# Patient Record
Sex: FEMALE | Race: WHITE | NOT HISPANIC OR LATINO | Employment: FULL TIME | URBAN - METROPOLITAN AREA
[De-identification: names, ages, dates, MRNs, and addresses within clinical notes are randomized per-mention and may not be internally consistent; named-entity substitution may affect disease eponyms.]

---

## 2017-01-31 ENCOUNTER — TRANSFERRED RECORDS (OUTPATIENT)
Dept: HEALTH INFORMATION MANAGEMENT | Facility: CLINIC | Age: 57
End: 2017-01-31

## 2017-02-17 ENCOUNTER — TELEPHONE (OUTPATIENT)
Dept: FAMILY MEDICINE | Facility: CLINIC | Age: 57
End: 2017-02-17
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
## 2017-03-20 ENCOUNTER — OFFICE VISIT (OUTPATIENT)
Dept: FAMILY MEDICINE | Facility: CLINIC | Age: 57
End: 2017-03-20
Payer: COMMERCIAL

## 2017-03-20 VITALS
DIASTOLIC BLOOD PRESSURE: 80 MMHG | SYSTOLIC BLOOD PRESSURE: 122 MMHG | BODY MASS INDEX: 39.16 KG/M2 | HEART RATE: 88 BPM | WEIGHT: 229.4 LBS | HEIGHT: 64 IN

## 2017-03-20 DIAGNOSIS — B07.0 PLANTAR WART: ICD-10-CM

## 2017-03-20 DIAGNOSIS — I10 ESSENTIAL HYPERTENSION WITH GOAL BLOOD PRESSURE LESS THAN 140/90: ICD-10-CM

## 2017-03-20 DIAGNOSIS — E66.01 MORBID OBESITY DUE TO EXCESS CALORIES (H): ICD-10-CM

## 2017-03-20 DIAGNOSIS — R80.9 MICROALBUMINURIA: ICD-10-CM

## 2017-03-20 DIAGNOSIS — E11.29 TYPE 2 DIABETES MELLITUS WITH OTHER DIABETIC KIDNEY COMPLICATION (H): Primary | ICD-10-CM

## 2017-03-20 DIAGNOSIS — E78.5 HYPERLIPIDEMIA LDL GOAL <100: ICD-10-CM

## 2017-03-20 LAB — HBA1C MFR BLD: 10.6 % (ref 4.3–6)

## 2017-03-20 PROCEDURE — 99214 OFFICE O/P EST MOD 30 MIN: CPT | Mod: 25 | Performed by: FAMILY MEDICINE

## 2017-03-20 PROCEDURE — 83036 HEMOGLOBIN GLYCOSYLATED A1C: CPT | Performed by: FAMILY MEDICINE

## 2017-03-20 PROCEDURE — 36415 COLL VENOUS BLD VENIPUNCTURE: CPT | Performed by: FAMILY MEDICINE

## 2017-03-20 PROCEDURE — 99207 C FOOT EXAM  NO CHARGE: CPT | Performed by: FAMILY MEDICINE

## 2017-03-20 PROCEDURE — 17110 DESTRUCTION B9 LES UP TO 14: CPT | Performed by: FAMILY MEDICINE

## 2017-03-20 NOTE — PATIENT INSTRUCTIONS
Diabetes Plan  1. Hemoglobin A1c goal is between 7% and 8%  Your Hemoglobin A1c is not at goal  Plan is:Will use novolog 18 units with each meal, an extra 6 units if glucometer over 200.  2. LDL (bad cholesterol) goal is less than 100  Your LDL is at goal  Plan is: Continue medications at current doses  3. Blood pressure goal is less than 140/90.  Your blood pressure is at goal.  Plan is: Continue medications at current doses  Come back in 3 months.                  Thank you for choosing [facility].  You may be receiving a survey in the mail from [employer] regarding your visit today.  Please take a few minutes to complete and return the survey to let us know how we are doing.      Our Clinic hours are:  Mondays    7:20 am - 7 pm  Tues -  Fri  7:20 am - 5 pm    Clinic Phone: [phone]    The clinic lab opens at 7:30 am Mon - Fri and appointments are required.    [facility]  Ph. [phone]  Monday-Thursday 8 am - 7pm  Tues/Wed/Fri 8 am - 5:30 pm

## 2017-03-20 NOTE — NURSING NOTE
"Chief Complaint   Patient presents with     Diabetes     has some questions got a cold on Wednesday       Initial /80 (Cuff Size: Adult Large)  Pulse 88  Ht 5' 3.5\" (1.613 m)  Wt 229 lb 6.4 oz (104.1 kg)  BMI 40 kg/m2 Estimated body mass index is 40 kg/(m^2) as calculated from the following:    Height as of this encounter: 5' 3.5\" (1.613 m).    Weight as of this encounter: 229 lb 6.4 oz (104.1 kg).  Medication Reconciliation: complete   Faiza Card CMA      "

## 2017-03-20 NOTE — PROGRESS NOTES
SUBJECTIVE:                                                    Bria Davis is a 56 year old female who presents to clinic today for the following health issues:      Diabetes Follow-up    Patient is checking blood sugars: three times daily.   Blood sugar testing frequency justification: Patient modifying lifestyle changes (diet, exercise) with blood sugars  Results are as follows:         am - 200         lunchtime - 200         suppertime - 220    Diabetic concerns: blood sugar frequently over 200     Symptoms of hypoglycemia (low blood sugar): none     Paresthesias (numbness or burning in feet) or sores: No     Date of last diabetic eye exam: 6 weeks ago        Amount of exercise or physical activity: 1 day/week for an average of 15-30 minutes    Problems taking medications regularly: No    Medication side effects: none    Diet: regular (no restrictions)    Last visit I recommended that she take 10 units of NovoLog with each meal if the glucometer at the time was over 200. She admits that she would not bring her insulin to work so never did cover her lunch and was often reluctant to take NovoLog at supper with fear that she might have hypoglycemia during the night. In the past if she had taken 30 units of NovoLog with meals she had some episodes of hypoglycemia      PROBLEMS TO ADD ON...      Hyperlipidemia Follow-Up      Rate your low fat/cholesterol diet?: good    Taking statin?  Yes, no muscle aches from statin    Other lipid medications/supplements?:  none     Hypertension Follow-up      Outpatient blood pressures are being checked at work.  Results are in the acceptable range.    Low Salt Diet: no added salt       Asthma Follow-Up    Was ACT completed today?    Yes    ACT Total Scores 3/20/2017   ACT TOTAL SCORE (Goal Greater than or Equal to 20) 21   In the past 12 months, how many times did you visit the emergency room for your asthma without being admitted to the hospital? 0   In the past 12 months,  "how many times were you hospitalized overnight because of your asthma? 0       Recent asthma triggers that patient is dealing with: upper respiratory infections       Plantar wart: She has a plantar wart on the right foot distal to the heel area. This was treated with cryotherapy in the past but did not resolve completely        Problem list and histories reviewed & adjusted, as indicated.  Additional history: none        Reviewed and updated as needed this visit by clinical staff  Tobacco  Allergies  Meds  Med Hx  Surg Hx  Fam Hx  Soc Hx      Reviewed and updated as needed this visit by Provider               ROS:  Constitutional, HEENT, cardiovascular, pulmonary, gi and gu systems are negative, except as otherwise noted.        OBJECTIVE:                                                    /80 (Cuff Size: Adult Large)  Pulse 88  Ht 5' 3.5\" (1.613 m)  Wt 229 lb 6.4 oz (104.1 kg)  BMI 40 kg/m2    GENERAL: healthy, alert and no distress  EYES: Eyes grossly normal to inspection, extraocular movements - intact, and PERRL  NECK: no tenderness, no adenopathy, no asymmetry, no masses, no stiffness; thyroid- normal to palpation  RESP: lungs clear to auscultation - no rales, no rhonchi, no wheezes  CV: regular rates and rhythm, normal S1 S2, no S3 or S4 and no murmur, no click or rub -  MS: extremities- no gross deformities noted, no edema  SKIN: 4 mm plantar wart on the plantar surface of right foot distal to the heel  Diabetic foot exam: no trophic changes or ulcerative lesions and normal monofilament exam    Diagnostic test results:  Results for orders placed or performed in visit on 03/20/17 (from the past 24 hour(s))   Hemoglobin A1c   Result Value Ref Range    Hemoglobin A1C 10.6 (H) 4.3 - 6.0 %        ASSESSMENT/PLAN:                                                    ASSESSMENT:  1. Type 2 diabetes mellitus with other diabetic kidney complication (H)    2. Essential hypertension with goal blood pressure " less than 140/90    3. Morbid obesity due to excess calories (H)    4. Hyperlipidemia LDL goal <100    5. Microalbuminuria    6. Plantar wart        PLAN:  Orders Placed This Encounter     DESTRUCT BENIGN LESION, UP TO 14     Hemoglobin A1c     insulin aspart (NOVOLOG VIAL) 100 UNITS/ML injection     The callus was trimmed off the wart with a razor blade and then the wart was treated with cryotherapy using liquid nitrogen with a freeze-thaw-freeze technique ×3    Patient Instructions   Diabetes Plan  1. Hemoglobin A1c goal is between 7% and 8%  Your Hemoglobin A1c is not at goal  Plan is:Will use novolog 18 units with each meal, an extra 6 units if glucometer over 200.  2. LDL (bad cholesterol) goal is less than 100  Your LDL is at goal  Plan is: Continue medications at current doses  3. Blood pressure goal is less than 140/90.  Your blood pressure is at goal.  Plan is: Continue medications at current doses  Come back in 3 months.                  Thank you for choosing [facility].  You may be receiving a survey in the mail from [employer] regarding your visit today.  Please take a few minutes to complete and return the survey to let us know how we are doing.      Our Clinic hours are:  Mondays    7:20 am - 7 pm  Tues -  Fri  7:20 am - 5 pm    Clinic Phone: [phone]    The clinic lab opens at 7:30 am Mon - Fri and appointments are required.    [facility]  Ph. [phone]  Monday-Thursday 8 am - 7pm  Tues/Wed/Fri 8 am - 5:30 pm             IRVIN Conroy  [facility]

## 2017-03-20 NOTE — Clinical Note
Please abstract the following data from this visit with this patient into the appropriate field in Epic:  Mammogram done on this date: 4/4/2017 (approximately), by this group: [facility], results were normal.

## 2017-03-20 NOTE — MR AVS SNAPSHOT
After Visit Summary   3/20/2017    Bria Davis    MRN: [id number]           Patient Information     Date Of Birth          1960        Visit Information        Provider Department      3/20/2017 4:40 PM Rafiq, IRVIN Malloy MD [facility]        Today's Diagnoses     Type 2 diabetes mellitus with other diabetic kidney complication (H)    -  1      Care Instructions    Diabetes Plan  1. Hemoglobin A1c goal is between 7% and 8%  Your Hemoglobin A1c is not at goal  Plan is:Will use novolog 18 units with each meal, an extra 6 units if glucometer over 200.  2. LDL (bad cholesterol) goal is less than 100  Your LDL is at goal  Plan is: Continue medications at current doses  3. Blood pressure goal is less than 140/90.  Your blood pressure is at goal.  Plan is: Continue medications at current doses  Come back in 3 months.                  Thank you for choosing [facility].  You may be receiving a survey in the mail from [employer] [facility] [employer] regarding your visit today.  Please take a few minutes to complete and return the survey to let us know how we are doing.      Our Clinic hours are:  Mondays    7:20 am - 7 pm  Tues -  Fri  7:20 am - 5 pm    Clinic Phone: [phone]    The clinic lab opens at 7:30 am Mon - Fri and appointments are required.    [facility]  Ph. [phone]  Monday-Thursday 8 am - 7pm  Tues/Wed/Fri 8 am - 5:30 pm               Follow-ups after your visit        Who to contact     If you have questions or need follow up information about today's clinic visit or your schedule please contact [facility] directly at [phone].  Normal or non-critical lab and imaging results will be communicated to you by MyChart, letter or phone within 4 business days after the clinic has received the results. If you do not hear from us within 7 days, please contact the clinic through MyChart or phone. If you have a critical or abnormal lab result, we  "will notify you by phone as soon as possible.  Submit refill requests through [employer] or call your pharmacy and they will forward the refill request to us. Please allow 3 business days for your refill to be completed.          Additional Information About Your Visit        [employer] Information     [employer] gives you secure access to your electronic health record. If you see a primary care provider, you can also send messages to your care team and make appointments. If you have questions, please call your primary care clinic.  If you do not have a primary care provider, please call [phone] and they will assist you.        Care EveryWhere ID     This is your Care EveryWhere ID. This could be used by other organizations to access your [facility] medical records  [id number]        Your Vitals Were     Pulse Height BMI (Body Mass Index)             88 5' 3.5\" (1.613 m) 40 kg/m2          Blood Pressure from Last 3 Encounters:   03/20/17 122/80   12/08/16 126/78   09/02/16 124/86    Weight from Last 3 Encounters:   03/20/17 229 lb 6.4 oz (104.1 kg)   12/08/16 229 lb 3.2 oz (104 kg)   09/02/16 244 lb (110.7 kg)              We Performed the Following     Hemoglobin A1c          Today's Medication Changes          These changes are accurate as of: 3/20/17  5:19 PM.  If you have any questions, ask your nurse or doctor.               These medicines have changed or have updated prescriptions.        Dose/Directions    insulin aspart 100 UNITS/ML injection   Commonly known as:  NovoLOG VIAL   This may have changed:  additional instructions   Used for:  Type 2 diabetes mellitus with other diabetic kidney complication (H)   Changed by:  IRVIN Conroy MD        Take 18 units with each meal. If glucometer is over 200 take an extra 6 units   Quantity:  3 vial   Refills:  1            Where to get your medicines      These medications were sent to [facility] [place], [place] - [address]  [address] [address] [facility] [address]     Phone:  [phone]     insulin aspart 100 UNITS/ML injection                Primary Care Provider Office Phone # Fax #    R Gama Conroy [phone]       [facility] [address] [facility] [address]        Thank you!     Thank you for choosing [facility]  for your care. Our goal is always to provide you with excellent care. Hearing back from our patients is one way we can continue to improve our services. Please take a few minutes to complete the written survey that you may receive in the mail after your visit with us. Thank you!             Your Updated Medication List - Protect others around you: Learn how to safely use, store and throw away your medicines at www.disposemymeds.org.          This list is accurate as of: 3/20/17  5:19 PM.  Always use your most recent med list.                   Brand Name Dispense Instructions for use    albuterol 108 (90 BASE) MCG/ACT Inhaler    PROAIR HFA/PROVENTIL HFA/VENTOLIN HFA    1 Inhaler    Inhale 2 puffs into the lungs every 6 hours       aspirin 81 MG tablet     100 tablet    Take 1 tablet by mouth daily.       blood glucose monitoring lancets     4 Box    1 each by In Vitro route 4 times daily Use to test blood sugar as directed.       blood glucose monitoring test strip    no brand specified    4 Box    Contour test stripsby In Vitro route 4 times daily       Calcium-Vitamin D 600-400 MG-UNIT Tabs      Take 1 tablet by mouth daily       dulaglutide 1.5 MG/0.5ML pen    TRULICITY    6 mL    Inject 1.5 mg Subcutaneous every 7 days       esomeprazole 40 MG CR capsule    nexIUM    90 capsule    Take 1 capsule (40 mg) by mouth every morning (before breakfast)       fluconazole 150 MG tablet    DIFLUCAN    4 tablet    Take 1 tablet (150 mg) by mouth every 3 days       fluticasone-salmeterol 100-50 MCG/DOSE diskus inhaler    ADVAIR DISKUS    3 Inhaler    Inhale 1 puff into  "the lungs 2 times daily       insulin aspart 100 UNITS/ML injection    NovoLOG VIAL    3 vial    Take 18 units with each meal. If glucometer is over 200 take an extra 6 units       insulin glargine 100 UNIT/ML injection    LANTUS VIAL    3 Month    Inject 64 Units Subcutaneous in the morning       losartan 100 MG tablet    COZAAR    100 tablet    Take 1 tablet (100 mg) by mouth daily       metFORMIN 500 MG 24 hr tablet    GLUCOPHAGE-XR    360 tablet    Take 2 tablets (1,000 mg) by mouth 2 times daily (with meals) (Needs lab work)       metoclopramide 10 MG tablet    REGLAN    180 tablet    Take 1 tablet (10 mg) by mouth 2 times daily       montelukast 10 MG tablet    SINGULAIR    90 tablet    Take 1 tablet (10 mg) by mouth At Bedtime       MULTIVITAMIN ADULTS 50+ Tabs      Take 1 tablet by mouth daily       Needle (Disp) 30G X 1/2\" Misc    BD DISP NEEDLES    300 each    To use with humalog insulin 3 times daily       pseudoePHEDrine 30 MG tablet    SUDAFED    360 tablet    Take 2 tablets (60 mg) by mouth every 12 hours       simvastatin 40 MG tablet    ZOCOR    100 tablet    Take 1 tablet (40 mg) by mouth At Bedtime at bedtime.         "

## 2017-03-21 ASSESSMENT — ASTHMA QUESTIONNAIRES: ACT_TOTALSCORE: 21

## 2017-04-04 ENCOUNTER — TRANSFERRED RECORDS (OUTPATIENT)
Dept: HEALTH INFORMATION MANAGEMENT | Facility: CLINIC | Age: 57
End: 2017-04-04

## 2017-04-24 ENCOUNTER — TELEPHONE (OUTPATIENT)
Dept: FAMILY MEDICINE | Facility: CLINIC | Age: 57
End: 2017-04-24

## 2017-04-24 DIAGNOSIS — E11.29 TYPE 2 DIABETES MELLITUS WITH OTHER DIABETIC KIDNEY COMPLICATION (H): ICD-10-CM

## 2017-04-24 NOTE — TELEPHONE ENCOUNTER
"Patient is requesting a new syringe needle prescription, she is looking for a shorter inch needle for her novolog, she doesn't like the 1/2\" needle. She also is requesting an Rx for her syringes for lantus.     Thank You-  Noy Ceballos, [facility] [place]     "

## 2017-04-26 NOTE — TELEPHONE ENCOUNTER
Talked to pharmacist, okay to dispense whatever they can find as a shorter alternative.     Kaia Elena M.D.

## 2017-05-01 ENCOUNTER — OFFICE VISIT (OUTPATIENT)
Dept: FAMILY MEDICINE | Facility: CLINIC | Age: 57
End: 2017-05-01
Payer: COMMERCIAL

## 2017-05-01 VITALS
WEIGHT: 240.2 LBS | DIASTOLIC BLOOD PRESSURE: 80 MMHG | BODY MASS INDEX: 41.01 KG/M2 | SYSTOLIC BLOOD PRESSURE: 130 MMHG | HEART RATE: 72 BPM | HEIGHT: 64 IN

## 2017-05-01 DIAGNOSIS — G44.209 MUSCLE CONTRACTION HEADACHE: Primary | ICD-10-CM

## 2017-05-01 DIAGNOSIS — E11.29 TYPE 2 DIABETES MELLITUS WITH OTHER DIABETIC KIDNEY COMPLICATION (H): ICD-10-CM

## 2017-05-01 PROCEDURE — 99214 OFFICE O/P EST MOD 30 MIN: CPT | Performed by: FAMILY MEDICINE

## 2017-05-01 RX ORDER — CYCLOBENZAPRINE HCL 10 MG
10 TABLET ORAL AT BEDTIME
Qty: 30 TABLET | Refills: 5 | Status: SHIPPED | OUTPATIENT
Start: 2017-05-01 | End: 2017-09-18

## 2017-05-01 NOTE — MR AVS SNAPSHOT
"              After Visit Summary   5/1/2017    Bria Davis    MRN: [id number]           Patient Information     Date Of Birth          1960        Visit Information        Provider Department      5/1/2017 4:40 PM IRVIN Conroy MD [facility]        Today's Diagnoses     Muscle contraction headache    -  1      Care Instructions    Alternate ice and heat to the back of the neck. Take the Flexeril at night if you have a persistent headache.          Follow-ups after your visit        Who to contact     If you have questions or need follow up information about today's clinic visit or your schedule please contact [facility] directly at [phone].  Normal or non-critical lab and imaging results will be communicated to you by MyChart, letter or phone within 4 business days after the clinic has received the results. If you do not hear from us within 7 days, please contact the clinic through [employer] or phone. If you have a critical or abnormal lab result, we will notify you by phone as soon as possible.  Submit refill requests through [employer] or call your pharmacy and they will forward the refill request to us. Please allow 3 business days for your refill to be completed.          Additional Information About Your Visit        MyChart Information     [employer] gives you secure access to your electronic health record. If you see a primary care provider, you can also send messages to your care team and make appointments. If you have questions, please call your primary care clinic.  If you do not have a primary care provider, please call [phone] and they will assist you.        Care EveryWhere ID     This is your Care EveryWhere ID. This could be used by other organizations to access your [facility] medical records  [id number]        Your Vitals Were     Pulse Height BMI (Body Mass Index)             72 5' 3.5\" (1.613 m) 41.88 kg/m2          Blood Pressure from Last 3 " Encounters:   05/01/17 130/80   03/20/17 122/80   12/08/16 126/78    Weight from Last 3 Encounters:   05/01/17 240 lb 3.2 oz (109 kg)   03/20/17 229 lb 6.4 oz (104.1 kg)   12/08/16 229 lb 3.2 oz (104 kg)              Today, you had the following     No orders found for display         Today's Medication Changes          These changes are accurate as of: 5/1/17  4:56 PM.  If you have any questions, ask your nurse or doctor.               Start taking these medicines.        Dose/Directions    cyclobenzaprine 10 MG tablet   Commonly known as:  FLEXERIL   Used for:  Muscle contraction headache   Started by:  IRVIN Conroy MD        Dose:  10 mg   Take 1 tablet (10 mg) by mouth At Bedtime   Quantity:  30 tablet   Refills:  5            Where to get your medicines      These medications were sent to [facility] [place] [id number]  [id number] [place] [address]     Phone:  [phone]     cyclobenzaprine 10 MG tablet                Primary Care Provider Office Phone # Fax #    IRVIN Conroy [phone]       [address] [address]        Thank you!     Thank you for choosing [facility]  for your care. Our goal is always to provide you with excellent care. Hearing back from our patients is one way we can continue to improve our services. Please take a few minutes to complete the written survey that you may receive in the mail after your visit with us. Thank you!             Your Updated Medication List - Protect others around you: Learn how to safely use, store and throw away your medicines at www.disposemymeds.org.          This list is accurate as of: 5/1/17  4:56 PM.  Always use your most recent med list.                   Brand Name Dispense Instructions for use    albuterol 108 (90 BASE) MCG/ACT Inhaler    PROAIR HFA/PROVENTIL HFA/VENTOLIN HFA    1 Inhaler    Inhale 2 puffs  "into the lungs every 6 hours       aspirin 81 MG tablet     100 tablet    Take 1 tablet by mouth daily.       blood glucose monitoring lancets     4 Box    1 each by In Vitro route 4 times daily Use to test blood sugar as directed.       blood glucose monitoring test strip    no brand specified    4 Box    Contour test stripsby In Vitro route 4 times daily       Calcium-Vitamin D 600-400 MG-UNIT Tabs      Take 1 tablet by mouth daily       cyclobenzaprine 10 MG tablet    FLEXERIL    30 tablet    Take 1 tablet (10 mg) by mouth At Bedtime       dulaglutide 1.5 MG/0.5ML pen    TRULICITY    6 mL    Inject 1.5 mg Subcutaneous every 7 days       esomeprazole 40 MG CR capsule    nexIUM    90 capsule    Take 1 capsule (40 mg) by mouth every morning (before breakfast)       fluconazole 150 MG tablet    DIFLUCAN    4 tablet    Take 1 tablet (150 mg) by mouth every 3 days       fluticasone-salmeterol 100-50 MCG/DOSE diskus inhaler    ADVAIR DISKUS    3 Inhaler    Inhale 1 puff into the lungs 2 times daily       insulin aspart 100 UNITS/ML injection    NovoLOG VIAL    3 vial    Take 18 units with each meal. If glucometer is over 200 take an extra 6 units       insulin glargine 100 UNIT/ML injection    LANTUS VIAL    3 Month    Inject 64 Units Subcutaneous in the morning       losartan 100 MG tablet    COZAAR    100 tablet    Take 1 tablet (100 mg) by mouth daily       metFORMIN 500 MG 24 hr tablet    GLUCOPHAGE-XR    360 tablet    Take 2 tablets (1,000 mg) by mouth 2 times daily (with meals) (Needs lab work)       metoclopramide 10 MG tablet    REGLAN    180 tablet    Take 1 tablet (10 mg) by mouth 2 times daily       montelukast 10 MG tablet    SINGULAIR    90 tablet    Take 1 tablet (10 mg) by mouth At Bedtime       MULTIVITAMIN ADULTS 50+ Tabs      Take 1 tablet by mouth daily       Needle (Disp) 30G X 1/2\" Misc    BD DISP NEEDLES    300 each    To use with humalog insulin 3 times daily       pseudoePHEDrine 30 MG tablet    " SUDAFED    360 tablet    Take 2 tablets (60 mg) by mouth every 12 hours       simvastatin 40 MG tablet    ZOCOR    100 tablet    Take 1 tablet (40 mg) by mouth At Bedtime at bedtime.

## 2017-05-01 NOTE — PATIENT INSTRUCTIONS
Alternate ice and heat to the back of the neck. Take the Flexeril at night if you have a persistent headache.

## 2017-05-02 NOTE — PROGRESS NOTES
Subjective:  Bria Davis is a 56 year old female   Chief Complaint   Patient presents with     Headache     X worse in the last week.      Patient Request     concerns with ? reaction to Trulicity. sx are choking when swallowing. food, water or whatever pt. is having. X months.      Last week she had a headache that was more severe than usual in the right posterior neck and occipital region. This particular headache lasted 2 days which is quite unusual for her. She will get hit headaches periodically but they generally only last a few hours. It has gotten better since then but she's had a few episodes of the headache off and on since that. She has had a few episodes of choking with swallowing food and had read about truly see being associated with thyroid cancer. She admits that she was worried that the symptoms could be because of a tumor in the thyroid.        Encounter Diagnoses   Name Primary?     Muscle contraction headache Yes     Type 2 diabetes mellitus with other diabetic kidney complication (H)        ROS:other than noted above, general, HEENT, respiratory, cardiac, gastrointestinal systems are negative    Medical, surgical, social, and family histories, medications and allergies reviewed and updated.    Objective:  Exam:    GENERAL APPEARANCE ADULT: Alert, no acute distress  EYES: PERRL, EOM normal, conjunctiva and lids normal  HENT: Ears and TMs normal, oral mucosa and posterior oropharynx normal  NECK: No adenopathy,masses or thyromegaly  RESP: lungs clear to auscultation   CV: normal rate, regular rhythm, no murmur or gallop  NEURO: Alert, oriented, speech and mentation normal, Cranial nerves 2-12 are normal., Strength normal and symmetrical in upper and lower extremities., Reflexes 2+ and symmetrical at biceps, triceps, brachioradialis, knees and ankles, Finger to nose and heel to shin testing is normal, Sensation is normal., Gait normal    She brought in her glucometer readings and they have  been quite improved over the last month    ASSESSMENT:  1. Muscle contraction headache; Discussed pathophysiology of this condition and implications.  Questions answered.     2. Type 2 diabetes mellitus with other diabetic kidney complication (H)      Reassured her that thyroid cancer would present as a palpable lump in the thyroid gland and she does not have this. Would really like to continue with the [facility] [place] since it is helping her blood sugars    PLAN:  Orders Placed This Encounter     cyclobenzaprine (FLEXERIL) 10 MG tablet   Discussed how to take the medication(s), expected outcomes, potential side effects.     Patient Instructions   Alternate ice and heat to the back of the neck. Take the Flexeril at night if you have a persistent headache.

## 2017-05-22 ENCOUNTER — OFFICE VISIT (OUTPATIENT)
Dept: FAMILY MEDICINE | Facility: CLINIC | Age: 57
End: 2017-05-22
Payer: COMMERCIAL

## 2017-05-22 VITALS
HEART RATE: 101 BPM | DIASTOLIC BLOOD PRESSURE: 86 MMHG | HEIGHT: 64 IN | BODY MASS INDEX: 41.48 KG/M2 | RESPIRATION RATE: 16 BRPM | SYSTOLIC BLOOD PRESSURE: 137 MMHG | TEMPERATURE: 98.2 F | WEIGHT: 243 LBS

## 2017-05-22 DIAGNOSIS — E11.29 TYPE 2 DIABETES MELLITUS WITH OTHER DIABETIC KIDNEY COMPLICATION (H): ICD-10-CM

## 2017-05-22 DIAGNOSIS — H10.31 ACUTE BACTERIAL CONJUNCTIVITIS OF RIGHT EYE: Primary | ICD-10-CM

## 2017-05-22 PROCEDURE — 99213 OFFICE O/P EST LOW 20 MIN: CPT | Performed by: NURSE PRACTITIONER

## 2017-05-22 RX ORDER — CIPROFLOXACIN HYDROCHLORIDE 3.5 MG/ML
SOLUTION/ DROPS TOPICAL
Qty: 1 BOTTLE | Refills: 0 | Status: SHIPPED | OUTPATIENT
Start: 2017-05-22 | End: 2017-12-29

## 2017-05-22 NOTE — NURSING NOTE
"Chief Complaint   Patient presents with     Eye Problem     Medication Reconciliation     not on the Diflucan       Initial /86 (BP Location: Right arm, Patient Position: Chair, Cuff Size: Adult Large)  Pulse 101  Temp 98.2  F (36.8  C) (Oral)  Resp 16  Ht 5' 3.5\" (1.613 m)  Wt 243 lb (110.2 kg)  LMP   BMI 42.37 kg/m2 Estimated body mass index is 42.37 kg/(m^2) as calculated from the following:    Height as of this encounter: 5' 3.5\" (1.613 m).    Weight as of this encounter: 243 lb (110.2 kg).  Medication Reconciliation: complete  "

## 2017-05-22 NOTE — MR AVS SNAPSHOT
After Visit Summary   5/22/2017    Bria Davis    MRN: [id number]           Patient Information     Date Of Birth          1960        Visit Information        Provider Department      5/22/2017 9:40 AM Varsha Posey APRN [facility]        Today's Diagnoses     Acute bacterial conjunctivitis of right eye    -  1      Care Instructions    If no improvement or any worsening, follow up with eye doctor.                  Conjunctivitis  What is eye inflammation?   The clear membrane that lines the inside of the eyelids and covers the white of the eye (conjunctiva) can get red and swollen. This is called conjunctivitis.   How does it occur?   Conjunctivitis can be caused by many things, including infection by viruses or bacteria. Many kinds of bacteria can cause conjunctivitis. These include bacteria that cause strep, staph, and STD infections.   Conjunctivitis caused by a virus is sometimes called pink eye. It can be spread easily to other people. The same viruses that cause the common cold can cause viral conjunctivitis. Viruses can be spread by coughing or sneezing and can get in your eyes through contact with infected:  hands   washcloths or towels   cosmetics   false eyelashes   soft contact lenses  Avoid unnecessary contact with others so that you do not spread the disease.   What are the symptoms?   Symptoms may include:  itchy or scratchy eyes   redness   painful sensitivity to light   swelling of eyelids   matting of eyelashes   watery or pus discharge  How is it diagnosed?   Your healthcare provider will ask about your medical history and if you have been near someone who has conjunctivitis. Your provider will examine your eyes. He or she will also check for enlarged lymph nodes near your ear and jaw. Your provider may get lab tests of a sample of the pus to see what type of germs are present.  How is it treated?   Like a cold, viral conjunctivitis will  usually go away on its own without treatment. However, your healthcare provider may prescribe eyedrops to help control your symptoms. Antihistamine pills may also relieve the itching and redness.  If you have bacterial conjunctivitis, your healthcare provider will prescribe antibiotic eyedrops. You can also help your eyes get better by washing them gently to remove any pus or crusts. Then dry them gently with a clean towel.  For very severe forms of conjunctivitis, antibiotics may need to be given by mouth or with a shot or an IV.  If you wear contact lenses, you will need to stop wearing them until your eyes are healed. The combination of contacts and conjunctivitis may damage your cornea (the clear outer layer on the front of your eye) and cause severe vision problems. Your provider may ask you to throw away your current contact lenses and lens case.  How long will the effects last?   Viral conjunctivitis usually gets worse 5 to 7 days after the first symptoms. It can get better in 10 days to 1 month. If only one eye is affected at first, the other eye may become infected up to 2 weeks later. Usually, if both eyes are affected, the first eye has worse conjunctivitis than the second.  Bacterial conjunctivitis should improve within 2 days after you begin using antibiotics. If your eyes are not better after 3 days of antibiotics, call your healthcare provider.  How can I prevent conjunctivitis?   To keep from getting conjunctivitis from someone who has it, or to keep from spreading it to others, follow these guidelines:  Wash your hands often. Do not touch or rub your eyes.   Never share eye makeup or cosmetics with anyone. When you have conjunctivitis, throw out eye makeup you have been using.   Never use eye medicine that has been prescribed for someone else.   Do not share towels, washcloths, pillows, or sheets with anyone. If one of your eyes is affected but not the other, use a separate towel for each eye.    Avoid swimming in swimming pools if you have conjunctivitis.   Avoid close contact with people until your symptoms improve. Depending on your job, you may be asked to take some time off from work.  When should I call my healthcare provider?   Call your provider if:  You have any severe eye pain.   Your symptoms do not improve after you have used your medicine for 3 days (if you have bacterial conjunctivitis).   Your symptoms do not improve after 2 weeks (if you have viral conjunctivitis).   Your eyes get very sensitive to light, even after the redness is gone.  Reviewed for medical accuracy by faculty at the [facility]. Web site: [email or web address]        Thank you for choosing [facility].  You may be receiving a survey in the mail from [employer] regarding your visit today.  Please take a few minutes to complete and return the survey to let us know how we are doing.      Our Clinic hours are:  Mondays    7:20 am - 7 pm  Tues -  Fri  7:20 am - 5 pm    Clinic Phone: [phone]    The clinic lab opens at 7:30 am Mon - Fri and appointments are required.    [facility]  Ph. [phone]  Monday-Thursday 8 am - 7pm  Tues/Wed/Fri 8 am - 5:30 pm               Follow-ups after your visit        Who to contact     If you have questions or need follow up information about today's clinic visit or your schedule please contact [facility] directly at [phone].  Normal or non-critical lab and imaging results will be communicated to you by MyChart, letter or phone within 4 business days after the clinic has received the results. If you do not hear from us within 7 days, please contact the clinic through MyChart or phone. If you have a critical or abnormal lab result, we will notify you by phone as soon as possible.  Submit refill requests through [employer] or call your pharmacy and they will forward the refill request to us. Please  "allow 3 business days for your refill to be completed.          Additional Information About Your Visit        MyChart Information     [employer] gives you secure access to your electronic health record. If you see a primary care provider, you can also send messages to your care team and make appointments. If you have questions, please call your primary care clinic.  If you do not have a primary care provider, please call [phone] and they will assist you.        Care EveryWhere ID     This is your Care EveryWhere ID. This could be used by other organizations to access your [facility] medical records  [id number]        Your Vitals Were     Pulse Temperature Respirations Height BMI (Body Mass Index)       101 98.2  F (36.8  C) (Oral) 16 5' 3.5\" (1.613 m) 42.37 kg/m2        Blood Pressure from Last 3 Encounters:   05/22/17 137/86   05/01/17 130/80   03/20/17 122/80    Weight from Last 3 Encounters:   05/22/17 243 lb (110.2 kg)   05/01/17 240 lb 3.2 oz (109 kg)   03/20/17 229 lb 6.4 oz (104.1 kg)              We Performed the Following     Asthma Action Plan (AAP)          Today's Medication Changes          These changes are accurate as of: 5/22/17 10:27 AM.  If you have any questions, ask your nurse or doctor.               Start taking these medicines.        Dose/Directions    ciprofloxacin 0.3 % ophthalmic solution   Commonly known as:  CILOXAN   Used for:  Acute bacterial conjunctivitis of right eye   Started by:  Varsha Posey APRN CNP        Instill 1-2 drops in the affected eye(s) every 2 hours while awake for 2 days then 1-2 drops every 4 hours while awake for the next 5 days.   Quantity:  1 Bottle   Refills:  0            Where to get your medicines      These medications were sent to [facility] [place], [place] - [address]  [address], [facility] [address]     Phone:  [phone]     ciprofloxacin 0.3 % ophthalmic solution                Primary " Care Provider Office Phone # Fax #    IRVIN Gama Conroy [phone]       [address] [address]        Thank you!     Thank you for choosing [facility]  for your care. Our goal is always to provide you with excellent care. Hearing back from our patients is one way we can continue to improve our services. Please take a few minutes to complete the written survey that you may receive in the mail after your visit with us. Thank you!             Your Updated Medication List - Protect others around you: Learn how to safely use, store and throw away your medicines at www.disposemymeds.org.          This list is accurate as of: 5/22/17 10:27 AM.  Always use your most recent med list.                   Brand Name Dispense Instructions for use    albuterol 108 (90 BASE) MCG/ACT Inhaler    PROAIR HFA/PROVENTIL HFA/VENTOLIN HFA    1 Inhaler    Inhale 2 puffs into the lungs every 6 hours       aspirin 81 MG tablet     100 tablet    Take 1 tablet by mouth daily.       blood glucose monitoring lancets     4 Box    1 each by In Vitro route 4 times daily Use to test blood sugar as directed.       blood glucose monitoring test strip    no brand specified    4 Box    Contour test stripsby In Vitro route 4 times daily       Calcium-Vitamin D 600-400 MG-UNIT Tabs      Take 1 tablet by mouth daily       ciprofloxacin 0.3 % ophthalmic solution    CILOXAN    1 Bottle    Instill 1-2 drops in the affected eye(s) every 2 hours while awake for 2 days then 1-2 drops every 4 hours while awake for the next 5 days.       cyclobenzaprine 10 MG tablet    FLEXERIL    30 tablet    Take 1 tablet (10 mg) by mouth At Bedtime       dulaglutide 1.5 MG/0.5ML pen    TRULICITY    6 mL    Inject 1.5 mg Subcutaneous every 7 days       esomeprazole 40 MG CR capsule    nexIUM    90 capsule    Take 1 capsule (40 mg) by mouth every morning (before breakfast)       fluconazole 150 MG tablet  "   DIFLUCAN    4 tablet    Take 1 tablet (150 mg) by mouth every 3 days       fluticasone-salmeterol 100-50 MCG/DOSE diskus inhaler    ADVAIR DISKUS    3 Inhaler    Inhale 1 puff into the lungs 2 times daily       insulin aspart 100 UNITS/ML injection    NovoLOG VIAL    3 vial    Take 18 units with each meal. If glucometer is over 200 take an extra 6 units       insulin glargine 100 UNIT/ML injection    LANTUS VIAL    3 Month    Inject 64 Units Subcutaneous in the morning       losartan 100 MG tablet    COZAAR    100 tablet    Take 1 tablet (100 mg) by mouth daily       metFORMIN 500 MG 24 hr tablet    GLUCOPHAGE-XR    360 tablet    Take 2 tablets (1,000 mg) by mouth 2 times daily (with meals) (Needs lab work)       metoclopramide 10 MG tablet    REGLAN    180 tablet    Take 1 tablet (10 mg) by mouth 2 times daily       montelukast 10 MG tablet    SINGULAIR    90 tablet    Take 1 tablet (10 mg) by mouth At Bedtime       MULTIVITAMIN ADULTS 50+ Tabs      Take 1 tablet by mouth daily       Needle (Disp) 30G X 1/2\" Misc    BD DISP NEEDLES    300 each    To use with humalog insulin 3 times daily       pseudoePHEDrine 30 MG tablet    SUDAFED    360 tablet    Take 2 tablets (60 mg) by mouth every 12 hours       simvastatin 40 MG tablet    ZOCOR    100 tablet    Take 1 tablet (40 mg) by mouth At Bedtime at bedtime.         "

## 2017-05-22 NOTE — TELEPHONE ENCOUNTER
Trulicity          Last Written Prescription Date: 12/8/16  Last Fill Quantity: 6ml, # refills: 1  Last Office Visit with G, P or [facility] prescribing provider:  5/1/17        BP Readings from Last 3 Encounters:   05/22/17 137/86   05/01/17 130/80   03/20/17 122/80     Lab Results   Component Value Date    MICROL 271 05/19/2016     Lab Results   Component Value Date    UMALCR 231.62 05/19/2016     Creatinine   Date Value Ref Range Status   09/01/2016 0.57 0.52 - 1.04 mg/dL Final   ]  GFR Estimate   Date Value Ref Range Status   09/01/2016 >90  Non  GFR Calc   >60 mL/min/1.7m2 Final   09/28/2015 >90  Non  GFR Calc   >60 mL/min/1.7m2 Final   03/13/2014 >90 >60 mL/min/1.7m2 Final     GFR Estimate If Black   Date Value Ref Range Status   09/01/2016 >90   GFR Calc   >60 mL/min/1.7m2 Final   09/28/2015 >90   GFR Calc   >60 mL/min/1.7m2 Final   03/13/2014 >90 >60 mL/min/1.7m2 Final     Lab Results   Component Value Date    CHOL 170 11/25/2016     Lab Results   Component Value Date    HDL 45 11/25/2016     Lab Results   Component Value Date    LDL 80 11/25/2016     Lab Results   Component Value Date    TRIG 224 11/25/2016     Lab Results   Component Value Date    CHOLHDLRATIO 4.0 03/13/2014     Lab Results   Component Value Date    AST 32 04/26/2010     Lab Results   Component Value Date    ALT 17 03/30/2012     Lab Results   Component Value Date    A1C 10.6 03/20/2017    A1C 10.8 11/25/2016    A1C 10.1 09/01/2016    A1C 8.6 05/19/2016    A1C 8.4 01/21/2016     Potassium   Date Value Ref Range Status   09/01/2016 4.2 3.4 - 5.3 mmol/L Final     Thank You-  Noy Ceballos, [facility]

## 2017-05-22 NOTE — PATIENT INSTRUCTIONS
If no improvement or any worsening, follow up with eye doctor.                  Conjunctivitis  What is eye inflammation?   The clear membrane that lines the inside of the eyelids and covers the white of the eye (conjunctiva) can get red and swollen. This is called conjunctivitis.   How does it occur?   Conjunctivitis can be caused by many things, including infection by viruses or bacteria. Many kinds of bacteria can cause conjunctivitis. These include bacteria that cause strep, staph, and STD infections.   Conjunctivitis caused by a virus is sometimes called pink eye. It can be spread easily to other people. The same viruses that cause the common cold can cause viral conjunctivitis. Viruses can be spread by coughing or sneezing and can get in your eyes through contact with infected:  hands   washcloths or towels   cosmetics   false eyelashes   soft contact lenses  Avoid unnecessary contact with others so that you do not spread the disease.   What are the symptoms?   Symptoms may include:  itchy or scratchy eyes   redness   painful sensitivity to light   swelling of eyelids   matting of eyelashes   watery or pus discharge  How is it diagnosed?   Your healthcare provider will ask about your medical history and if you have been near someone who has conjunctivitis. Your provider will examine your eyes. He or she will also check for enlarged lymph nodes near your ear and jaw. Your provider may get lab tests of a sample of the pus to see what type of germs are present.  How is it treated?   Like a cold, viral conjunctivitis will usually go away on its own without treatment. However, your healthcare provider may prescribe eyedrops to help control your symptoms. Antihistamine pills may also relieve the itching and redness.  If you have bacterial conjunctivitis, your healthcare provider will prescribe antibiotic eyedrops. You can also help your eyes get better by washing them gently to remove any pus or crusts. Then dry  them gently with a clean towel.  For very severe forms of conjunctivitis, antibiotics may need to be given by mouth or with a shot or an IV.  If you wear contact lenses, you will need to stop wearing them until your eyes are healed. The combination of contacts and conjunctivitis may damage your cornea (the clear outer layer on the front of your eye) and cause severe vision problems. Your provider may ask you to throw away your current contact lenses and lens case.  How long will the effects last?   Viral conjunctivitis usually gets worse 5 to 7 days after the first symptoms. It can get better in 10 days to 1 month. If only one eye is affected at first, the other eye may become infected up to 2 weeks later. Usually, if both eyes are affected, the first eye has worse conjunctivitis than the second.  Bacterial conjunctivitis should improve within 2 days after you begin using antibiotics. If your eyes are not better after 3 days of antibiotics, call your healthcare provider.  How can I prevent conjunctivitis?   To keep from getting conjunctivitis from someone who has it, or to keep from spreading it to others, follow these guidelines:  Wash your hands often. Do not touch or rub your eyes.   Never share eye makeup or cosmetics with anyone. When you have conjunctivitis, throw out eye makeup you have been using.   Never use eye medicine that has been prescribed for someone else.   Do not share towels, washcloths, pillows, or sheets with anyone. If one of your eyes is affected but not the other, use a separate towel for each eye.   Avoid swimming in swimming pools if you have conjunctivitis.   Avoid close contact with people until your symptoms improve. Depending on your job, you may be asked to take some time off from work.  When should I call my healthcare provider?   Call your provider if:  You have any severe eye pain.   Your symptoms do not improve after you have used your medicine for 3 days (if you have bacterial  conjunctivitis).   Your symptoms do not improve after 2 weeks (if you have viral conjunctivitis).   Your eyes get very sensitive to light, even after the redness is gone.  Reviewed for medical accuracy by faculty at the [facility]. Web site: [email or web address]        Thank you for choosing [facility]  You may be receiving a survey in the mail from [employer] regarding your visit today.  Please take a few minutes to complete and return the survey to let us know how we are doing.      Our Clinic hours are:  Mondays    7:20 am - 7 pm  Tues -  Fri  7:20 am - 5 pm    Clinic Phone: [phone]    The clinic lab opens at 7:30 am Mon - Fri and appointments are required.    [facility]  Ph. [phone]  Monday-Thursday 8 am - 7pm  Tues/Wed/Fri 8 am - 5:30 pm

## 2017-05-22 NOTE — PROGRESS NOTES
SUBJECTIVE:                                                    Bria Davis is a 56 year old female who presents to clinic today for the following health issues:  not on the Diflucan    Eye(s) Problem      Duration: watery yesterday and worse today    Description:  Location: right  Pain: YES  Redness: YES  Discharge: YES    Accompanying signs and symptoms: none    History (Trauma, foreign body exposure,): None    Precipitating or alleviating factors (contact use): None    Therapies tried and outcome: None           Problem list and histories reviewed & adjusted, as indicated.  Additional history: she denies any worsening of allergy symptoms. She reports a lot of pink eye going around her work; she works at [employer].  She states her right started watering yesterday and is worsening redness. Denies any crusty discharge present this morning.  She thought at first it may of felt like a foreign body but that's improved.  She denies pain.   No other URI complaint today.      Patient Active Problem List   Diagnosis     Mild intermittent asthma     Esophageal reflux     Allergic rhinitis     Obesity     Restless legs syndrome (RLS)     Enthesopathy     Lumbar radiculopathy     Microalbuminuria     Hyperlipidemia LDL goal <100     Type 2 diabetes mellitus with other diabetic kidney complication (H)     Morbid obesity (H)     Essential hypertension with goal blood pressure less than 140/90     Past Surgical History:   Procedure Laterality Date     COLONOSCOPY  1/31/2002     COLONOSCOPY  10/26/2012    Procedure: COLONOSCOPY;  Colonoscopy;  Surgeon: Margret Sales MD;  Location: [facility]     INJECT EPIDURAL LUMBAR  12/7/2010    INJECT EPIDURAL LUMBAR performed by GENERIC ANESTHESIA PROVIDER at [facility]     INJECT EPIDURAL LUMBAR  1/17/2011    INJECT EPIDURAL LUMBAR performed by GENERIC ANESTHESIA PROVIDER at [facility]     RELEASE CARPAL TUNNEL  6/19/2012    Procedure: RELEASE CARPAL TUNNEL;  Right  Carpal Tunnel Release;  Surgeon: Mike Sparrow MD;  Location: [facility]     RELEASE CARPAL TUNNEL  11/9/2012    Procedure: RELEASE CARPAL TUNNEL;  Left Carpal Tunnel Release;  Surgeon: Mike Sparrow MD;  Location: [facility]     SURGICAL HISTORY OF -   4/10/2000    bilateral total ethmoidectomies, bilateral maxillary antrostomies, bilateral SMR of inferior turbinates, reduction of lt turbinate porter bullosa       Social History   Substance Use Topics     Smoking status: Never Smoker     Smokeless tobacco: Never Used     Alcohol use No     Family History   Problem Relation Age of Onset     Hypertension Mother      DIABETES Mother      Respiratory Mother      asthma-COPD     Hypertension Father      HEART DISEASE Father      CEREBROVASCULAR DISEASE Father      Cardiovascular Father      Breast Cancer Maternal Grandmother      CANCER Brother      DIABETES Brother      Respiratory Brother      copd     Chronic Obstructive Pulmonary Disease Brother      Depression Sister      Respiratory Sister      copd     Chronic Obstructive Pulmonary Disease Sister      Depression Sister      Chronic Obstructive Pulmonary Disease Sister      Depression Sister          Current Outpatient Prescriptions   Medication Sig Dispense Refill     ciprofloxacin (CILOXAN) 0.3 % ophthalmic solution Instill 1-2 drops in the affected eye(s) every 2 hours while awake for 2 days then 1-2 drops every 4 hours while awake for the next 5 days. 1 Bottle 0     cyclobenzaprine (FLEXERIL) 10 MG tablet Take 1 tablet (10 mg) by mouth At Bedtime 30 tablet 5     insulin aspart (NOVOLOG VIAL) 100 UNITS/ML injection Take 18 units with each meal. If glucometer is over 200 take an extra 6 units 3 vial 1     metFORMIN (GLUCOPHAGE-XR) 500 MG 24 hr tablet Take 2 tablets (1,000 mg) by mouth 2 times daily (with meals) (Needs lab work) 360 tablet 1     dulaglutide (TRULICITY) 1.5 MG/0.5ML pen Inject 1.5 mg Subcutaneous every 7 days 6 mL 1     losartan (COZAAR) 100 MG  "tablet Take 1 tablet (100 mg) by mouth daily 100 tablet 1     simvastatin (ZOCOR) 40 MG tablet Take 1 tablet (40 mg) by mouth At Bedtime at bedtime. 100 tablet 1     insulin glargine (LANTUS VIAL) 100 UNIT/ML injection Inject 64 Units Subcutaneous in the morning 3 Month 1     fluticasone-salmeterol (ADVAIR DISKUS) 100-50 MCG/DOSE diskus inhaler Inhale 1 puff into the lungs 2 times daily 3 Inhaler 3     montelukast (SINGULAIR) 10 MG tablet Take 1 tablet (10 mg) by mouth At Bedtime 90 tablet 3     esomeprazole (NEXIUM) 40 MG CR capsule Take 1 capsule (40 mg) by mouth every morning (before breakfast) 90 capsule 3     albuterol (PROAIR HFA/PROVENTIL HFA/VENTOLIN HFA) 108 (90 BASE) MCG/ACT Inhaler Inhale 2 puffs into the lungs every 6 hours 1 Inhaler 11     metoclopramide (REGLAN) 10 MG tablet Take 1 tablet (10 mg) by mouth 2 times daily 180 tablet 3     pseudoePHEDrine (SUDAFED) 30 MG tablet Take 2 tablets (60 mg) by mouth every 12 hours 360 tablet 3     blood glucose monitoring (ACCU-CHEK MULTICLIX) lancets 1 each by In Vitro route 4 times daily Use to test blood sugar as directed. 4 Box 3     blood glucose monitoring (NO BRAND SPECIFIED) test strip Contour test stripsby In Vitro route 4 times daily 4 Box 3     Needle, Disp, (BD DISP NEEDLES) 30G X 1/2\" MISC To use with humalog insulin 3 times daily 300 each 1     Multiple Vitamins-Minerals (MULTIVITAMIN ADULTS 50+) TABS Take 1 tablet by mouth daily       Calcium Carb-Cholecalciferol (CALCIUM-VITAMIN D) 600-400 MG-UNIT TABS Take 1 tablet by mouth daily       aspirin 81 MG tablet Take 1 tablet by mouth daily. 100 tablet 3     fluconazole (DIFLUCAN) 150 MG tablet Take 1 tablet (150 mg) by mouth every 3 days (Patient not taking: Reported on 3/20/2017) 4 tablet 5     Allergies   Allergen Reactions     Lisinopril Cough     Tape [Adhesive Tape] Rash       Reviewed and updated as needed this visit by clinical staff  Tobacco  Allergies  Med Hx  Surg Hx  Fam Hx  Soc Hx    " "  Reviewed and updated as needed this visit by Provider          ROS: 10 point ROS neg other than the symptoms noted above in the HPI.    OBJECTIVE:                                                    /86 (BP Location: Right arm, Patient Position: Chair, Cuff Size: Adult Large)  Pulse 101  Temp 98.2  F (36.8  C) (Oral)  Resp 16  Ht 5' 3.5\" (1.613 m)  Wt 243 lb (110.2 kg)  LMP   BMI 42.37 kg/m2  Body mass index is 42.37 kg/(m^2).  GENERAL: healthy, alert and no distress  HENT: ear canals and TM's normal, pharynx without erythema, nasal passages boggy and congested, right is watering, sclera injected, left normal, perrl, no surrounding eye swelling or erythema or pain, PERRL, gross acuity normal  NECK: no adenopathy, no asymmetry  RESP: lungs clear to auscultation - no rales, rhonchi or wheezes  CV: regular rate and rhythm  MS: no gross musculoskeletal defects noted      Diagnostic Test Results:  none      ASSESSMENT/PLAN:                                                            1. Acute bacterial conjunctivitis of right eye    - ciprofloxacin (CILOXAN) 0.3 % ophthalmic solution; Instill 1-2 drops in the affected eye(s) every 2 hours while awake for 2 days then 1-2 drops every 4 hours while awake for the next 5 days.  Dispense: 1 Bottle; Refill: 0  Discussed how to take the medication(s), expected outcomes, potential side effects.  Will treat based upon exposure in school setting, although it is very mild and appears more consistent with allergic conjunctivitis and she is currently on medications to help control that.    See Patient Instructions  Patient Instructions   If no improvement or any worsening, follow up with eye doctor.                  Conjunctivitis  What is eye inflammation?   The clear membrane that lines the inside of the eyelids and covers the white of the eye (conjunctiva) can get red and swollen. This is called conjunctivitis.   How does it occur?   Conjunctivitis can be caused by many " things, including infection by viruses or bacteria. Many kinds of bacteria can cause conjunctivitis. These include bacteria that cause strep, staph, and STD infections.   Conjunctivitis caused by a virus is sometimes called pink eye. It can be spread easily to other people. The same viruses that cause the common cold can cause viral conjunctivitis. Viruses can be spread by coughing or sneezing and can get in your eyes through contact with infected:  hands   washcloths or towels   cosmetics   false eyelashes   soft contact lenses  Avoid unnecessary contact with others so that you do not spread the disease.   What are the symptoms?   Symptoms may include:  itchy or scratchy eyes   redness   painful sensitivity to light   swelling of eyelids   matting of eyelashes   watery or pus discharge  How is it diagnosed?   Your healthcare provider will ask about your medical history and if you have been near someone who has conjunctivitis. Your provider will examine your eyes. He or she will also check for enlarged lymph nodes near your ear and jaw. Your provider may get lab tests of a sample of the pus to see what type of germs are present.  How is it treated?   Like a cold, viral conjunctivitis will usually go away on its own without treatment. However, your healthcare provider may prescribe eyedrops to help control your symptoms. Antihistamine pills may also relieve the itching and redness.  If you have bacterial conjunctivitis, your healthcare provider will prescribe antibiotic eyedrops. You can also help your eyes get better by washing them gently to remove any pus or crusts. Then dry them gently with a clean towel.  For very severe forms of conjunctivitis, antibiotics may need to be given by mouth or with a shot or an IV.  If you wear contact lenses, you will need to stop wearing them until your eyes are healed. The combination of contacts and conjunctivitis may damage your cornea (the clear outer layer on the front of  your eye) and cause severe vision problems. Your provider may ask you to throw away your current contact lenses and lens case.  How long will the effects last?   Viral conjunctivitis usually gets worse 5 to 7 days after the first symptoms. It can get better in 10 days to 1 month. If only one eye is affected at first, the other eye may become infected up to 2 weeks later. Usually, if both eyes are affected, the first eye has worse conjunctivitis than the second.  Bacterial conjunctivitis should improve within 2 days after you begin using antibiotics. If your eyes are not better after 3 days of antibiotics, call your healthcare provider.  How can I prevent conjunctivitis?   To keep from getting conjunctivitis from someone who has it, or to keep from spreading it to others, follow these guidelines:  Wash your hands often. Do not touch or rub your eyes.   Never share eye makeup or cosmetics with anyone. When you have conjunctivitis, throw out eye makeup you have been using.   Never use eye medicine that has been prescribed for someone else.   Do not share towels, washcloths, pillows, or sheets with anyone. If one of your eyes is affected but not the other, use a separate towel for each eye.   Avoid swimming in swimming pools if you have conjunctivitis.   Avoid close contact with people until your symptoms improve. Depending on your job, you may be asked to take some time off from work.  When should I call my healthcare provider?   Call your provider if:  You have any severe eye pain.   Your symptoms do not improve after you have used your medicine for 3 days (if you have bacterial conjunctivitis).   Your symptoms do not improve after 2 weeks (if you have viral conjunctivitis).   Your eyes get very sensitive to light, even after the redness is gone.  Reviewed for medical accuracy by faculty at the [facility]. Web site: [email or web address]        Thank you for choosing  [facility].  You may be receiving a survey in the mail from [facility] regarding your visit today.  Please take a few minutes to complete and return the survey to let us know how we are doing.      Our Clinic hours are:  Mondays    7:20 am - 7 pm  Tues -  Fri  7:20 am - 5 pm    Clinic Phone: [phone]    The clinic lab opens at 7:30 am Mon - Fri and appointments are required.    [facility]  Ph. [phone]  Monday-Thursday 8 am - 7pm  Tues/Wed/Fri 8 am - 5:30 pm             ALYSON Pierce CNP  [facility]

## 2017-05-22 NOTE — LETTER
My Asthma Action Plan  Name: Bria Davis   YOB: 1960  Date: 5/22/2017   My doctor: ALYSON Pierce CNP   My clinic: [facility]        My Control Medicine: Fluticasone + salmeterol (Advair) -  Diskus 100/50 mcg 2 puffs twice daily  Montelukast (Singulair) -  10 mg 1 daily  My Rescue Medicine: Albuterol (Proair/Ventolin/Proventil) inhaler 2 puffs every 4 hours as needed   My Asthma Severity: intermittent  Avoid your asthma triggers: weather  upper respiratory infections            GREEN ZONE     Good Control    I feel good    No cough or wheeze    Can work, sleep and play without asthma symptoms       Take your asthma control medicine every day.     1. If exercise triggers your asthma, take your rescue medication    15 minutes before exercise or sports, and    During exercise if you have asthma symptoms  2. Spacer to use with inhaler: If you have a spacer, make sure to use it with your inhaler             YELLOW ZONE     Getting Worse  I have ANY of these:    I do not feel good    Cough or wheeze    Chest feels tight    Wake up at night   1. Keep taking your Green Zone medications  2. Start taking your rescue medicine:    every 20 minutes for up to 1 hour. Then every 4 hours for 24-48 hours.  3. If you stay in the Yellow Zone for more than 12-24 hours, contact your doctor.  4. If you do not return to the Green Zone in 12-24 hours or you get worse, start taking your oral steroid medicine if prescribed by your provider.           RED ZONE     Medical Alert - Get Help  I have ANY of these:    I feel awful    Medicine is not helping    Breathing getting harder    Trouble walking or talking    Nose opens wide to breathe       1. Take your rescue medicine NOW  2. If your provider has prescribed an oral steroid medicine, start taking it NOW  3. Call your doctor NOW  4. If you are still in the Red Zone after 20 minutes and you have not reached your doctor:    Take your rescue  medicine again and    Call 911 or go to the emergency room right away    See your regular doctor within 2 weeks of an Emergency Room or Urgent Care visit for follow-up treatment.        Electronically signed by: Faiza Ramirez, May 22, 2017    Annual Reminders:  Meet with Asthma Educator,  Flu Shot in the Fall, consider Pneumonia Vaccination for patients with asthma (aged 19 and older).    Pharmacy:    [id number] - [place], [place] - [address] AT [address] [facility] - [place], [place] - [address]  [id number] - [place], [place] - [address]                     Asthma Triggers  How To Control Things That Make Your Asthma Worse    Triggers are things that make your asthma worse.  Look at the list below to help you find your triggers and what you can do about them.  You can help prevent asthma flare-ups by staying away from your triggers.      Trigger                                                          What you can do   Cigarette Smoke  Tobacco smoke can make asthma worse. Do not allow smoking in your home, car or around you.  Be sure no one smokes at a child s day care or school.  If you smoke, ask your health care provider for ways to help you quit.  Ask family members to quit too.  Ask your health care provider for a referral to Quit Plan to help you quit smoking, or call 1-611-444-PLAN.     Colds, Flu, Bronchitis  These are common triggers of asthma. Wash your hands often.  Don t touch your eyes, nose or mouth.  Get a flu shot every year.     Dust Mites  These are tiny bugs that live in cloth or carpet. They are too small to see. Wash sheets and blankets in hot water every week.   Encase pillows and mattress in dust mite proof covers.  Avoid having carpet if you can. If you have carpet, vacuum weekly.   Use a dust mask and HEPA vacuum.   Pollen and Outdoor Mold  Some people are allergic to trees, grass, or weed pollen, or molds. Try  to keep your windows closed.  Limit time out doors when pollen count is high.   Ask you health care provider about taking medicine during allergy season.     Animal Dander  Some people are allergic to skin flakes, urine or saliva from pets with fur or feathers. Keep pets with fur or feathers out of your home.    If you can t keep the pet outdoors, then keep the pet out of your bedroom.  Keep the bedroom door closed.  Keep pets off cloth furniture and away from stuffed toys.     Mice, Rats, and Cockroaches  Some people are allergic to the waste from these pests.   Cover food and garbage.  Clean up spills and food crumbs.  Store grease in the refrigerator.   Keep food out of the bedroom.   Indoor Mold  This can be a trigger if your home has high moisture. Fix leaking faucets, pipes, or other sources of water.   Clean moldy surfaces.  Dehumidify basement if it is damp and smelly.   Smoke, Strong Odors, and Sprays  These can reduce air quality. Stay away from strong odors and sprays, such as perfume, powder, hair spray, paints, smoke incense, paint, cleaning products, candles and new carpet.   Exercise or Sports  Some people with asthma have this trigger. Be active!  Ask your doctor about taking medicine before sports or exercise to prevent symptoms.    Warm up for 5-10 minutes before and after sports or exercise.     Other Triggers of Asthma  Cold air:  Cover your nose and mouth with a scarf.  Sometimes laughing or crying can be a trigger.  Some medicines and food can trigger asthma.

## 2017-05-26 NOTE — TELEPHONE ENCOUNTER
Medication is being filled for 1 time refill only due to:  Patient needs labs [id number]. Patient needs to be seen because requested by PCP. Kimberly MELENDEZ RN

## 2017-06-13 DIAGNOSIS — E78.5 HYPERLIPIDEMIA LDL GOAL <100: ICD-10-CM

## 2017-06-13 DIAGNOSIS — E11.29 TYPE 2 DIABETES MELLITUS WITH OTHER DIABETIC KIDNEY COMPLICATION (H): ICD-10-CM

## 2017-06-13 NOTE — TELEPHONE ENCOUNTER
Simvastatin 40mg     Last Written Prescription Date: 12/8/16  Last Fill Quantity: 100, # refills: 1  Last Office Visit with [facility], [facility] or [facility] prescribing provider: 5/22/17       Lab Results   Component Value Date    CHOL 170 11/25/2016     Lab Results   Component Value Date    HDL 45 11/25/2016     Lab Results   Component Value Date    LDL 80 11/25/2016     Lab Results   Component Value Date    TRIG 224 11/25/2016     Lab Results   Component Value Date    CHOLHDLRATIO 4.0 03/13/2014       Metformin ER 500mg         Last Written Prescription Date: 12/8/16  Last Fill Quantity: 360, # refills: 1  Last Office Visit with [facility], [facility] or [facility] prescribing provider:  5/22/17        BP Readings from Last 3 Encounters:   05/22/17 137/86   05/01/17 130/80   03/20/17 122/80     Lab Results   Component Value Date    MICROL 271 05/19/2016     Lab Results   Component Value Date    UMALCR 231.62 05/19/2016     Creatinine   Date Value Ref Range Status   09/01/2016 0.57 0.52 - 1.04 mg/dL Final   ]  GFR Estimate   Date Value Ref Range Status   09/01/2016 >90  Non  GFR Calc   >60 mL/min/1.7m2 Final   09/28/2015 >90  Non  GFR Calc   >60 mL/min/1.7m2 Final   03/13/2014 >90 >60 mL/min/1.7m2 Final     GFR Estimate If Black   Date Value Ref Range Status   09/01/2016 >90   GFR Calc   >60 mL/min/1.7m2 Final   09/28/2015 >90   GFR Calc   >60 mL/min/1.7m2 Final   03/13/2014 >90 >60 mL/min/1.7m2 Final     Lab Results   Component Value Date    CHOL 170 11/25/2016     Lab Results   Component Value Date    HDL 45 11/25/2016     Lab Results   Component Value Date    LDL 80 11/25/2016     Lab Results   Component Value Date    TRIG 224 11/25/2016     Lab Results   Component Value Date    CHOLHDLRATIO 4.0 03/13/2014     Lab Results   Component Value Date    AST 32 04/26/2010     Lab Results   Component Value Date    ALT 17 03/30/2012     Lab Results   Component Value Date    A1C 10.6  03/20/2017    A1C 10.8 11/25/2016    A1C 10.1 09/01/2016    A1C 8.6 05/19/2016    A1C 8.4 01/21/2016     Potassium   Date Value Ref Range Status   09/01/2016 4.2 3.4 - 5.3 mmol/L Final       Lantus 100u/ml Vial         Last Written Prescription Date: 12//816  Last Fill Quantity: 3 months, # refills: 1  Last Office Visit with [facility], [facility] or [facility] prescribing provider:  5/22/17        BP Readings from Last 3 Encounters:   05/22/17 137/86   05/01/17 130/80   03/20/17 122/80     Lab Results   Component Value Date    MICROL 271 05/19/2016     Lab Results   Component Value Date    UMALCR 231.62 05/19/2016     Creatinine   Date Value Ref Range Status   09/01/2016 0.57 0.52 - 1.04 mg/dL Final   ]  GFR Estimate   Date Value Ref Range Status   09/01/2016 >90  Non  GFR Calc   >60 mL/min/1.7m2 Final   09/28/2015 >90  Non  GFR Calc   >60 mL/min/1.7m2 Final   03/13/2014 >90 >60 mL/min/1.7m2 Final     GFR Estimate If Black   Date Value Ref Range Status   09/01/2016 >90   GFR Calc   >60 mL/min/1.7m2 Final   09/28/2015 >90   GFR Calc   >60 mL/min/1.7m2 Final   03/13/2014 >90 >60 mL/min/1.7m2 Final     Lab Results   Component Value Date    CHOL 170 11/25/2016     Lab Results   Component Value Date    HDL 45 11/25/2016     Lab Results   Component Value Date    LDL 80 11/25/2016     Lab Results   Component Value Date    TRIG 224 11/25/2016     Lab Results   Component Value Date    CHOLHDLRATIO 4.0 03/13/2014     Lab Results   Component Value Date    AST 32 04/26/2010     Lab Results   Component Value Date    ALT 17 03/30/2012     Lab Results   Component Value Date    A1C 10.6 03/20/2017    A1C 10.8 11/25/2016    A1C 10.1 09/01/2016    A1C 8.6 05/19/2016    A1C 8.4 01/21/2016     Potassium   Date Value Ref Range Status   09/01/2016 4.2 3.4 - 5.3 mmol/L Final       Beverly Aylsworth, CPhT  [id number] [address], [facility] [place]  [address]     Phone: [phone]    Fax: [phone]

## 2017-06-15 RX ORDER — INSULIN GLARGINE 100 [IU]/ML
INJECTION, SOLUTION SUBCUTANEOUS
Qty: 2 ML | Refills: 0 | Status: SHIPPED | OUTPATIENT
Start: 2017-06-15 | End: 2017-07-26

## 2017-06-15 RX ORDER — METFORMIN HCL 500 MG
1000 TABLET, EXTENDED RELEASE 24 HR ORAL 2 TIMES DAILY WITH MEALS
Qty: 120 TABLET | Refills: 0 | Status: SHIPPED | OUTPATIENT
Start: 2017-06-15 | End: 2017-06-19

## 2017-06-15 RX ORDER — SIMVASTATIN 40 MG
40 TABLET ORAL AT BEDTIME
Qty: 30 TABLET | Refills: 0 | Status: SHIPPED | OUTPATIENT
Start: 2017-06-15 | End: 2017-06-19

## 2017-06-19 ENCOUNTER — OFFICE VISIT (OUTPATIENT)
Dept: FAMILY MEDICINE | Facility: CLINIC | Age: 57
End: 2017-06-19
Payer: COMMERCIAL

## 2017-06-19 VITALS
WEIGHT: 248.6 LBS | SYSTOLIC BLOOD PRESSURE: 120 MMHG | BODY MASS INDEX: 42.44 KG/M2 | DIASTOLIC BLOOD PRESSURE: 62 MMHG | HEART RATE: 96 BPM | HEIGHT: 64 IN

## 2017-06-19 DIAGNOSIS — E66.01 MORBID OBESITY DUE TO EXCESS CALORIES (H): ICD-10-CM

## 2017-06-19 DIAGNOSIS — E11.29 TYPE 2 DIABETES MELLITUS WITH OTHER DIABETIC KIDNEY COMPLICATION (H): Primary | ICD-10-CM

## 2017-06-19 DIAGNOSIS — E78.5 HYPERLIPIDEMIA LDL GOAL <100: ICD-10-CM

## 2017-06-19 DIAGNOSIS — I10 ESSENTIAL HYPERTENSION WITH GOAL BLOOD PRESSURE LESS THAN 140/90: ICD-10-CM

## 2017-06-19 DIAGNOSIS — R80.9 MICROALBUMINURIA: ICD-10-CM

## 2017-06-19 LAB
CREAT UR-MCNC: 92 MG/DL
HBA1C MFR BLD: 8.6 % (ref 4.3–6)
MICROALBUMIN UR-MCNC: 120 MG/L
MICROALBUMIN/CREAT UR: 129.87 MG/G CR (ref 0–25)

## 2017-06-19 PROCEDURE — 99214 OFFICE O/P EST MOD 30 MIN: CPT | Performed by: FAMILY MEDICINE

## 2017-06-19 PROCEDURE — 82043 UR ALBUMIN QUANTITATIVE: CPT | Performed by: FAMILY MEDICINE

## 2017-06-19 PROCEDURE — 36415 COLL VENOUS BLD VENIPUNCTURE: CPT | Performed by: FAMILY MEDICINE

## 2017-06-19 PROCEDURE — 83036 HEMOGLOBIN GLYCOSYLATED A1C: CPT | Performed by: FAMILY MEDICINE

## 2017-06-19 PROCEDURE — 99207 C FOOT EXAM  NO CHARGE: CPT | Performed by: FAMILY MEDICINE

## 2017-06-19 RX ORDER — SIMVASTATIN 40 MG
40 TABLET ORAL AT BEDTIME
Qty: 90 TABLET | Refills: 1 | Status: SHIPPED | OUTPATIENT
Start: 2017-06-19 | End: 2017-12-29

## 2017-06-19 RX ORDER — METFORMIN HCL 500 MG
1000 TABLET, EXTENDED RELEASE 24 HR ORAL 2 TIMES DAILY WITH MEALS
Qty: 120 TABLET | Refills: 0 | Status: SHIPPED | OUTPATIENT
Start: 2017-06-19 | End: 2017-08-24

## 2017-06-19 RX ORDER — LOSARTAN POTASSIUM 100 MG/1
100 TABLET ORAL DAILY
Qty: 100 TABLET | Refills: 1 | Status: SHIPPED | OUTPATIENT
Start: 2017-06-19 | End: 2017-09-18

## 2017-06-19 NOTE — MR AVS SNAPSHOT
After Visit Summary   6/19/2017    Bria Davis    MRN: [id number]           Patient Information     Date Of Birth          1960        Visit Information        Provider Department      6/19/2017 4:20 PM Rafiq, IRVIN Malloy MD [facility]        Today's Diagnoses     Type 2 diabetes mellitus with other diabetic kidney complication (H)    -  1    Microalbuminuria        Hyperlipidemia LDL goal <100        Essential hypertension with goal blood pressure less than 140/90        Morbid obesity due to excess calories (H)          Care Instructions    Diabetes Plan  1. Hemoglobin A1c goal is between 7% and 8%  Your Hemoglobin A1c is not at goal, but getting close  Plan is:Increase novolog to 22 u breakfast and lunch, decrease dinner to 16 units  2. LDL (bad cholesterol) goal is less than 100  Your LDL is at goal  Plan is: Continue medications at current doses  3. Blood pressure goal is less than 140/90.  Your blood pressure is at goal.  Plan is: Continue medications at current doses  4. Microalbumin checks for protein in your urine which is an indicator of kidney damage.  Your microalbumin is pending  Plan is: Recheck in one year  Recheck in 3 months                        Follow-ups after your visit        Who to contact     If you have questions or need follow up information about today's clinic visit or your schedule please contact [facility] directly at [phone].  Normal or non-critical lab and imaging results will be communicated to you by MyChart, letter or phone within 4 business days after the clinic has received the results. If you do not hear from us within 7 days, please contact the clinic through [employer] inchart or phone. If you have a critical or abnormal lab result, we will notify you by phone as soon as possible.  Submit refill requests through [employer] or call your pharmacy and they will forward the refill request to us. Please allow 3 business days for your  "refill to be completed.          Additional Information About Your Visit        [employer] Information     [employer] gives you secure access to your electronic health record. If you see a primary care provider, you can also send messages to your care team and make appointments. If you have questions, please call your primary care clinic.  If you do not have a primary care provider, please call [phone] and they will assist you.        Care EveryWhere ID     This is your Care EveryWhere ID. This could be used by other organizations to access your [place] medical records  [id number]        Your Vitals Were     Pulse Height BMI (Body Mass Index)             96 5' 3.5\" (1.613 m) 43.35 kg/m2          Blood Pressure from Last 3 Encounters:   06/19/17 120/62   05/22/17 137/86   05/01/17 130/80    Weight from Last 3 Encounters:   06/19/17 248 lb 9.6 oz (112.8 kg)   05/22/17 243 lb (110.2 kg)   05/01/17 240 lb 3.2 oz (109 kg)              We Performed the Following     **A1C FUTURE anytime     **Albumin Random Urine Quant FUTURE anytime          Today's Medication Changes          These changes are accurate as of: 6/19/17  5:04 PM.  If you have any questions, ask your nurse or doctor.               These medicines have changed or have updated prescriptions.        Dose/Directions    insulin aspart 100 UNITS/ML injection   Commonly known as:  NovoLOG VIAL   This may have changed:  additional instructions   Used for:  Type 2 diabetes mellitus with other diabetic kidney complication (H)   Changed by:  IRVIN Conroy MD        Take 22 units with breakfast and lunch, 16 units with dinner.  If glucometer is over 200 take an extra 6 units   Quantity:  3 vial   Refills:  1            Where to get your medicines      These medications were sent to [facility] [place], [place] - [address]  [address] [facility] [address]     Phone:  [phone]     dulaglutide 1.5 MG/0.5ML pen    " insulin aspart 100 UNITS/ML injection    losartan 100 MG tablet    metFORMIN 500 MG 24 hr tablet    simvastatin 40 MG tablet                Primary Care Provider Office Phone # Fax #    IRVIN Gama Conroy [phone]       [facility] [address] [facility] [address]        Thank you!     Thank you for choosing [facility]  for your care. Our goal is always to provide you with excellent care. Hearing back from our patients is one way we can continue to improve our services. Please take a few minutes to complete the written survey that you may receive in the mail after your visit with us. Thank you!             Your Updated Medication List - Protect others around you: Learn how to safely use, store and throw away your medicines at www.disposemymeds.org.          This list is accurate as of: 6/19/17  5:04 PM.  Always use your most recent med list.                   Brand Name Dispense Instructions for use    albuterol 108 (90 BASE) MCG/ACT Inhaler    PROAIR HFA/PROVENTIL HFA/VENTOLIN HFA    1 Inhaler    Inhale 2 puffs into the lungs every 6 hours       aspirin 81 MG tablet     100 tablet    Take 1 tablet by mouth daily.       blood glucose monitoring lancets     4 Box    1 each by In Vitro route 4 times daily Use to test blood sugar as directed.       blood glucose monitoring test strip    no brand specified    4 Box    Contour test stripsby In Vitro route 4 times daily       Calcium-Vitamin D 600-400 MG-UNIT Tabs      Take 1 tablet by mouth daily       ciprofloxacin 0.3 % ophthalmic solution    CILOXAN    1 Bottle    Instill 1-2 drops in the affected eye(s) every 2 hours while awake for 2 days then 1-2 drops every 4 hours while awake for the next 5 days.       cyclobenzaprine 10 MG tablet    FLEXERIL    30 tablet    Take 1 tablet (10 mg) by mouth At Bedtime       dulaglutide 1.5 MG/0.5ML pen    TRULICITY    6 mL    Inject 1.5 mg Subcutaneous every 7 days        "esomeprazole 40 MG CR capsule    nexIUM    90 capsule    Take 1 capsule (40 mg) by mouth every morning (before breakfast)       fluconazole 150 MG tablet    DIFLUCAN    4 tablet    Take 1 tablet (150 mg) by mouth every 3 days       fluticasone-salmeterol 100-50 MCG/DOSE diskus inhaler    ADVAIR DISKUS    3 Inhaler    Inhale 1 puff into the lungs 2 times daily       insulin aspart 100 UNITS/ML injection    NovoLOG VIAL    3 vial    Take 22 units with breakfast and lunch, 16 units with dinner.  If glucometer is over 200 take an extra 6 units       insulin glargine 100 UNIT/ML injection    LANTUS VIAL    2 mL    Inject 64 Units Subcutaneous in the morning       losartan 100 MG tablet    COZAAR    100 tablet    Take 1 tablet (100 mg) by mouth daily       metFORMIN 500 MG 24 hr tablet    GLUCOPHAGE-XR    120 tablet    Take 2 tablets (1,000 mg) by mouth 2 times daily (with meals)       metoclopramide 10 MG tablet    REGLAN    180 tablet    Take 1 tablet (10 mg) by mouth 2 times daily       montelukast 10 MG tablet    SINGULAIR    90 tablet    Take 1 tablet (10 mg) by mouth At Bedtime       MULTIVITAMIN ADULTS 50+ Tabs      Take 1 tablet by mouth daily       Needle (Disp) 30G X 1/2\" Misc    BD DISP NEEDLES    300 each    To use with humalog insulin 3 times daily       pseudoePHEDrine 30 MG tablet    SUDAFED    360 tablet    Take 2 tablets (60 mg) by mouth every 12 hours       simvastatin 40 MG tablet    ZOCOR    90 tablet    Take 1 tablet (40 mg) by mouth At Bedtime at bedtime.         "

## 2017-06-19 NOTE — NURSING NOTE
"Chief Complaint   Patient presents with     Diabetes     Lipids     Hypertension       Initial /62 (BP Location: Right arm, Patient Position: Chair, Cuff Size: Adult Large)  Pulse 96  Ht 5' 3.5\" (1.613 m)  Wt 248 lb 9.6 oz (112.8 kg)  BMI 43.35 kg/m2 Estimated body mass index is 43.35 kg/(m^2) as calculated from the following:    Height as of this encounter: 5' 3.5\" (1.613 m).    Weight as of this encounter: 248 lb 9.6 oz (112.8 kg).  Medication Reconciliation: complete     Jewell Pantoja, CMA      "

## 2017-06-19 NOTE — PATIENT INSTRUCTIONS
Diabetes Plan  1. Hemoglobin A1c goal is between 7% and 8%  Your Hemoglobin A1c is not at goal, but getting close  Plan is:Increase novolog to 22 u breakfast and lunch, decrease dinner to 16 units  2. LDL (bad cholesterol) goal is less than 100  Your LDL is at goal  Plan is: Continue medications at current doses  3. Blood pressure goal is less than 140/90.  Your blood pressure is at goal.  Plan is: Continue medications at current doses  4. Microalbumin checks for protein in your urine which is an indicator of kidney damage.  Your microalbumin is pending  Plan is: Recheck in one year  Recheck in 3 months

## 2017-06-19 NOTE — PROGRESS NOTES
"  SUBJECTIVE:                                                    Bria Davis is a 56 year old female who presents to clinic today for the following health issues:        Diabetes Follow-up    Patient is checking blood sugars: three times daily.   Blood sugar testing frequency justification: Frequent hypoglycemia  Results are as follows:         am - 194         lunchtime - 191         suppertime - 184    Diabetic concerns: None     Symptoms of hypoglycemia (low blood sugar): shaky, dizzy, weak sometimes     Paresthesias (numbness or burning in feet) or sores: No     Date of last diabetic eye exam: 1/31/2017    Overall her blood sugars are better but she has been having more episodes of waking up in the middle the night with hypoglycemia, and then she has to have a snack. She feels that's probably why she has regained some of the weight she had lost      Hyperlipidemia Follow-Up      Rate your low fat/cholesterol diet?: poor    Taking statin?  Yes, no muscle aches from statin    Other lipid medications/supplements?:  none     Hypertension Follow-up      Outpatient blood pressures are not being checked.    Low Salt Diet: low salt         Amount of exercise or physical activity: None    Problems taking medications regularly: No    Medication side effects: none    Diet: regular (no restrictions)          Problem list and histories reviewed & adjusted, as indicated.  Additional history: none        Reviewed and updated as needed this visit by clinical staff  Tobacco  Allergies  Med Hx  Surg Hx  Fam Hx  Soc Hx      Reviewed and updated as needed this visit by Provider               ROS:  Constitutional, HEENT, cardiovascular, pulmonary, gi and gu systems are negative, except as otherwise noted.        OBJECTIVE:                                                    /62 (BP Location: Right arm, Patient Position: Chair, Cuff Size: Adult Large)  Pulse 96  Ht 5' 3.5\" (1.613 m)  Wt 248 lb 9.6 oz (112.8 kg)  " BMI 43.35 kg/m2    GENERAL: healthy, alert and no distress  EYES: Eyes grossly normal to inspection, extraocular movements - intact, and PERRL  NECK: no tenderness, no adenopathy, no asymmetry, no masses, no stiffness; thyroid- normal to palpation  RESP: lungs clear to auscultation - no rales, no rhonchi, no wheezes  CV: regular rates and rhythm, normal S1 S2, no S3 or S4 and no murmur, no click or rub -  MS: extremities- no gross deformities noted, no edema  Diabetic foot exam: no trophic changes or ulcerative lesions and normal monofilament exam    Diagnostic test results:  Results for orders placed or performed in visit on 06/19/17 (from the past 24 hour(s))   **A1C FUTURE anytime   Result Value Ref Range    Hemoglobin A1C 8.6 (H) 4.3 - 6.0 %        ASSESSMENT:  1. Type 2 diabetes mellitus with other diabetic kidney complication (H)    2. Microalbuminuria    3. Hyperlipidemia LDL goal <100    4. Essential hypertension with goal blood pressure less than 140/90    5. Morbid obesity due to excess calories (H)        PLAN:  Orders Placed This Encounter     FOOT EXAM     insulin aspart (NOVOLOG VIAL) 100 UNITS/ML injection     simvastatin (ZOCOR) 40 MG tablet     metFORMIN (GLUCOPHAGE-XR) 500 MG 24 hr tablet     dulaglutide (TRULICITY) 1.5 MG/0.5ML pen     losartan (COZAAR) 100 MG tablet       Patient Instructions   Diabetes Plan  1. Hemoglobin A1c goal is between 7% and 8%  Your Hemoglobin A1c is not at goal, but getting close  Plan is:Increase novolog to 22 u breakfast and lunch, decrease dinner to 16 units  2. LDL (bad cholesterol) goal is less than 100  Your LDL is at goal  Plan is: Continue medications at current doses  3. Blood pressure goal is less than 140/90.  Your blood pressure is at goal.  Plan is: Continue medications at current doses  4. Microalbumin checks for protein in your urine which is an indicator of kidney damage.  Your microalbumin is pending  Plan is: Recheck in one year  Recheck in 3  meredith Malloy Post  [facility]

## 2017-06-20 NOTE — PROGRESS NOTES
Bria,  We discussed her glycosylated hemoglobin were in the office. The albumin in your urine is improved from last time, probably because your blood sugar control has improved. I'll see you in 3 months      Gama Conroy MD

## 2017-07-25 ENCOUNTER — TELEPHONE (OUTPATIENT)
Dept: FAMILY MEDICINE | Facility: CLINIC | Age: 57
End: 2017-07-25

## 2017-07-25 DIAGNOSIS — B37.31 CANDIDIASIS OF VULVA AND VAGINA: Primary | ICD-10-CM

## 2017-07-25 RX ORDER — FLUCONAZOLE 150 MG/1
150 TABLET ORAL ONCE
Qty: 2 TABLET | Refills: 0 | Status: SHIPPED | OUTPATIENT
Start: 2017-07-25 | End: 2017-07-25

## 2017-07-25 NOTE — TELEPHONE ENCOUNTER
Dr. Conroy,      S-(situation): yeast infection    B-(background): ongoing for 2 days.  White like cottage cheese discharge with terrible itching.  No odor to discharge.  No color to discharge.  Some gas pains yesterday.  No vaginal bleeding. No fever.  +sexaully active.    A-(assessment): yeast infection    R-(recommendations): would like Rx for diflucan sent to our pharmacy.  Please advise. Kimberly MELENDEZ RN

## 2017-07-25 NOTE — TELEPHONE ENCOUNTER
Reason for call:  Patient reporting a symptom    Symptom or request: Pt has had yeast infection symptoms X 2 days and would like an Rx for Diflucan sent to [facility]    Duration (how long have symptoms been present): 2 days    Have you been treated for this before? Yes    Additional comments:     Phone Number patient can be reached at:  Home number on file [phone] (home)    Best Time:  any    Can we leave a detailed message on this number:  YES    Call taken on 7/25/2017 at 9:39 AM by Ciara Mir

## 2017-07-26 DIAGNOSIS — E11.29 TYPE 2 DIABETES MELLITUS WITH OTHER DIABETIC KIDNEY COMPLICATION (H): ICD-10-CM

## 2017-07-26 NOTE — TELEPHONE ENCOUNTER
Rambo         Last Written Prescription Date: 06/15/17  Last Fill Quantity: 2ml, # refills: 0  Last Office Visit with G, [facility] or [facility] prescribing provider:  06/19/17        BP Readings from Last 3 Encounters:   06/19/17 120/62   05/22/17 137/86   05/01/17 130/80     Lab Results   Component Value Date    MICROL 120 06/19/2017     Lab Results   Component Value Date    UMALCR 129.87 06/19/2017     Creatinine   Date Value Ref Range Status   09/01/2016 0.57 0.52 - 1.04 mg/dL Final   ]  GFR Estimate   Date Value Ref Range Status   09/01/2016 >90  Non  GFR Calc   >60 mL/min/1.7m2 Final   09/28/2015 >90  Non  GFR Calc   >60 mL/min/1.7m2 Final   03/13/2014 >90 >60 mL/min/1.7m2 Final     GFR Estimate If Black   Date Value Ref Range Status   09/01/2016 >90   GFR Calc   >60 mL/min/1.7m2 Final   09/28/2015 >90   GFR Calc   >60 mL/min/1.7m2 Final   03/13/2014 >90 >60 mL/min/1.7m2 Final     Lab Results   Component Value Date    CHOL 170 11/25/2016     Lab Results   Component Value Date    HDL 45 11/25/2016     Lab Results   Component Value Date    LDL 80 11/25/2016     Lab Results   Component Value Date    TRIG 224 11/25/2016     Lab Results   Component Value Date    CHOLHDLRATIO 4.0 03/13/2014     Lab Results   Component Value Date    AST 32 04/26/2010     Lab Results   Component Value Date    ALT 17 03/30/2012     Lab Results   Component Value Date    A1C 8.6 06/19/2017    A1C 10.6 03/20/2017    A1C 10.8 11/25/2016    A1C 10.1 09/01/2016    A1C 8.6 05/19/2016     Potassium   Date Value Ref Range Status   09/01/2016 4.2 3.4 - 5.3 mmol/L Final

## 2017-07-28 RX ORDER — INSULIN GLARGINE 100 [IU]/ML
INJECTION, SOLUTION SUBCUTANEOUS
Qty: 60 ML | Refills: 1 | Status: SHIPPED | OUTPATIENT
Start: 2017-07-28 | End: 2017-09-18

## 2017-08-24 DIAGNOSIS — E11.29 TYPE 2 DIABETES MELLITUS WITH OTHER DIABETIC KIDNEY COMPLICATION (H): ICD-10-CM

## 2017-08-25 NOTE — TELEPHONE ENCOUNTER
Metformin         Last Written Prescription Date: 06/19/17  Last Fill Quantity: 120, # refills: 0  Last Office Visit with [facility], [facility] or [facility] prescribing provider:  06/19/17        BP Readings from Last 3 Encounters:   06/19/17 120/62   05/22/17 137/86   05/01/17 130/80     Lab Results   Component Value Date    MICROL 120 06/19/2017     Lab Results   Component Value Date    UMALCR 129.87 06/19/2017     Creatinine   Date Value Ref Range Status   09/01/2016 0.57 0.52 - 1.04 mg/dL Final   ]  GFR Estimate   Date Value Ref Range Status   09/01/2016 >90  Non  GFR Calc   >60 mL/min/1.7m2 Final   09/28/2015 >90  Non  GFR Calc   >60 mL/min/1.7m2 Final   03/13/2014 >90 >60 mL/min/1.7m2 Final     GFR Estimate If Black   Date Value Ref Range Status   09/01/2016 >90   GFR Calc   >60 mL/min/1.7m2 Final   09/28/2015 >90   GFR Calc   >60 mL/min/1.7m2 Final   03/13/2014 >90 >60 mL/min/1.7m2 Final     Lab Results   Component Value Date    CHOL 170 11/25/2016     Lab Results   Component Value Date    HDL 45 11/25/2016     Lab Results   Component Value Date    LDL 80 11/25/2016     Lab Results   Component Value Date    TRIG 224 11/25/2016     Lab Results   Component Value Date    CHOLHDLRATIO 4.0 03/13/2014     Lab Results   Component Value Date    AST 32 04/26/2010     Lab Results   Component Value Date    ALT 17 03/30/2012     Lab Results   Component Value Date    A1C 8.6 06/19/2017    A1C 10.6 03/20/2017    A1C 10.8 11/25/2016    A1C 10.1 09/01/2016    A1C 8.6 05/19/2016     Potassium   Date Value Ref Range Status   09/01/2016 4.2 3.4 - 5.3 mmol/L Final

## 2017-08-29 NOTE — TELEPHONE ENCOUNTER
Patient requesting 90 day supply please    Dilma Carrion CPhT  [facility]  Pharmacy  [phone]

## 2017-08-31 RX ORDER — METFORMIN HCL 500 MG
TABLET, EXTENDED RELEASE 24 HR ORAL
Qty: 120 TABLET | Refills: 1 | Status: SHIPPED | OUTPATIENT
Start: 2017-08-31 | End: 2017-09-18

## 2017-09-18 ENCOUNTER — OFFICE VISIT (OUTPATIENT)
Dept: FAMILY MEDICINE | Facility: CLINIC | Age: 57
End: 2017-09-18
Payer: COMMERCIAL

## 2017-09-18 VITALS
DIASTOLIC BLOOD PRESSURE: 80 MMHG | HEART RATE: 96 BPM | WEIGHT: 255.6 LBS | BODY MASS INDEX: 43.64 KG/M2 | SYSTOLIC BLOOD PRESSURE: 130 MMHG | HEIGHT: 64 IN

## 2017-09-18 DIAGNOSIS — I10 ESSENTIAL HYPERTENSION WITH GOAL BLOOD PRESSURE LESS THAN 140/90: ICD-10-CM

## 2017-09-18 DIAGNOSIS — G44.209 MUSCLE CONTRACTION HEADACHE: ICD-10-CM

## 2017-09-18 DIAGNOSIS — E11.29 TYPE 2 DIABETES MELLITUS WITH OTHER DIABETIC KIDNEY COMPLICATION (H): Primary | ICD-10-CM

## 2017-09-18 DIAGNOSIS — E78.5 HYPERLIPIDEMIA LDL GOAL <100: ICD-10-CM

## 2017-09-18 LAB — HBA1C MFR BLD: 9.3 % (ref 4.3–6)

## 2017-09-18 PROCEDURE — 83036 HEMOGLOBIN GLYCOSYLATED A1C: CPT | Performed by: FAMILY MEDICINE

## 2017-09-18 PROCEDURE — 99214 OFFICE O/P EST MOD 30 MIN: CPT | Performed by: FAMILY MEDICINE

## 2017-09-18 PROCEDURE — 36415 COLL VENOUS BLD VENIPUNCTURE: CPT | Performed by: FAMILY MEDICINE

## 2017-09-18 RX ORDER — METFORMIN HCL 500 MG
TABLET, EXTENDED RELEASE 24 HR ORAL
Qty: 360 TABLET | Refills: 0 | Status: SHIPPED | OUTPATIENT
Start: 2017-09-18 | End: 2017-12-29

## 2017-09-18 RX ORDER — CYCLOBENZAPRINE HCL 10 MG
10 TABLET ORAL
Qty: 30 TABLET | Refills: 5 | Status: SHIPPED | OUTPATIENT
Start: 2017-09-18 | End: 2019-05-09

## 2017-09-18 RX ORDER — INSULIN GLARGINE 100 [IU]/ML
INJECTION, SOLUTION SUBCUTANEOUS
Qty: 70 ML | Refills: 2 | Status: SHIPPED | OUTPATIENT
Start: 2017-09-18 | End: 2017-12-29

## 2017-09-18 RX ORDER — LOSARTAN POTASSIUM 100 MG/1
100 TABLET ORAL DAILY
Qty: 100 TABLET | Refills: 1 | Status: SHIPPED | OUTPATIENT
Start: 2017-09-18 | End: 2018-07-30

## 2017-09-18 NOTE — MR AVS SNAPSHOT
After Visit Summary   9/18/2017    Bria Davis    MRN: [id number]           Patient Information     Date Of Birth          1960        Visit Information        Provider Department      9/18/2017 3:20 PM Rafiq, IRVIN Malloy MD [facility]        Today's Diagnoses     Type 2 diabetes mellitus with other diabetic kidney complication (H)        Essential hypertension with goal blood pressure less than 140/90        Muscle contraction headache          Care Instructions    Diabetes Plan  1. Hemoglobin A1c goal is between 7% and 8%  Your Hemoglobin A1c is not at goal  Plan is:Increase novolog as indicated on prescription and change Lantus to 36 units twice daily  2. LDL (bad cholesterol) goal is less than 100  Your LDL is at goal  Plan is: Continue medications at current doses  3. Blood pressure goal is less than 140/90.  Your blood pressure is at goal.  Plan is: Continue medications at current doses  4. Microalbumin checks for protein in your urine which is an indicator of kidney damage.  Your microalbumin is acceptable.  Plan is: Recheck in one year  Recheck in 3 months                        Follow-ups after your visit        Who to contact     If you have questions or need follow up information about today's clinic visit or your schedule please contact [facility] directly at [phone].  Normal or non-critical lab and imaging results will be communicated to you by MyChart, letter or phone within 4 business days after the clinic has received the results. If you do not hear from us within 7 days, please contact the clinic through "[employer]hart or phone. If you have a critical or abnormal lab result, we will notify you by phone as soon as possible.  Submit refill requests through [employer] or call your pharmacy and they will forward the refill request to us. Please allow 3 business days for your refill to be completed.          Additional Information About Your Visit       "  [employer] Information     [employer] gives you secure access to your electronic health record. If you see a primary care provider, you can also send messages to your care team and make appointments. If you have questions, please call your primary care clinic.  If you do not have a primary care provider, please call [phone] and they will assist you.        Care EveryWhere ID     This is your Care EveryWhere ID. This could be used by other organizations to access your [facility] medical records  [id number]        Your Vitals Were     Pulse Height BMI (Body Mass Index)             96 5' 3.5\" (1.613 m) 44.57 kg/m2          Blood Pressure from Last 3 Encounters:   09/18/17 130/80   06/19/17 120/62   05/22/17 137/86    Weight from Last 3 Encounters:   09/18/17 255 lb 9.6 oz (115.9 kg)   06/19/17 248 lb 9.6 oz (112.8 kg)   05/22/17 243 lb (110.2 kg)              We Performed the Following     Hemoglobin A1c          Today's Medication Changes          These changes are accurate as of: 9/18/17  3:47 PM.  If you have any questions, ask your nurse or doctor.               These medicines have changed or have updated prescriptions.        Dose/Directions    cyclobenzaprine 10 MG tablet   Commonly known as:  FLEXERIL   This may have changed:    - when to take this  - reasons to take this   Used for:  Muscle contraction headache   Changed by:  IRVIN Conroy MD        Dose:  10 mg   Take 1 tablet (10 mg) by mouth nightly as needed for muscle spasms   Quantity:  30 tablet   Refills:  5       insulin aspart 100 UNITS/ML injection   Commonly known as:  NovoLOG VIAL   This may have changed:  additional instructions   Used for:  Type 2 diabetes mellitus with other diabetic kidney complication (H)   Changed by:  IRVIN Conroy MD        Take 24 units with breakfast and lunch, 18 units with dinner.  If glucometer is over 200 take an extra 6 units   Quantity:  3 vial   Refills:  2       insulin glargine 100 UNIT/ML injection   Commonly " known as:  LANTUS VIAL   This may have changed:  See the new instructions.   Used for:  Type 2 diabetes mellitus with other diabetic kidney complication (H)   Changed by:  IRVIN Conroy MD        INJECT 36 UNITS UNDER THE SKIN twice daily   Quantity:  70 mL   Refills:  2       metFORMIN 500 MG 24 hr tablet   Commonly known as:  GLUCOPHAGE-XR   This may have changed:  See the new instructions.   Used for:  Type 2 diabetes mellitus with other diabetic kidney complication (H)   Changed by:  IRVIN Conroy MD        TAKE TWO TABLETS BY MOUTH TWICE A DAY (WITH MEALS)   Quantity:  360 tablet   Refills:  0            Where to get your medicines      [place] [facility] [place] [address]  [address] [facility] [address]     Phone:  [phone]     cyclobenzaprine 10 MG tablet    dulaglutide 1.5 MG/0.5ML pen    insulin aspart 100 UNITS/ML injection    insulin glargine 100 UNIT/ML injection    losartan 100 MG tablet    metFORMIN 500 MG 24 hr tablet                Primary Care Provider Office Phone # Fax #    IRVIN Conroy [phone] [address] [facility] [address]        Equal Access to Services     JOANNE STORM AH: Hadii kam carney hadasho Soomaali, waaxda luqadaha, qaybta kaalmada adeegyada, waxay jeronimoin haylion sneha lehman. So [facility] [phone].    ATENCIÓN: Si habla español, tiene a headley disposición servicios gratuitos de asistencia lingüística. Piliame al [phone].    We comply with applicable federal civil rights laws and [place] laws. We do not discriminate on the basis of race, color, national origin, age, disability sex, sexual orientation or gender identity.            Thank you!     Thank you for choosing [facility]  for your care. Our goal is always to provide you with excellent care. Hearing back from our patients is one way we can continue to improve our services.  Please take a few minutes to complete the written survey that you may receive in the mail after your visit with us. Thank you!             Your Updated Medication List - Protect others around you: Learn how to safely use, store and throw away your medicines at www.disposemymeds.org.          This list is accurate as of: 9/18/17  3:47 PM.  Always use your most recent med list.                   Brand Name Dispense Instructions for use Diagnosis    albuterol 108 (90 BASE) MCG/ACT Inhaler    PROAIR HFA/PROVENTIL HFA/VENTOLIN HFA    1 Inhaler    Inhale 2 puffs into the lungs every 6 hours    Mild intermittent asthma without complication       aspirin 81 MG tablet     100 tablet    Take 1 tablet by mouth daily.    Diabetes mellitus, type 2 (H)       blood glucose monitoring lancets     4 Box    1 each by In Vitro route 4 times daily Use to test blood sugar as directed.    Type 2 diabetes mellitus with other diabetic kidney complication (H)       blood glucose monitoring test strip    no brand specified    4 Box    Contour test stripsby In Vitro route 4 times daily    Type 2 diabetes mellitus with other diabetic kidney complication (H)       Calcium-Vitamin D 600-400 MG-UNIT Tabs      Take 1 tablet by mouth daily        ciprofloxacin 0.3 % ophthalmic solution    CILOXAN    1 Bottle    Instill 1-2 drops in the affected eye(s) every 2 hours while awake for 2 days then 1-2 drops every 4 hours while awake for the next 5 days.    Acute bacterial conjunctivitis of right eye       cyclobenzaprine 10 MG tablet    FLEXERIL    30 tablet    Take 1 tablet (10 mg) by mouth nightly as needed for muscle spasms    Muscle contraction headache       dulaglutide 1.5 MG/0.5ML pen    TRULICITY    6 mL    Inject 1.5 mg Subcutaneous every 7 days    Type 2 diabetes mellitus with other diabetic kidney complication (H)       esomeprazole 40 MG CR capsule    nexIUM    90 capsule    Take 1 capsule (40 mg) by mouth every morning (before breakfast)     "Gastroesophageal reflux disease without esophagitis       fluconazole 150 MG tablet    DIFLUCAN    4 tablet    Take 1 tablet (150 mg) by mouth every 3 days    Candidiasis of vulva and vagina       fluticasone-salmeterol 100-50 MCG/DOSE diskus inhaler    ADVAIR DISKUS    3 Inhaler    Inhale 1 puff into the lungs 2 times daily    Mild intermittent asthma, uncomplicated       insulin aspart 100 UNITS/ML injection    NovoLOG VIAL    3 vial    Take 24 units with breakfast and lunch, 18 units with dinner.  If glucometer is over 200 take an extra 6 units    Type 2 diabetes mellitus with other diabetic kidney complication (H)       insulin glargine 100 UNIT/ML injection    LANTUS VIAL    70 mL    INJECT 36 UNITS UNDER THE SKIN twice daily    Type 2 diabetes mellitus with other diabetic kidney complication (H)       losartan 100 MG tablet    COZAAR    100 tablet    Take 1 tablet (100 mg) by mouth daily    Essential hypertension with goal blood pressure less than 140/90       metFORMIN 500 MG 24 hr tablet    GLUCOPHAGE-XR    360 tablet    TAKE TWO TABLETS BY MOUTH TWICE A DAY (WITH MEALS)    Type 2 diabetes mellitus with other diabetic kidney complication (H)       metoclopramide 10 MG tablet    REGLAN    180 tablet    Take 1 tablet (10 mg) by mouth 2 times daily    Gastroparesis due to DM (H)       montelukast 10 MG tablet    SINGULAIR    90 tablet    Take 1 tablet (10 mg) by mouth At Bedtime    Mild intermittent asthma, uncomplicated       MULTIVITAMIN ADULTS 50+ Tabs      Take 1 tablet by mouth daily        Needle (Disp) 30G X 1/2\" Misc    BD DISP NEEDLES    300 each    To use with humalog insulin 3 times daily    Type 2 diabetes mellitus with other diabetic kidney complication (H)       pseudoePHEDrine 30 MG tablet    SUDAFED    360 tablet    Take 2 tablets (60 mg) by mouth every 12 hours    Chronic rhinitis       simvastatin 40 MG tablet    ZOCOR    90 tablet    Take 1 tablet (40 mg) by mouth At Bedtime at bedtime.    " Hyperlipidemia LDL goal <100

## 2017-09-18 NOTE — PATIENT INSTRUCTIONS
Diabetes Plan  1. Hemoglobin A1c goal is between 7% and 8%  Your Hemoglobin A1c is not at goal  Plan is:Increase novolog as indicated on prescription and change Lantus to 36 units twice daily  2. LDL (bad cholesterol) goal is less than 100  Your LDL is at goal  Plan is: Continue medications at current doses  3. Blood pressure goal is less than 140/90.  Your blood pressure is at goal.  Plan is: Continue medications at current doses  4. Microalbumin checks for protein in your urine which is an indicator of kidney damage.  Your microalbumin is acceptable.  Plan is: Recheck in one year  Recheck in 3 months

## 2017-09-18 NOTE — NURSING NOTE
"Chief Complaint   Patient presents with     Diabetes       Initial /80 (BP Location: Right arm, Patient Position: Chair, Cuff Size: Adult Regular)  Pulse 96  Ht 5' 3.5\" (1.613 m)  Wt 255 lb 9.6 oz (115.9 kg)  BMI 44.57 kg/m2 Estimated body mass index is 44.57 kg/(m^2) as calculated from the following:    Height as of this encounter: 5' 3.5\" (1.613 m).    Weight as of this encounter: 255 lb 9.6 oz (115.9 kg).  Medication Reconciliation: complete     Jewell Pantoja, CMA      "

## 2017-09-18 NOTE — PROGRESS NOTES
Bria,  As we suspected, the glycosylated hemoglobin is higher than last time. Please make the changes in insulin dose as discussed in the office in follow-up in 3 months      Gama Conroy MD

## 2017-09-19 ASSESSMENT — ASTHMA QUESTIONNAIRES: ACT_TOTALSCORE: 24

## 2017-10-01 DIAGNOSIS — J45.20 MILD INTERMITTENT ASTHMA, UNCOMPLICATED: ICD-10-CM

## 2017-10-02 RX ORDER — MONTELUKAST SODIUM 10 MG/1
TABLET ORAL
Qty: 90 TABLET | Refills: 1 | Status: SHIPPED | OUTPATIENT
Start: 2017-10-02 | End: 2017-12-29

## 2017-10-02 NOTE — TELEPHONE ENCOUNTER
Prescription approved per [facility] Refill Protocol.    Julia NEGRO RN

## 2017-10-02 NOTE — TELEPHONE ENCOUNTER
Montelukast        Last Written Prescription Date: 09/25/17  Last Fill Quantity: 90, # refills:3   Last Office Visit with [facility], [facility] or [facility] prescribing provider:  09/18/17   Future Office Visit:       Date of Last Asthma Action Plan Letter:   Asthma Action Plan Q1 Year    Topic Date Due     Asthma Action Plan - yearly  05/22/2018      Asthma Control Test:   ACT Total Scores 9/18/2017   ACT TOTAL SCORE -   ASTHMA ER VISITS -   ASTHMA HOSPITALIZATIONS -   ACT TOTAL SCORE (Goal Greater than or Equal to 20) 24   In the past 12 months, how many times did you visit the emergency room for your asthma without being admitted to the hospital? 0   In the past 12 months, how many times were you hospitalized overnight because of your asthma? 0       Date of Last Spirometry Test:   No results found for this or any previous visit.

## 2017-11-01 DIAGNOSIS — E11.29 TYPE 2 DIABETES MELLITUS WITH OTHER DIABETIC KIDNEY COMPLICATION (CODE): Primary | ICD-10-CM

## 2017-11-02 PROBLEM — E11.29: Status: ACTIVE | Noted: 2017-11-02

## 2017-11-02 RX ORDER — DULAGLUTIDE 1.5 MG/.5ML
INJECTION, SOLUTION SUBCUTANEOUS
Qty: 6 ML | Refills: 0 | OUTPATIENT
Start: 2017-11-02

## 2017-11-02 RX ORDER — LANCETS
EACH MISCELLANEOUS
Qty: 408 EACH | Refills: 3 | Status: SHIPPED | OUTPATIENT
Start: 2017-11-02 | End: 2018-07-30

## 2017-11-02 NOTE — TELEPHONE ENCOUNTER
**This refill requires provider completion and is not appropriate for RN review per RN refill guidelines.**  Please associate appropriate diagnosis to this medication.  Varsha Stephen RN

## 2017-11-28 ENCOUNTER — TELEPHONE (OUTPATIENT)
Dept: FAMILY MEDICINE | Facility: CLINIC | Age: 57
End: 2017-11-28

## 2017-11-28 NOTE — TELEPHONE ENCOUNTER
"11/28/2017      Patient Communication Preferences indicate  Do not contact  and/or communication by \"Phone\" is not preferred. Call not required per Outreach team.          Outreach ,  Noah Peterson    "

## 2017-12-03 ENCOUNTER — HOSPITAL ENCOUNTER (EMERGENCY)
Facility: CLINIC | Age: 57
Discharge: HOME | End: 2017-12-03
Attending: PHYSICIAN ASSISTANT | Admitting: PHYSICIAN ASSISTANT
Payer: COMMERCIAL

## 2017-12-03 VITALS
DIASTOLIC BLOOD PRESSURE: 107 MMHG | HEART RATE: 102 BPM | OXYGEN SATURATION: 98 % | RESPIRATION RATE: 18 BRPM | SYSTOLIC BLOOD PRESSURE: 178 MMHG | TEMPERATURE: 97.4 F

## 2017-12-03 DIAGNOSIS — J06.9 UPPER RESPIRATORY TRACT INFECTION, UNSPECIFIED TYPE: ICD-10-CM

## 2017-12-03 LAB
INTERNAL QC OK POCT: YES
S PYO AG THROAT QL IA.RAPID: NEGATIVE

## 2017-12-03 PROCEDURE — 87077 CULTURE AEROBIC IDENTIFY: CPT | Performed by: PHYSICIAN ASSISTANT

## 2017-12-03 PROCEDURE — 99213 OFFICE O/P EST LOW 20 MIN: CPT | Performed by: PHYSICIAN ASSISTANT

## 2017-12-03 PROCEDURE — 87880 STREP A ASSAY W/OPTIC: CPT | Performed by: PHYSICIAN ASSISTANT

## 2017-12-03 PROCEDURE — 87081 CULTURE SCREEN ONLY: CPT | Performed by: PHYSICIAN ASSISTANT

## 2017-12-03 PROCEDURE — 99213 OFFICE O/P EST LOW 20 MIN: CPT

## 2017-12-03 NOTE — ED AVS SNAPSHOT
[facility] Emergency Department    [address] [facility] [address]    Phone:  [phone]    Fax:  [phone]                                       Bria Davis   MRN: [id number]    Department:  [facility] Emergency Department   Date of Visit:  12/3/2017           Patient Information     Date Of Birth          1960        Your diagnoses for this visit were:     Upper respiratory tract infection, unspecified type        You were seen by Dalton Jacob PA-C.        Discharge Instructions         Viral Upper Respiratory Illness (Adult)  You have a viral upper respiratory illness (URI), which is another term for the common cold. This illness is contagious during the first few days. It is spread through the air by coughing and sneezing. It may also be spread by direct contact (touching the sick person and then touching your own eyes, nose, or mouth). Frequent handwashing will decrease risk of spread. Most viral illnesses go away within 7 to 10 days with rest and simple home remedies. Sometimes the illness may last for several weeks. Antibiotics will not kill a virus, and they are generally not prescribed for this condition.    Home care    If symptoms are severe, rest at home for the first 2 to 3 days. When you resume activity, don't let yourself get too tired.    Avoid being exposed to cigarette smoke (yours or others ).    You may use acetaminophen or ibuprofen to control pain and fever, unless another medicine was prescribed. (Note: If you have chronic liver or kidney disease, have ever had a stomach ulcer or gastrointestinal bleeding, or are taking blood-thinning medicines, talk with your healthcare provider before using these medicines.) Aspirin should never be given to anyone under 18 years of age who is ill with a viral infection or fever. It may cause severe liver or brain damage.    Your appetite may be poor, so a light diet is fine. Avoid dehydration by drinking 6 to 8  glasses of fluids per day (water, soft drinks, juices, tea, or soup). Extra fluids will help loosen secretions in the nose and lungs.    Over-the-counter cold medicines will not shorten the length of time you re sick, but they may be helpful for the following symptoms: cough, sore throat, and nasal and sinus congestion. (Note: Do not use decongestants if you have high blood pressure.)  Follow-up care  Follow up with your healthcare provider, or as advised.  When to seek medical advice  Call your healthcare provider right away if any of these occur:    Cough with lots of colored sputum (mucus)    Severe headache; face, neck, or ear pain    Difficulty swallowing due to throat pain    Fever of 100.4 F (38 C)  Call 911, or get immediate medical care  Call emergency services right away if any of these occur:    Chest pain, shortness of breath, wheezing, or difficulty breathing    Coughing up blood    Inability to swallow due to throat pain  Date Last Reviewed: 9/13/2015 2000-2017 [employer]. [address] [place] [address]. All rights reserved. This information is not intended as a substitute for professional medical care. Always follow your healthcare professional's instructions.          24 Hour Appointment Hotline       To make an appointment at any [facility], call [phone] ([phone]). If you don't have a family doctor or clinic, we will help you find one. [facility] clinics are conveniently located to serve the needs of you and your family.             Review of your medicines      Our records show that you are taking the medicines listed below. If these are incorrect, please call your family doctor or clinic.        Dose / Directions Last dose taken    albuterol 108 (90 BASE) MCG/ACT Inhaler   Commonly known as:  PROAIR HFA/PROVENTIL HFA/VENTOLIN HFA   Dose:  2 puff   Quantity:  1 Inhaler        Inhale 2 puffs into the lungs every 6 hours   Refills:  11        aspirin 81  MG tablet   Dose:  81 mg   Quantity:  100 tablet        Take 1 tablet by mouth daily.   Refills:  3        JOAQUIN CONTOUR test strip   Quantity:  400 each   Generic drug:  blood glucose monitoring        USE TO TEST BLOOD SUGAR FOUR TIMES A DAY AS DIRECTED   Refills:  3        blood glucose monitoring lancets   Quantity:  408 each        USE TO TEST BLOOD SUGAR FOUR TIMES A DAY AS DIRECTED   Refills:  3        Calcium-Vitamin D 600-400 MG-UNIT Tabs   Dose:  1 tablet        Take 1 tablet by mouth daily   Refills:  0        ciprofloxacin 0.3 % ophthalmic solution   Commonly known as:  CILOXAN   Quantity:  1 Bottle        Instill 1-2 drops in the affected eye(s) every 2 hours while awake for 2 days then 1-2 drops every 4 hours while awake for the next 5 days.   Refills:  0        cyclobenzaprine 10 MG tablet   Commonly known as:  FLEXERIL   Dose:  10 mg   Quantity:  30 tablet        Take 1 tablet (10 mg) by mouth nightly as needed for muscle spasms   Refills:  5        dulaglutide 1.5 MG/0.5ML pen   Commonly known as:  TRULICITY   Dose:  1.5 mg   Quantity:  6 mL        Inject 1.5 mg Subcutaneous every 7 days   Refills:  2        esomeprazole 40 MG CR capsule   Commonly known as:  nexIUM   Dose:  40 mg   Quantity:  90 capsule        Take 1 capsule (40 mg) by mouth every morning (before breakfast)   Refills:  3        fluconazole 150 MG tablet   Commonly known as:  DIFLUCAN   Dose:  150 mg   Quantity:  4 tablet        Take 1 tablet (150 mg) by mouth every 3 days   Refills:  5        fluticasone-salmeterol 100-50 MCG/DOSE diskus inhaler   Commonly known as:  ADVAIR DISKUS   Dose:  1 puff   Quantity:  3 Inhaler        Inhale 1 puff into the lungs 2 times daily   Refills:  3        insulin aspart 100 UNITS/ML injection   Commonly known as:  NovoLOG VIAL   Quantity:  3 vial        Take 24 units with breakfast and lunch, 18 units with dinner.  If glucometer is over 200 take an extra 6 units   Refills:  2        insulin  "glargine 100 UNIT/ML injection   Commonly known as:  LANTUS VIAL   Quantity:  70 mL        INJECT 36 UNITS UNDER THE SKIN twice daily   Refills:  2        losartan 100 MG tablet   Commonly known as:  COZAAR   Dose:  100 mg   Quantity:  100 tablet        Take 1 tablet (100 mg) by mouth daily   Refills:  1        metFORMIN 500 MG 24 hr tablet   Commonly known as:  GLUCOPHAGE-XR   Quantity:  360 tablet        TAKE TWO TABLETS BY MOUTH TWICE A DAY (WITH MEALS)   Refills:  0        metoclopramide 10 MG tablet   Commonly known as:  REGLAN   Dose:  10 mg   Quantity:  180 tablet        Take 1 tablet (10 mg) by mouth 2 times daily   Refills:  3        montelukast 10 MG tablet   Commonly known as:  SINGULAIR   Quantity:  90 tablet        TAKE ONE TABLET BY MOUTH AT BEDTIME   Refills:  1        MULTIVITAMIN ADULTS 50+ Tabs   Dose:  1 tablet        Take 1 tablet by mouth daily   Refills:  0        Needle (Disp) 30G X 1/2\" Misc   Commonly known as:  BD DISP NEEDLES   Quantity:  300 each        To use with humalog insulin 3 times daily   Refills:  1        pseudoePHEDrine 30 MG tablet   Commonly known as:  SUDAFED   Dose:  60 mg   Quantity:  360 tablet        Take 2 tablets (60 mg) by mouth every 12 hours   Refills:  3        simvastatin 40 MG tablet   Commonly known as:  ZOCOR   Dose:  40 mg   Quantity:  90 tablet        Take 1 tablet (40 mg) by mouth At Bedtime at bedtime.   Refills:  1                Procedures and tests performed during your visit     Beta strep group A r/o culture    Rapid strep group A screen POCT      Orders Needing Specimen Collection     None      Pending Results     Date and Time Order Name Status Description    12/3/2017 1625 Beta strep group A r/o culture In process             Pending Culture Results     Date and Time Order Name Status Description    12/3/2017 1625 Beta strep group A r/o culture In process             Pending Results Instructions     If you had any lab results that were not finalized " at the time of your Discharge, you can call the ED Lab Result RN at [phone]. You will be contacted by this team for any positive Lab results or changes in treatment. The nurses are available 7 days a week from 10A to 6:30P.  You can leave a message 24 hours per day and they will return your call.        Test Results From Your Hospital Stay        12/3/2017  4:25 PM      Component Results     Component Value Ref Range & Units Status    Rapid Strep A Screen NEGATIVE neg Final    Internal QC OK Yes  Final         12/3/2017  4:26 PM                Thank you for choosing [facility]       Thank you for choosing [facility] for your care. Our goal is always to provide you with excellent care. Hearing back from our patients is one way we can continue to improve our services. Please take a few minutes to complete the written survey that you may receive in the mail after you visit with us. Thank you!        [employer] Information     [employer] gives you secure access to your electronic health record. If you see a primary care provider, you can also send messages to your care team and make appointments. If you have questions, please call your primary care clinic.  If you do not have a primary care provider, please call [phone] and they will assist you.        Care EveryWhere ID     This is your Care EveryWhere ID. This could be used by other organizations to access your [facility] medical records  [id number]        Equal Access to Services     JOANNE STORM : Ferny Bonilla, wasandrada kwaku, qaybta kaalmahema medley, ann-marie lehman. So [facility] [phone].    ATENCIÓN: Si habla español, tiene a headley disposición servicios gratuitos de asistencia lingüística. Llame al [phone].    We comply with applicable federal civil rights laws and [place] laws. We do not discriminate on the basis of race, color, national origin, age, disability, sex, sexual orientation, or gender identity.             After Visit Summary       This is your record. Keep this with you and show to your community pharmacist(s) and doctor(s) at your next visit.

## 2017-12-03 NOTE — ED AVS SNAPSHOT
[facility] Emergency Department    [address] [address]    Phone:  [phone]    Fax:  [phone]                                       Bria Davis   MRN: [id number]    Department:  [facility] Emergency Department   Date of Visit:  12/3/2017           After Visit Summary Signature Page     I have received my discharge instructions, and my questions have been answered. I have discussed any challenges I see with this plan with the nurse or doctor.    ..........................................................................................................................................  Patient/Patient Representative Signature      ..........................................................................................................................................  Patient Representative Print Name and Relationship to Patient    ..................................................               ................................................  Date                                            Time    ..........................................................................................................................................  Reviewed by Signature/Title    ...................................................              ..............................................  Date                                                            Time

## 2017-12-03 NOTE — ED PROVIDER NOTES
Chief Complaint    Chief Complaint   Patient presents with     Pharyngitis       HPI  Bria Davis is an 57 year old female. presents for evaluation and treatment of sore throat. Symptoms include congestion and cough. Onset 5 days, unchanged since that time. Known Strep exposure: work exposure.  Patient states that her boss was Dx with strep that is resistant to antibiotics.    She denies any diarrhea or vomiting.  No chest pain or shortness of breath.   She is eating well with no problems swallowing.  Is urinating regularly.     ROS:  All other systems were reviewed and are negative.     Respiratory History  occasional episodes of bronchitis     Problem history  Patient Active Problem List   Diagnosis     Mild intermittent asthma     Esophageal reflux     Allergic rhinitis     Obesity     Restless legs syndrome (RLS)     Enthesopathy     Lumbar radiculopathy     Microalbuminuria     Hyperlipidemia LDL goal <100     Type 2 diabetes mellitus with other diabetic kidney complication     Morbid obesity (H)     Essential hypertension with goal blood pressure less than 140/90     Type 2 diabetes mellitus with other diabetic kidney complication (CODE) (H)        Allergies  Allergies   Allergen Reactions     Lisinopril Cough     Tape [Adhesive Tape] Rash        Smoking History  History   Smoking Status     Never Smoker   Smokeless Tobacco     Never Used        Current Meds  Medications reviewed in EMR    OBJECTIVE     Vital signs noted and reviewed by Dalton Jacob  BP (!) 178/107  Pulse 102  Temp 97.4  F (36.3  C) (Temporal)  Resp 18  SpO2 98%     PEFR:  General appearance: healthy, alert and no distress  Ears: R TM - normal: no effusions, no erythema, and normal landmarks, L TM - normal: no effusions, no erythema, and normal landmarks  Eyes: R normal, L normal  Nose: clear discharge  Oropharynx: mild erythema and post nasal drainage  Neck: supple and no adenopathy  Lungs: normal and clear to auscultation  Heart:  S1, S2 normal, no murmur, click, rub or gallop, regular rate and rhythm, chest is clear without rales or wheezing, no pedal edema, no JVD, no hepatosplenomegaly          Labs:     Results for orders placed or performed during the hospital encounter of 12/03/17   Rapid strep group A screen POCT   Result Value Ref Range    Rapid Strep A Screen NEGATIVE neg    Internal QC OK Yes         ASSESSMENT:     (J06.9) Upper respiratory tract infection, unspecified type       PLAN:    Strep screen was negative and communicated to patient    Symptomatic treatment with fluids, vaporizer, acetaminophen,salt water gargles, and ibuprofen.  Follow up with PCP as needed for persistence, worsening, appearance of new symptoms.  Contagion reviewed.  Out of work/school until feeling better, or no fever without antipyretics for 24 hours.        Dalton Jacob  12/3/2017, 4:46 PM       Dalton Jacob PA-C  12/03/17 [id number]

## 2017-12-03 NOTE — DISCHARGE INSTRUCTIONS

## 2017-12-03 NOTE — ED NOTES
Patient here for sore throat, chest congestion, ear pain, symptoms started about a month ago.  Patient presents ambulatory to the urgent care.  Rapid strep ordered per protocol.

## 2017-12-05 ENCOUNTER — TELEPHONE (OUTPATIENT)
Dept: FAMILY MEDICINE | Facility: CLINIC | Age: 57
End: 2017-12-05

## 2017-12-05 DIAGNOSIS — B37.31 YEAST INFECTION OF THE VAGINA: ICD-10-CM

## 2017-12-05 DIAGNOSIS — B96.89 BACTERIAL PHARYNGITIS: Primary | ICD-10-CM

## 2017-12-05 DIAGNOSIS — J02.8 BACTERIAL PHARYNGITIS: Primary | ICD-10-CM

## 2017-12-05 LAB
BACTERIA SPEC CULT: ABNORMAL
Lab: ABNORMAL
SPECIMEN SOURCE: ABNORMAL

## 2017-12-05 RX ORDER — FLUCONAZOLE 150 MG/1
150 TABLET ORAL
Qty: 4 TABLET | Refills: 0 | Status: SHIPPED | OUTPATIENT
Start: 2017-12-05 | End: 2017-12-29

## 2017-12-05 RX ORDER — FLUCONAZOLE 150 MG/1
150 TABLET ORAL ONCE
Qty: 1 TABLET | Refills: 0 | Status: CANCELLED | OUTPATIENT
Start: 2017-12-05 | End: 2017-12-05

## 2017-12-05 RX ORDER — PENICILLIN V POTASSIUM 500 MG/1
500 TABLET, FILM COATED ORAL 2 TIMES DAILY
Qty: 20 TABLET | Refills: 0 | Status: SHIPPED | OUTPATIENT
Start: 2017-12-05 | End: 2017-12-29

## 2017-12-05 NOTE — TELEPHONE ENCOUNTER
The culture with Streptococcus, not group A means this is not the usual strep bacteria.  However, it is a bacterial infection and since she has been sick for so long it would be reasonable to treat with a course of antibiotics.  I will send a prescription to the [facility] pharmacy

## 2017-12-05 NOTE — TELEPHONE ENCOUNTER
"Reason for call:  Patient reporting a symptom    Symptom or request: Patient has had URI on and off since October. Was seen in ED on 12/3. ED doctor said that this has been a series of URI, not the same one for the whole 6 weeks. Patient received results this morning from Strep culture which noted  \"Light growth Beta hemolytic Streptococcus, not group A.\" Patient would like to know what this means. It is not positive, yet there is growth. Patient would like to know what she can do, she needs to get over the sickness as its gone on too long. Patient also notes that she has had higher Blood Sugars the last 2 months during this time she has been ill.    Duration (how long have symptoms been present): two months, see ED note from 12/3    Have you been treated for this before? Yes    Additional comments: none    Phone Number patient can be reached at:  Work number on file:  [phone] (work)    Best Time:  any    Can we leave a detailed message on this number:  YES    Call taken on 12/5/2017 at 11:33 AM by Imelda aGrvin    "

## 2017-12-05 NOTE — TELEPHONE ENCOUNTER
Please see message below.  Patient has been sick on and off since Oct.  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  Patient denies any fevers just cold symptoms.  Patient would like antibiotics due to growth in culture.  Patient was advised the growth does not indicate treatment with antibiotics but due to the longevity of the symptoms will send to provider for review.  If ok to treat patient would like diflucan also.      Thank you  Julia NEGRO RN

## 2017-12-05 NOTE — TELEPHONE ENCOUNTER
Patient called back and was given information below. Patient is following up with Dr. Conroy in 2 week for diabetes, she will let him know how she is feeling at that time.    Imelda Garvin   Clinic Station

## 2017-12-10 DIAGNOSIS — K31.84 GASTROPARESIS DUE TO DM (H): ICD-10-CM

## 2017-12-10 DIAGNOSIS — E11.43 GASTROPARESIS DUE TO DM (H): ICD-10-CM

## 2017-12-12 RX ORDER — METOCLOPRAMIDE 10 MG/1
TABLET ORAL
Qty: 180 TABLET | Refills: 1 | Status: SHIPPED | OUTPATIENT
Start: 2017-12-12 | End: 2018-06-10

## 2017-12-22 ENCOUNTER — TELEPHONE (OUTPATIENT)
Dept: FAMILY MEDICINE | Facility: CLINIC | Age: 57
End: 2017-12-22

## 2017-12-22 DIAGNOSIS — E78.5 HYPERLIPIDEMIA LDL GOAL <100: ICD-10-CM

## 2017-12-22 DIAGNOSIS — E11.29 TYPE 2 DIABETES MELLITUS WITH OTHER DIABETIC KIDNEY COMPLICATION (CODE): ICD-10-CM

## 2017-12-22 DIAGNOSIS — R80.9 MICROALBUMINURIA: Primary | ICD-10-CM

## 2017-12-22 RX ORDER — METFORMIN HCL 500 MG
TABLET, EXTENDED RELEASE 24 HR ORAL
Qty: 360 TABLET | Refills: 0 | OUTPATIENT
Start: 2017-12-22

## 2017-12-22 NOTE — TELEPHONE ENCOUNTER
POst:  Pt scheduled on 12/29/17.  Added FLP, CMP and microalbumin to labs for refills that will be needed in the next 12 months.  Please review and approve.      Pt states she has enough medication to get to her appointment on 12/29/17 and will address refills at this time.

## 2017-12-29 ENCOUNTER — OFFICE VISIT (OUTPATIENT)
Dept: FAMILY MEDICINE | Facility: CLINIC | Age: 57
End: 2017-12-29
Payer: COMMERCIAL

## 2017-12-29 VITALS
BODY MASS INDEX: 43.87 KG/M2 | WEIGHT: 257 LBS | HEIGHT: 64 IN | TEMPERATURE: 98 F | DIASTOLIC BLOOD PRESSURE: 86 MMHG | HEART RATE: 96 BPM | SYSTOLIC BLOOD PRESSURE: 138 MMHG | RESPIRATION RATE: 18 BRPM

## 2017-12-29 DIAGNOSIS — J31.0 CHRONIC RHINITIS, UNSPECIFIED TYPE: ICD-10-CM

## 2017-12-29 DIAGNOSIS — I10 ESSENTIAL HYPERTENSION WITH GOAL BLOOD PRESSURE LESS THAN 140/90: ICD-10-CM

## 2017-12-29 DIAGNOSIS — E78.5 HYPERLIPIDEMIA LDL GOAL <100: ICD-10-CM

## 2017-12-29 DIAGNOSIS — J45.20 MILD INTERMITTENT ASTHMA WITHOUT COMPLICATION: ICD-10-CM

## 2017-12-29 DIAGNOSIS — B37.31 CANDIDIASIS OF VULVA AND VAGINA: ICD-10-CM

## 2017-12-29 DIAGNOSIS — J45.20 MILD INTERMITTENT ASTHMA, UNCOMPLICATED: ICD-10-CM

## 2017-12-29 DIAGNOSIS — E11.29 TYPE 2 DIABETES MELLITUS WITH OTHER DIABETIC KIDNEY COMPLICATION (CODE): ICD-10-CM

## 2017-12-29 DIAGNOSIS — R80.9 MICROALBUMINURIA: ICD-10-CM

## 2017-12-29 DIAGNOSIS — K21.9 GASTROESOPHAGEAL REFLUX DISEASE WITHOUT ESOPHAGITIS: ICD-10-CM

## 2017-12-29 DIAGNOSIS — E66.01 MORBID OBESITY (H): ICD-10-CM

## 2017-12-29 DIAGNOSIS — E11.29 TYPE 2 DIABETES MELLITUS WITH OTHER KIDNEY COMPLICATION, UNSPECIFIED LONG TERM INSULIN USE STATUS: Primary | ICD-10-CM

## 2017-12-29 LAB
ANION GAP SERPL CALCULATED.3IONS-SCNC: 5 MMOL/L (ref 3–14)
BUN SERPL-MCNC: 13 MG/DL (ref 7–30)
CALCIUM SERPL-MCNC: 9.3 MG/DL (ref 8.5–10.1)
CHLORIDE SERPL-SCNC: 101 MMOL/L (ref 94–109)
CHOLEST SERPL-MCNC: 175 MG/DL
CO2 SERPL-SCNC: 31 MMOL/L (ref 20–32)
CREAT SERPL-MCNC: 0.57 MG/DL (ref 0.52–1.04)
CREAT UR-MCNC: 52 MG/DL
GFR SERPL CREATININE-BSD FRML MDRD: >90 ML/MIN/1.7M2
GLUCOSE SERPL-MCNC: 166 MG/DL (ref 70–99)
HBA1C MFR BLD: 9.6 % (ref 4.3–6)
HDLC SERPL-MCNC: 52 MG/DL
LDLC SERPL CALC-MCNC: 71 MG/DL
MICROALBUMIN UR-MCNC: 107 MG/L
MICROALBUMIN/CREAT UR: 205.37 MG/G CR (ref 0–25)
NONHDLC SERPL-MCNC: 123 MG/DL
POTASSIUM SERPL-SCNC: 4.3 MMOL/L (ref 3.4–5.3)
SODIUM SERPL-SCNC: 137 MMOL/L (ref 133–144)
TRIGL SERPL-MCNC: 259 MG/DL
TSH SERPL DL<=0.005 MIU/L-ACNC: 2.85 MU/L (ref 0.4–4)

## 2017-12-29 PROCEDURE — 80048 BASIC METABOLIC PNL TOTAL CA: CPT | Performed by: FAMILY MEDICINE

## 2017-12-29 PROCEDURE — 36415 COLL VENOUS BLD VENIPUNCTURE: CPT | Performed by: FAMILY MEDICINE

## 2017-12-29 PROCEDURE — 80061 LIPID PANEL: CPT | Performed by: FAMILY MEDICINE

## 2017-12-29 PROCEDURE — 84443 ASSAY THYROID STIM HORMONE: CPT | Performed by: FAMILY MEDICINE

## 2017-12-29 PROCEDURE — 99214 OFFICE O/P EST MOD 30 MIN: CPT | Performed by: FAMILY MEDICINE

## 2017-12-29 PROCEDURE — 82043 UR ALBUMIN QUANTITATIVE: CPT | Performed by: FAMILY MEDICINE

## 2017-12-29 PROCEDURE — 83036 HEMOGLOBIN GLYCOSYLATED A1C: CPT | Performed by: FAMILY MEDICINE

## 2017-12-29 RX ORDER — PSEUDOEPHEDRINE HCL 30 MG
60 TABLET ORAL EVERY 12 HOURS
Qty: 360 TABLET | Refills: 3 | Status: SHIPPED | OUTPATIENT
Start: 2017-12-29 | End: 2019-01-07

## 2017-12-29 RX ORDER — MONTELUKAST SODIUM 10 MG/1
TABLET ORAL
Qty: 90 TABLET | Refills: 1 | Status: SHIPPED | OUTPATIENT
Start: 2017-12-29 | End: 2018-06-17

## 2017-12-29 RX ORDER — INSULIN GLARGINE 100 [IU]/ML
INJECTION, SOLUTION SUBCUTANEOUS
Qty: 70 ML | Refills: 2 | Status: SHIPPED | OUTPATIENT
Start: 2017-12-29 | End: 2018-04-13

## 2017-12-29 RX ORDER — SIMVASTATIN 40 MG
40 TABLET ORAL AT BEDTIME
Qty: 90 TABLET | Refills: 1 | Status: SHIPPED | OUTPATIENT
Start: 2017-12-29 | End: 2018-07-09

## 2017-12-29 RX ORDER — ALBUTEROL SULFATE 90 UG/1
2 AEROSOL, METERED RESPIRATORY (INHALATION) EVERY 6 HOURS
Qty: 1 INHALER | Refills: 11 | Status: SHIPPED | OUTPATIENT
Start: 2017-12-29 | End: 2018-07-30

## 2017-12-29 RX ORDER — ESOMEPRAZOLE MAGNESIUM 40 MG/1
40 CAPSULE, DELAYED RELEASE ORAL
Qty: 90 CAPSULE | Refills: 3 | Status: SHIPPED | OUTPATIENT
Start: 2017-12-29 | End: 2018-07-30

## 2017-12-29 RX ORDER — FLUCONAZOLE 150 MG/1
150 TABLET ORAL
Qty: 4 TABLET | Refills: 5 | Status: SHIPPED | OUTPATIENT
Start: 2017-12-29 | End: 2018-06-05

## 2017-12-29 RX ORDER — METFORMIN HCL 500 MG
TABLET, EXTENDED RELEASE 24 HR ORAL
Qty: 360 TABLET | Refills: 1 | Status: SHIPPED | OUTPATIENT
Start: 2017-12-29 | End: 2018-06-17

## 2017-12-29 NOTE — NURSING NOTE
"Chief Complaint   Patient presents with     Diabetes     Health Maintenance     will complete pap at [facility]       Initial BP (!) 142/94  Pulse 96  Temp 98  F (36.7  C) (Tympanic)  Resp 18  Ht 5' 3.5\" (1.613 m)  Wt 257 lb (116.6 kg)  Breastfeeding? Yes  BMI 44.81 kg/m2 Estimated body mass index is 44.81 kg/(m^2) as calculated from the following:    Height as of this encounter: 5' 3.5\" (1.613 m).    Weight as of this encounter: 257 lb (116.6 kg).  Medication Reconciliation: complete    "

## 2017-12-29 NOTE — PROGRESS NOTES
SUBJECTIVE:   Bria Davis is a 57 year old female who presents to clinic today for the following health issues:      Diabetes Follow-up    Patient is checking blood sugars: three times daily.   Blood sugar testing frequency justification: Frequent hypoglycemia  Results are as follows:       am - 180       lunchtime - 200       suppertime - 180        Diabetic concerns: blood sugar frequently over 200     Symptoms of hypoglycemia (low blood sugar): shaky, weak     Paresthesias (numbness or burning in feet) or sores: No     Date of last diabetic eye exam: up to date    She has had problems with hypoglycemia and admits that she frequently will eat to avoid having hypoglycemia and this ends up giving her more total calories.  She also had a prolonged respiratory infection with cough and admits that she has not been doing any regular exercise.  When she was ill did not feel like cooking properly and had a lot of fast food    Hyperlipidemia Follow-Up      Rate your low fat/cholesterol diet?: poor    Taking statin?  Yes, no muscle aches from statin    Other lipid medications/supplements?:  none    Hypertension Follow-up      Outpatient blood pressures are not being checked.    Low Salt Diet: low salt  BP Readings from Last 2 Encounters:   12/03/17 (!) 178/107   09/18/17 130/80     Hemoglobin A1C (%)   Date Value   12/29/2017 9.6 (H)   09/18/2017 9.3 (H)     LDL Cholesterol Calculated (mg/dL)   Date Value   11/25/2016 80   01/21/2016 65         Amount of exercise or physical activity: None    Problems taking medications regularly: No    Medication side effects: none    Diet: regular (no restrictions)    Morbid obesity: As discussed above has not been consistent in exercising and has not been eating a healthy diet so has not been able to lose any weight        Problem list and histories reviewed & adjusted, as indicated.  Additional history: as documented        Reviewed and updated as needed this visit by clinical  "staffTobacco       Reviewed and updated as needed this visit by Provider               ROS:  Constitutional, HEENT, cardiovascular, pulmonary, gi and gu systems are negative, except as otherwise noted.          OBJECTIVE:                                                    /86  Pulse 96  Temp 98  F (36.7  C) (Tympanic)  Resp 18  Ht 5' 3.5\" (1.613 m)  Wt 257 lb (116.6 kg)  Breastfeeding? Yes  BMI 44.81 kg/m2    GENERAL: healthy, alert and no distress  EYES: Eyes grossly normal to inspection, extraocular movements - intact, and PERRL  NECK: no tenderness, no adenopathy, no asymmetry, no masses, no stiffness; thyroid- normal to palpation  RESP: lungs clear to auscultation - no rales, no rhonchi, no wheezes  CV: regular rates and rhythm, normal S1 S2, no S3 or S4 and no murmur, no click or rub -  MS: extremities- no gross deformities noted, no edema  Diabetic foot exam: no trophic changes or ulcerative lesions and normal monofilament exam; some dry skin and callus formation    Diagnostic test results:  Results for orders placed or performed in visit on 12/29/17 (from the past 24 hour(s))   Hemoglobin A1c   Result Value Ref Range    Hemoglobin A1C 9.6 (H) 4.3 - 6.0 %          ASSESSMENT/PLAN:                                                    ASSESSMENT:  1. Type 2 diabetes mellitus with other kidney complication, unspecified long term insulin use status (H)    2. Mild intermittent asthma, uncomplicated    3. Hyperlipidemia LDL goal <100    4. Mild intermittent asthma without complication    5. Essential hypertension with goal blood pressure less than 140/90    6. Morbid obesity (H)    7. Gastroesophageal reflux disease without esophagitis    8. Candidiasis of vulva and vagina    9. Chronic rhinitis, unspecified type        PLAN:  Orders Placed This Encounter     montelukast (SINGULAIR) 10 MG tablet     insulin glargine (LANTUS VIAL) 100 UNIT/ML injection     insulin aspart (NOVOLOG VIAL) 100 UNITS/ML " injection     metFORMIN (GLUCOPHAGE-XR) 500 MG 24 hr tablet     simvastatin (ZOCOR) 40 MG tablet     fluticasone-salmeterol (ADVAIR DISKUS) 100-50 MCG/DOSE diskus inhaler     albuterol (PROAIR HFA/PROVENTIL HFA/VENTOLIN HFA) 108 (90 BASE) MCG/ACT Inhaler     esomeprazole (NEXIUM) 40 MG CR capsule     pseudoePHEDrine (SUDAFED) 30 MG tablet     fluconazole (DIFLUCAN) 150 MG tablet       Patient Instructions   Diabetes Plan  1. Hemoglobin A1c goal is between 7% and 8%  Your Hemoglobin A1c is not at goal  Plan is:Increase Lantus to 40 units twice daily; really work on diet and exercise  2. LDL (bad cholesterol) goal is less than 100  Your LDL is at goal  Plan is: Continue medications at current doses  3. Blood pressure goal is less than 140/90.  Your blood pressure is at goal.  Plan is: Continue medications at current doses  Recheck in 3 months                  Thank you for choosing [facility]  You may be receiving a survey in the mail from [employer] regarding your visit today.  Please take a few minutes to complete and return the survey to let us know how we are doing.      Our Clinic hours are:  Mondays    7:20 am - 7 pm  Tues -  Fri  7:20 am - 5 pm    Clinic Phone: [phone]    The clinic lab opens at 7:30 am Mon - Fri and appointments are required.    [facility]  Ph. [phone]  Monday-Thursday 8 am - 7pm  Tues/Wed/Fri 8 am - 5:30 pm             IRVIN Malloy Post  [facility]

## 2017-12-29 NOTE — PATIENT INSTRUCTIONS
Diabetes Plan  1. Hemoglobin A1c goal is between 7% and 8%  Your Hemoglobin A1c is not at goal  Plan is:Increase Lantus to 40 units twice daily; really work on diet and exercise  2. LDL (bad cholesterol) goal is less than 100  Your LDL is at goal  Plan is: Continue medications at current doses  3. Blood pressure goal is less than 140/90.  Your blood pressure is at goal.  Plan is: Continue medications at current doses  Recheck in 3 months                  Thank you for choosing [facility]  You may be receiving a survey in the mail from [employer] regarding your visit today.  Please take a few minutes to complete and return the survey to let us know how we are doing.      Our Clinic hours are:  Mondays    7:20 am - 7 pm  Tues -  Fri  7:20 am - 5 pm    Clinic Phone: [phone]    The clinic lab opens at 7:30 am Mon - Fri and appointments are required.    [facility]  Ph. [phone]  Monday-Thursday 8 am - 7pm  Tues/Wed/Fri 8 am - 5:30 pm

## 2017-12-29 NOTE — MR AVS SNAPSHOT
After Visit Summary   12/29/2017    Bria Davis    MRN: [id number]           Patient Information     Date Of Birth          1960        Visit Information        Provider Department      12/29/2017 9:40 AM IRVIN Conroy MD [facility]        Today's Diagnoses     Type 2 diabetes mellitus with other kidney complication, unspecified long term insulin use status (H)    -  1    Mild intermittent asthma, uncomplicated        Hyperlipidemia LDL goal <100        Mild intermittent asthma without complication        Gastroesophageal reflux disease without esophagitis        Candidiasis of vulva and vagina        Chronic rhinitis, unspecified type          Care Instructions    Diabetes Plan  1. Hemoglobin A1c goal is between 7% and 8%  Your Hemoglobin A1c is not at goal  Plan is:Increase Lantus to 40 units twice daily; really work on diet and exercise  2. LDL (bad cholesterol) goal is less than 100  Your LDL is at goal  Plan is: Continue medications at current doses  3. Blood pressure goal is less than 140/90.  Your blood pressure is at goal.  Plan is: Continue medications at current doses  Recheck in 3 months                  Thank you for choosing [facility].  You may be receiving a survey in the mail from [employer] regarding your visit today.  Please take a few minutes to complete and return the survey to let us know how we are doing.      Our Clinic hours are:  Mondays    7:20 am - 7 pm  Tues -  Fri  7:20 am - 5 pm    Clinic Phone: [phone]    The clinic lab opens at 7:30 am Mon - Fri and appointments are required.    [facility]  Ph. [phone]  Monday-Thursday 8 am - 7pm  Tues/Wed/Fri 8 am - 5:30 pm                 Follow-ups after your visit        Who to contact     If you have questions or need follow up information about today's clinic visit or your schedule please contact [facility] directly at [phone].  Normal or  "non-critical lab and imaging results will be communicated to you by MyChart, letter or phone within 4 business days after the clinic has received the results. If you do not hear from us within 7 days, please contact the clinic through [employer] or phone. If you have a critical or abnormal lab result, we will notify you by phone as soon as possible.  Submit refill requests through [employer] or call your pharmacy and they will forward the refill request to us. Please allow 3 business days for your refill to be completed.          Additional Information About Your Visit        [employer] Information     [employer] gives you secure access to your electronic health record. If you see a primary care provider, you can also send messages to your care team and make appointments. If you have questions, please call your primary care clinic.  If you do not have a primary care provider, please call [phone] and they will assist you.        Care EveryWhere ID     This is your Care EveryWhere ID. This could be used by other organizations to access your [place] medical records  [id number]        Your Vitals Were     Pulse Temperature Respirations Height Breastfeeding? BMI (Body Mass Index)    96 98  F (36.7  C) (Tympanic) 18 5' 3.5\" (1.613 m) Yes 44.81 kg/m2       Blood Pressure from Last 3 Encounters:   12/29/17 138/86   12/03/17 (!) 178/107   09/18/17 130/80    Weight from Last 3 Encounters:   12/29/17 257 lb (116.6 kg)   09/18/17 255 lb 9.6 oz (115.9 kg)   06/19/17 248 lb 9.6 oz (112.8 kg)              Today, you had the following     No orders found for display         Today's Medication Changes          These changes are accurate as of: 12/29/17 10:03 AM.  If you have any questions, ask your nurse or doctor.               These medicines have changed or have updated prescriptions.        Dose/Directions    insulin glargine 100 UNIT/ML injection   Commonly known as:  LANTUS VIAL   This may have changed:  additional instructions "   Used for:  Type 2 diabetes mellitus with other kidney complication, unspecified long term insulin use status (H)   Changed by:  IRVIN Conroy MD        INJECT 40 UNITS UNDER THE SKIN twice daily   Quantity:  70 mL   Refills:  2       montelukast 10 MG tablet   Commonly known as:  SINGULAIR   This may have changed:  See the new instructions.   Used for:  Mild intermittent asthma, uncomplicated, Chronic rhinitis, unspecified type   Changed by:  IRVIN Conroy MD        TAKE ONE TABLET BY MOUTH AT BEDTIME   Quantity:  90 tablet   Refills:  1            Where to get your medicines      These medications were [place] [facility] [address]  [id number] [place] [id number]     Phone:  [phone]     albuterol 108 (90 BASE) MCG/ACT Inhaler    esomeprazole 40 MG CR capsule    fluconazole 150 MG tablet    fluticasone-salmeterol 100-50 MCG/DOSE diskus inhaler    insulin aspart 100 UNITS/ML injection    insulin glargine 100 UNIT/ML injection    metFORMIN 500 MG 24 hr tablet    montelukast 10 MG tablet    simvastatin 40 MG tablet         Some of these will need a paper prescription and others can be bought over the counter.  Ask your nurse if you have questions.     Bring a paper prescription for each of these medications     pseudoePHEDrine 30 MG tablet                Primary Care Provider Office Phone # Fax #    R Gama Conroy [phone] [address] [facility] [address]        Equal Access to Services     Sharp [facility] VistaIRVIN AH: Hadii aad ku hadasho Soomaali, waaxda luqadaha, qaybta kaalmada adeegyada, waxay idiin haylion sneha white lajohn ah. So [facility] [phone].    ATENCIÓN: Si habla español, tiene a headley disposición servicios gratuitos de asistencia lingüística. Llame al [phone].    We comply with applicable federal civil rights laws and [place] laws. We do not discriminate on the basis of race, color, national origin,  age, disability, sex, sexual orientation, or gender identity.            Thank you!     Thank you for choosing [facility]  for your care. Our goal is always to provide you with excellent care. Hearing back from our patients is one way we can continue to improve our services. Please take a few minutes to complete the written survey that you may receive in the mail after your visit with us. Thank you!             Your Updated Medication List - Protect others around you: Learn how to safely use, store and throw away your medicines at www.disposemymeds.org.          This list is accurate as of: 12/29/17 10:03 AM.  Always use your most recent med list.                   Brand Name Dispense Instructions for use Diagnosis    albuterol 108 (90 BASE) MCG/ACT Inhaler    PROAIR HFA/PROVENTIL HFA/VENTOLIN HFA    1 Inhaler    Inhale 2 puffs into the lungs every 6 hours    Mild intermittent asthma without complication       aspirin 81 MG tablet     100 tablet    Take 1 tablet by mouth daily.    Diabetes mellitus, type 2 (H)       JOAQUIN CONTOUR test strip   Generic drug:  blood glucose monitoring     400 each    USE TO TEST BLOOD SUGAR FOUR TIMES A DAY AS DIRECTED    Type 2 diabetes mellitus with other diabetic kidney complication (CODE) (H)       blood glucose monitoring lancets     408 each    USE TO TEST BLOOD SUGAR FOUR TIMES A DAY AS DIRECTED    Type 2 diabetes mellitus with other diabetic kidney complication (CODE) (H)       Calcium-Vitamin D 600-400 MG-UNIT Tabs      Take 1 tablet by mouth daily        cyclobenzaprine 10 MG tablet    FLEXERIL    30 tablet    Take 1 tablet (10 mg) by mouth nightly as needed for muscle spasms    Muscle contraction headache       dulaglutide 1.5 MG/0.5ML pen    TRULICITY    6 mL    Inject 1.5 mg Subcutaneous every 7 days    Type 2 diabetes mellitus with other diabetic kidney complication       esomeprazole 40 MG CR capsule    nexIUM    90 capsule    Take 1 capsule (40 mg) by  "mouth every morning (before breakfast)    Gastroesophageal reflux disease without esophagitis       fluconazole 150 MG tablet    DIFLUCAN    4 tablet    Take 1 tablet (150 mg) by mouth every 3 days    Candidiasis of vulva and vagina       fluticasone-salmeterol 100-50 MCG/DOSE diskus inhaler    ADVAIR DISKUS    3 Inhaler    Inhale 1 puff into the lungs 2 times daily    Mild intermittent asthma, uncomplicated       insulin aspart 100 UNITS/ML injection    NovoLOG VIAL    3 vial    Take 24 units with breakfast and lunch, 18 units with dinner.  If glucometer is over 200 take an extra 6 units    Type 2 diabetes mellitus with other kidney complication, unspecified long term insulin use status (H)       insulin glargine 100 UNIT/ML injection    LANTUS VIAL    70 mL    INJECT 40 UNITS UNDER THE SKIN twice daily    Type 2 diabetes mellitus with other kidney complication, unspecified long term insulin use status (H)       losartan 100 MG tablet    COZAAR    100 tablet    Take 1 tablet (100 mg) by mouth daily    Essential hypertension with goal blood pressure less than 140/90       metFORMIN 500 MG 24 hr tablet    GLUCOPHAGE-XR    360 tablet    TAKE TWO TABLETS BY MOUTH TWICE A DAY (WITH MEALS)    Type 2 diabetes mellitus with other kidney complication, unspecified long term insulin use status (H)       metoclopramide 10 MG tablet    REGLAN    180 tablet    TAKE ONE TABLET BY MOUTH TWICE A DAY    Gastroparesis due to DM (H)       montelukast 10 MG tablet    SINGULAIR    90 tablet    TAKE ONE TABLET BY MOUTH AT BEDTIME    Mild intermittent asthma, uncomplicated, Chronic rhinitis, unspecified type       MULTIVITAMIN ADULTS 50+ Tabs      Take 1 tablet by mouth daily        Needle (Disp) 30G X 1/2\" Misc    BD DISP NEEDLES    300 each    To use with humalog insulin 3 times daily    Type 2 diabetes mellitus with other diabetic kidney complication       pseudoePHEDrine 30 MG tablet    SUDAFED    360 tablet    Take 2 tablets (60 mg) " by mouth every 12 hours    Chronic rhinitis, unspecified type       simvastatin 40 MG tablet    ZOCOR    90 tablet    Take 1 tablet (40 mg) by mouth At Bedtime at bedtime.    Hyperlipidemia LDL goal <100

## 2018-01-02 NOTE — PROGRESS NOTES
Bria,  We already discussed the glycosylated hemoglobin and the importance of improving your diet and exercise level. The other abnormality in your labs is the protein in your urine is higher than last time. This indicates damage to the kidneys which will progress if you don't improve your blood sugar control.      Gama Conroy MD

## 2018-01-06 ENCOUNTER — HEALTH MAINTENANCE LETTER (OUTPATIENT)
Age: 58
End: 2018-01-06

## 2018-02-05 ENCOUNTER — TRANSFERRED RECORDS (OUTPATIENT)
Dept: HEALTH INFORMATION MANAGEMENT | Facility: CLINIC | Age: 58
End: 2018-02-05

## 2018-03-19 DIAGNOSIS — I10 ESSENTIAL HYPERTENSION WITH GOAL BLOOD PRESSURE LESS THAN 140/90: ICD-10-CM

## 2018-03-20 NOTE — TELEPHONE ENCOUNTER
"Requested Prescriptions   Pending Prescriptions Disp Refills     losartan (COZAAR) 100 MG tablet [Pharmacy Med Name: LOSARTAN POTASSIUM 100MG TABS]  Last Written Prescription Date:  09/18/2017  Last Fill Quantity: 100,  # refills: 1   Last office visit: 12/29/2017 with prescribing provider:  Post   Future Office Visit:     100 tablet 1     Sig: TAKE ONE TABLET BY MOUTH EVERY DAY    Angiotensin-II Receptors Passed    3/19/2018  5:43 PM       Passed - Blood pressure under 140/90 in past 12 months    BP Readings from Last 3 Encounters:   12/29/17 138/86   12/03/17 (!) 178/107   09/18/17 130/80                Passed - Recent (12 mo) or future (30 days) visit within the authorizing provider's specialty    Patient had office visit in the last 12 months or has a visit in the next 30 days with authorizing provider or within the authorizing provider's specialty.  See \"Patient Info\" tab in inbasket, or \"Choose Columns\" in Meds & Orders section of the refill encounter.           Passed - Patient is age 18 or older       Passed - No active pregnancy on record       Passed - Normal serum creatinine on file in past 12 months    Recent Labs   Lab Test  12/29/17   0907   CR  0.57            Passed - Normal serum potassium on file in past 12 months    Recent Labs   Lab Test  12/29/17   0907   POTASSIUM  4.3                   Passed - No positive pregnancy test in past 12 months        Jose L Borja RT (R)    "

## 2018-03-21 RX ORDER — LOSARTAN POTASSIUM 100 MG/1
TABLET ORAL
Qty: 100 TABLET | Refills: 1 | Status: SHIPPED | OUTPATIENT
Start: 2018-03-21 | End: 2018-07-30

## 2018-04-13 ENCOUNTER — OFFICE VISIT (OUTPATIENT)
Dept: FAMILY MEDICINE | Facility: CLINIC | Age: 58
End: 2018-04-13
Payer: COMMERCIAL

## 2018-04-13 VITALS
BODY MASS INDEX: 44.9 KG/M2 | HEIGHT: 64 IN | SYSTOLIC BLOOD PRESSURE: 136 MMHG | DIASTOLIC BLOOD PRESSURE: 80 MMHG | WEIGHT: 263 LBS | HEART RATE: 88 BPM

## 2018-04-13 DIAGNOSIS — R80.9 MICROALBUMINURIA: ICD-10-CM

## 2018-04-13 DIAGNOSIS — J45.20 MILD INTERMITTENT ASTHMA WITHOUT COMPLICATION: ICD-10-CM

## 2018-04-13 DIAGNOSIS — E78.5 HYPERLIPIDEMIA LDL GOAL <100: ICD-10-CM

## 2018-04-13 DIAGNOSIS — I10 ESSENTIAL HYPERTENSION WITH GOAL BLOOD PRESSURE LESS THAN 140/90: ICD-10-CM

## 2018-04-13 DIAGNOSIS — E11.29 TYPE 2 DIABETES MELLITUS WITH OTHER KIDNEY COMPLICATION, UNSPECIFIED LONG TERM INSULIN USE STATUS: Primary | ICD-10-CM

## 2018-04-13 DIAGNOSIS — E66.01 MORBID OBESITY (H): ICD-10-CM

## 2018-04-13 LAB — HBA1C MFR BLD: 9.3 % (ref 0–5.6)

## 2018-04-13 PROCEDURE — 36415 COLL VENOUS BLD VENIPUNCTURE: CPT | Performed by: FAMILY MEDICINE

## 2018-04-13 PROCEDURE — 83036 HEMOGLOBIN GLYCOSYLATED A1C: CPT | Performed by: FAMILY MEDICINE

## 2018-04-13 PROCEDURE — 99214 OFFICE O/P EST MOD 30 MIN: CPT | Performed by: FAMILY MEDICINE

## 2018-04-13 RX ORDER — INSULIN GLARGINE 100 [IU]/ML
INJECTION, SOLUTION SUBCUTANEOUS
Qty: 70 ML | Refills: 2 | Status: SHIPPED | OUTPATIENT
Start: 2018-04-13 | End: 2018-07-30

## 2018-04-13 NOTE — PROGRESS NOTES
SUBJECTIVE:   Bria Davis is a 57 year old female who presents to clinic today for the following health issues:      Diabetes Follow-up    Patient is checking blood sugars: three times daily.   Blood sugar testing frequency justification: Uncontrolled diabetes  Results are as follows:         am - 185         lunchtime - 180         suppertime - 175    Diabetic concerns: None     Symptoms of hypoglycemia (low blood sugar): shaky, dizzy, weak     Paresthesias (numbness or burning in feet) or sores: No     Date of last diabetic eye exam: 2/5/2018    BP Readings from Last 2 Encounters:   12/29/17 138/86   12/03/17 (!) 178/107     Hemoglobin A1C (%)   Date Value   12/29/2017 9.6 (H)   09/18/2017 9.3 (H)     LDL Cholesterol Calculated (mg/dL)   Date Value   12/29/2017 71   11/25/2016 80       Amount of exercise or physical activity: None    Problems taking medications regularly: No    Medication side effects: none    Diet: regular (no restrictions)        PROBLEMS TO ADD ON...  Hyperlipidemia Follow-Up      Rate your low fat/cholesterol diet?: good    Taking statin?  Yes, no muscle aches from statin    Other lipid medications/supplements?:  none    Hypertension Follow-up      Outpatient blood pressures are being checked at home.  Results are in the acceptable range.    Low Salt Diet: no added salt    Asthma Follow-Up    Was ACT completed today?  25  Yes    ACT Total Scores 9/18/2017   ACT TOTAL SCORE -   ASTHMA ER VISITS -   ASTHMA HOSPITALIZATIONS -   ACT TOTAL SCORE (Goal Greater than or Equal to 20) 24   In the past 12 months, how many times did you visit the emergency room for your asthma without being admitted to the hospital? 0   In the past 12 months, how many times were you hospitalized overnight because of your asthma? 0       Recent asthma triggers that patient is dealing with: None      Morbid obesity: She was pretty good about exercising in January and then her  had some health issues and she  "admits she has been lax recently    Problem list and histories reviewed & adjusted, as indicated.  Additional history: none        Reviewed and updated as needed this visit by clinical staff  Tobacco  Allergies  Med Hx  Surg Hx  Fam Hx  Soc Hx      Reviewed and updated as needed this visit by Provider               ROS:  Constitutional, HEENT, cardiovascular, pulmonary, gi and gu systems are negative, except as otherwise noted.        OBJECTIVE:                                                    /80  Pulse 88  Ht 5' 3.5\" (1.613 m)  Wt 263 lb (119.3 kg)  BMI 45.86 kg/m2    GENERAL: healthy, alert and no distress  EYES: Eyes grossly normal to inspection, extraocular movements - intact, and PERRL  NECK: no tenderness, no adenopathy, no asymmetry, no masses, no stiffness; thyroid- normal to palpation  RESP: lungs clear to auscultation - no rales, no rhonchi, no wheezes  CV: regular rates and rhythm, normal S1 S2, no S3 or S4 and no murmur, no click or rub -  MS: extremities- no gross deformities noted, no edema  Diabetic foot exam: no trophic changes or ulcerative lesions and normal monofilament exam    Diagnostic test results:  Results for orders placed or performed in visit on 04/13/18 (from the past 24 hour(s))   Hemoglobin A1c   Result Value Ref Range    Hemoglobin A1C 9.3 (H) 0 - 5.6 %        ASSESSMENT/PLAN:                                                    ASSESSMENT:  1. Type 2 diabetes mellitus with other kidney complication, unspecified long term insulin use status (H)    2. Hyperlipidemia LDL goal <100    3. Essential hypertension with goal blood pressure less than 140/90    4. Morbid obesity (H)    5. Microalbuminuria    6. Mild intermittent asthma without complication        PLAN:  Orders Placed This Encounter     Hemoglobin A1c       Diabetes Plan  1. Hemoglobin A1c goal is between 7% and 8%  Your Hemoglobin A1c is not at goal  Plan is: Increase her Lantus to 42 units twice daily and " increase the NovoLog to 26 units with breakfast and lunch and 20 units with dinner; will come in over 200 take an extra 6 units  2. LDL (bad cholesterol) goal is less than 100  Your LDL is at goal  Plan is: Continue medications at current doses  3. Blood pressure goal is less than 140/90.  Your blood pressure is at goal.  Plan is: Continue medications at current doses        IRVIN Conroy  [facility]

## 2018-04-13 NOTE — MR AVS SNAPSHOT
"              After Visit Summary   4/13/2018    Bria Davis    MRN: [id number]           Patient Information     Date Of Birth          1960        Visit Information        Provider Department      4/13/2018 7:40 AM IRVIN Conroy MD [facility]        Today's Diagnoses     Type 2 diabetes mellitus with other kidney complication, unspecified long term insulin use status (H)    -  1       Follow-ups after your visit        Who to contact     If you have questions or need follow up information about today's clinic visit or your schedule please contact [facility] directly at [phone].  Normal or non-critical lab and imaging results will be communicated to you by [employer], letter or phone within 4 business days after the clinic has received the results. If you do not hear from us within 7 days, please contact the clinic through [employer] or phone. If you have a critical or abnormal lab result, we will notify you by phone as soon as possible.  Submit refill requests through [employer] or call your pharmacy and they will forward the refill request to us. Please allow 3 business days for your refill to be completed.          Additional Information About Your Visit        MyChart Information     [employer] gives you secure access to your electronic health record. If you see a primary care provider, you can also send messages to your care team and make appointments. If you have questions, please call your primary care clinic.  If you do not have a primary care provider, please call [phone] and they will assist you.        Care EveryWhere ID     This is your Care EveryWhere ID. This could be used by other organizations to access your [place] medical records  [id number]        Your Vitals Were     Pulse Height BMI (Body Mass Index)             88 5' 3.5\" (1.613 m) 45.86 kg/m2          Blood Pressure from Last 3 Encounters:   04/13/18 136/80   12/29/17 138/86   12/03/17 (!) " 178/107    Weight from Last 3 Encounters:   04/13/18 263 lb (119.3 kg)   12/29/17 257 lb (116.6 kg)   09/18/17 255 lb 9.6 oz (115.9 kg)              We Performed the Following     Hemoglobin A1c        Primary Care Provider Office Phone # Fax #    R Gama Conroy [phone] [address] [facility] [address]        Equal Access to Services     JOANNE STORM : Hadii aad ku hadasho Soomaali, waaxda luqadaha, qaybta kaalmada adeegyada, waxay idiin hayaan adeeg kharash lajohn . So [facility] [phone].    ATENCIÓN: Si jerilyn gautam, tiene a headley disposición servicios gratuitos de asistencia lingüística. Llame al [phone].    We comply with applicable federal civil rights laws and [place] laws. We do not discriminate on the basis of race, color, national origin, age, disability, sex, sexual orientation, or gender identity.            Thank you!     Thank you for choosing [facility]  for your care. Our goal is always to provide you with excellent care. Hearing back from our patients is one way we can continue to improve our services. Please take a few minutes to complete the written survey that you may receive in the mail after your visit with us. Thank you!             Your Updated Medication List - Protect others around you: Learn how to safely use, store and throw away your medicines at www.disposemymeds.org.          This list is accurate as of 4/13/18  8:18 AM.  Always use your most recent med list.                   Brand Name Dispense Instructions for use Diagnosis    albuterol 108 (90 Base) MCG/ACT Inhaler    PROAIR HFA/PROVENTIL HFA/VENTOLIN HFA    1 Inhaler    Inhale 2 puffs into the lungs every 6 hours    Mild intermittent asthma without complication       aspirin 81 MG tablet     100 tablet    Take 1 tablet by mouth daily.    Diabetes mellitus, type 2 (H)       JOAQUIN CONTOUR test strip   Generic drug:  blood glucose monitoring     400 each    USE TO TEST BLOOD  SUGAR FOUR TIMES A DAY AS DIRECTED    Type 2 diabetes mellitus with other diabetic kidney complication (CODE) (H)       blood glucose monitoring lancets     408 each    USE TO TEST BLOOD SUGAR FOUR TIMES A DAY AS DIRECTED    Type 2 diabetes mellitus with other diabetic kidney complication (CODE) (H)       Calcium-Vitamin D 600-400 MG-UNIT Tabs      Take 1 tablet by mouth daily        cyclobenzaprine 10 MG tablet    FLEXERIL    30 tablet    Take 1 tablet (10 mg) by mouth nightly as needed for muscle spasms    Muscle contraction headache       dulaglutide 1.5 MG/0.5ML pen    TRULICITY    6 mL    Inject 1.5 mg Subcutaneous every 7 days    Type 2 diabetes mellitus with other diabetic kidney complication       esomeprazole 40 MG CR capsule    nexIUM    90 capsule    Take 1 capsule (40 mg) by mouth every morning (before breakfast)    Gastroesophageal reflux disease without esophagitis       fluconazole 150 MG tablet    DIFLUCAN    4 tablet    Take 1 tablet (150 mg) by mouth every 3 days    Candidiasis of vulva and vagina       fluticasone-salmeterol 100-50 MCG/DOSE diskus inhaler    ADVAIR DISKUS    3 Inhaler    Inhale 1 puff into the lungs 2 times daily    Mild intermittent asthma, uncomplicated       insulin aspart 100 UNITS/ML injection    NovoLOG VIAL    3 vial    Take 24 units with breakfast and lunch, 18 units with dinner.  If glucometer is over 200 take an extra 6 units    Type 2 diabetes mellitus with other kidney complication, unspecified long term insulin use status (H)       insulin glargine 100 UNIT/ML injection    LANTUS VIAL    70 mL    INJECT 40 UNITS UNDER THE SKIN twice daily    Type 2 diabetes mellitus with other kidney complication, unspecified long term insulin use status (H)       * losartan 100 MG tablet    COZAAR    100 tablet    Take 1 tablet (100 mg) by mouth daily    Essential hypertension with goal blood pressure less than 140/90       * losartan 100 MG tablet    COZAAR    100 tablet    TAKE ONE  "TABLET BY MOUTH EVERY DAY    Essential hypertension with goal blood pressure less than 140/90       metFORMIN 500 MG 24 hr tablet    GLUCOPHAGE-XR    360 tablet    TAKE TWO TABLETS BY MOUTH TWICE A DAY (WITH MEALS)    Type 2 diabetes mellitus with other kidney complication, unspecified long term insulin use status (H)       metoclopramide 10 MG tablet    REGLAN    180 tablet    TAKE ONE TABLET BY MOUTH TWICE A DAY    Gastroparesis due to DM (H)       montelukast 10 MG tablet    SINGULAIR    90 tablet    TAKE ONE TABLET BY MOUTH AT BEDTIME    Mild intermittent asthma, uncomplicated, Chronic rhinitis, unspecified type       MULTIVITAMIN ADULTS 50+ Tabs      Take 1 tablet by mouth daily        Needle (Disp) 30G X 1/2\" Misc    BD DISP NEEDLES    300 each    To use with humalog insulin 3 times daily    Type 2 diabetes mellitus with other diabetic kidney complication       pseudoePHEDrine 30 MG tablet    SUDAFED    360 tablet    Take 2 tablets (60 mg) by mouth every 12 hours    Chronic rhinitis, unspecified type       simvastatin 40 MG tablet    ZOCOR    90 tablet    Take 1 tablet (40 mg) by mouth At Bedtime at bedtime.    Hyperlipidemia LDL goal <100       * Notice:  This list has 2 medication(s) that are the same as other medications prescribed for you. Read the directions carefully, and ask your doctor or other care provider to review them with you.      "

## 2018-04-13 NOTE — PROGRESS NOTES
Bria,  A1c was a little improved but not much.  I think you are going to need to do more than increase her exercise.  I would recommend you increase the Lantus to 42 units twice daily and her NovoLog to 26 units at breakfast and lunch and 20 units at dinner      Gama Conroy MD

## 2018-04-13 NOTE — NURSING NOTE
"Chief Complaint   Patient presents with     Diabetes       Initial /80  Pulse 88  Ht 5' 3.5\" (1.613 m)  Wt 263 lb (119.3 kg)  BMI 45.86 kg/m2 Estimated body mass index is 45.86 kg/(m^2) as calculated from the following:    Height as of this encounter: 5' 3.5\" (1.613 m).    Weight as of this encounter: 263 lb (119.3 kg).  Medication Reconciliation: complete     Jewell Pantoja, CMA      "

## 2018-04-14 ASSESSMENT — ASTHMA QUESTIONNAIRES: ACT_TOTALSCORE: 25

## 2018-04-23 ENCOUNTER — TELEPHONE (OUTPATIENT)
Dept: SURGERY | Facility: CLINIC | Age: 58
End: 2018-04-23

## 2018-04-23 DIAGNOSIS — J31.0 CHRONIC RHINITIS, UNSPECIFIED TYPE: ICD-10-CM

## 2018-04-23 DIAGNOSIS — E11.29 TYPE 2 DIABETES MELLITUS WITH OTHER KIDNEY COMPLICATION, UNSPECIFIED LONG TERM INSULIN USE STATUS: Primary | ICD-10-CM

## 2018-04-23 NOTE — TELEPHONE ENCOUNTER
Patient called requesting refill on 30g needles and 50 ml syringes. For insulin.     [facility] pharmacy    Esthela L.  [facility]

## 2018-04-26 ENCOUNTER — TELEPHONE (OUTPATIENT)
Dept: FAMILY MEDICINE | Facility: CLINIC | Age: 58
End: 2018-04-26

## 2018-04-26 DIAGNOSIS — E11.29 TYPE 2 DIABETES MELLITUS WITH OTHER KIDNEY COMPLICATION, UNSPECIFIED LONG TERM INSULIN USE STATUS: Primary | ICD-10-CM

## 2018-04-26 RX ORDER — SYRINGE-NEEDLE,INSULIN,0.5 ML 27GX1/2"
SYRINGE, EMPTY DISPOSABLE MISCELLANEOUS
Qty: 100 EACH | Refills: 11 | Status: SHIPPED | OUTPATIENT
Start: 2018-04-26 | End: 2018-07-30

## 2018-04-26 RX ORDER — SYRINGE-NEEDLE,INSULIN,0.5 ML 27GX1/2"
SYRINGE, EMPTY DISPOSABLE MISCELLANEOUS
Qty: 100 EACH | Refills: 5 | Status: SHIPPED | OUTPATIENT
Start: 2018-04-26 | End: 2018-07-30

## 2018-06-05 ENCOUNTER — OFFICE VISIT (OUTPATIENT)
Dept: FAMILY MEDICINE | Facility: CLINIC | Age: 58
End: 2018-06-05
Payer: COMMERCIAL

## 2018-06-05 VITALS
HEIGHT: 64 IN | DIASTOLIC BLOOD PRESSURE: 87 MMHG | WEIGHT: 263.6 LBS | OXYGEN SATURATION: 97 % | HEART RATE: 87 BPM | BODY MASS INDEX: 45 KG/M2 | SYSTOLIC BLOOD PRESSURE: 134 MMHG | TEMPERATURE: 98.2 F

## 2018-06-05 DIAGNOSIS — Z11.59 NEED FOR HEPATITIS C SCREENING TEST: ICD-10-CM

## 2018-06-05 DIAGNOSIS — B37.31 CANDIDIASIS OF VULVA AND VAGINA: ICD-10-CM

## 2018-06-05 DIAGNOSIS — D50.9 MICROCYTIC HYPOCHROMIC ANEMIA: ICD-10-CM

## 2018-06-05 DIAGNOSIS — R10.32 LLQ ABDOMINAL PAIN: Primary | ICD-10-CM

## 2018-06-05 DIAGNOSIS — Z11.4 SCREENING FOR HIV (HUMAN IMMUNODEFICIENCY VIRUS): ICD-10-CM

## 2018-06-05 LAB
ALBUMIN UR-MCNC: >=300 MG/DL
APPEARANCE UR: CLEAR
BACTERIA #/AREA URNS HPF: ABNORMAL /HPF
BASOPHILS # BLD AUTO: 0.1 10E9/L (ref 0–0.2)
BASOPHILS NFR BLD AUTO: 0.9 %
BILIRUB UR QL STRIP: NEGATIVE
COLOR UR AUTO: YELLOW
DIFFERENTIAL METHOD BLD: ABNORMAL
EOSINOPHIL # BLD AUTO: 0.5 10E9/L (ref 0–0.7)
EOSINOPHIL NFR BLD AUTO: 4.8 %
ERYTHROCYTE [DISTWIDTH] IN BLOOD BY AUTOMATED COUNT: 18.3 % (ref 10–15)
GLUCOSE UR STRIP-MCNC: >=1000 MG/DL
HCT VFR BLD AUTO: 33.7 % (ref 35–47)
HGB BLD-MCNC: 10.2 G/DL (ref 11.7–15.7)
HGB UR QL STRIP: NEGATIVE
KETONES UR STRIP-MCNC: NEGATIVE MG/DL
LEUKOCYTE ESTERASE UR QL STRIP: NEGATIVE
LYMPHOCYTES # BLD AUTO: 2.2 10E9/L (ref 0.8–5.3)
LYMPHOCYTES NFR BLD AUTO: 22.2 %
MCH RBC QN AUTO: 20.9 PG (ref 26.5–33)
MCHC RBC AUTO-ENTMCNC: 30.3 G/DL (ref 31.5–36.5)
MCV RBC AUTO: 69 FL (ref 78–100)
MONOCYTES # BLD AUTO: 0.7 10E9/L (ref 0–1.3)
MONOCYTES NFR BLD AUTO: 7.1 %
NEUTROPHILS # BLD AUTO: 6.4 10E9/L (ref 1.6–8.3)
NEUTROPHILS NFR BLD AUTO: 65 %
NITRATE UR QL: NEGATIVE
NON-SQ EPI CELLS #/AREA URNS LPF: ABNORMAL /LPF
PH UR STRIP: 5.5 PH (ref 5–7)
PLATELET # BLD AUTO: 383 10E9/L (ref 150–450)
RBC # BLD AUTO: 4.88 10E12/L (ref 3.8–5.2)
RBC #/AREA URNS AUTO: ABNORMAL /HPF
SOURCE: ABNORMAL
SP GR UR STRIP: 1.02 (ref 1–1.03)
UROBILINOGEN UR STRIP-ACNC: 0.2 EU/DL (ref 0.2–1)
WBC # BLD AUTO: 9.8 10E9/L (ref 4–11)
WBC #/AREA URNS AUTO: ABNORMAL /HPF

## 2018-06-05 PROCEDURE — 85025 COMPLETE CBC W/AUTO DIFF WBC: CPT | Performed by: FAMILY MEDICINE

## 2018-06-05 PROCEDURE — 36415 COLL VENOUS BLD VENIPUNCTURE: CPT | Performed by: FAMILY MEDICINE

## 2018-06-05 PROCEDURE — 99214 OFFICE O/P EST MOD 30 MIN: CPT | Performed by: FAMILY MEDICINE

## 2018-06-05 PROCEDURE — 86803 HEPATITIS C AB TEST: CPT | Performed by: FAMILY MEDICINE

## 2018-06-05 PROCEDURE — 87389 HIV-1 AG W/HIV-1&-2 AB AG IA: CPT | Performed by: FAMILY MEDICINE

## 2018-06-05 PROCEDURE — 81001 URINALYSIS AUTO W/SCOPE: CPT | Performed by: FAMILY MEDICINE

## 2018-06-05 RX ORDER — METRONIDAZOLE 500 MG/1
500 TABLET ORAL 3 TIMES DAILY
Qty: 21 TABLET | Refills: 0 | Status: SHIPPED | OUTPATIENT
Start: 2018-06-05 | End: 2019-02-21

## 2018-06-05 RX ORDER — FLUCONAZOLE 150 MG/1
150 TABLET ORAL ONCE
Qty: 1 TABLET | Refills: 3 | Status: SHIPPED | OUTPATIENT
Start: 2018-06-05 | End: 2019-02-21

## 2018-06-05 RX ORDER — CIPROFLOXACIN 500 MG/1
500 TABLET, FILM COATED ORAL 2 TIMES DAILY
Qty: 20 TABLET | Refills: 0 | Status: SHIPPED | OUTPATIENT
Start: 2018-06-05 | End: 2018-07-25

## 2018-06-05 ASSESSMENT — PAIN SCALES - GENERAL: PAINLEVEL: MILD PAIN (3)

## 2018-06-05 NOTE — PROGRESS NOTES
"  SUBJECTIVE:   Bria Davis is a 57 year old female who presents to clinic today for the following health issues:      FLANK/groin PAIN     Onset: about 2 days    Description:   Character: Dull ache and constant  Location: left flank  Radiation: Back    Intensity: moderate    Progression of Symptoms:  worsening    Accompanying Signs & Symptoms:  Fever/Chills?: no   Gas/Bloating: YES- feels a little gasy  Nausea: no   Vomitting: no   Diarrhea?: no   Constipation:no   Dysuria or Hematuria: no    History:   Trauma: no   Previous similar pain: no    Previous tests done: none    Precipitating factors:   Does the pain change with:     Food: no      BM: no     Urination: no     Alleviating factors:  none    Therapies Tried and outcome: has not tried anything. She did take some sinus medicine last night that had a little tylenol in it.    LMP:  not applicable       She has never had a history of kidney stones and has not seen any blood in her urine.  The pain is a dull steady pain and not colicky in nature.  She had a colonoscopy about 6 years ago and this did not show any diverticulosis.  She has never had an episode of diverticulitis either.  She has not noticed any blood in her stools or urine and does not have other bleeding problems such as nosebleeds or postmenopausal bleeding  **She says he scalp has also been slightly itchy.        Problem list and histories reviewed & adjusted, as indicated.  Additional history: none          Reviewed and updated as needed this visit by clinical staff       Reviewed and updated as needed this visit by Provider               ROS:  Constitutional, HEENT, cardiovascular, pulmonary, gi and gu systems are negative, except as otherwise noted.        OBJECTIVE:                                                    /87 (BP Location: Right arm, Patient Position: Sitting, Cuff Size: Adult Large)  Pulse 87  Temp 98.2  F (36.8  C) (Tympanic)  Ht 5' 3.5\" (1.613 m)  Wt 263 lb 9.6 oz " (119.6 kg)  SpO2 97%  BMI 45.96 kg/m2    GENERAL: healthy, alert and no distress  EYES: Eyes grossly normal to inspection, extraocular movements - intact, and PERRL  NECK: no tenderness, no adenopathy, no asymmetry, no masses, no stiffness; thyroid- normal to palpation  RESP: lungs clear to auscultation - no rales, no rhonchi, no wheezes  CV: regular rates and rhythm, normal S1 S2, no S3 or S4 and no murmur, no click or rub -  ABDOMEN: bowel sounds normal, liver span normal to percussion, no palpable masses and tender low down in the left lower quadrant  MS: extremities- no gross deformities noted, no edema  SKIN: no suspicious lesions, no rashes    Diagnostic test results:  Results for orders placed or performed in visit on 06/05/18 (from the past 24 hour(s))   CBC with platelets differential   Result Value Ref Range    WBC 9.8 4.0 - 11.0 10e9/L    RBC Count 4.88 3.8 - 5.2 10e12/L    Hemoglobin 10.2 (L) 11.7 - 15.7 g/dL    Hematocrit 33.7 (L) 35.0 - 47.0 %    MCV 69 (L) 78 - 100 fl    MCH 20.9 (L) 26.5 - 33.0 pg    MCHC 30.3 (L) 31.5 - 36.5 g/dL    RDW 18.3 (H) 10.0 - 15.0 %    Platelet Count 383 150 - 450 10e9/L    Diff Method Automated Method     % Neutrophils 65.0 %    % Lymphocytes 22.2 %    % Monocytes 7.1 %    % Eosinophils 4.8 %    % Basophils 0.9 %    Absolute Neutrophil 6.4 1.6 - 8.3 10e9/L    Absolute Lymphocytes 2.2 0.8 - 5.3 10e9/L    Absolute Monocytes 0.7 0.0 - 1.3 10e9/L    Absolute Eosinophils 0.5 0.0 - 0.7 10e9/L    Absolute Basophils 0.1 0.0 - 0.2 10e9/L   *UA reflex to Microscopic   Result Value Ref Range    Color Urine Yellow     Appearance Urine Clear     Glucose Urine >=1000 (A) NEG^Negative mg/dL    Bilirubin Urine Negative NEG^Negative    Ketones Urine Negative NEG^Negative mg/dL    Specific Gravity Urine 1.025 1.003 - 1.035    Blood Urine Negative NEG^Negative    pH Urine 5.5 5.0 - 7.0 pH    Protein Albumin Urine >=300 (A) NEG^Negative mg/dL    Urobilinogen Urine 0.2 0.2 - 1.0 EU/dL     Nitrite Urine Negative NEG^Negative    Leukocyte Esterase Urine Negative NEG^Negative    Source Midstream Urine    Urine Microscopic   Result Value Ref Range    WBC Urine 0 - 5 OTO5^0 - 5 /HPF    RBC Urine O - 2 OTO2^O - 2 /HPF    Squamous Epithelial /LPF Urine Few FEW^Few /LPF    Bacteria Urine Few (A) NEG^Negative /HPF        ASSESSMENT/PLAN:                                                    ASSESSMENT:  1. LLQ abdominal pain    2. Microcytic hypochromic anemia    3. Candidiasis of vulva and vagina    4. Need for hepatitis C screening test    5. Screening for HIV (human immunodeficiency virus)        PLAN:  We discussed that it would be unusual to develop a microcytic hypochromic anemia without some type of chronic blood loss, and yet by her history there is nothing obvious.  We will start her on presumptive treatment for diverticulitis with Cipro and metronidazole, and set her up for a CT scan.  I explained that sometimes people will lead from diverticulitis and it could cause a blood loss anemia.  If this all comes back normal then would recommend going ahead with a colonoscopy even though it has only been 6 years since her last      Orders Placed This Encounter     CT Abdomen Pelvis w Contrast     Hepatitis C Screen Reflex to HCV RNA Quant and Genotype     HIV Screening     CBC with platelets differential     *UA reflex to Microscopic     Urine Microscopic     ciprofloxacin (CIPRO) 500 MG tablet     metroNIDAZOLE (FLAGYL) 500 MG tablet     fluconazole (DIFLUCAN) 150 MG tablet       Patient Instructions   Take the antibiotics until gone. Schedule CT abd soon.      IRVIN Malloy Post  [facility]

## 2018-06-05 NOTE — MR AVS SNAPSHOT
After Visit Summary   6/5/2018    Bria Davis    MRN: [id number]           Patient Information     Date Of Birth          1960        Visit Information        Provider Department      6/5/2018 3:20 PM Rafiq, IRVIN Malloy MD [facility]        Today's Diagnoses     LLQ abdominal pain    -  1    Need for hepatitis C screening test        Screening for HIV (human immunodeficiency virus)          Care Instructions    Take the antibiotics until gone. Schedule CT abd soon.          Follow-ups after your visit        Future tests that were ordered for you today     Open Future Orders        Priority Expected Expires Ordered    CT Abdomen Pelvis w Contrast Routine  6/5/2019 6/5/2018            Who to contact     If you have questions or need follow up information about today's clinic visit or your schedule please contact [facility] directly at [phone].  Normal or non-critical lab and imaging results will be communicated to you by [employer], letter or phone within 4 business days after the clinic has received the results. If you do not hear from us within 7 days, please contact the clinic through [employer] or phone. If you have a critical or abnormal lab result, we will notify you by phone as soon as possible.  Submit refill requests through [employer] or call your pharmacy and they will forward the refill request to us. Please allow 3 business days for your refill to be completed.          Additional Information About Your Visit        [employer] Information     [employer] gives you secure access to your electronic health record. If you see a primary care provider, you can also send messages to your care team and make appointments. If you have questions, please call your primary care clinic.  If you do not have a primary care provider, please call [phone] and they will assist you.        Care EveryWhere ID     This is your Care EveryWhere ID. This could be used by other  "organizations to access your [facility] medical records  [id number]        Your Vitals Were     Pulse Temperature Height Pulse Oximetry BMI (Body Mass Index)       87 98.2  F (36.8  C) (Tympanic) 5' 3.5\" (1.613 m) 97% 45.96 kg/m2        Blood Pressure from Last 3 Encounters:   06/05/18 134/87   04/13/18 136/80   12/29/17 138/86    Weight from Last 3 Encounters:   06/05/18 263 lb 9.6 oz (119.6 kg)   04/13/18 263 lb (119.3 kg)   12/29/17 257 lb (116.6 kg)              We Performed the Following     *UA reflex to Microscopic     CBC with platelets differential     Hepatitis C Screen Reflex to HCV RNA Quant and Genotype     HIV Screening     Urine Microscopic          Today's Medication Changes          These changes are accurate as of 6/5/18  4:01 PM.  If you have any questions, ask your nurse or doctor.               Start taking these medicines.        Dose/Directions    ciprofloxacin 500 MG tablet   Commonly known as:  CIPRO   Used for:  LLQ abdominal pain   Started by:  IRVIN Conroy MD        Dose:  500 mg   Take 1 tablet (500 mg) by mouth 2 times daily   Quantity:  20 tablet   Refills:  0       metroNIDAZOLE 500 MG tablet   Commonly known as:  FLAGYL   Used for:  LLQ abdominal pain   Started by:  IRVIN Conroy MD        Dose:  500 mg   Take 1 tablet (500 mg) by mouth 3 times daily for 7 days   Quantity:  21 tablet   Refills:  0            Where to get your medicines      These medications were sent to [facility] [address]  [address] [facility] [address]     Phone:  [phone]     ciprofloxacin 500 MG tablet    metroNIDAZOLE 500 MG tablet                Primary Care Provider Office Phone # Fax #    IRVIN Conroy [phone] [address] [facility] [address]        Equal Access to Services     JOANNE STORM AH: Ferny rainey Soomaali, waaxda luqadaha, qaybta kaalmada adesona, ann-marie hoover " christopher monteaadain ah. So [facility] [phone].    ATENCIÓN: Si jerilyn gautam, tiene a headley disposición servicios gratuitos de asistencia lingüística. Lo valencia [phone].    We comply with applicable federal civil rights laws and [place] laws. We do not discriminate on the basis of race, color, national origin, age, disability, sex, sexual orientation, or gender identity.            Thank you!     Thank you for choosing [facility]  for your care. Our goal is always to provide you with excellent care. Hearing back from our patients is one way we can continue to improve our services. Please take a few minutes to complete the written survey that you may receive in the mail after your visit with us. Thank you!             Your Updated Medication List - Protect others around you: Learn how to safely use, store and throw away your medicines at www.disposemymeds.org.          This list is accurate as of 6/5/18  4:01 PM.  Always use your most recent med list.                   Brand Name Dispense Instructions for use Diagnosis    albuterol 108 (90 Base) MCG/ACT Inhaler    PROAIR HFA/PROVENTIL HFA/VENTOLIN HFA    1 Inhaler    Inhale 2 puffs into the lungs every 6 hours    Mild intermittent asthma without complication       aspirin 81 MG tablet     100 tablet    Take 1 tablet by mouth daily.    Diabetes mellitus, type 2 (H)       JOAQUIN CONTOUR test strip   Generic drug:  blood glucose monitoring     400 each    USE TO TEST BLOOD SUGAR FOUR TIMES A DAY AS DIRECTED    Type 2 diabetes mellitus with other diabetic kidney complication (CODE) (H)       blood glucose monitoring lancets     408 each    USE TO TEST BLOOD SUGAR FOUR TIMES A DAY AS DIRECTED    Type 2 diabetes mellitus with other diabetic kidney complication (CODE) (H)       Calcium-Vitamin D 600-400 MG-UNIT Tabs      Take 1 tablet by mouth daily        ciprofloxacin 500 MG tablet    CIPRO    20 tablet    Take 1 tablet (500 mg) by mouth 2 times daily    LLQ  "abdominal pain       cyclobenzaprine 10 MG tablet    FLEXERIL    30 tablet    Take 1 tablet (10 mg) by mouth nightly as needed for muscle spasms    Muscle contraction headache       dulaglutide 1.5 MG/0.5ML pen    TRULICITY    6 mL    Inject 1.5 mg Subcutaneous every 7 days    Type 2 diabetes mellitus with other diabetic kidney complication       esomeprazole 40 MG CR capsule    nexIUM    90 capsule    Take 1 capsule (40 mg) by mouth every morning (before breakfast)    Gastroesophageal reflux disease without esophagitis       fluticasone-salmeterol 100-50 MCG/DOSE diskus inhaler    ADVAIR DISKUS    3 Inhaler    Inhale 1 puff into the lungs 2 times daily    Mild intermittent asthma, uncomplicated       insulin aspart 100 UNITS/ML injection    NovoLOG VIAL    9 vial    Take 26 units with breakfast and lunch, 20 units with dinner.  If glucometer is over 200 take an extra 6 units    Type 2 diabetes mellitus with other kidney complication, unspecified long term insulin use status (H)       insulin glargine 100 UNIT/ML injection    LANTUS VIAL    70 mL    INJECT 42 UNITS UNDER THE SKIN twice daily    Type 2 diabetes mellitus with other kidney complication, unspecified long term insulin use status (H)       * insulin syringe-needle U-100 30G X 1/2\" 0.3 ML     400 each    Use four syringes daily or as directed.    Type 2 diabetes mellitus with other kidney complication, unspecified long term insulin use status (H)       * insulin syringe-needle U-100 31G X 5/16\" 0.5 ML    BD insulin syringe ultrafine    100 each    Use one syringe 4 daily or as directed.    Type 2 diabetes mellitus with other kidney complication, unspecified long term insulin use status (H)       * insulin syringe-needle U-100 31G X 5/16\" 1 ML     100 each    Use 3 syringes daily or as directed.    Type 2 diabetes mellitus with other kidney complication, unspecified long term insulin use status (H)       * losartan 100 MG tablet    COZAAR    100 tablet    " "Take 1 tablet (100 mg) by mouth daily    Essential hypertension with goal blood pressure less than 140/90       * losartan 100 MG tablet    COZAAR    100 tablet    TAKE ONE TABLET BY MOUTH EVERY DAY    Essential hypertension with goal blood pressure less than 140/90       metFORMIN 500 MG 24 hr tablet    GLUCOPHAGE-XR    360 tablet    TAKE TWO TABLETS BY MOUTH TWICE A DAY (WITH MEALS)    Type 2 diabetes mellitus with other kidney complication, unspecified long term insulin use status (H)       metoclopramide 10 MG tablet    REGLAN    180 tablet    TAKE ONE TABLET BY MOUTH TWICE A DAY    Gastroparesis due to DM (H)       metroNIDAZOLE 500 MG tablet    FLAGYL    21 tablet    Take 1 tablet (500 mg) by mouth 3 times daily for 7 days    LLQ abdominal pain       montelukast 10 MG tablet    SINGULAIR    90 tablet    TAKE ONE TABLET BY MOUTH AT BEDTIME    Mild intermittent asthma, uncomplicated, Chronic rhinitis, unspecified type       MULTIVITAMIN ADULTS 50+ Tabs      Take 1 tablet by mouth daily        Needle (Disp) 30G X 1/2\" Misc    BD DISP NEEDLES    300 each    To use with humalog insulin 3 times daily    Type 2 diabetes mellitus with other diabetic kidney complication       pseudoePHEDrine 30 MG tablet    SUDAFED    360 tablet    Take 2 tablets (60 mg) by mouth every 12 hours    Chronic rhinitis, unspecified type       simvastatin 40 MG tablet    ZOCOR    90 tablet    Take 1 tablet (40 mg) by mouth At Bedtime at bedtime.    Hyperlipidemia LDL goal <100       * Notice:  This list has 5 medication(s) that are the same as other medications prescribed for you. Read the directions carefully, and ask your doctor or other care provider to review them with you.      "

## 2018-06-05 NOTE — LETTER
My Asthma Action Plan  Name: Bria Davis   YOB: 1960  Date: 6/5/2018   My doctor: IRVIN Conroy MD   My clinic: [facility]        My Control Medicine: Fluticasone + salmeterol (Advair) -  Diskus 100/50 mcg one puff twice daily  My Rescue Medicine: Albuterol (Proair/Ventolin/Proventil) inhaler 108 mcg   My Asthma Severity: intermittent  Avoid your asthma triggers: smoke, upper respiratory infections, dust mites, animal dander, humidity and strong odors and fumes              GREEN ZONE   Good Control    I feel good    No cough or wheeze    Can work, sleep and play without asthma symptoms       Take your asthma control medicine every day.     1. If exercise triggers your asthma, take your rescue medication    15 minutes before exercise or sports, and    During exercise if you have asthma symptoms  2. Spacer to use with inhaler: If you have a spacer, make sure to use it with your inhaler             YELLOW ZONE Getting Worse  I have ANY of these:    I do not feel good    Cough or wheeze    Chest feels tight    Wake up at night   1. Keep taking your Green Zone medications  2. Start taking your rescue medicine:    every 20 minutes for up to 1 hour. Then every 4 hours for 24-48 hours.  3. If you stay in the Yellow Zone for more than 12-24 hours, contact your doctor.  4. If you do not return to the Green Zone in 12-24 hours or you get worse, start taking your oral steroid medicine if prescribed by your provider.           RED ZONE Medical Alert - Get Help  I have ANY of these:    I feel awful    Medicine is not helping    Breathing getting harder    Trouble walking or talking    Nose opens wide to breathe       1. Take your rescue medicine NOW  2. If your provider has prescribed an oral steroid medicine, start taking it NOW  3. Call your doctor NOW  4. If you are still in the Red Zone after 20 minutes and you have not reached your doctor:    Take your rescue medicine again and    Call  911 or go to the emergency room right away    See your regular doctor within 2 weeks of an Emergency Room or Urgent Care visit for follow-up treatment.          Annual Reminders:  Meet with Asthma Educator,  Flu Shot in the Fall, consider Pneumonia Vaccination for patients with asthma (aged 19 and older).    Pharmacy:    [id number] - [place], [place] - [address] AT [facility] OF [address] [place] [address] [facility] [place] - [place], [place] - [address] [facility] - [place], [place] - [address] [place] [address] PHARMACY [place], [place] - [address] [facility] [address]                      Asthma Triggers  How To Control Things That Make Your Asthma Worse    Triggers are things that make your asthma worse.  Look at the list below to help you find your triggers and what you can do about them.  You can help prevent asthma flare-ups by staying away from your triggers.      Trigger                                                          What you can do   Cigarette Smoke  Tobacco smoke can make asthma worse. Do not allow smoking in your home, car or around you.  Be sure no one smokes at a child s day care or school.  If you smoke, ask your health care provider for ways to help you quit.  Ask family members to quit too.  Ask your health care provider for a referral to Quit Plan to help you quit smoking, or call 7-793-210-PLAN.     Colds, Flu, Bronchitis  These are common triggers of asthma. Wash your hands often.  Don t touch your eyes, nose or mouth.  Get a flu shot every year.     Dust Mites  These are tiny bugs that live in cloth or carpet. They are too small to see. Wash sheets and blankets in hot water every week.   Encase pillows and mattress in dust mite proof covers.  Avoid having carpet if you can. If you have carpet, vacuum weekly.   Use a dust mask and HEPA vacuum.   Pollen and Outdoor Mold  Some people are allergic to trees, grass, or weed pollen, or molds. Try to keep  your windows closed.  Limit time out doors when pollen count is high.   Ask you health care provider about taking medicine during allergy season.     Animal Dander  Some people are allergic to skin flakes, urine or saliva from pets with fur or feathers. Keep pets with fur or feathers out of your home.    If you can t keep the pet outdoors, then keep the pet out of your bedroom.  Keep the bedroom door closed.  Keep pets off cloth furniture and away from stuffed toys.     Mice, Rats, and Cockroaches  Some people are allergic to the waste from these pests.   Cover food and garbage.  Clean up spills and food crumbs.  Store grease in the refrigerator.   Keep food out of the bedroom.   Indoor Mold  This can be a trigger if your home has high moisture. Fix leaking faucets, pipes, or other sources of water.   Clean moldy surfaces.  Dehumidify basement if it is damp and smelly.   Smoke, Strong Odors, and Sprays  These can reduce air quality. Stay away from strong odors and sprays, such as perfume, powder, hair spray, paints, smoke incense, paint, cleaning products, candles and new carpet.   Exercise or Sports  Some people with asthma have this trigger. Be active!  Ask your doctor about taking medicine before sports or exercise to prevent symptoms.    Warm up for 5-10 minutes before and after sports or exercise.     Other Triggers of Asthma  Cold air:  Cover your nose and mouth with a scarf.  Sometimes laughing or crying can be a trigger.  Some medicines and food can trigger asthma.

## 2018-06-06 LAB
HCV AB SERPL QL IA: NONREACTIVE
HIV 1+2 AB+HIV1 P24 AG SERPL QL IA: NONREACTIVE

## 2018-06-06 RX ORDER — IOPAMIDOL 755 MG/ML
100 INJECTION, SOLUTION INTRAVASCULAR ONCE
Status: COMPLETED | OUTPATIENT
Start: 2018-06-07 | End: 2018-06-07

## 2018-06-07 ENCOUNTER — HOSPITAL ENCOUNTER (OUTPATIENT)
Dept: CT IMAGING | Facility: CLINIC | Age: 58
Discharge: HOME | End: 2018-06-07
Attending: FAMILY MEDICINE | Admitting: FAMILY MEDICINE
Payer: COMMERCIAL

## 2018-06-07 DIAGNOSIS — R10.32 LLQ ABDOMINAL PAIN: ICD-10-CM

## 2018-06-07 LAB
CREAT BLD-MCNC: 0.6 MG/DL (ref 0.52–1.04)
GFR SERPL CREATININE-BSD FRML MDRD: >90 ML/MIN/1.7M2

## 2018-06-07 PROCEDURE — 82565 ASSAY OF CREATININE: CPT

## 2018-06-07 PROCEDURE — 74177 CT ABD & PELVIS W/CONTRAST: CPT

## 2018-06-07 PROCEDURE — 25000125 ZZHC RX 250: Performed by: RADIOLOGY

## 2018-06-07 PROCEDURE — 25000128 H RX IP 250 OP 636: Performed by: RADIOLOGY

## 2018-06-07 RX ADMIN — SODIUM CHLORIDE 72 ML: 9 INJECTION, SOLUTION INTRAVENOUS at 07:55

## 2018-06-07 RX ADMIN — IOPAMIDOL 100 ML: 755 INJECTION, SOLUTION INTRAVENOUS at 07:54

## 2018-06-07 NOTE — PROGRESS NOTES
Bria,    Your CT scan shows that you have epiploic appendagitis.  In late terms, this means that 1 of the fatty attachments along the outside of your colon has twisted on itself and this will cause pain similar to what you are experiencing.  The best majority of time these episodes will resolve spontaneously within 3-14 days.  It may help to take adequate doses of ibuprofen to help deal with the pain.  If this is not adequate I could give you a short-term prescription of a narcotic pain medication.  Let me know if it is not slowly getting better      Gama Conroy MD

## 2018-06-08 ENCOUNTER — TELEPHONE (OUTPATIENT)
Dept: FAMILY MEDICINE | Facility: CLINIC | Age: 58
End: 2018-06-08

## 2018-06-08 NOTE — TELEPHONE ENCOUNTER
Dr. Conroy,    Patient calls us and she is doing very well.  Not needing anything for pain.  Patient questioning if she should continue with the cipro and flagyl? Kimberly MELENDEZ RN

## 2018-06-08 NOTE — TELEPHONE ENCOUNTER
Reason for Call:  Results    Detailed comments: patient is calling stating she see's her results on My Chart, but was hoping Dr. Conroy or his RN will call, as she has questions about the results.    Phone Number Patient can be reached at: Home number on file [phone] (home)    Best Time: any    Can we leave a detailed message on this number? YES   Ginny Rust  [facility] [place] [facility]      Call taken on 6/8/2018 at 10:15 AM by Ginny Rust

## 2018-06-10 DIAGNOSIS — E11.43 GASTROPARESIS DUE TO DM (H): ICD-10-CM

## 2018-06-10 DIAGNOSIS — K31.84 GASTROPARESIS DUE TO DM (H): ICD-10-CM

## 2018-06-11 ENCOUNTER — MYC MEDICAL ADVICE (OUTPATIENT)
Dept: FAMILY MEDICINE | Facility: CLINIC | Age: 58
End: 2018-06-11

## 2018-06-11 RX ORDER — METOCLOPRAMIDE 10 MG/1
TABLET ORAL
Qty: 180 TABLET | Refills: 1 | Status: SHIPPED | OUTPATIENT
Start: 2018-06-11 | End: 2018-07-30

## 2018-06-11 NOTE — TELEPHONE ENCOUNTER
"Requested Prescriptions   Pending Prescriptions Disp Refills     metoclopramide (REGLAN) 10 MG tablet [Pharmacy Med Name: METOCLOPRAMIDE HCL 10MG TABS]  Last Written Prescription Date:  12/12/17  Last Fill Quantity: 180,  # refills: 1   Last office visit: 6/5/2018 with prescribing provider:  06/05/18   Future Office Visit:     180 tablet 1     Sig: TAKE ONE TABLET BY MOUTH TWICE A DAY     Antivertigo/Antiemetic Agents Passed    6/10/2018 10:48 AM       Passed - Recent (12 mo) or future (30 days) visit within the authorizing provider's specialty    Patient had office visit in the last 12 months or has a visit in the next 30 days with authorizing provider or within the authorizing provider's specialty.  See \"Patient Info\" tab in inbasket, or \"Choose Columns\" in Meds & Orders section of the refill encounter.           Passed - Patient is 18 years of age or older          "

## 2018-06-12 ENCOUNTER — TRANSFERRED RECORDS (OUTPATIENT)
Dept: HEALTH INFORMATION MANAGEMENT | Facility: CLINIC | Age: 58
End: 2018-06-12

## 2018-06-12 ENCOUNTER — TRANSFERRED RECORDS (OUTPATIENT)
Dept: MULTI SPECIALTY CLINIC | Facility: CLINIC | Age: 58
End: 2018-06-12

## 2018-06-12 LAB — PAP SMEAR - HIM PATIENT REPORTED: NORMAL

## 2018-06-17 DIAGNOSIS — J45.20 MILD INTERMITTENT ASTHMA, UNCOMPLICATED: ICD-10-CM

## 2018-06-17 DIAGNOSIS — E11.29 TYPE 2 DIABETES MELLITUS WITH OTHER KIDNEY COMPLICATION, UNSPECIFIED LONG TERM INSULIN USE STATUS: ICD-10-CM

## 2018-06-17 DIAGNOSIS — J31.0 CHRONIC RHINITIS, UNSPECIFIED TYPE: ICD-10-CM

## 2018-06-18 NOTE — TELEPHONE ENCOUNTER
"Requested Prescriptions   Pending Prescriptions Disp Refills     montelukast (SINGULAIR) 10 MG tablet [Pharmacy Med Name: MONTELUKAST SODIUM 10MG TABS]  Last Written Prescription Date:  12/29/17  Last Fill Quantity: 90,  # refills: 1   Last office visit: 6/5/2018 with prescribing provider:  06/05/18   Future Office Visit:     90 tablet 1     Sig: TAKE ONE TABLET BY MOUTH AT BEDTIME    Leukotriene Inhibitors Protocol Passed    6/17/2018 10:16 AM       Passed - Patient is age 12 or older    If patient is under 16, ok to refill using age based dosing.          Passed - Asthma control assessment score within normal limits in last 6 months    Please review ACT score.   ACT Total Scores 3/20/2017 9/18/2017 4/13/2018   ACT TOTAL SCORE - - -   ASTHMA ER VISITS - - -   ASTHMA HOSPITALIZATIONS - - -   ACT TOTAL SCORE (Goal Greater than or Equal to 20) 21 24 25   In the past 12 months, how many times did you visit the emergency room for your asthma without being admitted to the hospital? 0 0 0   In the past 12 months, how many times were you hospitalized overnight because of your asthma? 0 0 0          Passed - Recent (6 mo) or future (30 days) visit within the authorizing provider's specialty    Patient had office visit in the last 6 months or has a visit in the next 30 days with authorizing provider or within the authorizing provider's specialty.  See \"Patient Info\" tab in inbasket, or \"Choose Columns\" in Meds & Orders section of the refill encounter.            metFORMIN (GLUCOPHAGE-XR) 500 MG 24 hr tablet [Pharmacy Med Name: METFORMIN 500MG ER TABLET]  Last Written Prescription Date:  12/29/17  Last Fill Quantity: 360,  # refills: 1   Last office visit: 6/5/2018 with prescribing provider:  06/05/18   Future Office Visit:     360 tablet 1     Sig: TAKE TWO TABLETS BY MOUTH TWICE A DAY WITH MEALS    Biguanide Agents Passed    6/17/2018 10:16 AM       Passed - Blood pressure less than 140/90 in past 6 months    BP Readings " "from Last 3 Encounters:   06/05/18 134/87   04/13/18 136/80   12/29/17 138/86          Passed - Patient has documented LDL within the past 12 mos.    Recent Labs   Lab Test  12/29/17   0907   LDL  71          Passed - Patient has had a Microalbumin in the past 12 mos.    Recent Labs   Lab Test  12/29/17   0916   MICROL  107   UMALCR  205.37*          Passed - Patient is age 10 or older       Passed - Patient has documented A1c within the specified period of time.    If HgbA1C is 8 or greater, it needs to be on file within the past 3 months.  If less than 8, must be on file within the past 6 months.     Recent Labs   Lab Test  04/13/18   0821   A1C  9.3*          Passed - Patient's CR is NOT>1.4 OR Patient's EGFR is NOT<45 within past 12 mos.    Recent Labs   Lab Test  06/07/18   0755   GFRESTIMATED  >90   GFRESTBLACK  >90     Recent Labs   Lab Test  12/29/17   0907   CR  0.57          Passed - Patient does NOT have a diagnosis of CHF.       Passed - Patient is not pregnant       Passed - Patient has not had a positive pregnancy test within the past 12 mos.        Passed - Recent (6 mo) or future (30 days) visit within the authorizing provider's specialty    Patient had office visit in the last 6 months or has a visit in the next 30 days with authorizing provider or within the authorizing provider's specialty.  See \"Patient Info\" tab in inbasket, or \"Choose Columns\" in Meds & Orders section of the refill encounter.              "

## 2018-06-19 RX ORDER — METFORMIN HCL 500 MG
TABLET, EXTENDED RELEASE 24 HR ORAL
Qty: 360 TABLET | Refills: 1 | Status: SHIPPED | OUTPATIENT
Start: 2018-06-19 | End: 2018-07-30

## 2018-06-19 RX ORDER — MONTELUKAST SODIUM 10 MG/1
TABLET ORAL
Qty: 90 TABLET | Refills: 1 | Status: SHIPPED | OUTPATIENT
Start: 2018-06-19 | End: 2018-11-01

## 2018-07-09 DIAGNOSIS — E78.5 HYPERLIPIDEMIA LDL GOAL <100: ICD-10-CM

## 2018-07-09 NOTE — TELEPHONE ENCOUNTER
"Requested Prescriptions   Pending Prescriptions Disp Refills     simvastatin (ZOCOR) 40 MG tablet  Last Written Prescription Date:  12/29/17  Last Fill Quantity: 90,  # refills: 1   Last office visit: 6/5/2018 with prescribing provider:  06/05/18   Future Office Visit:     90 tablet 1     Sig: Take 1 tablet (40 mg) by mouth At Bedtime at bedtime.    Statins Protocol Passed    7/9/2018 12:16 PM       Passed - LDL on file in past 12 months    Recent Labs   Lab Test  12/29/17   0907   LDL  71          Passed - No abnormal creatine kinase in past 12 months    No lab results found.        Passed - Recent (12 mo) or future (30 days) visit within the authorizing provider's specialty    Patient had office visit in the last 12 months or has a visit in the next 30 days with authorizing provider or within the authorizing provider's specialty.  See \"Patient Info\" tab in inbasket, or \"Choose Columns\" in Meds & Orders section of the refill encounter.           Passed - Patient is age 18 or older       Passed - No active pregnancy on record       Passed - No positive pregnancy test in past 12 months          "

## 2018-07-10 RX ORDER — SIMVASTATIN 40 MG
40 TABLET ORAL AT BEDTIME
Qty: 90 TABLET | Refills: 1 | Status: SHIPPED | OUTPATIENT
Start: 2018-07-10 | End: 2018-11-01

## 2018-07-25 ENCOUNTER — OFFICE VISIT (OUTPATIENT)
Dept: FAMILY MEDICINE | Facility: CLINIC | Age: 58
End: 2018-07-25
Payer: COMMERCIAL

## 2018-07-25 ENCOUNTER — RADIANT APPOINTMENT (OUTPATIENT)
Dept: GENERAL RADIOLOGY | Facility: CLINIC | Age: 58
End: 2018-07-25
Attending: NURSE PRACTITIONER
Payer: COMMERCIAL

## 2018-07-25 VITALS
HEART RATE: 94 BPM | RESPIRATION RATE: 12 BRPM | HEIGHT: 64 IN | TEMPERATURE: 97.6 F | DIASTOLIC BLOOD PRESSURE: 82 MMHG | SYSTOLIC BLOOD PRESSURE: 142 MMHG | OXYGEN SATURATION: 96 % | BODY MASS INDEX: 45.07 KG/M2 | WEIGHT: 264 LBS

## 2018-07-25 DIAGNOSIS — M79.672 PAIN OF LEFT HEEL: ICD-10-CM

## 2018-07-25 DIAGNOSIS — M79.672 PAIN OF LEFT HEEL: Primary | ICD-10-CM

## 2018-07-25 PROCEDURE — 99213 OFFICE O/P EST LOW 20 MIN: CPT | Performed by: NURSE PRACTITIONER

## 2018-07-25 PROCEDURE — 73650 X-RAY EXAM OF HEEL: CPT | Mod: LT

## 2018-07-25 ASSESSMENT — PAIN SCALES - GENERAL: PAINLEVEL: MILD PAIN (2)

## 2018-07-25 NOTE — MR AVS SNAPSHOT
After Visit Summary   7/25/2018    Bria Davis    MRN: [id number]           Patient Information     Date Of Birth          1960        Visit Information        Provider Department      7/25/2018 9:20 AM Maylin Casper APRN CNP [facility]        Today's Diagnoses     Pain of left heel    -  1      Care Instructions    We will call you with the results of the x ray.           Follow-ups after your visit        Your next 10 appointments already scheduled     Jul 30, 2018  9:40 AM CDT   SHORT with IRVIN Conroy MD   [facility] ([facility])    [address]  [facility] [address]   [phone]              Who to contact     If you have questions or need follow up information about today's clinic visit or your schedule please contact [facility] directly at [phone].  Normal or non-critical lab and imaging results will be communicated to you by MyChart, letter or phone within 4 business days after the clinic has received the results. If you do not hear from us within 7 days, please contact the clinic through MyChart or phone. If you have a critical or abnormal lab result, we will notify you by phone as soon as possible.  Submit refill requests through [employer] or call your pharmacy and they will forward the refill request to us. Please allow 3 business days for your refill to be completed.          Additional Information About Your Visit        MyChart Information     [employer] gives you secure access to your electronic health record. If you see a primary care provider, you can also send messages to your care team and make appointments. If you have questions, please call your primary care clinic.  If you do not have a primary care provider, please call [phone] and they will assist you.        Care EveryWhere ID     This is your Care EveryWhere ID. This could be used by other organizations to access your  "[facility] medical records  [id number]        Your Vitals Were     Pulse Temperature Respirations Height Last Period Pulse Oximetry    94 97.6  F (36.4  C) (Tympanic) 12 5' 3.5\" (1.613 m) 01/14/2016 (Approximate) 96%    Breastfeeding? BMI (Body Mass Index)                No 46.03 kg/m2           Blood Pressure from Last 3 Encounters:   07/25/18 142/82   06/05/18 134/87   04/13/18 136/80    Weight from Last 3 Encounters:   07/25/18 264 lb (119.7 kg)   06/05/18 263 lb 9.6 oz (119.6 kg)   04/13/18 263 lb (119.3 kg)                 Today's Medication Changes          These changes are accurate as of 7/25/18 10:37 AM.  If you have any questions, ask your nurse or doctor.               Start taking these medicines.        Dose/Directions    order for DME   Used for:  Pain of left heel   Started by:  Maylin Casper APRN CNP        Equipment being ordered: CAM walker boot   Quantity:  1 Units   Refills:  0            Where to get your medicines      Some of these will need a paper prescription and others can be bought over the counter.  Ask your nurse if you have questions.     Bring a paper prescription for each of these medications     order for DME                Primary Care Provider Office Phone # Fax #    R Gama Conroy [phone] [address] [facility] [address]        Equal Access to Services     [facility] Center[facility] AH: Hadii kam carney hadasho Soomaali, waaxda luqadaha, qaybta kaalmada adetgyada, waxemily allan bruno . So [facility] [phone].    ATENCIÓN: Si habla español, tiene a headley disposición servicios gratuitos de asistencia lingüística. Llame al [phone].    We comply with applicable federal civil rights laws and [place] laws. We do not discriminate on the basis of race, color, national origin, age, disability, sex, sexual orientation, or gender identity.            Thank you!     Thank you for choosing [facility]  for your care. Our goal is " always to provide you with excellent care. Hearing back from our patients is one way we can continue to improve our services. Please take a few minutes to complete the written survey that you may receive in the mail after your visit with us. Thank you!             Your Updated Medication List - Protect others around you: Learn how to safely use, store and throw away your medicines at www.disposemymeds.org.          This list is accurate as of 7/25/18 10:37 AM.  Always use your most recent med list.                   Brand Name Dispense Instructions for use Diagnosis    albuterol 108 (90 Base) MCG/ACT Inhaler    PROAIR HFA/PROVENTIL HFA/VENTOLIN HFA    1 Inhaler    Inhale 2 puffs into the lungs every 6 hours    Mild intermittent asthma without complication       aspirin 81 MG tablet     100 tablet    Take 1 tablet by mouth daily.    Diabetes mellitus, type 2 (H)       JOAQUIN CONTOUR test strip   Generic drug:  blood glucose monitoring     400 each    USE TO TEST BLOOD SUGAR FOUR TIMES A DAY AS DIRECTED    Type 2 diabetes mellitus with other diabetic kidney complication (CODE) (H)       blood glucose monitoring lancets     408 each    USE TO TEST BLOOD SUGAR FOUR TIMES A DAY AS DIRECTED    Type 2 diabetes mellitus with other diabetic kidney complication (CODE) (H)       Calcium-Vitamin D 600-400 MG-UNIT Tabs      Take 1 tablet by mouth daily        cyclobenzaprine 10 MG tablet    FLEXERIL    30 tablet    Take 1 tablet (10 mg) by mouth nightly as needed for muscle spasms    Muscle contraction headache       dulaglutide 1.5 MG/0.5ML pen    TRULICITY    6 mL    Inject 1.5 mg Subcutaneous every 7 days    Type 2 diabetes mellitus with other diabetic kidney complication       esomeprazole 40 MG CR capsule    nexIUM    90 capsule    Take 1 capsule (40 mg) by mouth every morning (before breakfast)    Gastroesophageal reflux disease without esophagitis       fluticasone-salmeterol 100-50 MCG/DOSE diskus inhaler    ADVAIR DISKUS  "   3 Inhaler    Inhale 1 puff into the lungs 2 times daily    Mild intermittent asthma, uncomplicated       insulin aspart 100 UNITS/ML injection    NovoLOG VIAL    9 vial    Take 26 units with breakfast and lunch, 20 units with dinner.  If glucometer is over 200 take an extra 6 units    Type 2 diabetes mellitus with other kidney complication, unspecified long term insulin use status (H)       insulin glargine 100 UNIT/ML injection    LANTUS VIAL    70 mL    INJECT 42 UNITS UNDER THE SKIN twice daily    Type 2 diabetes mellitus with other kidney complication, unspecified long term insulin use status (H)       * insulin syringe-needle U-100 30G X 1/2\" 0.3 ML     400 each    Use four syringes daily or as directed.    Type 2 diabetes mellitus with other kidney complication, unspecified long term insulin use status (H)       * insulin syringe-needle U-100 31G X 5/16\" 0.5 ML    BD insulin syringe ultrafine    100 each    Use one syringe 4 daily or as directed.    Type 2 diabetes mellitus with other kidney complication, unspecified long term insulin use status (H)       * insulin syringe-needle U-100 31G X 5/16\" 1 ML     100 each    Use 3 syringes daily or as directed.    Type 2 diabetes mellitus with other kidney complication, unspecified long term insulin use status (H)       * losartan 100 MG tablet    COZAAR    100 tablet    Take 1 tablet (100 mg) by mouth daily    Essential hypertension with goal blood pressure less than 140/90       * losartan 100 MG tablet    COZAAR    100 tablet    TAKE ONE TABLET BY MOUTH EVERY DAY    Essential hypertension with goal blood pressure less than 140/90       metFORMIN 500 MG 24 hr tablet    GLUCOPHAGE-XR    360 tablet    TAKE TWO TABLETS BY MOUTH TWICE A DAY WITH MEALS    Type 2 diabetes mellitus with other kidney complication, unspecified long term insulin use status (H)       metoclopramide 10 MG tablet    REGLAN    180 tablet    TAKE ONE TABLET BY MOUTH TWICE A DAY    " "Gastroparesis due to DM (H)       montelukast 10 MG tablet    SINGULAIR    90 tablet    TAKE ONE TABLET BY MOUTH AT BEDTIME    Mild intermittent asthma, uncomplicated, Chronic rhinitis, unspecified type       MULTIVITAMIN ADULTS 50+ Tabs      Take 1 tablet by mouth daily        Needle (Disp) 30G X 1/2\" Misc    BD DISP NEEDLES    300 each    To use with humalog insulin 3 times daily    Type 2 diabetes mellitus with other diabetic kidney complication       order for DME     1 Units    Equipment being ordered: CAM walker boot    Pain of left heel       pseudoePHEDrine 30 MG tablet    SUDAFED    360 tablet    Take 2 tablets (60 mg) by mouth every 12 hours    Chronic rhinitis, unspecified type       simvastatin 40 MG tablet    ZOCOR    90 tablet    Take 1 tablet (40 mg) by mouth At Bedtime at bedtime.    Hyperlipidemia LDL goal <100       * Notice:  This list has 5 medication(s) that are the same as other medications prescribed for you. Read the directions carefully, and ask your doctor or other care provider to review them with you.      "

## 2018-07-25 NOTE — PROGRESS NOTES
SUBJECTIVE:   Bria Davis is a 58 year old female who presents to clinic today for the following health issues:      Joint Pain    Onset: 7/14/18    Description:   Location: left heel  Character: Sharp and dull ache    Intensity: mild, moderate    Progression of Symptoms: better    Accompanying Signs & Symptoms:  Other symptoms: none    History:   Previous similar pain: no       Precipitating factors:   Trauma or overuse: YES- pt was playing [employer] ball and landed on it    Alleviating factors:  Improved by: rest/inactivity    Therapies Tried and outcome: rest helps, stretching made it hurt    Resting at home and elevating. 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  She will not need to be up and active on her feet until school is in session.        Problem list and histories reviewed & adjusted, as indicated.  Additional history: as documented    Patient Active Problem List   Diagnosis     Mild intermittent asthma     Esophageal reflux     Allergic rhinitis     Restless legs syndrome (RLS)     Enthesopathy     Lumbar radiculopathy     Microalbuminuria     Hyperlipidemia LDL goal <100     Type 2 diabetes mellitus with other kidney complication, unspecified long term insulin use status (H)     Morbid obesity (H)     Essential hypertension with goal blood pressure less than 140/90     Past Surgical History:   Procedure Laterality Date     COLONOSCOPY  1/31/2002     COLONOSCOPY  10/26/2012    Procedure: COLONOSCOPY;  Colonoscopy;  Surgeon: Margret Sales MD;  Location: [facility]     INJECT EPIDURAL LUMBAR  12/7/2010    INJECT EPIDURAL LUMBAR performed by GENERIC ANESTHESIA PROVIDER at [facility]     INJECT EPIDURAL LUMBAR  1/17/2011    INJECT EPIDURAL LUMBAR performed by GENERIC ANESTHESIA PROVIDER at [facility]     RELEASE  "CARPAL TUNNEL  6/19/2012    Procedure: RELEASE CARPAL TUNNEL;  Right Carpal Tunnel Release;  Surgeon: Mike Sparrow MD;  Location: [facility]     RELEASE CARPAL TUNNEL  11/9/2012    Procedure: RELEASE CARPAL TUNNEL;  Left Carpal Tunnel Release;  Surgeon: Mkie Sparrow MD;  Location: [facility]     SURGICAL HISTORY OF -   4/10/2000    bilateral total ethmoidectomies, bilateral maxillary antrostomies, bilateral SMR of inferior turbinates, reduction of lt turbinate porter bullosa       Social History   Substance Use Topics     Smoking status: Never Smoker     Smokeless tobacco: Never Used     Alcohol use No     Family History   Problem Relation Age of Onset     Hypertension Mother      Diabetes Mother      Respiratory Mother      asthma-COPD     Hypertension Father      HEART DISEASE Father      Cerebrovascular Disease Father      Cardiovascular Father      Breast Cancer Maternal Grandmother      Cancer Brother      Diabetes Brother      Respiratory Brother      copd     Chronic Obstructive Pulmonary Disease Brother      Depression Sister      Respiratory Sister      copd     Chronic Obstructive Pulmonary Disease Sister      Depression Sister      Chronic Obstructive Pulmonary Disease Sister      Depression Sister            Reviewed and updated as needed this visit by clinical staff  Tobacco  Allergies  Meds  Problems  Med Hx  Surg Hx  Fam Hx  Soc Hx        Reviewed and updated as needed this visit by Provider  Allergies  Meds  Problems         ROS:  Constitutional, HEENT, cardiovascular, pulmonary, gi and gu systems are negative, except as otherwise noted.    OBJECTIVE:     /82 (BP Location: Right arm, Patient Position: Chair, Cuff Size: Adult Large)  Pulse 94  Temp 97.6  F (36.4  C) (Tympanic)  Resp 12  Ht 5' 3.5\" (1.613 m)  Wt 264 lb (119.7 kg)  LMP 01/14/2016 (Approximate)  SpO2 96%  Breastfeeding? No  BMI 46.03 kg/m2  Body mass index is 46.03 kg/(m^2).  GENERAL: healthy, alert and no " distress  RESP: Normal work of breathing  MS: Pain with palpation on left lateral heel.  Able to dorsi and plantarflex left ankle without discomfort.    Diagnostic Test Results:  Xray - LEFT CALCANEUS TWO OR MORE VIEWS   7/25/2018 10:17 AM      HISTORY: Pain of left heel.     COMPARISON: None.         IMPRESSION: Moderate to large plantar and posterior calcaneal spurs.  No evidence of acute fracture.     RADHA LANGLEY MD    ASSESSMENT/PLAN:     ASSESSMENT:  1. Pain of left heel.  Patient landed hard on her heel while playing volleyball.  Doubt fracture, will get x-ray.       PLAN:  Orders Placed This Encounter     XR Calcaneus Left G/E 2 Views     order for DME       Patient Instructions   We will call you with the results of the x ray.   Patient agrees with plan of care as outlined. Call or return to the clinic with any worsening of symptoms or no resolution. Medication side effects reviewed.  Chart documentation with Dragon Voice recognition Software. Although reviewed after completion, some words and grammatical errors may remain.        Maylin Casper, PAUL, APRN CNP  [facility]

## 2018-07-30 ENCOUNTER — OFFICE VISIT (OUTPATIENT)
Dept: FAMILY MEDICINE | Facility: CLINIC | Age: 58
End: 2018-07-30
Payer: COMMERCIAL

## 2018-07-30 VITALS
RESPIRATION RATE: 18 BRPM | DIASTOLIC BLOOD PRESSURE: 60 MMHG | BODY MASS INDEX: 45.58 KG/M2 | HEART RATE: 88 BPM | SYSTOLIC BLOOD PRESSURE: 132 MMHG | OXYGEN SATURATION: 97 % | HEIGHT: 64 IN | WEIGHT: 267 LBS | TEMPERATURE: 97.7 F

## 2018-07-30 DIAGNOSIS — J45.20 MILD INTERMITTENT ASTHMA WITHOUT COMPLICATION: ICD-10-CM

## 2018-07-30 DIAGNOSIS — I10 ESSENTIAL HYPERTENSION WITH GOAL BLOOD PRESSURE LESS THAN 140/90: ICD-10-CM

## 2018-07-30 DIAGNOSIS — J31.0 CHRONIC RHINITIS, UNSPECIFIED TYPE: ICD-10-CM

## 2018-07-30 DIAGNOSIS — E11.29 TYPE 2 DIABETES MELLITUS WITH OTHER KIDNEY COMPLICATION, UNSPECIFIED LONG TERM INSULIN USE STATUS: Primary | ICD-10-CM

## 2018-07-30 DIAGNOSIS — J45.20 MILD INTERMITTENT ASTHMA, UNCOMPLICATED: ICD-10-CM

## 2018-07-30 DIAGNOSIS — E66.01 MORBID OBESITY (H): ICD-10-CM

## 2018-07-30 DIAGNOSIS — K21.9 GASTROESOPHAGEAL REFLUX DISEASE WITHOUT ESOPHAGITIS: ICD-10-CM

## 2018-07-30 DIAGNOSIS — E11.43 GASTROPARESIS DUE TO DM (H): ICD-10-CM

## 2018-07-30 DIAGNOSIS — K31.84 GASTROPARESIS DUE TO DM (H): ICD-10-CM

## 2018-07-30 LAB — HBA1C MFR BLD: 9.1 % (ref 0–5.6)

## 2018-07-30 PROCEDURE — 99214 OFFICE O/P EST MOD 30 MIN: CPT | Performed by: FAMILY MEDICINE

## 2018-07-30 PROCEDURE — 83036 HEMOGLOBIN GLYCOSYLATED A1C: CPT | Performed by: FAMILY MEDICINE

## 2018-07-30 PROCEDURE — 36415 COLL VENOUS BLD VENIPUNCTURE: CPT | Performed by: FAMILY MEDICINE

## 2018-07-30 RX ORDER — PSEUDOEPHEDRINE HCL 30 MG
60 TABLET ORAL EVERY 12 HOURS
Qty: 360 TABLET | Refills: 3 | Status: CANCELLED | OUTPATIENT
Start: 2018-07-30

## 2018-07-30 RX ORDER — LOSARTAN POTASSIUM 100 MG/1
100 TABLET ORAL DAILY
Qty: 100 TABLET | Refills: 1 | Status: SHIPPED | OUTPATIENT
Start: 2018-07-30 | End: 2018-11-01

## 2018-07-30 RX ORDER — ALBUTEROL SULFATE 90 UG/1
2 AEROSOL, METERED RESPIRATORY (INHALATION) EVERY 6 HOURS
Qty: 1 INHALER | Refills: 11 | Status: SHIPPED | OUTPATIENT
Start: 2018-07-30 | End: 2019-08-05

## 2018-07-30 RX ORDER — METFORMIN HCL 500 MG
TABLET, EXTENDED RELEASE 24 HR ORAL
Qty: 360 TABLET | Refills: 1 | Status: SHIPPED | OUTPATIENT
Start: 2018-07-30 | End: 2018-11-01

## 2018-07-30 RX ORDER — LANCETS
1 EACH MISCELLANEOUS 3 TIMES DAILY
Qty: 408 EACH | Refills: 3 | Status: SHIPPED | OUTPATIENT
Start: 2018-07-30 | End: 2019-08-05

## 2018-07-30 RX ORDER — ESOMEPRAZOLE MAGNESIUM 40 MG/1
40 CAPSULE, DELAYED RELEASE ORAL
Qty: 90 CAPSULE | Refills: 3 | Status: SHIPPED | OUTPATIENT
Start: 2018-07-30 | End: 2019-08-05

## 2018-07-30 RX ORDER — SYRINGE-NEEDLE,INSULIN,0.5 ML 27GX1/2"
SYRINGE, EMPTY DISPOSABLE MISCELLANEOUS
Qty: 270 EACH | Refills: 3 | Status: SHIPPED | OUTPATIENT
Start: 2018-07-30 | End: 2019-08-06

## 2018-07-30 RX ORDER — METOCLOPRAMIDE 10 MG/1
10 TABLET ORAL 2 TIMES DAILY
Qty: 180 TABLET | Refills: 1 | Status: SHIPPED | OUTPATIENT
Start: 2018-07-30 | End: 2018-11-01

## 2018-07-30 RX ORDER — SYRINGE-NEEDLE,INSULIN,0.5 ML 27GX1/2"
SYRINGE, EMPTY DISPOSABLE MISCELLANEOUS
Qty: 180 EACH | Refills: 3 | Status: SHIPPED | OUTPATIENT
Start: 2018-07-30 | End: 2019-08-07

## 2018-07-30 RX ORDER — INSULIN GLARGINE 100 [IU]/ML
INJECTION, SOLUTION SUBCUTANEOUS
Qty: 70 ML | Refills: 2 | Status: SHIPPED | OUTPATIENT
Start: 2018-07-30 | End: 2018-11-01

## 2018-07-30 NOTE — PATIENT INSTRUCTIONS
Diabetes Plan  1. Hemoglobin A1c goal is between 7% and 8%  Your Hemoglobin A1c is not at goal  Plan is:Continue medications at current doses and Diet and Exercise (she states that she is going back to work next week and then will be on a much more consistent routine of diet and exercise, should be able to bring her blood sugars down without adjusting her medications)  2. LDL (bad cholesterol) goal is less than 100  Your LDL is at goal  Plan is: Continue medications at current doses  3. Blood pressure goal is less than 140/90.  Your blood pressure is at goal.  Plan is: Continue medications at current doses    Come back for a glycosylated hemoglobin in 3 months and then will decide the next steps                    Thank you for choosing [facility].  You may be receiving a survey in the mail from [employer] regarding your visit today.  Please take a few minutes to complete and return the survey to let us know how we are doing.      Our Clinic hours are:  Mondays    7:20 am - 7 pm  Tues -  Fri  7:20 am - 5 pm    Clinic Phone: [phone]    The clinic lab opens at 7:30 am Mon - Fri and appointments are required.    [facility]. [phone]  Monday  8 am - 7pm  Tues - Fri 8 am - 5:30 pm

## 2018-07-30 NOTE — PROGRESS NOTES
SUBJECTIVE:   Bria Davis is a 58 year old female who presents to clinic today for the following health issues:      Diabetes Follow-up    Patient is checking blood sugars: 3 times daily.    Blood sugar testing frequency justification: Patient modifying lifestyle changes (diet, exercise) with blood sugars  Results are as follows:         120-380    Diabetic concerns blood sugar frequently over 200     Symptoms of hypoglycemia (low blood sugar): none     Paresthesias (numbness or burning in feet) or sores: No     Date of last diabetic eye exam: 6 mo     Diabetes Management Resources    Hyperlipidemia Follow-Up      Rate your low fat/cholesterol diet?: good    Taking statin?  Yes, muscle aches after starting statin    Other lipid medications/supplements?:  none    Hypertension Follow-up      Outpatient blood pressures are not being checked.    Low Salt Diet: no added salt    BP Readings from Last 2 Encounters:   07/30/18 132/60   07/25/18 142/82     Hemoglobin A1C (%)   Date Value   04/13/2018 9.3 (H)   12/29/2017 9.6 (H)     LDL Cholesterol Calculated (mg/dL)   Date Value   12/29/2017 71   11/25/2016 80       Amount of exercise or physical activity: None    Problems taking medications regularly: No    Medication side effects: muscle aches    Diet: low salt, low fat/cholesterol, diabetic and carbohydrate counting    Morbid obesity: She admits she has not been consistent in eating properly and exercising.  Recently had a big family reunion, and now that that is over states she will be able to get on a more consistent routine        Problem list and histories reviewed & adjusted, as indicated.  Additional history: none        Reviewed and updated as needed this visit by clinical staff  Tobacco  Allergies  Meds       Reviewed and updated as needed this visit by Provider               ROS:  Constitutional, HEENT, cardiovascular, pulmonary, gi and gu systems are negative, except as otherwise  "noted.        OBJECTIVE:                                                    /60  Pulse 88  Temp 97.7  F (36.5  C)  Resp 18  Ht 5' 3.75\" (1.619 m)  Wt 267 lb (121.1 kg)  LMP 01/14/2016 (Approximate)  SpO2 97%  BMI 46.19 kg/m2    GENERAL: healthy, alert and no distress  EYES: Eyes grossly normal to inspection, extraocular movements - intact, and PERRL  NECK: no tenderness, no adenopathy, no asymmetry, no masses, no stiffness; thyroid- normal to palpation  RESP: lungs clear to auscultation - no rales, no rhonchi, no wheezes  CV: regular rates and rhythm, normal S1 S2, no S3 or S4 and no murmur, no click or rub -  MS: extremities- no gross deformities noted, no edema    Diagnostic test results:  Results for orders placed or performed in visit on 07/30/18 (from the past 24 hour(s))   Hemoglobin A1c   Result Value Ref Range    Hemoglobin A1C 9.1 (H) 0 - 5.6 %        ASSESSMENT/PLAN:                                                    ASSESSMENT:  1. Type 2 diabetes mellitus with other kidney complication, unspecified long term insulin use status (H)    2. Mild intermittent asthma without complication    3. Gastroesophageal reflux disease without esophagitis    4. Mild intermittent asthma, uncomplicated    5. Essential hypertension with goal blood pressure less than 140/90    6. Gastroparesis due to DM (H)    7. Chronic rhinitis, unspecified type    8. Morbid obesity (H)        PLAN:  Orders Placed This Encounter     Hemoglobin A1c     albuterol (PROAIR HFA/PROVENTIL HFA/VENTOLIN HFA) 108 (90 Base) MCG/ACT Inhaler     blood glucose monitoring (ACCU-CHEK FASTCLIX) lancets     dulaglutide (TRULICITY) 1.5 MG/0.5ML pen     esomeprazole (NEXIUM) 40 MG CR capsule     fluticasone-salmeterol (ADVAIR DISKUS) 100-50 MCG/DOSE diskus inhaler     insulin aspart (NOVOLOG VIAL) 100 UNITS/ML injection     insulin glargine (LANTUS VIAL) 100 UNIT/ML injection     insulin syringe-needle U-100 (BD INSULIN SYRINGE ULTRAFINE) 31G X " "5/16\" 0.5 ML     insulin syringe-needle U-100 31G X 5/16\" 1 ML     losartan (COZAAR) 100 MG tablet     metFORMIN (GLUCOPHAGE-XR) 500 MG 24 hr tablet     metoclopramide (REGLAN) 10 MG tablet     Needle, Disp, (BD DISP NEEDLES) 30G X 1/2\" MISC       Patient Instructions   Diabetes Plan  1. Hemoglobin A1c goal is between 7% and 8%  Your Hemoglobin A1c is not at goal  Plan is:Continue medications at current doses and Diet and Exercise (she states that she is going back to work next week and then will be on a much more consistent routine of diet and exercise, should be able to bring her blood sugars down without adjusting her medications)  2. LDL (bad cholesterol) goal is less than 100  Your LDL is at goal  Plan is: Continue medications at current doses  3. Blood pressure goal is less than 140/90.  Your blood pressure is at goal.  Plan is: Continue medications at current doses    Come back for a glycosylated hemoglobin in 3 months and then will decide the next steps                    Thank you for choosing [facility].  You may be receiving a survey in the mail from [employer] regarding your visit today.  Please take a few minutes to complete and return the survey to let us know how we are doing.      Our Clinic hours are:  Mondays    7:20 am - 7 pm  Tues -  Fri  7:20 am - 5 pm    Clinic Phone: [phone]    The clinic lab opens at 7:30 am Mon - Fri and appointments are required.    [facility] [place]  Ph. [phone]  Monday  8 am - 7pm  Tues - Fri 8 am - 5:30 pm             IRVIN Malloy Post  [facility]     "

## 2018-07-30 NOTE — MR AVS SNAPSHOT
After Visit Summary   7/30/2018    Bria Davis    MRN: [id number]           Patient Information     Date Of Birth          1960        Visit Information        Provider Department      7/30/2018 9:40 AM Rafiq, IRVIN Malloy MD [facility]        Today's Diagnoses     Mild intermittent asthma without complication        Type 2 diabetes mellitus with other diabetic kidney complication (CODE) (H)        Gastroesophageal reflux disease without esophagitis        Mild intermittent asthma, uncomplicated        Type 2 diabetes mellitus with other kidney complication, unspecified long term insulin use status (H)        Essential hypertension with goal blood pressure less than 140/90        Gastroparesis due to DM (H)        Chronic rhinitis, unspecified type          Care Instructions    Diabetes Plan  1. Hemoglobin A1c goal is between 7% and 8%  Your Hemoglobin A1c is not at goal  Plan is:Continue medications at current doses and Diet and Exercise (she states that she is going back to work next week and then will be on a much more consistent routine of diet and exercise, should be able to bring her blood sugars down without adjusting her medications)  2. LDL (bad cholesterol) goal is less than 100  Your LDL is at goal  Plan is: Continue medications at current doses  3. Blood pressure goal is less than 140/90.  Your blood pressure is at goal.  Plan is: Continue medications at current doses    Come back for a glycosylated hemoglobin in 3 months and then will decide the next steps                    Thank you for choosing [facility].  You may be receiving a survey in the mail from Guillermo Rockwell regarding your visit today.  Please take a few minutes to complete and return the survey to let us know how we are doing.      Our Clinic hours are:  Mondays    7:20 am - 7 pm  Tues -  Fri  7:20 am - 5 pm    Clinic Phone: [phone]    The clinic lab opens at 7:30 am Mon - Fri and appointments  "are required.    [facility] [place]  Ph. [phone]  Monday  8 am - 7pm  Tues - Fri 8 am - 5:30 pm                 Follow-ups after your visit        Who to contact     If you have questions or need follow up information about today's clinic visit or your schedule please contact [facility] directly at [phone].  Normal or non-critical lab and imaging results will be communicated to you by [employer], letter or phone within 4 business days after the clinic has received the results. If you do not hear from us within 7 days, please contact the clinic through [employer] or phone. If you have a critical or abnormal lab result, we will notify you by phone as soon as possible.  Submit refill requests through [employer] or call your pharmacy and they will forward the refill request to us. Please allow 3 business days for your refill to be completed.          Additional Information About Your Visit        [employer] Information     [employer] gives you secure access to your electronic health record. If you see a primary care provider, you can also send messages to your care team and make appointments. If you have questions, please call your primary care clinic.  If you do not have a primary care provider, please call [phone] and they will assist you.        Care EveryWhere ID     This is your Care EveryWhere ID. This could be used by other organizations to access your [facility] medical records  [id number]        Your Vitals Were     Pulse Temperature Respirations Height Last Period Pulse Oximetry    88 97.7  F (36.5  C) 18 5' 3.75\" (1.619 m) 01/14/2016 (Approximate) 97%    BMI (Body Mass Index)                   46.19 kg/m2            Blood Pressure from Last 3 Encounters:   07/30/18 132/60   07/25/18 142/82   06/05/18 134/87    Weight from Last 3 Encounters:   07/30/18 267 lb (121.1 kg)   07/25/18 264 lb (119.7 kg)   06/05/18 263 lb 9.6 oz (119.6 kg)              We Performed the Following     " "Hemoglobin A1c          Today's Medication Changes          These changes are accurate as of 7/30/18 12:12 PM.  If you have any questions, ask your nurse or doctor.               These medicines have changed or have updated prescriptions.        Dose/Directions    blood glucose monitoring lancets   This may have changed:  See the new instructions.   Used for:  Type 2 diabetes mellitus with other diabetic kidney complication (CODE) (H)   Changed by:  IRVIN Conroy MD        Dose:  1 each   1 each 3 times daily Use to test blood sugar 3 times daily or as directed.   Quantity:  408 each   Refills:  3       * insulin syringe-needle U-100 30G X 1/2\" 0.3 ML   This may have changed:  Another medication with the same name was changed. Make sure you understand how and when to take each.   Used for:  Type 2 diabetes mellitus with other kidney complication, unspecified long term insulin use status (H)   Changed by:  IRVIN Conroy MD        Use four syringes daily or as directed.   Quantity:  400 each   Refills:  3       * insulin syringe-needle U-100 31G X 5/16\" 0.5 ML   Commonly known as:  BD insulin syringe ultrafine   This may have changed:  additional instructions   Used for:  Type 2 diabetes mellitus with other kidney complication, unspecified long term insulin use status (H)   Changed by:  IRVIN Conroy MD        Use one syringe 3 timesdaily or as directed.   Quantity:  270 each   Refills:  3       * insulin syringe-needle U-100 31G X 5/16\" 1 ML   This may have changed:  additional instructions   Used for:  Type 2 diabetes mellitus with other kidney complication, unspecified long term insulin use status (H)   Changed by:  IRVIN Conroy MD        Use 2 syringes daily or as directed.   Quantity:  180 each   Refills:  3       losartan 100 MG tablet   Commonly known as:  COZAAR   This may have changed:  Another medication with the same name was removed. Continue taking this medication, and follow the directions you see here. " "  Used for:  Essential hypertension with goal blood pressure less than 140/90   Changed by:  IRVIN Conroy MD        Dose:  100 mg   Take 1 tablet (100 mg) by mouth daily   Quantity:  100 tablet   Refills:  1       metoclopramide 10 MG tablet   Commonly known as:  REGLAN   This may have changed:  See the new instructions.   Used for:  Gastroparesis due to DM (H)   Changed by:  IRVIN Conroy MD        Dose:  10 mg   Take 1 tablet (10 mg) by mouth 2 times daily   Quantity:  180 tablet   Refills:  1       * Notice:  This list has 3 medication(s) that are the same as other medications prescribed for you. Read the directions carefully, and ask your doctor or other care provider to review them with you.         Where to get your medicines      These medications were sent to [facility] [address]  [address] [facility] [address]     Phone:  [phone]     albuterol 108 (90 Base) MCG/ACT Inhaler    blood glucose monitoring lancets    dulaglutide 1.5 MG/0.5ML pen    esomeprazole 40 MG CR capsule    fluticasone-salmeterol 100-50 MCG/DOSE diskus inhaler    insulin aspart 100 UNITS/ML injection    insulin glargine 100 UNIT/ML injection    insulin syringe-needle U-100 31G X 5/16\" 0.5 ML    insulin syringe-needle U-100 31G X 5/16\" 1 ML    losartan 100 MG tablet    metFORMIN 500 MG 24 hr tablet    metoclopramide 10 MG tablet    Needle (Disp) 30G X 1/2\" Misc                Primary Care Provider Office Phone # Fax #    IRVIN Conroy [phone] [address] [facility] [address]        Equal Access to Services     [facility] DOTTY AH: Hadii kam Bonilla, waaxda luqadaha, qaybta kaalmada adeegyada, ann-marie lehman. So [facility] [phone].    ATENCIÓN: Si habla español, tiene a headley disposición servicios gratuitos de asistencia lingüística. Llame al [phone].    We comply with applicable federal civil " rights laws and [place] laws. We do not discriminate on the basis of race, color, national origin, age, disability, sex, sexual orientation, or gender identity.            Thank you!     Thank you for choosing [facility]  for your care. Our goal is always to provide you with excellent care. Hearing back from our patients is one way we can continue to improve our services. Please take a few minutes to complete the written survey that you may receive in the mail after your visit with us. Thank you!             Your Updated Medication List - Protect others around you: Learn how to safely use, store and throw away your medicines at www.disposemymeds.org.          This list is accurate as of 7/30/18 12:12 PM.  Always use your most recent med list.                   Brand Name Dispense Instructions for use Diagnosis    albuterol 108 (90 Base) MCG/ACT Inhaler    PROAIR HFA/PROVENTIL HFA/VENTOLIN HFA    1 Inhaler    Inhale 2 puffs into the lungs every 6 hours    Mild intermittent asthma without complication       aspirin 81 MG tablet     100 tablet    Take 1 tablet by mouth daily.    Diabetes mellitus, type 2 (H)       JOAQUIN CONTOUR test strip   Generic drug:  blood glucose monitoring     400 each    USE TO TEST BLOOD SUGAR FOUR TIMES A DAY AS DIRECTED    Type 2 diabetes mellitus with other diabetic kidney complication (CODE) (H)       blood glucose monitoring lancets     408 each    1 each 3 times daily Use to test blood sugar 3 times daily or as directed.    Type 2 diabetes mellitus with other diabetic kidney complication (CODE) (H)       Calcium-Vitamin D 600-400 MG-UNIT Tabs      Take 1 tablet by mouth daily        cyclobenzaprine 10 MG tablet    FLEXERIL    30 tablet    Take 1 tablet (10 mg) by mouth nightly as needed for muscle spasms    Muscle contraction headache       dulaglutide 1.5 MG/0.5ML pen    TRULICITY    6 mL    Inject 1.5 mg Subcutaneous every 7 days    Type 2 diabetes mellitus with  "other diabetic kidney complication (CODE) (H)       esomeprazole 40 MG CR capsule    nexIUM    90 capsule    Take 1 capsule (40 mg) by mouth every morning (before breakfast)    Gastroesophageal reflux disease without esophagitis       fluticasone-salmeterol 100-50 MCG/DOSE diskus inhaler    ADVAIR DISKUS    3 Inhaler    Inhale 1 puff into the lungs 2 times daily    Mild intermittent asthma, uncomplicated       insulin aspart 100 UNITS/ML injection    NovoLOG VIAL    9 vial    Take 26 units with breakfast and lunch, 20 units with dinner.  If glucometer is over 200 take an extra 6 units    Type 2 diabetes mellitus with other kidney complication, unspecified long term insulin use status (H)       insulin glargine 100 UNIT/ML injection    LANTUS VIAL    70 mL    INJECT 42 UNITS UNDER THE SKIN twice daily    Type 2 diabetes mellitus with other kidney complication, unspecified long term insulin use status (H)       * insulin syringe-needle U-100 30G X 1/2\" 0.3 ML     400 each    Use four syringes daily or as directed.    Type 2 diabetes mellitus with other kidney complication, unspecified long term insulin use status (H)       * insulin syringe-needle U-100 31G X 5/16\" 0.5 ML    BD insulin syringe ultrafine    270 each    Use one syringe 3 timesdaily or as directed.    Type 2 diabetes mellitus with other kidney complication, unspecified long term insulin use status (H)       * insulin syringe-needle U-100 31G X 5/16\" 1 ML     180 each    Use 2 syringes daily or as directed.    Type 2 diabetes mellitus with other kidney complication, unspecified long term insulin use status (H)       losartan 100 MG tablet    COZAAR    100 tablet    Take 1 tablet (100 mg) by mouth daily    Essential hypertension with goal blood pressure less than 140/90       metFORMIN 500 MG 24 hr tablet    GLUCOPHAGE-XR    360 tablet    TAKE TWO TABLETS BY MOUTH TWICE A DAY WITH MEALS    Type 2 diabetes mellitus with other kidney complication, " "unspecified long term insulin use status (H)       metoclopramide 10 MG tablet    REGLAN    180 tablet    Take 1 tablet (10 mg) by mouth 2 times daily    Gastroparesis due to DM (H)       montelukast 10 MG tablet    SINGULAIR    90 tablet    TAKE ONE TABLET BY MOUTH AT BEDTIME    Mild intermittent asthma, uncomplicated, Chronic rhinitis, unspecified type       MULTIVITAMIN ADULTS 50+ Tabs      Take 1 tablet by mouth daily        Needle (Disp) 30G X 1/2\" Misc    BD DISP NEEDLES    300 each    To use with humalog insulin 3 times daily    Gastroesophageal reflux disease without esophagitis       order for DME     1 Units    Equipment being ordered: CAM walker boot    Pain of left heel       pseudoePHEDrine 30 MG tablet    SUDAFED    360 tablet    Take 2 tablets (60 mg) by mouth every 12 hours    Chronic rhinitis, unspecified type       simvastatin 40 MG tablet    ZOCOR    90 tablet    Take 1 tablet (40 mg) by mouth At Bedtime at bedtime.    Hyperlipidemia LDL goal <100       * Notice:  This list has 3 medication(s) that are the same as other medications prescribed for you. Read the directions carefully, and ask your doctor or other care provider to review them with you.      "

## 2018-08-08 ENCOUNTER — TELEPHONE (OUTPATIENT)
Dept: FAMILY MEDICINE | Facility: CLINIC | Age: 58
End: 2018-08-08

## 2018-08-08 ENCOUNTER — HOSPITAL ENCOUNTER (EMERGENCY)
Facility: CLINIC | Age: 58
Discharge: HOME | End: 2018-08-08
Attending: NURSE PRACTITIONER | Admitting: NURSE PRACTITIONER
Payer: COMMERCIAL

## 2018-08-08 VITALS
HEIGHT: 63 IN | SYSTOLIC BLOOD PRESSURE: 151 MMHG | RESPIRATION RATE: 14 BRPM | TEMPERATURE: 98.2 F | DIASTOLIC BLOOD PRESSURE: 83 MMHG | OXYGEN SATURATION: 97 % | HEART RATE: 88 BPM

## 2018-08-08 DIAGNOSIS — H10.13 ALLERGIC CONJUNCTIVITIS, BILATERAL: ICD-10-CM

## 2018-08-08 PROCEDURE — 99213 OFFICE O/P EST LOW 20 MIN: CPT | Performed by: NURSE PRACTITIONER

## 2018-08-08 PROCEDURE — G0463 HOSPITAL OUTPT CLINIC VISIT: HCPCS

## 2018-08-08 RX ORDER — AZELASTINE HYDROCHLORIDE 0.5 MG/ML
1 SOLUTION/ DROPS OPHTHALMIC 2 TIMES DAILY
Qty: 1 BOTTLE | Refills: 0 | Status: SHIPPED | OUTPATIENT
Start: 2018-08-08 | End: 2019-05-09

## 2018-08-08 NOTE — TELEPHONE ENCOUNTER
Reason for Call:  eyes    Detailed comments: patient is calling wondering if she has pink eye. She states her eyes are very painful and have fluid coming from them. Do not look pink. She has been exposed to Pink Eye at work. Please advise.    Phone Number Patient can be reached at: Work number on file:  [phone] (work)    Best Time: any    Can we leave a detailed message on this number? YES   Ginny Rust  Clinic Station  [facility]      Call taken on 8/8/2018 at 11:22 AM by Ginny Rust

## 2018-08-08 NOTE — ED AVS SNAPSHOT
[facility] Emergency Department    [address] [facility] [address]    Phone:  [phone]    Fax:  [phone]                                       Bria Davis   MRN: [id number]    Department:  [facility] Emergency Department   Date of Visit:  8/8/2018           After Visit Summary Signature Page     I have received my discharge instructions, and my questions have been answered. I have discussed any challenges I see with this plan with the nurse or doctor.    ..........................................................................................................................................  Patient/Patient Representative Signature      ..........................................................................................................................................  Patient Representative Print Name and Relationship to Patient    ..................................................               ................................................  Date                                            Time    ..........................................................................................................................................  Reviewed by Signature/Title    ...................................................              ..............................................  Date                                                            Time

## 2018-08-08 NOTE — ED AVS SNAPSHOT
[facility] Emergency Department    [address]    [facility] [place] [address]    Phone:  [phone]    Fax:  [phone]                                       Bria Davis   MRN: [id number]    Department:  [facility] Emergency Department   Date of Visit:  8/8/2018           Patient Information     Date Of Birth          1960        Your diagnoses for this visit were:     Allergic conjunctivitis, bilateral        You were seen by Christy Ruiz APRN CNP.      Follow-up Information     Follow up with IRVIN Conroy MD.    Specialty:  Family Practice    Why:  As needed    Contact information:    [address] [facility] [id number] [phone]          Discharge Instructions       Zyrtec or Claritin daily.  Azelastine drops 1 drop each eye daily.        Conjunctivitis, Allergic    Conjunctivitis is an irritation of a thin membrane in the eye. This membrane is called the conjunctiva. It covers the white of the eye and the inside of the eyelid. The condition is often called pink eye or red eye because the eye looks pink or red. The eye can also be swollen. A thick fluid may leak from the eyelid. The eye may itch and burn, and feel gritty or scratchy.  Allergic conjunctivitis is caused by an allergen. Allergens are substances that cause the body to react with certain symptoms. Allergens that cause eye irritation include things such as house dust or pollen in the air. This can occur seasonally, most often in the spring.  Home care    Eye drops may be prescribed to reduce itching and redness. Use these as directed. Otherwise, over-the-counter decongestant eye drops may be used.    Apply a cool compress (towel soaked in cool water) to the affected eye 3 to 4 times a day to reduce swelling and itching.    It is common to have mucus drainage during the night, causing the eyelids to become crusted by morning. Use a warm, wet cloth to wipe this away. You may also use saline irrigating  solution or artificial tears to rinse away mucus in the eye. Don't patch the eye.    You may use acetaminophen or ibuprofen to control pain, unless another medicine was prescribed. (Note: If you have chronic liver or kidney disease, or if you have ever had a stomach ulcer or gastrointestinal bleeding, talk with your healthcare provider before using these medicines.)    Don't wear contact lenses until your eyes have healed and all symptoms are gone.  Follow-up care  Follow up with your healthcare provider, or as advised.  When to seek medical advice  Call your healthcare provider right away if any of these occur:    Increased eyelid swelling    New or worsening drainage from the eye    Increasing redness around the eye    Facial swelling  Date Last Reviewed: 7/1/2017 2000-2017 [employer]. [address] [place] [address], [place] [id number]. All rights reserved. This information is not intended as a substitute for professional medical care. Always follow your healthcare professional's instructions.          24 Hour Appointment Hotline       To make an appointment at any [facility], call [phone] ([phone]). If you don't have a family doctor or clinic, we will help you find one. [place] clinics are conveniently located to serve the needs of you and your family.             Review of your medicines      START taking        Dose / Directions Last dose taken    azelastine 0.05 % Soln ophthalmic solution   Commonly known as:  OPTIVAR   Dose:  1 drop   Quantity:  1 Bottle        Place 1 drop into both eyes 2 times daily   Refills:  0          Our records show that you are taking the medicines listed below. If these are incorrect, please call your family doctor or clinic.        Dose / Directions Last dose taken    albuterol 108 (90 Base) MCG/ACT Inhaler   Commonly known as:  PROAIR HFA/PROVENTIL HFA/VENTOLIN HFA   Dose:  2 puff   Quantity:  1 Inhaler        Inhale 2 puffs into the lungs every 6  "hours   Refills:  11        aspirin 81 MG tablet   Dose:  81 mg   Quantity:  100 tablet        Take 1 tablet by mouth daily.   Refills:  3        JOAQUIN CONTOUR test strip   Quantity:  400 each   Generic drug:  blood glucose monitoring        USE TO TEST BLOOD SUGAR FOUR TIMES A DAY AS DIRECTED   Refills:  3        blood glucose monitoring lancets   Dose:  1 each   Quantity:  408 each        1 each 3 times daily Use to test blood sugar 3 times daily or as directed.   Refills:  3        Calcium-Vitamin D 600-400 MG-UNIT Tabs   Dose:  1 tablet        Take 1 tablet by mouth daily   Refills:  0        cyclobenzaprine 10 MG tablet   Commonly known as:  FLEXERIL   Dose:  10 mg   Quantity:  30 tablet        Take 1 tablet (10 mg) by mouth nightly as needed for muscle spasms   Refills:  5        dulaglutide 1.5 MG/0.5ML pen   Commonly known as:  TRULICITY   Dose:  1.5 mg   Quantity:  6 mL        Inject 1.5 mg Subcutaneous every 7 days   Refills:  2        esomeprazole 40 MG CR capsule   Commonly known as:  nexIUM   Dose:  40 mg   Quantity:  90 capsule        Take 1 capsule (40 mg) by mouth every morning (before breakfast)   Refills:  3        fluticasone-salmeterol 100-50 MCG/DOSE diskus inhaler   Commonly known as:  ADVAIR DISKUS   Dose:  1 puff   Quantity:  3 Inhaler        Inhale 1 puff into the lungs 2 times daily   Refills:  3        insulin aspart 100 UNITS/ML injection   Commonly known as:  NovoLOG VIAL   Quantity:  9 vial        Take 26 units with breakfast and lunch, 20 units with dinner.  If glucometer is over 200 take an extra 6 units   Refills:  1        insulin glargine 100 UNIT/ML injection   Commonly known as:  LANTUS VIAL   Quantity:  70 mL        INJECT 42 UNITS UNDER THE SKIN twice daily   Refills:  2        * insulin syringe-needle U-100 30G X 1/2\" 0.3 ML   Quantity:  400 each        Use four syringes daily or as directed.   Refills:  3        * insulin syringe-needle U-100 31G X 5/16\" 0.5 ML   Commonly " "known as:  BD insulin syringe ultrafine   Quantity:  270 each        Use one syringe 3 timesdaily or as directed.   Refills:  3        * insulin syringe-needle U-100 31G X 5/16\" 1 ML   Quantity:  180 each        Use 2 syringes daily or as directed.   Refills:  3        losartan 100 MG tablet   Commonly known as:  COZAAR   Dose:  100 mg   Quantity:  100 tablet        Take 1 tablet (100 mg) by mouth daily   Refills:  1        metFORMIN 500 MG 24 hr tablet   Commonly known as:  GLUCOPHAGE-XR   Quantity:  360 tablet        TAKE TWO TABLETS BY MOUTH TWICE A DAY WITH MEALS   Refills:  1        metoclopramide 10 MG tablet   Commonly known as:  REGLAN   Dose:  10 mg   Quantity:  180 tablet        Take 1 tablet (10 mg) by mouth 2 times daily   Refills:  1        montelukast 10 MG tablet   Commonly known as:  SINGULAIR   Quantity:  90 tablet        TAKE ONE TABLET BY MOUTH AT BEDTIME   Refills:  1        MULTIVITAMIN ADULTS 50+ Tabs   Dose:  1 tablet        Take 1 tablet by mouth daily   Refills:  0        Needle (Disp) 30G X 1/2\" Misc   Commonly known as:  BD DISP NEEDLES   Quantity:  300 each        To use with humalog insulin 3 times daily   Refills:  1        order for DME   Quantity:  1 Units        Equipment being ordered: CAM walker boot   Refills:  0        pseudoePHEDrine 30 MG tablet   Commonly known as:  SUDAFED   Dose:  60 mg   Quantity:  360 tablet        Take 2 tablets (60 mg) by mouth every 12 hours   Refills:  3        simvastatin 40 MG tablet   Commonly known as:  ZOCOR   Dose:  40 mg   Quantity:  90 tablet        Take 1 tablet (40 mg) by mouth At Bedtime at bedtime.   Refills:  1        * Notice:  This list has 3 medication(s) that are the same as other medications prescribed for you. Read the directions carefully, and ask your doctor or other care provider to review them with you.            Prescriptions were sent or printed at these locations (1 Prescription)                   [facility] - " [place], [place] - [address]   [address] [place] [address]    Telephone:  [phone]   Fax:  [phone]   Hours:                  E-Prescribed (1 of 1)         azelastine (OPTIVAR) 0.05 % SOLN ophthalmic solution                Orders Needing Specimen Collection     None      Pending Results     No orders found from 8/6/2018 to 8/9/2018.            Pending Culture Results     No orders found from 8/6/2018 to 8/9/2018.            Pending Results Instructions     If you had any lab results that were not finalized at the time of your Discharge, you can call the ED Lab Result RN at [phone]. You will be contacted by this team for any positive Lab results or changes in treatment. The nurses are available 7 days a week from 10A to 6:30P.  You can leave a message 24 hours per day and they will return your call.        Test Results From Your Hospital Stay               Thank you for choosing [facility]       Thank you for choosing [facility] for your care. Our goal is always to provide you with excellent care. Hearing back from our patients is one way we can continue to improve our services. Please take a few minutes to complete the written survey that you may receive in the mail after you visit with us. Thank you!        [employer] Information     [employer] gives you secure access to your electronic health record. If you see a primary care provider, you can also send messages to your care team and make appointments. If you have questions, please call your primary care clinic.  If you do not have a primary care provider, please call [phone] and they will assist you.        Care EveryWhere ID     This is your Care EveryWhere ID. This could be used by other organizations to access your [facility] medical records  [id number]        Equal Access to Services     JOANNE STORM : patience Porter qaybta kaalmada adeegyada, waxay idiin hayaan adeeg kharash la'aan ah. So [facility]  [phone].    ATENCIÓN: Si habla español, tiene a headley disposición servicios gratuitos de asistencia lingüística. Llame al [phone].    We comply with applicable federal civil rights laws and [place] laws. We do not discriminate on the basis of race, color, national origin, age, disability, sex, sexual orientation, or gender identity.            After Visit Summary       This is your record. Keep this with you and show to your community pharmacist(s) and doctor(s) at your next visit.

## 2018-08-08 NOTE — DISCHARGE INSTRUCTIONS
Zyrtec or Claritin daily.  Azelastine drops 1 drop each eye daily.        Conjunctivitis, Allergic    Conjunctivitis is an irritation of a thin membrane in the eye. This membrane is called the conjunctiva. It covers the white of the eye and the inside of the eyelid. The condition is often called pink eye or red eye because the eye looks pink or red. The eye can also be swollen. A thick fluid may leak from the eyelid. The eye may itch and burn, and feel gritty or scratchy.  Allergic conjunctivitis is caused by an allergen. Allergens are substances that cause the body to react with certain symptoms. Allergens that cause eye irritation include things such as house dust or pollen in the air. This can occur seasonally, most often in the spring.  Home care    Eye drops may be prescribed to reduce itching and redness. Use these as directed. Otherwise, over-the-counter decongestant eye drops may be used.    Apply a cool compress (towel soaked in cool water) to the affected eye 3 to 4 times a day to reduce swelling and itching.    It is common to have mucus drainage during the night, causing the eyelids to become crusted by morning. Use a warm, wet cloth to wipe this away. You may also use saline irrigating solution or artificial tears to rinse away mucus in the eye. Don't patch the eye.    You may use acetaminophen or ibuprofen to control pain, unless another medicine was prescribed. (Note: If you have chronic liver or kidney disease, or if you have ever had a stomach ulcer or gastrointestinal bleeding, talk with your healthcare provider before using these medicines.)    Don't wear contact lenses until your eyes have healed and all symptoms are gone.  Follow-up care  Follow up with your healthcare provider, or as advised.  When to seek medical advice  Call your healthcare provider right away if any of these occur:    Increased eyelid swelling    New or worsening drainage from the eye    Increasing redness around the  eye    Facial swelling  Date Last Reviewed: 7/1/2017 2000-2017 [employer]. [address] [place] [address], [place] [address]. All rights reserved. This information is not intended as a substitute for professional medical care. Always follow your healthcare professional's instructions.

## 2018-08-08 NOTE — ED NOTES
Pt presents to ED with complaints of eyes watering for several days and pain that started yesterday. Pt also reports feeling the sensation of something stuck in her throat for a couple weeks. Pt is able to swallow with slight discomfort. No trouble breathing.     Vision is not impaired. No fevers.

## 2018-08-09 NOTE — ED PROVIDER NOTES
History     Chief Complaint   Patient presents with     Eye Problem     discharge and itching     HPI  Bria Davis is a 58 year old female who who presents to urgent care for evaluation of watery itchy eyes.  Symptoms started 1 week ago.  Sinus congestion for the last 2 days. Eye drainage is sticky and clear. Denies fever. Denies cough. Denies foreign body in eye. Wears glasses, does not wear contacts.  Problem List:    Patient Active Problem List    Diagnosis Date Noted     Essential hypertension with goal blood pressure less than 140/90 08/25/2016     Priority: Medium     Type 2 diabetes mellitus with other kidney complication, unspecified long term insulin use status (H) 10/21/2015     Priority: Medium     Morbid obesity (H) 10/21/2015     Priority: Medium     Hyperlipidemia LDL goal <100 03/26/2011     Priority: Medium     Microalbuminuria 01/06/2011     Priority: Medium     Lumbar radiculopathy 11/30/2010     Priority: Medium     Enthesopathy 11/27/2007     Priority: Medium     Problem list name updated by automated process. Provider to review       Restless legs syndrome (RLS) 04/12/2007     Priority: Medium     Esophageal reflux 02/07/2006     Priority: Medium     Allergic rhinitis 02/07/2006     Priority: Medium     Problem list name updated by automated process. Provider to review       Mild intermittent asthma 11/10/2005     Priority: Medium        Past Medical History:    Past Medical History:   Diagnosis Date     Asthma      Diabetes mellitus (H)      Esophageal reflux      Other chronic sinusitis        Past Surgical History:    Past Surgical History:   Procedure Laterality Date     COLONOSCOPY  1/31/2002     COLONOSCOPY  10/26/2012    Procedure: COLONOSCOPY;  Colonoscopy;  Surgeon: Margret Sales MD;  Location: [facility]     INJECT EPIDURAL LUMBAR  12/7/2010    INJECT EPIDURAL LUMBAR performed by GENERIC ANESTHESIA PROVIDER at [facility]     INJECT EPIDURAL LUMBAR  1/17/2011    INJECT  EPIDURAL LUMBAR performed by GENERIC ANESTHESIA PROVIDER at [facility]     RELEASE CARPAL TUNNEL  6/19/2012    Procedure: RELEASE CARPAL TUNNEL;  Right Carpal Tunnel Release;  Surgeon: Mike Sparrow MD;  Location: [facility]     RELEASE CARPAL TUNNEL  11/9/2012    Procedure: RELEASE CARPAL TUNNEL;  Left Carpal Tunnel Release;  Surgeon: Mike Sparrow MD;  Location: [facility]     SURGICAL HISTORY OF -   4/10/2000    bilateral total ethmoidectomies, bilateral maxillary antrostomies, bilateral SMR of inferior turbinates, reduction of lt turbinate porter bullosa       Family History:    Family History   Problem Relation Age of Onset     Hypertension Mother      Diabetes Mother      Respiratory Mother      asthma-COPD     Hypertension Father      HEART DISEASE Father      Cerebrovascular Disease Father      Cardiovascular Father      Breast Cancer Maternal Grandmother      Cancer Brother      Diabetes Brother      Respiratory Brother      copd     Chronic Obstructive Pulmonary Disease Brother      Depression Sister      Respiratory Sister      copd     Chronic Obstructive Pulmonary Disease Sister      Depression Sister      Chronic Obstructive Pulmonary Disease Sister      Depression Sister        Social History:  Marital Status:   [2]  Social History   Substance Use Topics     Smoking status: Never Smoker     Smokeless tobacco: Never Used     Alcohol use No        No current facility-administered medications on file prior to encounter.   Current Outpatient Prescriptions on File Prior to Encounter:  albuterol (PROAIR HFA/PROVENTIL HFA/VENTOLIN HFA) 108 (90 Base) MCG/ACT Inhaler Inhale 2 puffs into the lungs every 6 hours   aspirin 81 MG tablet Take 1 tablet by mouth daily.   JOAQUIN CONTOUR test strip USE TO TEST BLOOD SUGAR FOUR TIMES A DAY AS DIRECTED   blood glucose monitoring (ACCU-CHEK FASTCLIX) lancets 1 each 3 times daily Use to test blood sugar 3 times daily or as directed.   Calcium Carb-Cholecalciferol  "(CALCIUM-VITAMIN D) 600-400 MG-UNIT TABS Take 1 tablet by mouth daily   cyclobenzaprine (FLEXERIL) 10 MG tablet Take 1 tablet (10 mg) by mouth nightly as needed for muscle spasms (Patient not taking: Reported on 4/13/2018)   dulaglutide (TRULICITY) 1.5 MG/0.5ML pen Inject 1.5 mg Subcutaneous every 7 days   esomeprazole (NEXIUM) 40 MG CR capsule Take 1 capsule (40 mg) by mouth every morning (before breakfast)   fluticasone-salmeterol (ADVAIR DISKUS) 100-50 MCG/DOSE diskus inhaler Inhale 1 puff into the lungs 2 times daily   insulin aspart (NOVOLOG VIAL) 100 UNITS/ML injection Take 26 units with breakfast and lunch, 20 units with dinner.  If glucometer is over 200 take an extra 6 units   insulin glargine (LANTUS VIAL) 100 UNIT/ML injection INJECT 42 UNITS UNDER THE SKIN twice daily   insulin syringe-needle U-100 (BD INSULIN SYRINGE ULTRAFINE) 31G X 5/16\" 0.5 ML Use one syringe 3 timesdaily or as directed.   insulin syringe-needle U-100 30G X 1/2\" 0.3 ML Use four syringes daily or as directed.   insulin syringe-needle U-100 31G X 5/16\" 1 ML Use 2 syringes daily or as directed.   losartan (COZAAR) 100 MG tablet Take 1 tablet (100 mg) by mouth daily   metFORMIN (GLUCOPHAGE-XR) 500 MG 24 hr tablet TAKE TWO TABLETS BY MOUTH TWICE A DAY WITH MEALS   metoclopramide (REGLAN) 10 MG tablet Take 1 tablet (10 mg) by mouth 2 times daily   montelukast (SINGULAIR) 10 MG tablet TAKE ONE TABLET BY MOUTH AT BEDTIME   Multiple Vitamins-Minerals (MULTIVITAMIN ADULTS 50+) TABS Take 1 tablet by mouth daily   Needle, Disp, (BD DISP NEEDLES) 30G X 1/2\" MISC To use with humalog insulin 3 times daily   order for DME Equipment being ordered: CAM walker boot   pseudoePHEDrine (SUDAFED) 30 MG tablet Take 2 tablets (60 mg) by mouth every 12 hours   simvastatin (ZOCOR) 40 MG tablet Take 1 tablet (40 mg) by mouth At Bedtime at bedtime.         Review of Systems  As mentioned above in the history present illness. All other systems were reviewed and " "are negative.    Physical Exam   BP: 151/83  Pulse: 88  Temp: 98.2  F (36.8  C)  Resp: 14  Height: 160 cm (5' 3\")  SpO2: 97 %      Physical Exam    GENERAL APPEARANCE: healthy, alert and no distress  EYES: EOMI, bilateral conjunctiva injection.  No periorbital edema or erythema.  HENT: bilateral ear canals clear, intact, and without inflammation. Right TM normal. Left TM normal. Nose normal.  Oropharynx without ulcers, erythema or lesions  NECK: supple, nontender, no lymphadenopathy  RESP: lungs clear to auscultation - no rales, rhonchi or wheezes  CV: regular rates and rhythm, normal S1 S2, no murmur noted    ED Course     ED Course     Procedures             No results found for this or any previous visit (from the past 24 hour(s)).    Medications - No data to display    Assessments & Plan (with Medical Decision Making)   History and exam are consistent with allergic conjunctivitis.  Prescription for antihistamine eyedrop, Optivar, was given to the patient.  Instructed to recheck for worsening or persistent symptoms.  I have reviewed the nursing notes.    I have reviewed the findings, diagnosis, plan and need for follow up with the patient.      Discharge Medication List as of 8/8/2018  5:52 PM      START taking these medications    Details   azelastine (OPTIVAR) 0.05 % SOLN ophthalmic solution Place 1 drop into both eyes 2 times daily, Disp-1 Bottle, R-0, E-Prescribe             Final diagnoses:   Allergic conjunctivitis, bilateral       8/8/2018   [facility] EMERGENCY DEPARTMENT     SaraChristy soto, ALYSON MONAHAN  08/09/18 1657    "

## 2018-10-01 ENCOUNTER — OFFICE VISIT (OUTPATIENT)
Dept: FAMILY MEDICINE | Facility: CLINIC | Age: 58
End: 2018-10-01
Payer: COMMERCIAL

## 2018-10-01 VITALS
TEMPERATURE: 97.8 F | RESPIRATION RATE: 12 BRPM | HEIGHT: 63 IN | OXYGEN SATURATION: 100 % | WEIGHT: 265 LBS | DIASTOLIC BLOOD PRESSURE: 78 MMHG | HEART RATE: 84 BPM | BODY MASS INDEX: 46.95 KG/M2 | SYSTOLIC BLOOD PRESSURE: 122 MMHG

## 2018-10-01 DIAGNOSIS — Z01.818 PREOP GENERAL PHYSICAL EXAM: Primary | ICD-10-CM

## 2018-10-01 DIAGNOSIS — N84.0 UTERINE POLYP: ICD-10-CM

## 2018-10-01 LAB
ANION GAP SERPL CALCULATED.3IONS-SCNC: 6 MMOL/L (ref 3–14)
BUN SERPL-MCNC: 12 MG/DL (ref 7–30)
CALCIUM SERPL-MCNC: 8.7 MG/DL (ref 8.5–10.1)
CHLORIDE SERPL-SCNC: 105 MMOL/L (ref 94–109)
CO2 SERPL-SCNC: 29 MMOL/L (ref 20–32)
CREAT SERPL-MCNC: 0.62 MG/DL (ref 0.52–1.04)
ERYTHROCYTE [DISTWIDTH] IN BLOOD BY AUTOMATED COUNT: 17.6 % (ref 10–15)
GFR SERPL CREATININE-BSD FRML MDRD: >90 ML/MIN/1.7M2
GLUCOSE SERPL-MCNC: 107 MG/DL (ref 70–99)
HCT VFR BLD AUTO: 35 % (ref 35–47)
HGB BLD-MCNC: 10.8 G/DL (ref 11.7–15.7)
MCH RBC QN AUTO: 22.1 PG (ref 26.5–33)
MCHC RBC AUTO-ENTMCNC: 30.9 G/DL (ref 31.5–36.5)
MCV RBC AUTO: 72 FL (ref 78–100)
PLATELET # BLD AUTO: 360 10E9/L (ref 150–450)
POTASSIUM SERPL-SCNC: 4.3 MMOL/L (ref 3.4–5.3)
RBC # BLD AUTO: 4.88 10E12/L (ref 3.8–5.2)
SODIUM SERPL-SCNC: 140 MMOL/L (ref 133–144)
WBC # BLD AUTO: 9.2 10E9/L (ref 4–11)

## 2018-10-01 PROCEDURE — 80048 BASIC METABOLIC PNL TOTAL CA: CPT | Performed by: NURSE PRACTITIONER

## 2018-10-01 PROCEDURE — 85027 COMPLETE CBC AUTOMATED: CPT | Performed by: NURSE PRACTITIONER

## 2018-10-01 PROCEDURE — 36415 COLL VENOUS BLD VENIPUNCTURE: CPT | Performed by: NURSE PRACTITIONER

## 2018-10-01 PROCEDURE — 99214 OFFICE O/P EST MOD 30 MIN: CPT | Performed by: NURSE PRACTITIONER

## 2018-10-01 NOTE — PATIENT INSTRUCTIONS
Hold aspirin or NSAID's.  Half dose of lantus and hold novolog insulin morning of surgery.    Before Your Surgery      Call your surgeon if there is any change in your health. This includes signs of a cold or flu (such as a sore throat, runny nose, cough, rash or fever).    Do not smoke, drink alcohol or take over the counter medicine (unless your surgeon or primary care doctor tells you to) for the 24 hours before and after surgery.    If you take prescribed drugs: Follow your doctor s orders about which medicines to take and which to stop until after surgery.    Eating and drinking prior to surgery: follow the instructions from your surgeon    Take a shower or bath the night before surgery. Use the soap your surgeon gave you to gently clean your skin. If you do not have soap from your surgeon, use your regular soap. Do not shave or scrub the surgery site.  Wear clean pajamas and have clean sheets on your bed.

## 2018-10-01 NOTE — MR AVS SNAPSHOT
After Visit Summary   10/1/2018    Bria Davis    MRN: [id number]           Patient Information     Date Of Birth          1960        Visit Information        Provider Department      10/1/2018 1:00 PM Varsha Posey APRN CNP [facility]        Today's Diagnoses     Preop general physical exam    -  1    Uterine polyp          Care Instructions    Hold aspirin or NSAID's.  Half dose of lantus and hold novolog insulin morning of surgery.    Before Your Surgery      Call your surgeon if there is any change in your health. This includes signs of a cold or flu (such as a sore throat, runny nose, cough, rash or fever).    Do not smoke, drink alcohol or take over the counter medicine (unless your surgeon or primary care doctor tells you to) for the 24 hours before and after surgery.    If you take prescribed drugs: Follow your doctor s orders about which medicines to take and which to stop until after surgery.    Eating and drinking prior to surgery: follow the instructions from your surgeon    Take a shower or bath the night before surgery. Use the soap your surgeon gave you to gently clean your skin. If you do not have soap from your surgeon, use your regular soap. Do not shave or scrub the surgery site.  Wear clean pajamas and have clean sheets on your bed.           Follow-ups after your visit        Follow-up notes from your care team     Return if symptoms worsen or fail to improve.      Your next 10 appointments already scheduled     Nov 01, 2018  4:20 PM CDT   SHORT with IRVIN Conroy MD   [facility] ([facility])    [address] [facility] [id number] [phone]              Who to contact     If you have questions or need follow up information about today's clinic visit or your schedule please contact [facility] directly at [phone].  Normal or non-critical lab and imaging results will be  "communicated to you by [employer], letter or phone within 4 business days after the clinic has received the results. If you do not hear from us within 7 days, please contact the clinic through [employer] or phone. If you have a critical or abnormal lab result, we will notify you by phone as soon as possible.  Submit refill requests through [employer] or call your pharmacy and they will forward the refill request to us. Please allow 3 business days for your refill to be completed.          Additional Information About Your Visit        [employer] Information     [employer] gives you secure access to your electronic health record. If you see a primary care provider, you can also send messages to your care team and make appointments. If you have questions, please call your primary care clinic.  If you do not have a primary care provider, please call [phone] and they will assist you.        Care EveryWhere ID     This is your Care EveryWhere ID. This could be used by other organizations to access your [facility] medical records  [id number]        Your Vitals Were     Pulse Temperature Respirations Height Last Period Pulse Oximetry    84 97.8  F (36.6  C) (Tympanic) 12 5' 3\" (1.6 m) 01/14/2016 (Approximate) 100%    BMI (Body Mass Index)                   46.94 kg/m2            Blood Pressure from Last 3 Encounters:   10/01/18 122/78   08/08/18 151/83   07/30/18 132/60    Weight from Last 3 Encounters:   10/01/18 265 lb (120.2 kg)   07/30/18 267 lb (121.1 kg)   07/25/18 264 lb (119.7 kg)              We Performed the Following     Basic metabolic panel  (Ca, Cl, CO2, Creat, Gluc, K, Na, BUN)     CBC with platelets        Primary Care Provider Office Phone # Fax #    R Gama Conroy [phone] [address] [facility] [address]        Equal Access to Services     JOANNE STORM AH: Ferny woodardo Somauali, waaxda luqadaha, qaybta kaalmada adeegyahema, ann-marie lehman. So wa " [phone].    ATENCIÓN: Si jerilyn gautam, tiene a headley disposición servicios gratuitos de asistencia lingüística. Lo valencia [phone].    We comply with applicable federal civil rights laws and [place] laws. We do not discriminate on the basis of race, color, national origin, age, disability, sex, sexual orientation, or gender identity.            Thank you!     Thank you for choosing [facility]  for your care. Our goal is always to provide you with excellent care. Hearing back from our patients is one way we can continue to improve our services. Please take a few minutes to complete the written survey that you may receive in the mail after your visit with us. Thank you!             Your Updated Medication List - Protect others around you: Learn how to safely use, store and throw away your medicines at www.disposemymeds.org.          This list is accurate as of 10/1/18  1:19 PM.  Always use your most recent med list.                   Brand Name Dispense Instructions for use Diagnosis    albuterol 108 (90 Base) MCG/ACT inhaler    PROAIR HFA/PROVENTIL HFA/VENTOLIN HFA    1 Inhaler    Inhale 2 puffs into the lungs every 6 hours    Mild intermittent asthma without complication       aspirin 81 MG tablet     100 tablet    Take 1 tablet by mouth daily.    Diabetes mellitus, type 2 (H)       azelastine 0.05 % ophthalmic solution    OPTIVAR    1 Bottle    Place 1 drop into both eyes 2 times daily        JOAQUIN CONTOUR test strip   Generic drug:  blood glucose monitoring     400 each    USE TO TEST BLOOD SUGAR FOUR TIMES A DAY AS DIRECTED    Type 2 diabetes mellitus with other diabetic kidney complication (CODE) (H)       blood glucose monitoring lancets     408 each    1 each 3 times daily Use to test blood sugar 3 times daily or as directed.    Type 2 diabetes mellitus with other kidney complication, unspecified long term insulin use status (H)       Calcium-Vitamin D 600-400 MG-UNIT Tabs      Take  "1 tablet by mouth daily        cyclobenzaprine 10 MG tablet    FLEXERIL    30 tablet    Take 1 tablet (10 mg) by mouth nightly as needed for muscle spasms    Muscle contraction headache       dulaglutide 1.5 MG/0.5ML pen    TRULICITY    6 mL    Inject 1.5 mg Subcutaneous every 7 days    Type 2 diabetes mellitus with other kidney complication, unspecified long term insulin use status (H)       esomeprazole 40 MG CR capsule    nexIUM    90 capsule    Take 1 capsule (40 mg) by mouth every morning (before breakfast)    Gastroesophageal reflux disease without esophagitis       fluticasone-salmeterol 100-50 MCG/DOSE diskus inhaler    ADVAIR DISKUS    3 Inhaler    Inhale 1 puff into the lungs 2 times daily    Mild intermittent asthma, uncomplicated       insulin aspart 100 UNITS/ML injection    NovoLOG VIAL    9 vial    Take 26 units with breakfast and lunch, 20 units with dinner.  If glucometer is over 200 take an extra 6 units    Type 2 diabetes mellitus with other kidney complication, unspecified long term insulin use status (H)       insulin glargine 100 UNIT/ML injection    LANTUS VIAL    70 mL    INJECT 42 UNITS UNDER THE SKIN twice daily    Type 2 diabetes mellitus with other kidney complication, unspecified long term insulin use status (H)       * insulin syringe-needle U-100 30G X 1/2\" 0.3 ML     400 each    Use four syringes daily or as directed.    Type 2 diabetes mellitus with other kidney complication, unspecified long term insulin use status (H)       * insulin syringe-needle U-100 31G X 5/16\" 0.5 ML    BD insulin syringe ultrafine    270 each    Use one syringe 3 timesdaily or as directed.    Type 2 diabetes mellitus with other kidney complication, unspecified long term insulin use status (H)       * insulin syringe-needle U-100 31G X 5/16\" 1 ML     180 each    Use 2 syringes daily or as directed.    Type 2 diabetes mellitus with other kidney complication, unspecified long term insulin use status (H)       " "losartan 100 MG tablet    COZAAR    100 tablet    Take 1 tablet (100 mg) by mouth daily    Essential hypertension with goal blood pressure less than 140/90       metFORMIN 500 MG 24 hr tablet    GLUCOPHAGE-XR    360 tablet    TAKE TWO TABLETS BY MOUTH TWICE A DAY WITH MEALS    Type 2 diabetes mellitus with other kidney complication, unspecified long term insulin use status (H)       metoclopramide 10 MG tablet    REGLAN    180 tablet    Take 1 tablet (10 mg) by mouth 2 times daily    Gastroparesis due to DM (H)       montelukast 10 MG tablet    SINGULAIR    90 tablet    TAKE ONE TABLET BY MOUTH AT BEDTIME    Mild intermittent asthma, uncomplicated, Chronic rhinitis, unspecified type       MULTIVITAMIN ADULTS 50+ Tabs      Take 1 tablet by mouth daily        Needle (Disp) 30G X 1/2\" Misc    BD DISP NEEDLES    300 each    To use with humalog insulin 3 times daily    Type 2 diabetes mellitus with other kidney complication, unspecified long term insulin use status (H)       order for DME     1 Units    Equipment being ordered: CAM walker boot    Pain of left heel       pseudoePHEDrine 30 MG tablet    SUDAFED    360 tablet    Take 2 tablets (60 mg) by mouth every 12 hours    Chronic rhinitis, unspecified type       simvastatin 40 MG tablet    ZOCOR    90 tablet    Take 1 tablet (40 mg) by mouth At Bedtime at bedtime.    Hyperlipidemia LDL goal <100       * Notice:  This list has 3 medication(s) that are the same as other medications prescribed for you. Read the directions carefully, and ask your doctor or other care provider to review them with you.      "

## 2018-10-01 NOTE — PROGRESS NOTES
[facility]  [address] [facility] [address]  [phone]  Dept: [phone]    PRE-OP EVALUATION:  Today's date: 10/1/2018    Bria Davis (: 1960) presents for pre-operative evaluation assessment as requested by Dr. Anthony.  She requires evaluation and anesthesia risk assessment prior to undergoing surgery/procedure for treatment of polyps removed from uterus .    Proposed Surgery/ Procedure: polyps removed from uterus  Date of Surgery/ Procedure: 10/5/18   Time of Surgery/ Procedure: to be determined  Hospital/Surgical Facility: [facility]  Fax number for surgical facility: [phone]  Primary Physician: IRVIN Conroy  Type of Anesthesia Anticipated: General    Patient has a Health Care Directive or Living Will:  NO    1. NO - Do you have a history of heart attack, stroke, stent, bypass or surgery on an artery in the head, neck, heart or legs?  2. NO - Do you ever have any pain or discomfort in your chest?  3. NO - Do you have a history of  Heart Failure?  4. NO - Are you troubled by shortness of breath when: walking on the level, up a slight hill or at night?  5. NO - Do you currently have a cold, bronchitis or other respiratory infection?  6. NO - Do you have a cough, shortness of breath or wheezing?  7. NO - Do you sometimes get pains in the calves of your legs when you walk?  8. NO - Do you or anyone in your family have previous history of blood clots?  9. NO - Do you or does anyone in your family have a serious bleeding problem such as prolonged bleeding following surgeries or cuts?  10. yes - Have you ever had problems with anemia or been told to take iron pills? Anemia last , no longer on iron pills  11. NO - Have you had any abnormal blood loss such as black, tarry or bloody stools, or abnormal vaginal bleeding?  12. NO - Have you ever had a blood transfusion?  13. NO - Have you or any of your relatives ever had problems with  anesthesia?  14. NO - Do you have sleep apnea, excessive snoring or daytime drowsiness?  15. NO - Do you have any prosthetic heart valves?  16. NO - Do you have prosthetic joints?  17. NO - Is there any chance that you may be pregnant?      HPI:     HPI related to upcoming procedure: she has had painful intercourse and was found to have polyps in her uterus.    She has had flu shot this season.    See problem list for active medical problems.  Problems all longstanding and stable, except as noted/documented.  See ROS for pertinent symptoms related to these conditions.                                                                                                                                                          .    MEDICAL HISTORY:     Patient Active Problem List    Diagnosis Date Noted     Essential hypertension with goal blood pressure less than 140/90 08/25/2016     Priority: Medium     Type 2 diabetes mellitus with other kidney complication, unspecified long term insulin use status (H) 10/21/2015     Priority: Medium     Morbid obesity (H) 10/21/2015     Priority: Medium     Hyperlipidemia LDL goal <100 03/26/2011     Priority: Medium     Microalbuminuria 01/06/2011     Priority: Medium     Lumbar radiculopathy 11/30/2010     Priority: Medium     Enthesopathy 11/27/2007     Priority: Medium     Problem list name updated by automated process. Provider to review       Restless legs syndrome (RLS) 04/12/2007     Priority: Medium     Esophageal reflux 02/07/2006     Priority: Medium     Allergic rhinitis 02/07/2006     Priority: Medium     Problem list name updated by automated process. Provider to review       Mild intermittent asthma 11/10/2005     Priority: Medium      Past Medical History:   Diagnosis Date     Asthma      Diabetes mellitus (H)      Esophageal reflux      Other chronic sinusitis      Past Surgical History:   Procedure Laterality Date     COLONOSCOPY  1/31/2002     COLONOSCOPY  10/26/2012     Procedure: COLONOSCOPY;  Colonoscopy;  Surgeon: Margret Sales MD;  Location: [facility]     INJECT EPIDURAL LUMBAR  12/7/2010    INJECT EPIDURAL LUMBAR performed by GENERIC ANESTHESIA PROVIDER at [facility]     INJECT EPIDURAL LUMBAR  1/17/2011    INJECT EPIDURAL LUMBAR performed by GENERIC ANESTHESIA PROVIDER at [facility]     RELEASE CARPAL TUNNEL  6/19/2012    Procedure: RELEASE CARPAL TUNNEL;  Right Carpal Tunnel Release;  Surgeon: Mike Sparrow MD;  Location: [facility]     RELEASE CARPAL TUNNEL  11/9/2012    Procedure: RELEASE CARPAL TUNNEL;  Left Carpal Tunnel Release;  Surgeon: Mike Sparrow MD;  Location: [facility]     SURGICAL HISTORY OF -   4/10/2000    bilateral total ethmoidectomies, bilateral maxillary antrostomies, bilateral SMR of inferior turbinates, reduction of lt turbinate porter bullosa     Current Outpatient Prescriptions   Medication Sig Dispense Refill     aspirin 81 MG tablet Take 1 tablet by mouth daily. 100 tablet 3     JOAQUIN CONTOUR test strip USE TO TEST BLOOD SUGAR FOUR TIMES A DAY AS DIRECTED 400 each 3     blood glucose monitoring (ACCU-CHEK FASTCLIX) lancets 1 each 3 times daily Use to test blood sugar 3 times daily or as directed. 408 each 3     Calcium Carb-Cholecalciferol (CALCIUM-VITAMIN D) 600-400 MG-UNIT TABS Take 1 tablet by mouth daily       dulaglutide (TRULICITY) 1.5 MG/0.5ML pen Inject 1.5 mg Subcutaneous every 7 days 6 mL 2     esomeprazole (NEXIUM) 40 MG CR capsule Take 1 capsule (40 mg) by mouth every morning (before breakfast) 90 capsule 3     fluticasone-salmeterol (ADVAIR DISKUS) 100-50 MCG/DOSE diskus inhaler Inhale 1 puff into the lungs 2 times daily 3 Inhaler 3     insulin aspart (NOVOLOG VIAL) 100 UNITS/ML injection Take 26 units with breakfast and lunch, 20 units with dinner.  If glucometer is over 200 take an extra 6 units 9 vial 1     insulin glargine (LANTUS VIAL) 100 UNIT/ML injection INJECT 42 UNITS UNDER THE SKIN twice daily 70 mL 2     insulin  "syringe-needle U-100 (BD INSULIN SYRINGE ULTRAFINE) 31G X 5/16\" 0.5 ML Use one syringe 3 timesdaily or as directed. 270 each 3     insulin syringe-needle U-100 30G X 1/2\" 0.3 ML Use four syringes daily or as directed. 400 each 3     insulin syringe-needle U-100 31G X 5/16\" 1 ML Use 2 syringes daily or as directed. 180 each 3     losartan (COZAAR) 100 MG tablet Take 1 tablet (100 mg) by mouth daily 100 tablet 1     metFORMIN (GLUCOPHAGE-XR) 500 MG 24 hr tablet TAKE TWO TABLETS BY MOUTH TWICE A DAY WITH MEALS 360 tablet 1     metoclopramide (REGLAN) 10 MG tablet Take 1 tablet (10 mg) by mouth 2 times daily 180 tablet 1     montelukast (SINGULAIR) 10 MG tablet TAKE ONE TABLET BY MOUTH AT BEDTIME 90 tablet 1     Multiple Vitamins-Minerals (MULTIVITAMIN ADULTS 50+) TABS Take 1 tablet by mouth daily       Needle, Disp, (BD DISP NEEDLES) 30G X 1/2\" MISC To use with humalog insulin 3 times daily 300 each 1     pseudoePHEDrine (SUDAFED) 30 MG tablet Take 2 tablets (60 mg) by mouth every 12 hours 360 tablet 3     simvastatin (ZOCOR) 40 MG tablet Take 1 tablet (40 mg) by mouth At Bedtime at bedtime. 90 tablet 1     albuterol (PROAIR HFA/PROVENTIL HFA/VENTOLIN HFA) 108 (90 Base) MCG/ACT Inhaler Inhale 2 puffs into the lungs every 6 hours 1 Inhaler 11     azelastine (OPTIVAR) 0.05 % SOLN ophthalmic solution Place 1 drop into both eyes 2 times daily (Patient not taking: Reported on 10/1/2018) 1 Bottle 0     cyclobenzaprine (FLEXERIL) 10 MG tablet Take 1 tablet (10 mg) by mouth nightly as needed for muscle spasms (Patient not taking: Reported on 4/13/2018) 30 tablet 5     order for DME Equipment being ordered: CAM walker boot (Patient not taking: Reported on 10/1/2018) 1 Units 0     OTC products: None, except as noted above    Allergies   Allergen Reactions     Lisinopril Cough     Tape [Adhesive Tape] Rash      Latex Allergy: NO    Social History   Substance Use Topics     Smoking status: Never Smoker     Smokeless tobacco: Never " "Used     Alcohol use No     History   Drug Use No       REVIEW OF SYSTEMS:   CONSTITUTIONAL: NEGATIVE for fever, chills, change in weight  INTEGUMENTARY/SKIN: NEGATIVE for worrisome rashes, moles or lesions  EYES: NEGATIVE for vision changes or irritation  ENT/MOUTH: NEGATIVE for ear, mouth and throat problems  RESP: NEGATIVE for significant cough or SOB  BREAST: NEGATIVE for masses, tenderness or discharge  CV: NEGATIVE for chest pain, palpitations or peripheral edema  GI: NEGATIVE for nausea, abdominal pain, heartburn, or change in bowel habits  : NEGATIVE for frequency, dysuria, or hematuria  MUSCULOSKELETAL: NEGATIVE for significant arthralgias or myalgia  NEURO: NEGATIVE for weakness, dizziness or paresthesias  ENDOCRINE: NEGATIVE for temperature intolerance, skin/hair changes  HEME: NEGATIVE for bleeding problems  PSYCHIATRIC: NEGATIVE for changes in mood or affect    EXAM:   /78 (BP Location: Right arm, Patient Position: Chair, Cuff Size: Adult Regular)  Pulse 84  Temp 97.8  F (36.6  C) (Tympanic)  Resp 12  Ht 5' 3\" (1.6 m)  Wt 265 lb (120.2 kg)  LMP 01/14/2016 (Approximate)  SpO2 100%  BMI 46.94 kg/m2    GENERAL APPEARANCE: healthy, alert and no distress     EYES: EOMI, PERRL     HENT: ear canals and TM's normal and nose and mouth without ulcers or lesions     NECK: no adenopathy, no asymmetry, masses, or scars and thyroid normal to palpation     RESP: lungs clear to auscultation - no rales, rhonchi or wheezes     CV: regular rates and rhythm, normal S1 S2, no S3 or S4 and no murmur, click or rub     ABDOMEN:  soft, obese, nontender, no HSM or masses and bowel sounds normal     MS: extremities normal- no gross deformities noted, no evidence of inflammation in joints, FROM in all extremities.     SKIN: no suspicious lesions or rashes     NEURO: Normal strength and tone, sensory exam grossly normal, mentation intact and speech normal     PSYCH: mentation appears normal. and affect " normal/bright     LYMPHATICS: No cervical adenopathy    DIAGNOSTICS:     No labs or EKG required for low risk surgery (cataract, skin procedure, breast biopsy, etc)  Labs Drawn and in Process:   Unresulted Labs Ordered in the Past 30 Days of this Admission     No orders found from 8/2/2018 to 10/2/2018.          Recent Labs   Lab Test  07/30/18   1012  06/05/18   1537  04/13/18   0821  12/29/17   0907   09/01/16   1654   HGB   --   10.2*   --    --    --    --    PLT   --   383   --    --    --    --    NA   --    --    --   137   --   137   POTASSIUM   --    --    --   4.3   --   4.2   CR   --    --    --   0.57   --   0.57   A1C  9.1*   --   9.3*  9.6*   < >  10.1*    < > = values in this interval not displayed.        IMPRESSION:   Reason for surgery/procedure: uterine polyps  Diagnosis/reason for consult: evaluation for anesthesia    The proposed surgical procedure is considered LOW risk.    REVISED CARDIAC RISK INDEX  The patient has the following serious cardiovascular risks for perioperative complications such as (MI, PE, VFib and 3  AV Block):  No serious cardiac risks  INTERPRETATION: 0 risks: Class I (very low risk - 0.4% complication rate)    The patient has the following additional risks for perioperative complications:  No identified additional risks      ICD-10-CM    1. Preop general physical exam Z01.818 Basic metabolic panel  (Ca, Cl, CO2, Creat, Gluc, K, Na, BUN)     CBC with platelets   2. Uterine polyp N84.0 Basic metabolic panel  (Ca, Cl, CO2, Creat, Gluc, K, Na, BUN)     CBC with platelets       RECOMMENDATIONS:     --Consult hospital rounder / IM to assist post-op medical management    --Patient is to take all scheduled medications on the day of surgery EXCEPT for modifications listed below.  Take one half of long acting insulin dose and hold short acting insulin in the morning of surgery.    APPROVAL GIVEN to proceed with proposed procedure, without further diagnostic evaluation       Signed  Electronically by: ALYSON Pierce CNP    Copy of this evaluation report is provided to requesting physician.    [facility] Preop Guidelines    Revised Cardiac Risk Index

## 2018-11-01 ENCOUNTER — OFFICE VISIT (OUTPATIENT)
Dept: FAMILY MEDICINE | Facility: CLINIC | Age: 58
End: 2018-11-01
Payer: COMMERCIAL

## 2018-11-01 VITALS
HEIGHT: 63 IN | OXYGEN SATURATION: 97 % | RESPIRATION RATE: 16 BRPM | DIASTOLIC BLOOD PRESSURE: 70 MMHG | WEIGHT: 264.4 LBS | TEMPERATURE: 97.1 F | SYSTOLIC BLOOD PRESSURE: 126 MMHG | HEART RATE: 84 BPM | BODY MASS INDEX: 46.85 KG/M2

## 2018-11-01 DIAGNOSIS — E11.43 GASTROPARESIS DUE TO DM (H): ICD-10-CM

## 2018-11-01 DIAGNOSIS — E11.22 TYPE 2 DIABETES MELLITUS WITH CHRONIC KIDNEY DISEASE, WITH LONG-TERM CURRENT USE OF INSULIN, UNSPECIFIED CKD STAGE (H): Primary | ICD-10-CM

## 2018-11-01 DIAGNOSIS — J45.20 MILD INTERMITTENT ASTHMA, UNCOMPLICATED: ICD-10-CM

## 2018-11-01 DIAGNOSIS — E78.5 HYPERLIPIDEMIA LDL GOAL <100: ICD-10-CM

## 2018-11-01 DIAGNOSIS — Z79.4 TYPE 2 DIABETES MELLITUS WITH CHRONIC KIDNEY DISEASE, WITH LONG-TERM CURRENT USE OF INSULIN, UNSPECIFIED CKD STAGE (H): Primary | ICD-10-CM

## 2018-11-01 DIAGNOSIS — K31.84 GASTROPARESIS DUE TO DM (H): ICD-10-CM

## 2018-11-01 DIAGNOSIS — I10 ESSENTIAL HYPERTENSION WITH GOAL BLOOD PRESSURE LESS THAN 140/90: ICD-10-CM

## 2018-11-01 LAB — HBA1C MFR BLD: 9.8 % (ref 0–5.6)

## 2018-11-01 PROCEDURE — 99214 OFFICE O/P EST MOD 30 MIN: CPT | Performed by: FAMILY MEDICINE

## 2018-11-01 PROCEDURE — 36416 COLLJ CAPILLARY BLOOD SPEC: CPT | Performed by: FAMILY MEDICINE

## 2018-11-01 PROCEDURE — 83036 HEMOGLOBIN GLYCOSYLATED A1C: CPT | Performed by: FAMILY MEDICINE

## 2018-11-01 RX ORDER — LOSARTAN POTASSIUM 100 MG/1
100 TABLET ORAL DAILY
Qty: 100 TABLET | Refills: 1 | Status: SHIPPED | OUTPATIENT
Start: 2018-11-01 | End: 2019-02-11

## 2018-11-01 RX ORDER — SIMVASTATIN 40 MG
40 TABLET ORAL AT BEDTIME
Qty: 90 TABLET | Refills: 1 | Status: SHIPPED | OUTPATIENT
Start: 2018-11-01 | End: 2019-02-11

## 2018-11-01 RX ORDER — MONTELUKAST SODIUM 10 MG/1
TABLET ORAL
Qty: 90 TABLET | Refills: 1 | Status: SHIPPED | OUTPATIENT
Start: 2018-11-01 | End: 2019-07-05

## 2018-11-01 RX ORDER — METFORMIN HCL 500 MG
TABLET, EXTENDED RELEASE 24 HR ORAL
Qty: 360 TABLET | Refills: 1 | Status: SHIPPED | OUTPATIENT
Start: 2018-11-01 | End: 2019-02-11

## 2018-11-01 RX ORDER — METOCLOPRAMIDE 10 MG/1
10 TABLET ORAL 2 TIMES DAILY
Qty: 180 TABLET | Refills: 1 | Status: SHIPPED | OUTPATIENT
Start: 2018-11-01 | End: 2019-06-05

## 2018-11-01 RX ORDER — INSULIN GLARGINE 100 [IU]/ML
INJECTION, SOLUTION SUBCUTANEOUS
Qty: 70 ML | Refills: 2 | Status: SHIPPED | OUTPATIENT
Start: 2018-11-01 | End: 2019-02-11

## 2018-11-01 NOTE — MR AVS SNAPSHOT
After Visit Summary   11/1/2018    Bria Davis    MRN: [id number]           Patient Information     Date Of Birth          1960        Visit Information        Provider Department      11/1/2018 4:20 PM Rafiq, IRVIN Malloy MD [facility]        Today's Diagnoses     Type 2 diabetes mellitus with chronic kidney disease, with long-term current use of insulin, unspecified CKD stage (H)    -  1    Essential hypertension with goal blood pressure less than 140/90        Gastroparesis due to DM (H)        Mild intermittent asthma, uncomplicated        Hyperlipidemia LDL goal <100          Care Instructions    Diabetes Plan  1. Hemoglobin A1c goal is between 7% and 8%  Your Hemoglobin A1c is not at goal  Plan is:Continue medications at current doses but really work on Diet and Exercise  2. LDL (bad cholesterol) goal is less than 100  Your LDL is at goal  Plan is: Continue medications at current doses  3. Blood pressure goal is less than 140/90.  Your blood pressure is at goal.  Plan is: Continue medications at current doses  Recheck glycosylated hemoglobin in 3 months          Follow-ups after your visit        Future tests that were ordered for you today     Open Future Orders        Priority Expected Expires Ordered    Hemoglobin A1c Routine 2/1/2019 5/4/2019 11/1/2018            Who to contact     If you have questions or need follow up information about today's clinic visit or your schedule please contact [facility] directly at [phone].  Normal or non-critical lab and imaging results will be communicated to you by MyChart, letter or phone within 4 business days after the clinic has received the results. If you do not hear from us within 7 days, please contact the clinic through MyChart or phone. If you have a critical or abnormal lab result, we will notify you by phone as soon as possible.  Submit refill requests through [employer] or call your pharmacy and they  "will forward the refill request to us. Please allow 3 business days for your refill to be completed.          Additional Information About Your Visit        [employer] Information     [employer] gives you secure access to your electronic health record. If you see a primary care provider, you can also send messages to your care team and make appointments. If you have questions, please call your primary care clinic.  If you do not have a primary care provider, please call [phone] and they will assist you.        Care EveryWhere ID     This is your Care EveryWhere ID. This could be used by other organizations to access your [facility] medical records  [id number]        Your Vitals Were     Pulse Temperature Respirations Height Last Period Pulse Oximetry    84 97.1  F (36.2  C) (Tympanic) 16 5' 3\" (1.6 m) 01/14/2016 (Approximate) 97%    BMI (Body Mass Index)                   46.84 kg/m2            Blood Pressure from Last 3 Encounters:   11/01/18 126/70   10/01/18 122/78   08/08/18 151/83    Weight from Last 3 Encounters:   11/01/18 264 lb 6.4 oz (119.9 kg)   10/01/18 265 lb (120.2 kg)   07/30/18 267 lb (121.1 kg)              We Performed the Following     Hemoglobin A1c          Where to get your medicines      These medications were sent to [facility] [place], [place] - [address]  [address] [facility] [address]     Phone:  [phone]     dulaglutide 1.5 MG/0.5ML pen    insulin aspart 100 UNITS/ML injection    insulin glargine 100 UNIT/ML injection    losartan 100 MG tablet    metFORMIN 500 MG 24 hr tablet    metoclopramide 10 MG tablet    montelukast 10 MG tablet    simvastatin 40 MG tablet          Primary Care Provider Office Phone # Fax #    IRVIN Conroy [phone] [address] [facility] [address]        Equal Access to Services     JOANNE STORM AH: patience Porter, dov medley, " ann-marie bo sneha rodas'aan ah. So [facility] [phone].    ATENCIÓN: Si jerilyn gautam, tiene a headley disposición servicios gratuitos de asistencia lingüística. Lo valencia [phone].    We comply with applicable federal civil rights laws and [place] laws. We do not discriminate on the basis of race, color, national origin, age, disability, sex, sexual orientation, or gender identity.            Thank you!     Thank you for choosing [facility]  for your care. Our goal is always to provide you with excellent care. Hearing back from our patients is one way we can continue to improve our services. Please take a few minutes to complete the written survey that you may receive in the mail after your visit with us. Thank you!             Your Updated Medication List - Protect others around you: Learn how to safely use, store and throw away your medicines at www.disposemymeds.org.          This list is accurate as of 11/1/18  5:01 PM.  Always use your most recent med list.                   Brand Name Dispense Instructions for use Diagnosis    albuterol 108 (90 Base) MCG/ACT inhaler    PROAIR HFA/PROVENTIL HFA/VENTOLIN HFA    1 Inhaler    Inhale 2 puffs into the lungs every 6 hours    Mild intermittent asthma without complication       aspirin 81 MG tablet     100 tablet    Take 1 tablet by mouth daily.    Diabetes mellitus, type 2 (H)       azelastine 0.05 % ophthalmic solution    OPTIVAR    1 Bottle    Place 1 drop into both eyes 2 times daily        JOAQUIN CONTOUR test strip   Generic drug:  blood glucose monitoring     400 each    USE TO TEST BLOOD SUGAR FOUR TIMES A DAY AS DIRECTED    Type 2 diabetes mellitus with other diabetic kidney complication (CODE)       blood glucose monitoring lancets     408 each    1 each 3 times daily Use to test blood sugar 3 times daily or as directed.    Type 2 diabetes mellitus with other kidney complication, unspecified long term insulin use status        "Calcium-Vitamin D 600-400 MG-UNIT Tabs      Take 1 tablet by mouth daily        cyclobenzaprine 10 MG tablet    FLEXERIL    30 tablet    Take 1 tablet (10 mg) by mouth nightly as needed for muscle spasms    Muscle contraction headache       dulaglutide 1.5 MG/0.5ML pen    TRULICITY    6 mL    Inject 1.5 mg Subcutaneous every 7 days    Type 2 diabetes mellitus with chronic kidney disease, with long-term current use of insulin, unspecified CKD stage (H)       esomeprazole 40 MG CR capsule    nexIUM    90 capsule    Take 1 capsule (40 mg) by mouth every morning (before breakfast)    Gastroesophageal reflux disease without esophagitis       fluticasone-salmeterol 100-50 MCG/DOSE diskus inhaler    ADVAIR DISKUS    3 Inhaler    Inhale 1 puff into the lungs 2 times daily    Mild intermittent asthma, uncomplicated       insulin aspart 100 UNITS/ML injection    NovoLOG VIAL    9 vial    Take 26 units with breakfast and lunch, 20 units with dinner.  If glucometer is over 200 take an extra 6 units    Type 2 diabetes mellitus with chronic kidney disease, with long-term current use of insulin, unspecified CKD stage (H)       insulin glargine 100 UNIT/ML injection    LANTUS VIAL    70 mL    INJECT 42 UNITS UNDER THE SKIN twice daily    Type 2 diabetes mellitus with chronic kidney disease, with long-term current use of insulin, unspecified CKD stage (H)       * insulin syringe-needle U-100 30G X 1/2\" 0.3 ML     400 each    Use four syringes daily or as directed.    Type 2 diabetes mellitus with other kidney complication, unspecified long term insulin use status       * insulin syringe-needle U-100 31G X 5/16\" 0.5 ML    BD insulin syringe ultrafine    270 each    Use one syringe 3 timesdaily or as directed.    Type 2 diabetes mellitus with other kidney complication, unspecified long term insulin use status       * insulin syringe-needle U-100 31G X 5/16\" 1 ML     180 each    Use 2 syringes daily or as directed.    Type 2 diabetes " "mellitus with other kidney complication, unspecified long term insulin use status       losartan 100 MG tablet    COZAAR    100 tablet    Take 1 tablet (100 mg) by mouth daily    Essential hypertension with goal blood pressure less than 140/90       metFORMIN 500 MG 24 hr tablet    GLUCOPHAGE-XR    360 tablet    TAKE TWO TABLETS BY MOUTH TWICE A DAY WITH MEALS    Type 2 diabetes mellitus with chronic kidney disease, with long-term current use of insulin, unspecified CKD stage (H)       metoclopramide 10 MG tablet    REGLAN    180 tablet    Take 1 tablet (10 mg) by mouth 2 times daily    Gastroparesis due to DM (H)       montelukast 10 MG tablet    SINGULAIR    90 tablet    TAKE ONE TABLET BY MOUTH AT BEDTIME    Mild intermittent asthma, uncomplicated       MULTIVITAMIN ADULTS 50+ Tabs      Take 1 tablet by mouth daily        Needle (Disp) 30G X 1/2\" Misc    BD DISP NEEDLES    300 each    To use with humalog insulin 3 times daily    Type 2 diabetes mellitus with other kidney complication, unspecified long term insulin use status       order for DME     1 Units    Equipment being ordered: CAM walker boot    Pain of left heel       pseudoePHEDrine 30 MG tablet    SUDAFED    360 tablet    Take 2 tablets (60 mg) by mouth every 12 hours    Chronic rhinitis, unspecified type       simvastatin 40 MG tablet    ZOCOR    90 tablet    Take 1 tablet (40 mg) by mouth At Bedtime at bedtime.    Hyperlipidemia LDL goal <100       * Notice:  This list has 3 medication(s) that are the same as other medications prescribed for you. Read the directions carefully, and ask your doctor or other care provider to review them with you.      "

## 2018-11-01 NOTE — PATIENT INSTRUCTIONS
Diabetes Plan  1. Hemoglobin A1c goal is between 7% and 8%  Your Hemoglobin A1c is not at goal  Plan is:Continue medications at current doses but really work on Diet and Exercise  2. LDL (bad cholesterol) goal is less than 100  Your LDL is at goal  Plan is: Continue medications at current doses  3. Blood pressure goal is less than 140/90.  Your blood pressure is at goal.  Plan is: Continue medications at current doses  Recheck glycosylated hemoglobin in 3 months

## 2018-11-01 NOTE — PROGRESS NOTES
SUBJECTIVE:   Bria Davis is a 58 year old female who presents to clinic today for the following health issues:      Diabetes Follow-up    Patient is checking blood sugars: three times daily.   Blood sugar testing frequency justification: Frequent hypoglycemia  Results are as follows:         am - 190         lunchtime - 170         suppertime - 160    Diabetic concerns: None     Symptoms of hypoglycemia (low blood sugar): none     Paresthesias (numbness or burning in feet) or sores: No     Date of last diabetic eye exam: 2/5/2018    Diabetes Management Resources    Hyperlipidemia Follow-Up      Rate your low fat/cholesterol diet?: poor    Taking statin?  Yes, no muscle aches from statin    Other lipid medications/supplements?:  none    Hypertension Follow-up      Outpatient blood pressures are not being checked.    Low Salt Diet: low salt    BP Readings from Last 2 Encounters:   10/01/18 122/78   08/08/18 151/83     Hemoglobin A1C (%)   Date Value   07/30/2018 9.1 (H)   04/13/2018 9.3 (H)     LDL Cholesterol Calculated (mg/dL)   Date Value   12/29/2017 71   11/25/2016 80       Amount of exercise or physical activity: None    Problems taking medications regularly: No    Medication side effects: none    Diet: regular (no restrictions)        PROBLEMS TO ADD ON...  He was dealing with back pain and then had surgery for removal of endometrial polyps.  This took care of her back pain    Problem list and histories reviewed & adjusted, as indicated.  Additional history: none        Reviewed and updated as needed this visit by clinical staff  Tobacco  Allergies  Meds  Med Hx  Surg Hx  Fam Hx  Soc Hx      Reviewed and updated as needed this visit by Provider               ROS:  Constitutional, HEENT, cardiovascular, pulmonary, gi and gu systems are negative, except as otherwise noted.        OBJECTIVE:                                                    /70  Pulse 84  Temp 97.1  F (36.2  C) (Tympanic)   "Resp 16  Ht 5' 3\" (1.6 m)  Wt 264 lb 6.4 oz (119.9 kg)  LMP 01/14/2016 (Approximate)  SpO2 97%  BMI 46.84 kg/m2    GENERAL: healthy, alert and no distress  EYES: Eyes grossly normal to inspection, extraocular movements - intact, and PERRL  HENT: ear canals- normal; TMs- normal; Nose- normal; Mouth- no ulcers, no lesions  NECK: no tenderness, no adenopathy, no asymmetry, no masses, no stiffness; thyroid- normal to palpation  RESP: lungs clear to auscultation - no rales, no rhonchi, no wheezes  CV: regular rates and rhythm, normal S1 S2, no S3 or S4 and no murmur, no click or rub -  MS: extremities- no gross deformities noted, no edema  Diabetic foot exam: no trophic changes or ulcerative lesions and reduced sensation at bottom of both feet with filament testing but it is mild    Diagnostic test results:  Results for orders placed or performed in visit on 11/01/18 (from the past 24 hour(s))   Hemoglobin A1c   Result Value Ref Range    Hemoglobin A1C 9.8 (H) 0 - 5.6 %        ASSESSMENT/PLAN:                                                    ASSESSMENT:  1. Type 2 diabetes mellitus with chronic kidney disease, with long-term current use of insulin, unspecified CKD stage (H)    2. Essential hypertension with goal blood pressure less than 140/90    3. Gastroparesis due to DM (H)    4. Mild intermittent asthma, uncomplicated    5. Hyperlipidemia LDL goal <100        This is inadequate glycemic control.  She is reluctant to increase her insulin doses because it seems when she does so she ends up eating more because she starts to feel hypoglycemic. She knows that what she needs to do is increase her exercise level, and now that the back pain is taking care of she indicates she will be working hard on this    PLAN:  Orders Placed This Encounter     Hemoglobin A1c     Hemoglobin A1c     insulin aspart (NOVOLOG VIAL) 100 UNITS/ML injection     insulin glargine (LANTUS VIAL) 100 UNIT/ML injection     dulaglutide " (TRULICITY) 1.5 MG/0.5ML pen     losartan (COZAAR) 100 MG tablet     metFORMIN (GLUCOPHAGE-XR) 500 MG 24 hr tablet     metoclopramide (REGLAN) 10 MG tablet     montelukast (SINGULAIR) 10 MG tablet     simvastatin (ZOCOR) 40 MG tablet       Patient Instructions   Diabetes Plan  1. Hemoglobin A1c goal is between 7% and 8%  Your Hemoglobin A1c is not at goal  Plan is:Continue medications at current doses but really work on Diet and Exercise  2. LDL (bad cholesterol) goal is less than 100  Your LDL is at goal  Plan is: Continue medications at current doses  3. Blood pressure goal is less than 140/90.  Your blood pressure is at goal.  Plan is: Continue medications at current doses  Recheck glycosylated hemoglobin in 3 months      IRVIN Conroy  [facility]

## 2018-11-02 ASSESSMENT — ASTHMA QUESTIONNAIRES: ACT_TOTALSCORE: 25

## 2018-12-14 ENCOUNTER — ANCILLARY PROCEDURE (OUTPATIENT)
Dept: GENERAL RADIOLOGY | Facility: CLINIC | Age: 58
End: 2018-12-14
Attending: FAMILY MEDICINE
Payer: OTHER MISCELLANEOUS

## 2018-12-14 ENCOUNTER — OFFICE VISIT (OUTPATIENT)
Dept: FAMILY MEDICINE | Facility: CLINIC | Age: 58
End: 2018-12-14
Payer: OTHER MISCELLANEOUS

## 2018-12-14 VITALS
TEMPERATURE: 96.6 F | BODY MASS INDEX: 46.94 KG/M2 | RESPIRATION RATE: 20 BRPM | SYSTOLIC BLOOD PRESSURE: 144 MMHG | DIASTOLIC BLOOD PRESSURE: 72 MMHG | OXYGEN SATURATION: 96 % | WEIGHT: 265 LBS | HEART RATE: 91 BPM

## 2018-12-14 DIAGNOSIS — S49.91XA ARM INJURY, RIGHT, INITIAL ENCOUNTER: ICD-10-CM

## 2018-12-14 DIAGNOSIS — S50.11XA CONTUSION OF RIGHT FOREARM, INITIAL ENCOUNTER: Primary | ICD-10-CM

## 2018-12-14 DIAGNOSIS — S54.01XA: ICD-10-CM

## 2018-12-14 PROCEDURE — 99213 OFFICE O/P EST LOW 20 MIN: CPT | Performed by: FAMILY MEDICINE

## 2018-12-14 PROCEDURE — 73090 X-RAY EXAM OF FOREARM: CPT | Mod: RT

## 2018-12-14 ASSESSMENT — PAIN SCALES - GENERAL: PAINLEVEL: MILD PAIN (2)
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
## 2018-12-14 NOTE — PATIENT INSTRUCTIONS
Thank you for choosing [facility].  You may be receiving a survey in the mail from [facility] regarding your visit today.  Please take a few minutes to complete and return the survey to let us know how we are doing.      Our Clinic hours are:  Mondays    7:20 am - 7 pm  Tues - Fri  7:20 am - 5 pm    Clinic Phone: [phone]    The clinic lab opens at 7:30 am Mon - Fri and appointments are required.    [facility]. [phone]  Monday  8 am - 7pm  Tues - Fri 8 am - 5:30 pm

## 2018-12-14 NOTE — LETTER
[facility]  [address]  [facility] [address]  Phone: [phone]      REPORT OF WORK ABILITY    NOTE TO EMPLOYEE: You must promptly provide a copy of this report to your  employer or worker's compensation insurer, and Qualified Rehabilitation Consultant.    Date: 12/14/2018                     Employee Name: Bria Davis         YOB: 1960  Medical Record Number: [id number]   Soc.Sec.No: [id number]  Employer: None                Date of Injury: 12/13/18  Managed Care Organization / Insurance Company Name: UNKNOWN    Diagnosis: contusion of forearm with sensory nerve injury  Work Related: yes     MMI: NO   Permanent Partial Disability(PPD) likely: UNKNOWN    EMPLOYEE IS ABLE TO WORK: unrestricted as of 12/14/18 - Full shift     RESTRICTIONS IF ANY:         OTHER RESTRICTIONS: None    TREATMENT PLAN/NOTES: ice and elevate over the weekend.      LY Conroy MD/

## 2019-01-07 DIAGNOSIS — J31.0 CHRONIC RHINITIS: ICD-10-CM

## 2019-01-08 NOTE — TELEPHONE ENCOUNTER
pseudoePHEDrine (SUDAFED) 30 MG tablet      Last Written Prescription Date:  12/29/17  Last Fill Quantity: 360,   # refills: 3  Last Office Visit: 12/14/18  Future Office visit:       Routing refill request to provider for review/approval because:  Drug not on the [facility], P or [facility] refill protocol or controlled substance

## 2019-01-09 RX ORDER — PSEUDOEPHEDRINE HCL 30 MG
TABLET ORAL
Qty: 360 TABLET | Refills: 3 | Status: SHIPPED | OUTPATIENT
Start: 2019-01-09 | End: 2019-12-24

## 2019-01-09 NOTE — TELEPHONE ENCOUNTER
Dr Conroy pcp. She has seen Varsha GRAY also.     This med is not on refill protocol.   RN unable to approve or deny.   Lori Cruz RNC

## 2019-02-06 ENCOUNTER — TELEPHONE (OUTPATIENT)
Dept: FAMILY MEDICINE | Facility: CLINIC | Age: 59
End: 2019-02-06

## 2019-02-06 NOTE — TELEPHONE ENCOUNTER
Panel Management Review      Patient has the following on her problem list:     Asthma review     ACT Total Scores 11/1/2018   ACT TOTAL SCORE -   ASTHMA ER VISITS -   ASTHMA HOSPITALIZATIONS -   ACT TOTAL SCORE (Goal Greater than or Equal to 20) 25   In the past 12 months, how many times did you visit the emergency room for your asthma without being admitted to the hospital? 0   In the past 12 months, how many times were you hospitalized overnight because of your asthma? 0      1. Is Asthma diagnosis on the Problem List? Yes    2. Is Asthma listed on Health Maintenance? Yes    3. Patient is due for:  none    Diabetes    ASA: Passed    Last A1C  Lab Results   Component Value Date    A1C 9.8 11/01/2018    A1C 9.1 07/30/2018    A1C 9.3 04/13/2018    A1C 9.6 12/29/2017    A1C 9.3 09/18/2017     A1C tested: FAILED    Last LDL:    Lab Results   Component Value Date    CHOL 175 12/29/2017     Lab Results   Component Value Date    HDL 52 12/29/2017     Lab Results   Component Value Date    LDL 71 12/29/2017     Lab Results   Component Value Date    TRIG 259 12/29/2017     Lab Results   Component Value Date    CHOLHDLRATIO 4.0 03/13/2014     Lab Results   Component Value Date    NHDL 123 12/29/2017       Is the patient on a Statin? YES             Is the patient on Aspirin? YES    Medications     HMG CoA Reductase Inhibitors    simvastatin (ZOCOR) 40 MG tablet    Salicylates    aspirin 81 MG tablet          Last three blood pressure readings:  BP Readings from Last 3 Encounters:   12/14/18 144/72   11/01/18 126/70   10/01/18 122/78       Date of last diabetes office visit: 11/01/18     Tobacco History:     History   Smoking Status     Never Smoker   Smokeless Tobacco     Never Used           Composite cancer screening  Chart review shows that this patient is due/due soon for the following Pap Smear  Summary:    Patient is due/failing the following:   PAP    Action needed:   Patient needs office visit for pap.    Type of  outreach:    had normal pap 6/12/18 through [facility]    Questions for provider review:    None                                                                                                                                    Dilma Dolan MA       Chart routed to abstract .

## 2019-02-07 ENCOUNTER — OFFICE VISIT (OUTPATIENT)
Dept: FAMILY MEDICINE | Facility: CLINIC | Age: 59
End: 2019-02-07
Payer: COMMERCIAL

## 2019-02-07 ENCOUNTER — ANCILLARY PROCEDURE (OUTPATIENT)
Dept: GENERAL RADIOLOGY | Facility: CLINIC | Age: 59
End: 2019-02-07
Attending: NURSE PRACTITIONER
Payer: COMMERCIAL

## 2019-02-07 VITALS
TEMPERATURE: 97.6 F | SYSTOLIC BLOOD PRESSURE: 132 MMHG | WEIGHT: 264.8 LBS | BODY MASS INDEX: 46.92 KG/M2 | OXYGEN SATURATION: 98 % | DIASTOLIC BLOOD PRESSURE: 68 MMHG | HEART RATE: 98 BPM | HEIGHT: 63 IN

## 2019-02-07 DIAGNOSIS — M25.471 EDEMA OF RIGHT ANKLE: Primary | ICD-10-CM

## 2019-02-07 DIAGNOSIS — M25.471 EDEMA OF RIGHT ANKLE: ICD-10-CM

## 2019-02-07 LAB — URATE SERPL-MCNC: 5.1 MG/DL (ref 2.6–6)

## 2019-02-07 PROCEDURE — 99214 OFFICE O/P EST MOD 30 MIN: CPT | Performed by: NURSE PRACTITIONER

## 2019-02-07 PROCEDURE — 36415 COLL VENOUS BLD VENIPUNCTURE: CPT | Performed by: NURSE PRACTITIONER

## 2019-02-07 PROCEDURE — 84550 ASSAY OF BLOOD/URIC ACID: CPT | Performed by: NURSE PRACTITIONER

## 2019-02-07 PROCEDURE — 73610 X-RAY EXAM OF ANKLE: CPT | Mod: RT

## 2019-02-07 RX ORDER — TRAMADOL HYDROCHLORIDE 50 MG/1
50 TABLET ORAL 2 TIMES DAILY PRN
Qty: 10 TABLET | Refills: 0 | Status: SHIPPED | OUTPATIENT
Start: 2019-02-07 | End: 2019-02-21

## 2019-02-07 RX ORDER — DICLOFENAC SODIUM 75 MG/1
75 TABLET, DELAYED RELEASE ORAL 2 TIMES DAILY PRN
Qty: 60 TABLET | Refills: 1 | Status: SHIPPED | OUTPATIENT
Start: 2019-02-07 | End: 2019-05-09

## 2019-02-07 ASSESSMENT — MIFFLIN-ST. JEOR: SCORE: 1755.25

## 2019-02-07 NOTE — PATIENT INSTRUCTIONS
Xray to rule out pathological, or stress fracture: normal    Uric acid level for evaluation of gout    Start Diclofenac 75 mg twice daily as needed for pain, take with food    Tramadol 1 tablet twice daily as needed  for more severe pain    ACE wraps    Elevate foot    If no improvement in 2-3 days let me know will make referral to podiatrist

## 2019-02-07 NOTE — PROGRESS NOTES
"  SUBJECTIVE:   Bria Davis is a 58 year old female who presents to clinic today for the following health issues:    Joint Pain    Onset: Since Jan 23rd, gradually getting worse, no known injury     Description:   Location: right foot, started on the top of her foot, now having ankle pain on the outside     Character: always a constant ache, sharp pains when she trying to lay down.     Intensity: 6/10    Progression of Symptoms: worse    Accompanying Signs & Symptoms:  Other symptoms: swelling yesterday     History:   Previous similar pain: no       Precipitating factors:   Trauma or overuse: NO, but constantly on her feet at work      Alleviating factors:  Improved by: nothing    Therapies Tried and outcome: elevated it, epsam salt     Problem list and histories reviewed & adjusted, as indicated.  Additional history: as documented    Labs reviewed in EPIC    Reviewed and updated as needed this visit by clinical staff       Reviewed and updated as needed this visit by Provider         ROS:  Constitutional, HEENT, cardiovascular, pulmonary, gi and gu systems are negative, except as otherwise noted.    OBJECTIVE:     /68 (BP Location: Right arm, Patient Position: Sitting, Cuff Size: Adult Large)   Pulse 98   Temp 97.6  F (36.4  C) (Tympanic)   Ht 1.6 m (5' 3\")   Wt 120.1 kg (264 lb 12.8 oz)   LMP 01/14/2016 (Approximate)   SpO2 98%   BMI 46.91 kg/m    Body mass index is 46.91 kg/m .  GENERAL: healthy, alert and no distress  CV: regular rates and rhythm and no peripheral edema  MS: moderate edema of the lateral side of the R ankle, tender to palpation, decreased ROM due to pain, Achillis intact.  SKIN: no suspicious lesions or rashes  PSYCH: mentation appears normal, affect normal/bright    Diagnostic Test Results:  R Ankle Xray - normal, no pathological fractures seen  Uric acid level pending for evaluation of possible gout     ASSESSMENT/PLAN:     1. Edema of right ankle  -possibly due to " osteoarthritis changes, overuse. Ordered Xray to rule out possible pathological fractures and it was normal.   -recommended treatment with Voltaren and Tramadol for more severe pain  -rest, ice, elevation  -ACE wraps  -if pain persist, or no improvement over the next 3-4 days, recommended podiatry consult   - XR Ankle Right G/E 3 Views; Future  - Uric acid  - diclofenac (VOLTAREN) 75 MG EC tablet; Take 1 tablet (75 mg) by mouth 2 times daily as needed for moderate pain  Dispense: 60 tablet; Refill: 1  - traMADol (ULTRAM) 50 MG tablet; Take 1 tablet (50 mg) by mouth 2 times daily as needed for severe pain  Dispense: 10 tablet; Refill: 0    See Patient Instructions    ALYSON Flores [facility]

## 2019-02-11 ENCOUNTER — OFFICE VISIT (OUTPATIENT)
Dept: FAMILY MEDICINE | Facility: CLINIC | Age: 59
End: 2019-02-11
Payer: COMMERCIAL

## 2019-02-11 VITALS
HEIGHT: 63 IN | TEMPERATURE: 97.8 F | WEIGHT: 266.8 LBS | SYSTOLIC BLOOD PRESSURE: 136 MMHG | DIASTOLIC BLOOD PRESSURE: 80 MMHG | RESPIRATION RATE: 16 BRPM | HEART RATE: 89 BPM | BODY MASS INDEX: 47.27 KG/M2 | OXYGEN SATURATION: 96 %

## 2019-02-11 DIAGNOSIS — I10 ESSENTIAL HYPERTENSION WITH GOAL BLOOD PRESSURE LESS THAN 140/90: ICD-10-CM

## 2019-02-11 DIAGNOSIS — Z79.4 TYPE 2 DIABETES MELLITUS WITH CHRONIC KIDNEY DISEASE, WITH LONG-TERM CURRENT USE OF INSULIN, UNSPECIFIED CKD STAGE (H): Primary | ICD-10-CM

## 2019-02-11 DIAGNOSIS — E78.5 HYPERLIPIDEMIA LDL GOAL <100: ICD-10-CM

## 2019-02-11 DIAGNOSIS — E11.22 TYPE 2 DIABETES MELLITUS WITH CHRONIC KIDNEY DISEASE, WITH LONG-TERM CURRENT USE OF INSULIN, UNSPECIFIED CKD STAGE (H): Primary | ICD-10-CM

## 2019-02-11 DIAGNOSIS — M67.471 GANGLION OF RIGHT ANKLE: ICD-10-CM

## 2019-02-11 LAB
ALBUMIN SERPL-MCNC: 3.4 G/DL (ref 3.4–5)
ALP SERPL-CCNC: 73 U/L (ref 40–150)
ALT SERPL W P-5'-P-CCNC: 30 U/L (ref 0–50)
ANION GAP SERPL CALCULATED.3IONS-SCNC: 6 MMOL/L (ref 3–14)
AST SERPL W P-5'-P-CCNC: 24 U/L (ref 0–45)
BILIRUB SERPL-MCNC: 0.3 MG/DL (ref 0.2–1.3)
BUN SERPL-MCNC: 11 MG/DL (ref 7–30)
CALCIUM SERPL-MCNC: 8.5 MG/DL (ref 8.5–10.1)
CHLORIDE SERPL-SCNC: 104 MMOL/L (ref 94–109)
CHOLEST SERPL-MCNC: 156 MG/DL
CO2 SERPL-SCNC: 27 MMOL/L (ref 20–32)
CREAT SERPL-MCNC: 0.6 MG/DL (ref 0.52–1.04)
CREAT UR-MCNC: 36 MG/DL
GFR SERPL CREATININE-BSD FRML MDRD: >90 ML/MIN/{1.73_M2}
GLUCOSE SERPL-MCNC: 132 MG/DL (ref 70–99)
HBA1C MFR BLD: 9.5 % (ref 0–5.6)
HDLC SERPL-MCNC: 40 MG/DL
LDLC SERPL CALC-MCNC: 58 MG/DL
MICROALBUMIN UR-MCNC: 105 MG/L
MICROALBUMIN/CREAT UR: 290.86 MG/G CR (ref 0–25)
NONHDLC SERPL-MCNC: 116 MG/DL
POTASSIUM SERPL-SCNC: 4 MMOL/L (ref 3.4–5.3)
PROT SERPL-MCNC: 6.6 G/DL (ref 6.8–8.8)
SODIUM SERPL-SCNC: 137 MMOL/L (ref 133–144)
TRIGL SERPL-MCNC: 292 MG/DL

## 2019-02-11 PROCEDURE — 82043 UR ALBUMIN QUANTITATIVE: CPT | Performed by: FAMILY MEDICINE

## 2019-02-11 PROCEDURE — 99214 OFFICE O/P EST MOD 30 MIN: CPT | Performed by: FAMILY MEDICINE

## 2019-02-11 PROCEDURE — 83036 HEMOGLOBIN GLYCOSYLATED A1C: CPT | Performed by: FAMILY MEDICINE

## 2019-02-11 PROCEDURE — 80061 LIPID PANEL: CPT | Performed by: FAMILY MEDICINE

## 2019-02-11 PROCEDURE — 80053 COMPREHEN METABOLIC PANEL: CPT | Performed by: FAMILY MEDICINE

## 2019-02-11 PROCEDURE — 36415 COLL VENOUS BLD VENIPUNCTURE: CPT | Performed by: FAMILY MEDICINE

## 2019-02-11 RX ORDER — LOSARTAN POTASSIUM 100 MG/1
100 TABLET ORAL DAILY
Qty: 100 TABLET | Refills: 1 | Status: SHIPPED | OUTPATIENT
Start: 2019-02-11 | End: 2019-08-05

## 2019-02-11 RX ORDER — INSULIN GLARGINE 100 [IU]/ML
INJECTION, SOLUTION SUBCUTANEOUS
Qty: 70 ML | Refills: 2 | Status: SHIPPED | OUTPATIENT
Start: 2019-02-11 | End: 2019-08-05

## 2019-02-11 RX ORDER — SIMVASTATIN 40 MG
40 TABLET ORAL AT BEDTIME
Qty: 90 TABLET | Refills: 1 | Status: SHIPPED | OUTPATIENT
Start: 2019-02-11 | End: 2019-08-05

## 2019-02-11 RX ORDER — METFORMIN HCL 500 MG
TABLET, EXTENDED RELEASE 24 HR ORAL
Qty: 360 TABLET | Refills: 1 | Status: SHIPPED | OUTPATIENT
Start: 2019-02-11 | End: 2019-08-05

## 2019-02-11 ASSESSMENT — MIFFLIN-ST. JEOR: SCORE: 1764.33

## 2019-02-11 NOTE — PATIENT INSTRUCTIONS
Increase Lantus to 46 units twice daily    See the diabetic educator either at [place] or [facility]    Kaia Elena M.D.

## 2019-02-11 NOTE — PROGRESS NOTES
"  SUBJECTIVE:   Bria Davis is a 58 year old female who presents to clinic today for the following health issues:      Diabetes Follow-up    Patient is checking blood sugars: three times daily.   Blood sugar testing frequency justification: Risk of hypoglycemia with medication(s)  Results are as follows:         am - 217          lunchtime - 235         suppertime - 221    Diabetic concerns: blood sugar frequently over 200     Symptoms of hypoglycemia (low blood sugar): none     Paresthesias (numbness or burning in feet) or sores: Yes right foot     Date of last diabetic eye exam: 2/2018    Keeps a log of blood sugars.  Most are in the mid to high 200s, occasionally low 100s but this isn't frequent.  She ad a 66 once in the past few months and that scared her.  By history it sounds like he's over reacting to the feeling of a \"low\" - she also gets nauseated if it's in the low 100s.  We talked about the fact that this likely happens when it's come from a high to a more \"normal\" level and doesn't represent a true low and therefore doesn't need to be treated.      Diabetes Management Resources    Hyperlipidemia Follow-Up      Rate your low fat/cholesterol diet?: poor    Taking statin?  Yes, no muscle aches from statin    Other lipid medications/supplements?:  none    Hypertension Follow-up      Outpatient blood pressures are not being checked.    Low Salt Diet: low salt    BP Readings from Last 2 Encounters:   02/11/19 136/80   02/07/19 132/68     Hemoglobin A1C (%)   Date Value   11/01/2018 9.8 (H)   07/30/2018 9.1 (H)     LDL Cholesterol Calculated (mg/dL)   Date Value   12/29/2017 71   11/25/2016 80       Amount of exercise or physical activity: None    Problems taking medications regularly: No    Medication side effects: none    Diet: regular (no restrictions)         /80   Pulse 89   Temp 97.8  F (36.6  C) (Tympanic)   Resp 16   Ht 1.6 m (5' 3\")   Wt 121 kg (266 lb 12.8 oz)   LMP 01/14/2016 " (Approximate)   SpO2 96%   BMI 47.26 kg/m    EXAM: GENERAL APPEARANCE: Alert, no acute distress  RESP: lungs clear to auscultation   CV: normal rate, regular rhythm, no murmur or gallop  ABDOMEN: soft, no organomegaly, masses or tenderness    Diabetic Foot Screen:  Any complaints of increased pain or numbness ?  YES right foot/ankle pain  Is there a foot ulcer now or a history of foot ulcer? No  Does the foot have an abnormal shape?  YES there is a ganglion cyst of the right lateral ankle - 4 cm or so in diameter  Are the nails thick, too long or ingrown? No  Are there any redness or open areas? No         Sensation Testing done at all points on the diagram with monofilament     Right Foot: Sensation Normal at all points  Left Foot: Sensation Normal at all points     Risk Category: 0- No loss of protective sensation  Performed by Kaia Elena MD        Results for orders placed or performed in visit on 02/11/19   Hemoglobin A1c   Result Value Ref Range    Hemoglobin A1C 9.5 (H) 0 - 5.6 %     ASSESSMENT/PLAN:      ICD-10-CM    1. Type 2 diabetes mellitus with chronic kidney disease, with long-term current use of insulin, unspecified CKD stage (H) E11.22 Hemoglobin A1c    Z79.4 Lipid panel reflex to direct LDL Non-fasting     Comprehensive metabolic panel     Albumin Random Urine Quantitative with Creat Ratio     dulaglutide (TRULICITY) 1.5 MG/0.5ML pen     insulin aspart (NOVOLOG VIAL) 100 UNITS/ML vial     insulin glargine (LANTUS VIAL) 100 UNIT/ML vial     metFORMIN (GLUCOPHAGE-XR) 500 MG 24 hr tablet     DIABETES EDUCATOR REFERRAL   2. Essential hypertension with goal blood pressure less than 140/90 I10 losartan (COZAAR) 100 MG tablet   3. Hyperlipidemia LDL goal <100 E78.5 simvastatin (ZOCOR) 40 MG tablet   4. Ganglion of right ankle M67.471 ORTHO  REFERRAL     Uncontrolled type 2 diabetes with microalbuminuria. May benefit from 24 hour urine collection to see the true amount of proteinuria.     Strongly encouraged her to go back to diabetic ed and will increase her lantus to 46 units twice daily (from 42)    Check renal function, just started on diclofenac.  If renal function is changing we may need to discontinue this.      See podiatry for the ganglion cyst.     Kaia Elena M.D.      Patient Instructions   Increase Lantus to 46 units twice daily    See the diabetic educator either at [place] or [facility]    Kaia Elena M.D.

## 2019-02-21 ENCOUNTER — OFFICE VISIT (OUTPATIENT)
Dept: PODIATRY | Facility: CLINIC | Age: 59
End: 2019-02-21
Payer: COMMERCIAL

## 2019-02-21 VITALS
WEIGHT: 266 LBS | BODY MASS INDEX: 47.13 KG/M2 | DIASTOLIC BLOOD PRESSURE: 87 MMHG | HEART RATE: 87 BPM | SYSTOLIC BLOOD PRESSURE: 145 MMHG | HEIGHT: 63 IN

## 2019-02-21 DIAGNOSIS — M76.71 PERONEAL TENDONITIS, RIGHT: Primary | ICD-10-CM

## 2019-02-21 DIAGNOSIS — D17.20 LIPOMA OF EXTREMITY: ICD-10-CM

## 2019-02-21 PROCEDURE — 99243 OFF/OP CNSLTJ NEW/EST LOW 30: CPT | Performed by: PODIATRIST

## 2019-02-21 ASSESSMENT — MIFFLIN-ST. JEOR: SCORE: 1760.7

## 2019-02-21 NOTE — PROGRESS NOTES
PATIENT HISTORY:  Bria Davis is a 58 year old female who presents to clinic for a painful right foot .  The patient describes the pain as sharp aching.  The patient relates the pain level is moderate.  The patient relates pain is located along the outside of the right ankle.  The patient relates the pain has been present for the past several weeks.  The patient relates pain with ambulation.  The patient has tried different shoes with little relief.  The patient was sent by Dr. Elena for consultation on the right foot.       REVIEW OF SYSTEMS:  Constitutional, HEENT, cardiovascular, pulmonary, GI, , musculoskeletal, neuro, skin, endocrine and psych systems are negative, except as otherwise noted.     PAST MEDICAL HISTORY:   Past Medical History:   Diagnosis Date     Asthma      Diabetes mellitus (H)      Esophageal reflux      Other chronic sinusitis         PAST SURGICAL HISTORY:   Past Surgical History:   Procedure Laterality Date     COLONOSCOPY  1/31/2002     COLONOSCOPY  10/26/2012    Procedure: COLONOSCOPY;  Colonoscopy;  Surgeon: Margret Sales MD;  Location: [facility]     INJECT EPIDURAL LUMBAR  12/7/2010    INJECT EPIDURAL LUMBAR performed by GENERIC ANESTHESIA PROVIDER at [facility]     INJECT EPIDURAL LUMBAR  1/17/2011    INJECT EPIDURAL LUMBAR performed by GENERIC ANESTHESIA PROVIDER at [facility]     RELEASE CARPAL TUNNEL  6/19/2012    Procedure: RELEASE CARPAL TUNNEL;  Right Carpal Tunnel Release;  Surgeon: Mike Sparrow MD;  Location: [facility]     RELEASE CARPAL TUNNEL  11/9/2012    Procedure: RELEASE CARPAL TUNNEL;  Left Carpal Tunnel Release;  Surgeon: Mike Sparrow MD;  Location: [facility]     SURGICAL HISTORY OF -   4/10/2000    bilateral total ethmoidectomies, bilateral maxillary antrostomies, bilateral SMR of inferior turbinates, reduction of lt turbinate porter bullosa        MEDICATIONS:   Current Outpatient Medications:      aspirin 81 MG tablet, Take 1 tablet by mouth daily.,  "Disp: 100 tablet, Rfl: 3     JOAQUIN CONTOUR test strip, USE TO TEST BLOOD SUGAR FOUR TIMES A DAY AS DIRECTED, Disp: 400 each, Rfl: 3     blood glucose monitoring (ACCU-CHEK FASTCLIX) lancets, 1 each 3 times daily Use to test blood sugar 3 times daily or as directed., Disp: 408 each, Rfl: 3     Calcium Carb-Cholecalciferol (CALCIUM-VITAMIN D) 600-400 MG-UNIT TABS, Take 1 tablet by mouth daily, Disp: , Rfl:      diclofenac (VOLTAREN) 75 MG EC tablet, Take 1 tablet (75 mg) by mouth 2 times daily as needed for moderate pain, Disp: 60 tablet, Rfl: 1     dulaglutide (TRULICITY) 1.5 MG/0.5ML pen, Inject 1.5 mg Subcutaneous every 7 days, Disp: 6 mL, Rfl: 2     esomeprazole (NEXIUM) 40 MG CR capsule, Take 1 capsule (40 mg) by mouth every morning (before breakfast), Disp: 90 capsule, Rfl: 3     fluticasone-salmeterol (ADVAIR DISKUS) 100-50 MCG/DOSE diskus inhaler, Inhale 1 puff into the lungs 2 times daily, Disp: 3 Inhaler, Rfl: 3     insulin aspart (NOVOLOG VIAL) 100 UNITS/ML vial, Take 26 units with breakfast and lunch, 20 units with dinner.  If glucometer is over 200 take an extra 6 units, Disp: 9 vial, Rfl: 1     insulin glargine (LANTUS VIAL) 100 UNIT/ML vial, INJECT 46 UNITS UNDER THE SKIN twice daily, Disp: 70 mL, Rfl: 2     insulin syringe-needle U-100 (BD INSULIN SYRINGE ULTRAFINE) 31G X 5/16\" 0.5 ML, Use one syringe 3 timesdaily or as directed., Disp: 270 each, Rfl: 3     insulin syringe-needle U-100 30G X 1/2\" 0.3 ML, Use four syringes daily or as directed., Disp: 400 each, Rfl: 3     insulin syringe-needle U-100 31G X 5/16\" 1 ML, Use 2 syringes daily or as directed., Disp: 180 each, Rfl: 3     losartan (COZAAR) 100 MG tablet, Take 1 tablet (100 mg) by mouth daily, Disp: 100 tablet, Rfl: 1     metFORMIN (GLUCOPHAGE-XR) 500 MG 24 hr tablet, TAKE TWO TABLETS BY MOUTH TWICE A DAY WITH MEALS, Disp: 360 tablet, Rfl: 1     metoclopramide (REGLAN) 10 MG tablet, Take 1 tablet (10 mg) by mouth 2 times daily, Disp: 180 " "tablet, Rfl: 1     montelukast (SINGULAIR) 10 MG tablet, TAKE ONE TABLET BY MOUTH AT BEDTIME, Disp: 90 tablet, Rfl: 1     Multiple Vitamins-Minerals (MULTIVITAMIN ADULTS 50+) TABS, Take 1 tablet by mouth daily, Disp: , Rfl:      Needle, Disp, (BD DISP NEEDLES) 30G X 1/2\" MISC, To use with humalog insulin 3 times daily, Disp: 300 each, Rfl: 1     pseudoePHEDrine (SUDAFED) 30 MG tablet, TAKE TWO TABLETS BY MOUTH EVERY 12 HOURS, Disp: 360 tablet, Rfl: 3     simvastatin (ZOCOR) 40 MG tablet, Take 1 tablet (40 mg) by mouth At Bedtime at bedtime., Disp: 90 tablet, Rfl: 1     albuterol (PROAIR HFA/PROVENTIL HFA/VENTOLIN HFA) 108 (90 Base) MCG/ACT Inhaler, Inhale 2 puffs into the lungs every 6 hours (Patient not taking: Reported on 2/7/2019), Disp: 1 Inhaler, Rfl: 11     azelastine (OPTIVAR) 0.05 % SOLN ophthalmic solution, Place 1 drop into both eyes 2 times daily (Patient not taking: Reported on 10/1/2018), Disp: 1 Bottle, Rfl: 0     cyclobenzaprine (FLEXERIL) 10 MG tablet, Take 1 tablet (10 mg) by mouth nightly as needed for muscle spasms (Patient not taking: Reported on 12/14/2018), Disp: 30 tablet, Rfl: 5     order for DME, Equipment being ordered: CAM walker boot (Patient not taking: Reported on 10/1/2018), Disp: 1 Units, Rfl: 0     ALLERGIES:    Allergies   Allergen Reactions     Lisinopril Cough     Tape [Adhesive Tape] Rash        SOCIAL HISTORY:   Social History     Socioeconomic History     Marital status:      Spouse name: Not on file     Number of children: Not on file     Years of education: Not on file     Highest education level: Not on file   Occupational History     Not on file   Social Needs     Financial resource strain: Not on file     Food insecurity:     Worry: Not on file     Inability: Not on file     Transportation needs:     Medical: Not on file     Non-medical: Not on file   Tobacco Use     Smoking status: Never Smoker     Smokeless tobacco: Never Used   Substance and Sexual Activity     " "Alcohol use: No     Drug use: No     Sexual activity: Yes     Partners: Male     Comment:  vasectomy   Lifestyle     Physical activity:     Days per week: Not on file     Minutes per session: Not on file     Stress: Not on file   Relationships     Social connections:     Talks on phone: Not on file     Gets together: Not on file     Attends Islam service: Not on file     Active member of club or organization: Not on file     Attends meetings of clubs or organizations: Not on file     Relationship status: Not on file     Intimate partner violence:     Fear of current or ex partner: Not on file     Emotionally abused: Not on file     Physically abused: Not on file     Forced sexual activity: Not on file   Other Topics Concern     Parent/sibling w/ CABG, MI or angioplasty before 65F 55M? No   Social History Narrative     Not on file        FAMILY HISTORY:   Family History   Problem Relation Age of Onset     Hypertension Mother      Diabetes Mother      Respiratory Mother         asthma-COPD     Hypertension Father      Heart Disease Father      Cerebrovascular Disease Father      Cardiovascular Father      Breast Cancer Maternal Grandmother      Cancer Brother      Diabetes Brother      Respiratory Brother         copd     Chronic Obstructive Pulmonary Disease Brother      Depression Sister      Respiratory Sister         copd     Chronic Obstructive Pulmonary Disease Sister      Depression Sister      Chronic Obstructive Pulmonary Disease Sister      Depression Sister         EXAM:Vitals: /87 (BP Location: Other (Comment), Patient Position: Sitting, Cuff Size: Adult Regular)   Pulse 87   Ht 1.6 m (5' 3\")   Wt 120.7 kg (266 lb)   LMP 01/14/2016 (Approximate)   BMI 47.12 kg/m    BMI= Body mass index is 47.12 kg/m .    Weight management plan: Patient was referred to their PCP to discuss a diet and exercise plan.    General appearance: Patient is alert and fully cooperative with history & exam.  No " sign of distress is noted during the visit.     Psychiatric: Affect is pleasant & appropriate.  Patient appears motivated to improve health.     Respiratory: Breathing is regular & unlabored while sitting.     HEENT: Hearing is intact to spoken word.  Speech is clear.  No gross evidence of visual impairment that would impact ambulation.     Dermatologic: Skin is intact to both lower extremities without significant lesions, rash or abrasion.  No paronychia or evidence of soft tissue infection is noted.     Vascular: DP & PT pulses are intact & regular bilaterally.  No significant edema or varicosities noted.  CFT and skin temperature is normal to both lower extremities.     Neurologic: Lower extremity sensation is intact to light touch.  No evidence of weakness or contracture in the lower extremities.  No evidence of neuropathy.     Musculoskeletal: Patient is ambulatory without assistive device or brace.  No gross ankle deformity noted.  No foot or ankle joint effusion is noted.  Noted firm soft tissue mass located over the sinus tarsi bilaterally.  No surrounding erythema noted.  Noted pain on palpation of the peroneal tendons on the right foot and ankle.  Noted pain with plantarflexion and eversion against resistance on the right.  No surrounding erythema noted.    Radiographs from February 7, 2019 were evaluated including AP, lateral and medial oblique views of the right foot reveals  no cortical erosions or periosteal elevation.  All joint margins appear stable.  There is no apparent fracture or tumor formation noted.  There is no evidence of foreign body.    ASSESSMENT / PLAN:     ICD-10-CM    1. Peroneal tendonitis, right M76.71        I have explained to Bria  about the conditions.  We discussed the nature of the condition as well as the treatment plan and expected length of recovery.  At this time, the patient was instructed on icing, stretching, tissue massage and support.  The patient was fitted with a  Tri-Lock ankle brace that will aid in offloading the tension forces to the soft tissues and prevent further inflammation.    The patient will return in four weeks for reevaluation if the symptoms do not resolve.        Disclaimer: This note consists of symbols derived from keyboarding, dictation and/or voice recognition software. As a result, there may be errors in the script that have gone undetected. Please consider this when interpreting information found in this chart.       ELIO Mccann D.P.M., [email or web address].

## 2019-02-21 NOTE — NURSING NOTE
"Chief Complaint   Patient presents with     Consult     XR taken 2/7/19,Pain started 1/23/19, swelling occuried 2 weeks later, large bump in rt ankle       Initial /87 (BP Location: Other (Comment), Patient Position: Sitting, Cuff Size: Adult Regular)   Pulse 87   Ht 1.6 m (5' 3\")   Wt 120.7 kg (266 lb)   LMP 01/14/2016 (Approximate)   BMI 47.12 kg/m   Estimated body mass index is 47.12 kg/m  as calculated from the following:    Height as of this encounter: 1.6 m (5' 3\").    Weight as of this encounter: 120.7 kg (266 lb).  Medications and allergies reviewed.      Carmen CUNNINGHAM MA    "

## 2019-02-21 NOTE — LETTER
2/21/2019         RE: Bria Davis  [address] [facility] [address]        Dear Colleague,    Thank you for referring your patient, Bria Davis, to the [facility]. Please see a copy of my visit note below.    PATIENT HISTORY:  Bria Davis is a 58 year old female who presents to clinic for a painful right foot .  The patient describes the pain as sharp aching.  The patient relates the pain level is moderate.  The patient relates pain is located along the outside of the right ankle.  The patient relates the pain has been present for the past several weeks.  The patient relates pain with ambulation.  The patient has tried different shoes with little relief.  The patient was sent by Dr. Elena for consultation on the right foot.       REVIEW OF SYSTEMS:  Constitutional, HEENT, cardiovascular, pulmonary, GI, , musculoskeletal, neuro, skin, endocrine and psych systems are negative, except as otherwise noted.     PAST MEDICAL HISTORY:   Past Medical History:   Diagnosis Date     Asthma      Diabetes mellitus (H)      Esophageal reflux      Other chronic sinusitis         PAST SURGICAL HISTORY:   Past Surgical History:   Procedure Laterality Date     COLONOSCOPY  1/31/2002     COLONOSCOPY  10/26/2012    Procedure: COLONOSCOPY;  Colonoscopy;  Surgeon: Margret Sales MD;  Location: [facility]     INJECT EPIDURAL LUMBAR  12/7/2010    INJECT EPIDURAL LUMBAR performed by GENERIC ANESTHESIA PROVIDER at [facility]     INJECT EPIDURAL LUMBAR  1/17/2011    INJECT EPIDURAL LUMBAR performed by GENERIC ANESTHESIA PROVIDER at [facility]     RELEASE CARPAL TUNNEL  6/19/2012    Procedure: RELEASE CARPAL TUNNEL;  Right Carpal Tunnel Release;  Surgeon: Mike Sparrow MD;  Location: [facility]     RELEASE CARPAL TUNNEL  11/9/2012    Procedure: RELEASE CARPAL TUNNEL;  Left Carpal Tunnel Release;  Surgeon: Mike Sparrow MD;  Location: [facility]     SURGICAL HISTORY OF -    "4/10/2000    bilateral total ethmoidectomies, bilateral maxillary antrostomies, bilateral SMR of inferior turbinates, reduction of lt turbinate porter bullosa        MEDICATIONS:   Current Outpatient Medications:      aspirin 81 MG tablet, Take 1 tablet by mouth daily., Disp: 100 tablet, Rfl: 3     JOAQUIN CONTOUR test strip, USE TO TEST BLOOD SUGAR FOUR TIMES A DAY AS DIRECTED, Disp: 400 each, Rfl: 3     blood glucose monitoring (ACCU-CHEK FASTCLIX) lancets, 1 each 3 times daily Use to test blood sugar 3 times daily or as directed., Disp: 408 each, Rfl: 3     Calcium Carb-Cholecalciferol (CALCIUM-VITAMIN D) 600-400 MG-UNIT TABS, Take 1 tablet by mouth daily, Disp: , Rfl:      diclofenac (VOLTAREN) 75 MG EC tablet, Take 1 tablet (75 mg) by mouth 2 times daily as needed for moderate pain, Disp: 60 tablet, Rfl: 1     dulaglutide (TRULICITY) 1.5 MG/0.5ML pen, Inject 1.5 mg Subcutaneous every 7 days, Disp: 6 mL, Rfl: 2     esomeprazole (NEXIUM) 40 MG CR capsule, Take 1 capsule (40 mg) by mouth every morning (before breakfast), Disp: 90 capsule, Rfl: 3     fluticasone-salmeterol (ADVAIR DISKUS) 100-50 MCG/DOSE diskus inhaler, Inhale 1 puff into the lungs 2 times daily, Disp: 3 Inhaler, Rfl: 3     insulin aspart (NOVOLOG VIAL) 100 UNITS/ML vial, Take 26 units with breakfast and lunch, 20 units with dinner.  If glucometer is over 200 take an extra 6 units, Disp: 9 vial, Rfl: 1     insulin glargine (LANTUS VIAL) 100 UNIT/ML vial, INJECT 46 UNITS UNDER THE SKIN twice daily, Disp: 70 mL, Rfl: 2     insulin syringe-needle U-100 (BD INSULIN SYRINGE ULTRAFINE) 31G X 5/16\" 0.5 ML, Use one syringe 3 timesdaily or as directed., Disp: 270 each, Rfl: 3     insulin syringe-needle U-100 30G X 1/2\" 0.3 ML, Use four syringes daily or as directed., Disp: 400 each, Rfl: 3     insulin syringe-needle U-100 31G X 5/16\" 1 ML, Use 2 syringes daily or as directed., Disp: 180 each, Rfl: 3     losartan (COZAAR) 100 MG tablet, Take 1 tablet (100 mg) " "by mouth daily, Disp: 100 tablet, Rfl: 1     metFORMIN (GLUCOPHAGE-XR) 500 MG 24 hr tablet, TAKE TWO TABLETS BY MOUTH TWICE A DAY WITH MEALS, Disp: 360 tablet, Rfl: 1     metoclopramide (REGLAN) 10 MG tablet, Take 1 tablet (10 mg) by mouth 2 times daily, Disp: 180 tablet, Rfl: 1     montelukast (SINGULAIR) 10 MG tablet, TAKE ONE TABLET BY MOUTH AT BEDTIME, Disp: 90 tablet, Rfl: 1     Multiple Vitamins-Minerals (MULTIVITAMIN ADULTS 50+) TABS, Take 1 tablet by mouth daily, Disp: , Rfl:      Needle, Disp, (BD DISP NEEDLES) 30G X 1/2\" MISC, To use with humalog insulin 3 times daily, Disp: 300 each, Rfl: 1     pseudoePHEDrine (SUDAFED) 30 MG tablet, TAKE TWO TABLETS BY MOUTH EVERY 12 HOURS, Disp: 360 tablet, Rfl: 3     simvastatin (ZOCOR) 40 MG tablet, Take 1 tablet (40 mg) by mouth At Bedtime at bedtime., Disp: 90 tablet, Rfl: 1     albuterol (PROAIR HFA/PROVENTIL HFA/VENTOLIN HFA) 108 (90 Base) MCG/ACT Inhaler, Inhale 2 puffs into the lungs every 6 hours (Patient not taking: Reported on 2/7/2019), Disp: 1 Inhaler, Rfl: 11     azelastine (OPTIVAR) 0.05 % SOLN ophthalmic solution, Place 1 drop into both eyes 2 times daily (Patient not taking: Reported on 10/1/2018), Disp: 1 Bottle, Rfl: 0     cyclobenzaprine (FLEXERIL) 10 MG tablet, Take 1 tablet (10 mg) by mouth nightly as needed for muscle spasms (Patient not taking: Reported on 12/14/2018), Disp: 30 tablet, Rfl: 5     order for DME, Equipment being ordered: CAM walker boot (Patient not taking: Reported on 10/1/2018), Disp: 1 Units, Rfl: 0     ALLERGIES:    Allergies   Allergen Reactions     Lisinopril Cough     Tape [Adhesive Tape] Rash        SOCIAL HISTORY:   Social History     Socioeconomic History     Marital status:      Spouse name: Not on file     Number of children: Not on file     Years of education: Not on file     Highest education level: Not on file   Occupational History     Not on file   Social Needs     Financial resource strain: Not on file     " "Food insecurity:     Worry: Not on file     Inability: Not on file     Transportation needs:     Medical: Not on file     Non-medical: Not on file   Tobacco Use     Smoking status: Never Smoker     Smokeless tobacco: Never Used   Substance and Sexual Activity     Alcohol use: No     Drug use: No     Sexual activity: Yes     Partners: Male     Comment:  vasectomy   Lifestyle     Physical activity:     Days per week: Not on file     Minutes per session: Not on file     Stress: Not on file   Relationships     Social connections:     Talks on phone: Not on file     Gets together: Not on file     Attends Buddhist service: Not on file     Active member of club or organization: Not on file     Attends meetings of clubs or organizations: Not on file     Relationship status: Not on file     Intimate partner violence:     Fear of current or ex partner: Not on file     Emotionally abused: Not on file     Physically abused: Not on file     Forced sexual activity: Not on file   Other Topics Concern     Parent/sibling w/ CABG, MI or angioplasty before 65F 55M? No   Social History Narrative     Not on file        FAMILY HISTORY:   Family History   Problem Relation Age of Onset     Hypertension Mother      Diabetes Mother      Respiratory Mother         asthma-COPD     Hypertension Father      Heart Disease Father      Cerebrovascular Disease Father      Cardiovascular Father      Breast Cancer Maternal Grandmother      Cancer Brother      Diabetes Brother      Respiratory Brother         copd     Chronic Obstructive Pulmonary Disease Brother      Depression Sister      Respiratory Sister         copd     Chronic Obstructive Pulmonary Disease Sister      Depression Sister      Chronic Obstructive Pulmonary Disease Sister      Depression Sister         EXAM:Vitals: /87 (BP Location: Other (Comment), Patient Position: Sitting, Cuff Size: Adult Regular)   Pulse 87   Ht 1.6 m (5' 3\")   Wt 120.7 kg (266 lb)   LMP " 01/14/2016 (Approximate)   BMI 47.12 kg/m     BMI= Body mass index is 47.12 kg/m .    Weight management plan: Patient was referred to their PCP to discuss a diet and exercise plan.    General appearance: Patient is alert and fully cooperative with history & exam.  No sign of distress is noted during the visit.     Psychiatric: Affect is pleasant & appropriate.  Patient appears motivated to improve health.     Respiratory: Breathing is regular & unlabored while sitting.     HEENT: Hearing is intact to spoken word.  Speech is clear.  No gross evidence of visual impairment that would impact ambulation.     Dermatologic: Skin is intact to both lower extremities without significant lesions, rash or abrasion.  No paronychia or evidence of soft tissue infection is noted.     Vascular: DP & PT pulses are intact & regular bilaterally.  No significant edema or varicosities noted.  CFT and skin temperature is normal to both lower extremities.     Neurologic: Lower extremity sensation is intact to light touch.  No evidence of weakness or contracture in the lower extremities.  No evidence of neuropathy.     Musculoskeletal: Patient is ambulatory without assistive device or brace.  No gross ankle deformity noted.  No foot or ankle joint effusion is noted.  Noted firm soft tissue mass located over the sinus tarsi bilaterally.  No surrounding erythema noted.  Noted pain on palpation of the peroneal tendons on the right foot and ankle.  Noted pain with plantarflexion and eversion against resistance on the right.  No surrounding erythema noted.    Radiographs from February 7, 2019 were evaluated including AP, lateral and medial oblique views of the right foot reveals  no cortical erosions or periosteal elevation.  All joint margins appear stable.  There is no apparent fracture or tumor formation noted.  There is no evidence of foreign body.    ASSESSMENT / PLAN:     ICD-10-CM    1. Peroneal tendonitis, right M76.71        I have  explained to Bria  about the conditions.  We discussed the nature of the condition as well as the treatment plan and expected length of recovery.  At this time, the patient was instructed on icing, stretching, tissue massage and support.  The patient was fitted with a Tri-Lock ankle brace that will aid in offloading the tension forces to the soft tissues and prevent further inflammation.    The patient will return in four weeks for reevaluation if the symptoms do not resolve.        Disclaimer: This note consists of symbols derived from keyboarding, dictation and/or voice recognition software. As a result, there may be errors in the script that have gone undetected. Please consider this when interpreting information found in this chart.       MARISA [email or web address].[email or web address]., F.A.C.F.A.S.        Again, thank you for allowing me to participate in the care of your patient.        Sincerely,        Lai Mccann DPM

## 2019-02-24 DIAGNOSIS — Z79.4 TYPE 2 DIABETES MELLITUS WITH CHRONIC KIDNEY DISEASE, WITH LONG-TERM CURRENT USE OF INSULIN, UNSPECIFIED CKD STAGE (H): Primary | ICD-10-CM

## 2019-02-24 DIAGNOSIS — E11.22 TYPE 2 DIABETES MELLITUS WITH CHRONIC KIDNEY DISEASE, WITH LONG-TERM CURRENT USE OF INSULIN, UNSPECIFIED CKD STAGE (H): Primary | ICD-10-CM

## 2019-02-25 NOTE — TELEPHONE ENCOUNTER
"Requested Prescriptions   Pending Prescriptions Disp Refills     blood glucose monitoring (ACCU-CHEK FASTCLIX) lancets [Pharmacy Med Name: ACCU-CHEK FASTCLIX LANCETS  MISC]  Last Written Prescription Date:  7/30/208  Last Fill Quantity: 408,  # refills: 3   Last office visit: 2/11/2019 with prescribing provider:  Ulices    Future Office Visit:     408 each 3     Sig: USE TO TEST BLOOD SUGAR FOUR TIMES A DAY OR AS DIRECTED    Diabetic Supplies Protocol Passed - 2/24/2019  3:02 PM       Passed - Medication is active on med list       Passed - Patient is 18 years of age or older       Passed - Recent (6 mo) or future (30 days) visit within the authorizing provider's specialty    Patient had office visit in the last 6 months or has a visit in the next 30 days with authorizing provider.  See \"Patient Info\" tab in inbasket, or \"Choose Columns\" in Meds & Orders section of the refill encounter.            JOAQUIN CONTOUR test strip [Pharmacy Med Name: JOAQUIN CONTOUR TEST  STRP]  Last Written Prescription Date:  11/2/2017  Last Fill Quantity: 400,  # refills: 3   Last office visit: 2/11/2019 with prescribing provider:  Ulices    Future Office Visit:     400 each 3     Sig: USE TO TEST BLOOD SUGAR FOUR TIMES A DAY OR AS DIRECTED    Diabetic Supplies Protocol Passed - 2/24/2019  3:02 PM       Passed - Medication is active on med list       Passed - Patient is 18 years of age or older       Passed - Recent (6 mo) or future (30 days) visit within the authorizing provider's specialty    Patient had office visit in the last 6 months or has a visit in the next 30 days with authorizing provider.  See \"Patient Info\" tab in inbasket, or \"Choose Columns\" in Meds & Orders section of the refill encounter.              "

## 2019-02-26 RX ORDER — LANCETS
EACH MISCELLANEOUS
Qty: 408 EACH | Refills: 3 | OUTPATIENT
Start: 2019-02-26

## 2019-02-26 NOTE — TELEPHONE ENCOUNTER
Prescription approved per [facility] Refill Protocol.      Julia NEGRO RN

## 2019-03-26 ENCOUNTER — TRANSFERRED RECORDS (OUTPATIENT)
Dept: HEALTH INFORMATION MANAGEMENT | Facility: CLINIC | Age: 59
End: 2019-03-26

## 2019-05-09 ENCOUNTER — OFFICE VISIT (OUTPATIENT)
Dept: FAMILY MEDICINE | Facility: CLINIC | Age: 59
End: 2019-05-09
Payer: OTHER MISCELLANEOUS

## 2019-05-09 VITALS
OXYGEN SATURATION: 98 % | HEIGHT: 63 IN | HEART RATE: 85 BPM | TEMPERATURE: 97.6 F | RESPIRATION RATE: 18 BRPM | DIASTOLIC BLOOD PRESSURE: 88 MMHG | BODY MASS INDEX: 46.95 KG/M2 | SYSTOLIC BLOOD PRESSURE: 128 MMHG | WEIGHT: 265 LBS

## 2019-05-09 DIAGNOSIS — T14.8XXA HEMATOMA OF SKIN: Primary | ICD-10-CM

## 2019-05-09 DIAGNOSIS — S89.92XA INJURY OF LEFT LOWER EXTREMITY, INITIAL ENCOUNTER: ICD-10-CM

## 2019-05-09 PROCEDURE — 99213 OFFICE O/P EST LOW 20 MIN: CPT | Performed by: FAMILY MEDICINE

## 2019-05-09 ASSESSMENT — MIFFLIN-ST. JEOR: SCORE: 1756.16

## 2019-05-09 ASSESSMENT — PAIN SCALES - GENERAL: PAINLEVEL: MILD PAIN (2)

## 2019-05-09 NOTE — LETTER
[facility] [place]  [address]  [facility] [address]  Phone: [phone]      REPORT OF WORK ABILITY    NOTE TO EMPLOYEE: You must promptly provide a copy of this report to your  employer or worker's compensation insurer, and Qualified Rehabilitation Consultant.    Date: 5/9/2019                     Employee Name: Bria Davis         YOB: 1960  Medical Record Number: [id number]   Soc.Sec.No: [id number]  Employer: None                Date of Injury: 5/8/2019  Managed Care Organization / Insurance Company Name: UNKNOWN    Diagnosis: hematoma and bruising, left leg  Work Related: yes     MMI: NO   Permanent Partial Disability(PPD) likely: NO    EMPLOYEE IS ABLE TO WORK: OFF work for today and unrestricted as of 5/10/2019 - Full shift     RESTRICTIONS IF ANY: None         OTHER RESTRICTIONS: None    TREATMENT PLAN/NOTES: warm packs to left leg as needed. .          Kaia Elena MD

## 2019-05-09 NOTE — PATIENT INSTRUCTIONS
Warm compresses to bruising    Follow up if there is significant pain/redness.     Kaia Elena M.D.      Our Clinic hours are:  Mondays    7:20 am - 7 pm  Tues -  Fri  7:20 am - 5 pm    Clinic Phone: [phone]    The clinic lab opens at 7:30 am Mon - Fri and appointments are required.    [facility]. [phone]  Monday  8 am - 7pm  Tues - Fri 8 am - 5:30 pm
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
## 2019-05-10 ASSESSMENT — ASTHMA QUESTIONNAIRES: ACT_TOTALSCORE: 25

## 2019-05-19 DIAGNOSIS — Z79.4 TYPE 2 DIABETES MELLITUS WITH CHRONIC KIDNEY DISEASE, WITH LONG-TERM CURRENT USE OF INSULIN, UNSPECIFIED CKD STAGE (H): Primary | ICD-10-CM

## 2019-05-19 DIAGNOSIS — E11.22 TYPE 2 DIABETES MELLITUS WITH CHRONIC KIDNEY DISEASE, WITH LONG-TERM CURRENT USE OF INSULIN, UNSPECIFIED CKD STAGE (H): Primary | ICD-10-CM

## 2019-05-20 RX ORDER — LANCETS
EACH MISCELLANEOUS
Qty: 408 EACH | Refills: 3 | Status: SHIPPED | OUTPATIENT
Start: 2019-05-20 | End: 2019-11-20

## 2019-05-20 NOTE — TELEPHONE ENCOUNTER
"Requested Prescriptions   Pending Prescriptions Disp Refills     blood glucose monitoring (ACCU-CHEK FASTCLIX) lancets [Pharmacy Med Name: ACCU-CHEK FASTCLIX LANCETS  MISC] 408 each 3     Sig: USE TO TEST BLOOD SUGAR FOUR TIMES A DAY OR AS DIRECTED  Last Written Prescription Date:  2/26/2019  Last Fill Quantity: 100,  # refills: 2   Last office visit: 5/9/2019 with prescribing provider:  Ulices   Future Office Visit:           Diabetic Supplies Protocol Passed - 5/19/2019  5:35 PM        Passed - Medication is active on med list        Passed - Patient is 18 years of age or older        Passed - Recent (6 mo) or future (30 days) visit within the authorizing provider's specialty     Patient had office visit in the last 6 months or has a visit in the next 30 days with authorizing provider.  See \"Patient Info\" tab in inbasket, or \"Choose Columns\" in Meds & Orders section of the refill encounter.              "

## 2019-06-03 DIAGNOSIS — K31.84 GASTROPARESIS DUE TO DM (H): ICD-10-CM

## 2019-06-03 DIAGNOSIS — E11.43 GASTROPARESIS DUE TO DM (H): ICD-10-CM

## 2019-06-03 NOTE — TELEPHONE ENCOUNTER
"Requested Prescriptions   Pending Prescriptions Disp Refills     metoclopramide (REGLAN) 10 MG tablet 180 tablet 1     Sig: Take 1 tablet (10 mg) by mouth 2 times daily  Last Written Prescription Date:  11/1/2018  Last Fill Quantity: 180,  # refills: 1   Last office visit: 5/9/2019 with prescribing provider:  Ulices   Future Office Visit:            Antivertigo/Antiemetic Agents Passed - 6/3/2019  3:57 PM        Passed - Recent (12 mo) or future (30 days) visit within the authorizing provider's specialty     Patient had office visit in the last 12 months or has a visit in the next 30 days with authorizing provider or within the authorizing provider's specialty.  See \"Patient Info\" tab in inbasket, or \"Choose Columns\" in Meds & Orders section of the refill encounter.              Passed - Medication is active on med list        Passed - Patient is 18 years of age or older          "

## 2019-06-05 RX ORDER — METOCLOPRAMIDE 10 MG/1
10 TABLET ORAL 2 TIMES DAILY
Qty: 180 TABLET | Refills: 1 | Status: SHIPPED | OUTPATIENT
Start: 2019-06-05 | End: 2019-08-05

## 2019-07-02 DIAGNOSIS — J45.20 MILD INTERMITTENT ASTHMA, UNCOMPLICATED: ICD-10-CM

## 2019-07-02 NOTE — TELEPHONE ENCOUNTER
"Requested Prescriptions   Pending Prescriptions Disp Refills     montelukast (SINGULAIR) 10 MG tablet 90 tablet 1     Sig: TAKE ONE TABLET BY MOUTH AT BEDTIME  Last Written Prescription Date:  11/1/2018  Last Fill Quantity: 90,  # refills: 1   Last office visit: 5/9/2019 with prescribing provider:  Ulices    Future Office Visit:           Leukotriene Inhibitors Protocol Passed - 7/2/2019  3:24 PM        Passed - Patient is age 12 or older     If patient is under 16, ok to refill using age based dosing.           Passed - Asthma control assessment score within normal limits in last 6 months     Please review ACT score.           Passed - Medication is active on med list        Passed - Recent (6 mo) or future (30 days) visit within the authorizing provider's specialty     Patient had office visit in the last 6 months or has a visit in the next 30 days with authorizing provider or within the authorizing provider's specialty.  See \"Patient Info\" tab in inbasket, or \"Choose Columns\" in Meds & Orders section of the refill encounter.              " No

## 2019-07-05 RX ORDER — MONTELUKAST SODIUM 10 MG/1
TABLET ORAL
Qty: 90 TABLET | Refills: 1 | Status: SHIPPED | OUTPATIENT
Start: 2019-07-05 | End: 2019-08-05

## 2019-07-05 NOTE — TELEPHONE ENCOUNTER
Routing refill request to provider for review/approval because:  Last ordered by Dr. Conroy    ACT Total Scores 4/13/2018 11/1/2018 5/9/2019   ACT TOTAL SCORE - - -   ASTHMA ER VISITS - - -   ASTHMA HOSPITALIZATIONS - - -   ACT TOTAL SCORE (Goal Greater than or Equal to 20) 25 25 25   In the past 12 months, how many times did you visit the emergency room for your asthma without being admitted to the hospital? 0 0 0   In the past 12 months, how many times were you hospitalized overnight because of your asthma? 0 0 0     Ginna SPENCER RN

## 2019-07-11 DIAGNOSIS — E11.22 TYPE 2 DIABETES MELLITUS WITH CHRONIC KIDNEY DISEASE, WITH LONG-TERM CURRENT USE OF INSULIN, UNSPECIFIED CKD STAGE (H): Primary | ICD-10-CM

## 2019-07-11 DIAGNOSIS — Z79.4 TYPE 2 DIABETES MELLITUS WITH CHRONIC KIDNEY DISEASE, WITH LONG-TERM CURRENT USE OF INSULIN, UNSPECIFIED CKD STAGE (H): Primary | ICD-10-CM

## 2019-07-17 DIAGNOSIS — J45.20 MILD INTERMITTENT ASTHMA, UNCOMPLICATED: ICD-10-CM

## 2019-07-17 NOTE — TELEPHONE ENCOUNTER
Advair refill given.  Pt was seen on 5/9/19.  LM to discuss further refills of this med @ her next appt. Kpaashley

## 2019-07-17 NOTE — TELEPHONE ENCOUNTER
"Requested Prescriptions   Pending Prescriptions Disp Refills     fluticasone-salmeterol (ADVAIR DISKUS) 100-50 MCG/DOSE inhaler 3 Inhaler 3     Sig: Inhale 1 puff into the lungs 2 times daily   Last Written Prescription Date:  7/30/18  Last Fill Quantity: 3 inhaler,  # refills: 3   Last office visit: 5/9/2019 with prescribing provider:  Kaia Elena     Future Office Visit:   Next 5 appointments (look out 90 days)    Aug 05, 2019  3:20 PM CDT  SHORT with Kaia Elena MD  [facility] [address] [facility] [address]  [phone]             Inhaled Steroids Protocol Passed - 7/17/2019 10:14 AM        Passed - Patient is age 12 or older        Passed - Asthma control assessment score within normal limits in last 6 months     Please review ACT score.           Passed - Medication is active on med list        Passed - Recent (6 mo) or future (30 days) visit within the authorizing provider's specialty     Patient had office visit in the last 6 months or has a visit in the next 30 days with authorizing provider or within the authorizing provider's specialty.  See \"Patient Info\" tab in inbasket, or \"Choose Columns\" in Meds & Orders section of the refill encounter.              "

## 2019-08-05 ENCOUNTER — OFFICE VISIT (OUTPATIENT)
Dept: FAMILY MEDICINE | Facility: CLINIC | Age: 59
End: 2019-08-05
Payer: COMMERCIAL

## 2019-08-05 VITALS
WEIGHT: 267 LBS | OXYGEN SATURATION: 98 % | HEIGHT: 63 IN | BODY MASS INDEX: 47.31 KG/M2 | SYSTOLIC BLOOD PRESSURE: 128 MMHG | DIASTOLIC BLOOD PRESSURE: 68 MMHG | RESPIRATION RATE: 18 BRPM | HEART RATE: 90 BPM | TEMPERATURE: 98.6 F

## 2019-08-05 DIAGNOSIS — I10 ESSENTIAL HYPERTENSION WITH GOAL BLOOD PRESSURE LESS THAN 140/90: ICD-10-CM

## 2019-08-05 DIAGNOSIS — K31.84 GASTROPARESIS DUE TO DM (H): ICD-10-CM

## 2019-08-05 DIAGNOSIS — E11.43 GASTROPARESIS DUE TO DM (H): ICD-10-CM

## 2019-08-05 DIAGNOSIS — E11.22 TYPE 2 DIABETES MELLITUS WITH CHRONIC KIDNEY DISEASE, WITH LONG-TERM CURRENT USE OF INSULIN, UNSPECIFIED CKD STAGE (H): ICD-10-CM

## 2019-08-05 DIAGNOSIS — Z79.4 TYPE 2 DIABETES MELLITUS WITH CHRONIC KIDNEY DISEASE, WITH LONG-TERM CURRENT USE OF INSULIN, UNSPECIFIED CKD STAGE (H): Primary | ICD-10-CM

## 2019-08-05 DIAGNOSIS — E78.5 HYPERLIPIDEMIA LDL GOAL <100: ICD-10-CM

## 2019-08-05 DIAGNOSIS — J45.20 MILD INTERMITTENT ASTHMA WITHOUT COMPLICATION: ICD-10-CM

## 2019-08-05 DIAGNOSIS — E11.22 TYPE 2 DIABETES MELLITUS WITH CHRONIC KIDNEY DISEASE, WITH LONG-TERM CURRENT USE OF INSULIN, UNSPECIFIED CKD STAGE (H): Primary | ICD-10-CM

## 2019-08-05 DIAGNOSIS — J45.20 MILD INTERMITTENT ASTHMA, UNCOMPLICATED: ICD-10-CM

## 2019-08-05 DIAGNOSIS — K21.9 GASTROESOPHAGEAL REFLUX DISEASE WITHOUT ESOPHAGITIS: ICD-10-CM

## 2019-08-05 DIAGNOSIS — R07.9 EXERTIONAL CHEST PAIN: ICD-10-CM

## 2019-08-05 DIAGNOSIS — Z79.4 TYPE 2 DIABETES MELLITUS WITH CHRONIC KIDNEY DISEASE, WITH LONG-TERM CURRENT USE OF INSULIN, UNSPECIFIED CKD STAGE (H): ICD-10-CM

## 2019-08-05 LAB
ALBUMIN SERPL-MCNC: 3.6 G/DL (ref 3.4–5)
ALP SERPL-CCNC: 79 U/L (ref 40–150)
ALT SERPL W P-5'-P-CCNC: 29 U/L (ref 0–50)
ANION GAP SERPL CALCULATED.3IONS-SCNC: 10 MMOL/L (ref 3–14)
AST SERPL W P-5'-P-CCNC: 19 U/L (ref 0–45)
BILIRUB SERPL-MCNC: 0.1 MG/DL (ref 0.2–1.3)
BUN SERPL-MCNC: 14 MG/DL (ref 7–30)
CALCIUM SERPL-MCNC: 9.4 MG/DL (ref 8.5–10.1)
CHLORIDE SERPL-SCNC: 103 MMOL/L (ref 94–109)
CO2 SERPL-SCNC: 24 MMOL/L (ref 20–32)
CREAT SERPL-MCNC: 0.6 MG/DL (ref 0.52–1.04)
CREAT UR-MCNC: 157 MG/DL
GFR SERPL CREATININE-BSD FRML MDRD: >90 ML/MIN/{1.73_M2}
GLUCOSE SERPL-MCNC: 177 MG/DL (ref 70–99)
HBA1C MFR BLD: 9.4 % (ref 0–5.6)
MICROALBUMIN UR-MCNC: 496 MG/L
MICROALBUMIN/CREAT UR: 315.92 MG/G CR (ref 0–25)
POTASSIUM SERPL-SCNC: 3.9 MMOL/L (ref 3.4–5.3)
PROT SERPL-MCNC: 7.2 G/DL (ref 6.8–8.8)
SODIUM SERPL-SCNC: 137 MMOL/L (ref 133–144)
TSH SERPL DL<=0.005 MIU/L-ACNC: 1.41 MU/L (ref 0.4–4)

## 2019-08-05 PROCEDURE — 80053 COMPREHEN METABOLIC PANEL: CPT | Performed by: FAMILY MEDICINE

## 2019-08-05 PROCEDURE — 82043 UR ALBUMIN QUANTITATIVE: CPT | Performed by: FAMILY MEDICINE

## 2019-08-05 PROCEDURE — 36415 COLL VENOUS BLD VENIPUNCTURE: CPT | Performed by: FAMILY MEDICINE

## 2019-08-05 PROCEDURE — 93000 ELECTROCARDIOGRAM COMPLETE: CPT | Performed by: FAMILY MEDICINE

## 2019-08-05 PROCEDURE — 84443 ASSAY THYROID STIM HORMONE: CPT | Performed by: FAMILY MEDICINE

## 2019-08-05 PROCEDURE — 83036 HEMOGLOBIN GLYCOSYLATED A1C: CPT | Performed by: FAMILY MEDICINE

## 2019-08-05 PROCEDURE — 99214 OFFICE O/P EST MOD 30 MIN: CPT | Performed by: FAMILY MEDICINE

## 2019-08-05 RX ORDER — METFORMIN HCL 500 MG
TABLET, EXTENDED RELEASE 24 HR ORAL
Qty: 360 TABLET | Refills: 1 | Status: SHIPPED | OUTPATIENT
Start: 2019-08-05 | End: 2019-10-31

## 2019-08-05 RX ORDER — METOCLOPRAMIDE 10 MG/1
10 TABLET ORAL 2 TIMES DAILY
Qty: 180 TABLET | Refills: 1 | Status: SHIPPED | OUTPATIENT
Start: 2019-08-05 | End: 2019-10-31

## 2019-08-05 RX ORDER — ALBUTEROL SULFATE 90 UG/1
2 AEROSOL, METERED RESPIRATORY (INHALATION) EVERY 6 HOURS
Qty: 1 INHALER | Refills: 11 | Status: SHIPPED | OUTPATIENT
Start: 2019-08-05 | End: 2021-01-14

## 2019-08-05 RX ORDER — MONTELUKAST SODIUM 10 MG/1
TABLET ORAL
Qty: 90 TABLET | Refills: 1 | Status: SHIPPED | OUTPATIENT
Start: 2019-08-05 | End: 2019-10-31

## 2019-08-05 RX ORDER — INSULIN GLARGINE 100 [IU]/ML
INJECTION, SOLUTION SUBCUTANEOUS
Qty: 70 ML | Refills: 2 | Status: SHIPPED | OUTPATIENT
Start: 2019-08-05 | End: 2019-10-31

## 2019-08-05 RX ORDER — ESOMEPRAZOLE MAGNESIUM 40 MG/1
40 CAPSULE, DELAYED RELEASE ORAL
Qty: 90 CAPSULE | Refills: 3 | Status: SHIPPED | OUTPATIENT
Start: 2019-08-05 | End: 2020-06-15

## 2019-08-05 RX ORDER — SIMVASTATIN 40 MG
40 TABLET ORAL AT BEDTIME
Qty: 90 TABLET | Refills: 1 | Status: SHIPPED | OUTPATIENT
Start: 2019-08-05 | End: 2019-10-31

## 2019-08-05 RX ORDER — LOSARTAN POTASSIUM 100 MG/1
100 TABLET ORAL DAILY
Qty: 90 TABLET | Refills: 1 | Status: SHIPPED | OUTPATIENT
Start: 2019-08-05 | End: 2020-06-08

## 2019-08-05 ASSESSMENT — MIFFLIN-ST. JEOR: SCORE: 1760.23

## 2019-08-05 ASSESSMENT — PAIN SCALES - GENERAL: PAINLEVEL: NO PAIN (0)

## 2019-08-05 NOTE — PROGRESS NOTES
Subjective     Bria Davis is a 59 year old female who presents to clinic today for the following health issues:    HPI   Diabetes Follow-up      How often are you checking your blood sugar? Three times daily    What time of day are you checking your blood sugars (select all that apply)?  Before meals    Have you had any blood sugars above 200?  Yes     Have you had any blood sugars below 70?  seldom    What symptoms do you notice when your blood sugar is low?  Shaky    What concerns do you have today about your diabetes? Blood sugar is often over 200     Do you have any of these symptoms? (Select all that apply)  No numbness or tingling in feet.  No redness, sores or blisters on feet.  No complaints of excessive thirst.  No reports of blurry vision.  No significant changes to weight.     Have you had a diabetic eye exam in the last 12 months? Yes- Date of last eye exam: UTD    Diabetes Management Resources    Hyperlipidemia Follow-Up      Are you having any of the following symptoms? (Select all that apply)  No complaints of shortness of breath, chest pain or pressure.  No increased sweating or nausea with activity.  No left-sided neck or arm pain.  No complaints of pain in calves when walking 1-2 blocks.    Are you regularly taking any medication or supplement to lower your cholesterol?   Yes- simvastain    Are you having muscle aches or other side effects that you think could be caused by your cholesterol lowering medication?  No    Hypertension Follow-up      Do you check your blood pressure regularly outside of the clinic? No     Are you following a low salt diet? Yes    Are your blood pressures ever more than 140 on the top number (systolic) OR more   than 90 on the bottom number (diastolic), for example 140/90? unknown    BP Readings from Last 2 Encounters:   08/05/19 128/68   05/09/19 128/88     Hemoglobin A1C (%)   Date Value   08/05/2019 9.4 (H)   02/11/2019 9.5 (H)     LDL Cholesterol Calculated  "(mg/dL)   Date Value   02/11/2019 58   12/29/2017 71       Amount of exercise or physical activity: None    Problems taking medications regularly: No    Medication side effects: none    Diet: regular (no restrictions) and low salt  Reviewed and updated as needed this visit by Provider         Review of Systems   ROS COMP: Constitutional, HEENT, cardiovascular, pulmonary, gi and gu systems are negative, except as otherwise noted.        Patient reports chest pain - central chest with activity at times \"it's fleeting\" but it resolves with rest and a few deep breaths.  She will feel short of breath with this. No diaphoresis or nausea.     Objective    /68   Pulse 90   Temp 98.6  F (37  C) (Tympanic)   Resp 18   Ht 1.6 m (5' 3\")   Wt 121.1 kg (267 lb)   LMP 01/14/2016 (Approximate)   SpO2 98%   Breastfeeding? No   BMI 47.30 kg/m    Body mass index is 47.3 kg/m .  Physical Exam   GENERAL: healthy, alert and no distress  EYES: Eyes grossly normal to inspection, PERRL and conjunctivae and sclerae normal  HENT: ear canals and TM's normal, nose and mouth without ulcers or lesions  NECK: no adenopathy, no asymmetry, masses, or scars and thyroid normal to palpation  RESP: lungs clear to auscultation - no rales, rhonchi or wheezes  CV: regular rate and rhythm, normal S1 S2, no S3 or S4, no murmur, click or rub, no peripheral edema and peripheral pulses strong  ABDOMEN: soft, nontender, no hepatosplenomegaly, no masses and bowel sounds normal  MS: no gross musculoskeletal defects noted, no edema  SKIN: no suspicious lesions or rashes  NEURO: Normal strength and tone, mentation intact and speech normal  PSYCH: mentation appears normal, affect normal/bright    Diagnostic Test Results:  Results for orders placed or performed in visit on 08/05/19 (from the past 24 hour(s))   Hemoglobin A1c   Result Value Ref Range    Hemoglobin A1C 9.4 (H) 0 - 5.6 %     EKG - negative        Assessment & Plan       ICD-10-CM    1. " Type 2 diabetes mellitus with kidney complication, with long-term current use of insulin (H) E11.29 Comprehensive metabolic panel    Z79.4 TSH with free T4 reflex     Albumin Random Urine Quantitative with Creat Ratio   2. Mild intermittent asthma without complication J45.20 albuterol (PROAIR HFA/PROVENTIL HFA/VENTOLIN HFA) 108 (90 Base) MCG/ACT inhaler   3. Type 2 diabetes mellitus with chronic kidney disease, with long-term current use of insulin, unspecified CKD stage (H) E11.22 blood glucose (NO BRAND SPECIFIED) lancets standard    Z79.4 blood glucose (NO BRAND SPECIFIED) test strip     dulaglutide (TRULICITY) 1.5 MG/0.5ML pen     insulin aspart (NOVOLOG VIAL) 100 UNITS/ML vial     insulin glargine (LANTUS VIAL) 100 UNIT/ML vial     metFORMIN (GLUCOPHAGE-XR) 500 MG 24 hr tablet     NM Exercise stress test   4. Gastroesophageal reflux disease without esophagitis K21.9 esomeprazole (NEXIUM) 40 MG DR capsule   5. Essential hypertension with goal blood pressure less than 140/90 I10 losartan (COZAAR) 100 MG tablet   6. Mild intermittent asthma, uncomplicated J45.20 montelukast (SINGULAIR) 10 MG tablet   7. Gastroparesis due to DM (H) E11.43 metoclopramide (REGLAN) 10 MG tablet    K31.84    8. Hyperlipidemia LDL goal <100 E78.5 simvastatin (ZOCOR) 40 MG tablet   9. Exertional chest pain R07.9 EKG 12-lead complete w/read - Clinics     NM Exercise stress test     Patient resistant to adding additional medications (invokana, glyburide, etc) at this time or increasing her insulin.  She would like to try some dietary modifications.   Recheck in 3 months.     With respect to the chest pain, this could be an anginal equivalent.  Needs nuclear stress, due to her obesity I don't think a stress echo would be appropriate.      Patient Instructions   Recheck HgbA1c in 3 months    Book:  The Diabetes Code by Dr. Hero Rowell    Website:  [email or web address]    Schedule stress testing [phone] - cardiology clinic in [place]    Our  "Clinic hours are:  Mondays    7:20 am - 7 pm  Tues -  Fri  7:20 am - 5 pm    Clinic Phone: [phone]    The clinic lab opens at 7:30 am Mon - Fri and appointments are required.    [facility]  Ph. [phone]  Monday  8 am - 7pm  Tues - Fri 8 am - 5:30 pm               BMI:   Estimated body mass index is 47.3 kg/m  as calculated from the following:    Height as of this encounter: 1.6 m (5' 3\").    Weight as of this encounter: 121.1 kg (267 lb).               No follow-ups on file.    Kaia Elena MD  [facility]      "

## 2019-08-05 NOTE — PATIENT INSTRUCTIONS
Recheck HgbA1c in 3 months    Book:  The Diabetes Code by Dr. Hero Rowell    Website:  [email or web address]    Schedule stress testing [phone] - cardiology clinic in [place]    Our Clinic hours are:  Mondays    7:20 am - 7 pm  Tues -  Fri  7:20 am - 5 pm    Clinic Phone: [phone]    The clinic lab opens at 7:30 am Mon - Fri and appointments are required.    [facility]. [phone]  Monday  8 am - 7pm  Tues - Fri 8 am - 5:30 pm

## 2019-08-06 DIAGNOSIS — Z79.4 TYPE 2 DIABETES MELLITUS WITH COMPLICATION, WITH LONG-TERM CURRENT USE OF INSULIN (H): ICD-10-CM

## 2019-08-06 DIAGNOSIS — E11.8 TYPE 2 DIABETES MELLITUS WITH COMPLICATION, WITH LONG-TERM CURRENT USE OF INSULIN (H): ICD-10-CM

## 2019-08-06 DIAGNOSIS — E11.9 DIABETES MELLITUS WITHOUT COMPLICATION (H): ICD-10-CM

## 2019-08-06 RX ORDER — SYRINGE-NEEDLE,INSULIN,0.5 ML 27GX1/2"
SYRINGE, EMPTY DISPOSABLE MISCELLANEOUS
Qty: 270 EACH | Refills: 3 | Status: SHIPPED | OUTPATIENT
Start: 2019-08-06 | End: 2019-08-07

## 2019-08-06 NOTE — TELEPHONE ENCOUNTER
"Requested Prescriptions   Pending Prescriptions Disp Refills     insulin syringe-needle U-100 (BD INSULIN SYRINGE ULTRAFINE) 31G X 5/16\" 0.5 ML miscellaneous 270 each 3     Sig: Use one syringe 3 timesdaily or as directed.  Last Written Prescription Date:  7/30/2018  Last Fill Quantity: 180,  # refills: 3   Last office visit: 8/5/2019 with prescribing provider:  Rozina    Future Office Visit:           Diabetic Supplies Protocol Passed - 8/6/2019  3:04 PM        Passed - Medication is active on med list        Passed - Patient is 18 years of age or older        Passed - Recent (6 mo) or future (30 days) visit within the authorizing provider's specialty     Patient had office visit in the last 6 months or has a visit in the next 30 days with authorizing provider.  See \"Patient Info\" tab in inbasket, or \"Choose Columns\" in Meds & Orders section of the refill encounter.              "

## 2019-08-06 NOTE — TELEPHONE ENCOUNTER
Routing refill request to provider for review/approval because:  Last ordered by Dr. Rafiq SPENCER RN

## 2019-08-07 ENCOUNTER — ALLIED HEALTH/NURSE VISIT (OUTPATIENT)
Dept: FAMILY MEDICINE | Facility: CLINIC | Age: 59
End: 2019-08-07
Payer: COMMERCIAL

## 2019-08-07 DIAGNOSIS — Z79.4 TYPE 2 DIABETES MELLITUS WITH COMPLICATION, WITH LONG-TERM CURRENT USE OF INSULIN (H): Primary | ICD-10-CM

## 2019-08-07 DIAGNOSIS — E11.8 TYPE 2 DIABETES MELLITUS WITH COMPLICATION, WITH LONG-TERM CURRENT USE OF INSULIN (H): Primary | ICD-10-CM

## 2019-08-07 PROCEDURE — 99207 ZZC NO CHARGE NURSE ONLY: CPT

## 2019-08-07 RX ORDER — SYRINGE-NEEDLE,INSULIN,0.5 ML 27GX1/2"
SYRINGE, EMPTY DISPOSABLE MISCELLANEOUS
Qty: 270 EACH | Refills: 1 | Status: SHIPPED | OUTPATIENT
Start: 2019-08-07 | End: 2019-11-06 | Stop reason: ALTCHOICE

## 2019-08-07 RX ORDER — SYRINGE-NEEDLE,INSULIN,0.5 ML 27GX1/2"
SYRINGE, EMPTY DISPOSABLE MISCELLANEOUS
Qty: 180 EACH | Refills: 1 | Status: SHIPPED | OUTPATIENT
Start: 2019-08-07 | End: 2019-11-06 | Stop reason: ALTCHOICE

## 2019-08-07 NOTE — TELEPHONE ENCOUNTER
Pt was seen this week and Insulin syringes were not ordered.  Orders for Insulin syringes sent.  KpawindyRN

## 2019-08-07 NOTE — PROGRESS NOTES
Order for Insulin syringes sent to Pharmacy.  Pt was just seen on 8/6/19.  Will f/u in 3 months.  KpaNichole

## 2019-08-14 ENCOUNTER — HOSPITAL ENCOUNTER (OUTPATIENT)
Dept: NUCLEAR MEDICINE | Facility: CLINIC | Age: 59
Setting detail: NUCLEAR MEDICINE
Discharge: HOME | End: 2019-08-14
Attending: FAMILY MEDICINE | Admitting: FAMILY MEDICINE
Payer: COMMERCIAL

## 2019-08-14 DIAGNOSIS — R07.9 EXERTIONAL CHEST PAIN: ICD-10-CM

## 2019-08-14 DIAGNOSIS — Z79.4 TYPE 2 DIABETES MELLITUS WITH CHRONIC KIDNEY DISEASE, WITH LONG-TERM CURRENT USE OF INSULIN, UNSPECIFIED CKD STAGE (H): ICD-10-CM

## 2019-08-14 DIAGNOSIS — E11.22 TYPE 2 DIABETES MELLITUS WITH CHRONIC KIDNEY DISEASE, WITH LONG-TERM CURRENT USE OF INSULIN, UNSPECIFIED CKD STAGE (H): ICD-10-CM

## 2019-08-14 PROCEDURE — 93018 CV STRESS TEST I&R ONLY: CPT | Performed by: INTERNAL MEDICINE

## 2019-08-14 PROCEDURE — 93017 CV STRESS TEST TRACING ONLY: CPT

## 2019-08-14 PROCEDURE — 34300033 ZZH RX 343: Performed by: INTERNAL MEDICINE

## 2019-08-14 PROCEDURE — 78452 HT MUSCLE IMAGE SPECT MULT: CPT | Mod: 26 | Performed by: INTERNAL MEDICINE

## 2019-08-14 PROCEDURE — 93016 CV STRESS TEST SUPVJ ONLY: CPT | Performed by: INTERNAL MEDICINE

## 2019-08-14 PROCEDURE — A9502 TC99M TETROFOSMIN: HCPCS | Performed by: INTERNAL MEDICINE

## 2019-08-14 PROCEDURE — 78452 HT MUSCLE IMAGE SPECT MULT: CPT

## 2019-08-14 RX ADMIN — TETROFOSMIN 12.7 MCI.: 1.38 INJECTION, POWDER, LYOPHILIZED, FOR SOLUTION INTRAVENOUS at 07:53

## 2019-08-14 RX ADMIN — TETROFOSMIN 37.5 MCI.: 1.38 INJECTION, POWDER, LYOPHILIZED, FOR SOLUTION INTRAVENOUS at 09:15

## 2019-08-14 NOTE — PROGRESS NOTES
Today patient had a nuclear stress test. She had 3/10 chest tightness at peak exercise which resolved at 4 minutes recovery. She did develop a LBBB at 3 minutes exercise with HR about 120/min which resolved in recovery. Patient describes hx of chest tightness with stress or some exertion occurring for a couple of months. Discussed with supervising MD, OK for patient to go home with activity restrictions. Patient verbalized understanding of restrictions. OMERO Torres RN

## 2019-08-15 DIAGNOSIS — R94.31 ABNORMAL ECG DURING EXERCISE STRESS TEST: Primary | ICD-10-CM

## 2019-08-15 NOTE — RESULT ENCOUNTER NOTE
Stress testing was without evidence of ischemia.  However, there was an abnormal finding on the EKG that developed while exercising (left bundle branch block).  I would like her to see cardiology for a consultation regarding this.  Can call [phone] to make that appointment.    Kaia Elena M.D.

## 2019-09-06 ENCOUNTER — OFFICE VISIT (OUTPATIENT)
Dept: CARDIOLOGY | Facility: CLINIC | Age: 59
End: 2019-09-06
Attending: FAMILY MEDICINE
Payer: COMMERCIAL

## 2019-09-06 VITALS
WEIGHT: 267 LBS | DIASTOLIC BLOOD PRESSURE: 83 MMHG | SYSTOLIC BLOOD PRESSURE: 133 MMHG | HEART RATE: 85 BPM | OXYGEN SATURATION: 98 % | BODY MASS INDEX: 47.3 KG/M2

## 2019-09-06 DIAGNOSIS — Z79.4 TYPE 2 DIABETES MELLITUS WITH CHRONIC KIDNEY DISEASE, WITH LONG-TERM CURRENT USE OF INSULIN, UNSPECIFIED CKD STAGE (H): ICD-10-CM

## 2019-09-06 DIAGNOSIS — I20.89 OTHER FORMS OF ANGINA PECTORIS (H): Primary | ICD-10-CM

## 2019-09-06 DIAGNOSIS — E11.22 TYPE 2 DIABETES MELLITUS WITH CHRONIC KIDNEY DISEASE, WITH LONG-TERM CURRENT USE OF INSULIN, UNSPECIFIED CKD STAGE (H): ICD-10-CM

## 2019-09-06 PROCEDURE — 99205 OFFICE O/P NEW HI 60 MIN: CPT | Performed by: INTERNAL MEDICINE

## 2019-09-06 RX ORDER — LIDOCAINE 40 MG/G
CREAM TOPICAL
Status: CANCELLED | OUTPATIENT
Start: 2019-09-06

## 2019-09-06 RX ORDER — METOPROLOL SUCCINATE 25 MG/1
25 TABLET, EXTENDED RELEASE ORAL DAILY
Qty: 93 TABLET | Refills: 3 | Status: SHIPPED | OUTPATIENT
Start: 2019-09-06 | End: 2020-06-15

## 2019-09-06 RX ORDER — ASPIRIN 81 MG/1
81 TABLET ORAL DAILY
Status: CANCELLED | OUTPATIENT
Start: 2019-09-06 | End: 2019-09-08

## 2019-09-06 RX ORDER — SODIUM CHLORIDE 9 MG/ML
INJECTION, SOLUTION INTRAVENOUS CONTINUOUS
Status: CANCELLED | OUTPATIENT
Start: 2019-09-06

## 2019-09-06 NOTE — LETTER
9/6/2019      Kaia Elena MD  [id number]  [facility] [id number]      RE: Bria Davis       Dear Colleague,    I had the pleasure of seeing Bria Davis in the [facility].    Service Date: 09/06/2019      PATIENT HISTORY:  Ms. Bria Davis is 59 years old and was referred by Dr. Kaia Elena to [facility] in [place], [place].  She is seen for chest discomfort and a recent stress study which demonstrated a rate-dependent left bundle branch block.  She also had chest discomfort during the stress study.      Ms. Davis notes that for the past 3-6 months, she has developed exertional chest pressure.  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  She notes that concrete has been torn up and there has been concrete dust in the air.  She attributed her shortness of breath and the chest pressure to breathing problems associated with that renovation work.      She has no prior history of heart disease.  She began to develop upper mid-chest pressure if she walked too fast or exerted herself too significantly.  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  She was concerned about her ability to continue her job, having developed the discomfort.  On further questioning, she notes that sometimes she will also have mid-scapular discomfort.  The discomfort typically resolves shortly after she stops exerting herself.  She has been careful not to exert herself significantly, but still has been having daily symptoms.  She thought that she had no discomfort for the last 2 days, but then remembers that she was carrying 2 annmarie of paper and had mild discomfort.      She has never smoked, but has a family history of heart disease  through her father, has dyslipidemia with hypertriglyceridemia, currently treated with statin therapy and, with regard to her  diabetes, the hemoglobin A1c's have been approximately 9-10 for the last year.  Her blood pressures have been reasonably controlled, but she does have a history of hypertension.      The stress study performed was a nuclear exercise stress study and she was able to complete 5 minutes and 18 seconds.  She had chest tightness during the study which resulted in the study being ended.  She reached 91% of her age-predicted heart rate and there was no evidence of ischemia or infarct on the stress study.  She did develop a left bundle branch block at a rate of about 126 beats per minute which persisted 6 minutes into recovery.      PHYSICAL EXAMINATION:     GENERAL:  This is a woman in no apparent distress.  She was alert and oriented to person, place and time.   VITAL SIGNS:  Blood pressure was initially mildly elevated when she first came into the clinic and later on recheck on the left arm, sitting, it was 133/83 mmHg, heart rate 85 beats per minute and regular, and respiratory rate 14-18 per minute.   CHEST:  Clear to auscultation.   CARDIAC:  On cardiac auscultation, there was an S1 and S2 without extra sounds or a murmur.  The rhythm was regular.      ASSESSMENT/RECOMMENDATIONS:  With regard to her symptoms, she has classic exertional symptoms.  She had reproduction of her symptom on her exercise nuclear stress study and so despite the normal perfusion scan, I have recommended further evaluation.  I also recommended the addition of beta blockade to her medical regimen.  She will initiate metoprolol XL 25 mg daily.      I discussed the physiology of coronary ischemia.  I discussed the fact that stress imaging studies have a sensitivity and specificity based on the severity of disease.  I also explained that stress testing is not absolutely normal or absolutely abnormal as a definition of the existence of coronary disease.  I am concerned by her symptoms and her need to exert herself.  Her  asked about coronary  angiography and whether it is always indicated.  I explained that it is indicated if there is a high suspicion of her limiting symptoms being caused by cardiac disease and I explained why I thought that was the case.  I also explained that revascularization can be done with coronary angiography and the reason to do revascularization is when symptoms are not controlled by medication.  I did add metoprolol, but she is already on an ARB agent.  Her lipids are being treated and she is on low-dose aspirin.  Unfortunately, her blood sugars remain high.  I do also believe that she is at higher risk for possible multivessel coronary disease not detected by the stress study, and, as noted, the stress study was subjectively abnormal.  I explained the procedure in detail including the risks, benefits and alternatives of medical therapy alone.  I explained the risks of medical therapy alone.  She also understands the need for dual antiplatelet therapy following coronary intervention.      She and her  agreed to proceed with coronary angiography.  She asked about exercise and I recommended holding off on an exercise program until the procedure is completed.  She will contact us in the interim if she has any additional questions, but the procedure has now been scheduled.  She would like to proceed as quickly as possible.  Followup will be arranged after the coronary angiogram.         NIKKI VILCHIS MD, [facility]             D: 2019   T: 2019   MT: al      Name:     ALYCIA ENCINAS   MRN:      [id number]        Account:      [id number]   :      1960           Service Date: 2019      Document: [id number]         Outpatient Encounter Medications as of 2019   Medication Sig Dispense Refill     albuterol (PROAIR HFA/PROVENTIL HFA/VENTOLIN HFA) 108 (90 Base) MCG/ACT inhaler Inhale 2 puffs into the lungs every 6 hours 1 Inhaler 11     aspirin 81 MG tablet Take 1 tablet by mouth daily. 100 tablet 3  "    blood glucose (NO BRAND SPECIFIED) lancets standard Use to test blood sugar four times daily or as directed. 100 each 2     blood glucose (NO BRAND SPECIFIED) test strip Use to test blood sugar four times daily or as directed. 400 each 1     blood glucose monitoring (ACCU-CHEK FASTCLIX) lancets USE TO TEST BLOOD SUGAR FOUR TIMES A DAY OR AS DIRECTED 408 each 3     Calcium Carb-Cholecalciferol (CALCIUM-VITAMIN D) 600-400 MG-UNIT TABS Take 1 tablet by mouth daily       dulaglutide (TRULICITY) 1.5 MG/0.5ML pen Inject 1.5 mg Subcutaneous every 7 days 6 mL 1     esomeprazole (NEXIUM) 40 MG DR capsule Take 1 capsule (40 mg) by mouth every morning (before breakfast) 90 capsule 3     fluticasone-salmeterol (ADVAIR DISKUS) 100-50 MCG/DOSE inhaler Inhale 1 puff into the lungs 2 times daily Discuss refills @ Next appt. 3 Inhaler 1     insulin aspart (NOVOLOG VIAL) 100 UNITS/ML vial Take 26 units with breakfast and lunch, 20 units with dinner.  If glucometer is over 200 take an extra 6 units 9 vial 1     insulin glargine (LANTUS VIAL) 100 UNIT/ML vial INJECT 46 UNITS UNDER THE SKIN twice daily 70 mL 2     insulin syringe-needle U-100 (31G X 5/16\" 1 ML) 31G X 5/16\" 1 ML miscellaneous Use 2 syringes daily or as directed. 180 each 1     insulin syringe-needle U-100 (BD INSULIN SYRINGE ULTRAFINE) 31G X 5/16\" 0.5 ML miscellaneous Use one syringe 3 times daily or as directed. 270 each 1     insulin syringe-needle U-100 30G X 1/2\" 0.3 ML Use four syringes daily or as directed. 400 each 3     losartan (COZAAR) 100 MG tablet Take 1 tablet (100 mg) by mouth daily 90 tablet 1     metFORMIN (GLUCOPHAGE-XR) 500 MG 24 hr tablet TAKE TWO TABLETS BY MOUTH TWICE A DAY WITH MEALS 360 tablet 1     metoclopramide (REGLAN) 10 MG tablet Take 1 tablet (10 mg) by mouth 2 times daily 180 tablet 1     metoprolol succinate ER (TOPROL-XL) 25 MG 24 hr tablet Take 1 tablet (25 mg) by mouth daily 93 tablet 3     montelukast (SINGULAIR) 10 MG tablet TAKE " "ONE TABLET BY MOUTH AT BEDTIME 90 tablet 1     Multiple Vitamins-Minerals (MULTIVITAMIN ADULTS 50+) TABS Take 1 tablet by mouth daily       Needle, Disp, (BD DISP NEEDLES) 30G X 1/2\" MISC To use with humalog insulin 3 times daily 300 each 1     pseudoePHEDrine (SUDAFED) 30 MG tablet TAKE TWO TABLETS BY MOUTH EVERY 12 HOURS 360 tablet 3     simvastatin (ZOCOR) 40 MG tablet Take 1 tablet (40 mg) by mouth At Bedtime at bedtime. 90 tablet 1     No facility-administered encounter medications on file as of 9/6/2019.        Again, thank you for allowing me to participate in the care of your patient.      Sincerely,    Leni Kolb MD     [facility]    "

## 2019-09-06 NOTE — PATIENT INSTRUCTIONS
"[facility]  [facility]~[address]~[place], [place]~[address]  Thank you for your  Heart Care visit today. If you have questions regarding your visit, please contact your cardiology RN's, Kimberly Quinonez or Melyssa Davison, at [phone]. Your provider has recommended the following:  Medication Changes:  1. START metoprolol XL 25 mg daily  Recommendations:  1. Coronary angiogram to be done at [facility] on Thursday September 12, 2019. Please arrive at 6:30am. If you need to contact [facility] for any reason, please call [phone] option #2.  Follow-up:  See Carmen Stevens NP for cardiology follow up at [facility] 1-2 weeks after angiogram    To schedule a future appointment, we kindly ask that you call cardiology scheduling at [phone] three months prior to requested revisit date.  [facility] cardiology clinic is staffed with \"Advance Practice Providers\". These are our cardiology Physician Assistants and Nurse Practitioners.   Please call cardiology scheduling if you feel you need clinical evaluation with them at any time for any cardiac reason.   Reminder:  For your safety, we ask that you bring in your current medication(s) or an updated list of your medications with you to EACH office visit. Include the medication name, dose of pill on bottle and how you are taking it. Include over-the-counter medications or supplements. Your provider will review this at each visit and plan your care based on your current information.   ~~~~~~~~~~~~~~~~~~~~~~~~~~~~~~~~~~~~~~~  \"[facility]\" campus telephone numbers for reference:  Cardiology Scheduling~[phone]  Diagnostic Imaging Scheduling~[phone]  Lab Scheduling~[phone]  Anticoagulation Clinic~[phone]  Cardiac Rehabilitation~[phone]  CORE Clinic RN's~[phone] (at [facility])  Cardiology Clinic RN's~[phone] (Melyssa Davison, TERA & Kimberly Quinonez, " "RN)  ~~~~~~~~~~~~~~~~~~~~~~~~~~~~~~~~~~~~~~~~    ANGIOGRAM  [facility]   [place], [place]   Contact [facility] if needed at [phone].      1. In preparation for your procedure, we require that you do the following:  a. Nothing to eat or drink after midnight if your procedure is before 12 noon.  b. Take your usual morning medications with a small sip of water immediately upon arising on the morning of your procedure unless outlined below:    Aspirin:  o If you currently take Aspirin, continue taking your same dose.    Diabetic:  o If you take Metformin (glucophage) do not take the morning of the procedure or for 2 days after the procedure.   o If you are on other oral diabetic medications do not take the morning of the procedure.  o If you take Insulin contact your Primary Care MD for the recommended dosage to take the evening prior/morning of the procedure.  2. You will be unable to drive after your procedure; please arrange to have someone drive you home. Make sure that there is a responsible adult with you for 24 hours after your procedure. Your procedure will be cancelled if you do not have transportation home or someone staying with you for 24 hrs.   3.  Your procedure will be done at [facility]. Please park in the  Skyway Ramp  on the west side of [facility] on [address]. Take the skyway over [facility] to the hospital. Please check in on the first floor, \"Skyway Welcome Desk\" which is one floor down from the skyway level.   4. If you have any questions about your upcoming procedure, please contact nursing at [facility] at [phone] or at [facility] at [phone].    "

## 2019-09-06 NOTE — LETTER
9/6/2019    Kaia Elena MD  [address]  [facility] [address]    RE: Bria Davis       Dear Colleague,    I had the pleasure of seeing Bria Davis in the [facility].    HPI and Plan:   See dictation [id number]    Orders Placed This Encounter   Procedures     Basic metabolic panel       Orders Placed This Encounter   Medications     metoprolol succinate ER (TOPROL-XL) 25 MG 24 hr tablet     Sig: Take 1 tablet (25 mg) by mouth daily     Dispense:  93 tablet     Refill:  3       There are no discontinued medications.      Encounter Diagnoses   Name Primary?     Other forms of angina pectoris (H) Yes     Type 2 diabetes mellitus with chronic kidney disease, with long-term current use of insulin, unspecified CKD stage (H)        CURRENT MEDICATIONS:  Current Outpatient Medications   Medication Sig Dispense Refill     albuterol (PROAIR HFA/PROVENTIL HFA/VENTOLIN HFA) 108 (90 Base) MCG/ACT inhaler Inhale 2 puffs into the lungs every 6 hours 1 Inhaler 11     aspirin 81 MG tablet Take 1 tablet by mouth daily. 100 tablet 3     blood glucose (NO BRAND SPECIFIED) lancets standard Use to test blood sugar four times daily or as directed. 100 each 2     blood glucose (NO BRAND SPECIFIED) test strip Use to test blood sugar four times daily or as directed. 400 each 1     blood glucose monitoring (ACCU-CHEK FASTCLIX) lancets USE TO TEST BLOOD SUGAR FOUR TIMES A DAY OR AS DIRECTED 408 each 3     Calcium Carb-Cholecalciferol (CALCIUM-VITAMIN D) 600-400 MG-UNIT TABS Take 1 tablet by mouth daily       dulaglutide (TRULICITY) 1.5 MG/0.5ML pen Inject 1.5 mg Subcutaneous every 7 days 6 mL 1     esomeprazole (NEXIUM) 40 MG DR capsule Take 1 capsule (40 mg) by mouth every morning (before breakfast) 90 capsule 3     fluticasone-salmeterol (ADVAIR DISKUS) 100-50 MCG/DOSE inhaler Inhale 1 puff into the lungs 2 times daily Discuss refills @ Next appt. 3 Inhaler 1     insulin aspart (NOVOLOG VIAL)  "100 UNITS/ML vial Take 26 units with breakfast and lunch, 20 units with dinner.  If glucometer is over 200 take an extra 6 units 9 vial 1     insulin glargine (LANTUS VIAL) 100 UNIT/ML vial INJECT 46 UNITS UNDER THE SKIN twice daily 70 mL 2     insulin syringe-needle U-100 (31G X 5/16\" 1 ML) 31G X 5/16\" 1 ML miscellaneous Use 2 syringes daily or as directed. 180 each 1     insulin syringe-needle U-100 (BD INSULIN SYRINGE ULTRAFINE) 31G X 5/16\" 0.5 ML miscellaneous Use one syringe 3 times daily or as directed. 270 each 1     insulin syringe-needle U-100 30G X 1/2\" 0.3 ML Use four syringes daily or as directed. 400 each 3     losartan (COZAAR) 100 MG tablet Take 1 tablet (100 mg) by mouth daily 90 tablet 1     metFORMIN (GLUCOPHAGE-XR) 500 MG 24 hr tablet TAKE TWO TABLETS BY MOUTH TWICE A DAY WITH MEALS 360 tablet 1     metoclopramide (REGLAN) 10 MG tablet Take 1 tablet (10 mg) by mouth 2 times daily 180 tablet 1     metoprolol succinate ER (TOPROL-XL) 25 MG 24 hr tablet Take 1 tablet (25 mg) by mouth daily 93 tablet 3     montelukast (SINGULAIR) 10 MG tablet TAKE ONE TABLET BY MOUTH AT BEDTIME 90 tablet 1     Multiple Vitamins-Minerals (MULTIVITAMIN ADULTS 50+) TABS Take 1 tablet by mouth daily       Needle, Disp, (BD DISP NEEDLES) 30G X 1/2\" MISC To use with humalog insulin 3 times daily 300 each 1     pseudoePHEDrine (SUDAFED) 30 MG tablet TAKE TWO TABLETS BY MOUTH EVERY 12 HOURS 360 tablet 3     simvastatin (ZOCOR) 40 MG tablet Take 1 tablet (40 mg) by mouth At Bedtime at bedtime. 90 tablet 1       ALLERGIES     Allergies   Allergen Reactions     Lisinopril Cough     Tape [Adhesive Tape] Rash       PAST MEDICAL HISTORY:  Past Medical History:   Diagnosis Date     Asthma      Diabetes mellitus (H)      Esophageal reflux      Other chronic sinusitis        PAST SURGICAL HISTORY:  Past Surgical History:   Procedure Laterality Date     COLONOSCOPY  1/31/2002     COLONOSCOPY  10/26/2012    Procedure: COLONOSCOPY;  " Colonoscopy;  Surgeon: Margret Sales MD;  Location: [facility]     INJECT EPIDURAL LUMBAR  12/7/2010    INJECT EPIDURAL LUMBAR performed by GENERIC ANESTHESIA PROVIDER at [facility]     INJECT EPIDURAL LUMBAR  1/17/2011    INJECT EPIDURAL LUMBAR performed by GENERIC ANESTHESIA PROVIDER at [facility]     RELEASE CARPAL TUNNEL  6/19/2012    Procedure: RELEASE CARPAL TUNNEL;  Right Carpal Tunnel Release;  Surgeon: Mike Sparrow MD;  Location: [facility]     RELEASE CARPAL TUNNEL  11/9/2012    Procedure: RELEASE CARPAL TUNNEL;  Left Carpal Tunnel Release;  Surgeon: Mike Sparrow MD;  Location: [facility]     SURGICAL HISTORY OF -   4/10/2000    bilateral total ethmoidectomies, bilateral maxillary antrostomies, bilateral SMR of inferior turbinates, reduction of lt turbinate porter bullosa       FAMILY HISTORY:  Family History   Problem Relation Age of Onset     Hypertension Mother      Diabetes Mother      Respiratory Mother         asthma-COPD     Hypertension Father      Heart Disease Father      Cerebrovascular Disease Father      Cardiovascular Father      Breast Cancer Maternal Grandmother      Cancer Brother      Diabetes Brother      Respiratory Brother         copd     Chronic Obstructive Pulmonary Disease Brother      Depression Sister      Respiratory Sister         copd     Chronic Obstructive Pulmonary Disease Sister      Depression Sister      Chronic Obstructive Pulmonary Disease Sister      Depression Sister      Chronic Obstructive Pulmonary Disease Brother      Kidney failure Brother        SOCIAL HISTORY:  Social History     Socioeconomic History     Marital status:      Spouse name: None     Number of children: None     Years of education: None     Highest education level: None   Occupational History     None   Social Needs     Financial resource strain: None     Food insecurity:     Worry: None     Inability: None     Transportation needs:     Medical: None     Non-medical: None   Tobacco Use      Smoking status: Never Smoker     Smokeless tobacco: Never Used   Substance and Sexual Activity     Alcohol use: No     Drug use: No     Sexual activity: Yes     Partners: Male     Comment:  vasectomy   Lifestyle     Physical activity:     Days per week: None     Minutes per session: None     Stress: None   Relationships     Social connections:     Talks on phone: None     Gets together: None     Attends [employer] service: None     Active member of club or organization: None     Attends meetings of clubs or organizations: None     Relationship status: None     Intimate partner violence:     Fear of current or ex partner: None     Emotionally abused: None     Physically abused: None     Forced sexual activity: None   Other Topics Concern     Parent/sibling w/ CABG, MI or angioplasty before 65F 55M? No   Social History Narrative     None       Review of Systems:  Skin:  Negative       Eyes:  Positive for glasses    ENT:  Negative      Respiratory:  Positive for shortness of breath     Cardiovascular:  Negative for;palpitations;edema;syncope or near-syncope chest pain;Positive for;dizziness;fatigue;lightheadedness    Gastroenterology: Positive for nausea    Genitourinary:  Negative      Musculoskeletal:  Negative      Neurologic:  Negative for memory problems;headaches;stroke;seizures;numbness or tingling of hands;numbness or tingling of feet    Psychiatric:  Negative for anxiety;depression    Heme/Lymph/Imm:  Negative for bleeding disorder    Endocrine:  Positive for diabetes      Physical Exam:  Vitals: BP (!) 144/89   Pulse 85   Wt 121.1 kg (267 lb)   LMP 01/14/2016 (Approximate)   SpO2 98%   BMI 47.30 kg/m       Constitutional:  cooperative, alert and oriented, well developed, well nourished, in no acute distress        Skin:  warm and dry to the touch          Head:  normocephalic        Eyes:  sclera white        Lymph:      ENT:           Neck:  carotid pulses are full and equal bilaterally, JVP  normal, no carotid bruit        Respiratory:  normal breath sounds, clear to auscultation, normal A-P diameter, normal symmetry, normal respiratory excursion, no use of accessory muscles         Cardiac: regular rhythm, normal S1/S2, no S3 or S4, apical impulse not displaced, no murmurs, gallops or rubs                                                         GI:           Extremities and Muscular Skeletal:  no edema              Neurological:  no gross motor deficits;affect appropriate        Psych:  Alert and Oriented x 3;affect appropriate, oriented to time, person and place          CC  Kaia Elena MD  [id number] [place] [id number]                    Thank you for allowing me to participate in the care of your patient.      Sincerely,     Leni Kolb MD     [facility]    cc:   Kaia Elena MD  [address] [place] [address]

## 2019-09-09 NOTE — PROGRESS NOTES
Service Date: 09/06/2019      PATIENT HISTORY:  Ms. Bria Davis is 59 years old and was referred by Dr. Kaia Elena to [facility] in [place], [place].  She is seen for chest discomfort and a recent stress study which demonstrated a rate-dependent left bundle branch block.  She also had chest discomfort during the stress study.      Ms. Davis notes that for the past 3-6 months, she has developed exertional chest pressure.  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  She notes that concrete has been torn up and there has been concrete dust in the air.  She attributed her shortness of breath and the chest pressure to breathing problems associated with that renovation work.      She has no prior history of heart disease.  She began to develop upper mid-chest pressure if she walked too fast or exerted herself too significantly.  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  She was concerned about her ability to continue her job, having developed the discomfort.  On further questioning, she notes that sometimes she will also have mid-scapular discomfort.  The discomfort typically resolves shortly after she stops exerting herself.  She has been careful not to exert herself significantly, but still has been having daily symptoms.  She thought that she had no discomfort for the last 2 days, but then remembers that she was carrying 2 annmarie of paper and had mild discomfort.      She has never smoked, but has a family history of heart disease  through her father, has dyslipidemia with hypertriglyceridemia, currently treated with statin therapy and, with regard to her diabetes, the hemoglobin A1c's have been approximately 9-10 for the last year.  Her blood pressures have been reasonably controlled, but she does have a history of hypertension.      The stress study performed was a nuclear exercise  stress study and she was able to complete 5 minutes and 18 seconds.  She had chest tightness during the study which resulted in the study being ended.  She reached 91% of her age-predicted heart rate and there was no evidence of ischemia or infarct on the stress study.  She did develop a left bundle branch block at a rate of about 126 beats per minute which persisted 6 minutes into recovery.      PHYSICAL EXAMINATION:     GENERAL:  This is a woman in no apparent distress.  She was alert and oriented to person, place and time.   VITAL SIGNS:  Blood pressure was initially mildly elevated when she first came into the clinic and later on recheck on the left arm, sitting, it was 133/83 mmHg, heart rate 85 beats per minute and regular, and respiratory rate 14-18 per minute.   CHEST:  Clear to auscultation.   CARDIAC:  On cardiac auscultation, there was an S1 and S2 without extra sounds or a murmur.  The rhythm was regular.      ASSESSMENT/RECOMMENDATIONS:  With regard to her symptoms, she has classic exertional symptoms.  She had reproduction of her symptom on her exercise nuclear stress study and so despite the normal perfusion scan, I have recommended further evaluation.  I also recommended the addition of beta blockade to her medical regimen.  She will initiate metoprolol XL 25 mg daily.      I discussed the physiology of coronary ischemia.  I discussed the fact that stress imaging studies have a sensitivity and specificity based on the severity of disease.  I also explained that stress testing is not absolutely normal or absolutely abnormal as a definition of the existence of coronary disease.  I am concerned by her symptoms and her need to exert herself.  Her  asked about coronary angiography and whether it is always indicated.  I explained that it is indicated if there is a high suspicion of her limiting symptoms being caused by cardiac disease and I explained why I thought that was the case.  I also  explained that revascularization can be done with coronary angiography and the reason to do revascularization is when symptoms are not controlled by medication.  I did add metoprolol, but she is already on an ARB agent.  Her lipids are being treated and she is on low-dose aspirin.  Unfortunately, her blood sugars remain high.  I do also believe that she is at higher risk for possible multivessel coronary disease not detected by the stress study, and, as noted, the stress study was subjectively abnormal.  I explained the procedure in detail including the risks, benefits and alternatives of medical therapy alone.  I explained the risks of medical therapy alone.  She also understands the need for dual antiplatelet therapy following coronary intervention.      She and her  agreed to proceed with coronary angiography.  She asked about exercise and I recommended holding off on an exercise program until the procedure is completed.  She will contact us in the interim if she has any additional questions, but the procedure has now been scheduled.  She would like to proceed as quickly as possible.  Followup will be arranged after the coronary angiogram.         NIKKI VILCHIS MD, [facility]             D: 2019   T: 2019   MT: al      Name:     ALYCIA ENCINAS   MRN:      [id number]        Account:      [id number]   :      1960           Service Date: 2019      Document: [id number]

## 2019-09-11 ENCOUNTER — TELEPHONE (OUTPATIENT)
Dept: FAMILY MEDICINE | Facility: CLINIC | Age: 59
End: 2019-09-11

## 2019-09-11 NOTE — TELEPHONE ENCOUNTER
Reason for Call:  Medication question:    Do you use a [facility]?  Name of the pharmacy and phone number for the current request:  [facility] [phone]    Name of the medication requested: Insulin    Other request: Patient having coronary angiogram tomorrow, 9/12/2019 and needs to know how much insulin to take.     Can we leave a detailed message on this number? YES    Phone number patient can be reached at: Home number on file [phone] (home)    Best Time: Any    Call taken on 9/11/2019 at 12:22 PM by Ginny Merrill

## 2019-09-11 NOTE — TELEPHONE ENCOUNTER
Usually I recommend 80% of long acting insulin - so 30 units or so.  Should not take any short acting insulin.     Kaia Elena M.D.

## 2019-09-12 ENCOUNTER — SURGERY (OUTPATIENT)
Age: 59
End: 2019-09-12
Payer: COMMERCIAL

## 2019-09-12 ENCOUNTER — HOSPITAL ENCOUNTER (OUTPATIENT)
Facility: CLINIC | Age: 59
Discharge: HOME | End: 2019-09-12
Admitting: INTERNAL MEDICINE
Payer: COMMERCIAL

## 2019-09-12 VITALS
TEMPERATURE: 98.8 F | HEART RATE: 76 BPM | HEIGHT: 63 IN | SYSTOLIC BLOOD PRESSURE: 101 MMHG | BODY MASS INDEX: 47.31 KG/M2 | DIASTOLIC BLOOD PRESSURE: 49 MMHG | RESPIRATION RATE: 16 BRPM | OXYGEN SATURATION: 95 % | WEIGHT: 267 LBS

## 2019-09-12 DIAGNOSIS — I20.89 OTHER FORMS OF ANGINA PECTORIS (H): ICD-10-CM

## 2019-09-12 PROBLEM — Z98.890 STATUS POST CORONARY ANGIOGRAM: Status: ACTIVE | Noted: 2019-09-12

## 2019-09-12 LAB
ANION GAP SERPL CALCULATED.3IONS-SCNC: 6 MMOL/L (ref 3–14)
APTT PPP: 29 SEC (ref 22–37)
BUN SERPL-MCNC: 15 MG/DL (ref 7–30)
CALCIUM SERPL-MCNC: 9.3 MG/DL (ref 8.5–10.1)
CATH EF ESTIMATED: 60 %
CHLORIDE SERPL-SCNC: 107 MMOL/L (ref 94–109)
CO2 SERPL-SCNC: 27 MMOL/L (ref 20–32)
CREAT SERPL-MCNC: 0.54 MG/DL (ref 0.52–1.04)
ERYTHROCYTE [DISTWIDTH] IN BLOOD BY AUTOMATED COUNT: 17.4 % (ref 10–15)
GFR SERPL CREATININE-BSD FRML MDRD: >90 ML/MIN/{1.73_M2}
GLUCOSE SERPL-MCNC: 171 MG/DL (ref 70–99)
HCT VFR BLD AUTO: 36.4 % (ref 35–47)
HGB BLD-MCNC: 11.1 G/DL (ref 11.7–15.7)
INR PPP: 0.96 (ref 0.86–1.14)
MCH RBC QN AUTO: 22.4 PG (ref 26.5–33)
MCHC RBC AUTO-ENTMCNC: 30.5 G/DL (ref 31.5–36.5)
MCV RBC AUTO: 74 FL (ref 78–100)
PLATELET # BLD AUTO: 304 10E9/L (ref 150–450)
POTASSIUM SERPL-SCNC: 4.2 MMOL/L (ref 3.4–5.3)
RBC # BLD AUTO: 4.95 10E12/L (ref 3.8–5.2)
SODIUM SERPL-SCNC: 140 MMOL/L (ref 133–144)
WBC # BLD AUTO: 7.1 10E9/L (ref 4–11)

## 2019-09-12 PROCEDURE — 93005 ELECTROCARDIOGRAM TRACING: CPT

## 2019-09-12 PROCEDURE — 27210794 ZZH OR GENERAL SUPPLY STERILE: Performed by: INTERNAL MEDICINE

## 2019-09-12 PROCEDURE — 85730 THROMBOPLASTIN TIME PARTIAL: CPT | Performed by: INTERNAL MEDICINE

## 2019-09-12 PROCEDURE — 85610 PROTHROMBIN TIME: CPT | Performed by: INTERNAL MEDICINE

## 2019-09-12 PROCEDURE — 40000065 ZZH STATISTIC EKG NON-CHARGEABLE

## 2019-09-12 PROCEDURE — 40000235 ZZH STATISTIC TELEMETRY

## 2019-09-12 PROCEDURE — 40000852 ZZH STATISTIC HEART CATH LAB OR EP LAB

## 2019-09-12 PROCEDURE — 85027 COMPLETE CBC AUTOMATED: CPT | Performed by: INTERNAL MEDICINE

## 2019-09-12 PROCEDURE — 25800030 ZZH RX IP 258 OP 636: Performed by: INTERNAL MEDICINE

## 2019-09-12 PROCEDURE — 93010 ELECTROCARDIOGRAM REPORT: CPT | Performed by: INTERNAL MEDICINE

## 2019-09-12 PROCEDURE — 25000128 H RX IP 250 OP 636: Performed by: INTERNAL MEDICINE

## 2019-09-12 PROCEDURE — 80048 BASIC METABOLIC PNL TOTAL CA: CPT | Performed by: INTERNAL MEDICINE

## 2019-09-12 PROCEDURE — 93458 L HRT ARTERY/VENTRICLE ANGIO: CPT | Mod: 26 | Performed by: INTERNAL MEDICINE

## 2019-09-12 PROCEDURE — 36591 DRAW BLOOD OFF VENOUS DEVICE: CPT

## 2019-09-12 PROCEDURE — 99152 MOD SED SAME PHYS/QHP 5/>YRS: CPT | Performed by: INTERNAL MEDICINE

## 2019-09-12 PROCEDURE — 25000125 ZZHC RX 250: Performed by: INTERNAL MEDICINE

## 2019-09-12 PROCEDURE — 93458 L HRT ARTERY/VENTRICLE ANGIO: CPT | Performed by: INTERNAL MEDICINE

## 2019-09-12 RX ORDER — FENTANYL CITRATE 50 UG/ML
25-50 INJECTION, SOLUTION INTRAMUSCULAR; INTRAVENOUS
Status: DISCONTINUED | OUTPATIENT
Start: 2019-09-12 | End: 2019-09-12 | Stop reason: HOSPADM

## 2019-09-12 RX ORDER — SODIUM CHLORIDE 9 MG/ML
INJECTION, SOLUTION INTRAVENOUS CONTINUOUS
Status: DISCONTINUED | OUTPATIENT
Start: 2019-09-12 | End: 2019-09-12 | Stop reason: HOSPADM

## 2019-09-12 RX ORDER — LIDOCAINE 40 MG/G
CREAM TOPICAL
Status: DISCONTINUED | OUTPATIENT
Start: 2019-09-12 | End: 2019-09-12 | Stop reason: HOSPADM

## 2019-09-12 RX ORDER — ACETAMINOPHEN 325 MG/1
650 TABLET ORAL EVERY 4 HOURS PRN
Status: DISCONTINUED | OUTPATIENT
Start: 2019-09-12 | End: 2019-09-12 | Stop reason: HOSPADM

## 2019-09-12 RX ORDER — FENTANYL CITRATE 50 UG/ML
INJECTION, SOLUTION INTRAMUSCULAR; INTRAVENOUS
Status: DISCONTINUED | OUTPATIENT
Start: 2019-09-12 | End: 2019-09-12 | Stop reason: HOSPADM

## 2019-09-12 RX ORDER — NALOXONE HYDROCHLORIDE 0.4 MG/ML
.1-.4 INJECTION, SOLUTION INTRAMUSCULAR; INTRAVENOUS; SUBCUTANEOUS
Status: DISCONTINUED | OUTPATIENT
Start: 2019-09-12 | End: 2019-09-12 | Stop reason: HOSPADM

## 2019-09-12 RX ORDER — ATROPINE SULFATE 0.1 MG/ML
0.5 INJECTION INTRAVENOUS EVERY 5 MIN PRN
Status: DISCONTINUED | OUTPATIENT
Start: 2019-09-12 | End: 2019-09-12 | Stop reason: HOSPADM

## 2019-09-12 RX ORDER — FLUMAZENIL 0.1 MG/ML
0.2 INJECTION, SOLUTION INTRAVENOUS
Status: DISCONTINUED | OUTPATIENT
Start: 2019-09-12 | End: 2019-09-12 | Stop reason: HOSPADM

## 2019-09-12 RX ORDER — IOPAMIDOL 755 MG/ML
INJECTION, SOLUTION INTRAVASCULAR
Status: DISCONTINUED | OUTPATIENT
Start: 2019-09-12 | End: 2019-09-12 | Stop reason: HOSPADM

## 2019-09-12 RX ORDER — ASPIRIN 81 MG/1
81 TABLET ORAL DAILY
Status: DISCONTINUED | OUTPATIENT
Start: 2019-09-12 | End: 2019-09-12 | Stop reason: HOSPADM

## 2019-09-12 RX ORDER — NALOXONE HYDROCHLORIDE 0.4 MG/ML
.2-.4 INJECTION, SOLUTION INTRAMUSCULAR; INTRAVENOUS; SUBCUTANEOUS
Status: DISCONTINUED | OUTPATIENT
Start: 2019-09-12 | End: 2019-09-12 | Stop reason: HOSPADM

## 2019-09-12 RX ADMIN — SODIUM CHLORIDE: 9 INJECTION, SOLUTION INTRAVENOUS at 07:12

## 2019-09-12 RX ADMIN — LIDOCAINE HYDROCHLORIDE 10 ML: 10 INJECTION, SOLUTION EPIDURAL; INFILTRATION; INTRACAUDAL; PERINEURAL at 08:49

## 2019-09-12 RX ADMIN — MIDAZOLAM 0.5 MG: 1 INJECTION INTRAMUSCULAR; INTRAVENOUS at 08:49

## 2019-09-12 RX ADMIN — FENTANYL CITRATE 50 MCG: 50 INJECTION, SOLUTION INTRAMUSCULAR; INTRAVENOUS at 08:45

## 2019-09-12 RX ADMIN — MIDAZOLAM 1 MG: 1 INJECTION INTRAMUSCULAR; INTRAVENOUS at 08:45

## 2019-09-12 RX ADMIN — FENTANYL CITRATE 25 MCG: 50 INJECTION, SOLUTION INTRAMUSCULAR; INTRAVENOUS at 08:38

## 2019-09-12 RX ADMIN — MIDAZOLAM 0.5 MG: 1 INJECTION INTRAMUSCULAR; INTRAVENOUS at 08:38

## 2019-09-12 RX ADMIN — IOPAMIDOL 90 ML: 755 INJECTION, SOLUTION INTRAVENOUS at 09:02

## 2019-09-12 RX ADMIN — FENTANYL CITRATE 25 MCG: 50 INJECTION, SOLUTION INTRAMUSCULAR; INTRAVENOUS at 08:49

## 2019-09-12 ASSESSMENT — MIFFLIN-ST. JEOR: SCORE: 1760.23

## 2019-09-12 NOTE — PROGRESS NOTES
[facility] Discharge Nursing Note    Education/questions answered: Yes  Patient DC location: Home  Accompanied by:   CS discharge time: 1150

## 2019-09-12 NOTE — PROGRESS NOTES
[facility] Post-Procedure Note    Procedure: Heart Cath   CS arrival time: ~0917  Accompanied by: Cath lab RN  Concerns/abnormal assessment after procedure: Site CDI, no hematoma.  +CMS and pulses.  MD at bedside and spouse with pt and spouse.    Plan: Discharge home when stable.

## 2019-09-12 NOTE — PROGRESS NOTES
Care Suites Admission Nursing Note    Reason for admission: Heart Cath  CS arrival time: ~0624  Accompanied by: Spouse  Name/phone of DC : Tam [phone]  Medications held: No  Consent signed: Yes  Abnormal assessment/labs: No  If abnormal, provider notified: N/A  Education/questions answered: Discharge instructions reviewed and questions answered.  Pre-procedure questions addressed and explained.    Plan:Procedure ~0830

## 2019-09-12 NOTE — PRE-PROCEDURE
GENERAL PRE-PROCEDURE:   Procedure:  Heart cath and poss intervention  Date/Time:  9/12/2019 8:19 AM    Written consent obtained?: Yes    Risks and benefits: Risks, benefits and alternatives were discussed    Consent given by:  Patient  Patient states understanding of procedure being performed: Yes    Patient's understanding of procedure matches consent: Yes    Procedure consent matches procedure scheduled: Yes    Expected level of sedation:  Moderate  Appropriately NPO:  Yes  ASA Class:  Class 3- Severe systemic disease, definite functional limitations  Mallampati  :  Grade 2- soft palate, base of uvula, tonsillar pillars, and portion of posterior pharyngeal wall visible  Lungs:  Lungs clear with good breath sounds bilaterally  Heart:  Normal heart sounds and rate  History & Physical reviewed:  History and physical reviewed and no updates needed  Statement of review:  I have reviewed the lab findings, diagnostic data, medications, and the plan for sedation

## 2019-09-12 NOTE — PROGRESS NOTES
Pt took own BGM @1030.  GMU=676.  Pt took own insulin 26 Units Novolin R.    Discharge instructions reviewed and questions answered.

## 2019-09-12 NOTE — RESULT ENCOUNTER NOTE
Results noted: trivial CAD. Rec echo to be done. To be discussed at OV with Carmen Stevens NP on 9/20/19

## 2019-09-13 LAB — INTERPRETATION ECG - MUSE: NORMAL

## 2019-09-16 DIAGNOSIS — Z79.4 TYPE 2 DIABETES MELLITUS WITH CHRONIC KIDNEY DISEASE, WITH LONG-TERM CURRENT USE OF INSULIN, UNSPECIFIED CKD STAGE (H): ICD-10-CM

## 2019-09-16 DIAGNOSIS — E11.22 TYPE 2 DIABETES MELLITUS WITH CHRONIC KIDNEY DISEASE, WITH LONG-TERM CURRENT USE OF INSULIN, UNSPECIFIED CKD STAGE (H): ICD-10-CM

## 2019-09-16 LAB
ANION GAP SERPL CALCULATED.3IONS-SCNC: 7 MMOL/L (ref 3–14)
BUN SERPL-MCNC: 12 MG/DL (ref 7–30)
CALCIUM SERPL-MCNC: 8.8 MG/DL (ref 8.5–10.1)
CHLORIDE SERPL-SCNC: 103 MMOL/L (ref 94–109)
CO2 SERPL-SCNC: 26 MMOL/L (ref 20–32)
CREAT SERPL-MCNC: 0.54 MG/DL (ref 0.52–1.04)
GFR SERPL CREATININE-BSD FRML MDRD: >90 ML/MIN/{1.73_M2}
GLUCOSE SERPL-MCNC: 297 MG/DL (ref 70–99)
POTASSIUM SERPL-SCNC: 4.4 MMOL/L (ref 3.4–5.3)
SODIUM SERPL-SCNC: 136 MMOL/L (ref 133–144)

## 2019-09-16 PROCEDURE — 80048 BASIC METABOLIC PNL TOTAL CA: CPT | Performed by: INTERNAL MEDICINE

## 2019-09-16 PROCEDURE — 36415 COLL VENOUS BLD VENIPUNCTURE: CPT | Performed by: INTERNAL MEDICINE

## 2019-09-16 NOTE — RESULT ENCOUNTER NOTE
Results noted: electrolytes and kidney function WNL. Done after coronary angiogram on 9/12/19. Pt ok to restart metformin. Disc with patient who verbalized understanding.

## 2019-09-17 ENCOUNTER — HOSPITAL ENCOUNTER (OUTPATIENT)
Dept: CARDIOLOGY | Facility: CLINIC | Age: 59
Discharge: HOME | End: 2019-09-17
Attending: INTERNAL MEDICINE | Admitting: INTERNAL MEDICINE
Payer: COMMERCIAL

## 2019-09-17 DIAGNOSIS — I20.89 OTHER FORMS OF ANGINA PECTORIS (H): ICD-10-CM

## 2019-09-17 PROCEDURE — 93306 TTE W/DOPPLER COMPLETE: CPT | Mod: 26 | Performed by: INTERNAL MEDICINE

## 2019-09-17 PROCEDURE — 93306 TTE W/DOPPLER COMPLETE: CPT

## 2019-09-20 ENCOUNTER — OFFICE VISIT (OUTPATIENT)
Dept: CARDIOLOGY | Facility: CLINIC | Age: 59
End: 2019-09-20
Payer: COMMERCIAL

## 2019-09-20 VITALS
SYSTOLIC BLOOD PRESSURE: 132 MMHG | BODY MASS INDEX: 47.3 KG/M2 | OXYGEN SATURATION: 95 % | HEART RATE: 79 BPM | WEIGHT: 267 LBS | DIASTOLIC BLOOD PRESSURE: 84 MMHG

## 2019-09-20 DIAGNOSIS — E78.5 HYPERLIPIDEMIA LDL GOAL <70: ICD-10-CM

## 2019-09-20 DIAGNOSIS — I10 BENIGN ESSENTIAL HYPERTENSION: ICD-10-CM

## 2019-09-20 DIAGNOSIS — I25.10 NONOBSTRUCTIVE ATHEROSCLEROSIS OF CORONARY ARTERY: Primary | ICD-10-CM

## 2019-09-20 PROCEDURE — 99214 OFFICE O/P EST MOD 30 MIN: CPT | Performed by: NURSE PRACTITIONER

## 2019-09-20 NOTE — PATIENT INSTRUCTIONS
"[facility] HE[facility]  [facility]~[address]~[place], [place]~[address]  Thank you for your  Heart Care visit today. If you have questions regarding your visit, please contact your cardiology RN's, Melyssa Davison, at [phone]. Your provider has recommended the following:  Medication Changes:  Wait a couple weeks, if blood sugars aren't better controlled then hold metoprolol for 2 weeks and see if any improvement. If so, then call and we will discontinue metoprolol. If no improvement, restart metoprolol and follow-up with primary about diabetes   Recommendations:  1. Call if any questions   Follow-up:  See cardiology as needed.  To schedule a future appointment, we kindly ask that you call cardiology scheduling at [phone] three months prior to requested revisit date.      [facility] cardiology clinic is staffed with \"Advance Practice Providers\". These are our cardiology Physician Assistants and Nurse Practitioners.   Please call cardiology scheduling if you feel you need clinical evaluation with them at any time for any cardiac reason.   Reminder:  For your safety, we ask that you bring in your current medication(s) or an updated list of your medications with you to EACH office visit. Include the medication name, dose of pill on bottle and how you are taking it. Include over-the-counter medications or supplements. Your provider will review this at each visit and plan your care based on your current information.   ~~~~~~~~~~~~~~~~~~~~~~~~~~~~~~~~~~~~~~~  \"[facility]\" [place] telephone numbers for reference:  Cardiology Scheduling~[phone]  Diagnostic Imaging Scheduling~[phone]  Lab Scheduling~[phone]  Anticoagulation Clinic~[phone]  Cardiac Rehabilitation~[phone]  [facility] RN's~[phone] (at [facility])  Cardiology Clinic RN's~[phone] (Melyssa Davison, RN)  ~~~~~~~~~~~~~~~~~~~~~~~~~~~~~~~~~~~~~~~~    "

## 2019-09-20 NOTE — PROGRESS NOTES
Cardiology Clinic Progress Note  Bria Davis MRN# [id number]   YOB: 1960 Age: 59 year old      Reason For Visit: Angiogram follow-up   Primary Cardiologist:   Dr. Kolb          History of Presenting Illness:      Bria Davis is a pleasant 59 year old patient with a past cardiac history significant for hypertension, hyperlipidemia, and trivial nonobstructive CAD.  Past medical history significant for diabetes.     Pt was last seen by Dr. Kolb on 9/6/2018 in consultation for chest pain and abnormal stress test.  She noted exertional chest pressure over the prior 3-6 months.  She had been undergoing renovations and thought her chest pressure and SOB was related to this. Her stress test was stopped due to chest discomfort and did not show any ischemia or infarction.  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 She was started on metoprolol and angiogram recommended.    Pt presents today for angiogram follow-up.  BMP 4 days post angiogram showing normal renal function. Coronary angiogram showing only trivial CAD with 10% stenosis in the mid LAD and pRCA.  She was noted have normal EF but may be a gradient across the aorta.  Echocardiogram was recommended to further assess. Dr. Sanon recommended following up with PCP regarding microcytic anemia and diabetes management. Echocardiogram September 2019 showing LVEF 60-65%, mild to moderate concentric LVH, normal RV size and function, and no significant valvular disease. These results were reviewed with her today.    She continues with occasional chest discomfort.  She denies any palpitations or heart racing and did not have any arrhythmias on stress testing that occurred with her chest discomfort. She does have a history of asthma. Therefore, I recommend that she follow back with her PCP for this.  Blood pressure today is controlled.  She has noted elevated blood sugars after starting metoprolol.  She is unsure if this because  "she was holding her metformin, prior to angiogram, or metoprolol.  I did see that there is a side effect of \"unstable diabetes\" for metoprolol.  I have asked her to hold her metoprolol for a couple weeks and if blood sugars do not improve, call and we can discontinue the medication.  If there is improvement, she will restart metoprolol and follow-up with PCP.  She denies any pain, numbness or tingling, or problems with her angiogram site. Patient reports no PND, orthopnea, presyncope, syncope, edema, heart racing, or palpitations.    Current Cardiac Medications   Aspirin 81 mg daily  Losartan 100 mg daily  Metoprolol XL 25 mg daily  Simvastatin 40 mg daily                   Assessment and Plan:       Plan  1.  Hold metoprolol for two weeks.  If no improvement in blood sugars, restart metoprolol.  If improvement in blood sugars, call and we will discontinue metoprolol  2. Follow-up PCP regarding chest discomfort and SOB  3.  Follow-up with cardiology as needed      1. trivial nonobstructive CAD    Angiogram 8/2019 with 10% mid LAD and proximal RCA    No angina    Continue statin, aspirin, ARB, beta blocker      2. hyperlipidemia    Last LDL 58 on 2/2019    Continue simvastatin 40 mg daily      3. hypertension    controlled    Continue losartan, metoprolol         Thank you for allowing me to participate in this delightful patient's care.      This note was completed in part using Dragon voice recognition software. Although reviewed after completion, some word and grammatical errors may occur.    Carmen Strong, APRN CNP, APRN, CNP           Data:   All laboratory data reviewed        HPI and Plan:   See dictation    No orders of the defined types were placed in this encounter.      No orders of the defined types were placed in this encounter.      There are no discontinued medications.      Encounter Diagnoses   Name Primary?     Nonobstructive atherosclerosis of coronary artery Yes     Benign " "essential hypertension      Hyperlipidemia LDL goal <70        CURRENT MEDICATIONS:  Current Outpatient Medications   Medication Sig Dispense Refill     albuterol (PROAIR HFA/PROVENTIL HFA/VENTOLIN HFA) 108 (90 Base) MCG/ACT inhaler Inhale 2 puffs into the lungs every 6 hours 1 Inhaler 11     aspirin 81 MG tablet Take 1 tablet by mouth daily. 100 tablet 3     blood glucose (NO BRAND SPECIFIED) lancets standard Use to test blood sugar four times daily or as directed. 100 each 2     blood glucose (NO BRAND SPECIFIED) test strip Use to test blood sugar four times daily or as directed. 400 each 1     blood glucose monitoring (ACCU-CHEK FASTCLIX) lancets USE TO TEST BLOOD SUGAR FOUR TIMES A DAY OR AS DIRECTED 408 each 3     Calcium Carb-Cholecalciferol (CALCIUM-VITAMIN D) 600-400 MG-UNIT TABS Take 1 tablet by mouth daily       dulaglutide (TRULICITY) 1.5 MG/0.5ML pen Inject 1.5 mg Subcutaneous every 7 days 6 mL 1     esomeprazole (NEXIUM) 40 MG DR capsule Take 1 capsule (40 mg) by mouth every morning (before breakfast) 90 capsule 3     fluticasone-salmeterol (ADVAIR DISKUS) 100-50 MCG/DOSE inhaler Inhale 1 puff into the lungs 2 times daily Discuss refills @ Next appt. 3 Inhaler 1     insulin aspart (NOVOLOG VIAL) 100 UNITS/ML vial Take 26 units with breakfast and lunch, 20 units with dinner.  If glucometer is over 200 take an extra 6 units 9 vial 1     insulin glargine (LANTUS VIAL) 100 UNIT/ML vial INJECT 46 UNITS UNDER THE SKIN twice daily 70 mL 2     insulin syringe-needle U-100 (31G X 5/16\" 1 ML) 31G X 5/16\" 1 ML miscellaneous Use 2 syringes daily or as directed. 180 each 1     insulin syringe-needle U-100 (BD INSULIN SYRINGE ULTRAFINE) 31G X 5/16\" 0.5 ML miscellaneous Use one syringe 3 times daily or as directed. 270 each 1     insulin syringe-needle U-100 30G X 1/2\" 0.3 ML Use four syringes daily or as directed. 400 each 3     losartan (COZAAR) 100 MG tablet Take 1 tablet (100 mg) by mouth daily 90 tablet 1     " "metFORMIN (GLUCOPHAGE-XR) 500 MG 24 hr tablet TAKE TWO TABLETS BY MOUTH TWICE A DAY WITH MEALS 360 tablet 1     metoclopramide (REGLAN) 10 MG tablet Take 1 tablet (10 mg) by mouth 2 times daily 180 tablet 1     metoprolol succinate ER (TOPROL-XL) 25 MG 24 hr tablet Take 1 tablet (25 mg) by mouth daily 93 tablet 3     montelukast (SINGULAIR) 10 MG tablet TAKE ONE TABLET BY MOUTH AT BEDTIME 90 tablet 1     Multiple Vitamins-Minerals (MULTIVITAMIN ADULTS 50+) TABS Take 1 tablet by mouth daily       Needle, Disp, (BD DISP NEEDLES) 30G X 1/2\" MISC To use with humalog insulin 3 times daily 300 each 1     pseudoePHEDrine (SUDAFED) 30 MG tablet TAKE TWO TABLETS BY MOUTH EVERY 12 HOURS 360 tablet 3     simvastatin (ZOCOR) 40 MG tablet Take 1 tablet (40 mg) by mouth At Bedtime at bedtime. 90 tablet 1       ALLERGIES     Allergies   Allergen Reactions     Lisinopril Cough     Tape [Adhesive Tape] Rash       PAST MEDICAL HISTORY:  Past Medical History:   Diagnosis Date     Asthma      Diabetes mellitus (H)      Esophageal reflux      Obese      Other chronic sinusitis      PONV (postoperative nausea and vomiting)        PAST SURGICAL HISTORY:  Past Surgical History:   Procedure Laterality Date     COLONOSCOPY  1/31/2002     COLONOSCOPY  10/26/2012    Procedure: COLONOSCOPY;  Colonoscopy;  Surgeon: Margret Sales MD;  Location: [facility]     CV LEFT HEART CATH N/A 9/12/2019    Procedure: Left Heart Cath;  Surgeon: Mike Sanon MD;  Location:  HE[facility] CARDIAC CATH LAB     CV LEFT VENTRICULOGRAM N/A 9/12/2019    Procedure: Left Ventriculogram;  Surgeon: Mike Sanon MD;  Location:  HEART CARDIAC CATH LAB     GYN SURGERY      Hydroablation 2007, polyp removal 2018     INJECT EPIDURAL LUMBAR  12/7/2010    INJECT EPIDURAL LUMBAR performed by GENERIC ANESTHESIA PROVIDER at [facility]     INJECT EPIDURAL LUMBAR  1/17/2011    INJECT EPIDURAL LUMBAR performed by GENERIC ANESTHESIA PROVIDER at [facility]     RELEASE CARPAL " TUNNEL  6/19/2012    Procedure: RELEASE CARPAL TUNNEL;  Right Carpal Tunnel Release;  Surgeon: Mike Sparrow MD;  Location: [facility]     RELEASE CARPAL TUNNEL  11/9/2012    Procedure: RELEASE CARPAL TUNNEL;  Left Carpal Tunnel Release;  Surgeon: Mike Sparrow MD;  Location: [facility]     SURGICAL HISTORY OF -   4/10/2000    bilateral total ethmoidectomies, bilateral maxillary antrostomies, bilateral SMR of inferior turbinates, reduction of lt turbinate porter bullosa       FAMILY HISTORY:  Family History   Problem Relation Age of Onset     Hypertension Mother      Diabetes Mother      Respiratory Mother         asthma-COPD     Hypertension Father      Heart Disease Father      Cerebrovascular Disease Father      Cardiovascular Father      Breast Cancer Maternal Grandmother      Cancer Brother      Diabetes Brother      Respiratory Brother         copd     Chronic Obstructive Pulmonary Disease Brother      Depression Sister      Respiratory Sister         copd     Chronic Obstructive Pulmonary Disease Sister      Depression Sister      Chronic Obstructive Pulmonary Disease Sister      Depression Sister      Chronic Obstructive Pulmonary Disease Brother      Kidney failure Brother        SOCIAL HISTORY:  Social History     Socioeconomic History     Marital status:      Spouse name: None     Number of children: None     Years of education: None     Highest education level: None   Occupational History     None   Social Needs     Financial resource strain: None     Food insecurity:     Worry: None     Inability: None     Transportation needs:     Medical: None     Non-medical: None   Tobacco Use     Smoking status: Never Smoker     Smokeless tobacco: Never Used   Substance and Sexual Activity     Alcohol use: No     Drug use: No     Sexual activity: Yes     Partners: Male     Comment:  vasectomy   Lifestyle     Physical activity:     Days per week: None     Minutes per session: None     Stress: None    Relationships     Social connections:     Talks on phone: None     Gets together: None     Attends Islam service: None     Active member of club or organization: None     Attends meetings of clubs or organizations: None     Relationship status: None     Intimate partner violence:     Fear of current or ex partner: None     Emotionally abused: None     Physically abused: None     Forced sexual activity: None   Other Topics Concern     Parent/sibling w/ CABG, MI or angioplasty before 65F 55M? No   Social History Narrative     None       Review of Systems:  Skin:  Negative       Eyes:  Positive for glasses    ENT:  Negative      Respiratory:  Positive for shortness of breath     Cardiovascular:  Negative for;edema;syncope or near-syncope;lightheadedness;dizziness chest pain;Positive for;fatigue;palpitations    Gastroenterology: Positive for nausea    Genitourinary:  Negative      Musculoskeletal:  Negative      Neurologic:  Negative for memory problems;headaches;stroke;seizures;numbness or tingling of hands;numbness or tingling of feet    Psychiatric:  Negative for anxiety;depression    Heme/Lymph/Imm:  Negative for bleeding disorder    Endocrine:  Positive for diabetes      Physical Exam:  Vitals: /84   Pulse 79   Wt 121.1 kg (267 lb)   LMP 01/14/2016 (Approximate)   SpO2 95%   BMI 47.30 kg/m      Constitutional:  cooperative, alert and oriented, well developed, well nourished, in no acute distress overweight      Skin:  warm and dry to the touch          Head:  normocephalic        Eyes:  sclera white        Lymph:      ENT:  no pallor or cyanosis        Neck:  no stiffness        Respiratory:  clear to auscultation;normal symmetry         Cardiac: regular rhythm;normal S1 and S2                pulses full and equal                                        GI:  abdomen soft        Extremities and Muscular Skeletal:  no edema              Neurological:  no gross motor deficits;affect appropriate         Psych:  Alert and Oriented x 3        CC  No referring provider defined for this encounter.

## 2019-09-20 NOTE — LETTER
9/20/2019    Kaia Elena MD  [id number]  [facility] [address]    RE: Bria Davis       Dear Colleague,    I had the pleasure of seeing Bria Davis in the [facility].    Cardiology Clinic Progress Note  Bria Davis MRN# [id number]   YOB: 1960 Age: 59 year old      Reason For Visit: Angiogram follow-up   Primary Cardiologist:   Dr. Kolb          History of Presenting Illness:      Bria Davis is a pleasant 59 year old patient with a past cardiac history significant for hypertension, hyperlipidemia, and trivial nonobstructive CAD.  Past medical history significant for diabetes.     Pt was last seen by Dr. Kolb on 9/6/2018 in consultation for chest pain and abnormal stress test.  She noted exertional chest pressure over the prior 3-6 months.  She had been undergoing renovations and thought her chest pressure and SOB was related to this. Her stress test was stopped due to chest discomfort and did not show any ischemia or infarction.  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 She was started on metoprolol and angiogram recommended.    Pt presents today for angiogram follow-up.  BMP 4 days post angiogram showing normal renal function. Coronary angiogram showing only trivial CAD with 10% stenosis in the mid LAD and pRCA.  She was noted have normal EF but may be a gradient across the aorta.  Echocardiogram was recommended to further assess. Dr. Sanon recommended following up with PCP regarding microcytic anemia and diabetes management. Echocardiogram September 2019 showing LVEF 60-65%, mild to moderate concentric LVH, normal RV size and function, and no significant valvular disease. These results were reviewed with her today.    She continues with occasional chest discomfort.  She denies any palpitations or heart racing and did not have any arrhythmias on stress testing that occurred with her chest discomfort. She does  "have a history of asthma. Therefore, I recommend that she follow back with her PCP for this.  Blood pressure today is controlled.  She has noted elevated blood sugars after starting metoprolol.  She is unsure if this because she was holding her metformin, prior to angiogram, or metoprolol.  I did see that there is a side effect of \"unstable diabetes\" for metoprolol.  I have asked her to hold her metoprolol for a couple weeks and if blood sugars do not improve, call and we can discontinue the medication.  If there is improvement, she will restart metoprolol and follow-up with PCP.  She denies any pain, numbness or tingling, or problems with her angiogram site. Patient reports no PND, orthopnea, presyncope, syncope, edema, heart racing, or palpitations.    Current Cardiac Medications   Aspirin 81 mg daily  Losartan 100 mg daily  Metoprolol XL 25 mg daily  Simvastatin 40 mg daily                   Assessment and Plan:       Plan  1.  Hold metoprolol for two weeks.  If no improvement in blood sugars, restart metoprolol.  If improvement in blood sugars, call and we will discontinue metoprolol  2. Follow-up PCP regarding chest discomfort and SOB  3.  Follow-up with cardiology as needed      1. trivial nonobstructive CAD    Angiogram 8/2019 with 10% mid LAD and proximal RCA    No angina    Continue statin, aspirin, ARB, beta blocker      2. hyperlipidemia    Last LDL 58 on 2/2019    Continue simvastatin 40 mg daily      3. hypertension    controlled    Continue losartan, metoprolol         Thank you for allowing me to participate in this delightful patient's care.      This note was completed in part using Dragon voice recognition software. Although reviewed after completion, some word and grammatical errors may occur.    Carmen Strong, APRN CNP, APRN, CNP           Data:   All laboratory data reviewed        HPI and Plan:   See dictation    No orders of the defined types were placed in this " "encounter.      No orders of the defined types were placed in this encounter.      There are no discontinued medications.      Encounter Diagnoses   Name Primary?     Nonobstructive atherosclerosis of coronary artery Yes     Benign essential hypertension      Hyperlipidemia LDL goal <70        CURRENT MEDICATIONS:  Current Outpatient Medications   Medication Sig Dispense Refill     albuterol (PROAIR HFA/PROVENTIL HFA/VENTOLIN HFA) 108 (90 Base) MCG/ACT inhaler Inhale 2 puffs into the lungs every 6 hours 1 Inhaler 11     aspirin 81 MG tablet Take 1 tablet by mouth daily. 100 tablet 3     blood glucose (NO BRAND SPECIFIED) lancets standard Use to test blood sugar four times daily or as directed. 100 each 2     blood glucose (NO BRAND SPECIFIED) test strip Use to test blood sugar four times daily or as directed. 400 each 1     blood glucose monitoring (ACCU-CHEK FASTCLIX) lancets USE TO TEST BLOOD SUGAR FOUR TIMES A DAY OR AS DIRECTED 408 each 3     Calcium Carb-Cholecalciferol (CALCIUM-VITAMIN D) 600-400 MG-UNIT TABS Take 1 tablet by mouth daily       dulaglutide (TRULICITY) 1.5 MG/0.5ML pen Inject 1.5 mg Subcutaneous every 7 days 6 mL 1     esomeprazole (NEXIUM) 40 MG DR capsule Take 1 capsule (40 mg) by mouth every morning (before breakfast) 90 capsule 3     fluticasone-salmeterol (ADVAIR DISKUS) 100-50 MCG/DOSE inhaler Inhale 1 puff into the lungs 2 times daily Discuss refills @ Next appt. 3 Inhaler 1     insulin aspart (NOVOLOG VIAL) 100 UNITS/ML vial Take 26 units with breakfast and lunch, 20 units with dinner.  If glucometer is over 200 take an extra 6 units 9 vial 1     insulin glargine (LANTUS VIAL) 100 UNIT/ML vial INJECT 46 UNITS UNDER THE SKIN twice daily 70 mL 2     insulin syringe-needle U-100 (31G X 5/16\" 1 ML) 31G X 5/16\" 1 ML miscellaneous Use 2 syringes daily or as directed. 180 each 1     insulin syringe-needle U-100 (BD INSULIN SYRINGE ULTRAFINE) 31G X 5/16\" 0.5 ML miscellaneous Use one syringe 3 " "times daily or as directed. 270 each 1     insulin syringe-needle U-100 30G X 1/2\" 0.3 ML Use four syringes daily or as directed. 400 each 3     losartan (COZAAR) 100 MG tablet Take 1 tablet (100 mg) by mouth daily 90 tablet 1     metFORMIN (GLUCOPHAGE-XR) 500 MG 24 hr tablet TAKE TWO TABLETS BY MOUTH TWICE A DAY WITH MEALS 360 tablet 1     metoclopramide (REGLAN) 10 MG tablet Take 1 tablet (10 mg) by mouth 2 times daily 180 tablet 1     metoprolol succinate ER (TOPROL-XL) 25 MG 24 hr tablet Take 1 tablet (25 mg) by mouth daily 93 tablet 3     montelukast (SINGULAIR) 10 MG tablet TAKE ONE TABLET BY MOUTH AT BEDTIME 90 tablet 1     Multiple Vitamins-Minerals (MULTIVITAMIN ADULTS 50+) TABS Take 1 tablet by mouth daily       Needle, Disp, (BD DISP NEEDLES) 30G X 1/2\" MISC To use with humalog insulin 3 times daily 300 each 1     pseudoePHEDrine (SUDAFED) 30 MG tablet TAKE TWO TABLETS BY MOUTH EVERY 12 HOURS 360 tablet 3     simvastatin (ZOCOR) 40 MG tablet Take 1 tablet (40 mg) by mouth At Bedtime at bedtime. 90 tablet 1       ALLERGIES     Allergies   Allergen Reactions     Lisinopril Cough     Tape [Adhesive Tape] Rash       PAST MEDICAL HISTORY:  Past Medical History:   Diagnosis Date     Asthma      Diabetes mellitus (H)      Esophageal reflux      Obese      Other chronic sinusitis      PONV (postoperative nausea and vomiting)        PAST SURGICAL HISTORY:  Past Surgical History:   Procedure Laterality Date     COLONOSCOPY  1/31/2002     COLONOSCOPY  10/26/2012    Procedure: COLONOSCOPY;  Colonoscopy;  Surgeon: Margret Sales MD;  Location: [facility]     CV LEFT HEART CATH N/A 9/12/2019    Procedure: Left Heart Cath;  Surgeon: Mike Sanon MD;  Location: [facility] CARDIAC C[facility] LAB     CV LEFT VENTRICULOGRAM N/A 9/12/2019    Procedure: Left Ventriculogram;  Surgeon: Mike Sanon MD;  Location:  HEART CARDIAC CATH LAB     GYN SURGERY      Hydroablation 2007, polyp removal 2018     INJECT " EPIDURAL LUMBAR  12/7/2010    INJECT EPIDURAL LUMBAR performed by GENERIC ANESTHESIA PROVIDER at [facility]     INJECT EPIDURAL LUMBAR  1/17/2011    INJECT EPIDURAL LUMBAR performed by GENERIC ANESTHESIA PROVIDER at [facility]     RELEASE CARPAL TUNNEL  6/19/2012    Procedure: RELEASE CARPAL TUNNEL;  Right Carpal Tunnel Release;  Surgeon: Mike Sparrow MD;  Location: [facility]     RELEASE CARPAL TUNNEL  11/9/2012    Procedure: RELEASE CARPAL TUNNEL;  Left Carpal Tunnel Release;  Surgeon: Mike Sparrow MD;  Location: [facility]     SURGICAL HISTORY OF -   4/10/2000    bilateral total ethmoidectomies, bilateral maxillary antrostomies, bilateral SMR of inferior turbinates, reduction of lt turbinate porter bullosa       FAMILY HISTORY:  Family History   Problem Relation Age of Onset     Hypertension Mother      Diabetes Mother      Respiratory Mother         asthma-COPD     Hypertension Father      Heart Disease Father      Cerebrovascular Disease Father      Cardiovascular Father      Breast Cancer Maternal Grandmother      Cancer Brother      Diabetes Brother      Respiratory Brother         copd     Chronic Obstructive Pulmonary Disease Brother      Depression Sister      Respiratory Sister         copd     Chronic Obstructive Pulmonary Disease Sister      Depression Sister      Chronic Obstructive Pulmonary Disease Sister      Depression Sister      Chronic Obstructive Pulmonary Disease Brother      Kidney failure Brother        SOCIAL HISTORY:  Social History     Socioeconomic History     Marital status:      Spouse name: None     Number of children: None     Years of education: None     Highest education level: None   Occupational History     None   Social Needs     Financial resource strain: None     Food insecurity:     Worry: None     Inability: None     Transportation needs:     Medical: None     Non-medical: None   Tobacco Use     Smoking status: Never Smoker     Smokeless tobacco: Never Used   Substance and  Sexual Activity     Alcohol use: No     Drug use: No     Sexual activity: Yes     Partners: Male     Comment:  vasectomy   Lifestyle     Physical activity:     Days per week: None     Minutes per session: None     Stress: None   Relationships     Social connections:     Talks on phone: None     Gets together: None     Attends Anglican service: None     Active member of club or organization: None     Attends meetings of clubs or organizations: None     Relationship status: None     Intimate partner violence:     Fear of current or ex partner: None     Emotionally abused: None     Physically abused: None     Forced sexual activity: None   Other Topics Concern     Parent/sibling w/ CABG, MI or angioplasty before 65F 55M? No   Social History Narrative     None       Review of Systems:  Skin:  Negative       Eyes:  Positive for glasses    ENT:  Negative      Respiratory:  Positive for shortness of breath     Cardiovascular:  Negative for;edema;syncope or near-syncope;lightheadedness;dizziness chest pain;Positive for;fatigue;palpitations    Gastroenterology: Positive for nausea    Genitourinary:  Negative      Musculoskeletal:  Negative      Neurologic:  Negative for memory problems;headaches;stroke;seizures;numbness or tingling of hands;numbness or tingling of feet    Psychiatric:  Negative for anxiety;depression    Heme/Lymph/Imm:  Negative for bleeding disorder    Endocrine:  Positive for diabetes      Physical Exam:  Vitals: /84   Pulse 79   Wt 121.1 kg (267 lb)   LMP 01/14/2016 (Approximate)   SpO2 95%   BMI 47.30 kg/m       Constitutional:  cooperative, alert and oriented, well developed, well nourished, in no acute distress overweight      Skin:  warm and dry to the touch          Head:  normocephalic        Eyes:  sclera white        Lymph:      ENT:  no pallor or cyanosis        Neck:  no stiffness        Respiratory:  clear to auscultation;normal symmetry         Cardiac: regular rhythm;normal  S1 and S2                pulses full and equal                                        GI:  abdomen soft        Extremities and Muscular Skeletal:  no edema              Neurological:  no gross motor deficits;affect appropriate        Psych:  Alert and Oriented x 3            Thank you for allowing me to participate in the care of your patient.    Sincerely,     ALYSON Corrigan [facility]

## 2019-10-04 ENCOUNTER — HEALTH MAINTENANCE LETTER (OUTPATIENT)
Age: 59
End: 2019-10-04

## 2019-10-31 ENCOUNTER — OFFICE VISIT (OUTPATIENT)
Dept: FAMILY MEDICINE | Facility: CLINIC | Age: 59
End: 2019-10-31
Payer: COMMERCIAL

## 2019-10-31 VITALS
HEART RATE: 82 BPM | BODY MASS INDEX: 46.78 KG/M2 | OXYGEN SATURATION: 96 % | RESPIRATION RATE: 18 BRPM | HEIGHT: 63 IN | SYSTOLIC BLOOD PRESSURE: 132 MMHG | WEIGHT: 264 LBS | TEMPERATURE: 97.2 F | DIASTOLIC BLOOD PRESSURE: 84 MMHG

## 2019-10-31 DIAGNOSIS — E11.22 TYPE 2 DIABETES MELLITUS WITH CHRONIC KIDNEY DISEASE, WITH LONG-TERM CURRENT USE OF INSULIN, UNSPECIFIED CKD STAGE (H): Primary | ICD-10-CM

## 2019-10-31 DIAGNOSIS — D50.9 MICROCYTIC ANEMIA: ICD-10-CM

## 2019-10-31 DIAGNOSIS — M79.672 HEEL PAIN, CHRONIC, LEFT: ICD-10-CM

## 2019-10-31 DIAGNOSIS — Z23 NEED FOR PROPHYLACTIC VACCINATION AND INOCULATION AGAINST INFLUENZA: ICD-10-CM

## 2019-10-31 DIAGNOSIS — E11.43 GASTROPARESIS DUE TO DM (H): ICD-10-CM

## 2019-10-31 DIAGNOSIS — G89.29 HEEL PAIN, CHRONIC, LEFT: ICD-10-CM

## 2019-10-31 DIAGNOSIS — J45.20 MILD INTERMITTENT ASTHMA, UNCOMPLICATED: ICD-10-CM

## 2019-10-31 DIAGNOSIS — E78.5 HYPERLIPIDEMIA LDL GOAL <100: ICD-10-CM

## 2019-10-31 DIAGNOSIS — K31.84 GASTROPARESIS DUE TO DM (H): ICD-10-CM

## 2019-10-31 DIAGNOSIS — Z79.4 TYPE 2 DIABETES MELLITUS WITH CHRONIC KIDNEY DISEASE, WITH LONG-TERM CURRENT USE OF INSULIN, UNSPECIFIED CKD STAGE (H): Primary | ICD-10-CM

## 2019-10-31 LAB
ALBUMIN SERPL-MCNC: 3.3 G/DL (ref 3.4–5)
ALP SERPL-CCNC: 84 U/L (ref 40–150)
ALT SERPL W P-5'-P-CCNC: 25 U/L (ref 0–50)
ANION GAP SERPL CALCULATED.3IONS-SCNC: 4 MMOL/L (ref 3–14)
AST SERPL W P-5'-P-CCNC: 16 U/L (ref 0–45)
BASOPHILS # BLD AUTO: 0.1 10E9/L (ref 0–0.2)
BASOPHILS NFR BLD AUTO: 1.6 %
BILIRUB SERPL-MCNC: 0.4 MG/DL (ref 0.2–1.3)
BUN SERPL-MCNC: 12 MG/DL (ref 7–30)
CALCIUM SERPL-MCNC: 9 MG/DL (ref 8.5–10.1)
CHLORIDE SERPL-SCNC: 104 MMOL/L (ref 94–109)
CHOLEST SERPL-MCNC: 157 MG/DL
CO2 SERPL-SCNC: 30 MMOL/L (ref 20–32)
CREAT SERPL-MCNC: 0.57 MG/DL (ref 0.52–1.04)
DIFFERENTIAL METHOD BLD: ABNORMAL
EOSINOPHIL # BLD AUTO: 0.5 10E9/L (ref 0–0.7)
EOSINOPHIL NFR BLD AUTO: 6.8 %
ERYTHROCYTE [DISTWIDTH] IN BLOOD BY AUTOMATED COUNT: 17.1 % (ref 10–15)
FERRITIN SERPL-MCNC: 6 NG/ML (ref 8–252)
GFR SERPL CREATININE-BSD FRML MDRD: >90 ML/MIN/{1.73_M2}
GLUCOSE SERPL-MCNC: 256 MG/DL (ref 70–99)
HBA1C MFR BLD: 9.3 % (ref 0–5.6)
HCT VFR BLD AUTO: 37.4 % (ref 35–47)
HDLC SERPL-MCNC: 45 MG/DL
HGB BLD-MCNC: 11.3 G/DL (ref 11.7–15.7)
IRON SATN MFR SERPL: 9 % (ref 15–46)
IRON SERPL-MCNC: 39 UG/DL (ref 35–180)
LDLC SERPL CALC-MCNC: 63 MG/DL
LYMPHOCYTES # BLD AUTO: 1.4 10E9/L (ref 0.8–5.3)
LYMPHOCYTES NFR BLD AUTO: 17.5 %
MCH RBC QN AUTO: 22.6 PG (ref 26.5–33)
MCHC RBC AUTO-ENTMCNC: 30.2 G/DL (ref 31.5–36.5)
MCV RBC AUTO: 75 FL (ref 78–100)
MONOCYTES # BLD AUTO: 0.6 10E9/L (ref 0–1.3)
MONOCYTES NFR BLD AUTO: 8 %
NEUTROPHILS # BLD AUTO: 5.3 10E9/L (ref 1.6–8.3)
NEUTROPHILS NFR BLD AUTO: 66.1 %
NONHDLC SERPL-MCNC: 112 MG/DL
PLATELET # BLD AUTO: 339 10E9/L (ref 150–450)
POTASSIUM SERPL-SCNC: 4.9 MMOL/L (ref 3.4–5.3)
PROT SERPL-MCNC: 7.1 G/DL (ref 6.8–8.8)
RBC # BLD AUTO: 5.01 10E12/L (ref 3.8–5.2)
SODIUM SERPL-SCNC: 138 MMOL/L (ref 133–144)
TIBC SERPL-MCNC: 412 UG/DL (ref 240–430)
TRIGL SERPL-MCNC: 243 MG/DL
VIT B12 SERPL-MCNC: 651 PG/ML (ref 193–986)
WBC # BLD AUTO: 8 10E9/L (ref 4–11)

## 2019-10-31 PROCEDURE — 83540 ASSAY OF IRON: CPT | Performed by: FAMILY MEDICINE

## 2019-10-31 PROCEDURE — 99207 C FOOT EXAM  NO CHARGE: CPT | Performed by: FAMILY MEDICINE

## 2019-10-31 PROCEDURE — 36415 COLL VENOUS BLD VENIPUNCTURE: CPT | Performed by: FAMILY MEDICINE

## 2019-10-31 PROCEDURE — 99214 OFFICE O/P EST MOD 30 MIN: CPT | Mod: 25 | Performed by: FAMILY MEDICINE

## 2019-10-31 PROCEDURE — 90682 RIV4 VACC RECOMBINANT DNA IM: CPT | Performed by: FAMILY MEDICINE

## 2019-10-31 PROCEDURE — 80061 LIPID PANEL: CPT | Performed by: FAMILY MEDICINE

## 2019-10-31 PROCEDURE — 83036 HEMOGLOBIN GLYCOSYLATED A1C: CPT | Performed by: FAMILY MEDICINE

## 2019-10-31 PROCEDURE — 83550 IRON BINDING TEST: CPT | Performed by: FAMILY MEDICINE

## 2019-10-31 PROCEDURE — 85025 COMPLETE CBC W/AUTO DIFF WBC: CPT | Performed by: FAMILY MEDICINE

## 2019-10-31 PROCEDURE — 80053 COMPREHEN METABOLIC PANEL: CPT | Performed by: FAMILY MEDICINE

## 2019-10-31 PROCEDURE — 90471 IMMUNIZATION ADMIN: CPT | Performed by: FAMILY MEDICINE

## 2019-10-31 PROCEDURE — 82607 VITAMIN B-12: CPT | Performed by: FAMILY MEDICINE

## 2019-10-31 PROCEDURE — 82728 ASSAY OF FERRITIN: CPT | Performed by: FAMILY MEDICINE

## 2019-10-31 RX ORDER — INSULIN GLARGINE 100 [IU]/ML
INJECTION, SOLUTION SUBCUTANEOUS
Qty: 70 ML | Refills: 2 | Status: SHIPPED | OUTPATIENT
Start: 2019-10-31 | End: 2019-11-06 | Stop reason: ALTCHOICE

## 2019-10-31 RX ORDER — MONTELUKAST SODIUM 10 MG/1
TABLET ORAL
Qty: 90 TABLET | Refills: 1 | Status: SHIPPED | OUTPATIENT
Start: 2019-10-31 | End: 2020-06-15

## 2019-10-31 RX ORDER — METOCLOPRAMIDE 10 MG/1
10 TABLET ORAL 2 TIMES DAILY
Qty: 180 TABLET | Refills: 1 | Status: SHIPPED | OUTPATIENT
Start: 2019-10-31 | End: 2020-06-08

## 2019-10-31 RX ORDER — SIMVASTATIN 40 MG
40 TABLET ORAL AT BEDTIME
Qty: 90 TABLET | Refills: 1 | Status: SHIPPED | OUTPATIENT
Start: 2019-10-31 | End: 2020-06-15

## 2019-10-31 RX ORDER — METFORMIN HCL 500 MG
TABLET, EXTENDED RELEASE 24 HR ORAL
Qty: 360 TABLET | Refills: 1 | Status: SHIPPED | OUTPATIENT
Start: 2019-10-31 | End: 2020-06-08

## 2019-10-31 ASSESSMENT — ASTHMA QUESTIONNAIRES
ACT_TOTALSCORE: 25
ACUTE_EXACERBATION_TODAY: NO
QUESTION_3 LAST FOUR WEEKS HOW OFTEN DID YOUR ASTHMA SYMPTOMS (WHEEZING, COUGHING, SHORTNESS OF BREATH, CHEST TIGHTNESS OR PAIN) WAKE YOU UP AT NIGHT OR EARLIER THAN USUAL IN THE MORNING: NOT AT ALL
QUESTION_4 LAST FOUR WEEKS HOW OFTEN HAVE YOU USED YOUR RESCUE INHALER OR NEBULIZER MEDICATION (SUCH AS ALBUTEROL): NOT AT ALL
QUESTION_2 LAST FOUR WEEKS HOW OFTEN HAVE YOU HAD SHORTNESS OF BREATH: NOT AT ALL
QUESTION_5 LAST FOUR WEEKS HOW WOULD YOU RATE YOUR ASTHMA CONTROL: COMPLETELY CONTROLLED
QUESTION_1 LAST FOUR WEEKS HOW MUCH OF THE TIME DID YOUR ASTHMA KEEP YOU FROM GETTING AS MUCH DONE AT WORK, SCHOOL OR AT HOME: NONE OF THE TIME

## 2019-10-31 ASSESSMENT — PAIN SCALES - GENERAL: PAINLEVEL: NO PAIN (1)

## 2019-10-31 ASSESSMENT — MIFFLIN-ST. JEOR: SCORE: 1746.63

## 2019-10-31 NOTE — PROGRESS NOTES
Subjective     Bria Davis is a 59 year old female who presents to clinic today for the following health issues:    HPI   Chief Complaint   Patient presents with     Abnormal Labs     Patient was told to follow-up with PCP from cardiologist. Patient is still having some discomfort and would like to have labs rechecked form anemia.      Musculoskeletal Problem     Patient was seen for left heel pain X 1 year ago and was given a boot that she was unable to use. Patient bumped her foot the other day and it has caused pain again. Patient rates pain at a 1/10 at rest and a 10/10 when it is bumped.      Flu Shot     Imm/Inj     Flu Shot       Diabetes Follow-up    How often are you checking your blood sugar? Four times daily    Mornin    Blood sugar testing frequency justification:  Uncontrolled diabetes and Risk of hypoglycemia with medication(s)  What time of day are you checking your blood sugars (select all that apply)?  Before meals  Have you had any blood sugars above 200?  Yes   Have you had any blood sugars below 70?  No    What symptoms do you notice when your blood sugar is low?  Shaky and Dizzy    What concerns do you have today about your diabetes? None and Blood sugar is often over 200     Do you have any of these symptoms? (Select all that apply)  No numbness or tingling in feet.  No redness, sores or blisters on feet.  No complaints of excessive thirst.  No reports of blurry vision.  No significant changes to weight.     Have you had a diabetic eye exam in the last 12 months? Yes- Date of last eye exam: up to date    Diabetes Management Resources    Hyperlipidemia Follow-Up      Are you having any of the following symptoms? (Select all that apply)  No complaints of shortness of breath, chest pain or pressure.  No increased sweating or nausea with activity.  No left-sided neck or arm pain.  No complaints of pain in calves when walking 1-2 blocks.    Are you regularly taking any medication or  supplement to lower your cholesterol?   Yes- simvastain     Are you having muscle aches or other side effects that you think could be caused by your cholesterol lowering medication?  No    Hypertension Follow-up      Do you check your blood pressure regularly outside of the clinic? No     Are you following a low salt diet? Yes- at times    Are your blood pressures ever more than 140 on the top number (systolic) OR more   than 90 on the bottom number (diastolic), for example 140/90? No    BP Readings from Last 2 Encounters:   10/31/19 132/84   09/20/19 132/84     Hemoglobin A1C (%)   Date Value   10/31/2019 9.3 (H)   08/05/2019 9.4 (H)     LDL Cholesterol Calculated (mg/dL)   Date Value   02/11/2019 58   12/29/2017 71           Musculoskeletal problem/pain      Duration: >1 year    Description  Location: left heel    Intensity:  moderate    Accompanying signs and symptoms: none    History  Previous similar problem: YES- was given a walking boot at one time and it didn't fit her well.  She has pain when the area is bumped.   Previous evaluation:  x-ray - moderate to large plantar and posterior calcaneal spurs    Precipitating or alleviating factors:  Trauma or overuse: no   Aggravating factors include: walking    Therapies tried and outcome: rest/inactivity      Summary of cardiac w/u since I saw her last:    Pt was last seen by Dr. Kolb on 9/6/2018 in consultation for chest pain and abnormal stress test.  She noted exertional chest pressure over the prior 3-6 months.  She had been undergoing renovations and thought her chest pressure and SOB was related to this. Her stress test was stopped due to chest discomfort and did not show any ischemia or infarction.  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 She was started on metoprolol and angiogram recommended.     Pt presents today for angiogram follow-up.  BMP 4 days post angiogram showing normal renal function. Coronary angiogram showing only  "trivial CAD with 10% stenosis in the mid LAD and pRCA.  She was noted have normal EF but may be a gradient across the aorta.  Echocardiogram was recommended to further assess. Dr. Sanon recommended following up with PCP regarding microcytic anemia and diabetes management. Echocardiogram September 2019 showing LVEF 60-65%, mild to moderate concentric LVH, normal RV size and function, and no significant valvular disease. These results were reviewed with her today.     She continues with occasional chest discomfort.  She denies any palpitations or heart racing and did not have any arrhythmias on stress testing that occurred with her chest discomfort. She does have a history of asthma. Therefore, I recommend that she follow back with her PCP for this.  Blood pressure today is controlled.  She has noted elevated blood sugars after starting metoprolol.  She is unsure if this because she was holding her metformin, prior to angiogram, or metoprolol.  I did see that there is a side effect of \"unstable diabetes\" for metoprolol.  I have asked her to hold her metoprolol for a couple weeks and if blood sugars do not improve, call and we can discontinue the medication.  If there is improvement, she will restart metoprolol and follow-up with PCP.  She denies any pain, numbness or tingling, or problems with her angiogram site. Patient reports no PND, orthopnea, presyncope, syncope, edema, heart racing, or palpitations.      Reviewed and updated as needed this visit by Provider         Review of Systems   ROS COMP: Constitutional, HEENT, cardiovascular, pulmonary, gi and gu systems are negative, except as otherwise noted.      Objective    /84   Pulse 82   Temp 97.2  F (36.2  C) (Tympanic)   Resp 18   Ht 1.6 m (5' 3\")   Wt 119.7 kg (264 lb)   LMP 01/14/2016 (Approximate)   SpO2 96%   Breastfeeding? No   BMI 46.77 kg/m    Body mass index is 46.77 kg/m .  Physical Exam   GENERAL: healthy, alert and no distress  NECK: " no adenopathy, no asymmetry, masses, or scars and thyroid normal to palpation  RESP: lungs clear to auscultation - no rales, rhonchi or wheezes  CV: regular rate and rhythm, normal S1 S2, no S3 or S4, no murmur, click or rub, no peripheral edema and peripheral pulses strong  ABDOMEN: soft, nontender, no hepatosplenomegaly, no masses and bowel sounds normal  MS: left heel achilles tender to palpation at insertion site    Diabetic Foot Screen:  Any complaints of increased pain or numbness ? No other than left heel pain at achilles insertion  Is there a foot ulcer now or a history of foot ulcer? No  Does the foot have an abnormal shape? No  Are the nails thick, too long or ingrown? No  Are there any redness or open areas? No         Sensation Testing done at all points on the diagram with monofilament     Right Foot: Sensation Normal at all points  Left Foot: Sensation Normal at all points     Risk Category: 0- No loss of protective sensation  Performed by Kaia Elena MD      Diagnostic Test Results:  Labs reviewed in Epic  Results for orders placed or performed in visit on 10/31/19 (from the past 24 hour(s))   Hemoglobin A1c   Result Value Ref Range    Hemoglobin A1C 9.3 (H) 0 - 5.6 %   CBC with platelets differential   Result Value Ref Range    WBC 8.0 4.0 - 11.0 10e9/L    RBC Count 5.01 3.8 - 5.2 10e12/L    Hemoglobin 11.3 (L) 11.7 - 15.7 g/dL    Hematocrit 37.4 35.0 - 47.0 %    MCV 75 (L) 78 - 100 fl    MCH 22.6 (L) 26.5 - 33.0 pg    MCHC 30.2 (L) 31.5 - 36.5 g/dL    RDW 17.1 (H) 10.0 - 15.0 %    Platelet Count 339 150 - 450 10e9/L    % Neutrophils 66.1 %    % Lymphocytes 17.5 %    % Monocytes 8.0 %    % Eosinophils 6.8 %    % Basophils 1.6 %    Absolute Neutrophil 5.3 1.6 - 8.3 10e9/L    Absolute Lymphocytes 1.4 0.8 - 5.3 10e9/L    Absolute Monocytes 0.6 0.0 - 1.3 10e9/L    Absolute Eosinophils 0.5 0.0 - 0.7 10e9/L    Absolute Basophils 0.1 0.0 - 0.2 10e9/L    Diff Method Automated Method             Assessment & Plan     1. Type 2 diabetes mellitus with chronic kidney disease, with long-term current use of insulin, unspecified CKD stage (H)  Uncontrolled despite the insulin she is currently taking (long acting and mealtime) as well as metformin and GLP-1 agonist.  I wonder if she'd be a good candidate for CGM and possibly a more concentrated insulin to allow for more insulin delivery wthout multiple injections or if she would be a good candidate for a pump.    - dulaglutide (TRULICITY) 1.5 MG/0.5ML pen; Inject 1.5 mg Subcutaneous every 7 days  Dispense: 6 mL; Refill: 1  - insulin aspart (NOVOLOG VIAL) 100 UNITS/ML vial; Inject 26 Units Subcutaneous 3 times daily (with meals) If glucometer is over 200 take an extra 6 units  Dispense: 9 vial; Refill: 1  - insulin glargine (LANTUS VIAL) 100 UNIT/ML vial; INJECT 46 UNITS UNDER THE SKIN twice daily  Dispense: 70 mL; Refill: 2  - metFORMIN (GLUCOPHAGE-XR) 500 MG 24 hr tablet; TAKE TWO TABLETS BY MOUTH TWICE A DAY WITH MEALS  Dispense: 360 tablet; Refill: 1  - Lipid panel reflex to direct LDL Fasting  - Hemoglobin A1c  - FOOT EXAM  - AMBULATORY ADULT DIABETES EDUCATOR REFERRAL  - Vitamin B12  - Comprehensive metabolic panel    2. Microcytic anemia   check iron and ferritin.  Last colonoscopy was 7 years ago. Likely will need another scope and would do EGD at the same time given longstanding GERD to r/o upper GI source of loss.   - CBC with platelets differential  - Iron and iron binding capacity  - Ferritin    3. Mild intermittent asthma, uncomplicated  stable  - fluticasone-salmeterol (ADVAIR DISKUS) 100-50 MCG/DOSE inhaler; Inhale 1 puff into the lungs 2 times daily Discuss refills @ Next appt.  Dispense: 3 Inhaler; Refill: 1  - montelukast (SINGULAIR) 10 MG tablet; TAKE ONE TABLET BY MOUTH AT BEDTIME  Dispense: 90 tablet; Refill: 1    4. Gastroparesis due to DM (H)     - metoclopramide (REGLAN) 10 MG tablet; Take 1 tablet (10 mg) by mouth 2 times daily   "Dispense: 180 tablet; Refill: 1    5. Hyperlipidemia LDL goal <100     - simvastatin (ZOCOR) 40 MG tablet; Take 1 tablet (40 mg) by mouth At Bedtime at bedtime.  Dispense: 90 tablet; Refill: 1  - Comprehensive metabolic panel    6. Heel pain, chronic, left     - ORTHO  REFERRAL    7. Need for prophylactic vaccination and inoculation against influenza     - INFLUENZA QUAD, RECOMBINANT, P-FREE (RIV4) (FLUBLOCK) [39824]  - Vaccine Administration, Initial [44870]     BMI:   Estimated body mass index is 46.77 kg/m  as calculated from the following:    Height as of this encounter: 1.6 m (5' 3\").    Weight as of this encounter: 119.7 kg (264 lb).   Weight management plan: Discussed healthy diet and exercise guidelines            Return in about 3 months (around 1/31/2020) for diabetes recheck.    Kaia Elena MD  [facility]      "

## 2019-11-01 ENCOUNTER — TELEPHONE (OUTPATIENT)
Dept: FAMILY MEDICINE | Facility: CLINIC | Age: 59
End: 2019-11-01

## 2019-11-01 NOTE — RESULT ENCOUNTER NOTE
Iron and iron stores (ferritin) are low.  I would have you start over the counter iron supplement twice a day.  Taken with 500 mg ascorbic acid (vitmain C) helps absorption of this.     I would also recommend, to be on the safe side, that we get that repeat colonoscopy and upper endoscopy (given your long standing reflux/treatment).     The number to call and schedule the scopes is on the inside of the colonoscopy prep packet.     Remainder of labs are acceptable other than the a1c.  Triglycerides are high, these will improve with improvement of your diabetes.    Kiaa Elena M.D.

## 2019-11-01 NOTE — TELEPHONE ENCOUNTER
Diabetes Education Scheduling Outreach #1:    Call to patient to schedule. Left message with phone number to call to schedule.    Plan for 2nd outreach attempt within 1 week.    Britany Weiner  [facility]  Diabetes and Nutrition Scheduling

## 2019-11-02 ASSESSMENT — ASTHMA QUESTIONNAIRES: ACT_TOTALSCORE: 25

## 2019-11-06 ENCOUNTER — ANESTHESIA EVENT (OUTPATIENT)
Dept: GASTROENTEROLOGY | Facility: CLINIC | Age: 59
End: 2019-11-06
Payer: COMMERCIAL

## 2019-11-06 ENCOUNTER — ALLIED HEALTH/NURSE VISIT (OUTPATIENT)
Dept: EDUCATION SERVICES | Facility: CLINIC | Age: 59
End: 2019-11-06
Payer: COMMERCIAL

## 2019-11-06 DIAGNOSIS — E11.9 DIABETES MELLITUS WITHOUT COMPLICATION (H): ICD-10-CM

## 2019-11-06 DIAGNOSIS — Z79.4 TYPE 2 DIABETES MELLITUS WITH CHRONIC KIDNEY DISEASE, WITH LONG-TERM CURRENT USE OF INSULIN, UNSPECIFIED CKD STAGE (H): Primary | ICD-10-CM

## 2019-11-06 DIAGNOSIS — E11.22 TYPE 2 DIABETES MELLITUS WITH CHRONIC KIDNEY DISEASE, WITH LONG-TERM CURRENT USE OF INSULIN, UNSPECIFIED CKD STAGE (H): Primary | ICD-10-CM

## 2019-11-06 PROCEDURE — G0108 DIAB MANAGE TRN  PER INDIV: HCPCS

## 2019-11-06 RX ORDER — INSULIN LISPRO 100 [IU]/ML
INJECTION, SOLUTION INTRAVENOUS; SUBCUTANEOUS
Qty: 15 ML | Refills: 3 | Status: SHIPPED | OUTPATIENT
Start: 2019-11-06 | End: 2019-11-20

## 2019-11-06 NOTE — PATIENT INSTRUCTIONS
"Diabetes Support Resources:  Toujeo Max 92 units daily, replaces Lantus  Humalog 32 units with meals (replaces Novolog)  Ozempic 0.25 mg X 4 wks then week 5 increase to 0.5 mg weekly  Taking Insulin:    1. Take Toujeo (U-300 Glargine)  MAX- 92 units at bedtime.     - Do a 2 unit \"prime\" before each injection, be sure a stream of insulin comes out of the needle before you give your injection.    - After you inject, hold the needle under the skin to the count of 10 to be sure all of the insulin goes in.     - Rotate injection sites, keeping at least 1 inch apart from last injection site and 2 inches away from belly button or surgical scars.    2. Pen - Use a new pen needle for each injection. Always remove pen needle from the insulin pen after use and do not store insulin pens with the needle on the pen.     3. Store insulin you are not using in the refrigerator (do not freeze). Take new insulin out of the refrigerator a few hours prior to use to bring to room temperature.     4. Once opened Toujeo can be kept at room temperature for 42 days after opened. and Humalog can be kept at room temperature for 28 days after opened.. Do not use the opened insulin after this time has passed, even if there is still medicine inside.     5. Always carry your blood sugar meter and a sugar source like glucose tablets with you in case of a low blood sugar. Treat a low blood sugar (less than 70) with 15 grams of carbohydrate (1 carb choice). Wait 15 minutes, recheck blood sugar. If blood sugar is still below 70, repeat the treatment.    6. Wear Medical ID or carry a wallet card stating you have Diabetes.    7. Call your doctor or diabetes educator if you begin having low blood sugars or if you have questions or concerns.     8. Complete and mail in the form to inform the [place] [employer] that you have started taking insulin.        [place] [facility] for the [facility]:  For " Your Diabetes Education or Nutrition Appointments Call:  [phone]   For Nutrition Related Questions Call:   Phone: [phone]  E-mail: [email or web address]  Fax: [phone]   If you need a medication refill please contact your pharmacy. Please allow 3 business days for your refills to be completed.        Bring blood glucose meter and logbook with you to all doctor and follow-up appointments.    Diabetes Education Telephone Visit Follow-up:    We realize your time is valuable and your health is important! We offer a convenient Telephone Visit follow up! It s a quick way to check in for a medication dose adjustment without having to come back to clinic as soon.    Telephone Visits are often covered by insurance. Please check with your insurance plan to see if this type of visit is covered. If not, the cost is less expensive than an office visit:      Up to 10 minutes (Code 61011): $30    11-20 minutes (Code 66268): $59    More than 20 minutes (Code 78738): $85    Talk with your Diabetes Educator if you want to learn more.      [facility]:  For Your Diabetes Education and Nutrition Appointments Call:  [phone]   For Diabetes Education or Nutrition Related Questions:   Phone: [phone]  E-mail: [email or web address]  Fax: [phone]   If you need a medication refill please contact your pharmacy. Please allow 3 business days for your refills to be completed.

## 2019-11-06 NOTE — ANESTHESIA PREPROCEDURE EVALUATION
Anesthesia Pre-Procedure Evaluation    Patient: Bria Davis   MRN: [id number] : 1960          Preoperative Diagnosis: Microcytic anemia [D50.9]    Procedure(s):  COLONOSCOPY  ESOPHAGOGASTRODUODENOSCOPY (EGD)    Past Medical History:   Diagnosis Date     Asthma      Diabetes mellitus (H)      Esophageal reflux      Obese      Other chronic sinusitis      PONV (postoperative nausea and vomiting)      Past Surgical History:   Procedure Laterality Date     COLONOSCOPY  2002     COLONOSCOPY  10/26/2012    Procedure: COLONOSCOPY;  Colonoscopy;  Surgeon: Margret Sales MD;  Location: [facility]     CV LEFT HEART CATH N/A 2019    Procedure: Left Heart Cath;  Surgeon: Mike Sanon MD;  Location:  HEART CARDIAC CATH LAB     CV LEFT VENTRICULOGRAM N/A 2019    Procedure: Left Ventriculogram;  Surgeon: Mike Sanon MD;  Location:  HEART CARDIAC CATH LAB     GYN SURGERY      Hydroablation , polyp removal      INJECT EPIDURAL LUMBAR  2010    INJECT EPIDURAL LUMBAR performed by GENERIC ANESTHESIA PROVIDER at [facility]     INJECT EPIDURAL LUMBAR  2011    INJECT EPIDURAL LUMBAR performed by GENERIC ANESTHESIA PROVIDER at [facility]     RELEASE CARPAL TUNNEL  2012    Procedure: RELEASE CARPAL TUNNEL;  Right Carpal Tunnel Release;  Surgeon: Mike Sparrow MD;  Location: [facility]     RELEASE CARPAL TUNNEL  2012    Procedure: RELEASE CARPAL TUNNEL;  Left Carpal Tunnel Release;  Surgeon: Mike Sparrow MD;  Location: [facility]     SURGICAL HISTORY OF -   4/10/2000    bilateral total ethmoidectomies, bilateral maxillary antrostomies, bilateral SMR of inferior turbinates, reduction of lt turbinate porter bullosa       Anesthesia Evaluation     . Pt has had prior anesthetic.     History of anesthetic complications   - PONV        ROS/MED HX    ENT/Pulmonary:     (+)allergic rhinitis, asthma , . .    Neurologic: Comment: RLS      Cardiovascular: Comment: Status post  coronary angiogram  Nonobstructive atherosclerosis of coronary artery        (+) Dyslipidemia, hypertension--CAD (nonobstructive), angina--. : . . . :. . Previous cardiac testing Echodate:9/17/19results:Interpretation Summary     The visual ejection fraction is estimated at 60-65%.  There is mild to moderate concentric left ventricular hypertrophy.  There is no pericardial effusion.  Normal left ventricular wall motion.  Technically difficult study.  Hypertension.  _____________________________________________________________________________  __        Left Ventricle  The left ventricle is normal in size. There is mild to moderate concentric  left ventricular hypertrophy. The visual ejection fraction is estimated at 60-  65%. Diastolic Doppler findings (E/E' ratio and/or other parameters) suggest  left ventricular filling pressures are indeterminate. Normal left ventricular  wall motion.     Right Ventricle  The right ventricle is normal in size and function.     Atria  Normal left atrial size. Right atrial size is normal.     Mitral Valve  There is physiologic mitral regurgitation.        Tricuspid Valve  There is trace tricuspid regurgitation. Right ventricular systolic pressure  could not be approximated due to inadequate tricuspid regurgitation.     Aortic Valve  The aortic valve is trileaflet. There is trace aortic regurgitation. No  hemodynamically significant valvular aortic stenosis.     Pulmonic Valve  The pulmonic valve is not well visualized. There is trace pulmonic valvular  regurgitation.     Vessels  The aortic root is normal size.     Pericardium  There is no pericardial effusion.date: results: date: results:Cath date: 9/12/19 results:Pt with dyspnea and chest pain. Nuc GXT shows rate related LBBB but not clear ischemia. Heart cath requested. NOTE-WE IDENTIFIED MICROCYTIC ANEMIA 2018/9. Pt will need to also be evaluated for that by PMD after cath. This was discussed with Dr Kolb and we felt we should  proceed with cath to exclude critical CAD before additional anemia gallo  Pre Procedure Diagnosis       other     Conclusion          Left ventricular filling pressures are mildly elevated .    Mid LAD lesion is 10% stenosed.    Prox RCA lesion is 10% stenosed.    The ejection fraction is greater than 55% by visual estimate.     1. Trivial CAD  2. HTN and mild elevated LVEDP. Additional BP management recommended  3. Normal LV EF but there may be a gradient on pullback to Aorta.  I will order an echocardiogram to be done before her follow-up  Office visit with cardiology  4. Pt will need to see pmd for follow-up evaluation of microcytic anemia, BP and cholesterol and DM management.              METS/Exercise Tolerance:  >4 METS   Hematologic:     (+) Anemia, -      Musculoskeletal: Comment: Lumbar radiculopathy        GI/Hepatic:     (+) GERD       Renal/Genitourinary:     (+) chronic renal disease,       Endo:     (+) type II DM Diabetic complications: nephropathy, Obesity, .      Psychiatric:  - neg psychiatric ROS       Infectious Disease:  - neg infectious disease ROS       Malignancy:      - no malignancy   Other:    - neg other ROS                      Physical Exam  Normal systems: cardiovascular, pulmonary and dental    Airway   Mallampati: II  TM distance: >3 FB  Neck ROM: full    Dental     Cardiovascular   Rhythm and rate: regular and normal      Pulmonary    breath sounds clear to auscultation            Lab Results   Component Value Date    WBC 8.0 10/31/2019    HGB 11.3 (L) 10/31/2019    HCT 37.4 10/31/2019     10/31/2019     10/31/2019    POTASSIUM 4.9 10/31/2019    CHLORIDE 104 10/31/2019    CO2 30 10/31/2019    BUN 12 10/31/2019    CR 0.57 10/31/2019     (H) 10/31/2019    MARLON 9.0 10/31/2019    ALBUMIN 3.3 (L) 10/31/2019    PROTTOTAL 7.1 10/31/2019    ALT 25 10/31/2019    AST 16 10/31/2019    ALKPHOS 84 10/31/2019    BILITOTAL 0.4 10/31/2019    LIPASE 128 04/26/2010    AMYLASE 54  "04/26/2010    PTT 29 09/12/2019    INR 0.96 09/12/2019    TSH 1.41 08/05/2019    HCG Negative 11/09/2012       Preop Vitals  BP Readings from Last 3 Encounters:   10/31/19 132/84   09/20/19 132/84   09/12/19 101/49    Pulse Readings from Last 3 Encounters:   10/31/19 82   09/20/19 79   09/12/19 76      Resp Readings from Last 3 Encounters:   10/31/19 18   09/12/19 16   08/05/19 18    SpO2 Readings from Last 3 Encounters:   10/31/19 96%   09/20/19 95%   09/12/19 95%      Temp Readings from Last 1 Encounters:   10/31/19 36.2  C (97.2  F) (Tympanic)    Ht Readings from Last 1 Encounters:   10/31/19 1.6 m (5' 3\")      Wt Readings from Last 1 Encounters:   10/31/19 119.7 kg (264 lb)    Estimated body mass index is 46.77 kg/m  as calculated from the following:    Height as of 10/31/19: 1.6 m (5' 3\").    Weight as of 10/31/19: 119.7 kg (264 lb).       Anesthesia Plan      History & Physical Review  History and physical reviewed and following examination; no interval change.    ASA Status:  3 .        Plan for General with Propofol induction. Maintenance will be TIVA.           Postoperative Care      Consents  Anesthetic plan, risks, benefits and alternatives discussed with:  Patient..                 ALYSON Dubon CRNA  "

## 2019-11-06 NOTE — PROGRESS NOTES
"Diabetes Self-Management Education & Support    Diabetes Education Self Management & Training    SUBJECTIVE/OBJECTIVE:  Presents for: Individual review  Accompanied by: Self  Diabetes education in the past 24mo: No  Focus of Visit: Monitoring, Taking Medication, Healthy Eating  Diabetes type: Type 2  Disease course: Worsening  Diabetes management related comments/concerns: has an infected tooth to be pulled  Transportation concerns: No  Other concerns:: None  Cultural Influences/Ethnic Background:  American        Diabetes Symptoms & Complications  Blurred vision: No  Fatigue: No  Neuropathy: No  Foot ulcerations: No  Polydipsia: Yes  Polyphagia: No  Polyuria: Yes  Visual change: No  Weakness: No  Weight loss: No  Slow healing wounds: No  Recent Infection(s): Yes(tooth)  Symptom course: Worsening  Weight trend: Stable  Autonomic neuropathy: No  CVA: No  Heart disease: No  Nephropathy: No  Peripheral neuropathy: No  Peripheral Vascular Disease: No  Retinopathy: No    Patient Problem List and Family Medical History reviewed for relevant medical history, current medical status, and diabetes risk factors.    Vitals:  LMP 01/14/2016 (Approximate)   Estimated body mass index is 46.77 kg/m  as calculated from the following:    Height as of 10/31/19: 1.6 m (5' 3\").    Weight as of 10/31/19: 119.7 kg (264 lb).   Last 3 BP:   BP Readings from Last 3 Encounters:   10/31/19 132/84   09/20/19 132/84   09/12/19 101/49       History   Smoking Status     Never Smoker   Smokeless Tobacco     Never Used       Labs:  Lab Results   Component Value Date    A1C 9.3 10/31/2019     Lab Results   Component Value Date     10/31/2019     Lab Results   Component Value Date    LDL 63 10/31/2019     HDL Cholesterol   Date Value Ref Range Status   10/31/2019 45 (L) >49 mg/dL Final   ]  GFR Estimate   Date Value Ref Range Status   10/31/2019 >90 >60 mL/min/[1.73_m2] Final     Comment:     Non  GFR Calc  Starting 12/18/2018, " serum creatinine based estimated GFR (eGFR) will be   calculated using the Chronic Kidney Disease Epidemiology Collaboration   (CKD-EPI) equation.       GFR Estimate If Black   Date Value Ref Range Status   10/31/2019 >90 >60 mL/min/[1.73_m2] Final     Comment:      GFR Calc  Starting 12/18/2018, serum creatinine based estimated GFR (eGFR) will be   calculated using the Chronic Kidney Disease Epidemiology Collaboration   (CKD-EPI) equation.       Lab Results   Component Value Date    CR 0.57 10/31/2019     No results found for: MICROALBUMIN    Healthy Eating  Healthy Eating Assessed Today: Yes  Cultural/Yarsanism diet restrictions?: No  Patient on a regular basis: Eats 3 meals a day  Breakfast: boost drinks,   Lunch: turkey and cheese sandwich, chips   Dinner: meat, potatoes, chix, mashed potatoes, veggies  Snacks: baby bell cheese , almonds, chips  Beverages: Coffee, Diet soda, Water  Has patient met with a dietitian in the past?: No    Being Active  Being Active Assessed Today: Yes  Exercise:: Currently not exercising  Barrier to exercise: None    Monitoring  Monitoring Assessed Today: Yes  Did patient bring glucose meter to appointment? : Yes  Home Glucose (Sugar) Monitoring: 3-4 times per day  Low Glucose Range (mg/dL): 180-200  High Glucose Range (mg/dL): >200  Overall Range (mg/dL): >200    AM ,214,278,186 before lunch, 223,250,306, before dinner, 271,241,287    Taking Medications  Diabetes Medication(s)     Biguanides       metFORMIN (GLUCOPHAGE-XR) 500 MG 24 hr tablet    TAKE TWO TABLETS BY MOUTH TWICE A DAY WITH MEALS    Insulin       Insulin Glargine, 2 Unit Dial, (TOUJEO MAX SOLOSTAR) 300 UNIT/ML SOPN    Inject 92 Units Subcutaneous daily To get 3 months worth of insulin     insulin lispro (HUMALOG KWIKPEN) 100 UNIT/ML (1 unit dial) pen    To take 32 units with each meal    Incretin Mimetic Agents (GLP-1 Receptor Agonists)       Semaglutide,0.25 or 0.5MG/DOS, (OZEMPIC, 0.25 OR 0.5  MG/DOSE,) 2 MG/1.5ML SOPN    Inject 0.5 mg Subcutaneous once a week          Taking Medication Assessed Today: Yes  Current Treatments: Diet, Insulin Injections, Non-insulin Injectables, Oral Agent (monotherapy)(Lantus 42 units BID, Novolog 32 units with meals, Metformin  mg takes 2 pills BID)  Dose schedule: pre-breakfast, pre-lunch, pre-dinner  Given by: Patient  Injection/Infusion sites: Abdomen  Problems taking diabetes medications regularly?: No  Diabetes medication side effects?: No  Treatment Compliance: All of the time    Problem Solving  Hypoglycemia Frequency: Rarely  Hypoglycemia Treatment: Other food  Patient carries a carbohydrate source: Yes    Hypoglycemia symptoms  Hunger: Yes  Nervousness/Anxiety: Yes  Speech difficulty: Yes  Sweats: Yes  Tremors: Yes         Reducing Risks  Diabetes Risks: Age over 45 years, Family History, Hypertriglyceridemia  CAD Risks: Diabetes Mellitus, Post-menopausal, Family history, Sedentary lifestyle  Has dilated eye exam at least once a year?: Yes  Sees dentist every 6 months?: Yes  Sees podiatrist (foot doctor)?: Yes    Healthy Coping  Healthy Coping Assessed Today: Yes  Emotional response to diabetes: Confidence diabetes can be controlled  Stage of change: PREPARATION (Decided to change - considering how)  Difficulty affording diabetes management supplies?: No  Patient Activation Measure Survey Score:  MARIA DEL ROSARIO Score (Last Two) 4/27/2012 9/19/2013   MARIA DEL ROSARIO Raw Score 46 52   Activation Score 75.3 100   MARIA DEL ROSARIO Level 4 4       ASSESSMENT:  Melyssa is here for some help to get her BG under better control and change out her medications that are covered by her new insurance.  She has been using vial and syringe , will switch over to pens.  Placed a CGM on her today, to read in 2 wks.  Referral to Endo as well for help.  Reviewed her diet and medications, and life style .  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          Patient's most recent   Lab Results   Component Value  Date    A1C 9.3 10/31/2019    is not meeting goal of <7.0    INTERVENTION:   Diabetes knowledge and skills assessment:     Patient is knowledgeable in diabetes management concepts related to: Monitoring and Taking Medication    Patient needs further education on the following diabetes management concepts: Healthy Eating, Being Active, Monitoring, Taking Medication and Problem Solving    Based on learning assessment above, most appropriate setting for further diabetes education would be: Individual setting.    Education provided today on:  AADE Self-Care Behaviors:  Diabetes Pathophysiology  Healthy Eating: carbohydrate counting, consistency in amount, composition, and timing of food intake, weight reduction, heart healthy diet, eating out, portion control, plate planning method and label reading  Monitoring: purpose, proper technique, log and interpret results, individual blood glucose targets and frequency of monitoring  Taking Medication: action of prescribed medication, drawing up, administering and storing injectable diabetes medications, proper site selection and rotation for injections, side effects of prescribed medications and when to take medications  Problem Solving: high blood glucose - causes, signs/symptoms, treatment and prevention, low blood glucose - causes, signs/symptoms, treatment and prevention, carrying a carbohydrate source at all times, safe travel and when to call health care provider  Patient was instructed on Accu-Chek Guide meter and was able to provide an accurate return demonstration. Patient's blood glucose reading today was  mg/dL.  Insulin administration technique taught today. Patient verbalized understanding and was able to perform an accurate return demonstration of administration technique.  GLP-1 administration technique taught today. Patient verbalized understanding and was able to perform an accurate return demonstration of administration technique. Side effects were discussed,  if patient has any abdominal pain, with or without nausea and/or vomiting, stop medication, call provider, clinic or go to the emergency room.    Opportunities for ongoing education and support in diabetes-self management were discussed.    Pt verbalized understanding of concepts discussed and recommendations provided today.       Education Materials Provided:  [facility] Understanding Diabetes Booklet, Carbohydrate Counting, Hypoglycemia Signs and Symptoms, My Plate Planner and Accu-Chek Guide meter kit    PLAN:  See Patient Instructions for co-developed, patient-stated behavior change goals.  AVS printed and provided to patient today. See Follow-Up section for recommended follow-up.   change out her medications that are covered by her new insurance.  She has been using vial and syringe , will switch over to pens.  Placed a CGM on her today, to read in 2 wks.  Referral to Endo as well for help.     Maria Esther Navarrete RN/GABRIEL Foote Diabetes Educator    Time Spent: 60 minutes  Encounter Type: Individual    Any diabetes medication dose changes were made via the CDE Protocol and Collaborative Practice Agreement with the patient's primary care provider. A copy of this encounter was shared with the provider.

## 2019-11-06 NOTE — LETTER
"    11/6/2019         RE: Bria Davis  [id number] [facility] [id number]        Dear Dr. Diaz, hoping she comes to see you, placed a CGM on her , she Is very insulin resistant and her retired PCP did not manage her well.      Thank you for referring your patient, Bria Davis, to the [facility]. Please see a copy of my visit note below.    Diabetes Self-Management Education & Support    Diabetes Education Self Management & Training    SUBJECTIVE/OBJECTIVE:  Presents for: Individual review  Accompanied by: Self  Diabetes education in the past 24mo: No  Focus of Visit: Monitoring, Taking Medication, Healthy Eating  Diabetes type: Type 2  Disease course: Worsening  Diabetes management related comments/concerns: has an infected tooth to be pulled  Transportation concerns: No  Other concerns:: None  Cultural Influences/Ethnic Background:  American        Diabetes Symptoms & Complications  Blurred vision: No  Fatigue: No  Neuropathy: No  Foot ulcerations: No  Polydipsia: Yes  Polyphagia: No  Polyuria: Yes  Visual change: No  Weakness: No  Weight loss: No  Slow healing wounds: No  Recent Infection(s): Yes(tooth)  Symptom course: Worsening  Weight trend: Stable  Autonomic neuropathy: No  CVA: No  Heart disease: No  Nephropathy: No  Peripheral neuropathy: No  Peripheral Vascular Disease: No  Retinopathy: No    Patient Problem List and Family Medical History reviewed for relevant medical history, current medical status, and diabetes risk factors.    Vitals:  [facility] 01/14/2016 (Approximate)   Estimated body mass index is 46.77 kg/m  as calculated from the following:    Height as of 10/31/19: 1.6 m (5' 3\").    Weight as of 10/31/19: 119.7 kg (264 lb).   Last 3 BP:   BP Readings from Last 3 Encounters:   10/31/19 132/84   09/20/19 132/84   09/12/19 101/49       History   Smoking Status     Never Smoker   Smokeless Tobacco     Never Used       Labs:  Lab Results   Component Value " Date    A1C 9.3 10/31/2019     Lab Results   Component Value Date     10/31/2019     Lab Results   Component Value Date    LDL 63 10/31/2019     HDL Cholesterol   Date Value Ref Range Status   10/31/2019 45 (L) >49 mg/dL Final   ]  GFR Estimate   Date Value Ref Range Status   10/31/2019 >90 >60 mL/min/[1.73_m2] Final     Comment:     Non  GFR Calc  Starting 12/18/2018, serum creatinine based estimated GFR (eGFR) will be   calculated using the Chronic Kidney Disease Epidemiology Collaboration   (CKD-EPI) equation.       GFR Estimate If Black   Date Value Ref Range Status   10/31/2019 >90 >60 mL/min/[1.73_m2] Final     Comment:      GFR Calc  Starting 12/18/2018, serum creatinine based estimated GFR (eGFR) will be   calculated using the Chronic Kidney Disease Epidemiology Collaboration   (CKD-EPI) equation.       Lab Results   Component Value Date    CR 0.57 10/31/2019     No results found for: MICROALBUMIN    Healthy Eating  Healthy Eating Assessed Today: Yes  Cultural/Moravian diet restrictions?: No  Patient on a regular basis: Eats 3 meals a day  Breakfast: boost drinks,   Lunch: turkey and cheese sandwich, chips   Dinner: meat, potatoes, chix, mashed potatoes, veggies  Snacks: baby bell cheese , almonds, chips  Beverages: Coffee, Diet soda, Water  Has patient met with a dietitian in the past?: No    Being Active  Being Active Assessed Today: Yes  Exercise:: Currently not exercising  Barrier to exercise: None    Monitoring  Monitoring Assessed Today: Yes  Did patient bring glucose meter to appointment? : Yes  Home Glucose (Sugar) Monitoring: 3-4 times per day  Low Glucose Range (mg/dL): 180-200  High Glucose Range (mg/dL): >200  Overall Range (mg/dL): >200    AM ,214,278,186 before lunch, 223,250,306, before dinner, 271,241,287    Taking Medications  Diabetes Medication(s)     Biguanides       metFORMIN (GLUCOPHAGE-XR) 500 MG 24 hr tablet    TAKE TWO TABLETS BY MOUTH  TWICE A DAY WITH MEALS    Insulin       Insulin Glargine, 2 Unit Dial, (TOUJEO MAX SOLOSTAR) 300 UNIT/ML SOPN    Inject 92 Units Subcutaneous daily To get 3 months worth of insulin     insulin lispro (HUMALOG KWIKPEN) 100 UNIT/ML (1 unit dial) pen    To take 32 units with each meal    Incretin Mimetic Agents (GLP-1 Receptor Agonists)       Semaglutide,0.25 or 0.5MG/DOS, (OZEMPIC, 0.25 OR 0.5 MG/DOSE,) 2 MG/1.5ML SOPN    Inject 0.5 mg Subcutaneous once a week          Taking Medication Assessed Today: Yes  Current Treatments: Diet, Insulin Injections, Non-insulin Injectables, Oral Agent (monotherapy)(Lantus 42 units BID, Novolog 32 units with meals, Metformin  mg takes 2 pills BID)  Dose schedule: pre-breakfast, pre-lunch, pre-dinner  Given by: Patient  Injection/Infusion sites: Abdomen  Problems taking diabetes medications regularly?: No  Diabetes medication side effects?: No  Treatment Compliance: All of the time    Problem Solving  Hypoglycemia Frequency: Rarely  Hypoglycemia Treatment: Other food  Patient carries a carbohydrate source: Yes    Hypoglycemia symptoms  Hunger: Yes  Nervousness/Anxiety: Yes  Speech difficulty: Yes  Sweats: Yes  Tremors: Yes         Reducing Risks  Diabetes Risks: Age over 45 years, Family History, Hypertriglyceridemia  CAD Risks: Diabetes Mellitus, Post-menopausal, Family history, Sedentary lifestyle  Has dilated eye exam at least once a year?: Yes  Sees dentist every 6 months?: Yes  Sees podiatrist (foot doctor)?: Yes    Healthy Coping  Healthy Coping Assessed Today: Yes  Emotional response to diabetes: Confidence diabetes can be controlled  Stage of change: PREPARATION (Decided to change - considering how)  Difficulty affording diabetes management supplies?: No  Patient Activation Measure Survey Score:  MARIA DEL ROSARIO Score (Last Two) 4/27/2012 9/19/2013   MARIA DEL ROSARIO Raw Score 46 52   Activation Score 75.3 100   MARIA DEL ROSARIO Level 4 4       ASSESSMENT:  Melyssa is here for some help to get her BG under  better control and change out her medications that are covered by her new insurance.  She has been using vial and syringe , will switch over to pens.  Placed a CGM on her today, to read in 2 wks.  Referral to Endo as well for help.  Reviewed her diet and medications, and life style .  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          Patient's most recent   Lab Results   Component Value Date    A1C 9.3 10/31/2019    is not meeting goal of <7.0    INTERVENTION:   Diabetes knowledge and skills assessment:     Patient is knowledgeable in diabetes management concepts related to: Monitoring and Taking Medication    Patient needs further education on the following diabetes management concepts: Healthy Eating, Being Active, Monitoring, Taking Medication and Problem Solving    Based on learning assessment above, most appropriate setting for further diabetes education would be: Individual setting.    Education provided today on:  AADE Self-Care Behaviors:  Diabetes Pathophysiology  Healthy Eating: carbohydrate counting, consistency in amount, composition, and timing of food intake, weight reduction, heart healthy diet, eating out, portion control, plate planning method and label reading  Monitoring: purpose, proper technique, log and interpret results, individual blood glucose targets and frequency of monitoring  Taking Medication: action of prescribed medication, drawing up, administering and storing injectable diabetes medications, proper site selection and rotation for injections, side effects of prescribed medications and when to take medications  Problem Solving: high blood glucose - causes, signs/symptoms, treatment and prevention, low blood glucose - causes, signs/symptoms, treatment and prevention, carrying a carbohydrate source at all times, safe travel and when to call health care provider  Patient was instructed on Accu-Chek Guide meter and was able to provide an accurate return demonstration. Patient's  blood glucose reading today was  mg/dL.  Insulin administration technique taught today. Patient verbalized understanding and was able to perform an accurate return demonstration of administration technique.  GLP-1 administration technique taught today. Patient verbalized understanding and was able to perform an accurate return demonstration of administration technique. Side effects were discussed, if patient has any abdominal pain, with or without nausea and/or vomiting, stop medication, call provider, clinic or go to the emergency room.    Opportunities for ongoing education and support in diabetes-self management were discussed.    Pt verbalized understanding of concepts discussed and recommendations provided today.       Education Materials Provided:  [facility] Understanding Diabetes Booklet, Carbohydrate Counting, Hypoglycemia Signs and Symptoms, My Plate Planner and Accu-Chek Guide meter kit    PLAN:  See Patient Instructions for co-developed, patient-stated behavior change goals.  AVS printed and provided to patient today. See Follow-Up section for recommended follow-up.   change out her medications that are covered by her new insurance.  She has been using vial and syringe , will switch over to pens.  Placed a CGM on her today, to read in 2 wks.  Referral to Endo as well for help.     Maria Esther Navarrete RN/GABRIEL Foote Diabetes Educator    Time Spent: 60 minutes  Encounter Type: Individual    Any diabetes medication dose changes were made via the CDE Protocol and Collaborative Practice Agreement with the patient's primary care provider. A copy of this encounter was shared with the provider.

## 2019-11-08 ENCOUNTER — HOSPITAL ENCOUNTER (OUTPATIENT)
Facility: CLINIC | Age: 59
Discharge: HOME | End: 2019-11-08
Attending: SURGERY | Admitting: SURGERY
Payer: COMMERCIAL

## 2019-11-08 ENCOUNTER — ANESTHESIA (OUTPATIENT)
Dept: GASTROENTEROLOGY | Facility: CLINIC | Age: 59
End: 2019-11-08
Payer: COMMERCIAL

## 2019-11-08 VITALS
SYSTOLIC BLOOD PRESSURE: 121 MMHG | RESPIRATION RATE: 16 BRPM | TEMPERATURE: 98.8 F | OXYGEN SATURATION: 95 % | HEIGHT: 63 IN | DIASTOLIC BLOOD PRESSURE: 99 MMHG | BODY MASS INDEX: 46.78 KG/M2 | HEART RATE: 86 BPM | WEIGHT: 264 LBS

## 2019-11-08 LAB
COLONOSCOPY: NORMAL
UPPER GI ENDOSCOPY: NORMAL

## 2019-11-08 PROCEDURE — 43239 EGD BIOPSY SINGLE/MULTIPLE: CPT | Performed by: SURGERY

## 2019-11-08 PROCEDURE — 45380 COLONOSCOPY AND BIOPSY: CPT | Performed by: SURGERY

## 2019-11-08 PROCEDURE — 43239 EGD BIOPSY SINGLE/MULTIPLE: CPT | Mod: 51 | Performed by: SURGERY

## 2019-11-08 PROCEDURE — 37000009 ZZH ANESTHESIA TECHNICAL FEE, EACH ADDTL 15 MIN: Performed by: SURGERY

## 2019-11-08 PROCEDURE — 25000125 ZZHC RX 250: Performed by: SURGERY

## 2019-11-08 PROCEDURE — 37000008 ZZH ANESTHESIA TECHNICAL FEE, 1ST 30 MIN: Performed by: SURGERY

## 2019-11-08 PROCEDURE — 88305 TISSUE EXAM BY PATHOLOGIST: CPT | Mod: 26,59 | Performed by: SURGERY

## 2019-11-08 PROCEDURE — 88305 TISSUE EXAM BY PATHOLOGIST: CPT | Performed by: SURGERY

## 2019-11-08 PROCEDURE — 88341 IMHCHEM/IMCYTCHM EA ADD ANTB: CPT | Mod: 26 | Performed by: SURGERY

## 2019-11-08 PROCEDURE — 25800030 ZZH RX IP 258 OP 636: Performed by: SURGERY

## 2019-11-08 PROCEDURE — 88341 IMHCHEM/IMCYTCHM EA ADD ANTB: CPT | Performed by: SURGERY

## 2019-11-08 PROCEDURE — 88342 IMHCHEM/IMCYTCHM 1ST ANTB: CPT | Mod: 26 | Performed by: SURGERY

## 2019-11-08 PROCEDURE — 25000125 ZZHC RX 250: Performed by: NURSE ANESTHETIST, CERTIFIED REGISTERED

## 2019-11-08 PROCEDURE — 43249 ESOPH EGD DILATION <30 MM: CPT | Performed by: SURGERY

## 2019-11-08 PROCEDURE — 25000128 H RX IP 250 OP 636: Performed by: NURSE ANESTHETIST, CERTIFIED REGISTERED

## 2019-11-08 PROCEDURE — 88342 IMHCHEM/IMCYTCHM 1ST ANTB: CPT | Performed by: SURGERY

## 2019-11-08 RX ORDER — ONDANSETRON 2 MG/ML
4 INJECTION INTRAMUSCULAR; INTRAVENOUS
Status: DISCONTINUED | OUTPATIENT
Start: 2019-11-08 | End: 2019-11-08 | Stop reason: HOSPADM

## 2019-11-08 RX ORDER — PROPOFOL 10 MG/ML
INJECTION, EMULSION INTRAVENOUS CONTINUOUS PRN
Status: DISCONTINUED | OUTPATIENT
Start: 2019-11-08 | End: 2019-11-08

## 2019-11-08 RX ORDER — PROPOFOL 10 MG/ML
INJECTION, EMULSION INTRAVENOUS PRN
Status: DISCONTINUED | OUTPATIENT
Start: 2019-11-08 | End: 2019-11-08

## 2019-11-08 RX ORDER — LIDOCAINE HYDROCHLORIDE 10 MG/ML
INJECTION, SOLUTION INFILTRATION; PERINEURAL PRN
Status: DISCONTINUED | OUTPATIENT
Start: 2019-11-08 | End: 2019-11-08

## 2019-11-08 RX ORDER — SODIUM CHLORIDE, SODIUM LACTATE, POTASSIUM CHLORIDE, CALCIUM CHLORIDE 600; 310; 30; 20 MG/100ML; MG/100ML; MG/100ML; MG/100ML
INJECTION, SOLUTION INTRAVENOUS CONTINUOUS
Status: DISCONTINUED | OUTPATIENT
Start: 2019-11-08 | End: 2019-11-08 | Stop reason: HOSPADM

## 2019-11-08 RX ORDER — LIDOCAINE 40 MG/G
CREAM TOPICAL
Status: DISCONTINUED | OUTPATIENT
Start: 2019-11-08 | End: 2019-11-08 | Stop reason: HOSPADM

## 2019-11-08 RX ADMIN — PROPOFOL 100 MG: 10 INJECTION, EMULSION INTRAVENOUS at 08:01

## 2019-11-08 RX ADMIN — SODIUM CHLORIDE, POTASSIUM CHLORIDE, SODIUM LACTATE AND CALCIUM CHLORIDE: 600; 310; 30; 20 INJECTION, SOLUTION INTRAVENOUS at 07:32

## 2019-11-08 RX ADMIN — LIDOCAINE HYDROCHLORIDE 0.1 ML: 10 INJECTION, SOLUTION EPIDURAL; INFILTRATION; INTRACAUDAL; PERINEURAL at 07:32

## 2019-11-08 RX ADMIN — PROPOFOL 150 MCG/KG/MIN: 10 INJECTION, EMULSION INTRAVENOUS at 08:01

## 2019-11-08 RX ADMIN — LIDOCAINE HYDROCHLORIDE 50 MG: 10 INJECTION, SOLUTION INFILTRATION; PERINEURAL at 08:01

## 2019-11-08 ASSESSMENT — MIFFLIN-ST. JEOR: SCORE: 1746.63

## 2019-11-08 NOTE — ANESTHESIA CARE TRANSFER NOTE
Patient: Bria Davis    Procedure(s):  COLONOSCOPY, WITH POLYPECTOMY AND BIOPSY  ESOPHAGOGASTRODUODENOSCOPY, WITH BIOPSY  Esophagogastroduodenoscopy, With Balloon Dilation Of Less Than 30 Millimeters    Diagnosis: Microcytic anemia [D50.9]  Diagnosis Additional Information: No value filed.    Anesthesia Type:   General     Note:  Airway :Nasal Cannula  Patient transferred to:Phase II  Handoff Report: Identifed the Patient, Identified the Reponsible Provider, Reviewed the pertinent medical history, Discussed the surgical course, Reviewed Intra-OP anesthesia mangement and issues during anesthesia, Set expectations for post-procedure period and Allowed opportunity for questions and acknowledgement of understanding      Vitals: (Last set prior to Anesthesia Care Transfer)    CRNA VITALS  11/8/2019 0805 - 11/8/2019 0835      11/8/2019             Pulse:  79    SpO2:  95 %    EKG:  NSR                Electronically Signed By: Lee Sparks CRNA, APRN CRNA  November 8, 2019  8:35 AM

## 2019-11-08 NOTE — ANESTHESIA POSTPROCEDURE EVALUATION
Patient: Bria Davis    Procedure(s):  COLONOSCOPY, WITH POLYPECTOMY AND BIOPSY  ESOPHAGOGASTRODUODENOSCOPY, WITH BIOPSY  Esophagogastroduodenoscopy, With Balloon Dilation Of Less Than 30 Millimeters    Diagnosis:Microcytic anemia [D50.9]  Diagnosis Additional Information: No value filed.    Anesthesia Type:  General    Note:  Anesthesia Post Evaluation    Patient location during evaluation: Phase 2 and Bedside  Patient participation: Able to fully participate in evaluation  Level of consciousness: awake and alert  Pain management: adequate  Airway patency: patent  Cardiovascular status: acceptable  Respiratory status: acceptable  Hydration status: acceptable  PONV: none     Anesthetic complications: None          Last vitals:  Vitals:    11/08/19 0700 11/08/19 0839   BP: 136/76 137/83   Pulse:  83   Resp: 20 16   Temp: 37.1  C (98.8  F)    SpO2: 96% 95%         Electronically Signed By: Lee Sparks CRNA, APRN CRNA  November 8, 2019  8:40 AM

## 2019-11-08 NOTE — ANESTHESIA CARE TRANSFER NOTE
Patient: Bria Davis    Procedure(s):  COLONOSCOPY  ESOPHAGOGASTRODUODENOSCOPY (EGD)    Diagnosis: Microcytic anemia [D50.9]  Diagnosis Additional Information: No value filed.    Anesthesia Type:   General     Note:  Airway :Nasal Cannula  Patient transferred to:Phase II  Handoff Report: Identifed the Patient, Identified the Reponsible Provider, Reviewed the pertinent medical history, Discussed the surgical course, Reviewed Intra-OP anesthesia mangement and issues during anesthesia, Set expectations for post-procedure period and Allowed opportunity for questions and acknowledgement of understanding      Vitals: (Last set prior to Anesthesia Care Transfer)    CRNA VITALS  11/8/2019 0727 - 11/8/2019 0757      11/8/2019             Pulse:  86    SpO2:  97 %                Electronically Signed By: Lee Sparks CRNA, APRN CRNA  November 8, 2019  7:57 AM

## 2019-11-08 NOTE — BRIEF OP NOTE
[facility]   Brief Operative Note    Pre-operative diagnosis: Microcytic anemia [D50.9]   Post-operative diagnosis normal EGD, two tiny colon polyps     Procedure: Procedure(s):  COLONOSCOPY, WITH POLYPECTOMY AND BIOPSY  ESOPHAGOGASTRODUODENOSCOPY, WITH BIOPSY  Esophagogastroduodenoscopy, With Balloon Dilation Of Less Than 30 Millimeters   Surgeon(s): Surgeon(s) and Role:     * Ariel Heck MD - Primary   Estimated blood loss: * No values recorded between 11/8/2019 12:00 AM and 11/8/2019  8:35 AM *    Specimens: ID Type Source Tests Collected by Time Destination   A : for hpylori Biopsy Stomach, Antrum SURGICAL PATHOLOGY EXAM Ariel Heck MD 11/8/2019  8:07 AM    B :  Polyp Large Intestine, Hepatic Flexure SURGICAL PATHOLOGY EXAM Ariel Heck MD 11/8/2019  8:25 AM    C :  Polyp Large Intestine, Rectum SURGICAL PATHOLOGY EXAM Ariel Heck MD 11/8/2019  8:30 AM       Findings: 1. Normal esophagus  2. Normal stomach - biopsied for h. Pylori  3. Normal duodenum  4. Two tiny polyps in colon  5. Colon otherwise normal

## 2019-11-08 NOTE — H&P
59 year old year old female here for EGD and colonoscopy for evaluation of microcytic anemia.    Patient Active Problem List   Diagnosis     Mild intermittent asthma     Esophageal reflux     Allergic rhinitis     Restless legs syndrome (RLS)     Enthesopathy     Lumbar radiculopathy     Microalbuminuria     Hyperlipidemia LDL goal <70     Type 2 diabetes mellitus with kidney complication, with long-term current use of insulin (H)     Morbid obesity (H)     Benign essential hypertension     Other forms of angina pectoris (H)     Status post coronary angiogram     Nonobstructive atherosclerosis of coronary artery       Past Medical History:   Diagnosis Date     Asthma      Diabetes mellitus (H)      Esophageal reflux      Obese      Other chronic sinusitis      PONV (postoperative nausea and vomiting)        Past Surgical History:   Procedure Laterality Date     COLONOSCOPY  1/31/2002     COLONOSCOPY  10/26/2012    Procedure: COLONOSCOPY;  Colonoscopy;  Surgeon: Margret Sales MD;  Location: [facility]     CV LEFT HEART CATH N/A 9/12/2019    Procedure: Left Heart Cath;  Surgeon: Mike Sanon MD;  Location: [facility] CARDIAC CATH LAB     CV LEFT VENTRICULOGRAM N/A 9/12/2019    Procedure: Left Ventriculogram;  Surgeon: Mike Sanon MD;  Location: [facility] CARDIAC CATH LAB     GYN SURGERY      Hydroablation 2007, polyp removal 2018     INJECT EPIDURAL LUMBAR  12/7/2010    INJECT EPIDURAL LUMBAR performed by GENERIC ANESTHESIA PROVIDER at [facility]     INJECT EPIDURAL LUMBAR  1/17/2011    INJECT EPIDURAL LUMBAR performed by GENERIC ANESTHESIA PROVIDER at [facility]     RELEASE CARPAL TUNNEL  6/19/2012    Procedure: RELEASE CARPAL TUNNEL;  Right Carpal Tunnel Release;  Surgeon: Mike Sparrow MD;  Location: [facility]     RELEASE CARPAL TUNNEL  11/9/2012    Procedure: RELEASE CARPAL TUNNEL;  Left Carpal Tunnel Release;  Surgeon: Mike Sparrow MD;  Location: [facility]     SURGICAL HISTORY OF -   4/10/2000     bilateral total ethmoidectomies, bilateral maxillary antrostomies, bilateral SMR of inferior turbinates, reduction of lt turbinate porter bullosa       @FMX@    No current outpatient medications on file.       Allergies   Allergen Reactions     Lisinopril Cough     Tape [Adhesive Tape] Rash       Pt reports that she has never smoked. She has never used smokeless tobacco. She reports that she does not drink alcohol or use drugs.    Exam:  LMP 01/14/2016 (Approximate)     Awake, Alert OX3  Lungs - CTA bilaterally  CV - RRR, no murmurs, distal pulses intact  Abd - soft, non-distended, non-tender, +BS  Extr - No cyanosis or edema    A/P 59 year old year old female in need of  EGD and colonoscopy for evaluation of microcytic anemia. Risks, benefits, alternatives, and complications were discussed including the possibility of perforation and the patient agreed to proceed.    Ariel Heck MD

## 2019-11-12 ENCOUNTER — HOSPITAL ENCOUNTER (OUTPATIENT)
Dept: MRI IMAGING | Facility: CLINIC | Age: 59
Discharge: HOME | End: 2019-11-12
Attending: PODIATRIST | Admitting: PODIATRIST
Payer: COMMERCIAL

## 2019-11-12 DIAGNOSIS — M76.60 ACHILLES TENDINITIS: ICD-10-CM

## 2019-11-12 PROCEDURE — 73721 MRI JNT OF LWR EXTRE W/O DYE: CPT | Mod: LT

## 2019-11-18 ENCOUNTER — TRANSFERRED RECORDS (OUTPATIENT)
Dept: HEALTH INFORMATION MANAGEMENT | Facility: CLINIC | Age: 59
End: 2019-11-18

## 2019-11-18 LAB — COPATH REPORT: NORMAL

## 2019-11-20 ENCOUNTER — ALLIED HEALTH/NURSE VISIT (OUTPATIENT)
Dept: EDUCATION SERVICES | Facility: CLINIC | Age: 59
End: 2019-11-20
Payer: COMMERCIAL

## 2019-11-20 DIAGNOSIS — Z79.4 TYPE 2 DIABETES MELLITUS WITH CHRONIC KIDNEY DISEASE, WITH LONG-TERM CURRENT USE OF INSULIN, UNSPECIFIED CKD STAGE (H): ICD-10-CM

## 2019-11-20 DIAGNOSIS — E11.22 TYPE 2 DIABETES MELLITUS WITH CHRONIC KIDNEY DISEASE, WITH LONG-TERM CURRENT USE OF INSULIN, UNSPECIFIED CKD STAGE (H): ICD-10-CM

## 2019-11-20 DIAGNOSIS — E11.9 DIABETES MELLITUS WITHOUT COMPLICATION (H): Primary | ICD-10-CM

## 2019-11-20 PROCEDURE — G0108 DIAB MANAGE TRN  PER INDIV: HCPCS

## 2019-11-20 RX ORDER — LANCETS
1 EACH MISCELLANEOUS 4 TIMES DAILY
Qty: 408 EACH | Refills: 3 | Status: SHIPPED | OUTPATIENT
Start: 2019-11-20 | End: 2021-05-06

## 2019-11-20 RX ORDER — INSULIN LISPRO 100 [IU]/ML
INJECTION, SOLUTION INTRAVENOUS; SUBCUTANEOUS
Qty: 45 ML | Refills: 3 | Status: SHIPPED | OUTPATIENT
Start: 2019-11-20 | End: 2020-06-08

## 2019-11-20 NOTE — PATIENT INSTRUCTIONS
Diabetes Support Resources:  Toujeo Max, increase to 100 units daily  Humalog take 32 units with meals  Ozempic 0.25 mg X 2 more wks then increase to 0.5 mg weekly  Metformin  mg take 2 pills twice per day only take when eating.       Bring blood glucose meter and logbook with you to all doctor and follow-up appointments.    Diabetes Education Telephone Visit Follow-up:    We realize your time is valuable and your health is important! We offer a convenient Telephone Visit follow up! It s a quick way to check in for a medication dose adjustment without having to come back to clinic as soon.    Telephone Visits are often covered by insurance. Please check with your insurance plan to see if this type of visit is covered. If not, the cost is less expensive than an office visit:      Up to 10 minutes (Code 13095): $30    11-20 minutes (Code 17711): $59    More than 20 minutes (Code 48650): $85    Talk with your Diabetes Educator if you want to learn more.      [facility]:  For Your Diabetes Education and Nutrition Appointments Call:  [phone]   For Diabetes Education or Nutrition Related Questions:   Phone: [phone]  E-mail: [email or web address]  Fax: [phone]   If you need a medication refill please contact your pharmacy. Please allow 3 business days for your refills to be completed.

## 2019-11-20 NOTE — PROGRESS NOTES
LibrePro Continuous Glucose Monitor Interpretation     Patient History:   1. Type of Diabetes: Type 2 diabetes  2. Duration of diabetes or year of diagnosis:   3. Current treatment regimen (include all diabetes medications, dose & dosing frequency/timing):   yes:     Diabetes Medication(s)     Biguanides       metFORMIN (GLUCOPHAGE-XR) 500 MG 24 hr tablet    TAKE TWO TABLETS BY MOUTH TWICE A DAY WITH MEALS    Insulin       Insulin Glargine, 2 Unit Dial, (TOUJEO MAX SOLOSTAR) 300 UNIT/ML SOPN    Inject 100 Units Subcutaneous daily To get 3 months worth of insulin     insulin lispro (HUMALOG KWIKPEN) 100 UNIT/ML (1 unit dial) pen    To take 32 units with each meal    Incretin Mimetic Agents (GLP-1 Receptor Agonists)       Semaglutide,0.25 or 0.5MG/DOS, (OZEMPIC, 0.25 OR 0.5 MG/DOSE,) 2 MG/1.5ML SOPN    Inject 0.5 mg Subcutaneous once a week      , Oral Medications: Metformin - Dose:  mg takes 1000 mg, Time: breakfast and supper, Injectable Medications: Toujeo 92-0-0-0, Humalog 32-32-32-0 and Ozempic  - Dose: 0.25 mg, Time: weekly X 2 wks  *Abbreviated insulin dose documentation key: Insulin Trade Name ([id number]-iectgc-[id number]) - i.e. Humalog 5-5-5-0 (Humalog 5 units at breakfast, 5 units at lunch, and 5 units at dinner).  4. Most Recent A1c Result:    Lab Results   Component Value Date    A1C 9.3 10/31/2019     5. Indication/s for LibrePro study: Unexplained fluctuations in glucose values.    Statistics:   1. Sensor worn for 5 days.  2. Glucose excursions: unable to get enough data, will need to do this again in January                          Data evaluation:   1. Sensor modal day evaluation shows the followin. Consistent day-to-day patterns noted: pattern of daytime hyperglycemia.  2. Low glucose events: 0        Patient's Logbook shows the following:   Carbohydrate counting is: accurate  Medication and/or insulin dosing is: inaccurate     Assessment and Plan:  Recommend increase to insulin -  Toujeo Max to 100 units daily       Maria Esther Navarrete RN/GABRIEL  [facility] Educator    Time Spent: 30 minutes  Any diabetes medication dose changes were made via the CDE Protocol and Collaborative Practice Agreement with the patient's primary care provider. A copy of this encounter was shared with the provider.

## 2019-12-19 ENCOUNTER — OFFICE VISIT (OUTPATIENT)
Dept: FAMILY MEDICINE | Facility: CLINIC | Age: 59
End: 2019-12-19
Payer: COMMERCIAL

## 2019-12-19 VITALS
SYSTOLIC BLOOD PRESSURE: 138 MMHG | OXYGEN SATURATION: 97 % | BODY MASS INDEX: 47.13 KG/M2 | HEIGHT: 63 IN | DIASTOLIC BLOOD PRESSURE: 86 MMHG | WEIGHT: 266 LBS | TEMPERATURE: 98.4 F | RESPIRATION RATE: 16 BRPM | HEART RATE: 76 BPM

## 2019-12-19 DIAGNOSIS — Z79.4 TYPE 2 DIABETES MELLITUS WITH CHRONIC KIDNEY DISEASE, WITH LONG-TERM CURRENT USE OF INSULIN, UNSPECIFIED CKD STAGE (H): ICD-10-CM

## 2019-12-19 DIAGNOSIS — J45.20 MILD INTERMITTENT ASTHMA, UNCOMPLICATED: ICD-10-CM

## 2019-12-19 DIAGNOSIS — I10 ESSENTIAL HYPERTENSION WITH GOAL BLOOD PRESSURE LESS THAN 140/90: ICD-10-CM

## 2019-12-19 DIAGNOSIS — Z01.818 PREOP GENERAL PHYSICAL EXAM: Primary | ICD-10-CM

## 2019-12-19 DIAGNOSIS — E78.5 HYPERLIPIDEMIA LDL GOAL <100: ICD-10-CM

## 2019-12-19 DIAGNOSIS — E11.22 TYPE 2 DIABETES MELLITUS WITH CHRONIC KIDNEY DISEASE, WITH LONG-TERM CURRENT USE OF INSULIN, UNSPECIFIED CKD STAGE (H): ICD-10-CM

## 2019-12-19 DIAGNOSIS — D50.9 MICROCYTIC ANEMIA: ICD-10-CM

## 2019-12-19 DIAGNOSIS — S86.012D RUPTURE OF LEFT ACHILLES TENDON, SUBSEQUENT ENCOUNTER: ICD-10-CM

## 2019-12-19 LAB
ALBUMIN SERPL-MCNC: 3.5 G/DL (ref 3.4–5)
ALP SERPL-CCNC: 66 U/L (ref 40–150)
ALT SERPL W P-5'-P-CCNC: 26 U/L (ref 0–50)
ANION GAP SERPL CALCULATED.3IONS-SCNC: 3 MMOL/L (ref 3–14)
AST SERPL W P-5'-P-CCNC: 19 U/L (ref 0–45)
BASOPHILS # BLD AUTO: 0.1 10E9/L (ref 0–0.2)
BASOPHILS NFR BLD AUTO: 1.2 %
BILIRUB SERPL-MCNC: 0.3 MG/DL (ref 0.2–1.3)
BUN SERPL-MCNC: 10 MG/DL (ref 7–30)
CALCIUM SERPL-MCNC: 9.4 MG/DL (ref 8.5–10.1)
CHLORIDE SERPL-SCNC: 105 MMOL/L (ref 94–109)
CO2 SERPL-SCNC: 28 MMOL/L (ref 20–32)
CREAT SERPL-MCNC: 0.52 MG/DL (ref 0.52–1.04)
DIFFERENTIAL METHOD BLD: ABNORMAL
EOSINOPHIL # BLD AUTO: 0.5 10E9/L (ref 0–0.7)
EOSINOPHIL NFR BLD AUTO: 6.3 %
ERYTHROCYTE [DISTWIDTH] IN BLOOD BY AUTOMATED COUNT: 22 % (ref 10–15)
FERRITIN SERPL-MCNC: 12 NG/ML (ref 8–252)
GFR SERPL CREATININE-BSD FRML MDRD: >90 ML/MIN/{1.73_M2}
GLUCOSE SERPL-MCNC: 90 MG/DL (ref 70–99)
HBA1C MFR BLD: 7.3 % (ref 0–5.6)
HCT VFR BLD AUTO: 42.8 % (ref 35–47)
HGB BLD-MCNC: 13.4 G/DL (ref 11.7–15.7)
IMM GRANULOCYTES # BLD: 0 10E9/L (ref 0–0.4)
IMM GRANULOCYTES NFR BLD: 0.4 %
IRON SATN MFR SERPL: 19 % (ref 15–46)
IRON SERPL-MCNC: 65 UG/DL (ref 35–180)
LYMPHOCYTES # BLD AUTO: 1.7 10E9/L (ref 0.8–5.3)
LYMPHOCYTES NFR BLD AUTO: 20.1 %
MCH RBC QN AUTO: 25.5 PG (ref 26.5–33)
MCHC RBC AUTO-ENTMCNC: 31.3 G/DL (ref 31.5–36.5)
MCV RBC AUTO: 81 FL (ref 78–100)
MONOCYTES # BLD AUTO: 0.5 10E9/L (ref 0–1.3)
MONOCYTES NFR BLD AUTO: 5.4 %
NEUTROPHILS # BLD AUTO: 5.6 10E9/L (ref 1.6–8.3)
NEUTROPHILS NFR BLD AUTO: 66.6 %
NRBC # BLD AUTO: 0 10*3/UL
NRBC BLD AUTO-RTO: 0 /100
PLATELET # BLD AUTO: 325 10E9/L (ref 150–450)
POTASSIUM SERPL-SCNC: 4 MMOL/L (ref 3.4–5.3)
PROT SERPL-MCNC: 7.1 G/DL (ref 6.8–8.8)
RBC # BLD AUTO: 5.26 10E12/L (ref 3.8–5.2)
SODIUM SERPL-SCNC: 136 MMOL/L (ref 133–144)
TIBC SERPL-MCNC: 348 UG/DL (ref 240–430)
WBC # BLD AUTO: 8.4 10E9/L (ref 4–11)

## 2019-12-19 PROCEDURE — 99215 OFFICE O/P EST HI 40 MIN: CPT | Performed by: FAMILY MEDICINE

## 2019-12-19 PROCEDURE — 83036 HEMOGLOBIN GLYCOSYLATED A1C: CPT | Performed by: FAMILY MEDICINE

## 2019-12-19 PROCEDURE — 83540 ASSAY OF IRON: CPT | Performed by: FAMILY MEDICINE

## 2019-12-19 PROCEDURE — 85025 COMPLETE CBC W/AUTO DIFF WBC: CPT | Performed by: FAMILY MEDICINE

## 2019-12-19 PROCEDURE — 36415 COLL VENOUS BLD VENIPUNCTURE: CPT | Performed by: FAMILY MEDICINE

## 2019-12-19 PROCEDURE — 83550 IRON BINDING TEST: CPT | Performed by: FAMILY MEDICINE

## 2019-12-19 PROCEDURE — 80053 COMPREHEN METABOLIC PANEL: CPT | Performed by: FAMILY MEDICINE

## 2019-12-19 PROCEDURE — 82728 ASSAY OF FERRITIN: CPT | Performed by: FAMILY MEDICINE

## 2019-12-19 RX ORDER — AMOXICILLIN 500 MG/1
CAPSULE ORAL
COMMUNITY
Start: 2019-12-02 | End: 2019-12-19

## 2019-12-19 ASSESSMENT — PAIN SCALES - GENERAL: PAINLEVEL: MILD PAIN (2)

## 2019-12-19 ASSESSMENT — MIFFLIN-ST. JEOR: SCORE: 1755.7

## 2019-12-19 NOTE — PROGRESS NOTES
[facility]  [address]  [facility] [address]  [phone]  Dept: [phone]    PRE-OP EVALUATION:  Today's date: 2019    Bria Davis (: 1960) presents for pre-operative evaluation assessment as requested by Dr. Watkins.  She requires evaluation and anesthesia risk assessment prior to undergoing surgery/procedure for treatment of Insertional Left Achilles Tendon Repair,Remodel,and Reattachment .    Proposed Surgery/ Procedure: Insertional Left Achilles Tendon Repair,Remodel,and Reattachment  Date of Surgery/ Procedure: 19  Time of Surgery/ Procedure: 3:45 pm  Hospital/Surgical Facility: [facility]    Primary Physician: Kaia Elena  Type of Anesthesia Anticipated: Combined General with Popliteal Block    Patient has a Health Care Directive or Living Will:  NO    1. NO - Do you have a history of heart attack, stroke, stent, bypass or surgery on an artery in the head, neck, heart or legs?  2. YES - DO YOU EVER HAVE ANY PAIN OR DISCOMFORT IN YOUR CHEST?  Daily Off and on   3. NO - Do you have a history of  Heart Failure?  4. YES - ARE YOUR TROUBLED BY SHORTNESS OF BREATH WHEN WALKING ON THE LEVEL, UP A SLIGHT HILL OR AT NIGHT? Up and slight hill and on level  5. NO - Do you currently have a cold, bronchitis or other respiratory infection?  6. NO - Do you have a cough, shortness of breath or wheezing?  7. NO - Do you sometimes get pains in the calves of your legs when you walk?  8. NO - Do you or anyone in your family have previous history of blood clots?  9. NO - Do you or does anyone in your family have a serious bleeding problem such as prolonged bleeding following surgeries or cuts?  10. YES - HAVE YOU EVER HAD PROBLEMS WITH ANEMIA OR BEEN TOLD TO TAKE IRON PILLS? Takes an iron pills daily  11. NO - Have you had any abnormal blood loss such as black, tarry or bloody stools, or abnormal vaginal bleeding?  12. NO - Have you ever had a blood  transfusion?  13. NO - Have you or any of your relatives ever had problems with anesthesia?  14. NO - Do you have sleep apnea, excessive snoring or daytime drowsiness?  15. NO - Do you have any prosthetic heart valves?  16. NO - Do you have prosthetic joints?  17. NO - Is there any chance that you may be pregnant?      HPI:     HPI related to upcoming procedure: torn achilles tendon needing repair.      ASTHMA - Patient has a longstanding history of moderate-severe Asthma . Patient has been doing well overall noting NO SYMPTOMS and continues on medication regimen consisting of advair, singulair, albuterol without adverse reactions or side effects.     CAD - Patient has a longstanding history of moderate-severe CAD. Patient denies recent chest pain or NTG use, denies exercise induced dyspnea or PND. Last Stress test 8/4/2019, cardiac cath 9/17/2019 see results below. EKG 9/12/2019 see below.     DIABETES - Patient has a longstanding history of DiabetesType Type II . Patient is being treated with oral agents and insulin injections and denies significant side effects. Control has been fair. Complicating factors include but are not limited to: hypertension and hyperlipidemia.     HYPERLIPIDEMIA - Patient has a long history of significant Hyperlipidemia requiring medication for treatment with recent good control. Patient reports no problems or side effects with the medication.     HYPERTENSION - Patient has longstanding history of HTN , currently denies any symptoms referable to elevated blood pressure. Specifically denies chest pain, palpitations, dyspnea, orthopnea, PND or peripheral edema. Blood pressure readings have been in normal range. Current medication regimen is as listed below. Patient denies any side effects of medication.       MEDICAL HISTORY:     Patient Active Problem List    Diagnosis Date Noted     Nonobstructive atherosclerosis of coronary artery 09/20/2019     Priority: Medium     Status post coronary  angiogram 09/12/2019     Priority: Medium     Other forms of angina pectoris (H) 09/06/2019     Priority: Medium     Added automatically from request for surgery [id number]       Benign essential hypertension 08/25/2016     Priority: Medium     Type 2 diabetes mellitus with kidney complication, with long-term current use of insulin (H) 10/21/2015     Priority: Medium     Morbid obesity (H) 10/21/2015     Priority: Medium     Hyperlipidemia LDL goal <70 03/26/2011     Priority: Medium     Microalbuminuria 01/06/2011     Priority: Medium     Lumbar radiculopathy 11/30/2010     Priority: Medium     Enthesopathy 11/27/2007     Priority: Medium     Problem list name updated by automated process. Provider to review       Restless legs syndrome (RLS) 04/12/2007     Priority: Medium     Esophageal reflux 02/07/2006     Priority: Medium     Allergic rhinitis 02/07/2006     Priority: Medium     Problem list name updated by automated process. Provider to review       Mild intermittent asthma 11/10/2005     Priority: Medium      Past Medical History:   Diagnosis Date     Asthma      Diabetes mellitus (H)      Esophageal reflux      Obese      Other chronic sinusitis      PONV (postoperative nausea and vomiting)      Past Surgical History:   Procedure Laterality Date     COLONOSCOPY  1/31/2002     COLONOSCOPY  10/26/2012    Procedure: COLONOSCOPY;  Colonoscopy;  Surgeon: Margret Sales MD;  Location: [facility]     COLONOSCOPY N/A 11/8/2019    Procedure: COLONOSCOPY, WITH POLYPECTOMY AND BIOPSY;  Surgeon: Ariel Heck MD;  Location: [facility]     CV LEFT HEART CATH N/A 9/12/2019    Procedure: Left Heart Cath;  Surgeon: Mike Sanon MD;  Location: [facility] CARDIAC CATH LAB     CV LEFT VENTRICULOGRAM N/A 9/12/2019    Procedure: Left Ventriculogram;  Surgeon: Mike Sanon MD;  Location: [facility] CARDIAC CATH LAB     ESOPHAGOSCOPY, GASTROSCOPY, DUODENOSCOPY (EGD), COMBINED N/A 11/8/2019    Procedure:  ESOPHAGOGASTRODUODENOSCOPY, WITH BIOPSY;  Surgeon: Ariel Heck MD;  Location: [facility]     GYN SURGERY      Hydroablation 2007, polyp removal 2018     INJECT EPIDURAL LUMBAR  12/7/2010    INJECT EPIDURAL LUMBAR performed by GENERIC ANESTHESIA PROVIDER at [facility]     INJECT EPIDURAL LUMBAR  1/17/2011    INJECT EPIDURAL LUMBAR performed by GENERIC ANESTHESIA PROVIDER at [facility]     RELEASE CARPAL TUNNEL  6/19/2012    Procedure: RELEASE CARPAL TUNNEL;  Right Carpal Tunnel Release;  Surgeon: Mike Sparrow MD;  Location: [facility]     RELEASE CARPAL TUNNEL  11/9/2012    Procedure: RELEASE CARPAL TUNNEL;  Left Carpal Tunnel Release;  Surgeon: Mike Sparrow MD;  Location: [facility]     SURGICAL HISTORY OF -   4/10/2000    bilateral total ethmoidectomies, bilateral maxillary antrostomies, bilateral SMR of inferior turbinates, reduction of lt turbinate porter bullosa     Current Outpatient Medications   Medication Sig Dispense Refill     albuterol (PROAIR HFA/PROVENTIL HFA/VENTOLIN HFA) 108 (90 Base) MCG/ACT inhaler Inhale 2 puffs into the lungs every 6 hours 1 Inhaler 11     aspirin 81 MG tablet Take 1 tablet by mouth daily. 100 tablet 3     Calcium Carb-Cholecalciferol (CALCIUM-VITAMIN D) 600-400 MG-UNIT TABS Take 1 tablet by mouth daily       esomeprazole (NEXIUM) 40 MG DR capsule Take 1 capsule (40 mg) by mouth every morning (before breakfast) 90 capsule 3     ferrous sulfate 140 (45 Fe) MG TBCR CR tablet Take 140 mg by mouth daily       fluticasone-salmeterol (ADVAIR DISKUS) 100-50 MCG/DOSE inhaler Inhale 1 puff into the lungs 2 times daily Discuss refills @ Next appt. 3 Inhaler 1     Insulin Glargine, 2 Unit Dial, (TOUJEO MAX SOLOSTAR) 300 UNIT/ML SOPN Inject 100 Units Subcutaneous daily To get 3 months worth of insulin 27 mL 3     insulin lispro (HUMALOG KWIKPEN) 100 UNIT/ML (1 unit dial) pen To take 32 units with each meal 45 mL 3     insulin pen needle (BD PEDRO LUIS U/F) 32G X 4 MM miscellaneous Use 4 daily as directed.  "400 each 11     losartan (COZAAR) 100 MG tablet Take 1 tablet (100 mg) by mouth daily 90 tablet 1     metFORMIN (GLUCOPHAGE-XR) 500 MG 24 hr tablet TAKE TWO TABLETS BY MOUTH TWICE A DAY WITH MEALS 360 tablet 1     metoclopramide (REGLAN) 10 MG tablet Take 1 tablet (10 mg) by mouth 2 times daily 180 tablet 1     metoprolol succinate ER (TOPROL-XL) 25 MG 24 hr tablet Take 1 tablet (25 mg) by mouth daily 93 tablet 3     montelukast (SINGULAIR) 10 MG tablet TAKE ONE TABLET BY MOUTH AT BEDTIME 90 tablet 1     Multiple Vitamins-Minerals (MULTIVITAMIN ADULTS 50+) TABS Take 1 tablet by mouth daily       Semaglutide,0.25 or 0.5MG/DOS, (OZEMPIC, 0.25 OR 0.5 MG/DOSE,) 2 MG/1.5ML SOPN Inject 0.5 mg Subcutaneous once a week 4.5 mL 1     simvastatin (ZOCOR) 40 MG tablet Take 1 tablet (40 mg) by mouth At Bedtime at bedtime. 90 tablet 1     blood glucose (ACCU-CHEK GUIDE) test strip Use to test blood sugar 3 times daily or as directed. 300 strip 11     blood glucose monitoring (ACCU-CHEK FASTCLIX) lancets 1 each by In Vitro route 4 times daily Use to test blood sugar 4 times daily or as directed. 408 each 3     pseudoePHEDrine (SUDAFED) 30 MG tablet TAKE TWO TABLETS BY MOUTH EVERY 12 HOURS 360 tablet 3     OTC products: None, except as noted above    Allergies   Allergen Reactions     Lisinopril Cough     Tape [Adhesive Tape] Rash      Latex Allergy: NO    Social History     Tobacco Use     Smoking status: Never Smoker     Smokeless tobacco: Never Used   Substance Use Topics     Alcohol use: No     History   Drug Use No       REVIEW OF SYSTEMS:   Constitutional, neuro, ENT, endocrine, pulmonary, cardiac, gastrointestinal, genitourinary, musculoskeletal, integument and psychiatric systems are negative, except as otherwise noted.    EXAM:   /86   Pulse 76   Temp 98.4  F (36.9  C) (Tympanic)   Resp 16   Ht 1.6 m (5' 3\")   Wt 120.7 kg (266 lb)   LMP 01/14/2016 (Approximate)   SpO2 97%   BMI 47.12 kg/m      GENERAL " APPEARANCE: healthy, alert and no distress     EYES: EOMI, PERRL     HENT: ear canals and TM's normal and nose and mouth without ulcers or lesions     NECK: no adenopathy, no asymmetry, masses, or scars and thyroid normal to palpation     RESP: lungs clear to auscultation - no rales, rhonchi or wheezes     CV: regular rates and rhythm, normal S1 S2, no S3 or S4 and no murmur, click or rub     ABDOMEN:  soft, nontender, no HSM or masses and bowel sounds normal     MS: extremities normal- no gross deformities noted, no evidence of inflammation in joints, FROM in all extremities.     SKIN: no suspicious lesions or rashes     NEURO: Normal strength and tone, sensory exam grossly normal, mentation intact and speech normal     PSYCH: mentation appears normal. and affect normal/bright     LYMPHATICS: No cervical adenopathy    DIAGNOSTICS:   EKG: appears normal, NSR, normal axis, normal intervals, no acute ST/T changes c/w ischemia, no LVH by voltage criteria, unchanged from previous tracings        Left ventricular filling pressures are mildly elevated .    Mid LAD lesion is 10% stenosed.    Prox RCA lesion is 10% stenosed.    The ejection fraction is greater than 55% by visual estimate.     1. Trivial CAD  2. HTN and mild elevated LVEDP. Additional BP management recommended  3. Normal LV EF but there may be a gradient on pullback to Aorta.  I will order an echocardiogram to be done before her follow-up  Office visit with cardiology  4. Pt will need to see pmd for follow-up evaluation of microcytic anemia, BP and cholesterol and DM management.  Recent Labs   Lab Test 10/31/19  0850 09/16/19  0728 09/12/19  0655  08/05/19  1454   HGB 11.3*  --  11.1*  --   --      --  304  --   --    INR  --   --  0.96  --   --     136 140   < >  --    POTASSIUM 4.9 4.4 4.2   < >  --    CR 0.57 0.54 0.54   < >  --    A1C 9.3*  --   --   --  9.4*    < > = values in this interval not displayed.        IMPRESSION:   Reason for  surgery/procedure: Achilles tendon repair  Diagnosis/reason for consult: Preoperative H&P     The proposed surgical procedure is considered INTERMEDIATE risk.    REVISED CARDIAC RISK INDEX  The patient has the following serious cardiovascular risks for perioperative complications such as (MI, PE, VFib and 3  AV Block):  Diabetes Mellitus (on Insulin)  INTERPRETATION: 0 risks: Class I (very low risk - 0.4% complication rate)    The patient has the following additional risks for perioperative complications:  Morbid obesity  Hypothyroidism, hyperlipidemia, anemia, asthma      ICD-10-CM    1. Preop general physical exam Z01.818    2. Rupture of left Achilles tendon, subsequent encounter S86.012D    3. Type 2 diabetes mellitus with chronic kidney disease, with long-term current use of insulin, unspecified CKD stage (H) E11.22 Fair control. But has been working aggressively with diabetic ed and more recent sugars over recent weeks/month have been well controlled.    Z79.4    4. Mild intermittent asthma, uncomplicated J45.20 Stable inactive symptoms.   5. Hyperlipidemia LDL goal <100 E78.5 Well controlled.    6. Essential hypertension with goal blood pressure less than 140/90 I10 Well controlled. Check labs. Comprehensive metabolic panel   7. Microcytic anemia D50.9 Mild. On iron supplement will re-check labs. CBC with platelets differential     Ferritin     Iron and iron binding capacity       RECOMMENDATIONS:         --Patient is to take all scheduled medications on the day of surgery EXCEPT for modifications listed below.    Diabetes Medication Use  -----Hold usual oral and non-insulin diabetic meds (e.g. Metformin, Actos, Glipizide) while NPO.   -----Hold long acting insulin (e.g. Lantus, NPH) while NPO (fasting)  -----Hold short acting insulin (e.g. Novolog, Humalog) while NPO (fasting)  -----Use usual sliding scale correction of insulin while NPO (fasting)      Anticoagulant or Antiplatelet Medication Use  ASPIRIN:  Discontinue ASA 7-10 days prior to procedure to reduce bleeding risk.  It should be resumed post-operatively.        ACE Inhibitor or Angiotensin Receptor Blocker (ARB) Use  Ace inhibitor or Angiotensin Receptor Blocker (ARB) and should HOLD this medication for the 24 hours prior to surgery.      APPROVAL GIVEN to proceed with proposed procedure, without further diagnostic evaluation       Signed Electronically by: Doroteo Ernst MD    Copy of this evaluation report is provided to requesting physician.    [facility] Preop Guidelines    Revised Cardiac Risk Index

## 2019-12-19 NOTE — PATIENT INSTRUCTIONS
Stop aspirin today. Stop metformin and losartan 24 hrs prior to surgery (last dose AM on the day prior to surgery). Stop insulin once NPO.    Before Your Surgery      Call your surgeon if there is any change in your health. This includes signs of a cold or flu (such as a sore throat, runny nose, cough, rash or fever).    Do not smoke, drink alcohol or take over the counter medicine (unless your surgeon or primary care doctor tells you to) for the 24 hours before and after surgery.    If you take prescribed drugs: Follow your doctor s orders about which medicines to take and which to stop until after surgery.    Eating and drinking prior to surgery: follow the instructions from your surgeon    Take a shower or bath the night before surgery. Use the soap your surgeon gave you to gently clean your skin. If you do not have soap from your surgeon, use your regular soap. Do not shave or scrub the surgery site.  Wear clean pajamas and have clean sheets on your bed.

## 2019-12-22 DIAGNOSIS — J31.0 CHRONIC RHINITIS: ICD-10-CM

## 2019-12-23 ENCOUNTER — ANESTHESIA EVENT (OUTPATIENT)
Dept: SURGERY | Facility: CLINIC | Age: 59
End: 2019-12-23
Payer: COMMERCIAL

## 2019-12-23 NOTE — ANESTHESIA PREPROCEDURE EVALUATION
Anesthesia Pre-Procedure Evaluation    Patient: Bria Davis   MRN: [id number] : 1960          Preoperative Diagnosis: Achilles tendinitis [M76.60]    Procedure(s):  Insertional Achilles Tendon Repair,Remodel,and Reattachment    Past Medical History:   Diagnosis Date     Asthma      Diabetes mellitus (H)      Esophageal reflux      Obese      Other chronic sinusitis      PONV (postoperative nausea and vomiting)      Past Surgical History:   Procedure Laterality Date     COLONOSCOPY  2002     COLONOSCOPY  10/26/2012    Procedure: COLONOSCOPY;  Colonoscopy;  Surgeon: Margret Sales MD;  Location: [facility]     COLONOSCOPY N/A 2019    Procedure: COLONOSCOPY, WITH POLYPECTOMY AND BIOPSY;  Surgeon: Ariel Heck MD;  Location: [facility]     CV LEFT HEART CATH N/A 2019    Procedure: Left Heart Cath;  Surgeon: Mike Sanon MD;  Location:  [facility] CATH [facility]     CV LEFT VENTRICULOGRAM N/A 2019    Procedure: Left Ventriculogram;  Surgeon: Mike Sanon MD;  Location:  [facility]     ESOPHAGOSCOPY, GASTROSCOPY, DUODENOSCOPY (EGD), COMBINED N/A 2019    Procedure: ESOPHAGOGASTRODUODENOSCOPY, WITH BIOPSY;  Surgeon: Ariel Heck MD;  Location: [facility]     GYN SURGERY      Hydroablation , polyp removal      INJECT EPIDURAL LUMBAR  2010    INJECT EPIDURAL LUMBAR performed by GENERIC ANESTHESIA PROVIDER at [facility]     INJECT EPIDURAL LUMBAR  2011    INJECT EPIDURAL LUMBAR performed by GENERIC ANESTHESIA PROVIDER at [facility]     RELEASE CARPAL TUNNEL  2012    Procedure: RELEASE CARPAL TUNNEL;  Right Carpal Tunnel Release;  Surgeon: Mike Sparrow MD;  Location: [facility]     RELEASE CARPAL TUNNEL  2012    Procedure: RELEASE CARPAL TUNNEL;  Left Carpal Tunnel Release;  Surgeon: Mike Sparrow MD;  Location: [facility]     SURGICAL HISTORY OF -   4/10/2000    bilateral total ethmoidectomies, bilateral maxillary antrostomies, bilateral SMR  of inferior turbinates, reduction of lt turbinate porter bullosa       Anesthesia Evaluation     . Pt has had prior anesthetic. Type: General, Regional and MAC    History of anesthetic complications   - PONV        ROS/MED HX    ENT/Pulmonary:     (+)allergic rhinitis, other ENT- Chronic sinusitis, Intermittent asthma , . .    Neurologic:     (+)other neuro RLS    Cardiovascular: Comment: S/P coronary angiogram    (+) Dyslipidemia, hypertension--CAD, angina--. : . . . :. . Previous cardiac testing Echodate:9/17/19results:Interpretation Summary     The visual ejection fraction is estimated at 60-65%.  There is mild to moderate concentric left ventricular hypertrophy.  There is no pericardial effusion.  Normal left ventricular wall motion.  Technically difficult study.  Hypertension.  _____________________________________________________________________________  __        Left Ventricle  The left ventricle is normal in size. There is mild to moderate concentric  left ventricular hypertrophy. The visual ejection fraction is estimated at 60-  65%. Diastolic Doppler findings (E/E' ratio and/or other parameters) suggest  left ventricular filling pressures are indeterminate. Normal left ventricular  wall motion.     Right Ventricle  The right ventricle is normal in size and function.     Atria  Normal left atrial size. Right atrial size is normal.     Mitral Valve  There is physiologic mitral regurgitation.        Tricuspid Valve  There is trace tricuspid regurgitation. Right ventricular systolic pressure  could not be approximated due to inadequate tricuspid regurgitation.     Aortic Valve  The aortic valve is trileaflet. There is trace aortic regurgitation. No  hemodynamically significant valvular aortic stenosis.     Pulmonic Valve  The pulmonic valve is not well visualized. There is trace pulmonic valvular  regurgitation.     Vessels  The aortic root is normal size.     Pericardium  There is no pericardial  effusion.date: results:ECG reviewed date:9/12/19 results:Sinus rhythm  Normal ECG  No previous ECGs available date: results:          METS/Exercise Tolerance:     Hematologic:     (+) Anemia, -      Musculoskeletal:   (+)  other musculoskeletal- Lumbar radiculopathy      GI/Hepatic:     (+) GERD Asymptomatic on medication,       Renal/Genitourinary:     (+) chronic renal disease, type: CRI,       Endo:     (+) type II DM Last HgA1c: 7.3 date: 12/19/19 Using insulin Normal glucose range: 83 Diabetic complications: nephropathy, Obesity (Morbid), .      Psychiatric:         Infectious Disease:  - neg infectious disease ROS       Malignancy:      - no malignancy   Other:    - neg other ROS                      Physical Exam  Normal systems: cardiovascular, pulmonary and dental    Airway   Mallampati: II  TM distance: >3 FB  Neck ROM: full    Dental     Cardiovascular       Pulmonary             Lab Results   Component Value Date    WBC 8.4 12/19/2019    HGB 13.4 12/19/2019    HCT 42.8 12/19/2019     12/19/2019     12/19/2019    POTASSIUM 4.0 12/19/2019    CHLORIDE 105 12/19/2019    CO2 28 12/19/2019    BUN 10 12/19/2019    CR 0.52 12/19/2019    GLC 90 12/19/2019    MARLON 9.4 12/19/2019    ALBUMIN 3.5 12/19/2019    PROTTOTAL 7.1 12/19/2019    ALT 26 12/19/2019    AST 19 12/19/2019    ALKPHOS 66 12/19/2019    BILITOTAL 0.3 12/19/2019    LIPASE 128 04/26/2010    AMYLASE 54 04/26/2010    PTT 29 09/12/2019    INR 0.96 09/12/2019    TSH 1.41 08/05/2019    HCG Negative 11/09/2012       Preop Vitals  BP Readings from Last 3 Encounters:   12/19/19 138/86   11/08/19 (!) 121/99   10/31/19 132/84    Pulse Readings from Last 3 Encounters:   12/19/19 76   11/08/19 86   10/31/19 82      Resp Readings from Last 3 Encounters:   12/19/19 16   11/08/19 16   10/31/19 18    SpO2 Readings from Last 3 Encounters:   12/19/19 97%   11/08/19 95%   10/31/19 96%      Temp Readings from Last 1 Encounters:   12/19/19 36.9  C (98.4  F)  "(Tympanic)    Ht Readings from Last 1 Encounters:   12/19/19 1.6 m (5' 3\")      Wt Readings from Last 1 Encounters:   12/19/19 120.7 kg (266 lb)    Estimated body mass index is 47.12 kg/m  as calculated from the following:    Height as of 12/19/19: 1.6 m (5' 3\").    Weight as of 12/19/19: 120.7 kg (266 lb).       Anesthesia Plan      History & Physical Review  History and physical reviewed and following examination; no interval change.    ASA Status:  3 .    NPO Status:  > 8 hours    Plan for General, ETT, LMA, Peripheral Nerve Block and For Post-op pain in coordination with surgeon with Intravenous and Propofol induction. Maintenance will be Balanced.    PONV prophylaxis:  Ondansetron (or other 5HT-3) and Dexamethasone or Solumedrol  Additional equipment: Videolaryngoscope      Postoperative Care  Postoperative pain management:  IV analgesics, Oral pain medications and Peripheral nerve block (Single Shot).      Consents  Anesthetic plan, risks, benefits and alternatives discussed with:  Patient..                 ALYSON Rand CRNA  "

## 2019-12-23 NOTE — TELEPHONE ENCOUNTER
Requested Prescriptions   Pending Prescriptions Disp Refills     SUDOGEST 30 MG tablet [Pharmacy Med Name: SudoGest 30 MG Oral Tablet]  0     Sig: TAKE 2 TABLETS BY MOUTH EVERY 12 HOURS   Last Written Prescription Date:  1/9/19  Last Fill Quantity: 360 tab,  # refills: 3   Last office visit: 12/19/2019 with prescribing provider:  MICHAEL Ernst   Future Office Visit:        There is no refill protocol information for this order

## 2019-12-24 RX ORDER — PSEUDOEPHEDRINE HCL 30 MG
60 TABLET ORAL EVERY 12 HOURS PRN
Qty: 360 TABLET | Refills: 1 | Status: SHIPPED | OUTPATIENT
Start: 2019-12-24 | End: 2020-06-15

## 2019-12-26 ENCOUNTER — ANESTHESIA (OUTPATIENT)
Dept: SURGERY | Facility: CLINIC | Age: 59
End: 2019-12-26
Payer: COMMERCIAL

## 2019-12-26 ENCOUNTER — HOSPITAL ENCOUNTER (OUTPATIENT)
Facility: CLINIC | Age: 59
Discharge: HOME | End: 2019-12-26
Attending: PODIATRIST | Admitting: PODIATRIST
Payer: COMMERCIAL

## 2019-12-26 VITALS
RESPIRATION RATE: 18 BRPM | OXYGEN SATURATION: 96 % | TEMPERATURE: 97.6 F | DIASTOLIC BLOOD PRESSURE: 83 MMHG | HEART RATE: 66 BPM | SYSTOLIC BLOOD PRESSURE: 128 MMHG

## 2019-12-26 DIAGNOSIS — G89.18 ACUTE POST-OPERATIVE PAIN: Primary | ICD-10-CM

## 2019-12-26 LAB
GLUCOSE BLDC GLUCOMTR-MCNC: 67 MG/DL (ref 70–99)
GLUCOSE BLDC GLUCOMTR-MCNC: 75 MG/DL (ref 70–99)
GLUCOSE BLDC GLUCOMTR-MCNC: 83 MG/DL (ref 70–99)
GLUCOSE BLDC GLUCOMTR-MCNC: 99 MG/DL (ref 70–99)

## 2019-12-26 PROCEDURE — 25000128 H RX IP 250 OP 636: Performed by: NURSE ANESTHETIST, CERTIFIED REGISTERED

## 2019-12-26 PROCEDURE — 36000056 ZZH SURGERY LEVEL 3 1ST 30 MIN: Performed by: PODIATRIST

## 2019-12-26 PROCEDURE — 36000058 ZZH SURGERY LEVEL 3 EA 15 ADDTL MIN: Performed by: PODIATRIST

## 2019-12-26 PROCEDURE — 25000128 H RX IP 250 OP 636: Performed by: PODIATRIST

## 2019-12-26 PROCEDURE — 25800030 ZZH RX IP 258 OP 636: Performed by: NURSE ANESTHETIST, CERTIFIED REGISTERED

## 2019-12-26 PROCEDURE — 25000125 ZZHC RX 250: Performed by: NURSE ANESTHETIST, CERTIFIED REGISTERED

## 2019-12-26 PROCEDURE — 71000014 ZZH RECOVERY PHASE 1 LEVEL 2 FIRST HR: Performed by: PODIATRIST

## 2019-12-26 PROCEDURE — 37000009 ZZH ANESTHESIA TECHNICAL FEE, EACH ADDTL 15 MIN: Performed by: PODIATRIST

## 2019-12-26 PROCEDURE — 37000008 ZZH ANESTHESIA TECHNICAL FEE, 1ST 30 MIN: Performed by: PODIATRIST

## 2019-12-26 PROCEDURE — 71000027 ZZH RECOVERY PHASE 2 EACH 15 MINS: Performed by: PODIATRIST

## 2019-12-26 PROCEDURE — C1713 ANCHOR/SCREW BN/BN,TIS/BN: HCPCS | Performed by: PODIATRIST

## 2019-12-26 PROCEDURE — 82962 GLUCOSE BLOOD TEST: CPT

## 2019-12-26 PROCEDURE — 25000132 ZZH RX MED GY IP 250 OP 250 PS 637: Performed by: NURSE ANESTHETIST, CERTIFIED REGISTERED

## 2019-12-26 PROCEDURE — C1762 CONN TISS, HUMAN(INC FASCIA): HCPCS | Performed by: PODIATRIST

## 2019-12-26 PROCEDURE — 40000305 ZZH STATISTIC PRE PROC ASSESS I: Performed by: PODIATRIST

## 2019-12-26 PROCEDURE — 27210794 ZZH OR GENERAL SUPPLY STERILE: Performed by: PODIATRIST

## 2019-12-26 DEVICE — IMP ANCHOR ARTHREX ACHILLIES SPEEDBRDG BIOCOMP AR-8928BC-CP: Type: IMPLANTABLE DEVICE | Site: ACHILLES TENDON | Status: FUNCTIONAL

## 2019-12-26 DEVICE — IMPLANTABLE DEVICE: Type: IMPLANTABLE DEVICE | Site: ACHILLES TENDON | Status: FUNCTIONAL

## 2019-12-26 RX ORDER — SCOLOPAMINE TRANSDERMAL SYSTEM 1 MG/1
1 PATCH, EXTENDED RELEASE TRANSDERMAL ONCE
Status: DISCONTINUED | OUTPATIENT
Start: 2019-12-26 | End: 2019-12-26 | Stop reason: HOSPADM

## 2019-12-26 RX ORDER — SODIUM CHLORIDE, SODIUM LACTATE, POTASSIUM CHLORIDE, CALCIUM CHLORIDE 600; 310; 30; 20 MG/100ML; MG/100ML; MG/100ML; MG/100ML
INJECTION, SOLUTION INTRAVENOUS CONTINUOUS
Status: DISCONTINUED | OUTPATIENT
Start: 2019-12-26 | End: 2019-12-26 | Stop reason: HOSPADM

## 2019-12-26 RX ORDER — MEPERIDINE HYDROCHLORIDE 25 MG/ML
12.5 INJECTION INTRAMUSCULAR; INTRAVENOUS; SUBCUTANEOUS
Status: DISCONTINUED | OUTPATIENT
Start: 2019-12-26 | End: 2019-12-26 | Stop reason: HOSPADM

## 2019-12-26 RX ORDER — GABAPENTIN 300 MG/1
300 CAPSULE ORAL ONCE
Status: COMPLETED | OUTPATIENT
Start: 2019-12-26 | End: 2019-12-26

## 2019-12-26 RX ORDER — PROPOFOL 10 MG/ML
INJECTION, EMULSION INTRAVENOUS CONTINUOUS PRN
Status: DISCONTINUED | OUTPATIENT
Start: 2019-12-26 | End: 2019-12-26

## 2019-12-26 RX ORDER — ONDANSETRON 2 MG/ML
INJECTION INTRAMUSCULAR; INTRAVENOUS PRN
Status: DISCONTINUED | OUTPATIENT
Start: 2019-12-26 | End: 2019-12-26

## 2019-12-26 RX ORDER — ONDANSETRON 2 MG/ML
4 INJECTION INTRAMUSCULAR; INTRAVENOUS EVERY 30 MIN PRN
Status: DISCONTINUED | OUTPATIENT
Start: 2019-12-26 | End: 2019-12-26 | Stop reason: HOSPADM

## 2019-12-26 RX ORDER — LIDOCAINE HYDROCHLORIDE AND EPINEPHRINE 15; 5 MG/ML; UG/ML
INJECTION, SOLUTION EPIDURAL PRN
Status: DISCONTINUED | OUTPATIENT
Start: 2019-12-26 | End: 2019-12-26

## 2019-12-26 RX ORDER — ALBUTEROL SULFATE 0.83 MG/ML
2.5 SOLUTION RESPIRATORY (INHALATION) EVERY 4 HOURS PRN
Status: DISCONTINUED | OUTPATIENT
Start: 2019-12-26 | End: 2019-12-26 | Stop reason: HOSPADM

## 2019-12-26 RX ORDER — ACETAMINOPHEN 325 MG/1
975 TABLET ORAL ONCE
Status: COMPLETED | OUTPATIENT
Start: 2019-12-26 | End: 2019-12-26

## 2019-12-26 RX ORDER — FENTANYL CITRATE 50 UG/ML
INJECTION, SOLUTION INTRAMUSCULAR; INTRAVENOUS PRN
Status: DISCONTINUED | OUTPATIENT
Start: 2019-12-26 | End: 2019-12-26

## 2019-12-26 RX ORDER — HYDROMORPHONE HYDROCHLORIDE 1 MG/ML
.3-.5 INJECTION, SOLUTION INTRAMUSCULAR; INTRAVENOUS; SUBCUTANEOUS EVERY 10 MIN PRN
Status: DISCONTINUED | OUTPATIENT
Start: 2019-12-26 | End: 2019-12-26 | Stop reason: HOSPADM

## 2019-12-26 RX ORDER — CEFAZOLIN SODIUM 1 G/50ML
1 INJECTION, SOLUTION INTRAVENOUS SEE ADMIN INSTRUCTIONS
Status: DISCONTINUED | OUTPATIENT
Start: 2019-12-26 | End: 2019-12-26 | Stop reason: HOSPADM

## 2019-12-26 RX ORDER — OXYCODONE AND ACETAMINOPHEN 5; 325 MG/1; MG/1
1-2 TABLET ORAL EVERY 4 HOURS PRN
Qty: 26 TABLET | Refills: 0 | Status: SHIPPED | OUTPATIENT
Start: 2019-12-26 | End: 2020-03-19

## 2019-12-26 RX ORDER — HYDRALAZINE HYDROCHLORIDE 20 MG/ML
2.5-5 INJECTION INTRAMUSCULAR; INTRAVENOUS EVERY 10 MIN PRN
Status: DISCONTINUED | OUTPATIENT
Start: 2019-12-26 | End: 2019-12-26 | Stop reason: HOSPADM

## 2019-12-26 RX ORDER — LIDOCAINE HYDROCHLORIDE 10 MG/ML
INJECTION, SOLUTION EPIDURAL; INFILTRATION; INTRACAUDAL; PERINEURAL PRN
Status: DISCONTINUED | OUTPATIENT
Start: 2019-12-26 | End: 2019-12-26

## 2019-12-26 RX ORDER — FENTANYL CITRATE 50 UG/ML
25-50 INJECTION, SOLUTION INTRAMUSCULAR; INTRAVENOUS
Status: DISCONTINUED | OUTPATIENT
Start: 2019-12-26 | End: 2019-12-26 | Stop reason: HOSPADM

## 2019-12-26 RX ORDER — OXYCODONE AND ACETAMINOPHEN 5; 325 MG/1; MG/1
1 TABLET ORAL
Status: DISCONTINUED | OUTPATIENT
Start: 2019-12-26 | End: 2019-12-26 | Stop reason: HOSPADM

## 2019-12-26 RX ORDER — NALOXONE HYDROCHLORIDE 0.4 MG/ML
.1-.4 INJECTION, SOLUTION INTRAMUSCULAR; INTRAVENOUS; SUBCUTANEOUS
Status: DISCONTINUED | OUTPATIENT
Start: 2019-12-26 | End: 2019-12-26 | Stop reason: HOSPADM

## 2019-12-26 RX ORDER — ROPIVACAINE HYDROCHLORIDE 5 MG/ML
INJECTION, SOLUTION EPIDURAL; INFILTRATION; PERINEURAL PRN
Status: DISCONTINUED | OUTPATIENT
Start: 2019-12-26 | End: 2019-12-26

## 2019-12-26 RX ORDER — KETOROLAC TROMETHAMINE 30 MG/ML
30 INJECTION, SOLUTION INTRAMUSCULAR; INTRAVENOUS EVERY 6 HOURS PRN
Status: DISCONTINUED | OUTPATIENT
Start: 2019-12-26 | End: 2019-12-26 | Stop reason: HOSPADM

## 2019-12-26 RX ORDER — PROPOFOL 10 MG/ML
INJECTION, EMULSION INTRAVENOUS PRN
Status: DISCONTINUED | OUTPATIENT
Start: 2019-12-26 | End: 2019-12-26

## 2019-12-26 RX ORDER — ONDANSETRON 4 MG/1
4 TABLET, ORALLY DISINTEGRATING ORAL EVERY 30 MIN PRN
Status: DISCONTINUED | OUTPATIENT
Start: 2019-12-26 | End: 2019-12-26 | Stop reason: HOSPADM

## 2019-12-26 RX ORDER — LIDOCAINE 40 MG/G
CREAM TOPICAL
Status: DISCONTINUED | OUTPATIENT
Start: 2019-12-26 | End: 2019-12-26 | Stop reason: HOSPADM

## 2019-12-26 RX ORDER — CEFAZOLIN SODIUM IN 0.9 % NACL 3 G/100 ML
3 INTRAVENOUS SOLUTION, PIGGYBACK (ML) INTRAVENOUS
Status: COMPLETED | OUTPATIENT
Start: 2019-12-26 | End: 2019-12-26

## 2019-12-26 RX ORDER — DEXAMETHASONE SODIUM PHOSPHATE 4 MG/ML
4 INJECTION, SOLUTION INTRA-ARTICULAR; INTRALESIONAL; INTRAMUSCULAR; INTRAVENOUS; SOFT TISSUE EVERY 10 MIN PRN
Status: DISCONTINUED | OUTPATIENT
Start: 2019-12-26 | End: 2019-12-26 | Stop reason: HOSPADM

## 2019-12-26 RX ORDER — HYDROXYZINE HYDROCHLORIDE 25 MG/1
25 TABLET, FILM COATED ORAL 3 TIMES DAILY PRN
Qty: 26 TABLET | Refills: 0 | Status: SHIPPED | OUTPATIENT
Start: 2019-12-26 | End: 2020-03-19

## 2019-12-26 RX ADMIN — DEXTROSE MONOHYDRATE 50 ML: 50 INJECTION, SOLUTION INTRAVENOUS at 17:16

## 2019-12-26 RX ADMIN — ROCURONIUM BROMIDE 30 MG: 10 INJECTION INTRAVENOUS at 15:53

## 2019-12-26 RX ADMIN — SCOPOLAMINE 1 PATCH: 1 PATCH TRANSDERMAL at 15:07

## 2019-12-26 RX ADMIN — SODIUM CHLORIDE, POTASSIUM CHLORIDE, SODIUM LACTATE AND CALCIUM CHLORIDE: 600; 310; 30; 20 INJECTION, SOLUTION INTRAVENOUS at 14:22

## 2019-12-26 RX ADMIN — ROPIVACAINE HYDROCHLORIDE 30 ML: 5 INJECTION, SOLUTION EPIDURAL; INFILTRATION; PERINEURAL at 15:22

## 2019-12-26 RX ADMIN — PROPOFOL 200 MCG/KG/MIN: 10 INJECTION, EMULSION INTRAVENOUS at 15:49

## 2019-12-26 RX ADMIN — MIDAZOLAM HYDROCHLORIDE 1 MG: 1 INJECTION, SOLUTION INTRAMUSCULAR; INTRAVENOUS at 15:47

## 2019-12-26 RX ADMIN — FENTANYL CITRATE 50 MCG: 50 INJECTION, SOLUTION INTRAMUSCULAR; INTRAVENOUS at 15:53

## 2019-12-26 RX ADMIN — LIDOCAINE HYDROCHLORIDE 5 ML: 10 INJECTION, SOLUTION EPIDURAL; INFILTRATION; INTRACAUDAL; PERINEURAL at 14:21

## 2019-12-26 RX ADMIN — MIDAZOLAM HYDROCHLORIDE 2 MG: 1 INJECTION, SOLUTION INTRAMUSCULAR; INTRAVENOUS at 15:50

## 2019-12-26 RX ADMIN — ONDANSETRON 4 MG: 2 INJECTION INTRAMUSCULAR; INTRAVENOUS at 16:54

## 2019-12-26 RX ADMIN — FENTANYL CITRATE 50 MCG: 50 INJECTION, SOLUTION INTRAMUSCULAR; INTRAVENOUS at 15:16

## 2019-12-26 RX ADMIN — PROPOFOL 200 MG: 10 INJECTION, EMULSION INTRAVENOUS at 15:53

## 2019-12-26 RX ADMIN — ROPIVACAINE HYDROCHLORIDE 10 ML: 5 INJECTION, SOLUTION EPIDURAL; INFILTRATION; PERINEURAL at 15:24

## 2019-12-26 RX ADMIN — LIDOCAINE HYDROCHLORIDE 1 ML: 10 INJECTION, SOLUTION EPIDURAL; INFILTRATION; INTRACAUDAL; PERINEURAL at 15:21

## 2019-12-26 RX ADMIN — LIDOCAINE HYDROCHLORIDE 5 ML: 10 INJECTION, SOLUTION EPIDURAL; INFILTRATION; INTRACAUDAL; PERINEURAL at 15:53

## 2019-12-26 RX ADMIN — Medication 3 G: at 14:22

## 2019-12-26 RX ADMIN — MIDAZOLAM HYDROCHLORIDE 2 MG: 1 INJECTION, SOLUTION INTRAMUSCULAR; INTRAVENOUS at 15:14

## 2019-12-26 RX ADMIN — FENTANYL CITRATE 100 MCG: 50 INJECTION, SOLUTION INTRAMUSCULAR; INTRAVENOUS at 15:47

## 2019-12-26 RX ADMIN — CEFAZOLIN SODIUM 3 G: 1 INJECTION, SOLUTION INTRAVENOUS at 15:43

## 2019-12-26 RX ADMIN — FENTANYL CITRATE 50 MCG: 50 INJECTION, SOLUTION INTRAMUSCULAR; INTRAVENOUS at 15:14

## 2019-12-26 RX ADMIN — GABAPENTIN 300 MG: 300 CAPSULE ORAL at 14:22

## 2019-12-26 RX ADMIN — LIDOCAINE HYDROCHLORIDE AND EPINEPHRINE 5 ML: 15; 5 INJECTION, SOLUTION EPIDURAL at 15:22

## 2019-12-26 RX ADMIN — ACETAMINOPHEN 975 MG: 325 TABLET, FILM COATED ORAL at 14:22

## 2019-12-26 NOTE — BRIEF OP NOTE
[facility] Operative Note    Pre-operative diagnosis: Achilles tendinitis [M76.60]   Post-operative diagnosis * No post-op diagnosis entered *   Procedure: Procedure(s):  Left Foot: Achilles Tendon Repair/Remodel/Reattachment; calcaneal prominence removal   Surgeon: Felix Watkins DPM   Anesthesia: Combined General with Popliteal Block    Estimated blood loss: Less than 10 ml   Blood transfusion: No transfusion was given during surgery   Drains: None   Specimens: None   Findings: Right instertional achilles tendinopathy along with posterior superior kendall's deformity and insertional enthesophyte of the calcaneal margins and pre-achilles reactive bursitis appreciated.   Complications: None   Condition: Stable   Comments: See dictated operative report for full details.           Felix Watkins DPM, FACFAS  Foot & Ankle Surgeon/Specialist  [facility]

## 2019-12-26 NOTE — OP NOTE
[facility] ORTHOPEDICS OPERATIVE REPORT  Operative Report - Orthopedics  Bria Davis,  1960, MRN [id number]    Surgery Date: 19    PCP: Kaia Elena, [phone]   Code status:  No Order       OPERATION SITE:  [facility] Operating Room       OPERATIVE REPORT  DR. FELIX FLYNN  FOOT & ANKLE SURGEON  [facility]    DATE OF PROCEDURE: 19    SITE: [facility] Operating Room    SURGEON: Dr. Felix Flynn - [facility]    ASSISTANT: Javed Jo PGYIII [facility] -  Surgeon      Pre-Operative Diagnosis:  1.   Left Intertional Achilles Tendinopathy  2.  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  Left pre-achilles bursa    Post-Operative Diagnosis:  1.   Left Intertional Achilles Tendinopathy  2.  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  Left pre-achilles bursa    Procedures Performed:  1.  Left insertional Achilles tendon - repair/remodel/debride/reattachment procedures  2.  Left posterior & superior calcaneal remodeling/partial excision and removal of Oanh's and Insertional Achilles Enthesophyte  3.  Left pre achilles bursal excision/removal 2.7 cm in diameter  4.  Post op splint application, below the knee ankle neutral, fiberglass material    Anesthesia: General Anesthesia Care with Local/Regional block by anesthesia with US assistance  Hemostasis: left thigh tourniquet at 300 mmHg  Position: prone  EBL: < 10 mL  Findings:  Right instertional achilles tendinopathy along with posterior superior oanh's deformity and insertional enthesophyte of the calcaneal margins and pre-achilles reactive bursitis appreciated.  Implants:  Arthrex inseritonal Achilles speed bridge with 4.75 anchors x 4 and collagen coated fiber tape.  Local 2-0 vicryl and 3-0 nylon.  Specimens: None    Indications for the Operation:  The patient has been seen and evaluated in clinic for  the above-mentioned diagnoses.  They have failed to respond to nonoperative care measures and/or surgical care for condition was indicated.  They have elected to proceed as recommended/indicated with surgical care after a thorough discussion of the associated pros, cons, risks and benefits of the operations as well as the postoperative course and details.  All associated questions were answered.  Verbal and written form informed consent was obtained.  Please see additional information within the clinical notes.    Description of the Procedure:  Patient was seen and evaluated in the preoperative holding area.  The surgical site was marked.  The consent was signed.  The H&P was updated/reviewed.  Patient was transported from the preoperative holding area to the surgical suite.  The patient was placed on the operative table in prone position post anesthesia that  was obtained.  Antibiotics were administered via IV.  Tourniquet was applied.  The operative extremity was prepped and draped sterilely.  Then, a timeout was performed to identify the proper patient, surgical site and the procedures to be performed.  Local anesthetic was infiltrated about the operative margins for regional blockade utilizing a one-to-one mixture of 2% lidocaine plain and 0.5% Marcaine plain approximately 29 cc of the mixture was utilized.  The foot/ankle was exsanguinated, and the tourniquet was inflated.    Attention was then directed to the left posterior insertional elements of the Achilles tendon.  A #15 blade was utilized to make a posterior curvilinear incision to address the posterior superior prominence and to be respectful of the local skin blood supply.  This was dissected down in layers with neurovascular identification and retraction.  Then, with a central split the insertional elements of the Achilles were carefully dissected off of the Oanh's deformity as well as the insertional calcaneal enthesophyte.  Significant insertional  degenerative thickening of the Achilles tendon was appreciated with being 4 times its normal size and with insertional junctional bone tendon disease with degeneration.  Pre-Achilles 2.7 cm bursa was identified and removed with local inflammatory tissues and ablated.  The Oanh's deformity as well as the insertional calcaneal enthesophyte was removed with a sagittal saw, bur and rasp.  This was completed until subchondral healthy posterior spherical bone of the calcaneus was created.  Drill holes were placed for the anchors these were tapped.  Proximal row was placed.  Fiber tape passed through the sections of the tendon medial and lateral after repair of the central split with interlocking running buried knot Vicryl.  Then, interlocking of the fiber tape about Achilles tendon was also conducted.  This was secured back to the calcaneus and the distal row nonlocking speed bridge was applied with the bio swivel lock anchors 4.75 inside.  Adequate surface area contact about the posterior insertional Achilles with much improved posterior contour.  After careful irrigation the peritenon was repaired with 2-0 Vicryl.    Following this, thorough irrigation of the surgical sites was conducted.  Layered, anatomic closure completed with 2-0 Vicryl, 3-0 Vicryl and 3-0 nylon with careful apposition of the skin and surfaces for primary healing.  A compressive sterile splint/dressing was applied.  Vascular status was intact after deflation of the tourniquet.  SPLINT: A posterior fiberglass, below the knee splint was applied with the ankle in the neutral position.  COMPLICATIONS: No direct complications encountered throughout the case.    The patient tolerated the procedure & anesthesia well.  They were transported from the operative suite to the postoperative holding area.  The patient was given postoperative orders as well as specific postoperative instructions which were reviewed by the nursing staff.  Orders were placed for  weightbearing status/activity, postoperative oral pain management, DVT prophylaxis measures both with mechanical and medicinal measures reviewed.  Splint/dressing care measures were reviewed as well as appropriate cryotherapy measures and nutrition.  Postoperative follow-up to be conducted in the next 10-14 days for outpatient clinical follow-up in the Orthopedic clinic at [facility] and the [facility].  If concerns or questions arise or develop they will contact our clinic and postoperative contact numbers provided.  Case details and post-operative care requirements reviewed with family/support present today.  Additionally, a detailed postoperative instruction sheet was provided to the patient and family.  All additional questions were answered postoperatively.    Please note that this report was completed with the assistance of voice recognition and transcription services.  Although every effort has been made to correct and avoid errors, errors may remain.    Dr. Felix Watkins, DPDELORIS, FACFAS  Foot & Ankle Surgeon/Specialist  [facility]          CC: [facility], Dr. Watkins's Clinical Team

## 2019-12-26 NOTE — ANESTHESIA POSTPROCEDURE EVALUATION
Patient: Bria Davis    Procedure(s):  Left Foot: Achilles Tendon Repair/Remodel/Reattachment; calcaneal prominence removal    Diagnosis:Achilles tendinitis [M76.60]  Diagnosis Additional Information: No value filed.    Anesthesia Type:  General, ETT, LMA, Peripheral Nerve Block, For Post-op pain in coordination with surgeon    Note:  Anesthesia Post Evaluation    Patient location during evaluation: Bedside  Patient participation: Able to fully participate in evaluation  Level of consciousness: awake  Pain management: adequate  Airway patency: patent  Cardiovascular status: acceptable  Hydration status: stable     Anesthetic complications: None          Last vitals:  Vitals:    12/26/19 1715 12/26/19 1730 12/26/19 1745   BP: 124/82 119/82 134/87   Pulse: 67 67 71   Resp: 14 (!) 7 19   Temp:   36.4  C (97.6  F)   SpO2: 100% 99% 99%         Electronically Signed By: ALYSON Scott CRNA  December 26, 2019  5:57 PM

## 2019-12-26 NOTE — ANESTHESIA PROCEDURE NOTES
Peripheral nerve/Neuraxial procedure note : sciatic, saphenous and popliteal  Pre-Procedure  Performed by  Sera Benitez APRN CRNA   Location: pre-op    Procedure Times:12/26/2019 3:14 PM and 12/26/2019 3:28 PM  Pre-Anesthestic Checklist: patient identified, IV checked, site marked, risks and benefits discussed, informed consent, monitors and equipment checked, pre-op evaluation, at physician/surgeon's request and post-op pain management    Timeout  Correct Patient: Yes   Correct Procedure: Yes   Correct Site: Yes   Correct Laterality: Yes   Correct Position: Yes   Site Marked: Yes   .   Procedure Documentation    .    Procedure: sciatic, saphenous and popliteal, left.   Patient Position:prone Local skin infiltrated with mL of 1% lidocaine.    Ultrasound used to identify targeted nerve, plexus, or vascular marker and placed a needle adjacent to it., Ultrasound was used to visualize the spread of the anesthetic in close proximity to the above stated nerve. A permanent image is entered into the patient's record.  Patient Prep/Sterile Barriers; mask, sterile gloves, chlorhexidine gluconate and isopropyl alcohol, patient draped.  Nerve Stim: Initial Level 1 mA.  Lowest motor response 0.44 mA..  Needle: insulated, short bevel   Needle Gauge: 20.    Needle Length (Inches) 4   .        Assessment/Narrative  Paresthesias: No.  Injection made incrementally with aspirations every 5 mL..  The placement was negative for: blood aspirated, painful injection and site bleeding.  Bolus given via needle. No blood aspirated via catheter.   Secured via.   Complications: none. Test dose of 5 mL lidocaine 2% w/ 1:200,000 epinephrine at 15:23.  Test dose negative for signs of intravascular, subdural or intrathecal injection. Comments:  Total volume injected at Sciatic nerve:30    Total volume injected at Saphenous Nerve:10

## 2019-12-26 NOTE — DISCHARGE INSTRUCTIONS
Discharge instructions for Patient with Scopolamine Transdermal Patch    1.  You may leave the patch on behind your ear for three days-But NO LONGER.  May have withdrawal symptoms (nausea, vomiting, headache,dizziness) if used longer.  2.  When you remove the patch, you must wash and dry your hands thoroughly and before touching your eyes, as pupils may dilate.  3.  Discard patch (away from children and pets).  4.  May develop some urinary hesitancy or urine retention.  5.  Patch should be removed by Sunday at the latest, otherwise remove in a.m. if able to tolerate light foods and fluids.                        Same Day Surgery Discharge Instructions  Special Precautions After Surgery - Adult    1. It is not unusual to feel lightheaded or faint, up to 24 hours after surgery or while taking pain medication.  If you have these symptoms; sit for a few minutes before standing and have someone assist you when getting up.  2. You should rest and relax for the next 24 hours and must have someone stay with you for at least 24 hours after your discharge.  3. DO NOT DRIVE any vehicle or operate mechanical equipment for 24 hours following the end of your surgery.  DO NOT DRIVE while taking narcotic pain medications that have been prescribed by your physician.  If you had a limb operated on, you must be able to use it fully to drive.  4. DO NOT drink alcoholic beverages for 24 hours following surgery or while taking prescription pain medication.  5. Drink clear liquids (apple juice, ginger ale, broth, 7-Up, etc.).  Progress to your regular diet as you feel able.  6. Any questions call your physician and do not make important decisions for 24 hours.    ACTIVITY  ? Restrictions: Non weight bearing     INCISIONAL CARE  ? Do not remove dressing until seen by physician.  ? Keep extremity elevated above the level of the heart if possible.  Check color and feeling of left toes.  ? Apply ice 1/2 hour on and 1/2 hour off for first 72  hours.  ? Be alert for signs of infection:  redness, swelling, heat, drainage of pus, and/or elevated temperature.  Contact your doctor if these occur.         Call for an appointment to return to the clinic in 10-14 days.    Medications:  ? Acetaminophen & Oxycodone (Percocet):  Next dose: start later tonight and stay on schedule. 1 tab if no pain, 2 tabs if any sensation of discomfort noticed.  ? Hydroxyzine (Vistaril):  Next dose: as needed for pain/itching/nausea  ? Aspirin 325 mg daily.  ? Follow the instructions on the bottle.     Additional discharge instructions: Block Instructions and Dr Gayle instructions  __________________________________________________________________________________________________________________________________  IMPORTANT NUMBERS:    [facility] Main Number:  [phone], [phone]  Pharmacy:  [phone]  Same Day Surgery:  [phone], Monday - Friday until 8:30 p.m.  Urgent Care:  [phone]  Emergency Room:  [phone]      [facility]:  [phone]                                                                             [facility]:  [phone] option 1  [facility]:  [phone]     [facility]:  [phone]   [facility]ty Clinic:  [phone]   Home Medical Equipment: [phone]  [facility]:  [phone]

## 2019-12-26 NOTE — ANESTHESIA CARE TRANSFER NOTE
Patient: Bria Davis    Procedure(s):  Left Foot: Achilles Tendon Repair/Remodel/Reattachment; calcaneal prominence removal    Diagnosis: Achilles tendinitis [M76.60]  Diagnosis Additional Information: No value filed.    Anesthesia Type:   General, ETT, LMA, Peripheral Nerve Block, For Post-op pain in coordination with surgeon     Note:  Airway :Face Mask  Patient transferred to:PACU  Handoff Report: Identifed the Patient, Identified the Reponsible Provider, Reviewed the pertinent medical history, Discussed the surgical course, Reviewed Intra-OP anesthesia mangement and issues during anesthesia, Set expectations for post-procedure period and Allowed opportunity for questions and acknowledgement of understanding      Vitals: (Last set prior to Anesthesia Care Transfer)    CRNA VITALS  12/26/2019 1635 - 12/26/2019 1705      12/26/2019             NIBP:  128/82    Pulse:  72    NIBP Mean:  99    SpO2:  98 %                Electronically Signed By: ALYSON Scott CRNA  December 26, 2019  5:05 PM

## 2020-01-01 LAB — RETINOPATHY: NORMAL

## 2020-01-06 ENCOUNTER — TRANSFERRED RECORDS (OUTPATIENT)
Dept: HEALTH INFORMATION MANAGEMENT | Facility: CLINIC | Age: 60
End: 2020-01-06

## 2020-01-29 ENCOUNTER — TRANSFERRED RECORDS (OUTPATIENT)
Dept: HEALTH INFORMATION MANAGEMENT | Facility: CLINIC | Age: 60
End: 2020-01-29

## 2020-02-12 ENCOUNTER — HOSPITAL ENCOUNTER (OUTPATIENT)
Dept: PHYSICAL THERAPY | Facility: CLINIC | Age: 60
Setting detail: THERAPIES SERIES
End: 2020-02-12
Attending: PODIATRIST
Payer: COMMERCIAL

## 2020-02-12 PROCEDURE — 97110 THERAPEUTIC EXERCISES: CPT | Mod: GP | Performed by: PHYSICAL THERAPIST

## 2020-02-12 PROCEDURE — 97162 PT EVAL MOD COMPLEX 30 MIN: CPT | Mod: GP | Performed by: PHYSICAL THERAPIST

## 2020-02-12 NOTE — PROGRESS NOTES
02/12/20 1600   General Information   Type of Visit Initial OP Ortho PT Evaluation   Start of Care Date 02/12/20   Referring Physician Felix Watkins    Patient/Family Goals Statement Play volleyball again, Walking any distance, Prolonged standing, stairs. Decrease swelling at end of full day of work.    Orders Evaluate and Treat   Orders Comment Functional conditioning, Modalities prn.    Date of Order 01/29/20   Certification Required? No  (HP )   Medical Diagnosis 12/26/19 Achilles tendon repair, etc.    Surgical/Medical history reviewed   (Menopause, DM, CTR x 2, C.Section, )   Precautions/Limitations no known precautions/limitations  (Self limiting. )   Special Instructions Tylenol as needed.    General Information Comments Will ask MD about compresion stocking through [facility] and what compression?    Body Part(s)   Body Part(s) Ankle/Foot   Presentation and Etiology   Pertinent history of current problem (include personal factors and/or comorbidities that impact the POC) About 2 years ago was playing volleyball, landed on L foot and felt something L distal calf, was getting more painful w/ stairs. Saw ortho specialist in Fall 2019, he recommended therapy, but also had MRI which showed requirement of surgery to fix the achilles tendon, etc.    Impairments A. Pain;B. Decreased WB tolerance;C. Swelling;D. Decreased ROM;E. Decreased flexibility;F. Decreased strength and endurance;G. Impaired balance;H. Impaired gait;I. Impaired skin integrity;J. Burning;K. Numbness;L. Tingling   Functional Limitations perform required work activities;perform desired leisure / sports activities   Symptom Location Bottom of L heel, Tight posterior lower calf. Inside of L foot feel a lot of burning. N/T around the incision.    How/Where did it occur During recreation/sports;With a fall   Onset date of current episode/exacerbation 12/26/19  (Surgical date. )   Chronicity Chronic   Pain rating (0-10 point scale)   (1/10 )   Pain  quality C. Aching   Frequency of pain/symptoms C. With activity   Pain/symptoms are: Worse in the morning   Pain/symptoms exacerbated by B. Walking;D. Carrying   Pain/symptoms eased by C. Rest;I. OTC medication(s)   Progression of symptoms since onset: Improved   Current / Previous Interventions   Diagnostic Tests: MRI   MRI Results Results   MRI results Showed that bone spur was tearing the achilles tendon.    Prior Level of Function   Functional Level Prior Comment Independent w/ADL's   Current Level of Function   Current Community Support Family/friend caregiver   Patient role/employment history A. Employed   Employment Comments Spec  supervisor    Living environment House/[facility]   Home/community accessibility Stairs, no railings.    Current equipment-Gait/Locomotion Other  (Uses scooter at school for long distances, )   Fall Risk Screen   Fall screen completed by PT   Have you fallen 2 or more times in the past year? No   Have you fallen and had an injury in the past year? No   Timed Up and Go score (seconds) 12   Is patient a fall risk? No   Abuse Screen (yes response referral indicated)   Feels Unsafe at Home or Work/School no   Feels Threatened by Someone no   Does Anyone Try to Keep You From Having Contact with Others or Doing Things Outside Your Home? no   Physical Signs of Abuse Present no   System Outcome Measures   Outcome Measures   (LEFS 20/80. )   Functional Scales   Functional Scales OPTIMAL   Optimal (1=able to do without difficulty, 2=able to do with little difficulty, 3=able to do with moderate difficulty, 4=able to do with much difficulty, 5=unable to do, 9=NA) Activity 1;Activity 2;Activity 3   Activity 1 comment Extreme difficulty walking any distance    Activity 2 comment Very swollen at end of work day.    Activity 3 comment Extreme difficulty w/prolonged standing.    Ankle/Foot Objective Findings   Integumentary Pitting edema near L Lat malleoli.    Posture  Forward head position   Gait/Locomotion Walks w/ shortened step on L d/t tight calf. Stairs 2 legged up, 1 legged down.    Foot Position In Standing Not assessed.    Ankle/Foot ROM Comment Supine DF Gas R 16, R Sol 26; L 16, L 26.    Ankle/Foot Strength Comments 5/5 for all except for PF, unable to do 1 in WBing.    Anterior Drawer Test Negative    Posterior Drawer Test Negative    Palpation Pitting edema lateral malleoli area. (Did not work today, so less swollen)   Planned Therapy Interventions   Planned Therapy Interventions Comment DEvelop HEP, Gait, Balance, Proprioception. STM, Jt mobs prn.    Planned Modality Interventions   Planned Modality Interventions Comments US prn.    Clinical Impression   Criteria for Skilled Therapeutic Interventions Met yes, treatment indicated   PT Diagnosis Limited mobility d/t Achilles tendon repair, calcaneal remodelinb, bursal excision.    Influenced by the following impairments Pain, Decreased ROM/Strength, Impaired balance and gait, Paraesthesias.    Functional limitations due to impairments Workability, Mobility, Sports involvement,    Clinical Presentation Evolving/Changing   Clinical Presentation Rationale LEFS, AROM, MMT, Pitting Edema, Hx of DM, Recent Achilles repair.    Clinical Decision Making (Complexity) Moderate complexity   Therapy Frequency 2 times/Week   Predicted Duration of Therapy Intervention (days/wks) for 2 weeks, then decrease to once/week. 8 weeks.    Risk & Benefits of therapy have been explained Yes   Patient, Family & other staff in agreement with plan of care Yes   Clinical Impression Comments Limited mobility d/t Achilles tendon repair, calcaneal remodelinb, bursal excision.    Education Assessment   Preferred Learning Style Listening;Demonstration;Pictures/video   Barriers to Learning No barriers   ORTHO GOALS   PT Ortho Eval Goals 1;2;3;4;5   Ortho Goal 1   Goal Identifier 1   Goal Description STG: Pt will be able to note less swelling at end of  day after work and be independent w/ use of compression stocking.    Target Date 03/18/20   Ortho Goal 2   Goal Identifier 2   Goal Description STG: Pt will be able to walk 2 blocks w/ minimal difficulty.    Target Date 03/18/20   Ortho Goal 3   Goal Identifier 3   Goal Description STG: Pt will be able to do stairs w/ moderate difficulty   Target Date 03/18/20   Ortho Goal 4   Goal Identifier 4   Goal Description STG: Pt will be bj to stand for an hour w/ moderate difficulty.    Target Date 03/18/20   Ortho Goal 5   Goal Identifier 5   Goal Description LTG: Pt will be independent w/ functional program to restore mobility at work, home and in community.    Target Date 04/08/20   Total Evaluation Time   PT Manju, Moderate Complexity Minutes (92650) 40   Leann Loja PT, [facility] (#[id number])  [facility]           [phone]  Fax          [phone]  Appt #      [phone]

## 2020-02-14 ENCOUNTER — HOSPITAL ENCOUNTER (OUTPATIENT)
Dept: PHYSICAL THERAPY | Facility: CLINIC | Age: 60
Setting detail: THERAPIES SERIES
End: 2020-02-14
Attending: PODIATRIST
Payer: COMMERCIAL

## 2020-02-14 PROCEDURE — 97035 APP MDLTY 1+ULTRASOUND EA 15: CPT | Mod: GP | Performed by: PHYSICAL THERAPIST

## 2020-02-14 PROCEDURE — 97140 MANUAL THERAPY 1/> REGIONS: CPT | Mod: GP | Performed by: PHYSICAL THERAPIST

## 2020-02-19 ENCOUNTER — HOSPITAL ENCOUNTER (OUTPATIENT)
Dept: PHYSICAL THERAPY | Facility: CLINIC | Age: 60
Setting detail: THERAPIES SERIES
End: 2020-02-19
Attending: PODIATRIST
Payer: COMMERCIAL

## 2020-02-19 PROCEDURE — 97140 MANUAL THERAPY 1/> REGIONS: CPT | Mod: GP | Performed by: PHYSICAL THERAPIST

## 2020-02-19 PROCEDURE — 97035 APP MDLTY 1+ULTRASOUND EA 15: CPT | Mod: GP | Performed by: PHYSICAL THERAPIST

## 2020-02-21 ENCOUNTER — HOSPITAL ENCOUNTER (OUTPATIENT)
Dept: PHYSICAL THERAPY | Facility: CLINIC | Age: 60
Setting detail: THERAPIES SERIES
End: 2020-02-21
Attending: PODIATRIST
Payer: COMMERCIAL

## 2020-02-21 PROCEDURE — 97140 MANUAL THERAPY 1/> REGIONS: CPT | Mod: GP | Performed by: PHYSICAL THERAPIST

## 2020-02-21 PROCEDURE — 97035 APP MDLTY 1+ULTRASOUND EA 15: CPT | Mod: GP | Performed by: PHYSICAL THERAPIST

## 2020-02-26 ENCOUNTER — HOSPITAL ENCOUNTER (OUTPATIENT)
Dept: PHYSICAL THERAPY | Facility: CLINIC | Age: 60
Setting detail: THERAPIES SERIES
End: 2020-02-26
Attending: PODIATRIST
Payer: COMMERCIAL

## 2020-02-26 PROCEDURE — 97140 MANUAL THERAPY 1/> REGIONS: CPT | Mod: GP | Performed by: PHYSICAL THERAPIST

## 2020-02-26 PROCEDURE — 97035 APP MDLTY 1+ULTRASOUND EA 15: CPT | Mod: GP | Performed by: PHYSICAL THERAPIST

## 2020-03-04 ENCOUNTER — HOSPITAL ENCOUNTER (OUTPATIENT)
Dept: PHYSICAL THERAPY | Facility: CLINIC | Age: 60
Setting detail: THERAPIES SERIES
End: 2020-03-04
Attending: PODIATRIST
Payer: COMMERCIAL

## 2020-03-04 PROCEDURE — 97035 APP MDLTY 1+ULTRASOUND EA 15: CPT | Mod: GP | Performed by: PHYSICAL THERAPIST

## 2020-03-04 PROCEDURE — 97110 THERAPEUTIC EXERCISES: CPT | Mod: GP | Performed by: PHYSICAL THERAPIST

## 2020-03-04 PROCEDURE — 97140 MANUAL THERAPY 1/> REGIONS: CPT | Mod: GP | Performed by: PHYSICAL THERAPIST

## 2020-03-12 ENCOUNTER — HOSPITAL ENCOUNTER (OUTPATIENT)
Dept: PHYSICAL THERAPY | Facility: CLINIC | Age: 60
Setting detail: THERAPIES SERIES
End: 2020-03-12
Attending: PODIATRIST
Payer: COMMERCIAL

## 2020-03-12 PROCEDURE — 97140 MANUAL THERAPY 1/> REGIONS: CPT | Mod: GP | Performed by: PHYSICAL THERAPIST

## 2020-03-12 PROCEDURE — 97110 THERAPEUTIC EXERCISES: CPT | Mod: GP | Performed by: PHYSICAL THERAPIST

## 2020-03-16 ENCOUNTER — TRANSFERRED RECORDS (OUTPATIENT)
Dept: HEALTH INFORMATION MANAGEMENT | Facility: CLINIC | Age: 60
End: 2020-03-16

## 2020-03-17 NOTE — PROGRESS NOTES
Diabetes Follow-up    Subjective/Objective:    Bria Davis sent in blood glucose log for review. Last date of communication was: 11/20/2019.    Diabetes is being managed with   Lifestyle (diet/activity), Diabetes Medications   Diabetes Medication(s)     Biguanides       metFORMIN (GLUCOPHAGE-XR) 500 MG 24 hr tablet    TAKE TWO TABLETS BY MOUTH TWICE A DAY WITH MEALS    Insulin       Insulin Glargine, 2 Unit Dial, (TOUJEO MAX SOLOSTAR) 300 UNIT/ML SOPN    Inject 100 Units Subcutaneous daily To get 3 months worth of insulin     insulin lispro (HUMALOG KWIKPEN) 100 UNIT/ML (1 unit dial) pen    To take 32 units with each meal    Incretin Mimetic Agents (GLP-1 Receptor Agonists)       Semaglutide,0.25 or 0.5MG/DOS, (OZEMPIC, 0.25 OR 0.5 MG/DOSE,) 2 MG/1.5ML SOPN    Inject 0.5 mg Subcutaneous once a week          BG/Food Log:       Assessment:    Fasting blood glucose: Are running about 150-180  She is taking Toujeo @ 100 units daily  Humalog 32/32/26.   Ozempic 0.5 mg   She plans to follow up with Dr. Elena in May and will call and let me know if she has any issues.  Her  has been having multiple strokes and cannot be left alone.  Will follow up at a later date      Plan/Response:  See Patient Instructions for co-developed, patient-stated behavior change goals.  Continue the same plan for now    Maria Esther Navarrete RN/GABRIEL  [place] [facility] Educ[facility]      Any diabetes medication dose changes were made via the CDE Protocol and Collaborative Practice Agreement with the patient's primary care provider. A copy of this encounter was shared with the provider.

## 2020-03-18 ENCOUNTER — ALLIED HEALTH/NURSE VISIT (OUTPATIENT)
Dept: EDUCATION SERVICES | Facility: CLINIC | Age: 60
End: 2020-03-18
Payer: COMMERCIAL

## 2020-03-18 DIAGNOSIS — E11.9 DIABETES MELLITUS WITHOUT COMPLICATION (H): Primary | ICD-10-CM

## 2020-03-19 ENCOUNTER — OFFICE VISIT (OUTPATIENT)
Dept: FAMILY MEDICINE | Facility: CLINIC | Age: 60
End: 2020-03-19
Payer: COMMERCIAL

## 2020-03-19 ENCOUNTER — TELEPHONE (OUTPATIENT)
Dept: FAMILY MEDICINE | Facility: CLINIC | Age: 60
End: 2020-03-19

## 2020-03-19 VITALS
DIASTOLIC BLOOD PRESSURE: 76 MMHG | WEIGHT: 262 LBS | SYSTOLIC BLOOD PRESSURE: 134 MMHG | TEMPERATURE: 97 F | HEART RATE: 87 BPM | HEIGHT: 63 IN | OXYGEN SATURATION: 97 % | RESPIRATION RATE: 16 BRPM | BODY MASS INDEX: 46.42 KG/M2

## 2020-03-19 DIAGNOSIS — B02.9 HERPES ZOSTER WITHOUT COMPLICATION: Primary | ICD-10-CM

## 2020-03-19 PROCEDURE — 99213 OFFICE O/P EST LOW 20 MIN: CPT | Performed by: FAMILY MEDICINE

## 2020-03-19 ASSESSMENT — MIFFLIN-ST. JEOR: SCORE: 1737.55

## 2020-03-19 ASSESSMENT — PAIN SCALES - GENERAL: PAINLEVEL: MODERATE PAIN (5)

## 2020-03-19 NOTE — TELEPHONE ENCOUNTER
Pt has red, elevated blotch on her forehead x 1 week.  Painful to touch and little itchy.  Area on neck below R ear lobe (gland?) slightly swollen and painful when touched.  Area above forehead blotch-scalp is painful.  Pt taking Tylenol.  Appt?  Advise.  Ravindra

## 2020-03-19 NOTE — PROGRESS NOTES
Subjective     Bria Davis is a 59 year old female who presents to clinic today for the following health issues:    HPI   Chief Complaint   Patient presents with     Derm Problem     Patient has a spot on right side of forehead X 1 week ago and noticed a small spot that was present with pain and at times hot to touch. Patient feels hot but has never ran a fever. Patient notices it also itching.      Rectal Bleeding     Patient just noticed rectal bleeding. Patient has no constipation concerns with normal bowel movements. Patient BM today had no blood noted. She noticed blood when wiping and in the toliet with bright red color with a small amount.        Rectal bleeding  Onset: this morning    Description:   Pain: no   Itching: no     Accompanying Signs & Symptoms:  Blood streaked toilet paper: YES  Blood in stool: no   Changes in stool pattern: no     History:   Any previous GI studies done:Colonoscopy 11/2019 - 2 small polyps  Family History of colon cancer: no     Precipitating factors:   None    Alleviating factors:  None    Therapies Tried and outcome: none      Derm-  Onset: 1 week    Description:   Location: forehead  Character: raised, painful, red  Itching (Pruritis): YES    Progression of Symptoms:  worsening    Accompanying Signs & Symptoms:  Fever: no   Body aches or joint pain: no   Sore throat symptoms: no   Recent cold symptoms: no     History:   Previous similar rash: no     Precipitating factors:   Exposure to similar rash: no   New exposures: None   Recent travel: no     Alleviating factors:      Therapies Tried and outcome: none    Has been under a lot of stress recently -  had several strokes, he is now home and doing well.         Reviewed and updated as needed this visit by Provider  Tobacco  Allergies  Meds  Problems  Med Hx  Surg Hx  Fam Hx         Review of Systems   ROS COMP: Constitutional, HEENT, cardiovascular, pulmonary, gi and gu systems are negative, except as  "otherwise noted.      Objective    /76   Pulse 87   Temp 97  F (36.1  C) (Tympanic)   Resp 16   Ht 1.6 m (5' 3\")   Wt 118.8 kg (262 lb)   LMP 01/14/2016 (Approximate)   SpO2 97%   Breastfeeding No   BMI 46.41 kg/m    Body mass index is 46.41 kg/m .  Physical Exam   GENERAL APPEARANCE: healthy, alert and no distress  SKIN: erythematous rash on right forehead in a 3.5 cm cluster of small vesicles with a few surrounding vesicles  HEENT: OP unremarkable  Enlarged right submandibular lymph node and right preauricular lymph node.             Assessment & Plan       ICD-10-CM    1. Herpes zoster without complication  B02.9      Discussed the nature of shingles and etiology, infectivity, and treatment options including antivirals.  This rash has been present x 7 days, will not start antiviral at this point.   There is no ocular involvement at this time - no pain, no visual changes.    Encouraged her to follow up asap if there is change in her vision/ocular symptoms.    Discussed the possibility of PHN.  At this time, no severe pain.  NSAIDS recommended.         No follow-ups on file.    Kaia Elena MD  [facility]        "

## 2020-03-19 NOTE — TELEPHONE ENCOUNTER
Reason for Call:  Facial pain    Detailed comments: patient is calling and stating that a couple of days ago she woke up with a red quarter size blemish on her forehead, and it is painful. Now thru the day and night she has pain from her ear on down to mid neck on her right side. No fever. Her face and scalp are very painful. Please advise.    Phone Number Patient can be reached at: Home number on file [phone] (home)    Best Time: any    Can we leave a detailed message on this number? YES   Ginny Moise  Clinic Station        Call taken on 3/19/2020 at 8:10 AM by Ginny Rust

## 2020-03-19 NOTE — PATIENT INSTRUCTIONS
Our Clinic hours are:  Mondays    7:20 am - 7 pm  Tues - Fri  7:20 am - 5 pm    Clinic Phone: [phone]    The clinic lab opens at 7:30 am Mon - Fri and appointments are required.    [facility]. [phone]  Monday  8 am - 7pm  Tues - Fri 8 am - 5:30 pm         Patient Education     Shingles  Shingles is a viral infection caused by the same virus that causes chicken pox. Anyone who has had chicken pox may get shingles later in life. The virus stays in the body, but remains asleep (dormant). Shingles often occurs in older persons or persons with lowered immunity. But it can affect anyone at any age.  Shingles starts as a tingling patch of skin on one side of the body. Small, painful blisters may then appear. The rash rarely spreads to other parts of the body.  Exposure to shingles can't cause shingles. However, it can cause chicken pox in anyone who has not had chicken pox or has not been vaccinated. The contagious period ends when all blisters have crusted over, generally 1 to 2 weeks after the illness starts.  After the blisters heal, the affected skin may be sensitive or painful for weeks or months, gradually resolving over time. But, sometimes this can last longer and be permanent (called postherpetic neuralgia.)  Shingles vaccines are available. Vaccination can help prevent shingles or make it less painful. It is generally recommended for adults older than 50, even if you've had singles in the past. Talk with your healthcare provider about when to get vaccinated and which vaccine is best for you.  Home care    Medicines may be prescribed to help relieve pain. Take these medicines as directed. Ask your healthcare provider or pharmacist before using over-the-counter medicines for helping treat pain and itching.    In certain cases, antiviral medicines may be prescribed to reduce pain, shorten the illness, and prevent neuralgia. Take these medicines as directed.    Compresses made  from a solution of cool water mixed with cornstarch or baking soda may help relieve pain and itching.     Gently wash skin daily with soap and water to help prevent infection. Be certain to rinse off all of the soap, which can be irritating.    Trim fingernails and try not to scratch. Scratching the sores may leave scars.    Stay home from work or school until all blisters have formed a crust and you are no longer contagious.  Follow-up care  Follow up with your healthcare provider, or as directed.  When to seek medical advice    Fever of 100.4 F (38 C) or higher, or as directed by your healthcare provider    Affected skin is on the face or neck and any of the following occur:  ? Headache  ? Eye pain  ? Changes in vision  ? Sores near the eye  ? Weakness of facial muscles    Blisters occurring on new areas of the body    Pain, redness, or swelling of a joint    Signs of skin infection: colored drainage from the sores, warmth, increasing redness, fever, or increasing pain  Date Last Reviewed: 4/1/2018 2000-2019 [employer]. [address] [place] [address], [place] [address]. All rights reserved. This information is not intended as a substitute for professional medical care. Always follow your healthcare professional's instructions.

## 2020-03-20 ASSESSMENT — ASTHMA QUESTIONNAIRES: ACT_TOTALSCORE: 25

## 2020-03-24 ENCOUNTER — TELEPHONE (OUTPATIENT)
Dept: FAMILY MEDICINE | Facility: CLINIC | Age: 60
End: 2020-03-24

## 2020-03-24 DIAGNOSIS — B02.9 HERPES ZOSTER WITHOUT COMPLICATION: Primary | ICD-10-CM

## 2020-03-24 RX ORDER — GABAPENTIN 100 MG/1
100 CAPSULE ORAL 3 TIMES DAILY
Qty: 90 CAPSULE | Refills: 0 | Status: SHIPPED | OUTPATIENT
Start: 2020-03-24 | End: 2020-06-15

## 2020-03-24 NOTE — TELEPHONE ENCOUNTER
Reason for Call:  Other prescription    Detailed comments: Pt was dx with shingles and at the appt decided she didn't want meditation but has decided that she does.    Phar is [facility] [place]    Phone Number Patient can be reached at: Home number on file [phone] (home)    Best Time: any    Can we leave a detailed message on this number? YES    Call taken on 3/24/2020 at 11:13 AM by Kimberly Henson

## 2020-03-24 NOTE — TELEPHONE ENCOUNTER
Patient states she is looking for something for pain as she knows it is too late for the antivirals. She states the rash is about the same, not worse, but where the redness was is now black. She is having a hard time sleeping due to the pain. She rates this currently 3/10 put will have bouts of pain at 8/10. Her right eye is now watering and itchy but no pain in the eye. She also has had a constant headache since Saturday rated 3-4/10. States she feels the best when her eyes are closed. She has been taking Ibuprofen 600mg every 6 hours and once in a while with take Tylenol 3 hours after a dose. This is not helping much she states.     Varsha Stephen RN

## 2020-03-24 NOTE — TELEPHONE ENCOUNTER
RN to call patient and get more information.    What medication is she referring to?  Something for pain or an antiviral?    When she was seen, it was already a week into symptoms, the antiviral medications that we use to treat Shingles (valacyclovir) is only effective if given in the first 48-72 hours of symptoms.     Is the rash worsening?  The pain worsening?      Kaia Elena M.D.

## 2020-03-24 NOTE — TELEPHONE ENCOUNTER
Gabapentin sent to pharmacy.  Start with 100 mg three times daily - this can cause some fatigue, but this is a low dose to start.  If not improving over the next few days, contact us again for further recommendations on ramping up on this dosing.     Kaia Elena M.D.

## 2020-04-15 NOTE — PROGRESS NOTES
Outpatient Physical Therapy Discharge Note     Patient: Bria Davis  : 1960    Beginning/End Dates of Reporting Period:  20 to 3/12/20 when last seen. Discharge written on 4/15/2020. Total # of Rx sessions: 7/10. Pt was placed on hold d/t Coronavirus, and was re-contacted on 4/15/20.     Referring Provider: Felix Watkins Diagnosis: 20  Achilles tendon repair.      Client Self Report: Pt was contacted by phone on 4/15/20 and offered Telehealth visit, which she was not interested in. She thougth that she was doing very well and continuing w/her exercise routine.  Did not feel that she needed to come back.  When she was last seen she was going to  compression stocking.  Will D/C patient per her wishes as of 4/15/20.     Objective Measurements:  Objective Measure: LEFS   Details: INITIALLY:      Objective Measure: MMT  Details: INITIALlY: Unable to do heel raise on L.     Objective Measure: AROM in supine.   Details: 20  DF L 30, Soleus 33. INITIALLY: Supine DF Gas R 16, R Sol 26; L 16, L 26.     Objective Measure: Gait   Details: 3/12/20 Was still walking w/ shorter step on L, but stairs were easier now. INITIALLY:  Walks w/ shortened step on L d/t tight calf. Stairs 2 legged up, 1 legged down.      Goals:  Goal Identifier 1   Goal Description STG: Pt will be able to note less swelling at end of day after work and be independent w/ use of compression stocking.    Target Date 20   Date Met      Progress:     Goal Identifier 2   Goal Description STG: Pt will be able to walk 2 blocks w/ minimal difficulty.    Target Date 20   Date Met      Progress:     Goal Identifier 3   Goal Description STG: Pt will be able to do stairs w/ moderate difficulty   Target Date 20   Date Met      Progress:     Goal Identifier 4   Goal Description STG: Pt will be bj to stand for an hour w/ moderate difficulty.    Target Date 20   Date Met      Progress:     Goal  Identifier 5   Goal Description LTG: Pt will be independent w/ functional program to restore mobility at work, home and in community. 4/15/20 MET per phone call w/patient.    Target Date 04/08/20   Date Met  04/15/20   Progress:     Progress Toward Goals:   Not assessed this period.    Plan:  Discharge from therapy.  Pt was placed on hold d/t Coronovirus as of 3/12/20, Re-contacted on 4/12/20 and wanted to close her chart at this time.     Discharge:    Reason for Discharge: Patient chooses to discontinue therapy.    Equipment Issued:     Discharge Plan: Patient to continue home program.  Pt to follow up w/MD as appropriate.   Leann Loja, PT, [facility] (#[id number])  [facility]           [phone]  Fax          [phone]  Appt #      [phone]

## 2020-04-17 DIAGNOSIS — E11.22 TYPE 2 DIABETES MELLITUS WITH CHRONIC KIDNEY DISEASE, WITH LONG-TERM CURRENT USE OF INSULIN, UNSPECIFIED CKD STAGE (H): ICD-10-CM

## 2020-04-17 DIAGNOSIS — Z79.4 TYPE 2 DIABETES MELLITUS WITH CHRONIC KIDNEY DISEASE, WITH LONG-TERM CURRENT USE OF INSULIN, UNSPECIFIED CKD STAGE (H): ICD-10-CM

## 2020-04-17 DIAGNOSIS — J45.20 MILD INTERMITTENT ASTHMA, UNCOMPLICATED: ICD-10-CM

## 2020-04-17 RX ORDER — SEMAGLUTIDE 1.34 MG/ML
INJECTION, SOLUTION SUBCUTANEOUS
Qty: 4.5 ML | Refills: 0 | Status: SHIPPED | OUTPATIENT
Start: 2020-04-17 | End: 2020-06-15

## 2020-04-17 NOTE — TELEPHONE ENCOUNTER
"Requested Prescriptions   Pending Prescriptions Disp Refills     OZEMPIC, 0.25 OR 0.5 MG/DOSE, 2 MG/1.5ML SOPN [Pharmacy Med Name: OZEMPIC 0.25 OR 0.5MG/DOSE (1 PEN/BOX)] 4.5 mL 1     Sig: INJECT 0.5MG UNDER THE SKIN ONCE A WEEK       There is no refill protocol information for this order        ADVAIR DISKUS 100-50 MCG/DOSE inhaler [Pharmacy Med Name: ADVAIR DISKUS 100-50MCG/DOSE AEPB]  1     Sig: INHALE ONE PUFF BY MOUTH TWICE A DAY (DISCUSS REFILLS AT NEXT APPOINTMENT)       Long-Acting Beta Agonist Inhalers Protocol  Failed - 4/17/2020  9:47 AM        Failed - Order for Serevent, Striverdi, or Foradil and pt has steroid inhaler        Passed - Patient is age 12 or older        Passed - Asthma control assessment score within normal limits in last 6 months     Please review ACT score.           Passed - Medication is active on med list        Passed - Recent (6 mo) or future (30 days) visit within the authorizing provider's specialty     Patient had office visit in the last 6 months or has a visit in the next 30 days with authorizing provider or within the authorizing provider's specialty.  See \"Patient Info\" tab in inbasket, or \"Choose Columns\" in Meds & Orders section of the refill encounter.           Inhaled Steroids Protocol Passed - 4/17/2020  9:47 AM        Passed - Patient is age 12 or older        Passed - Asthma control assessment score within normal limits in last 6 months     Please review ACT score.           Passed - Medication is active on med list        Passed - Recent (6 mo) or future (30 days) visit within the authorizing provider's specialty     Patient had office visit in the last 6 months or has a visit in the next 30 days with authorizing provider or within the authorizing provider's specialty.  See \"Patient Info\" tab in inbasket, or \"Choose Columns\" in Meds & Orders section of the refill encounter.                 "

## 2020-05-11 ENCOUNTER — E-VISIT (OUTPATIENT)
Dept: FAMILY MEDICINE | Facility: CLINIC | Age: 60
End: 2020-05-11
Payer: COMMERCIAL

## 2020-05-11 ENCOUNTER — TELEPHONE (OUTPATIENT)
Dept: FAMILY MEDICINE | Facility: CLINIC | Age: 60
End: 2020-05-11

## 2020-05-11 DIAGNOSIS — B37.31 CANDIDAL VULVOVAGINITIS: Primary | ICD-10-CM

## 2020-05-11 PROCEDURE — 99421 OL DIG E/M SVC 5-10 MIN: CPT | Performed by: FAMILY MEDICINE

## 2020-05-11 RX ORDER — FLUCONAZOLE 150 MG/1
150 TABLET ORAL ONCE
Qty: 1 TABLET | Refills: 0 | Status: SHIPPED | OUTPATIENT
Start: 2020-05-11 | End: 2020-07-10

## 2020-05-11 NOTE — TELEPHONE ENCOUNTER
Reason for call:  Patient reporting a symptom    Symptom or request: Pt has had yeast infection symptoms x 3 days and wants Rx over the phone sent top [facility].  Pt declined to schedule a virtual visit.  Please call patient and advise.      Duration (how long have symptoms been present): 3 days    Have you been treated for this before? Yes    Additional comments:     Phone Number patient can be reached at:  Home number on file [phone] (home)    Best Time:  any    Can we leave a detailed message on this number:  YES    Call taken on 5/11/2020 at 9:59 AM by Ciara Mir

## 2020-05-11 NOTE — PATIENT INSTRUCTIONS
Thank you for choosing us for your care. I have placed an order for a prescription so that you can start treatment. View your full visit summary for details by clicking on the link below. Your pharmacist will able to address any questions you may have about the medication.     If you re not feeling better within 2-3 days, please schedule an appointment.  You can schedule an appointment right here in [employer], or call [phone]  If the visit is for the same symptoms as your e-visit, we ll refund the cost of your e-visit if seen within seven days.      Yeast Infection (Candida Vaginal Infection)    You have a Candida vaginal infection. This is also known as a yeast infection. It is most often caused by a type of yeast (fungus) called Candida. Candida are normally found in the vagina. But if they increase in number, this can lead to infection and cause symptoms.  Symptoms of a yeast infection can include:    Clumpy or thin, white discharge, which may look like cottage cheese    Itching or burning    Burning with urination  Certain factors can make a yeast infection more likely. These can include:    Taking certain medicines, such as antibiotics or birth control pills    Pregnancy    Diabetes    Weak immune system  A yeast infection is most often treated with antifungal medicine. This may be given as a vaginal cream or pills you take by mouth. Treatment may last for about 1 to 7 days. Women with severe or recurrent infections may need longer courses of treatment.  Home care    If you re prescribed medicine, be sure to use it as directed. Finish all of the medicine, even if your symptoms go away. Note: Don t try to treat yourself using over-the-counter products without talking to your provider first. He or she will let you know if this is a good option for you.    Ask your provider what steps you can take to help reduce your risk of having a yeast infection in the future.  Follow-up care  Follow up with your  healthcare provider, or as directed.  When to seek medical advice  Call your healthcare provider right away if:    You have a fever of 100.4 F (38 C) or higher, or as directed by your provider.    Your symptoms worsen, or they don t go away within a few days of starting treatment.    You have new pain in the lower belly or pelvic region.    You have side effects that bother you or a reaction to the cream or pills you re prescribed.    You or any partners you have sex with have new symptoms, such as a rash, joint pain, or sores.  Date Last Reviewed: 10/1/2017    7137-3108 [employer]. [address] [place] [address]. All rights reserved. This information is not intended as a substitute for professional medical care. Always follow your healthcare professional's instructions.

## 2020-06-05 ENCOUNTER — TELEPHONE (OUTPATIENT)
Dept: FAMILY MEDICINE | Facility: CLINIC | Age: 60
End: 2020-06-05

## 2020-06-05 DIAGNOSIS — I10 ESSENTIAL HYPERTENSION WITH GOAL BLOOD PRESSURE LESS THAN 140/90: ICD-10-CM

## 2020-06-05 DIAGNOSIS — E11.22 TYPE 2 DIABETES MELLITUS WITH CHRONIC KIDNEY DISEASE, WITH LONG-TERM CURRENT USE OF INSULIN, UNSPECIFIED CKD STAGE (H): ICD-10-CM

## 2020-06-05 DIAGNOSIS — E11.43 GASTROPARESIS DUE TO DM (H): ICD-10-CM

## 2020-06-05 DIAGNOSIS — K31.84 GASTROPARESIS DUE TO DM (H): ICD-10-CM

## 2020-06-05 DIAGNOSIS — Z79.4 TYPE 2 DIABETES MELLITUS WITH CHRONIC KIDNEY DISEASE, WITH LONG-TERM CURRENT USE OF INSULIN, UNSPECIFIED CKD STAGE (H): ICD-10-CM

## 2020-06-08 RX ORDER — METOCLOPRAMIDE 10 MG/1
TABLET ORAL
Qty: 180 TABLET | Refills: 0 | Status: SHIPPED | OUTPATIENT
Start: 2020-06-08 | End: 2020-08-24

## 2020-06-08 RX ORDER — INSULIN LISPRO 100 [IU]/ML
INJECTION, SOLUTION INTRAVENOUS; SUBCUTANEOUS
Qty: 45 ML | Refills: 0 | Status: SHIPPED | OUTPATIENT
Start: 2020-06-08 | End: 2020-06-15

## 2020-06-08 RX ORDER — METFORMIN HCL 500 MG
TABLET, EXTENDED RELEASE 24 HR ORAL
Qty: 120 TABLET | Refills: 0 | Status: SHIPPED | OUTPATIENT
Start: 2020-06-08 | End: 2020-06-15

## 2020-06-08 RX ORDER — LOSARTAN POTASSIUM 100 MG/1
TABLET ORAL
Qty: 90 TABLET | Refills: 0 | Status: SHIPPED | OUTPATIENT
Start: 2020-06-08 | End: 2020-06-15

## 2020-06-08 NOTE — TELEPHONE ENCOUNTER
Due for visit and labs.  Can give 1 month refill until she can schedule. Notify patient and help to schedule.    Kaia Elena M.D.

## 2020-06-08 NOTE — TELEPHONE ENCOUNTER
"Routing refill request to provider for diabetic meds review/approval because: Coming due for A1c    Prescription for metoclopramide losartan and approved per [facility] Refill Protocol.      Requested Prescriptions   Pending Prescriptions Disp Refills     metoclopramide (REGLAN) 10 MG tablet [Pharmacy Med Name: METOCLOPRAMIDE HCL 10MG TABS] 180 tablet 1     Sig: TAKE ONE TABLET BY MOUTH TWICE A DAY        Antivertigo/Antiemetic Agents Passed - 6/5/2020  7:56 PM        Passed - Recent (12 mo) or future (30 days) visit within the authorizing provider's specialty     Patient has had an office visit with the authorizing provider or a provider within the authorizing providers department within the previous 12 mos or has a future within next 30 days. See \"Patient Info\" tab in inbasket, or \"Choose Columns\" in Meds & Orders section of the refill encounter.              Passed - Medication is active on med list        Passed - Patient is 18 years of age or older           metFORMIN (GLUCOPHAGE-XR) 500 MG 24 hr tablet [Pharmacy Med Name: METFORMIN HCL ER 500MG TB24] 360 tablet 1     Sig: TAKE TWO TABLETS BY MOUTH TWICE A DAY WITH MEALS       Biguanide Agents Passed - 6/5/2020  7:56 PM        Passed - Patient is age 10 or older        Passed - Patient has documented A1c within the specified period of time.     If HgbA1C is 8 or greater, it needs to be on file within the past 3 months.  If less than 8, must be on file within the past 6 months.     Recent Labs   Lab Test 12/19/19  0925   A1C 7.3*             Passed - Patient's CR is NOT>1.4 OR Patient's EGFR is NOT<45 within past 12 mos.     Recent Labs   Lab Test 12/19/19  0925   GFRESTIMATED >90   GFRESTBLACK >90       Recent Labs   Lab Test 12/19/19  0925   CR 0.52             Passed - Patient does NOT have a diagnosis of CHF.        Passed - Medication is active on med list        Passed - Patient is not pregnant        Passed - Patient has not had a positive pregnancy test within " "the past 12 mos.         Passed - Recent (6 mo) or future (30 days) visit within the authorizing provider's specialty     Patient had office visit in the last 6 months or has a visit in the next 30 days with authorizing provider or within the authorizing provider's specialty.  See \"Patient Info\" tab in inbasket, or \"Choose Columns\" in Meds & Orders section of the refill encounter.               insulin lispro (HUMALOG KWIKPEN) 100 UNIT/ML (1 unit dial) KWIKPEN [Pharmacy Med Name: HUMALOG KWIKPEN 100UNIT/ML SOPN] 45 mL 3     Sig: INJECT 32 UNITS UNDER THE SKIN WITH EACH MEAL       Short Acting Insulin Protocol Passed - 6/5/2020  7:56 PM        Passed - Serum creatinine on file in past 12 months     Recent Labs   Lab Test 12/19/19  0925  06/07/18  0755   CR 0.52   < >  --    CREAT  --   --  0.6    < > = values in this interval not displayed.       Ok to refill medication if creatinine is low          Passed - HgbA1C in past 3 or 6 months     If HgbA1C is 8 or greater, it needs to be on file within the past 3 months.  If less than 8, must be on file within the past 6 months.     Recent Labs   Lab Test 12/19/19  0925   A1C 7.3*             Passed - Medication is active on med list        Passed - Patient is age 18 or older        Passed - Recent (6 mo) or future (30 days) visit within the authorizing provider's specialty     Patient had office visit in the last 6 months or has a visit in the next 30 days with authorizing provider or within the authorizing provider's specialty.  See \"Patient Info\" tab in inbasket, or \"Choose Columns\" in Meds & Orders section of the refill encounter.               losartan (COZAAR) 100 MG tablet [Pharmacy Med Name: LOSARTAN POTASSIUM 100MG TABS] 90 tablet 1     Sig: TAKE ONE TABLET BY MOUTH ONCE DAILY       Angiotensin-II Receptors Passed - 6/5/2020  7:56 PM        Passed - Last blood pressure under 140/90 in past 12 months     BP Readings from Last 3 Encounters:   03/19/20 134/76 " "  12/26/19 128/83   12/19/19 138/86                 Passed - Recent (12 mo) or future (30 days) visit within the authorizing provider's specialty     Patient has had an office visit with the authorizing provider or a provider within the authorizing providers department within the previous 12 mos or has a future within next 30 days. See \"Patient Info\" tab in inbasket, or \"Choose Columns\" in Meds & Orders section of the refill encounter.              Passed - Medication is active on med list        Passed - Patient is age 18 or older        Passed - No active pregnancy on record        Passed - Normal serum creatinine on file in past 12 months     Recent Labs   Lab Test 12/19/19  0925  06/07/18  0755   CR 0.52   < >  --    CREAT  --   --  0.6    < > = values in this interval not displayed.       Ok to refill medication if creatinine is low          Passed - Normal serum potassium on file in past 12 months     Recent Labs   Lab Test 12/19/19  0925   POTASSIUM 4.0                    Passed - No positive pregnancy test in past 12 months           Ginna SPENCER RN, BSN      "

## 2020-06-15 ENCOUNTER — TRANSFERRED RECORDS (OUTPATIENT)
Dept: MULTI SPECIALTY CLINIC | Facility: CLINIC | Age: 60
End: 2020-06-15

## 2020-06-15 ENCOUNTER — OFFICE VISIT (OUTPATIENT)
Dept: FAMILY MEDICINE | Facility: CLINIC | Age: 60
End: 2020-06-15
Payer: COMMERCIAL

## 2020-06-15 VITALS
SYSTOLIC BLOOD PRESSURE: 126 MMHG | DIASTOLIC BLOOD PRESSURE: 74 MMHG | WEIGHT: 265 LBS | HEART RATE: 88 BPM | BODY MASS INDEX: 46.95 KG/M2 | TEMPERATURE: 98.4 F | RESPIRATION RATE: 16 BRPM | OXYGEN SATURATION: 95 % | HEIGHT: 63 IN

## 2020-06-15 DIAGNOSIS — J31.0 CHRONIC RHINITIS: ICD-10-CM

## 2020-06-15 DIAGNOSIS — I20.89 OTHER FORMS OF ANGINA PECTORIS (H): ICD-10-CM

## 2020-06-15 DIAGNOSIS — R80.9 MICROALBUMINURIA: ICD-10-CM

## 2020-06-15 DIAGNOSIS — E78.5 HYPERLIPIDEMIA LDL GOAL <100: ICD-10-CM

## 2020-06-15 DIAGNOSIS — J45.20 MILD INTERMITTENT ASTHMA, UNCOMPLICATED: ICD-10-CM

## 2020-06-15 DIAGNOSIS — E78.5 HYPERLIPIDEMIA LDL GOAL <70: ICD-10-CM

## 2020-06-15 DIAGNOSIS — I10 ESSENTIAL HYPERTENSION WITH GOAL BLOOD PRESSURE LESS THAN 140/90: ICD-10-CM

## 2020-06-15 DIAGNOSIS — E11.22 TYPE 2 DIABETES MELLITUS WITH CHRONIC KIDNEY DISEASE, WITH LONG-TERM CURRENT USE OF INSULIN, UNSPECIFIED CKD STAGE (H): Primary | ICD-10-CM

## 2020-06-15 DIAGNOSIS — Z79.4 TYPE 2 DIABETES MELLITUS WITH CHRONIC KIDNEY DISEASE, WITH LONG-TERM CURRENT USE OF INSULIN, UNSPECIFIED CKD STAGE (H): Primary | ICD-10-CM

## 2020-06-15 DIAGNOSIS — M65.30 TRIGGER FINGER, ACQUIRED: ICD-10-CM

## 2020-06-15 DIAGNOSIS — K21.9 GASTROESOPHAGEAL REFLUX DISEASE WITHOUT ESOPHAGITIS: ICD-10-CM

## 2020-06-15 LAB
ANION GAP SERPL CALCULATED.3IONS-SCNC: 6 MMOL/L (ref 3–14)
BUN SERPL-MCNC: 15 MG/DL (ref 7–30)
CALCIUM SERPL-MCNC: 9.4 MG/DL (ref 8.5–10.1)
CHLORIDE SERPL-SCNC: 102 MMOL/L (ref 94–109)
CHOLEST SERPL-MCNC: 154 MG/DL
CO2 SERPL-SCNC: 27 MMOL/L (ref 20–32)
CREAT SERPL-MCNC: 0.59 MG/DL (ref 0.52–1.04)
CREAT UR-MCNC: 91 MG/DL
GFR SERPL CREATININE-BSD FRML MDRD: >90 ML/MIN/{1.73_M2}
GLUCOSE SERPL-MCNC: 213 MG/DL (ref 70–99)
HBA1C MFR BLD: 8.2 % (ref 0–5.6)
HDLC SERPL-MCNC: 44 MG/DL
LDLC SERPL CALC-MCNC: 53 MG/DL
MICROALBUMIN UR-MCNC: 167 MG/L
MICROALBUMIN/CREAT UR: 182.71 MG/G CR (ref 0–25)
NONHDLC SERPL-MCNC: 110 MG/DL
POTASSIUM SERPL-SCNC: 4.6 MMOL/L (ref 3.4–5.3)
SODIUM SERPL-SCNC: 135 MMOL/L (ref 133–144)
TRIGL SERPL-MCNC: 284 MG/DL

## 2020-06-15 PROCEDURE — 82043 UR ALBUMIN QUANTITATIVE: CPT | Performed by: FAMILY MEDICINE

## 2020-06-15 PROCEDURE — 80061 LIPID PANEL: CPT | Performed by: FAMILY MEDICINE

## 2020-06-15 PROCEDURE — 36415 COLL VENOUS BLD VENIPUNCTURE: CPT | Performed by: FAMILY MEDICINE

## 2020-06-15 PROCEDURE — 99214 OFFICE O/P EST MOD 30 MIN: CPT | Performed by: FAMILY MEDICINE

## 2020-06-15 PROCEDURE — 83036 HEMOGLOBIN GLYCOSYLATED A1C: CPT | Performed by: FAMILY MEDICINE

## 2020-06-15 PROCEDURE — 80048 BASIC METABOLIC PNL TOTAL CA: CPT | Performed by: FAMILY MEDICINE

## 2020-06-15 RX ORDER — SEMAGLUTIDE 1.34 MG/ML
INJECTION, SOLUTION SUBCUTANEOUS
Qty: 4.5 ML | Refills: 3 | Status: SHIPPED | OUTPATIENT
Start: 2020-06-15 | End: 2021-04-14

## 2020-06-15 RX ORDER — MONTELUKAST SODIUM 10 MG/1
TABLET ORAL
Qty: 90 TABLET | Refills: 1 | Status: SHIPPED | OUTPATIENT
Start: 2020-06-15 | End: 2020-12-01

## 2020-06-15 RX ORDER — ESOMEPRAZOLE MAGNESIUM 40 MG/1
40 CAPSULE, DELAYED RELEASE ORAL
Qty: 90 CAPSULE | Refills: 3 | Status: SHIPPED | OUTPATIENT
Start: 2020-06-15 | End: 2021-04-14

## 2020-06-15 RX ORDER — PSEUDOEPHEDRINE HCL 30 MG
60 TABLET ORAL EVERY 12 HOURS PRN
Qty: 360 TABLET | Refills: 1 | Status: SHIPPED | OUTPATIENT
Start: 2020-06-15 | End: 2020-09-30

## 2020-06-15 RX ORDER — METOPROLOL SUCCINATE 25 MG/1
25 TABLET, EXTENDED RELEASE ORAL DAILY
Qty: 90 TABLET | Refills: 3 | Status: SHIPPED | OUTPATIENT
Start: 2020-06-15 | End: 2021-04-14

## 2020-06-15 RX ORDER — INSULIN LISPRO 100 [IU]/ML
INJECTION, SOLUTION INTRAVENOUS; SUBCUTANEOUS
Qty: 45 ML | Refills: 1 | Status: SHIPPED | OUTPATIENT
Start: 2020-06-15 | End: 2020-11-19

## 2020-06-15 RX ORDER — SIMVASTATIN 40 MG
40 TABLET ORAL AT BEDTIME
Qty: 90 TABLET | Refills: 1 | Status: SHIPPED | OUTPATIENT
Start: 2020-06-15 | End: 2020-12-29

## 2020-06-15 RX ORDER — LOSARTAN POTASSIUM 100 MG/1
100 TABLET ORAL DAILY
Qty: 90 TABLET | Refills: 1 | Status: SHIPPED | OUTPATIENT
Start: 2020-06-15 | End: 2021-02-24

## 2020-06-15 RX ORDER — METFORMIN HCL 500 MG
1000 TABLET, EXTENDED RELEASE 24 HR ORAL 2 TIMES DAILY WITH MEALS
Qty: 360 TABLET | Refills: 1 | Status: SHIPPED | OUTPATIENT
Start: 2020-06-15 | End: 2021-02-09

## 2020-06-15 ASSESSMENT — MIFFLIN-ST. JEOR: SCORE: 1751.16

## 2020-06-15 ASSESSMENT — PATIENT HEALTH QUESTIONNAIRE - PHQ9: SUM OF ALL RESPONSES TO PHQ QUESTIONS 1-9: 0

## 2020-06-15 ASSESSMENT — PAIN SCALES - GENERAL: PAINLEVEL: SEVERE PAIN (7)

## 2020-06-15 NOTE — RESULT ENCOUNTER NOTE
Urine protein is improved from last year.     HgbA1c is high at 8.2%. recheck in 3 months. Really work on diet/exercise.  If not improved, will have you see endocrinologist.     Lipids look okay.  Kidney function stable.     Kaia Elena M.D.

## 2020-06-15 NOTE — PATIENT INSTRUCTIONS
Our Clinic hours are:  Mondays    7:20 am - 7 pm  Tues - Fri  7:20 am - 5 pm    Clinic Phone: [phone]    The clinic lab opens at 7:30 am Mon - Fri and appointments are required.    [facility]. [phone]  Monday  8 am - 7pm  Tues - Fri 8 am - 5:30 pm         Patient Education     What is Trigger Finger?  Trigger finger is an inflammation of tissue inside your finger or thumb. It is also called tenosynovitis (ten-oh-sin-oh-VY-tis). Tendons (cordlike fibers that attach muscle to bone and allow you to bend the joints) become swollen. So does the synovium (a slick membrane that allows the tendons to move easily). This makes it hard to straighten the finger or thumb.    Causes  Repeated use of a tool with strong gripping, such as a drill or wrench, can irritate and inflame the tendons and the synovium. It is also more common in certain medical conditions, such as rheumatoid arthritis, gout, and diabetes. But often the cause of trigger finger is unknown.  Inside your finger  Tendons connect muscles in your forearm to the bones in your fingers. The tendons in each finger are surrounded by a protective tendon sheath. This sheath is lined with synovium, which produces a fluid that allows the tendons to slide easily when you bend and straighten the finger. If a tendon is irritated, it becomes inflamed.  When a tendon is inflamed  When a tendon is inflamed, it causes the lining of the tendon sheath to swell and thicken. Or the tendon itself may thicken. Then the sheath pinches the tendon. The tendon can then no longer slide easily inside the sheath. When you straighten your finger, the tendon sticks or  locks  as it tries to squeeze back through the sheath.    Symptoms  The first sign of trigger finger may be pain where the finger or thumb joins the palm. You may also notice some swelling. As the tendon becomes more inflamed, the finger may start to catch when you try to straighten or  bend it. When the locked tendon releases, the finger jumps, as if you were releasing the trigger of a gun. This further irritates the tendon. It may set up a cycle of catching and swelling.   Date Last Reviewed: 1/1/2018 2000-2019 [employer]. [address] [place] [id number]. All rights reserved. This information is not intended as a substitute for professional medical care. Always follow your healthcare professional's instructions.           Patient Education     Treating Trigger Finger    Trigger finger occurs when the tissue inside your finger or thumb becomes inflamed. Mild cases can be treated without surgery. If the problem is severe, surgery may be needed. Your healthcare provider will talk with you about your options.  Nonsurgical treatment  For mild symptoms, your healthcare provider may have you rest the finger or thumb. You may also be told to take anti-inflammatory medicines. These include ibuprofen or aspirin. You may be given an injection of medicine in the base of the finger or thumb. This typically is a steroid, such as cortisone.  Surgery  If nonsurgical treatments don t ease your symptoms, you may need surgery. A tendon is a cordlike fiber that attaches muscle to bone and allows joints to bend. The tendon is surrounded by a protective cover called a sheath. During surgery, the sheath in your finger or thumb is opened to enlarge the space and release the swollen tendon. This allows the finger or thumb to bend and straighten normally. Surgery takes about 20 minutes. It can often be done using a local anesthetic. You may also be sedated. You will likely be able to go home the same day. Your hand will be wrapped in a soft bandage. You may need to wear a plaster splint for a short time to keep the finger or thumb still as it heals. The stitches will be removed in about 2 weeks. Your healthcare provider will talk with you about the risks and benefits of surgery.  Date Last  Reviewed: 1/1/2018 2000-2019 [employer]. [address] [place] [address], [place] [address]. All rights reserved. This information is not intended as a substitute for professional medical care. Always follow your healthcare professional's instructions.

## 2020-06-15 NOTE — PROGRESS NOTES
Subjective     Bria Davis is a 59 year old female who presents to clinic today for the following health issues:    HPI   Chief Complaint   Patient presents with     Diabetes     Finger     Patient has had ongoing left middle finger that will lock up on her. Patient has mild pain at times. Patient has no swelling. Patient notices the most discomfort when she unlocks finger.        Diabetes Follow-up    How often are you checking your blood sugar? Three times daily  Blood sugar testing frequency justification:  Patient modifying lifestyle changes (diet, exercise) with blood sugars  What time of day are you checking your blood sugars (select all that apply)?  Before meals  Have you had any blood sugars above 200?  Yes   Have you had any blood sugars below 70?  Yes at times    What symptoms do you notice when your blood sugar is low?  Dizzy, Confusion and Other: sweaty    What concerns do you have today about your diabetes? None     Do you have any of these symptoms? (Select all that apply)  No numbness or tingling in feet.  No redness, sores or blisters on feet.  No complaints of excessive thirst.  No reports of blurry vision.  No significant changes to weight.    Have you had a diabetic eye exam in the last 12 months? Yes- Date of last eye exam: 6 months ago,  Location: [facility]      Hyperlipidemia Follow-Up      Are you regularly taking any medication or supplement to lower your cholesterol?   Yes- simvastatin\    Are you having muscle aches or other side effects that you think could be caused by your cholesterol lowering medication?  No    Hypertension Follow-up      Do you check your blood pressure regularly outside of the clinic? No     Are you following a low salt diet? Yes    Are your blood pressures ever more than 140 on the top number (systolic) OR more   than 90 on the bottom number (diastolic), for example 140/90? No    BP Readings from Last 2 Encounters:   06/15/20 126/74   03/19/20 134/76  "    Hemoglobin A1C (%)   Date Value   12/19/2019 7.3 (H)   10/31/2019 9.3 (H)     LDL Cholesterol Calculated (mg/dL)   Date Value   10/31/2019 63   02/11/2019 58         How many servings of fruits and vegetables do you eat daily?  2-3    On average, how many sweetened beverages do you drink each day (Examples: soda, juice, sweet tea, etc.  Do NOT count diet or artificially sweetened beverages)?   0    How many days per week do you exercise enough to make your heart beat faster? 3 or less    How many minutes a day do you exercise enough to make your heart beat faster? 9 or less    How many days per week do you miss taking your medication? 0    Reviewed and updated as needed this visit by Provider         Review of Systems   Constitutional, HEENT, cardiovascular, pulmonary, gi and gu systems are negative, except as otherwise noted.      Objective    /74   Pulse 88   Temp 98.4  F (36.9  C) (Tympanic)   Resp 16   Ht 1.6 m (5' 3\")   Wt 120.2 kg (265 lb)   LMP 01/14/2016 (Approximate)   SpO2 95%   Breastfeeding No   BMI 46.94 kg/m    Body mass index is 46.94 kg/m .  Physical Exam   GENERAL: healthy, alert and no distress  NECK: no adenopathy, no asymmetry, masses, or scars and thyroid normal to palpation  RESP: lungs clear to auscultation - no rales, rhonchi or wheezes  CV: regular rate and rhythm, normal S1 S2, no S3 or S4, no murmur, click or rub, no peripheral edema and peripheral pulses strong  ABDOMEN: soft, nontender, no hepatosplenomegaly, no masses and bowel sounds normal  MS: left middle finger triggering at the MCP joint.    Diagnostic Test Results:  Labs reviewed in Epic        Assessment & Plan     1. Type 2 diabetes mellitus with chronic kidney disease, with long-term current use of insulin, unspecified CKD stage (H)   anticipating high HgbA1c as she's been doing more baking, etc.   Will give 3 months to bring down and then will need a endocrine referral   - Albumin Random Urine Quantitative " with Creat Ratio  - Basic metabolic panel  - Hemoglobin A1c  - Lipid panel reflex to direct LDL Fasting  - Semaglutide,0.25 or 0.5MG/DOS, (OZEMPIC, 0.25 OR 0.5 MG/DOSE,) 2 MG/1.5ML SOPN; INJECT 0.5MG UNDER THE SKIN ONCE A WEEK  Dispense: 4.5 mL; Refill: 3  - metFORMIN (GLUCOPHAGE-XR) 500 MG 24 hr tablet; Take 2 tablets (1,000 mg) by mouth 2 times daily (with meals)  Dispense: 360 tablet; Refill: 1  - insulin lispro (HUMALOG KWIKPEN) 100 UNIT/ML (1 unit dial) KWIKPEN; INJECT 32 UNITS UNDER THE SKIN WITH EACH MEAL  Dispense: 45 mL; Refill: 1    2. Hyperlipidemia LDL goal <70     - Lipid panel reflex to direct LDL Fasting    3. Microalbuminuria     - Albumin Random Urine Quantitative with Creat Ratio    4. Hyperlipidemia LDL goal <100     - simvastatin (ZOCOR) 40 MG tablet; Take 1 tablet (40 mg) by mouth At Bedtime at bedtime.  Dispense: 90 tablet; Refill: 1    5. Mild intermittent asthma, uncomplicated     - montelukast (SINGULAIR) 10 MG tablet; TAKE ONE TABLET BY MOUTH AT BEDTIME  Dispense: 90 tablet; Refill: 1    6. Other forms of angina pectoris (H)   stable/asymptomatic  - metoprolol succinate ER (TOPROL-XL) 25 MG 24 hr tablet; Take 1 tablet (25 mg) by mouth daily  Dispense: 90 tablet; Refill: 3    7. Essential hypertension with goal blood pressure less than 140/90     - losartan (COZAAR) 100 MG tablet; Take 1 tablet (100 mg) by mouth daily  Dispense: 90 tablet; Refill: 1    8. Gastroesophageal reflux disease without esophagitis     - esomeprazole (NEXIUM) 40 MG DR capsule; Take 1 capsule (40 mg) by mouth every morning (before breakfast)  Dispense: 90 capsule; Refill: 3    9. Trigger finger, acquired  Refer to ortho  - Orthopedic & Spine  Referral; Future    10. Chronic rhinitis     - pseudoePHEDrine (SUDOGEST) 30 MG tablet; Take 2 tablets (60 mg) by mouth every 12 hours as needed for congestion  Dispense: 360 tablet; Refill: 1     BMI:   Estimated body mass index is 46.94 kg/m  as calculated from the  "following:    Height as of this encounter: 1.6 m (5' 3\").    Weight as of this encounter: 120.2 kg (265 lb).            Return in about 6 months (around 12/15/2020) for diabetes recheck.    Kaia Elena MD  [facility]      "

## 2020-06-16 ASSESSMENT — ASTHMA QUESTIONNAIRES: ACT_TOTALSCORE: 24

## 2020-06-19 ENCOUNTER — TRANSFERRED RECORDS (OUTPATIENT)
Dept: HEALTH INFORMATION MANAGEMENT | Facility: CLINIC | Age: 60
End: 2020-06-19

## 2020-07-10 ENCOUNTER — OFFICE VISIT (OUTPATIENT)
Dept: FAMILY MEDICINE | Facility: CLINIC | Age: 60
End: 2020-07-10
Payer: COMMERCIAL

## 2020-07-10 VITALS
OXYGEN SATURATION: 96 % | TEMPERATURE: 97.9 F | HEART RATE: 83 BPM | HEIGHT: 63 IN | BODY MASS INDEX: 46.95 KG/M2 | SYSTOLIC BLOOD PRESSURE: 138 MMHG | WEIGHT: 265 LBS | DIASTOLIC BLOOD PRESSURE: 88 MMHG

## 2020-07-10 DIAGNOSIS — I10 BENIGN ESSENTIAL HYPERTENSION: ICD-10-CM

## 2020-07-10 DIAGNOSIS — E66.01 MORBID OBESITY (H): ICD-10-CM

## 2020-07-10 DIAGNOSIS — Z01.818 PREOP GENERAL PHYSICAL EXAM: Primary | ICD-10-CM

## 2020-07-10 DIAGNOSIS — M65.30 TRIGGER FINGER, ACQUIRED: ICD-10-CM

## 2020-07-10 DIAGNOSIS — E11.22 TYPE 2 DIABETES MELLITUS WITH CHRONIC KIDNEY DISEASE, WITH LONG-TERM CURRENT USE OF INSULIN, UNSPECIFIED CKD STAGE (H): ICD-10-CM

## 2020-07-10 DIAGNOSIS — Z79.4 TYPE 2 DIABETES MELLITUS WITH CHRONIC KIDNEY DISEASE, WITH LONG-TERM CURRENT USE OF INSULIN, UNSPECIFIED CKD STAGE (H): ICD-10-CM

## 2020-07-10 DIAGNOSIS — J45.20 MILD INTERMITTENT ASTHMA WITHOUT COMPLICATION: ICD-10-CM

## 2020-07-10 DIAGNOSIS — E78.5 HYPERLIPIDEMIA LDL GOAL <70: ICD-10-CM

## 2020-07-10 DIAGNOSIS — I25.10 NONOBSTRUCTIVE ATHEROSCLEROSIS OF CORONARY ARTERY: ICD-10-CM

## 2020-07-10 PROCEDURE — 99215 OFFICE O/P EST HI 40 MIN: CPT | Performed by: NURSE PRACTITIONER

## 2020-07-10 ASSESSMENT — MIFFLIN-ST. JEOR: SCORE: 1746.16

## 2020-07-10 NOTE — PATIENT INSTRUCTIONS
Diabetes Medication Use  Hold usual oral and non-insulin diabetic meds (Metformin) while fasting.   Take 80% of long acting insulin (TOUJEO) while fasting  Hold short acting insulin (e.g. Novolog, Humalog) while fasting      Anticoagulant or Antiplatelet Medication Use  ASPIRIN: Discontinue ASA 7-10 days prior to procedure to reduce bleeding risk.  It should be resumed post-operatively.        ACE Inhibitor or Angiotensin Receptor Blocker (ARB) Use (LOSARTAN)  Ace inhibitor or Angiotensin Receptor Blocker (ARB) and should HOLD this medication for the 24 hours prior to surgery.      Before Your Surgery      Call your surgeon if there is any change in your health. This includes signs of a cold or flu (such as a sore throat, runny nose, cough, rash or fever).    Do not smoke, drink alcohol or take over the counter medicine (unless your surgeon or primary care doctor tells you to) for the 24 hours before and after surgery.    If you take prescribed drugs: Follow your doctor s orders about which medicines to take and which to stop until after surgery.    Eating and drinking prior to surgery: follow the instructions from your surgeon    Take a shower or bath the night before surgery. Use the soap your surgeon gave you to gently clean your skin. If you do not have soap from your surgeon, use your regular soap. Do not shave or scrub the surgery site.  Wear clean pajamas and have clean sheets on your bed.

## 2020-07-10 NOTE — PROGRESS NOTES
[facility]  [address] [address]  [phone]  Dept: [phone]    PRE-OP EVALUATION:  Today's date: 7/10/2020    Bria Davis (: 1960) presents for pre-operative evaluation assessment as requested by Dr. Sparrow.  She requires evaluation and anesthesia risk assessment prior to undergoing surgery/procedure for treatment of trigger finger- LEFT.    Proposed Surgery/ Procedure: A1 pulley release  Date of Surgery/ Procedure: 2020  Time of Surgery/ Procedure: tbd- [facility]  Hospital/Surgical Facility: [facility]  Fax number for surgical facility: [phone]  Primary Physician: Kaia Elena  Type of Anesthesia Anticipated: to be determined    Patient has a Health Care Directive or Living Will:  NO    1. NO - Do you have a history of heart attack, stroke, stent, bypass or surgery on an artery in the head, neck, heart or legs?  2. NO - Do you ever have any pain or discomfort in your chest?  3. NO - Do you have a history of  Heart Failure?  4. NO - Are you troubled by shortness of breath when: walking on the level, up a slight hill or at night?  5. NO - Do you currently have a cold, bronchitis or other respiratory infection?  6. NO - Do you have a cough, shortness of breath or wheezing?  7. YES - DO YOU SOMETIMES GET PAINS IN THE CALVES OF YOUR LEGS WHEN YOU WALK?  Left leg from achilles tendon surgery per patient.  8. NO - Do you or anyone in your family have previous history of blood clots?  9. NO - Do you or does anyone in your family have a serious bleeding problem such as prolonged bleeding following surgeries or cuts?  10. YES - HAVE YOU EVER HAD PROBLEMS WITH ANEMIA OR BEEN TOLD TO TAKE IRON PILLS? For the past year - last hemoglobin in 2019 was normal.  11. NO - Have you had any abnormal blood loss such as black, tarry or bloody stools, or abnormal vaginal bleeding?  12. NO - Have you ever had a blood transfusion?  13. NO -  Have you or any of your relatives ever had problems with anesthesia?  14. NO - Do you have sleep apnea, excessive snoring or daytime drowsiness?  15. NO - Do you have any prosthetic heart valves?  16. NO - Do you have prosthetic joints?  17. NO - Is there any chance that you may be pregnant?      HPI:     HPI related to upcoming procedure:   Trigger finger - worsening for 6 months.  Causing pain and dysfunction.      ASTHMA - Patient has a longstanding history of moderate-severe Asthma . Patient has been doing well overall noting NO SYMPTOMS and continues on medication regimen consisting of albuterol prn without adverse reactions or side effects.     DIABETES - Patient has a longstanding history of DiabetesType Type II . Patient is being treated with oral agents and insulin injections and denies significant side effects. Control has been fair. Complicating factors include but are not limited to: hypertension, hyperlipidemia and morbid obesity .     HYPERLIPIDEMIA - Patient has a long history of significant Hyperlipidemia requiring medication for treatment with recent good control. Patient reports no problems or side effects with the medication.     HYPERTENSION - Patient has longstanding history of HTN , currently denies any symptoms referable to elevated blood pressure. Specifically denies chest pain, palpitations, dyspnea, orthopnea, PND or peripheral edema. Blood pressure readings have been in normal range. Current medication regimen is as listed below. Patient denies any side effects of medication.         MEDICAL HISTORY:     Patient Active Problem List    Diagnosis Date Noted     Nonobstructive atherosclerosis of coronary artery 09/20/2019     Priority: Medium     Status post coronary angiogram 09/12/2019     Priority: Medium     Other forms of angina pectoris (H) 09/06/2019     Priority: Medium     Added automatically from request for surgery [id number]       Benign essential hypertension 08/25/2016      Priority: Medium     Type 2 diabetes mellitus with kidney complication, with long-term current use of insulin (H) 10/21/2015     Priority: Medium     Morbid obesity (H) 10/21/2015     Priority: Medium     Hyperlipidemia LDL goal <70 03/26/2011     Priority: Medium     Microalbuminuria 01/06/2011     Priority: Medium     Lumbar radiculopathy 11/30/2010     Priority: Medium     Enthesopathy 11/27/2007     Priority: Medium     Problem list name updated by automated process. Provider to review       Restless legs syndrome (RLS) 04/12/2007     Priority: Medium     Esophageal reflux 02/07/2006     Priority: Medium     Allergic rhinitis 02/07/2006     Priority: Medium     Problem list name updated by automated process. Provider to review       Mild intermittent asthma 11/10/2005     Priority: Medium      Past Medical History:   Diagnosis Date     Asthma      Diabetes mellitus (H)      Esophageal reflux      Obese      Other chronic sinusitis      PONV (postoperative nausea and vomiting)      Past Surgical History:   Procedure Laterality Date     COLONOSCOPY  1/31/2002     COLONOSCOPY  10/26/2012    Procedure: COLONOSCOPY;  Colonoscopy;  Surgeon: Margret Sales MD;  Location: [facility]     COLONOSCOPY N/A 11/8/2019    Procedure: COLONOSCOPY, WITH POLYPECTOMY AND BIOPSY;  Surgeon: Ariel Heck MD;  Location: [facility]     CV LEFT HEART CATH N/A 9/12/2019    Procedure: Left Heart Cath;  Surgeon: Mike Sanon MD;  Location:  HEART CARDIAC CATH LAB     CV LEFT VENTRICULOGRAM N/A 9/12/2019    Procedure: Left Ventriculogram;  Surgeon: Mike Sanon MD;  Location:  HEART CARDIAC CATH LAB     ESOPHAGOSCOPY, GASTROSCOPY, DUODENOSCOPY (EGD), COMBINED N/A 11/8/2019    Procedure: ESOPHAGOGASTRODUODENOSCOPY, WITH BIOPSY;  Surgeon: Ariel Heck MD;  Location: [facility]     GYN SURGERY      Hydroablation 2007, polyp removal 2018     INJECT EPIDURAL LUMBAR  12/7/2010    INJECT EPIDURAL LUMBAR performed by GENERIC  ANESTHESIA PROVIDER at [facility]     INJECT EPIDURAL LUMBAR  1/17/2011    INJECT EPIDURAL LUMBAR performed by GENERIC ANESTHESIA PROVIDER at [facility]     RELEASE CARPAL TUNNEL  6/19/2012    Procedure: RELEASE CARPAL TUNNEL;  Right Carpal Tunnel Release;  Surgeon: Mike Sparrow MD;  Location: [facility]     RELEASE CARPAL TUNNEL  11/9/2012    Procedure: RELEASE CARPAL TUNNEL;  Left Carpal Tunnel Release;  Surgeon: Mike Sparrow MD;  Location: [facility]     REPAIR TENDON ACHILLES Left 12/26/2019    Procedure: Left Foot: Achilles Tendon Repair/Remodel/Reattachment; calcaneal prominence removal;  Surgeon: Felix Watkins DPM;  Location: [facility]     SURGICAL HISTORY OF -   4/10/2000    bilateral total ethmoidectomies, bilateral maxillary antrostomies, bilateral SMR of inferior turbinates, reduction of lt turbinate porter bullosa     Current Outpatient Medications   Medication Sig Dispense Refill     albuterol (PROAIR HFA/PROVENTIL HFA/VENTOLIN HFA) 108 (90 Base) MCG/ACT inhaler Inhale 2 puffs into the lungs every 6 hours 1 Inhaler 11     aspirin (ASA) 81 MG EC tablet Take 1 tablet (81 mg) by mouth daily 1 tablet 0     Calcium Carb-Cholecalciferol (CALCIUM-VITAMIN D) 600-400 MG-UNIT TABS Take 1 tablet by mouth daily       esomeprazole (NEXIUM) 40 MG DR capsule Take 1 capsule (40 mg) by mouth every morning (before breakfast) 90 capsule 3     ferrous sulfate 140 (45 Fe) MG TBCR CR tablet Take 140 mg by mouth daily       fluticasone-salmeterol (ADVAIR DISKUS) 100-50 MCG/DOSE inhaler Inhale 1 puff into the lungs 2 times daily 3 Inhaler 0     Insulin Glargine, 2 Unit Dial, (TOUJEO MAX SOLOSTAR) 300 UNIT/ML SOPN Inject 100 Units Subcutaneous daily To get 3 months worth of insulin 27 mL 3     insulin lispro (HUMALOG KWIKPEN) 100 UNIT/ML (1 unit dial) KWIKPEN INJECT 32 UNITS UNDER THE SKIN WITH EACH MEAL 45 mL 1     losartan (COZAAR) 100 MG tablet Take 1 tablet (100 mg) by mouth daily 90 tablet 1     metFORMIN (GLUCOPHAGE-XR) 500  MG 24 hr tablet Take 2 tablets (1,000 mg) by mouth 2 times daily (with meals) 360 tablet 1     metoclopramide (REGLAN) 10 MG tablet TAKE ONE TABLET BY MOUTH TWICE A  tablet 0     metoprolol succinate ER (TOPROL-XL) 25 MG 24 hr tablet Take 1 tablet (25 mg) by mouth daily 90 tablet 3     montelukast (SINGULAIR) 10 MG tablet TAKE ONE TABLET BY MOUTH AT BEDTIME 90 tablet 1     Multiple Vitamins-Minerals (MULTIVITAMIN ADULTS 50+) TABS Take 1 tablet by mouth daily       pseudoePHEDrine (SUDOGEST) 30 MG tablet Take 2 tablets (60 mg) by mouth every 12 hours as needed for congestion (Patient taking differently: Take 60 mg by mouth every 12 hours ) 360 tablet 1     Semaglutide,0.25 or 0.5MG/DOS, (OZEMPIC, 0.25 OR 0.5 MG/DOSE,) 2 MG/1.5ML SOPN INJECT 0.5MG UNDER THE SKIN ONCE A WEEK 4.5 mL 3     simvastatin (ZOCOR) 40 MG tablet Take 1 tablet (40 mg) by mouth At Bedtime at bedtime. 90 tablet 1     blood glucose (ACCU-CHEK GUIDE) test strip Use to test blood sugar 3 times daily or as directed. 300 strip 11     blood glucose monitoring (ACCU-CHEK FASTCLIX) lancets 1 each by In Vitro route 4 times daily Use to test blood sugar 4 times daily or as directed. 408 each 3     insulin pen needle (BD PEDRO LUIS U/F) 32G X 4 MM miscellaneous Use 4 daily as directed. 400 each 11     OTC products: None, except as noted above    Allergies   Allergen Reactions     Lisinopril Cough     Tape [Adhesive Tape] Rash      Latex Allergy: NO    Social History     Tobacco Use     Smoking status: Never Smoker     Smokeless tobacco: Never Used   Substance Use Topics     Alcohol use: No     History   Drug Use No       REVIEW OF SYSTEMS:   Constitutional, neuro, ENT, endocrine, pulmonary, cardiac, gastrointestinal, genitourinary, musculoskeletal, integument and psychiatric systems are negative, except as otherwise noted.    EXAM:   /88 (BP Location: Right arm, Cuff Size: Adult Large)   Pulse 83   Temp 97.9  F (36.6  C) (Tympanic)   Ht 1.6 m (5'  "3\")   Wt 120.2 kg (265 lb)   LMP 01/14/2016 (Approximate)   SpO2 96%   BMI 46.94 kg/m      GENERAL APPEARANCE: healthy, alert and no distress     EYES: EOMI, PERRL     HENT: ear canals and TM's normal and nose and mouth without ulcers or lesions     NECK: no adenopathy, no asymmetry, masses, or scars and thyroid normal to palpation     RESP: lungs clear to auscultation - no rales, rhonchi or wheezes     CV: regular rates and rhythm, normal S1 S2, no S3 or S4 and no murmur, click or rub     ABDOMEN:  soft, nontender, no HSM or masses and bowel sounds normal     MS: extremities normal- no gross deformities noted, no evidence of inflammation in joints, FROM in all extremities.     SKIN: no suspicious lesions or rashes     NEURO: Normal strength and tone, sensory exam grossly normal, mentation intact and speech normal     PSYCH: mentation appears normal. and affect normal/bright     LYMPHATICS: No cervical adenopathy    DIAGNOSTICS:   EKG 9/2019: appears normal, NSR, normal axis, normal intervals, no acute ST/T changes c/w ischemia, no LVH by voltage criteria, unchanged from previous tracings    Recent Labs   Lab Test 06/15/20  0902 12/19/19  0925 10/31/19  0850  09/12/19  0655   HGB  --  13.4 11.3*  --  11.1*   PLT  --  325 339  --  304   INR  --   --   --   --  0.96    136 138   < > 140   POTASSIUM 4.6 4.0 4.9   < > 4.2   CR 0.59 0.52 0.57   < > 0.54   A1C 8.2* 7.3* 9.3*  --   --     < > = values in this interval not displayed.      IMPRESSION:   Reason for surgery/procedure: trigger finger  Diagnosis/reason for consult: pre-op evaluation    The proposed surgical procedure is considered INTERMEDIATE risk.    REVISED CARDIAC RISK INDEX  The patient has the following serious cardiovascular risks for perioperative complications such as (MI, PE, VFib and 3  AV Block):  Diabetes Mellitus (on Insulin)  INTERPRETATION: 1 risks: Class II (low risk - 0.9% complication rate)    The patient has the following additional " risks for perioperative complications:  Morbid obesity, asthma, hyperlipidemia, HTN, CAD      ICD-10-CM    1. Preop general physical exam  Z01.818    2. Trigger finger, acquired  M65.30    3. Type 2 diabetes mellitus with chronic kidney disease, with long-term current use of insulin, unspecified CKD stage (H)  E11.22     Z79.4    4. Morbid obesity (H)  E66.01    5. Mild intermittent asthma without complication  J45.20    6. Hyperlipidemia LDL goal <70  E78.5    7. Benign essential hypertension  I10    8. Nonobstructive atherosclerosis of coronary artery  I25.10        RECOMMENDATIONS:       Cardiovascular Risk  Performs 4 METs exercise without symptoms (Light housework (dusting, washing dishes), Climb a flight of stairs, Walk on level ground at 15 minutes per mile (4 miles/hour) and Bicycling at 8.5 minutes per mile (7 miles/hour)) .   Patient is already on a Beta Blocker. Continue Betablocker therapy after surgery, using Beta blocker order set as necessary for NPO status.      --Patient is to take all scheduled medications on the day of surgery EXCEPT for modifications listed below.    Diabetes Medication Use  -----Hold usual oral and non-insulin diabetic meds (e.g. Metformin, Actos, Glipizide) while NPO.   -----Take 80% of long acting insulin (e.g. Lantus, NPH) while NPO (fasting)  -----Hold short acting insulin (e.g. Novolog, Humalog) while NPO (fasting)      Anticoagulant or Antiplatelet Medication Use  ASPIRIN: Discontinue ASA 7-10 days prior to procedure to reduce bleeding risk.  It should be resumed post-operatively.        ACE Inhibitor or Angiotensin Receptor Blocker (ARB) Use  Ace inhibitor or Angiotensin Receptor Blocker (ARB) and should HOLD this medication for the 24 hours prior to surgery.      APPROVAL GIVEN to proceed with proposed procedure, without further diagnostic evaluation       Signed Electronically by: ALYSON Yuan CNP    Copy of this evaluation report is provided to requesting  physician.    [facility] Preop Guidelines    Revised Cardiac Risk Index

## 2020-07-30 ENCOUNTER — TELEPHONE (OUTPATIENT)
Dept: FAMILY MEDICINE | Facility: CLINIC | Age: 60
End: 2020-07-30

## 2020-07-31 ENCOUNTER — TRANSFERRED RECORDS (OUTPATIENT)
Dept: HEALTH INFORMATION MANAGEMENT | Facility: CLINIC | Age: 60
End: 2020-07-31

## 2020-07-31 NOTE — TELEPHONE ENCOUNTER
[place] Dept of Trans - diabetic driving form completed and routed to MARIANNE Aquino for review and signature.

## 2020-08-01 NOTE — TELEPHONE ENCOUNTER
Form completed, signed, and faxed to [phone] and original mailed to patient per patient request.

## 2020-08-17 ENCOUNTER — TRANSFERRED RECORDS (OUTPATIENT)
Dept: HEALTH INFORMATION MANAGEMENT | Facility: CLINIC | Age: 60
End: 2020-08-17

## 2020-08-22 DIAGNOSIS — K31.84 GASTROPARESIS DUE TO DM (H): ICD-10-CM

## 2020-08-22 DIAGNOSIS — E11.43 GASTROPARESIS DUE TO DM (H): ICD-10-CM

## 2020-08-24 RX ORDER — METOCLOPRAMIDE 10 MG/1
TABLET ORAL
Qty: 180 TABLET | Refills: 0 | Status: SHIPPED | OUTPATIENT
Start: 2020-08-24 | End: 2020-12-01

## 2020-08-24 NOTE — TELEPHONE ENCOUNTER
"Requested Prescriptions   Pending Prescriptions Disp Refills     metoclopramide (REGLAN) 10 MG tablet [Pharmacy Med Name: METOCLOPRAMIDE HCL 10MG TABS] 180 tablet 0     Sig: TAKE ONE TABLET BY MOUTH TWICE A DAY        Antivertigo/Antiemetic Agents Passed - 8/22/2020 12:15 PM        Passed - Recent (12 mo) or future (30 days) visit within the authorizing provider's specialty     Patient has had an office visit with the authorizing provider or a provider within the authorizing providers department within the previous 12 mos or has a future within next 30 days. See \"Patient Info\" tab in inbasket, or \"Choose Columns\" in Meds & Orders section of the refill encounter.              Passed - Medication is active on med list        Passed - Patient is 18 years of age or older             "

## 2020-08-31 ENCOUNTER — HOSPITAL ENCOUNTER (OUTPATIENT)
Dept: PHYSICAL THERAPY | Facility: CLINIC | Age: 60
Setting detail: THERAPIES SERIES
End: 2020-08-31
Attending: PODIATRIST
Payer: COMMERCIAL

## 2020-08-31 PROCEDURE — 97162 PT EVAL MOD COMPLEX 30 MIN: CPT | Mod: GP | Performed by: PHYSICAL THERAPIST

## 2020-08-31 PROCEDURE — 97035 APP MDLTY 1+ULTRASOUND EA 15: CPT | Mod: GP | Performed by: PHYSICAL THERAPIST

## 2020-08-31 PROCEDURE — 97110 THERAPEUTIC EXERCISES: CPT | Mod: GP | Performed by: PHYSICAL THERAPIST

## 2020-08-31 NOTE — PROGRESS NOTES
08/31/20 1600   General Information   Type of Visit Initial OP Ortho PT Evaluation   Start of Care Date 08/31/20   Referring Physician Felix Watkins    Patient/Family Goals Statement Walking, Stairs, Standing for job duties    Orders Evaluate and Treat   Orders Comment Functional Conditioning Program, HEP   Date of Order 08/17/20   Certification Required? No  (HP)   Medical Diagnosis R Achilles Tendonopathy   Surgical/Medical history reviewed   (Menopause, DM, CTR x 2, C Section)   Precautions/Limitations no known precautions/limitations   General Information Comments No medsd, No braces   Body Part(s)   Body Part(s) Ankle/Foot   Presentation and Etiology   Pertinent history of current problem (include personal factors and/or comorbidities that impact the POC) Couple months ago, started to have pain    Impairments A. Pain;F. Decreased strength and endurance;E. Decreased flexibility;G. Impaired balance;H. Impaired gait;J. Burning   Functional Limitations perform required work activities;perform desired leisure / sports activities   Symptom Location Burning pain on outside of ankle, Sharp pain on back of foot.    How/Where did it occur From insidious onset   Onset date of current episode/exacerbation 08/17/20  (MD order date. )   Chronicity New   Pain rating (0-10 point scale)   (0-7/10)   Pain quality B. Dull;D. Burning   Frequency of pain/symptoms A. Constant   Pain/symptoms are: Worse during the day  (Later in the day)   Pain/symptoms exacerbated by B. Walking;G. Certain positions;K. Home tasks;L. Work tasks   Pain/symptoms eased by C. Rest   Progression of symptoms since onset: Worsened   Current / Previous Interventions   Diagnostic Tests: X-ray   X-ray Results Results   X-ray results Bone spur at end of achilles tendon.    Prior Level of Function   Functional Level Prior Comment Independent w/ ADL   Current Level of Function   Current Community Support Family/friend caregiver   Patient role/employment  history A. Employed   Employment Comments Behavior manager, [employer].    Living environment [place]   Fall Risk Screen   Fall screen completed by PT   Have you fallen 2 or more times in the past year? No   Have you fallen and had an injury in the past year? No   Timed Up and Go score (seconds) No balance issues and walks quickly into dept.    Is patient a fall risk? No   Abuse Screen (yes response referral indicated)   Feels Unsafe at Home or Work/School no   Feels Threatened by Someone no   Does Anyone Try to Keep You From Having Contact with Others or Doing Things Outside Your Home? no   Physical Signs of Abuse Present no   System Outcome Measures   Outcome Measures   (LEFS  28)   Functional Scales   Functional Scales OPTIMAL   Optimal (1=able to do without difficulty, 2=able to do with little difficulty, 3=able to do with moderate difficulty, 4=able to do with much difficulty, 5=unable to do, 9=NA) Activity 1;Activity 2;Activity 3   Activity 1 comment Walking - a lot of difficulty    Activity 2 comment Stairs - A lot of difficulty    Activity 3 comment Prolonged standing - Moderate difficuylty.   Ankle/Foot Objective Findings   Integumentary Incision d/t L ankle surgery in Dec 2019    Gait/Locomotion Pain back of heel w/ going Down>Up stairs.    Foot Position In Standing Slight pronation B    Ankle/Foot ROM Comment In supine L Foot Gas 22, Soleus 30; R Foot Gas 12, Soleus 20.    Ankle/Foot Strength Comments Unable to heel raise on L d/t surgery. R Pain w/attempted heel raises. R Hip Flex 5-, DF 5-. Pain w/ DF and Inv, Hip Abd 5- B.    Palpation Very tender both sides of Achilles tendon.    Ankle/Foot Flexibility Comments HS's -15 B.    Observation Swelling Lateral R Ankle,    Planned Therapy Interventions   Planned Therapy Interventions Comment HEP, MT, STM, S&S, Kinesiotape   Planned Modality Interventions   Planned Modality Interventions Comments US   Clinical Impression   Criteria for Skilled  Therapeutic Interventions Met yes, treatment indicated   PT Diagnosis Achilles tendon irritation d/t Bone spur at insertion   Influenced by the following impairments Pain, Loss of strength, Burning pain    Functional limitations due to impairments Work duties,    Clinical Presentation Evolving/Changing   Clinical Presentation Rationale LEFS, MMT, AROM, Palpation, Hx of L Ankle surgery d/t same process of bone spurring, DM   Clinical Decision Making (Complexity) Moderate complexity   Therapy Frequency 2 times/Week   Predicted Duration of Therapy Intervention (days/wks) 6 weeks, as needed for up to 12 visits.    Risk & Benefits of therapy have been explained Yes   Patient, Family & other staff in agreement with plan of care Yes   Clinical Impression Comments Achilles tendon irritation d/t Bone spur at insertion   Education Assessment   Preferred Learning Style Listening;Reading;Demonstration;Pictures/video   Barriers to Learning No barriers   ORTHO GOALS   PT Ortho Eval Goals 1;2;3;4   Ortho Goal 1   Goal Identifier 1   Goal Description STG: Pt will be able to walk 1/4 mile w/ moderate difficylty.    Target Date 10/05/20   Ortho Goal 2   Goal Identifier 2   Goal Description STG: Pt will be able to stand for prolonged periods w/ minimal difficulty    Target Date 10/05/20   Ortho Goal 3   Goal Identifier 3   Goal Description STG: Pt will be able to do stairs w/ moderate difficulty and less pain.    Target Date 10/05/20   Ortho Goal 4   Goal Identifier 4   Goal Description LTG: Pt will be independent w/HEP and self cares.    Target Date 10/12/20   Total Evaluation Time   PT Eval, Moderate Complexity Minutes (62024) 35   Leann Loja PT, MTC (#[id number])  [facility]           [phone]  Fax          [phone]  Appt #      [phone]

## 2020-09-02 ENCOUNTER — HOSPITAL ENCOUNTER (OUTPATIENT)
Dept: PHYSICAL THERAPY | Facility: CLINIC | Age: 60
Setting detail: THERAPIES SERIES
End: 2020-09-02
Attending: PODIATRIST
Payer: COMMERCIAL

## 2020-09-02 PROCEDURE — 97110 THERAPEUTIC EXERCISES: CPT | Mod: GP | Performed by: PHYSICAL THERAPIST

## 2020-09-02 PROCEDURE — 97035 APP MDLTY 1+ULTRASOUND EA 15: CPT | Mod: GP | Performed by: PHYSICAL THERAPIST

## 2020-09-09 ENCOUNTER — HOSPITAL ENCOUNTER (OUTPATIENT)
Dept: PHYSICAL THERAPY | Facility: CLINIC | Age: 60
Setting detail: THERAPIES SERIES
End: 2020-09-09
Attending: PODIATRIST
Payer: COMMERCIAL

## 2020-09-09 PROCEDURE — 97035 APP MDLTY 1+ULTRASOUND EA 15: CPT | Mod: GP | Performed by: PHYSICAL THERAPIST

## 2020-09-09 PROCEDURE — 97110 THERAPEUTIC EXERCISES: CPT | Mod: GP | Performed by: PHYSICAL THERAPIST

## 2020-09-16 ENCOUNTER — HOSPITAL ENCOUNTER (OUTPATIENT)
Dept: PHYSICAL THERAPY | Facility: CLINIC | Age: 60
Setting detail: THERAPIES SERIES
End: 2020-09-16
Attending: PODIATRIST
Payer: COMMERCIAL

## 2020-09-16 PROCEDURE — 97140 MANUAL THERAPY 1/> REGIONS: CPT | Mod: GP | Performed by: PHYSICAL THERAPIST

## 2020-09-16 PROCEDURE — 97110 THERAPEUTIC EXERCISES: CPT | Mod: GP | Performed by: PHYSICAL THERAPIST

## 2020-09-23 ENCOUNTER — HOSPITAL ENCOUNTER (OUTPATIENT)
Dept: PHYSICAL THERAPY | Facility: CLINIC | Age: 60
Setting detail: THERAPIES SERIES
End: 2020-09-23
Attending: PODIATRIST
Payer: COMMERCIAL

## 2020-09-23 PROCEDURE — 97140 MANUAL THERAPY 1/> REGIONS: CPT | Mod: GP | Performed by: PHYSICAL THERAPIST

## 2020-09-23 PROCEDURE — 97110 THERAPEUTIC EXERCISES: CPT | Mod: GP | Performed by: PHYSICAL THERAPIST

## 2020-09-29 DIAGNOSIS — J31.0 CHRONIC RHINITIS: ICD-10-CM

## 2020-09-29 RX ORDER — PSEUDOEPHEDRINE HCL 30 MG
60 TABLET ORAL EVERY 12 HOURS PRN
Qty: 360 TABLET | Refills: 1 | OUTPATIENT
Start: 2020-09-29

## 2020-09-29 NOTE — TELEPHONE ENCOUNTER
Requested Prescriptions   Pending Prescriptions Disp Refills     pseudoePHEDrine (SUDOGEST) 30 MG tablet 360 tablet 1     Sig: Take 2 tablets (60 mg) by mouth every 12 hours as needed for congestion       There is no refill protocol information for this order

## 2020-09-30 ENCOUNTER — HOSPITAL ENCOUNTER (OUTPATIENT)
Dept: PHYSICAL THERAPY | Facility: CLINIC | Age: 60
Setting detail: THERAPIES SERIES
End: 2020-09-30
Attending: PODIATRIST
Payer: COMMERCIAL

## 2020-09-30 PROCEDURE — 97140 MANUAL THERAPY 1/> REGIONS: CPT | Mod: GP | Performed by: PHYSICAL THERAPIST

## 2020-09-30 PROCEDURE — 97110 THERAPEUTIC EXERCISES: CPT | Mod: GP | Performed by: PHYSICAL THERAPIST

## 2020-09-30 RX ORDER — PSEUDOEPHEDRINE HCL 30 MG
60 TABLET ORAL EVERY 12 HOURS PRN
Qty: 360 TABLET | Refills: 1 | Status: SHIPPED | OUTPATIENT
Start: 2020-09-30 | End: 2021-01-28

## 2020-10-02 DIAGNOSIS — Z79.4 TYPE 2 DIABETES MELLITUS WITH CHRONIC KIDNEY DISEASE, WITH LONG-TERM CURRENT USE OF INSULIN, UNSPECIFIED CKD STAGE (H): ICD-10-CM

## 2020-10-02 DIAGNOSIS — E11.22 TYPE 2 DIABETES MELLITUS WITH CHRONIC KIDNEY DISEASE, WITH LONG-TERM CURRENT USE OF INSULIN, UNSPECIFIED CKD STAGE (H): ICD-10-CM

## 2020-10-02 LAB — HBA1C MFR BLD: 7.3 % (ref 0–5.6)

## 2020-10-02 PROCEDURE — 36415 COLL VENOUS BLD VENIPUNCTURE: CPT | Performed by: FAMILY MEDICINE

## 2020-10-02 PROCEDURE — 83036 HEMOGLOBIN GLYCOSYLATED A1C: CPT | Performed by: FAMILY MEDICINE

## 2020-10-07 ENCOUNTER — HOSPITAL ENCOUNTER (OUTPATIENT)
Dept: PHYSICAL THERAPY | Facility: CLINIC | Age: 60
Setting detail: THERAPIES SERIES
End: 2020-10-07
Attending: PODIATRIST
Payer: COMMERCIAL

## 2020-10-07 PROCEDURE — 97110 THERAPEUTIC EXERCISES: CPT | Mod: GP | Performed by: PHYSICAL THERAPIST

## 2020-10-07 PROCEDURE — 97140 MANUAL THERAPY 1/> REGIONS: CPT | Mod: GP | Performed by: PHYSICAL THERAPIST

## 2020-10-07 NOTE — PROGRESS NOTES
Outpatient Physical Therapy Progress Note     Patient: Bria Davis  : 1960    Beginning/End Dates of Reporting Period:  20 to 10/7/2020.  Total # of rx sessions:  7    Referring Provider: Felix Watkins Diagnosis: R Achilles Tendonopathy.      Client Self Report: Sore last couple of days, thinks overdoing the ex's of heel raises and tandem sway.  Also has been unable to tape d/t razor cut on ankle.     Objective Measurements:  Objective Measure: LEFS   Details: 20  Score 35.   INITIALlY:  28    Objective Measure: MMT:   Details: 10/7/20 Unable to do L heel raise d/t weakness. Others 5/5. 20 Pain w/ L Heel raise, Hip Flex, DF, Hip Abd 5 B, DF w/ Inv 5/5. INITIAlly:  Unable to heel raise on L d/t surgery. R Pain w/attempted heel raises. R Hip Flex 5-, DF 5-. Pain w/ DF and Inv, Hip Abd 5- B.     Objective Measure: AROM   Details: 10/7/20  In supine R Foot Gas 16, Soleus 28. INITIALLY:  In supine L Foot Gas 22, Soleus 30; R Foot Gas 12, Soleus 20.     Objective Measure: Palpation   Details: 10/7/20  Slight tenderness on medial side of ankle. INITIALlY:  Tender B sides of Achilles tendon.      Goals:  Goal Identifier 1   Goal Description STG: Pt will be able to walk 1/4 mile w/ moderate difficylty.  20  NOT MET    Target Date 10/05/20   Date Met      Progress:     Goal Identifier 2   Goal Description STG: Pt will be able to stand for prolonged periods w/ minimal difficulty  20  MET    Target Date 10/05/20   Date Met  20   Progress:     Goal Identifier 3   Goal Description STG: Pt will be able to do stairs w/ moderate difficulty and less pain. 20  NOT MET - Still a lot of difficulty.    Target Date 10/05/20   Date Met      Progress:     Goal Identifier 4   Goal Description LTG: Pt will be independent w/HEP and self cares.    Target Date 10/12/20   Date Met      Progress:     Progress Toward Goals:   Progress this reporting period: Pt met 1/3 STG's.   Progress  limited due to Continued Pain w/ other activities, but better than initially.     Plan:  Continue therapy per current plan of care.    Discharge:  No  Leann Loja PT, MTC (#[id number])  [facility]           [phone]  Fax          [phone]  Appt #      [phone]

## 2020-10-08 ENCOUNTER — MYC MEDICAL ADVICE (OUTPATIENT)
Dept: FAMILY MEDICINE | Facility: CLINIC | Age: 60
End: 2020-10-08

## 2020-10-08 NOTE — TELEPHONE ENCOUNTER
I would recommend she reach our to Dr. Mueller' office to see if they will order the MRI.  The last MRI she had was of her ankle (not her foot) on the left. I just want to make sure the appropriate test is ordered for her condition and that is best if ordered by orthopedics.      Kaia Elena M.D.

## 2020-10-14 ENCOUNTER — HOSPITAL ENCOUNTER (OUTPATIENT)
Dept: MRI IMAGING | Facility: CLINIC | Age: 60
Discharge: HOME | End: 2020-10-14
Attending: PODIATRIST | Admitting: PODIATRIST
Payer: COMMERCIAL

## 2020-10-14 DIAGNOSIS — M76.60 ACHILLES TENDINITIS: ICD-10-CM

## 2020-10-14 PROCEDURE — 73721 MRI JNT OF LWR EXTRE W/O DYE: CPT | Mod: RT

## 2020-10-14 PROCEDURE — 73721 MRI JNT OF LWR EXTRE W/O DYE: CPT | Mod: 26 | Performed by: RADIOLOGY

## 2020-10-29 ENCOUNTER — TELEPHONE (OUTPATIENT)
Dept: FAMILY MEDICINE | Facility: CLINIC | Age: 60
End: 2020-10-29

## 2020-10-29 NOTE — TELEPHONE ENCOUNTER
Panel Management Review      Patient has the following on her problem list:     Asthma review     ACT Total Scores 6/15/2020   ACT TOTAL SCORE -   ASTHMA ER VISITS -   ASTHMA HOSPITALIZATIONS -   ACT TOTAL SCORE (Goal Greater than or Equal to 20) 24   In the past 12 months, how many times did you visit the emergency room for your asthma without being admitted to the hospital? 0   In the past 12 months, how many times were you hospitalized overnight because of your asthma? 0      1. Is Asthma diagnosis on the Problem List? Yes    2. Is Asthma listed on Health Maintenance? Yes    3. Patient is due for:  AAP    Diabetes    ASA: Passed    Last A1C  Lab Results   Component Value Date    A1C 7.3 10/02/2020    A1C 8.2 06/15/2020    A1C 7.3 12/19/2019    A1C 9.3 10/31/2019    A1C 9.4 08/05/2019     A1C tested: Passed    Last LDL:    Lab Results   Component Value Date    CHOL 154 06/15/2020     Lab Results   Component Value Date    HDL 44 06/15/2020     Lab Results   Component Value Date    LDL 53 06/15/2020     Lab Results   Component Value Date    TRIG 284 06/15/2020     Lab Results   Component Value Date    CHOLHDLRATIO 4.0 03/13/2014     Lab Results   Component Value Date    NHDL 110 06/15/2020       Is the patient on a Statin? YES             Is the patient on Aspirin? YES    Medications     HMG CoA Reductase Inhibitors     simvastatin (ZOCOR) 40 MG tablet       Salicylates     aspirin (ASA) 81 MG EC tablet             Last three blood pressure readings:  BP Readings from Last 3 Encounters:   07/10/20 138/88   06/15/20 126/74   03/19/20 134/76       Date of last diabetes office visit: 10/2/2020     Tobacco History:     History   Smoking Status     Never Smoker   Smokeless Tobacco     Never Used           Composite cancer screening  Chart review shows that this patient is due/due soon for the following None  Summary:    Patient is due/failing the following:   Pap      Action needed:   Patient needs referral/order: pap  and flu    Type of outreach:    sent copy to abstract    Questions for provider review:    None                                                                                                                                    Dilma Dolan MA       Chart routed to Care Team .

## 2020-11-08 ENCOUNTER — HEALTH MAINTENANCE LETTER (OUTPATIENT)
Age: 60
End: 2020-11-08

## 2020-11-09 ENCOUNTER — TRANSFERRED RECORDS (OUTPATIENT)
Dept: HEALTH INFORMATION MANAGEMENT | Facility: CLINIC | Age: 60
End: 2020-11-09

## 2020-11-10 ENCOUNTER — VIRTUAL VISIT (OUTPATIENT)
Dept: FAMILY MEDICINE | Facility: OTHER | Age: 60
End: 2020-11-10
Payer: COMMERCIAL

## 2020-11-10 DIAGNOSIS — Z20.822 ENCOUNTER FOR LABORATORY TESTING FOR COVID-19 VIRUS: Primary | ICD-10-CM

## 2020-11-10 PROCEDURE — 99421 OL DIG E/M SVC 5-10 MIN: CPT | Performed by: PHYSICIAN ASSISTANT

## 2020-11-10 PROCEDURE — U0003 INFECTIOUS AGENT DETECTION BY NUCLEIC ACID (DNA OR RNA); SEVERE ACUTE RESPIRATORY SYNDROME CORONAVIRUS 2 (SARS-COV-2) (CORONAVIRUS DISEASE [COVID-19]), AMPLIFIED PROBE TECHNIQUE, MAKING USE OF HIGH THROUGHPUT TECHNOLOGIES AS DESCRIBED BY CMS-2020-01-R: HCPCS | Performed by: FAMILY MEDICINE

## 2020-11-10 NOTE — PROGRESS NOTES
"Date: 11/10/2020 08:09:18  Clinician: Adele Perdomo  Clinician NPI: [id number]  Patient: Bria Davis  Patient : 1960  Patient Address: [address] [place], [place] [address]  Patient Phone: [phone]  Visit Protocol: URI  Patient Summary:  Bria is a 60 year old ( : 1960 ) female who initiated a [facility] Visit for COVID-19 (Coronavirus) evaluation and screening. When asked the question \"Please sign me up to receive news, health information and promotions from [facility].\", Bria responded \"Yes\".    Bria states her symptoms started 1-2 days ago.   Her symptoms consist of rhinitis, myalgia, chills, malaise, a sore throat, a headache, a cough, and nasal congestion. She is experiencing mild difficulty breathing with activities but can speak normally in full sentences.   Symptom details     Nasal secretions: The color of her mucus is clear.    Cough: Bria coughs a few times an hour and her cough is more bothersome at night. Phlegm comes into her throat when she coughs. She does not believe her cough is caused by post-nasal drip. The color of the phlegm is clear.     Sore throat: Bria reports having moderate throat pain (4-6 on a 10 point pain scale), does not have exudate on her tonsils, and can swallow liquids. The lymph nodes in her neck are not enlarged. A rash has not appeared on the skin since the sore throat started.     Headache: She states the headache is moderate (4-6 on a 10 point pain scale).      Bria denies having vomiting, facial pain or pressure, teeth pain, ageusia, diarrhea, ear pain, wheezing, fever, enlarged lymph nodes, nausea, and anosmia. She also denies taking antibiotic medication in the past month and having recent facial or sinus surgery in the past 60 days.   Precipitating events  Within the past week, Bria has not been exposed to someone with strep throat. She has not recently been exposed to someone with influenza. Bria has been in close " contact with the following high risk individuals: people with asthma, heart disease or diabetes.   Pertinent COVID-19 (Coronavirus) information  Bria does not work or volunteer as healthcare worker or a . In the past 14 days, Bria has worked or volunteered at a hospital or a clinic. 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   Bria has not had a close contact with a laboratory-confirmed COVID-19 patient within 14 days of symptom onset.    Since December 2019, Bria has been tested for COVID-19 and has not had upper respiratory infection or influenza-like illness.      Result of COVID-19 test: Negative     Date of her COVID-19 test: 07/10/2020      Pertinent medical history  Bria typically gets a yeast infection when she takes antibiotics. She has used fluconazole (Diflucan) to treat previous yeast infections. 2 doses of fluconazole (Diflucan) has typically been needed for symptoms to resolve in the past.  Bria needs a return to work/school note.   Weight: 260 lbs   Bria does not smoke or use smokeless tobacco.   Additional information as reported by the patient (free text): The hospital that I was in last week was for my sisters spine surgery.  I was in the waiting room at the emergency entrance for 11 hours.  I went in and out of the emergency entrance many times.   Weight: 260 lbs    MEDICATIONS: Ventolin HFA inhalation, Advair Diskus inhalation, Citrus Calcium-Vitamin D3 oral, Vitamin C With Savanna Hips oral, Iron (ferrous sulfate) oral, One Daily Plus Minerals oral, Ozempic subcutaneous, Toujeo Max U-300 SoloStar subcutaneous, Humalog KwikPen Insulin subcutaneous, metoprolol succinate oral, Sudogest oral, losartan oral, esomeprazole magnesium oral, metoclopramide oral, simvastatin oral, Singulair oral, metformin oral,  ALLERGIES: lisinopril  Clinician Response:  Dear Bria,   Your symptoms show that you may have coronavirus (COVID-19). This illness can cause fever, cough and trouble breathing. Many people get a mild case and get better on their own. Some people can get very sick.  Based on the symptoms you have shared, I would like you to be re-checked in 2 to 3 days. Please call your family clinic to set up a video or phone visit.  Will I be tested for COVID-19?  We would like to test you for this virus.   Please call [phone] to schedule your visit. Explain that you were referred by [facility] to have a COVID-19 test. Be ready to share your [facility] visit ID number.   * If you need to schedule in [place] or [place] please call [phone] or for [employer] employees please call [phone].    The following will serve as your written order for this COVID Test, ordered by me, for the indication of suspected COVID [Z20.828]: The test will be ordered in [facility], our electronic health record, after you are scheduled. It will show as ordered and authorized by Jesus Weiner MD.  Order: COVID-19 (Coronavirus) PCR for SYMPTOMATIC testing from [facility].   1.When it's time for your COVID test:   Stay at least 6 feet away from others. (If someone will drive you to your test, stay in the backseat, as far away from the  as you can.)   Cover your mouth and nose with a mask, tissue or washcloth.  Go straight to the testing site. Don't make any stops on the way there or back.      2.Starting now: Stay home and away from others (self-isolate) until:   You've had no fever---and no medicine that reduces fever---for one full day (24 hours). And...   Your other symptoms have gotten better. For example, your cough or breathing has improved. And...   At least 10 days have passed since your symptoms started.       During this time, don't leave the house except for testing or medical care.   Stay in your own room, even for meals. Use your own  "bathroom if you can.   Stay away from others in your home. No hugging, kissing or shaking hands. No visitors.  Don't go to work, school or anywhere else.    Clean \"high touch\" surfaces often (doorknobs, counters, handles, etc.). Use a household cleaning spray or wipes. You'll find a full list of  on the EPA website: www.epa.gov/pesticide-registration/list-n-disinfectants-use-against-sars-cov-2.   Cover your mouth and nose with a mask, tissue or washcloth to avoid spreading germs.  Wash your hands and face often. Use soap and water.  Caregivers in these groups are at risk for severe illness due to COVID-19:  o People 65 years and older  o People who live in a nursing home or long-term care facility  o People with chronic disease (lung, heart, cancer, diabetes, kidney, liver, immunologic)   o People who have a weakened immune system, including those who:   Are in cancer treatment  Take medicine that weakens the immune system, such as corticosteroids  Had a bone marrow or organ transplant  Have an immune deficiency  Have poorly controlled HIV or AIDS  Are obese (body mass index of 40 or higher)  Smoke regularly   o Caregivers should wear gloves while washing dishes, handling laundry and cleaning bedrooms and bathrooms.  o Use caution when washing and drying laundry: Don't shake dirty laundry, and use the warmest water setting that you can.  o For more tips, go to www.cdc.gov/coronavirus/2019-ncov/downloads/10Things.pdf.      How can I take care of myself?   Get lots of rest. Drink extra fluids (unless a doctor has told you not to)   Take Tylenol (acetaminophen) for fever or pain. If you have liver or kidney problems, ask your family doctor if it's okay to take Tylenol.   Adults can take either:    650 mg (two 325 mg pills) every 4 to 6 hours, or...   1,000 mg (two 500 mg pills) every 8 hours as needed.    Note: Don't take more than 3,000 mg in one day. Acetaminophen is found in many medicines (both prescribed " and over-the-counter medicines). Read all labels to be sure you don't take too much.   For children, check the Tylenol bottle for the right dose. The dose is based on the child's age or weight.    If you have other health problems (like cancer, heart failure, an organ transplant or severe kidney disease): Call your specialty clinic if you don't feel better in the next 2 days.       Know when to call 911. Emergency warning signs include:    Trouble breathing or shortness of breath Pain or pressure in the chest that doesn't go away Feeling confused like you haven't felt before, or not being able to wake up Bluish-colored lips or face  Where can I get more information?    [facility] -- About COVID-19: [email or web address]   CDC -- What to Do If You're Sick: www.cdc.gov/coronavirus/2019-ncov/about/steps-when-sick.html   CDC -- Ending Home Isolation: www.cdc.gov/coronavirus/2019-ncov/hcp/disposition-in-home-patients.html   [facility] -- Caring for Someone: www.cdc.gov/coronavirus/2019-ncov/if-you-are-sick/care-for-someone.html   [facility] -- Interim Guidance for Hospital Discharge to Home: www.health.[facility].[place].us/diseases/coronavirus/hcp/hospdischarge.pdf   [facility] clinical trials (COVID-19 research studies): clinicalaffairs.[facility]-clinical-trials    Below are the COVID-19 hotlines at the [facility] of Health ([facility]). Interpreters are available.    For health questions: Call 122-272-2096 or 1-383.435.6301 (7 a.m. to 7 p.m.) For questions about schools and childcare: Call [phone] or 1-290.989.6012 (7 a.m. to 7 p.m.)       Diagnosis: Contact with and (suspected) exposure to other viral communicable diseases  Diagnosis ICD: Z20.828

## 2020-11-11 LAB
SARS-COV-2 RNA SPEC QL NAA+PROBE: ABNORMAL
SPECIMEN SOURCE: ABNORMAL

## 2020-11-16 ENCOUNTER — E-VISIT (OUTPATIENT)
Dept: FAMILY MEDICINE | Facility: CLINIC | Age: 60
End: 2020-11-16
Payer: COMMERCIAL

## 2020-11-16 ENCOUNTER — MYC MEDICAL ADVICE (OUTPATIENT)
Dept: FAMILY MEDICINE | Facility: CLINIC | Age: 60
End: 2020-11-16

## 2020-11-16 DIAGNOSIS — U07.1 CLINICAL DIAGNOSIS OF COVID-19: Primary | ICD-10-CM

## 2020-11-16 PROCEDURE — 99421 OL DIG E/M SVC 5-10 MIN: CPT | Performed by: FAMILY MEDICINE

## 2020-11-16 RX ORDER — ALBUTEROL SULFATE 90 UG/1
2 AEROSOL, METERED RESPIRATORY (INHALATION) EVERY 4 HOURS PRN
Qty: 1 INHALER | Refills: 0 | Status: SHIPPED | OUTPATIENT
Start: 2020-11-16 | End: 2021-04-14

## 2020-11-16 NOTE — TELEPHONE ENCOUNTER
Needs triage or an e-visit/virtual visit, etc if this can't be handled by the RN.    Kaia Elena M.D.

## 2020-11-16 NOTE — PATIENT INSTRUCTIONS
For more information about COVID19 and options for caring for yourself at home, please visit the CDC website at https://www.cdc.gov/coronavirus/2019-ncov/about/steps-when-sick.html  For more options for care at [facility], please visit our website at [email or web address]   As you recover from COVID-19    Patients who have symptoms (cough, fever, or shortness of breath), need to isolate for 10 days from when symptoms started AND 72 hours after fever resolves (without fever reducing medications) AND improvement of respiratory symptoms (whichever is longer).    During this time:    Isolate yourself at home (in own room/own bathroom if possible)    Do not allow any visitors    Do not go to work or school    Do not go to Sh[employer],  centers, shopping, or other public places    Do not shake hands    Avoid close and intimate contact with others (hugging, kissing)    Follow CDC recommendations for household cleaning of frequently touched services    After the initial 10 days, continue to isolate yourself from household members as much as possible. To continue decrease the risk of community spread and exposure, you and any members of your household should limit activities in public for 14 days after starting home isolation.     You can reference the following CDC link for helpful home isolation/care tips:  https://www.cdc.gov/coronavirus/2019-ncov/downloads/10Things.pdf    Protect Others:    Cover your mouth and nose with a mask, disposable tissue or wash cloth to avoid spreading germs.    Wash your hands and face often. Use soap and water    Call Back If: Breathing difficulty develops or you become worse.      For more information about COVID19 and options for caring for yourself at home, please visit the CDC website at https://www.cdc.gov/coronavirus/2019-ncov/about/steps-when-sick.html  For more options for care at [facility], please visit our website at  [email or web address]    For information about [facility] COVID-19 Clinical Trials, please visit [email or web address]    For more information, please use the [place] Department of Health COVID-19 Website: https://www.health.[facility].us/diseases/coronavirus/index.html  [place] Department of Health ([facility]) COVID-19 Hotlines (Interpreters  available):      Health questions: Phone Number: 274.680.7314 or 1-116.897.9926 and Hours: 7 a.m. to 7 p.m.    Schools and  questions: Phone Number: [phone] or 1-455.495.4805 and Hours 7 a.m. to 7 p.m.

## 2020-11-16 NOTE — TELEPHONE ENCOUNTER
Pt states that she has been awaiting all day for a call back.   I did give pt message below from MD, pt has not been triaged, but will send an eivisit message to Dr. Elena now.

## 2020-11-17 DIAGNOSIS — E11.22 TYPE 2 DIABETES MELLITUS WITH CHRONIC KIDNEY DISEASE, WITH LONG-TERM CURRENT USE OF INSULIN, UNSPECIFIED CKD STAGE (H): ICD-10-CM

## 2020-11-17 DIAGNOSIS — Z79.4 TYPE 2 DIABETES MELLITUS WITH CHRONIC KIDNEY DISEASE, WITH LONG-TERM CURRENT USE OF INSULIN, UNSPECIFIED CKD STAGE (H): ICD-10-CM

## 2020-11-17 NOTE — TELEPHONE ENCOUNTER
"Requested Prescriptions   Pending Prescriptions Disp Refills     insulin lispro (HUMALOG KWIKPEN) 100 UNIT/ML (1 unit dial) KWIKPEN [Pharmacy Med Name: HUMALOG KWIKPEN 100UNIT/ML SOPN]  1     Sig: INJECT 32 UNITS UNDER THE SKIN WITH EACH MEAL       Short Acting Insulin Protocol Passed - 11/17/2020  1:45 PM        Passed - Serum creatinine on file in past 12 months     Recent Labs   Lab Test 06/15/20  0902 06/07/18  0755 06/07/18  0755   CR 0.59   < >  --    CREAT  --   --  0.6    < > = values in this interval not displayed.       Ok to refill medication if creatinine is low          Passed - HgbA1C in past 3 or 6 months     If HgbA1C is 8 or greater, it needs to be on file within the past 3 months.  If less than 8, must be on file within the past 6 months.     Recent Labs   Lab Test 10/02/20  0705   A1C 7.3*             Passed - Medication is active on med list        Passed - Patient is age 18 or older        Passed - Recent (6 mo) or future (30 days) visit within the authorizing provider's specialty     Patient had office visit in the last 6 months or has a visit in the next 30 days with authorizing provider or within the authorizing provider's specialty.  See \"Patient Info\" tab in inbasket, or \"Choose Columns\" in Meds & Orders section of the refill encounter.                 "

## 2020-11-19 ENCOUNTER — E-VISIT (OUTPATIENT)
Dept: FAMILY MEDICINE | Facility: CLINIC | Age: 60
End: 2020-11-19
Payer: COMMERCIAL

## 2020-11-19 DIAGNOSIS — R11.0 NAUSEA: Primary | ICD-10-CM

## 2020-11-19 DIAGNOSIS — U07.1 2019 NOVEL CORONAVIRUS DISEASE (COVID-19): ICD-10-CM

## 2020-11-19 RX ORDER — ONDANSETRON 4 MG/1
4 TABLET, FILM COATED ORAL EVERY 8 HOURS PRN
Qty: 20 TABLET | Refills: 0 | Status: SHIPPED | OUTPATIENT
Start: 2020-11-19 | End: 2020-12-09

## 2020-11-19 RX ORDER — INSULIN LISPRO 100 [IU]/ML
INJECTION, SOLUTION INTRAVENOUS; SUBCUTANEOUS
Qty: 45 ML | Refills: 1 | Status: SHIPPED | OUTPATIENT
Start: 2020-11-19 | End: 2021-03-16

## 2020-11-19 NOTE — LETTER
[facility]  [address]  [facility] [address]  Phone: [phone]    November 19, 2020        Bria Davis  [address] [facility] [address]          To whom it may concern:    RE: Bria Davis    Patient was seen and treated today at our clinic for a virtual visit.  Recent diagnosis of COVID 19 and supposed to be returning to work.  Still having some significant symptoms. With her other medical conditions, I would recommend she not return to work until 11/30/2020 and at that time without restrictions.    Please contact me for questions or concerns.      Sincerely,      Kaia Elena MD

## 2020-11-29 DIAGNOSIS — E11.43 GASTROPARESIS DUE TO DM (H): ICD-10-CM

## 2020-11-29 DIAGNOSIS — K31.84 GASTROPARESIS DUE TO DM (H): ICD-10-CM

## 2020-11-29 DIAGNOSIS — J45.20 MILD INTERMITTENT ASTHMA, UNCOMPLICATED: ICD-10-CM

## 2020-11-30 NOTE — TELEPHONE ENCOUNTER
"Requested Prescriptions   Pending Prescriptions Disp Refills     montelukast (SINGULAIR) 10 MG tablet [Pharmacy Med Name: MONTELUKAST SODIUM 10MG TABS] 90 tablet 1     Sig: TAKE ONE TABLET BY MOUTH AT BEDTIME       Leukotriene Inhibitors Protocol Passed - 11/29/2020 11:52 AM        Passed - Patient is age 12 or older     If patient is under 16, ok to refill using age based dosing.           Passed - Asthma control assessment score within normal limits in last 6 months     Please review ACT score.           Passed - Medication is active on med list        Passed - Recent (6 mo) or future (30 days) visit within the authorizing provider's specialty     Patient had office visit in the last 6 months or has a visit in the next 30 days with authorizing provider or within the authorizing provider's specialty.  See \"Patient Info\" tab in inbasket, or \"Choose Columns\" in Meds & Orders section of the refill encounter.               metoclopramide (REGLAN) 10 MG tablet [Pharmacy Med Name: METOCLOPRAMIDE HCL 10MG TABS] 180 tablet 0     Sig: TAKE ONE TABLET BY MOUTH TWICE A DAY        Antivertigo/Antiemetic Agents Passed - 11/29/2020 11:52 AM        Passed - Recent (12 mo) or future (30 days) visit within the authorizing provider's specialty     Patient has had an office visit with the authorizing provider or a provider within the authorizing providers department within the previous 12 mos or has a future within next 30 days. See \"Patient Info\" tab in inbasket, or \"Choose Columns\" in Meds & Orders section of the refill encounter.              Passed - Medication is active on med list        Passed - Patient is 18 years of age or older             "

## 2020-12-01 RX ORDER — MONTELUKAST SODIUM 10 MG/1
TABLET ORAL
Qty: 90 TABLET | Refills: 1 | Status: SHIPPED | OUTPATIENT
Start: 2020-12-01 | End: 2021-04-14

## 2020-12-01 RX ORDER — METOCLOPRAMIDE 10 MG/1
TABLET ORAL
Qty: 180 TABLET | Refills: 1 | Status: SHIPPED | OUTPATIENT
Start: 2020-12-01 | End: 2021-04-14

## 2020-12-08 ENCOUNTER — TRANSFERRED RECORDS (OUTPATIENT)
Dept: HEALTH INFORMATION MANAGEMENT | Facility: CLINIC | Age: 60
End: 2020-12-08

## 2020-12-09 ENCOUNTER — OFFICE VISIT (OUTPATIENT)
Dept: FAMILY MEDICINE | Facility: CLINIC | Age: 60
End: 2020-12-09
Payer: COMMERCIAL

## 2020-12-09 VITALS
SYSTOLIC BLOOD PRESSURE: 118 MMHG | OXYGEN SATURATION: 98 % | TEMPERATURE: 98.7 F | WEIGHT: 257 LBS | BODY MASS INDEX: 45.53 KG/M2 | HEART RATE: 91 BPM | RESPIRATION RATE: 16 BRPM | DIASTOLIC BLOOD PRESSURE: 66 MMHG

## 2020-12-09 DIAGNOSIS — M77.31 HEEL SPUR, RIGHT: ICD-10-CM

## 2020-12-09 DIAGNOSIS — T36.95XA ANTIBIOTIC-INDUCED YEAST INFECTION: ICD-10-CM

## 2020-12-09 DIAGNOSIS — Z79.4 TYPE 2 DIABETES MELLITUS WITH CHRONIC KIDNEY DISEASE, WITH LONG-TERM CURRENT USE OF INSULIN, UNSPECIFIED CKD STAGE (H): ICD-10-CM

## 2020-12-09 DIAGNOSIS — E11.22 TYPE 2 DIABETES MELLITUS WITH CHRONIC KIDNEY DISEASE, WITH LONG-TERM CURRENT USE OF INSULIN, UNSPECIFIED CKD STAGE (H): ICD-10-CM

## 2020-12-09 DIAGNOSIS — Z01.818 PREOP GENERAL PHYSICAL EXAM: Primary | ICD-10-CM

## 2020-12-09 DIAGNOSIS — B37.9 ANTIBIOTIC-INDUCED YEAST INFECTION: ICD-10-CM

## 2020-12-09 PROBLEM — I20.89 OTHER FORMS OF ANGINA PECTORIS (H): Status: RESOLVED | Noted: 2019-09-06 | Resolved: 2020-12-09

## 2020-12-09 LAB
ANION GAP SERPL CALCULATED.3IONS-SCNC: 6 MMOL/L (ref 3–14)
BUN SERPL-MCNC: 12 MG/DL (ref 7–30)
CALCIUM SERPL-MCNC: 9.3 MG/DL (ref 8.5–10.1)
CHLORIDE SERPL-SCNC: 105 MMOL/L (ref 94–109)
CO2 SERPL-SCNC: 29 MMOL/L (ref 20–32)
CREAT SERPL-MCNC: 0.64 MG/DL (ref 0.52–1.04)
GFR SERPL CREATININE-BSD FRML MDRD: >90 ML/MIN/{1.73_M2}
GLUCOSE SERPL-MCNC: 64 MG/DL (ref 70–99)
HBA1C MFR BLD: 7.9 % (ref 0–5.6)
HGB BLD-MCNC: 13.1 G/DL (ref 11.7–15.7)
POTASSIUM SERPL-SCNC: 3.8 MMOL/L (ref 3.4–5.3)
SODIUM SERPL-SCNC: 140 MMOL/L (ref 133–144)

## 2020-12-09 PROCEDURE — 36415 COLL VENOUS BLD VENIPUNCTURE: CPT | Performed by: FAMILY MEDICINE

## 2020-12-09 PROCEDURE — 80048 BASIC METABOLIC PNL TOTAL CA: CPT | Performed by: FAMILY MEDICINE

## 2020-12-09 PROCEDURE — 85018 HEMOGLOBIN: CPT | Performed by: FAMILY MEDICINE

## 2020-12-09 PROCEDURE — 83036 HEMOGLOBIN GLYCOSYLATED A1C: CPT | Performed by: FAMILY MEDICINE

## 2020-12-09 PROCEDURE — 99215 OFFICE O/P EST HI 40 MIN: CPT | Performed by: FAMILY MEDICINE

## 2020-12-09 RX ORDER — L.ACIDOPH/B.ANIMALIS/B.LONGUM 15B CELL
1 CAPSULE ORAL DAILY
COMMUNITY
Start: 2020-12-09 | End: 2021-04-20

## 2020-12-09 RX ORDER — FLUCONAZOLE 150 MG/1
TABLET ORAL
Qty: 3 TABLET | Refills: 0 | Status: SHIPPED | OUTPATIENT
Start: 2020-12-09 | End: 2021-01-14

## 2020-12-09 NOTE — PROGRESS NOTES
[facility]  [address] [address]  Phone: [phone]  Primary Provider: Kaia Elena  Pre-op Performing Provider: MARTINE CURRY    PREOPERATIVE EVALUATION:  Today's date: 12/9/2020    Bria Davis is a 60 year old female who presents for a preoperative evaluation.    Surgical Information:  Surgery/Procedure: Bone Spur reduction on right heel  Surgery Location: [facility]  Surgeon: Dr. Nico Wade  Surgery Date: 12/16/2020  Time of Surgery: TBD  Where patient plans to recover: At home with family  Fax number for surgical facility: [phone]    Type of Anesthesia Anticipated: General    Subjective     HPI related to upcoming procedure:   Nine months of right heel foot pain.  Tried Physical therapy and tylenol and ibuprofen.  Tried rest for 3 weeks but pain started immediately after starting to use it again.      Preop Questions 12/9/2020   1. Have you ever had a heart attack or stroke? No   2. Have you ever had surgery on your heart or blood vessels, such as a stent placement, a coronary artery bypass, or surgery on an artery in your head, neck, heart, or legs? No   3. Do you have chest pain with activity? No   4. Do you have a history of  heart failure? No   5. Do you currently have a cold, bronchitis or symptoms of other infection? No   6. Do you have a cough, shortness of breath, or wheezing? No   7. Do you or anyone in your family have previous history of blood clots? No   8. Do you or does anyone in your family have a serious bleeding problem such as prolonged bleeding following surgeries or cuts? No   9. Have you ever had problems with anemia or been told to take iron pills? YES - in 2018 that resolved with iron   10. Have you had any abnormal blood loss such as black, tarry or bloody stools, or abnormal vaginal bleeding? No   11. Have you ever had a blood transfusion? No   12. Are you willing to have a blood transfusion if it is  medically needed before, during, or after your surgery? Yes   13. Have you or any of your relatives ever had problems with anesthesia? No   14. Do you have sleep apnea, excessive snoring or daytime drowsiness? No   15. Do you have any artifical heart valves or other implanted medical devices like a pacemaker, defibrillator, or continuous glucose monitor? No   16. Do you have artificial joints? No   17. Are you allergic to latex? No   18. Is there any chance that you may be pregnant? No     Health Care Directive:  Patient does not have a Health Care Directive or Living Will: Discussed advance care planning with patient; information given to patient to review.    Preoperative Review of :   reviewed - no record of controlled substances prescribed.      Status of Chronic Conditions:  DIABETES - Patient has a longstanding history of DiabetesType Type II . Patient is being treated with diet, oral agents and insulin injections and denies significant side effects. Control has been good. Complicating factors include but are not limited to: hypertension, hyperlipidemia and morbid obesity .   Fasting usually 140-150.    HYPERLIPIDEMIA - Patient has a long history of significant Hyperlipidemia requiring medication for treatment with recent good control. Patient reports no problems or side effects with the medication.     HYPERTENSION - Patient has longstanding history of HTN , currently denies any symptoms referable to elevated blood pressure. Specifically denies chest pain, palpitations, dyspnea, orthopnea, PND or peripheral edema. Blood pressure readings have been in normal range. Current medication regimen is as listed below. Patient denies any side effects of medication.     ASTHMA: moderate persistent well controlled with advair and rare albuterol use.    Review of Systems  CONSTITUTIONAL: NEGATIVE for fever, chills, change in weight  INTEGUMENTARY/SKIN: NEGATIVE for worrisome rashes, moles or lesions  EYES: NEGATIVE  for vision changes or irritation  ENT/MOUTH: NEGATIVE for ear, mouth and throat problems  RESP: NEGATIVE for significant cough or SOB  BREAST: NEGATIVE for masses, tenderness or discharge  CV: NEGATIVE for chest pain, palpitations or peripheral edema  GI: NEGATIVE for nausea, abdominal pain, heartburn, or change in bowel habits  : NEGATIVE for frequency, dysuria, or hematuria  MUSCULOSKELETAL: NEGATIVE for significant arthralgias or myalgia  NEURO: NEGATIVE for weakness, dizziness or paresthesias  ENDOCRINE: NEGATIVE for temperature intolerance, skin/hair changes  HEME: NEGATIVE for bleeding problems  PSYCHIATRIC: NEGATIVE for changes in mood or affect    Patient Active Problem List    Diagnosis Date Noted     Nonobstructive atherosclerosis of coronary artery 09/20/2019     Priority: Medium     Status post coronary angiogram 09/12/2019     Priority: Medium     Other forms of angina pectoris (H) 09/06/2019     Priority: Medium     Added automatically from request for surgery [id number]       Benign essential hypertension 08/25/2016     Priority: Medium     Type 2 diabetes mellitus with kidney complication, with long-term current use of insulin (H) 10/21/2015     Priority: Medium     Morbid obesity (H) 10/21/2015     Priority: Medium     Hyperlipidemia LDL goal <70 03/26/2011     Priority: Medium     Microalbuminuria 01/06/2011     Priority: Medium     Lumbar radiculopathy 11/30/2010     Priority: Medium     Enthesopathy 11/27/2007     Priority: Medium     Problem list name updated by automated process. Provider to review       Restless legs syndrome (RLS) 04/12/2007     Priority: Medium     Esophageal reflux 02/07/2006     Priority: Medium     Allergic rhinitis 02/07/2006     Priority: Medium     Problem list name updated by automated process. Provider to review       Mild intermittent asthma 11/10/2005     Priority: Medium      Past Medical History:   Diagnosis Date     Asthma      Diabetes mellitus (H)       Esophageal reflux      Obese      Other chronic sinusitis      PONV (postoperative nausea and vomiting)      Past Surgical History:   Procedure Laterality Date     COLONOSCOPY  1/31/2002     COLONOSCOPY  10/26/2012    Procedure: COLONOSCOPY;  Colonoscopy;  Surgeon: Margret Sales MD;  Location: [facility]     COLONOSCOPY N/A 11/8/2019    Procedure: COLONOSCOPY, WITH POLYPECTOMY AND BIOPSY;  Surgeon: Ariel Heck MD;  Location: [facility]     CV LEFT HEART CATH N/A 9/12/2019    Procedure: Left Heart Cath;  Surgeon: Mike Sanon MD;  Location:  HEART CARDIAC CATH LAB     CV LEFT VENTRICULOGRAM N/A 9/12/2019    Procedure: Left Ventriculogram;  Surgeon: Mike Sanon MD;  Location:  HEART CARDIAC CATH LAB     ESOPHAGOSCOPY, GASTROSCOPY, DUODENOSCOPY (EGD), COMBINED N/A 11/8/2019    Procedure: ESOPHAGOGASTRODUODENOSCOPY, WITH BIOPSY;  Surgeon: Ariel Heck MD;  Location: [facility]     GYN SURGERY      Hydroablation 2007, polyp removal 2018     INJECT EPIDURAL LUMBAR  12/7/2010    INJECT EPIDURAL LUMBAR performed by GENERIC ANESTHESIA PROVIDER at [facility]     INJECT EPIDURAL LUMBAR  1/17/2011    INJECT EPIDURAL LUMBAR performed by GENERIC ANESTHESIA PROVIDER at [facility]     RELEASE CARPAL TUNNEL  6/19/2012    Procedure: RELEASE CARPAL TUNNEL;  Right Carpal Tunnel Release;  Surgeon: Mike Sparrow MD;  Location: [facility]     RELEASE CARPAL TUNNEL  11/9/2012    Procedure: RELEASE CARPAL TUNNEL;  Left Carpal Tunnel Release;  Surgeon: Mike Sparrow MD;  Location: [facility]     REPAIR TENDON ACHILLES Left 12/26/2019    Procedure: Left Foot: Achilles Tendon Repair/Remodel/Reattachment; calcaneal prominence removal;  Surgeon: Felix Watkins DPM;  Location: [facility]     SURGICAL HISTORY OF -   4/10/2000    bilateral total ethmoidectomies, bilateral maxillary antrostomies, bilateral SMR of inferior turbinates, reduction of lt turbinate porter bullosa     Current Outpatient Medications   Medication Sig Dispense  Refill     ADVAIR DISKUS 100-50 MCG/DOSE inhaler INHALE ONE PUFF BY MOUTH TWICE A  Inhaler 1     albuterol (PROAIR HFA/PROVENTIL HFA/VENTOLIN HFA) 108 (90 Base) MCG/ACT inhaler Inhale 2 puffs into the lungs every 4 hours as needed for shortness of breath / dyspnea or wheezing May substitute the equivalent medication per insurance preference. 1 Inhaler 0     albuterol (PROAIR HFA/PROVENTIL HFA/VENTOLIN HFA) 108 (90 Base) MCG/ACT inhaler Inhale 2 puffs into the lungs every 6 hours 1 Inhaler 11     aspirin (ASA) 81 MG EC tablet Take 1 tablet (81 mg) by mouth daily 1 tablet 0     blood glucose (ACCU-CHEK GUIDE) test strip Use to test blood sugar 3 times daily or as directed. 300 strip 11     blood glucose monitoring (ACCU-CHEK FASTCLIX) lancets 1 each by In Vitro route 4 times daily Use to test blood sugar 4 times daily or as directed. 408 each 3     Calcium Carb-Cholecalciferol (CALCIUM-VITAMIN D) 600-400 MG-UNIT TABS Take 1 tablet by mouth daily       esomeprazole (NEXIUM) 40 MG DR capsule Take 1 capsule (40 mg) by mouth every morning (before breakfast) 90 capsule 3     ferrous sulfate 140 (45 Fe) MG TBCR CR tablet Take 140 mg by mouth daily       Insulin Glargine, 2 Unit Dial, (TOUJEO MAX SOLOSTAR) 300 UNIT/ML SOPN Inject 100 Units Subcutaneous daily To get 3 months worth of insulin 27 mL 3     insulin lispro (HUMALOG KWIKPEN) 100 UNIT/ML (1 unit dial) KWIKPEN INJECT 32 UNITS UNDER THE SKIN WITH EACH MEAL 45 mL 1     insulin pen needle (BD PEDRO LUIS U/F) 32G X 4 MM miscellaneous Use 4 daily as directed. 400 each 11     losartan (COZAAR) 100 MG tablet Take 1 tablet (100 mg) by mouth daily 90 tablet 1     metFORMIN (GLUCOPHAGE-XR) 500 MG 24 hr tablet Take 2 tablets (1,000 mg) by mouth 2 times daily (with meals) 360 tablet 1     metoclopramide (REGLAN) 10 MG tablet TAKE ONE TABLET BY MOUTH TWICE A  tablet 1     metoprolol succinate ER (TOPROL-XL) 25 MG 24 hr tablet Take 1 tablet (25 mg) by mouth daily 90 tablet  3     montelukast (SINGULAIR) 10 MG tablet TAKE ONE TABLET BY MOUTH AT BEDTIME 90 tablet 1     Multiple Vitamins-Minerals (MULTIVITAMIN ADULTS 50+) TABS Take 1 tablet by mouth daily       pseudoePHEDrine (SUDOGEST) 30 MG tablet Take 2 tablets (60 mg) by mouth every 12 hours as needed for congestion 360 tablet 1     Semaglutide,0.25 or 0.5MG/DOS, (OZEMPIC, 0.25 OR 0.5 MG/DOSE,) 2 MG/1.5ML SOPN INJECT 0.5MG UNDER THE SKIN ONCE A WEEK 4.5 mL 3     simvastatin (ZOCOR) 40 MG tablet Take 1 tablet (40 mg) by mouth At Bedtime at bedtime. 90 tablet 1       Allergies   Allergen Reactions     Lisinopril Cough     Tape [Adhesive Tape] Rash        Social History     Tobacco Use     Smoking status: Never Smoker     Smokeless tobacco: Never Used   Substance Use Topics     Alcohol use: No     History   Drug Use No         Objective     /66   Pulse 91   Temp 98.7  F (37.1  C) (Tympanic)   Resp 16   Wt 116.6 kg (257 lb)   LMP 01/14/2016 (Approximate)   SpO2 98%   BMI 45.53 kg/m      Physical Exam    GENERAL APPEARANCE: healthy, alert and no distress     EYES: EOMI, PERRL     HENT: ear canals and TM's normal and nose and mouth without ulcers or lesions     NECK: no adenopathy, no asymmetry, masses, or scars and thyroid normal to palpation     RESP: lungs clear to auscultation - no rales, rhonchi or wheezes     CV: regular rates and rhythm, normal S1 S2, no S3 or S4 and no murmur, click or rub     ABDOMEN:  soft, nontender, no HSM or masses and bowel sounds normal     MS: extremities normal- no gross deformities noted, no evidence of inflammation in joints, FROM in all extremities.     SKIN: no suspicious lesions or rashes     NEURO: Normal strength and tone, sensory exam grossly normal, mentation intact and speech normal     PSYCH: mentation appears normal. and affect normal/bright     LYMPHATICS: No cervical adenopathy    Recent Labs   Lab Test 10/02/20  0705 06/15/20  0902 12/19/19  0925 10/31/19  0850 09/12/19  0652  09/12/19  0655   HGB  --   --  13.4 11.3*  --  11.1*   PLT  --   --  325 339  --  304   INR  --   --   --   --   --  0.96   NA  --  135 136 138   < > 140   POTASSIUM  --  4.6 4.0 4.9   < > 4.2   CR  --  0.59 0.52 0.57   < > 0.54   A1C 7.3* 8.2* 7.3* 9.3*  --   --     < > = values in this interval not displayed.      Results for orders placed or performed in visit on 12/09/20   Hemoglobin A1c     Status: Abnormal   Result Value Ref Range    Hemoglobin A1C 7.9 (H) 0 - 5.6 %   Hemoglobin     Status: None   Result Value Ref Range    Hemoglobin 13.1 11.7 - 15.7 g/dL       Diagnostics:  Labs pending at this time.  Results will be reviewed when available.   No EKG this visit, completed a stress test in 2019 which was very good and normal.    Revised Cardiac Risk Index (RCRI):  The patient has the following serious cardiovascular risks for perioperative complications:   - Diabetes Mellitus (on Insulin) = 1 point     RCRI Interpretation: 1 point: Class II (low risk - 0.9% complication rate)    956}     Assessment & Plan   The proposed surgical procedure is considered INTERMEDIATE risk.    Bria was seen today for pre-op exam.    Diagnoses and all orders for this visit:    Preop general physical exam  -     Hemoglobin A1c  -     Basic metabolic panel  (Ca, Cl, CO2, Creat, Gluc, K, Na, BUN)  -     Hemoglobin    Heel spur, right    Type 2 diabetes mellitus with chronic kidney disease, with long-term current use of insulin, microalbuminuria (H): stable, now  Did have higher blood sugars during COVID illness in November and now blood sugars stable again in the last 3 weeks.  -     Hemoglobin A1c  -     Basic metabolic panel  (Ca, Cl, CO2, Creat, Gluc, K, Na, BUN)    Antibiotic-induced yeast infection: will be on antibiotics for surgery, so plan preventative diflucan.  -     Probiotic Product (FLORAJEN DIGESTION) CAPS; Take 1 capsule by mouth daily  -     fluconazole (DIFLUCAN) 150 MG tablet; Take one tablet by mouth every 3rd  day while taking antibiotic for 3 doses.          Risks and Recommendations:  The patient has the following additional risks and recommendations for perioperative complications:   - Morbid obesity (BMI >40)  Diabetes:  - Patient is on insulin therapy; diabetic NPO guidelines provided and discussed.    Medication Instructions:  Patient is to take all scheduled medications on the day of surgery EXCEPT for modifications listed below:   - aspirin: Discontinue aspirin 7-10 days prior to procedure to reduce bleeding risk. It should be resumed postoperatively.    - Long acting insulin (e.g. glargine, detemir): Take 50% of the usual evening or morning dose before surgery.   - short acting insulin (e.g. regular, lispro, aspart): HOLD on the morning of surgery.    - metformin: HOLD day of surgery.   - GLP-1 Injectable (exenitide, liraglutide, semaglutide, dulaglutide, etc.): HOLD day of surgery     RECOMMENDATION:  APPROVAL GIVEN to proceed with proposed procedure.    Signed Electronically by: Mele Burr MD    Copy of this evaluation report is provided to requesting physician.    [facility] Preop Guidelines    Revised Cardiac Risk Index

## 2020-12-09 NOTE — PATIENT INSTRUCTIONS
"OK to take evening medications the day before surgery.     Medications on the day of surgery.  - HOLD (do not take) your METFORMIN on the morning of surgery.  -decrease the Toujeo the morning of surgery to 50 U  Do not take the humalog  Because you won't be eating, unless your blood sugar goes over 200 then do take Humalog 12 U.  Wait until the evening to take the ozempic when eating again.    OK to take nexium, losartan, reglan, metoprolol.    Avoid iron few days before surgery and if after surgery you are having constipation.    - STOP taking all vitamins and herbal supplements 14 days before surgery.    No NSAIDs (ibuprofen, advil, aleve, aspirin) 7 days before surgery.    OK to take tylenol.    Preparing for Your Surgery  Getting started  A surgery nurse will call you to review your health history and instructions. They will give you an arrival time based on your scheduled surgery time.  Please be ready to share the following:    Your doctor's clinic name and phone number    Your medical, surgical and anesthesia history    A list of allergies and sensitivities    A list of medicines, including herbal treatments and over-the-counter drugs    Whether the patient has a legal guardian (ask how to send us the papers in advance)  If your child is having surgery, please ask for a copy of Preparing for Your Child's Surgery.    Preparing for surgery    Within 30 days of surgery: Have an exam at your family clinic (primary care clinic), or go to a pre-operative clinic. This exam is called a \"History and Physical,\" or H&P.    At your H&P exam, talk to your care team about all medicines you take. If you need to stop any medicines before surgery, ask when to start taking them again.  ? We do this for your safety. Many medicines can make you bleed too much during surgery. Some change how well surgery (anesthesia) drugs work.    Call your insurance company to see what it will and won't pay for. Ask if they need to pre-approve " the surgery. (If no insurance, call [phone].)    Call your surgeon's clinic if there's any change in your health. This includes signs of a cold or flu (sore throat, runny nose, cough, rash, fever). It also includes a scrape or scratch near the surgery site.    If you have questions on the day of surgery, call your surgery center.  Eating and drinking guidelines  For your safety: Unless your surgeon tells you otherwise, follow the guidelines below.    Eat and drink as usual until 8 hours before surgery. After that, no food or milk.    Drink clear liquids until 2 hours before surgery. These are liquids you can see through, like water, Gatorade and Propel Water. You may also have black coffee and tea (no cream or milk).    Nothing by mouth within 2 hours of surgery. This includes gum, candy and breath mints.    Stop alcohol the midnight before surgery.    If your family clinic tells you to take medicine on the morning of surgery, it's okay to take it with a sip of water.  Preventing infection    Shower or bathe the night before and morning of your surgery. Follow the instructions your clinic gave you. (If no instructions, use regular soap.)    Don't shave or clip hair near your surgery site. This can lead to skin infection.    Don't smoke the morning of surgery. Smoking increases the risk of infection. You may chew nicotine gum up to 2 hours before surgery. A nicotine patch is okay.  ? Note: Some surgeries require you to completely quit smoking and nicotine. Check with your surgeon.    Your care team will make every effort to keep you safe from infection. We will:  ? Clean our hands often with soap and water (or an alcohol-based hand rub).  ? Clean the skin at your surgery site with a special soap that kills germs. We'll also remove hair from the site as needed.  ? Wear special hair covers, masks, gowns and gloves during surgery.  ? Give antibiotic medicine, if prescribed. Not all surgeries need antibiotics.  What  to bring on the day of surgery    Photo ID and insurance card    Copy of your health care directive, if you have one    Glasses and hearing aides (bring cases)  ? You can't wear contacts during surgery    Inhaler and eye drops, if you use them (tell us about these when you arrive)    CPAP machine or breathing device, if you use them    A few personal items, if spending the night    If you have . . .  ? A pacemaker or ICD (cardiac defibrillator): Bring the ID card.  ? An implanted stimulator: Bring the remote control.  ? A legal guardian: Bring a copy of the certified (court-stamped) guardianship papers.  Please remove any jewelry, including body piercings. Leave jewelry and other valuables at home.  If you're going home the day of surgery  Important: If you don't follow the rules below, we must cancel your surgery.     Arrange for someone to drive you home after surgery. You may not drive, take a taxi or take public transportation by yourself (unless you'll have local anesthesia only).    Arrange for a responsible adult to stay with you overnight. If you don't, we may keep you in the hospital overnight, and you may need to pay the costs yourself.  Questions?   If you have any questions for your care team, list them here: _________________________________________________________________________________________________________________________________________________________________________________________________________________________________________________________________________________________________________________________  For informational purposes only. Not to replace the advice of your health care provider. Copyright   8101-0668 [facility]. All rights reserved. Clinically reviewed by Abby Motley MD. [employer] [id number] - REV 07/19.      Instructions About Your Continuous Glucose Monitor  You should be prepared to remove the Continuous Glucose Monitor prior to the operation in order to avoid  damage to the equipment during the procedure. Your Continuous Glucose Monitor will not be the source of glucose monitoring during the operation.    How to Manage Your Diabetes Before Surgery  If you use insulin for your diabetes, follow these steps to keep your blood sugar in a safe range before surgery, when you ve been told not to eat or drink:     Check your blood sugar every 4 hours     If your blood sugar is high, take a corrective dose (not a meal dose) of sliding-scale insulin, if that is what you re used to doing    If your blood sugar is below 100, or you have symptoms of hypoglycemia, follow these steps:   1) Have 1 item from this list:  - 4 oz (1/2 cup) of fruit juice without pulp    - 4 oz (1/2 cup) of regular soda (not diet soda)    - 3 glucose gels    - 5 sugar cubes or sugar packets   2) Check your blood sugar again after 15 minutes  3) Repeat steps 1 and 2 again until your blood sugar is greater than 100

## 2020-12-16 ENCOUNTER — NURSE TRIAGE (OUTPATIENT)
Dept: NURSING | Facility: CLINIC | Age: 60
End: 2020-12-16

## 2020-12-16 NOTE — TELEPHONE ENCOUNTER
Just had achilles surgery - when leaving the surgery center she asked about a knee roller walker. MD didn't order it ahead of time. Patient is wondering where she can go to get it filled tonight. Called a few different home medical supply stores for patient - all were either closed or did not have a knee roller in stock. Patient will pursue tomorrow. Patient does have crutches at home for tonight.    Sheila Stafford RN on 12/16/2020 at 6:00 PM    Additional Information    Negative: [1] Caller is not with the adult (patient) AND [2] reporting urgent symptoms    Negative: Lab result questions    Negative: Medication questions    Negative: Caller can't be reached by phone    Negative: Caller has already spoken to PCP or another triager    Negative: RN needs further essential information from caller in order to complete triage    Negative: Requesting regular office appointment    Negative: [1] Caller requesting NON-URGENT health information AND [2] PCP's office is the best resource    Negative: Health Information question, no triage required and triager able to answer question    General information question, no triage required and triager able to answer question    Protocols used: INFORMATION ONLY CALL-A-

## 2020-12-24 DIAGNOSIS — E78.5 HYPERLIPIDEMIA LDL GOAL <100: ICD-10-CM

## 2020-12-27 DIAGNOSIS — Z79.4 TYPE 2 DIABETES MELLITUS WITH CHRONIC KIDNEY DISEASE, WITH LONG-TERM CURRENT USE OF INSULIN, UNSPECIFIED CKD STAGE (H): ICD-10-CM

## 2020-12-27 DIAGNOSIS — E11.22 TYPE 2 DIABETES MELLITUS WITH CHRONIC KIDNEY DISEASE, WITH LONG-TERM CURRENT USE OF INSULIN, UNSPECIFIED CKD STAGE (H): ICD-10-CM

## 2020-12-28 NOTE — TELEPHONE ENCOUNTER
"Requested Prescriptions   Pending Prescriptions Disp Refills     ACCU-CHEK GUIDE test strip [Pharmacy Med Name: ACCU-CHEK GUIDE  STRP] 300 each 11     Sig: USE TO TEST BLOOD SUGAR THREE TIMES A DAY OR AS DIRECTED       Diabetic Supplies Protocol Failed - 12/27/2020  4:07 PM        Failed - Recent (6 mo) or future (30 days) visit within the authorizing provider's specialty     Patient had office visit in the last 6 months or has a visit in the next 30 days with authorizing provider.  See \"Patient Info\" tab in inbasket, or \"Choose Columns\" in Meds & Orders section of the refill encounter.            Passed - Medication is active on med list        Passed - Patient is 18 years of age or older             "

## 2020-12-28 NOTE — TELEPHONE ENCOUNTER
"Requested Prescriptions   Pending Prescriptions Disp Refills     simvastatin (ZOCOR) 40 MG tablet [Pharmacy Med Name: SIMVASTATIN 40MG TABS] 90 tablet 1     Sig: TAKE ONE TABLET BY MOUTH AT BEDTIME       Statins Protocol Passed - 12/24/2020  3:34 PM        Passed - LDL on file in past 12 months     Recent Labs   Lab Test 06/15/20  0902   LDL 53             Passed - No abnormal creatine kinase in past 12 months     No lab results found.             Passed - Recent (12 mo) or future (30 days) visit within the authorizing provider's specialty     Patient has had an office visit with the authorizing provider or a provider within the authorizing providers department within the previous 12 mos or has a future within next 30 days. See \"Patient Info\" tab in inbasket, or \"Choose Columns\" in Meds & Orders section of the refill encounter.              Passed - Medication is active on med list        Passed - Patient is age 18 or older        Passed - No active pregnancy on record        Passed - No positive pregnancy test in past 12 months             "

## 2020-12-29 RX ORDER — SIMVASTATIN 40 MG
TABLET ORAL
Qty: 90 TABLET | Refills: 1 | Status: SHIPPED | OUTPATIENT
Start: 2020-12-29 | End: 2021-04-14

## 2020-12-29 NOTE — TELEPHONE ENCOUNTER
Prescription approved per [facility] Refill Protocol.    Ginna SPENCER RN, BSN

## 2020-12-30 RX ORDER — BLOOD SUGAR DIAGNOSTIC
STRIP MISCELLANEOUS
Qty: 300 EACH | Refills: 1 | Status: SHIPPED | OUTPATIENT
Start: 2020-12-30 | End: 2021-06-11

## 2020-12-30 NOTE — TELEPHONE ENCOUNTER
Prescription approved per [facility] Refill Protocol.  Radha DALE, MSN, RN

## 2021-01-13 ENCOUNTER — TRANSFERRED RECORDS (OUTPATIENT)
Dept: HEALTH INFORMATION MANAGEMENT | Facility: CLINIC | Age: 61
End: 2021-01-13

## 2021-01-14 ENCOUNTER — VIRTUAL VISIT (OUTPATIENT)
Dept: FAMILY MEDICINE | Facility: CLINIC | Age: 61
End: 2021-01-14
Payer: COMMERCIAL

## 2021-01-14 DIAGNOSIS — B37.2 YEAST DERMATITIS: Primary | ICD-10-CM

## 2021-01-14 PROCEDURE — 99213 OFFICE O/P EST LOW 20 MIN: CPT | Mod: TEL | Performed by: FAMILY MEDICINE

## 2021-01-14 RX ORDER — FLUCONAZOLE 150 MG/1
150 TABLET ORAL
Qty: 4 TABLET | Refills: 0 | Status: SHIPPED | OUTPATIENT
Start: 2021-01-14 | End: 2021-01-24

## 2021-01-14 NOTE — PROGRESS NOTES
"Melyssa is a 60 year old who is being evaluated via a billable telephone visit.      What phone number would you like to be contacted at? [phone]  How would you like to obtain your AVS? MyChart  Assessment & Plan     Yeast dermatitis  H/o recurrent yeast in the past. Will do a longer course of treatment.   - fluconazole (DIFLUCAN) 150 MG tablet; Take 1 tablet (150 mg) by mouth every 72 hours for 4 doses    BMI:   Estimated body mass index is 45.53 kg/m  as calculated from the following:    Height as of 7/10/20: 1.6 m (5' 3\").    Weight as of 12/9/20: 116.6 kg (257 lb).       No follow-ups on file.    Kaia Elena MD  [facility]     Melyssa is a 60 year old who presents to clinic today for the following health issues     HPI       Vaginal Symptoms  Onset/Duration: yesterday  Description:  Vaginal Discharge: white   Itching (Pruritis): YES  Burning sensation:  no  Odor: no  Accompanying Signs & Symptoms:  Urinary symptoms: no  Abdominal pain: no  Fever: no  History:   Sexually active: YES  New Partner: no  Possibility of Pregnancy:  no  Recent antibiotic use: YES- had antibiotic during surgery in Dec   Previous vaginitis issues: YES  Precipitating or alleviating factors: None  Therapies tried and outcome:  Florajen for women     H/o recurrent yeast.   Her bath schedule got off track with her foot surgery in December        Review of Systems   Constitutional, HEENT, cardiovascular, pulmonary, gi and gu systems are negative, except as otherwise noted.      Objective           Vitals:  No vitals were obtained today due to virtual visit.    Physical Exam   healthy, alert and no distress  PSYCH: Alert and oriented times 3; coherent speech, normal   rate and volume, able to articulate logical thoughts, able   to abstract reason, no tangential thoughts, no hallucinations   or delusions  Her affect is normal  RESP: No cough, no audible wheezing, able to talk in full " sentences  Remainder of exam unable to be completed due to telephone visits                Phone call duration: 5 minutes

## 2021-01-14 NOTE — PATIENT INSTRUCTIONS
Our Clinic hours are:  Mondays    7:20 am - 7 pm  Tues -  Fri  7:20 am - 5 pm    Clinic Phone: [phone]    The clinic lab opens at 7:30 am Mon - Fri and appointments are required.    [facility]. [phone]  Monday  8 am - 7pm  Tues - Fri 8 am - 5:30 pm

## 2021-01-15 ASSESSMENT — ASTHMA QUESTIONNAIRES: ACT_TOTALSCORE: 25

## 2021-01-18 NOTE — PROGRESS NOTES
Outpatient Physical Therapy Discharge Note     Patient: Bria Davis  : 1960    Beginning/End Dates of Reporting Period:  20 to 10/7/20 when last seen. Discharge written on 2021.  Total # of Rx sessions: 7    Referring Provider: Felix Watkins Diagnosis: R Achilles Tendonopathy      Client Self Report: Sore last couple of days, thinks overdoing the ex's of heel raises and tandem sway.  Also has been unable to tape d/t razor cut on ankle.     Objective Measurements:  Objective Measure: LEFS   Details: 20  Score 35.   INITIALlY:  28    Objective Measure: MMT:   Details: 10/7/20 Unable to do L heel raise d/t weakness. Others 5/5. 20 Pain w/ L Heel raise, Hip Flex, DF, Hip Abd 5 B, DF w/ Inv 5/5. INITIAlly:  Unable to heel raise on L d/t surgery. R Pain w/attempted heel raises. R Hip Flex 5-, DF 5-. Pain w/ DF and Inv, Hip Abd 5- B.     Objective Measure: AROM   Details: 10/7/20  In supine R Foot Gas 16, Soleus 28. INITIALLY:  In supine L Foot Gas 22, Soleus 30; R Foot Gas 12, Soleus 20.     Objective Measure: Palpation   Details: 10/7/20  Slight tenderness on medial side of ankle. INITIALlY:  Tender B sides of Achilles tendon.      Goals:  Goal Identifier 1   Goal Description STG: Pt will be able to walk 1/4 mile w/ moderate difficylty.  20  NOT MET    Target Date 10/05/20   Date Met      Progress:     Goal Identifier 2   Goal Description STG: Pt will be able to stand for prolonged periods w/ minimal difficulty  20  MET    Target Date 10/05/20   Date Met  20   Progress:     Goal Identifier 3   Goal Description STG: Pt will be able to do stairs w/ moderate difficulty and less pain. 20  NOT MET - Still a lot of difficulty.    Target Date 10/05/20   Date Met      Progress:     Goal Identifier 4   Goal Description LTG: Pt will be independent w/HEP and self cares.  MET    Target Date 10/12/20   Date Met  10/07/20   Progress:     Progress Toward Goals:    Progress this reporting period: Pt met 2 goals.   Progress limited due to Heel pain and weakness.     Plan:  Discharge from therapy.    Discharge:    Reason for Discharge: No further expectation of progress.  Patient chooses to discontinue therapy.    Equipment Issued:      Discharge Plan: Patient to continue home program.  Pt to follow up w/MD as appropriate.   Leann Loja, PT, [facility] (#[id number])  [facility]           [phone]  Fax          [phone]  Appt #      [phone]

## 2021-01-22 DIAGNOSIS — J45.20 MILD INTERMITTENT ASTHMA, UNCOMPLICATED: ICD-10-CM

## 2021-01-22 NOTE — TELEPHONE ENCOUNTER
"Requested Prescriptions   Pending Prescriptions Disp Refills     ADVAIR DISKUS 100-50 MCG/DOSE inhaler [Pharmacy Med Name: ADVAIR DISKUS 100-50MCG/DOSE AEPB] 180 each 1     Sig: INHALE ONE PUFF BY MOUTH TWICE A DAY       Long-Acting Beta Agonist Inhalers Protocol  Failed - 1/22/2021  6:42 AM        Failed - Order for Serevent, Striverdi, or Foradil and pt has steroid inhaler        Passed - Patient is age 12 or older        Passed - Asthma control assessment score within normal limits in last 6 months     Please review ACT score.           Passed - Medication is active on med list        Passed - Recent (6 mo) or future (30 days) visit within the authorizing provider's specialty     Patient had office visit in the last 6 months or has a visit in the next 30 days with authorizing provider or within the authorizing provider's specialty.  See \"Patient Info\" tab in inbasket, or \"Choose Columns\" in Meds & Orders section of the refill encounter.           Inhaled Steroids Protocol Passed - 1/22/2021  6:42 AM        Passed - Patient is age 12 or older        Passed - Asthma control assessment score within normal limits in last 6 months     Please review ACT score.           Passed - Medication is active on med list        Passed - Recent (6 mo) or future (30 days) visit within the authorizing provider's specialty     Patient had office visit in the last 6 months or has a visit in the next 30 days with authorizing provider or within the authorizing provider's specialty.  See \"Patient Info\" tab in inbasket, or \"Choose Columns\" in Meds & Orders section of the refill encounter.                 "

## 2021-01-27 DIAGNOSIS — J31.0 CHRONIC RHINITIS: ICD-10-CM

## 2021-01-27 NOTE — TELEPHONE ENCOUNTER
Requested Prescriptions   Pending Prescriptions Disp Refills     pseudoePHEDrine (SUDOGEST) 30 MG tablet 360 tablet 1     Sig: Take 2 tablets (60 mg) by mouth every 12 hours as needed for congestion       There is no refill protocol information for this order        Last Written Prescription Date:    Last Fill Quantity: ,  # refills:    Last office visit: 12/9/2020 with prescribing provider:  Ulices   Future Office Visit:

## 2021-01-28 RX ORDER — PSEUDOEPHEDRINE HCL 30 MG
60 TABLET ORAL EVERY 12 HOURS PRN
Qty: 360 TABLET | Refills: 1 | Status: SHIPPED | OUTPATIENT
Start: 2021-01-28 | End: 2021-04-14

## 2021-01-28 NOTE — TELEPHONE ENCOUNTER
Routing refill request to provider for review/approval because:  Drug not on the [facility] refill protocol.  Advise.  KPavelrn

## 2021-02-01 DIAGNOSIS — E11.22 TYPE 2 DIABETES MELLITUS WITH CHRONIC KIDNEY DISEASE, WITH LONG-TERM CURRENT USE OF INSULIN, UNSPECIFIED CKD STAGE (H): ICD-10-CM

## 2021-02-01 DIAGNOSIS — Z79.4 TYPE 2 DIABETES MELLITUS WITH CHRONIC KIDNEY DISEASE, WITH LONG-TERM CURRENT USE OF INSULIN, UNSPECIFIED CKD STAGE (H): ICD-10-CM

## 2021-02-01 NOTE — TELEPHONE ENCOUNTER
"Requested Prescriptions   Pending Prescriptions Disp Refills     TOUJEO MAX SOLOSTAR 300 UNIT/ML SOPN [Pharmacy Med Name: TOUJEO MAX SOLOSTAR 300 SOPN]  3     Sig: INJECT 100 UNITS SUBCUTANEOUSLY DAILY       Long Acting Insulin Protocol Failed - 2/1/2021  6:39 AM        Failed - Recent (6 mo) or future (30 days) visit within the authorizing provider's specialty     Patient had office visit in the last 6 months or has a visit in the next 30 days with authorizing provider or within the authorizing provider's specialty.  See \"Patient Info\" tab in inbasket, or \"Choose Columns\" in Meds & Orders section of the refill encounter.            Passed - Serum creatinine on file in past 12 months     Recent Labs   Lab Test 12/09/20  1633 06/07/18  0755 06/07/18  0755   CR 0.64   < >  --    CREAT  --   --  0.6    < > = values in this interval not displayed.       Ok to refill medication if creatinine is low          Passed - HgbA1C in past 3 or 6 months     If HgbA1C is 8 or greater, it needs to be on file within the past 3 months.  If less than 8, must be on file within the past 6 months.     Recent Labs   Lab Test 12/09/20  1633   A1C 7.9*             Passed - Medication is active on med list        Passed - Patient is age 18 or older             "

## 2021-02-02 ENCOUNTER — TRANSFERRED RECORDS (OUTPATIENT)
Dept: HEALTH INFORMATION MANAGEMENT | Facility: CLINIC | Age: 61
End: 2021-02-02

## 2021-02-02 DIAGNOSIS — E11.22 TYPE 2 DIABETES MELLITUS WITH CHRONIC KIDNEY DISEASE, WITH LONG-TERM CURRENT USE OF INSULIN, UNSPECIFIED CKD STAGE (H): ICD-10-CM

## 2021-02-02 DIAGNOSIS — Z79.4 TYPE 2 DIABETES MELLITUS WITH CHRONIC KIDNEY DISEASE, WITH LONG-TERM CURRENT USE OF INSULIN, UNSPECIFIED CKD STAGE (H): ICD-10-CM

## 2021-02-02 NOTE — TELEPHONE ENCOUNTER
"Requested Prescriptions   Pending Prescriptions Disp Refills     insulin pen needle (BD PEDRO LUIS U/F) 32G X 4 MM miscellaneous [Pharmacy Med Name: BD PEN NEEDLE PEDRO LUIS  32G X 4 MM MISC] 400 each 11     Sig: USE 4 DAILY USE AS DIRECTED       Diabetic Supplies Protocol Failed - 2/2/2021  9:07 AM        Failed - Recent (6 mo) or future (30 days) visit within the authorizing provider's specialty     Patient had office visit in the last 6 months or has a visit in the next 30 days with authorizing provider.  See \"Patient Info\" tab in inbasket, or \"Choose Columns\" in Meds & Orders section of the refill encounter.            Passed - Medication is active on med list        Passed - Patient is 18 years of age or older             "

## 2021-02-04 RX ORDER — PEN NEEDLE, DIABETIC 32GX 5/32"
NEEDLE, DISPOSABLE MISCELLANEOUS
Qty: 400 EACH | Refills: 11 | Status: SHIPPED | OUTPATIENT
Start: 2021-02-04 | End: 2022-03-25

## 2021-02-04 RX ORDER — INSULIN GLARGINE 300 U/ML
INJECTION, SOLUTION SUBCUTANEOUS
Qty: 27 ML | Refills: 0 | Status: SHIPPED | OUTPATIENT
Start: 2021-02-04 | End: 2021-04-14

## 2021-02-04 NOTE — TELEPHONE ENCOUNTER
Prescription approved per [facility] Refill Protocol.  Kristen Liu RN

## 2021-02-04 NOTE — TELEPHONE ENCOUNTER
Routing refill request to provider for review/approval because:  Pt had pre-op 12/9/2020 with Dr. Burr  Last OV with PCP/ordering provider Dr. Elena was 6/15/2020    Ginna SPENCER RN, BSN

## 2021-02-06 DIAGNOSIS — E11.22 TYPE 2 DIABETES MELLITUS WITH CHRONIC KIDNEY DISEASE, WITH LONG-TERM CURRENT USE OF INSULIN, UNSPECIFIED CKD STAGE (H): ICD-10-CM

## 2021-02-06 DIAGNOSIS — Z79.4 TYPE 2 DIABETES MELLITUS WITH CHRONIC KIDNEY DISEASE, WITH LONG-TERM CURRENT USE OF INSULIN, UNSPECIFIED CKD STAGE (H): ICD-10-CM

## 2021-02-08 NOTE — TELEPHONE ENCOUNTER
"Requested Prescriptions   Pending Prescriptions Disp Refills     metFORMIN (GLUCOPHAGE-XR) 500 MG 24 hr tablet [Pharmacy Med Name: METFORMIN HCL ER 500MG TB24] 360 tablet 1     Sig: TAKE TWO TABLETS BY MOUTH TWICE A DAY WITH MEALS       Biguanide Agents Passed - 2/6/2021 11:02 AM        Passed - Patient is age 10 or older        Passed - Patient has documented A1c within the specified period of time.     If HgbA1C is 8 or greater, it needs to be on file within the past 3 months.  If less than 8, must be on file within the past 6 months.     Recent Labs   Lab Test 12/09/20  1633   A1C 7.9*             Passed - Patient's CR is NOT>1.4 OR Patient's EGFR is NOT<45 within past 12 mos.     Recent Labs   Lab Test 12/09/20  1633   GFRESTIMATED >90   GFRESTBLACK >90       Recent Labs   Lab Test 12/09/20  1633   CR 0.64             Passed - Patient does NOT have a diagnosis of CHF.        Passed - Medication is active on med list        Passed - Patient is not pregnant        Passed - Patient has not had a positive pregnancy test within the past 12 mos.         Passed - Recent (6 mo) or future (30 days) visit within the authorizing provider's specialty     Patient had office visit in the last 6 months or has a visit in the next 30 days with authorizing provider or within the authorizing provider's specialty.  See \"Patient Info\" tab in inbasket, or \"Choose Columns\" in Meds & Orders section of the refill encounter.                 "

## 2021-02-09 RX ORDER — METFORMIN HCL 500 MG
TABLET, EXTENDED RELEASE 24 HR ORAL
Qty: 360 TABLET | Refills: 0 | Status: SHIPPED | OUTPATIENT
Start: 2021-02-09 | End: 2021-04-14

## 2021-02-09 NOTE — TELEPHONE ENCOUNTER
Prescription approved per [facility] Refill Protocol.    Ginna SPENCER RN, BSN

## 2021-02-20 DIAGNOSIS — I10 ESSENTIAL HYPERTENSION WITH GOAL BLOOD PRESSURE LESS THAN 140/90: ICD-10-CM

## 2021-02-22 NOTE — TELEPHONE ENCOUNTER
"Requested Prescriptions   Pending Prescriptions Disp Refills     losartan (COZAAR) 100 MG tablet [Pharmacy Med Name: LOSARTAN POTASSIUM 100MG TABS] 90 tablet 1     Sig: TAKE ONE TABLET BY MOUTH ONCE DAILY       Angiotensin-II Receptors Passed - 2/20/2021  1:35 PM        Passed - Last blood pressure under 140/90 in past 12 months     BP Readings from Last 3 Encounters:   12/09/20 118/66   07/10/20 138/88   06/15/20 126/74                 Passed - Recent (12 mo) or future (30 days) visit within the authorizing provider's specialty     Patient has had an office visit with the authorizing provider or a provider within the authorizing providers department within the previous 12 mos or has a future within next 30 days. See \"Patient Info\" tab in inbasket, or \"Choose Columns\" in Meds & Orders section of the refill encounter.              Passed - Medication is active on med list        Passed - Patient is age 18 or older        Passed - No active pregnancy on record        Passed - Normal serum creatinine on file in past 12 months     Recent Labs   Lab Test 12/09/20  1633 06/07/18  0755 06/07/18  0755   CR 0.64   < >  --    CREAT  --   --  0.6    < > = values in this interval not displayed.       Ok to refill medication if creatinine is low          Passed - Normal serum potassium on file in past 12 months     Recent Labs   Lab Test 12/09/20  1633   POTASSIUM 3.8                    Passed - No positive pregnancy test in past 12 months           "

## 2021-02-24 RX ORDER — LOSARTAN POTASSIUM 100 MG/1
TABLET ORAL
Qty: 90 TABLET | Refills: 1 | Status: SHIPPED | OUTPATIENT
Start: 2021-02-24 | End: 2021-04-14

## 2021-03-15 DIAGNOSIS — Z79.4 TYPE 2 DIABETES MELLITUS WITH CHRONIC KIDNEY DISEASE, WITH LONG-TERM CURRENT USE OF INSULIN, UNSPECIFIED CKD STAGE (H): ICD-10-CM

## 2021-03-15 DIAGNOSIS — E11.22 TYPE 2 DIABETES MELLITUS WITH CHRONIC KIDNEY DISEASE, WITH LONG-TERM CURRENT USE OF INSULIN, UNSPECIFIED CKD STAGE (H): ICD-10-CM

## 2021-03-15 NOTE — TELEPHONE ENCOUNTER
"Requested Prescriptions   Pending Prescriptions Disp Refills     insulin lispro (HUMALOG KWIKPEN) 100 UNIT/ML (1 unit dial) KWIKPEN [Pharmacy Med Name: HUMALOG KWIKPEN 100UNIT/ML SOPN]  1     Sig: INJECT 32 UNITS UNDER THE SKIN WITH EACH MEAL       Short Acting Insulin Protocol Passed - 3/15/2021  6:54 AM        Passed - Serum creatinine on file in past 12 months     Recent Labs   Lab Test 12/09/20  1633 06/07/18  0755 06/07/18  0755   CR 0.64   < >  --    CREAT  --   --  0.6    < > = values in this interval not displayed.       Ok to refill medication if creatinine is low          Passed - HgbA1C in past 3 or 6 months     If HgbA1C is 8 or greater, it needs to be on file within the past 3 months.  If less than 8, must be on file within the past 6 months.     Recent Labs   Lab Test 12/09/20  1633   A1C 7.9*             Passed - Medication is active on med list        Passed - Patient is age 18 or older        Passed - Recent (6 mo) or future (30 days) visit within the authorizing provider's specialty     Patient had office visit in the last 6 months or has a visit in the next 30 days with authorizing provider or within the authorizing provider's specialty.  See \"Patient Info\" tab in inbasket, or \"Choose Columns\" in Meds & Orders section of the refill encounter.                 "

## 2021-03-16 RX ORDER — INSULIN LISPRO 100 [IU]/ML
INJECTION, SOLUTION INTRAVENOUS; SUBCUTANEOUS
Qty: 45 ML | Refills: 0 | Status: SHIPPED | OUTPATIENT
Start: 2021-03-16 | End: 2021-04-14

## 2021-03-16 NOTE — TELEPHONE ENCOUNTER
"Prescription approved per [facility] Refill Protocol.    Requested Prescriptions   Pending Prescriptions Disp Refills     insulin lispro (HUMALOG KWIKPEN) 100 UNIT/ML (1 unit dial) KWIKPEN [Pharmacy Med Name: HUMALOG KWIKPEN 100UNIT/ML SOPN]  1     Sig: INJECT 32 UNITS UNDER THE SKIN WITH EACH MEAL       Short Acting Insulin Protocol Passed - 3/15/2021 11:09 AM        Passed - Serum creatinine on file in past 12 months     Recent Labs   Lab Test 12/09/20  1633 06/07/18  0755 06/07/18  0755   CR 0.64   < >  --    CREAT  --   --  0.6    < > = values in this interval not displayed.       Ok to refill medication if creatinine is low          Passed - HgbA1C in past 3 or 6 months     If HgbA1C is 8 or greater, it needs to be on file within the past 3 months.  If less than 8, must be on file within the past 6 months.     Recent Labs   Lab Test 12/09/20  1633   A1C 7.9*             Passed - Medication is active on med list        Passed - Patient is age 18 or older        Passed - Recent (6 mo) or future (30 days) visit within the authorizing provider's specialty     Patient had office visit in the last 6 months or has a visit in the next 30 days with authorizing provider or within the authorizing provider's specialty.  See \"Patient Info\" tab in inbasket, or \"Choose Columns\" in Meds & Orders section of the refill encounter.                 "

## 2021-04-09 ENCOUNTER — VIRTUAL VISIT (OUTPATIENT)
Dept: FAMILY MEDICINE | Facility: CLINIC | Age: 61
End: 2021-04-09
Payer: COMMERCIAL

## 2021-04-09 ENCOUNTER — TELEPHONE (OUTPATIENT)
Dept: FAMILY MEDICINE | Facility: CLINIC | Age: 61
End: 2021-04-09

## 2021-04-09 DIAGNOSIS — B37.31 CANDIDIASIS OF VULVA AND VAGINA: Primary | ICD-10-CM

## 2021-04-09 PROCEDURE — 99213 OFFICE O/P EST LOW 20 MIN: CPT | Mod: GT | Performed by: FAMILY MEDICINE

## 2021-04-09 RX ORDER — FLUCONAZOLE 150 MG/1
150 TABLET ORAL
Qty: 4 TABLET | Refills: 1 | Status: SHIPPED | OUTPATIENT
Start: 2021-04-09 | End: 2021-04-20

## 2021-04-09 NOTE — TELEPHONE ENCOUNTER
Patient reports that she has been having symptoms for the past 4 days. She has vaginal itching, swelling, and slight discharge. Denies odor, frequency, urgency, or blood in urine. Patient is a diabetic and she said the has been baking and eating. Her blood sugars have been higher as well. Patient does get yeast infections when she eats too much sugar. Advised video visit. Visit scheduled.  Kristen Liu RN

## 2021-04-09 NOTE — TELEPHONE ENCOUNTER
Reason for call:  Patient reporting a symptom    Symptom or request: Pt reporting yeast infection symptoms, itching and swelling for four days, please advise.    Duration (how long have symptoms been present): 4 days    Have you been treated for this before? No    Additional comments:     Phone Number patient can be reached at:  Cell number on file:    Telephone Information:   Mobile [phone]       Best Time:  any    Can we leave a detailed message on this number:  YES    Call taken on 4/9/2021 at 10:00 AM by Dilma Black

## 2021-04-09 NOTE — PROGRESS NOTES
Melyssa is a 60 year old who is being evaluated via a billable video visit.      How would you like to obtain your AVS? MyChart  If the video visit is dropped, the invitation should be resent by: Text to cell phone: [phone]  Will anyone else be joining your video visit? No    Video Start Time: 11:07 AM  Mable audio, called pt at 11:10 AM  Finished 11:14 AM      Assessment & Plan     Candidiasis of vulva and vagina  Immune suppressed due to DM and requires longer course to clear. Has had bad reaction to topicals  Gave an extra refill bc pt able to self-treat if exact same symptoms, knows to get wet prep if needed/different sx, and follows well w/her PCP  If recurs > 2-3x/year can consider prophylactic boric acid suppositories  - fluconazole (DIFLUCAN) 150 MG tablet  Dispense: 4 tablet; Refill: 1    Return if symptoms worsen or fail to improve.   Has appt already scheduled w/her PCP next week    Stephanie Weiner MD  [facility]   Melyssa is a 60 year old who presents for the following health issues    HPI     Concern -   Chief Complaint   Patient presents with     Vaginal Problem     yeast infection X 5 days.      Onset: X 5 days  Description: itching, swelling of the vaginal area  Intensity: moderate, 4-5/10  Progression of Symptoms:  Worsening, with the itching  Accompanying Signs & Symptoms: none  Previous history of similar problem: pt. Has had them in the past   Precipitating factors:        Worsened by: blood sugars being elevated  Alleviating factors:        Improved by: none  Therapies tried and outcome: cool rinse helps with the itching briefly.     Feels like she gets them a couple of times per year especially associated with abx use or high blood sugars related to stress/eating   Has had bad reactions to topical antifungal in past.      Review of Systems   Constitutional, HEENT, cardiovascular, pulmonary, gi and gu systems are negative, except as otherwise  noted.        Objective       Vitals:  No vitals were obtained today due to virtual visit.    Physical Exam   GENERAL: Healthy, alert and no distress  EYES: Eyes grossly normal to inspection.  No discharge or erythema, or obvious scleral/conjunctival abnormalities.  RESP: No audible wheeze, cough, or visible cyanosis.  No visible retractions or increased work of breathing.    SKIN: Visible skin clear. No significant rash, abnormal pigmentation or lesions.  NEURO: Cranial nerves grossly intact.  Mentation and speech appropriate for age.  PSYCH: Mentation appears normal, affect normal/bright, judgement and insight intact,     Video-Visit Details    Type of service:  Video Visit    Video End Time: 11:14 AM    Originating Location (pt. Location): Other - at her workplace    Distant Location (provider location):  [facility]     Platform used for Video Visit: Cyphoma

## 2021-04-13 ENCOUNTER — TRANSFERRED RECORDS (OUTPATIENT)
Dept: HEALTH INFORMATION MANAGEMENT | Facility: CLINIC | Age: 61
End: 2021-04-13

## 2021-04-13 LAB
RETINOPATHY: NORMAL
RETINOPATHY: NORMAL

## 2021-04-14 ENCOUNTER — OFFICE VISIT (OUTPATIENT)
Dept: FAMILY MEDICINE | Facility: CLINIC | Age: 61
End: 2021-04-14
Payer: COMMERCIAL

## 2021-04-14 VITALS
OXYGEN SATURATION: 97 % | HEIGHT: 64 IN | WEIGHT: 257 LBS | HEART RATE: 76 BPM | BODY MASS INDEX: 43.87 KG/M2 | TEMPERATURE: 96 F | DIASTOLIC BLOOD PRESSURE: 82 MMHG | SYSTOLIC BLOOD PRESSURE: 132 MMHG | RESPIRATION RATE: 16 BRPM

## 2021-04-14 DIAGNOSIS — K31.84 GASTROPARESIS DUE TO DM (H): ICD-10-CM

## 2021-04-14 DIAGNOSIS — E11.22 TYPE 2 DIABETES MELLITUS WITH CHRONIC KIDNEY DISEASE, WITH LONG-TERM CURRENT USE OF INSULIN, UNSPECIFIED CKD STAGE (H): Primary | ICD-10-CM

## 2021-04-14 DIAGNOSIS — J45.20 MILD INTERMITTENT ASTHMA, UNCOMPLICATED: ICD-10-CM

## 2021-04-14 DIAGNOSIS — J31.0 CHRONIC RHINITIS: ICD-10-CM

## 2021-04-14 DIAGNOSIS — E11.43 GASTROPARESIS DUE TO DM (H): ICD-10-CM

## 2021-04-14 DIAGNOSIS — I10 ESSENTIAL HYPERTENSION WITH GOAL BLOOD PRESSURE LESS THAN 140/90: ICD-10-CM

## 2021-04-14 DIAGNOSIS — K21.9 GASTROESOPHAGEAL REFLUX DISEASE WITHOUT ESOPHAGITIS: ICD-10-CM

## 2021-04-14 DIAGNOSIS — I20.89 OTHER FORMS OF ANGINA PECTORIS (H): ICD-10-CM

## 2021-04-14 DIAGNOSIS — Z79.4 TYPE 2 DIABETES MELLITUS WITH CHRONIC KIDNEY DISEASE, WITH LONG-TERM CURRENT USE OF INSULIN, UNSPECIFIED CKD STAGE (H): Primary | ICD-10-CM

## 2021-04-14 DIAGNOSIS — E78.5 HYPERLIPIDEMIA LDL GOAL <70: ICD-10-CM

## 2021-04-14 LAB
ALBUMIN SERPL-MCNC: 3.5 G/DL (ref 3.4–5)
ALP SERPL-CCNC: 65 U/L (ref 40–150)
ALT SERPL W P-5'-P-CCNC: 28 U/L (ref 0–50)
ANION GAP SERPL CALCULATED.3IONS-SCNC: 4 MMOL/L (ref 3–14)
AST SERPL W P-5'-P-CCNC: 16 U/L (ref 0–45)
BILIRUB SERPL-MCNC: 0.4 MG/DL (ref 0.2–1.3)
BUN SERPL-MCNC: 11 MG/DL (ref 7–30)
CALCIUM SERPL-MCNC: 9.3 MG/DL (ref 8.5–10.1)
CHLORIDE SERPL-SCNC: 103 MMOL/L (ref 94–109)
CHOLEST SERPL-MCNC: 149 MG/DL
CO2 SERPL-SCNC: 30 MMOL/L (ref 20–32)
CREAT SERPL-MCNC: 0.62 MG/DL (ref 0.52–1.04)
CREAT UR-MCNC: 92 MG/DL
GFR SERPL CREATININE-BSD FRML MDRD: >90 ML/MIN/{1.73_M2}
GLUCOSE SERPL-MCNC: 93 MG/DL (ref 70–99)
HBA1C MFR BLD: 7.3 % (ref 0–5.6)
HDLC SERPL-MCNC: 46 MG/DL
LDLC SERPL CALC-MCNC: 63 MG/DL
MICROALBUMIN UR-MCNC: 144 MG/L
MICROALBUMIN/CREAT UR: 157.38 MG/G CR (ref 0–25)
NONHDLC SERPL-MCNC: 103 MG/DL
POTASSIUM SERPL-SCNC: 4.3 MMOL/L (ref 3.4–5.3)
PROT SERPL-MCNC: 6.8 G/DL (ref 6.8–8.8)
SODIUM SERPL-SCNC: 137 MMOL/L (ref 133–144)
TRIGL SERPL-MCNC: 200 MG/DL

## 2021-04-14 PROCEDURE — 80053 COMPREHEN METABOLIC PANEL: CPT | Performed by: FAMILY MEDICINE

## 2021-04-14 PROCEDURE — 83036 HEMOGLOBIN GLYCOSYLATED A1C: CPT | Performed by: FAMILY MEDICINE

## 2021-04-14 PROCEDURE — 99214 OFFICE O/P EST MOD 30 MIN: CPT | Performed by: FAMILY MEDICINE

## 2021-04-14 PROCEDURE — 82043 UR ALBUMIN QUANTITATIVE: CPT | Performed by: FAMILY MEDICINE

## 2021-04-14 PROCEDURE — 99207 PR FOOT EXAM NO CHARGE: CPT | Performed by: FAMILY MEDICINE

## 2021-04-14 PROCEDURE — 36415 COLL VENOUS BLD VENIPUNCTURE: CPT | Performed by: FAMILY MEDICINE

## 2021-04-14 PROCEDURE — 80061 LIPID PANEL: CPT | Performed by: FAMILY MEDICINE

## 2021-04-14 RX ORDER — SEMAGLUTIDE 1.34 MG/ML
INJECTION, SOLUTION SUBCUTANEOUS
Qty: 4.5 ML | Refills: 3 | Status: SHIPPED | OUTPATIENT
Start: 2021-04-14 | End: 2022-03-30

## 2021-04-14 RX ORDER — ESOMEPRAZOLE MAGNESIUM 40 MG/1
40 CAPSULE, DELAYED RELEASE ORAL
Qty: 90 CAPSULE | Refills: 3 | Status: SHIPPED | OUTPATIENT
Start: 2021-04-14 | End: 2022-03-30

## 2021-04-14 RX ORDER — METFORMIN HCL 500 MG
1000 TABLET, EXTENDED RELEASE 24 HR ORAL 2 TIMES DAILY WITH MEALS
Qty: 360 TABLET | Refills: 1 | Status: SHIPPED | OUTPATIENT
Start: 2021-04-14 | End: 2021-09-23

## 2021-04-14 RX ORDER — INSULIN LISPRO 100 [IU]/ML
INJECTION, SOLUTION INTRAVENOUS; SUBCUTANEOUS
Qty: 90 ML | Refills: 3 | Status: SHIPPED | OUTPATIENT
Start: 2021-04-14 | End: 2021-10-25

## 2021-04-14 RX ORDER — METOCLOPRAMIDE 10 MG/1
10 TABLET ORAL 2 TIMES DAILY
Qty: 180 TABLET | Refills: 1 | Status: SHIPPED | OUTPATIENT
Start: 2021-04-14 | End: 2021-09-23

## 2021-04-14 RX ORDER — SIMVASTATIN 40 MG
40 TABLET ORAL AT BEDTIME
Qty: 90 TABLET | Refills: 1 | Status: SHIPPED | OUTPATIENT
Start: 2021-04-14 | End: 2022-01-13 | Stop reason: ALTCHOICE

## 2021-04-14 RX ORDER — LOSARTAN POTASSIUM 100 MG/1
100 TABLET ORAL DAILY
Qty: 90 TABLET | Refills: 1 | Status: SHIPPED | OUTPATIENT
Start: 2021-04-14 | End: 2021-09-23

## 2021-04-14 RX ORDER — MONTELUKAST SODIUM 10 MG/1
1 TABLET ORAL AT BEDTIME
Qty: 90 TABLET | Refills: 1 | Status: SHIPPED | OUTPATIENT
Start: 2021-04-14 | End: 2021-09-23

## 2021-04-14 RX ORDER — PSEUDOEPHEDRINE HCL 30 MG
60 TABLET ORAL EVERY 12 HOURS PRN
Qty: 360 TABLET | Refills: 1 | Status: SHIPPED | OUTPATIENT
Start: 2021-04-14 | End: 2021-10-12

## 2021-04-14 RX ORDER — INSULIN GLARGINE 300 U/ML
100 INJECTION, SOLUTION SUBCUTANEOUS DAILY
Qty: 40.5 ML | Refills: 1 | Status: SHIPPED | OUTPATIENT
Start: 2021-04-14 | End: 2021-09-23

## 2021-04-14 RX ORDER — ALBUTEROL SULFATE 90 UG/1
2 AEROSOL, METERED RESPIRATORY (INHALATION) EVERY 4 HOURS PRN
Qty: 18 G | Refills: 3 | Status: SHIPPED | OUTPATIENT
Start: 2021-04-14 | End: 2022-05-10

## 2021-04-14 RX ORDER — METOPROLOL SUCCINATE 25 MG/1
25 TABLET, EXTENDED RELEASE ORAL DAILY
Qty: 90 TABLET | Refills: 3 | Status: SHIPPED | OUTPATIENT
Start: 2021-04-14 | End: 2022-03-30

## 2021-04-14 ASSESSMENT — MIFFLIN-ST. JEOR: SCORE: 1722.87

## 2021-04-14 NOTE — RESULT ENCOUNTER NOTE
Bria,    All of the labs were normal or acceptable.    Please contact my office if you have questions.    Kaia Elena M.D.

## 2021-04-14 NOTE — PROGRESS NOTES
Assessment & Plan     Type 2 diabetes mellitus with chronic kidney disease, with long-term current use of insulin, unspecified CKD stage (H)  Control is fair.  Has some hypoglycemia so I don't want to push her medications higher.  - FOOT EXAM  - Albumin Random Urine Quantitative with Creat Ratio  - Comprehensive metabolic panel  - Lipid panel reflex to direct LDL Fasting  - Hemoglobin A1c  - insulin lispro (HUMALOG KWIKPEN) 100 UNIT/ML (1 unit dial) KWIKPEN; INJECT 32 UNITS UNDER THE SKIN WITH EACH MEAL  - metFORMIN (GLUCOPHAGE-XR) 500 MG 24 hr tablet; Take 2 tablets (1,000 mg) by mouth 2 times daily (with meals)  - Insulin Glargine, 2 Unit Dial, (TOUJEO MAX SOLOSTAR) 300 UNIT/ML SOPN; Inject 100 Units Subcutaneous daily  - simvastatin (ZOCOR) 40 MG tablet; Take 1 tablet (40 mg) by mouth At Bedtime  - Semaglutide,0.25 or 0.5MG/DOS, (OZEMPIC, 0.25 OR 0.5 MG/DOSE,) 2 MG/1.5ML SOPN; INJECT 0.5MG UNDER THE SKIN ONCE A WEEK    Gastroparesis due to DM (H)   stable  - metoclopramide (REGLAN) 10 MG tablet; Take 1 tablet (10 mg) by mouth 2 times daily    Other forms of angina pectoris (H)  Stable, asymptomatic  - metoprolol succinate ER (TOPROL-XL) 25 MG 24 hr tablet; Take 1 tablet (25 mg) by mouth daily    Hyperlipidemia LDL goal <70     - Comprehensive metabolic panel  - Lipid panel reflex to direct LDL Fasting  - simvastatin (ZOCOR) 40 MG tablet; Take 1 tablet (40 mg) by mouth At Bedtime    Essential hypertension with goal blood pressure less than 140/90   well controlled  - losartan (COZAAR) 100 MG tablet; Take 1 tablet (100 mg) by mouth daily    Mild intermittent asthma, uncomplicated      - fluticasone-salmeterol (ADVAIR DISKUS) 100-50 MCG/DOSE inhaler; Inhale 1 puff into the lungs 2 times daily  - montelukast (SINGULAIR) 10 MG tablet; Take 1 tablet (10 mg) by mouth At Bedtime  - albuterol (PROAIR HFA/PROVENTIL HFA/VENTOLIN HFA) 108 (90 Base) MCG/ACT inhaler; Inhale 2 puffs into the lungs every 4 hours as needed  "for shortness of breath / dyspnea or wheezing May substitute the equivalent medication per insurance preference.    Gastroesophageal reflux disease without esophagitis     - esomeprazole (NEXIUM) 40 MG DR capsule; Take 1 capsule (40 mg) by mouth every morning (before breakfast)    Chronic rhinitis     - pseudoePHEDrine (SUDOGEST) 30 MG tablet; Take 2 tablets (60 mg) by mouth every 12 hours as needed for congestion             BMI:   Estimated body mass index is 44.36 kg/m  as calculated from the following:    Height as of this encounter: 1.621 m (5' 3.82\").    Weight as of this encounter: 116.6 kg (257 lb).   Weight management plan: Discussed healthy diet and exercise guidelines        Return in about 6 months (around 10/14/2021) for diabetes recheck.    Kaia Elena MD  [facility]    Susannah Ragsdale is a 60 year old who presents for the following health issues     HPI     Diabetes Follow-up    How often are you checking your blood sugar? Three times daily  Blood sugar testing frequency justification:  Frequent hypoglycemia  What time of day are you checking your blood sugars (select all that apply)?  Before meals  Have you had any blood sugars above 200?  Yes 300 one time  Have you had any blood sugars below 70?  Yes 56 - there were a few. Usually happens during the night.    What symptoms do you notice when your blood sugar is low?  Shaky and sweating, disoriented    What concerns do you have today about your diabetes? Low blood sugar     Do you have any of these symptoms? (Select all that apply)  Blurry vision    Have you had a diabetic eye exam in the last 12 months? Yes- Date of last eye exam: 04/13/2021,  Location: [facility]           Hyperlipidemia Follow-Up      Are you regularly taking any medication or supplement to lower your cholesterol?   Yes- simvastatin (ZOCOR) 40 MG tablet    Are you having muscle aches or other side effects that you think could be " "caused by your cholesterol lowering medication?  No    Hypertension Follow-up      Do you check your blood pressure regularly outside of the clinic? No     Are you following a low salt diet? Yes    Are your blood pressures ever more than 140 on the top number (systolic) OR more   than 90 on the bottom number (diastolic), for example 140/90? unknown     BP Readings from Last 2 Encounters:   04/14/21 132/82   12/09/20 118/66     Hemoglobin A1C (%)   Date Value   04/14/2021 7.3 (H)   12/09/2020 7.9 (H)     LDL Cholesterol Calculated (mg/dL)   Date Value   06/15/2020 53   10/31/2019 63         How many servings of fruits and vegetables do you eat daily?  2-3    On average, how many sweetened beverages do you drink each day (Examples: soda, juice, sweet tea, etc.  Do NOT count diet or artificially sweetened beverages)?   1    How many days per week do you exercise enough to make your heart beat faster? 3 or less    How many minutes a day do you exercise enough to make your heart beat faster? 9 or less    How many days per week do you miss taking your medication? 0        Review of Systems   Constitutional, HEENT, cardiovascular, pulmonary, gi and gu systems are negative, except as otherwise noted.      Objective    /82 (BP Location: Left arm, Patient Position: Sitting, Cuff Size: Adult Large)   Pulse 76   Temp 96  F (35.6  C) (Tympanic)   Resp 16   Ht 1.621 m (5' 3.82\")   Wt 116.6 kg (257 lb)   LMP 01/14/2016 (Approximate)   SpO2 97%   BMI 44.36 kg/m    Body mass index is 44.36 kg/m .  Physical Exam   GENERAL: healthy, alert and no distress  NECK: no adenopathy, no asymmetry, masses, or scars and thyroid normal to palpation  RESP: lungs clear to auscultation - no rales, rhonchi or wheezes  CV: regular rate and rhythm, normal S1 S2, no S3 or S4, no murmur, click or rub, no peripheral edema and peripheral pulses strong  ABDOMEN: soft, nontender, no hepatosplenomegaly, no masses and bowel sounds normal  MS: no " gross musculoskeletal defects noted, no edema    Diabetic Foot Screen:  Any complaints of increased pain or numbness ? No  Is there a foot ulcer now or a history of foot ulcer? No  Does the foot have an abnormal shape? No  Are the nails thick, too long or ingrown? No  Are there any redness or open areas? No         Sensation Testing done at all points on the diagram with monofilament     Right Foot: Sensation Normal at all points  Left Foot: Sensation Normal at all points     Risk Category: 0- No loss of protective sensation  Performed by Kaia Elena MD                  Results for orders placed or performed in visit on 04/14/21 (from the past 24 hour(s))   Hemoglobin A1c   Result Value Ref Range    Hemoglobin A1C 7.3 (H) 0 - 5.6 %

## 2021-04-20 ENCOUNTER — VIRTUAL VISIT (OUTPATIENT)
Dept: FAMILY MEDICINE | Facility: CLINIC | Age: 61
End: 2021-04-20
Payer: COMMERCIAL

## 2021-04-20 DIAGNOSIS — J01.90 ACUTE SINUSITIS WITH SYMPTOMS > 10 DAYS: Primary | ICD-10-CM

## 2021-04-20 DIAGNOSIS — E66.01 MORBID OBESITY (H): ICD-10-CM

## 2021-04-20 DIAGNOSIS — B37.31 CANDIDIASIS OF VULVA AND VAGINA: ICD-10-CM

## 2021-04-20 PROCEDURE — 99213 OFFICE O/P EST LOW 20 MIN: CPT | Mod: TEL | Performed by: NURSE PRACTITIONER

## 2021-04-20 RX ORDER — FLUCONAZOLE 150 MG/1
TABLET ORAL
Qty: 4 TABLET | Refills: 0 | Status: SHIPPED | OUTPATIENT
Start: 2021-04-20 | End: 2021-06-17

## 2021-04-20 NOTE — PATIENT INSTRUCTIONS
Patient Education     Acute Bacterial Rhinosinusitis (ABRS)    Acute bacterial rhinosinusitis (ABRS) is an infection of your nasal cavity and sinuses. It s caused by bacteria. Acute means that you ve had symptoms for less than 4 weeks, but possibly up to 12 weeks.  Understanding your sinuses  The nasal cavity is the large air-filled space behind your nose. The sinuses are a group of spaces formed by the bones of your face. They connect with your nasal cavity. ABRS causes the tissue lining these spaces to become inflamed. Mucus may not drain normally. This leads to facial pain and other symptoms.  What causes ABRS?  ABRS most often follows an upper respiratory infection caused by a virus. Bacteria then infect the lining of your nasal cavity and sinuses. But you can also get ABRS if you have:    Nasal allergies    Long-term nasal swelling and congestion not caused by allergies    Blockage in the nose  Symptoms of ABRS  The symptoms of ABRS may be different for each person and include:    Nasal congestion or blockage    Pain or pressure in the face    Thick, colored drainage from the nose  Other symptoms may include:    Runny nose    Fluid draining from the nose down the throat (postnasal drip)    Headache    Cough    Pain    Fever  Diagnosing ABRS  ABRS may be diagnosed if you ve had an upper respiratory infection like a cold and cough for 10 or more days without improvement or with worsening symptoms. Your healthcare provider will ask about your symptoms and your medical history. The provider will check your vital signs, including your temperature. You ll have a physical exam. The healthcare provider will check your ears, nose, and throat. You likely won t need any tests. If ABRS comes back, you may have a culture or other tests.  Treatment for ABRS  Treatment may include:    Antibiotic medicine. This is for symptoms that last for at least 10 to 14 days.    Nasal corticosteroid medicine. Drops or spray used in the  nose can lessen swelling and congestion.    Over-the-counter pain medicine. This is to lessen sinus pain and pressure.    Nasal decongestant medicine. Spray or drops may help to lessen congestion. Do not use them for more than a few days.    Salt wash (saline irrigation). This can help to loosen mucus.  Possible complications of ABRS  ABRS may come back or become long-term (chronic). In rare cases, ABRS may cause complications such as:     Inflamed tissue around the brain and spinal cord (meningitis)    Inflamed tissue around the eyes (orbital cellulitis)    Inflamed bones around the sinuses (osteitis)  These problems may need to be treated in a hospital with intravenous (IV) antibiotic medicine or surgery.  When to call the healthcare provider  Call your healthcare provider if you have any of the following:    Symptoms that don t get better, or get worse    Symptoms that don t get better after 3 to 5 days on antibiotics    Trouble seeing    Swelling around your eyes    Confusion or trouble staying awake   [employer] last reviewed this educational content on 5/1/2017 2000-2021 [employer]. All rights reserved. This information is not intended as a substitute for professional medical care. Always follow your healthcare professional's instructions.

## 2021-04-20 NOTE — PROGRESS NOTES
Melyssa is a 60 year old who is being evaluated via a billable telephone visit.      What phone number would you like to be contacted at? [phone]  How would you like to obtain your AVS? MyChart    Assessment & Plan     Acute sinusitis with symptoms > 10 days    - amoxicillin-clavulanate (AUGMENTIN) 875-125 MG tablet; Take 1 tablet by mouth 2 times daily for 10 days    Candidiasis of vulva and vagina  Recently treated for this, last Diflucan was Sunday- will continue q 3 day treatment through duration of antibiotic    - fluconazole (DIFLUCAN) 150 MG tablet; Take 1 tab by mouth every 3 days for vaginitis symptoms    Morbid obesity (H)  Monitoring, discussed with PCP               FUTURE APPOINTMENTS:       - Follow up in 1 week for persistent symptoms, sooner for new or worsening symptoms.     Patient Instructions       Patient Education     Acute Bacterial Rhinosinusitis (ABRS)    Acute bacterial rhinosinusitis (ABRS) is an infection of your nasal cavity and sinuses. It s caused by bacteria. Acute means that you ve had symptoms for less than 4 weeks, but possibly up to 12 weeks.  Understanding your sinuses  The nasal cavity is the large air-filled space behind your nose. The sinuses are a group of spaces formed by the bones of your face. They connect with your nasal cavity. ABRS causes the tissue lining these spaces to become inflamed. Mucus may not drain normally. This leads to facial pain and other symptoms.  What causes ABRS?  ABRS most often follows an upper respiratory infection caused by a virus. Bacteria then infect the lining of your nasal cavity and sinuses. But you can also get ABRS if you have:    Nasal allergies    Long-term nasal swelling and congestion not caused by allergies    Blockage in the nose  Symptoms of ABRS  The symptoms of ABRS may be different for each person and include:    Nasal congestion or blockage    Pain or pressure in the face    Thick, colored drainage from the nose  Other  symptoms may include:    Runny nose    Fluid draining from the nose down the throat (postnasal drip)    Headache    Cough    Pain    Fever  Diagnosing ABRS  ABRS may be diagnosed if you ve had an upper respiratory infection like a cold and cough for 10 or more days without improvement or with worsening symptoms. Your healthcare provider will ask about your symptoms and your medical history. The provider will check your vital signs, including your temperature. You ll have a physical exam. The healthcare provider will check your ears, nose, and throat. You likely won t need any tests. If ABRS comes back, you may have a culture or other tests.  Treatment for ABRS  Treatment may include:    Antibiotic medicine. This is for symptoms that last for at least 10 to 14 days.    Nasal corticosteroid medicine. Drops or spray used in the nose can lessen swelling and congestion.    Over-the-counter pain medicine. This is to lessen sinus pain and pressure.    Nasal decongestant medicine. Spray or drops may help to lessen congestion. Do not use them for more than a few days.    Salt wash (saline irrigation). This can help to loosen mucus.  Possible complications of ABRS  ABRS may come back or become long-term (chronic). In rare cases, ABRS may cause complications such as:     Inflamed tissue around the brain and spinal cord (meningitis)    Inflamed tissue around the eyes (orbital cellulitis)    Inflamed bones around the sinuses (osteitis)  These problems may need to be treated in a hospital with intravenous (IV) antibiotic medicine or surgery.  When to call the healthcare provider  Call your healthcare provider if you have any of the following:    Symptoms that don t get better, or get worse    Symptoms that don t get better after 3 to 5 days on antibiotics    Trouble seeing    Swelling around your eyes    Confusion or trouble staying awake   [employer] last reviewed this educational content on 5/1/2017 2000-2021 [employer]. All rights reserved. This information is not intended as a substitute for professional medical care. Always follow your healthcare professional's instructions.               No follow-ups on file.    ALYSON Lincoln CNP  [facility] [place]    Susannah Ragsdale is a 60 year old who presents for the following health issue    HPI     Acute Illness  Acute illness concerns: sinus and dental pain/pressure  Onset/Duration: over 1 week   Symptoms:  Fever: no  Chills/Sweats: no  Headache (location?): no  Sinus Pressure: YES  Conjunctivitis:  YES- painful pressure, no redness  Ear Pain: YES: right ear yesterday   Rhinorrhea: YES- purulent- copious  Congestion: YES- nasal  Sore Throat: no  Cough: no  Wheeze: no  Decreased Appetite: no  Nausea: YES  Vomiting: no  Diarrhea: no  Dysuria/Freq.: no  Dysuria or Hematuria: no  Fatigue/Achiness: no  Sick/Strep Exposure: no  Therapies tried and outcome: antihistamine, decongestant-somewhat effective       Review of Systems   Constitutional, HEENT, cardiovascular, pulmonary, gi and gu systems are negative, except as otherwise noted.      Objective           Vitals:  No vitals were obtained today due to virtual visit.    Physical Exam   alert and no distress  PSYCH: Alert and oriented times 3; coherent speech, normal   rate and volume, able to articulate logical thoughts, able   to abstract reason, no tangential thoughts, no hallucinations   or delusions  Her affect is normal  RESP: No cough, no audible wheezing, able to talk in full sentences  Remainder of exam unable to be completed due to telephone visits                Phone call duration: 10 minutes

## 2021-05-03 DIAGNOSIS — Z79.4 TYPE 2 DIABETES MELLITUS WITH CHRONIC KIDNEY DISEASE, WITH LONG-TERM CURRENT USE OF INSULIN, UNSPECIFIED CKD STAGE (H): ICD-10-CM

## 2021-05-03 DIAGNOSIS — E11.22 TYPE 2 DIABETES MELLITUS WITH CHRONIC KIDNEY DISEASE, WITH LONG-TERM CURRENT USE OF INSULIN, UNSPECIFIED CKD STAGE (H): ICD-10-CM

## 2021-05-04 ENCOUNTER — TRANSFERRED RECORDS (OUTPATIENT)
Dept: HEALTH INFORMATION MANAGEMENT | Facility: CLINIC | Age: 61
End: 2021-05-04

## 2021-05-06 RX ORDER — LANCETS
EACH MISCELLANEOUS
Qty: 408 EACH | Refills: 3 | Status: SHIPPED | OUTPATIENT
Start: 2021-05-06 | End: 2023-07-19

## 2021-05-06 NOTE — TELEPHONE ENCOUNTER
"Prescription approved per [facility] Refill Protocol.Kristen Liu RN    Requested Prescriptions   Pending Prescriptions Disp Refills     blood glucose monitoring (ACCU-CHEK FASTCLIX) lancets [Pharmacy Med Name: ACCU-CHEK FASTCLIX LANCETS  MISC] 408 each 3     Sig: USE TO TEST BLOOD SUGAR FOUR TIMES A DAY OR AS DIRECTED       Diabetic Supplies Protocol Failed - 5/3/2021  4:42 PM        Failed - Recent (6 mo) or future (30 days) visit within the authorizing provider's specialty     Patient had office visit in the last 6 months or has a visit in the next 30 days with authorizing provider.  See \"Patient Info\" tab in inbasket, or \"Choose Columns\" in Meds & Orders section of the refill encounter.            Passed - Medication is active on med list        Passed - Patient is 18 years of age or older             "

## 2021-05-25 ENCOUNTER — RECORDS - HEALTHEAST (OUTPATIENT)
Dept: ADMINISTRATIVE | Facility: CLINIC | Age: 61
End: 2021-05-25

## 2021-06-11 DIAGNOSIS — Z79.4 TYPE 2 DIABETES MELLITUS WITH CHRONIC KIDNEY DISEASE, WITH LONG-TERM CURRENT USE OF INSULIN, UNSPECIFIED CKD STAGE (H): ICD-10-CM

## 2021-06-11 DIAGNOSIS — E11.22 TYPE 2 DIABETES MELLITUS WITH CHRONIC KIDNEY DISEASE, WITH LONG-TERM CURRENT USE OF INSULIN, UNSPECIFIED CKD STAGE (H): ICD-10-CM

## 2021-06-11 RX ORDER — BLOOD SUGAR DIAGNOSTIC
STRIP MISCELLANEOUS
Qty: 300 STRIP | Refills: 1 | Status: SHIPPED | OUTPATIENT
Start: 2021-06-11 | End: 2021-12-10

## 2021-06-17 DIAGNOSIS — B37.31 CANDIDIASIS OF VULVA AND VAGINA: ICD-10-CM

## 2021-06-17 RX ORDER — FLUCONAZOLE 150 MG/1
TABLET ORAL
Qty: 4 TABLET | Refills: 0 | Status: SHIPPED | OUTPATIENT
Start: 2021-06-17 | End: 2021-09-23

## 2021-06-17 NOTE — TELEPHONE ENCOUNTER
"Requested Prescriptions   Pending Prescriptions Disp Refills     fluconazole (DIFLUCAN) 150 MG tablet [Pharmacy Med Name: FLUCONAZOLE 150MG TABS] 4 tablet 0     Sig: TAKE 1 TABLET BY MOUTH EVERY 3 DAYS FOR VAGINITIS SYMPTOMS       Antifungal Agents Failed - 6/17/2021  3:16 PM        Failed - Not Fluconazole or Terconazole      If oral Fluconazole or Terconazole, may refill if indicated in progress notes.           Passed - Recent (12 mo) or future (30 days) visit within the authorizing provider's specialty     Patient has had an office visit with the authorizing provider or a provider within the authorizing providers department within the previous 12 mos or has a future within next 30 days. See \"Patient Info\" tab in inbasket, or \"Choose Columns\" in Meds & Orders section of the refill encounter.              Passed - Medication is active on med list             "

## 2021-07-13 ENCOUNTER — RECORDS - HEALTHEAST (OUTPATIENT)
Dept: ADMINISTRATIVE | Facility: CLINIC | Age: 61
End: 2021-07-13

## 2021-07-21 ENCOUNTER — RECORDS - HEALTHEAST (OUTPATIENT)
Dept: ADMINISTRATIVE | Facility: CLINIC | Age: 61
End: 2021-07-21

## 2021-09-11 ENCOUNTER — HEALTH MAINTENANCE LETTER (OUTPATIENT)
Age: 61
End: 2021-09-11

## 2021-09-23 ENCOUNTER — OFFICE VISIT (OUTPATIENT)
Dept: FAMILY MEDICINE | Facility: CLINIC | Age: 61
End: 2021-09-23
Payer: COMMERCIAL

## 2021-09-23 VITALS
SYSTOLIC BLOOD PRESSURE: 122 MMHG | OXYGEN SATURATION: 95 % | TEMPERATURE: 98.2 F | RESPIRATION RATE: 16 BRPM | HEIGHT: 64 IN | WEIGHT: 260 LBS | HEART RATE: 86 BPM | DIASTOLIC BLOOD PRESSURE: 66 MMHG | BODY MASS INDEX: 44.39 KG/M2

## 2021-09-23 DIAGNOSIS — Z79.4 TYPE 2 DIABETES MELLITUS WITH CHRONIC KIDNEY DISEASE, WITH LONG-TERM CURRENT USE OF INSULIN, UNSPECIFIED CKD STAGE (H): Primary | ICD-10-CM

## 2021-09-23 DIAGNOSIS — E11.22 TYPE 2 DIABETES MELLITUS WITH CHRONIC KIDNEY DISEASE, WITH LONG-TERM CURRENT USE OF INSULIN, UNSPECIFIED CKD STAGE (H): Primary | ICD-10-CM

## 2021-09-23 DIAGNOSIS — I10 ESSENTIAL HYPERTENSION WITH GOAL BLOOD PRESSURE LESS THAN 140/90: ICD-10-CM

## 2021-09-23 DIAGNOSIS — K31.84 GASTROPARESIS DUE TO DM (H): ICD-10-CM

## 2021-09-23 DIAGNOSIS — E11.43 GASTROPARESIS DUE TO DM (H): ICD-10-CM

## 2021-09-23 DIAGNOSIS — J45.20 MILD INTERMITTENT ASTHMA, UNCOMPLICATED: ICD-10-CM

## 2021-09-23 DIAGNOSIS — E78.5 HYPERLIPIDEMIA LDL GOAL <70: ICD-10-CM

## 2021-09-23 LAB — HBA1C MFR BLD: 7.4 % (ref 0–5.6)

## 2021-09-23 PROCEDURE — 36415 COLL VENOUS BLD VENIPUNCTURE: CPT | Performed by: FAMILY MEDICINE

## 2021-09-23 PROCEDURE — 99214 OFFICE O/P EST MOD 30 MIN: CPT | Performed by: FAMILY MEDICINE

## 2021-09-23 PROCEDURE — 83036 HEMOGLOBIN GLYCOSYLATED A1C: CPT | Performed by: FAMILY MEDICINE

## 2021-09-23 RX ORDER — INSULIN GLARGINE 300 U/ML
100 INJECTION, SOLUTION SUBCUTANEOUS DAILY
Qty: 40.5 ML | Refills: 1 | Status: SHIPPED | OUTPATIENT
Start: 2021-09-23 | End: 2022-03-30

## 2021-09-23 RX ORDER — ROSUVASTATIN CALCIUM 10 MG/1
10 TABLET, COATED ORAL DAILY
Qty: 90 TABLET | Refills: 1 | Status: SHIPPED | OUTPATIENT
Start: 2021-09-23 | End: 2022-03-07

## 2021-09-23 RX ORDER — METOCLOPRAMIDE 10 MG/1
10 TABLET ORAL 2 TIMES DAILY
Qty: 180 TABLET | Refills: 1 | Status: SHIPPED | OUTPATIENT
Start: 2021-09-23 | End: 2022-03-30

## 2021-09-23 RX ORDER — MONTELUKAST SODIUM 10 MG/1
1 TABLET ORAL AT BEDTIME
Qty: 90 TABLET | Refills: 1 | Status: SHIPPED | OUTPATIENT
Start: 2021-09-23 | End: 2022-03-30

## 2021-09-23 RX ORDER — MULTIVIT-MIN/IRON/FOLIC ACID/K 18-600-40
1 CAPSULE ORAL EVERY EVENING
COMMUNITY

## 2021-09-23 RX ORDER — METFORMIN HCL 500 MG
1000 TABLET, EXTENDED RELEASE 24 HR ORAL 2 TIMES DAILY WITH MEALS
Qty: 360 TABLET | Refills: 1 | Status: SHIPPED | OUTPATIENT
Start: 2021-09-23 | End: 2022-03-30

## 2021-09-23 RX ORDER — LOSARTAN POTASSIUM 100 MG/1
100 TABLET ORAL DAILY
Qty: 90 TABLET | Refills: 1 | Status: SHIPPED | OUTPATIENT
Start: 2021-09-23 | End: 2022-03-30

## 2021-09-23 ASSESSMENT — MIFFLIN-ST. JEOR: SCORE: 1731.18

## 2021-09-23 NOTE — PROGRESS NOTES
Assessment & Plan     Type 2 diabetes mellitus with chronic kidney disease, with long-term current use of insulin, unspecified CKD stage (H)     - Hemoglobin A1c; Future  - Insulin Glargine, 2 Unit Dial, (TOUJEO MAX SOLOSTAR) 300 UNIT/ML SOPN; Inject 100 Units Subcutaneous daily  - metFORMIN (GLUCOPHAGE-XR) 500 MG 24 hr tablet; Take 2 tablets (1,000 mg) by mouth 2 times daily (with meals)  - Hemoglobin A1c    Gastroparesis due to DM (H)     - metoclopramide (REGLAN) 10 MG tablet; Take 1 tablet (10 mg) by mouth 2 times daily    Mild intermittent asthma, uncomplicated     - fluticasone-salmeterol (ADVAIR DISKUS) 100-50 MCG/DOSE inhaler; Inhale 1 puff into the lungs 2 times daily  - montelukast (SINGULAIR) 10 MG tablet; Take 1 tablet (10 mg) by mouth At Bedtime    Essential hypertension with goal blood pressure less than 140/90  Well controlled  - losartan (COZAAR) 100 MG tablet; Take 1 tablet (100 mg) by mouth daily    Hyperlipidemia LDL goal <70  Stop the simvastatin and try rosuvastatin due to some joint pain  - rosuvastatin (CRESTOR) 10 MG tablet; Take 1 tablet (10 mg) by mouth daily        Will get flu shot at work.          No follow-ups on file.    Kaia Elena MD  [facility]   Melyssa is a 61 year old who presents for the following health issues     HPI     Diabetes Follow-up    How often are you checking your blood sugar? Three times daily    Morning usually around 120    Blood sugar testing frequency justification:  Uncontrolled diabetes  What time of day are you checking your blood sugars (select all that apply)?  Before meals  Have you had any blood sugars above 200?  Was seldom hitting 200 until this last week   Have you had any blood sugars below 70?  No    What symptoms do you notice when your blood sugar is low?  Shaky and sweats    What concerns do you have today about your diabetes? None and Low blood sugar     Do you have any of these symptoms? (Select  all that apply)  No numbness or tingling in feet.  No redness, sores or blisters on feet.  No complaints of excessive thirst.  No reports of blurry vision.  No significant changes to weight.  Feels like she has lost stregnth in legs    Humalog - usually only doing 26 at a meal, varies this dose based on where her pre-meal blood sugars are.     Hyperlipidemia Follow-Up      Are you regularly taking any medication or supplement to lower your cholesterol?   Yes- simvastatin    Are you having muscle aches or other side effects that you think could be caused by your cholesterol lowering medication?  Yes- mucle aches    Hypertension Follow-up      Do you check your blood pressure regularly outside of the clinic? No     Are you following a low salt diet? Yes    Are your blood pressures ever more than 140 on the top number (systolic) OR more   than 90 on the bottom number (diastolic), for example 140/90? No    BP Readings from Last 2 Encounters:   09/23/21 122/66   04/14/21 132/82     Hemoglobin A1C (%)   Date Value   04/14/2021 7.3 (H)   12/09/2020 7.9 (H)     LDL Cholesterol Calculated (mg/dL)   Date Value   04/14/2021 63   06/15/2020 53         How many servings of fruits and vegetables do you eat daily?  2-3    On average, how many sweetened beverages do you drink each day (Examples: soda, juice, sweet tea, etc.  Do NOT count diet or artificially sweetened beverages)?   0    How many days per week do you exercise enough to make your heart beat faster? 3 or less    How many minutes a day do you exercise enough to make your heart beat faster? 9 or less    How many days per week do you miss taking your medication? 0        Review of Systems   Constitutional, HEENT, cardiovascular, pulmonary, gi and gu systems are negative, except as otherwise noted.      Objective    /66 (BP Location: Right arm, Patient Position: Sitting, Cuff Size: Adult Large)   Pulse 86   Temp 98.2  F (36.8  C) (Tympanic)   Resp 16   Ht 1.621  "m (5' 3.8\")   Wt 117.9 kg (260 lb)   LMP 01/14/2016 (Approximate)   SpO2 95%   Breastfeeding No   BMI 44.91 kg/m    Body mass index is 44.91 kg/m .  Physical Exam   GENERAL: healthy, alert and no distress  NECK: no adenopathy, no asymmetry, masses, or scars and thyroid normal to palpation  RESP: lungs clear to auscultation - no rales, rhonchi or wheezes  CV: regular rate and rhythm, normal S1 S2, no S3 or S4, no murmur, click or rub, no peripheral edema and peripheral pulses strong  ABDOMEN: soft, nontender, no hepatosplenomegaly, no masses and bowel sounds normal  MS: right ankle large ganglion  Some swelling over the right posterior lateral ankle    HgbA1c pending              "

## 2021-10-08 DIAGNOSIS — J31.0 CHRONIC RHINITIS: ICD-10-CM

## 2021-10-12 RX ORDER — PSEUDOEPHEDRINE HCL 30 MG/1
TABLET, FILM COATED ORAL
Qty: 360 TABLET | Refills: 0 | Status: SHIPPED | OUTPATIENT
Start: 2021-10-12 | End: 2022-01-24

## 2021-10-25 ENCOUNTER — TELEPHONE (OUTPATIENT)
Dept: FAMILY MEDICINE | Facility: CLINIC | Age: 61
End: 2021-10-25

## 2021-10-25 DIAGNOSIS — Z79.4 TYPE 2 DIABETES MELLITUS WITH CHRONIC KIDNEY DISEASE, WITH LONG-TERM CURRENT USE OF INSULIN, UNSPECIFIED CKD STAGE (H): ICD-10-CM

## 2021-10-25 DIAGNOSIS — E11.22 TYPE 2 DIABETES MELLITUS WITH CHRONIC KIDNEY DISEASE, WITH LONG-TERM CURRENT USE OF INSULIN, UNSPECIFIED CKD STAGE (H): ICD-10-CM

## 2021-10-25 RX ORDER — INSULIN LISPRO 100 [IU]/ML
INJECTION, SOLUTION INTRAVENOUS; SUBCUTANEOUS
Qty: 90 ML | Refills: 3 | Status: CANCELLED | OUTPATIENT
Start: 2021-10-25

## 2021-10-25 RX ORDER — INSULIN ASPART 100 [IU]/ML
INJECTION, SOLUTION INTRAVENOUS; SUBCUTANEOUS
Qty: 90 ML | Refills: 3 | Status: SHIPPED | OUTPATIENT
Start: 2021-10-25 | End: 2022-03-30

## 2021-10-25 NOTE — TELEPHONE ENCOUNTER
Message from pharmacy:  NOT COVERED - USE [employer] Per insurance   Please send a new prescription for novolog

## 2021-12-08 ENCOUNTER — MYC MEDICAL ADVICE (OUTPATIENT)
Dept: FAMILY MEDICINE | Facility: CLINIC | Age: 61
End: 2021-12-08
Payer: COMMERCIAL

## 2021-12-08 ENCOUNTER — VIRTUAL VISIT (OUTPATIENT)
Dept: INTERNAL MEDICINE | Facility: CLINIC | Age: 61
End: 2021-12-08
Payer: COMMERCIAL

## 2021-12-08 VITALS
WEIGHT: 260 LBS | OXYGEN SATURATION: 98 % | BODY MASS INDEX: 44.91 KG/M2 | DIASTOLIC BLOOD PRESSURE: 98 MMHG | SYSTOLIC BLOOD PRESSURE: 150 MMHG

## 2021-12-08 DIAGNOSIS — J45.20 MILD INTERMITTENT ASTHMA WITHOUT COMPLICATION: ICD-10-CM

## 2021-12-08 DIAGNOSIS — R06.00 DYSPNEA, UNSPECIFIED TYPE: Primary | ICD-10-CM

## 2021-12-08 PROCEDURE — 99213 OFFICE O/P EST LOW 20 MIN: CPT | Mod: 95 | Performed by: NURSE PRACTITIONER

## 2021-12-08 NOTE — PROGRESS NOTES
Internal Medicine Virtual Visit  [facility]   Patient Name: Bria Davis  Patient Age: 61 year old  YOB: 1960  MRN: [id number]    Date of Visit: 12/8/2021  Patient presents with:  COVID test: x3 days shortness of breath- coughing- body achs- no fever- exposure at work        Video Start Time: 5:31 PM      Assessment / Plan / Medical Decision Making:    Problem List Items Addressed This Visit        Respiratory    Mild intermittent asthma      Other Visit Diagnoses     Dyspnea, unspecified type    -  Primary    Relevant Orders    Symptomatic COVID-19 Virus (Coronavirus) by PCR    Influenza A & B Antigen - Clinic Collect         - Due to high risk exposure and current symptoms, this is suspicious for COVID although she is vaccinated fully. We will also test her for flu given the myalgias and cough. She is advised to quarantine until results of her tests are known   - If COVID is positive, consider Monoclonal antibodies  - Reviewed return precautions      I am having Melyssa Davis maintain her Multivitamin Adults 50+, Calcium-Vitamin D, ferrous sulfate, aspirin, BD PEDRO LUIS U/F, simvastatin, albuterol, esomeprazole, Ozempic (0.25 or 0.5 MG/DOSE), metoprolol succinate ER, blood glucose monitoring, Accu-Chek Guide, Vitamin C, metoclopramide, fluticasone-salmeterol, Toujeo Max SoloStar, losartan, metFORMIN, montelukast, rosuvastatin, SudoGest, and NovoLOG FLEXPEN.          Orders Placed This Encounter   Procedures     Symptomatic COVID-19 Virus (Coronavirus) by PCR   Followup: No follow-ups on file. earlier if needed.      Noy Garces, DNP, APRN, CNP           HPI:  Bria Davis is a 61 year old year old who was contacted virtually today for an ill visit.     Monday she started to feel SOB and had to use her rescue inhaler which is atypical for her as she generally has well controlled asthma. Tuesday she started to feel unwell and got a call from her boss that they had COVID. She  took a home test and it was negative. She has now had body aches, cough, myalgias, and SOB. No fever. She has had COVID before, 2020.  The albuterol hasn't seemed to help although she realized today that it was .     Answers for HPI/ROS submitted by the patient on 2021  Do you have a cough?: Yes  Are you experiencing any wheezing in your chest?: Yes  Do you have any shortness of breath?: Yes  Use of rescue inhaler:: 2-3 times per day  Taking Asthma medication as prescribed:: 0  Asthma triggers:: unaware of any triggers  Have you had any Emergency Room visits, Urgent Care visits, or Hospital Admissions since your last office visit?: No  On average, how many sweetened beverages do you drink each day (Examples: soda, juice, sweet tea, etc.  Do NOT count diet or artificially sweetened beverages)?: 0  How many days a week do you exercise enough to make your heart beat faster?: 3 or less  How many days per week do you miss taking your medication?: 0        Health Maintenance Review  Health Maintenance   Topic Date Due     PREVENTIVE CARE VISIT  Never done     ADVANCE CARE PLANNING  Never done     Pneumococcal Vaccine: Pediatrics (0 to 5 Years) and At-Risk Patients (6 to 64 Years) (1 of 2 - PPSV23) Never done     ASTHMA ACTION PLAN  2019     PAP  2021     ASTHMA CONTROL TEST  2021     DTAP/TDAP/TD IMMUNIZATION (3 - Td or Tdap) 2021     A1C  2022     EYE EXAM  2022     BMP  2022     LIPID  2022     MICROALBUMIN  2022     DIABETIC FOOT EXAM  2022     ANNUAL REVIEW OF HM ORDERS  2022     MAMMO SCREENING  2023     COLORECTAL CANCER SCREENING  2024     HEPATITIS C SCREENING  Completed     HIV SCREENING  Completed     PHQ-2  Completed     INFLUENZA VACCINE  Completed     ZOSTER IMMUNIZATION  Completed     COVID-19 Vaccine  Completed     IPV IMMUNIZATION  Aged Out     MENINGITIS IMMUNIZATION  Aged Out       Current Scheduled  Meds:  Outpatient Encounter Medications as of 12/8/2021   Medication Sig Dispense Refill     ACCU-CHEK GUIDE test strip USE TO TEST BLOOD SUGAR THREE TIMES A DAY OR AS DIRECTED 300 strip 1     albuterol (PROAIR HFA/PROVENTIL HFA/VENTOLIN HFA) 108 (90 Base) MCG/ACT inhaler Inhale 2 puffs into the lungs every 4 hours as needed for shortness of breath / dyspnea or wheezing May substitute the equivalent medication per insurance preference. 18 g 3     Ascorbic Acid (VITAMIN C) 500 MG CAPS        aspirin (ASA) 81 MG EC tablet Take 1 tablet (81 mg) by mouth daily 1 tablet 0     blood glucose monitoring (ACCU-CHEK FASTCLIX) lancets USE TO TEST BLOOD SUGAR FOUR TIMES A DAY OR AS DIRECTED 408 each 3     Calcium Carb-Cholecalciferol (CALCIUM-VITAMIN D) 600-400 MG-UNIT TABS Take 1 tablet by mouth daily       esomeprazole (NEXIUM) 40 MG DR capsule Take 1 capsule (40 mg) by mouth every morning (before breakfast) 90 capsule 3     ferrous sulfate 140 (45 Fe) MG TBCR CR tablet Take 140 mg by mouth daily       fluticasone-salmeterol (ADVAIR DISKUS) 100-50 MCG/DOSE inhaler Inhale 1 puff into the lungs 2 times daily 180 each 1     insulin aspart (NOVOLOG FLEXPEN) 100 UNIT/ML pen 32 units before breakfast, 32 units before lunch, 32 units before dinner 90 mL 3     Insulin Glargine, 2 Unit Dial, (TOUJEO MAX SOLOSTAR) 300 UNIT/ML SOPN Inject 100 Units Subcutaneous daily 40.5 mL 1     insulin pen needle (BD PEDRO LUIS U/F) 32G X 4 MM miscellaneous USE 4 DAILY USE AS DIRECTED 400 each 11     losartan (COZAAR) 100 MG tablet Take 1 tablet (100 mg) by mouth daily 90 tablet 1     metFORMIN (GLUCOPHAGE-XR) 500 MG 24 hr tablet Take 2 tablets (1,000 mg) by mouth 2 times daily (with meals) 360 tablet 1     metoclopramide (REGLAN) 10 MG tablet Take 1 tablet (10 mg) by mouth 2 times daily 180 tablet 1     metoprolol succinate ER (TOPROL-XL) 25 MG 24 hr tablet Take 1 tablet (25 mg) by mouth daily 90 tablet 3     montelukast (SINGULAIR) 10 MG tablet Take 1  tablet (10 mg) by mouth At Bedtime 90 tablet 1     Multiple Vitamins-Minerals (MULTIVITAMIN ADULTS 50+) TABS Take 1 tablet by mouth daily       rosuvastatin (CRESTOR) 10 MG tablet Take 1 tablet (10 mg) by mouth daily 90 tablet 1     Semaglutide,0.25 or 0.5MG/DOS, (OZEMPIC, 0.25 OR 0.5 MG/DOSE,) 2 MG/1.5ML SOPN INJECT 0.5MG UNDER THE SKIN ONCE A WEEK 4.5 mL 3     SUDOGEST 30 MG tablet TAKE 2 TABLETS BY MOUTH EVERY 12 HOURS AS NEEDED FOR CONGESTION 360 tablet 0     simvastatin (ZOCOR) 40 MG tablet Take 1 tablet (40 mg) by mouth At Bedtime (Patient not taking: Reported on 12/8/2021) 90 tablet 1     No facility-administered encounter medications on file as of 12/8/2021.       Objective / Physical Examination:  Vitals:    12/08/21 1736   BP: (!) 150/98   SpO2: 98%   Weight: 117.9 kg (260 lb)     Wt Readings from Last 3 Encounters:   12/08/21 117.9 kg (260 lb)   09/23/21 117.9 kg (260 lb)   04/14/21 116.6 kg (257 lb)     Body mass index is 44.91 kg/m .     GENERAL: Healthy, alert and no distress  EYES: Eyes grossly normal to inspection.  No discharge or erythema, or obvious scleral/conjunctival abnormalities.  RESP: No audible wheeze, cough, or visible cyanosis.  No visible retractions or increased work of breathing.    SKIN: Visible skin clear. No significant rash  NEURO: Cranial nerves grossly intact.  Mentation and speech appropriate for age.        Video-Visit Details    Type of service:  Video Visit    Video End Time:5:56 PM    Originating Location (pt. Location): Home    Distant Location (provider location):  [facility]     Platform used for Video Visit: [employer]

## 2021-12-09 ENCOUNTER — TELEPHONE (OUTPATIENT)
Dept: FAMILY MEDICINE | Facility: CLINIC | Age: 61
End: 2021-12-09
Payer: COMMERCIAL

## 2021-12-09 NOTE — TELEPHONE ENCOUNTER
Looking at the history of the note yesterday with Noy Garces CNP she had symptom onset 3 days ago. At this point an antibiotic would not be appropriate for sinusitis. I don't see the history of two weeks documented from her virtual visit. I would recommend continued symptomatic management at this time for sinusitis symptoms with warm compress to face, afrin x 3 days, and neti pot to help reduce symptoms. If this is COVID I would recommend looking into monoclonal antibodies. If negative and symptoms continue to be present then we could consider an antibiotic to treat symptoms but this would need to be another visit to address symptoms or follow up with Noy Garces whom the original visit was with.   ALYSON Harper CNP

## 2021-12-09 NOTE — TELEPHONE ENCOUNTER
Reason for call:  Patient reporting a symptom    Symptom or request: Pt has had sinus infection symptoms x 2 weeks.  Pt was exposed to covid on Monday and started to have resp issues 2 days ago, so she did a virtual visit last night and is being tested tomorrow.  Pt wants to be treated for a sinus infection now and does not want to wait for COVID tests to come back.  [facility]  Please call patient and advise.      Duration (how long have symptoms been present): ongoing    Have you been treated for this before? Yes    Additional comments:     Phone Number patient can be reached at:  Home number on file [phone] (home)    Best Time:  any    Can we leave a detailed message on this number:  YES    Call taken on 12/9/2021 at 10:09 AM by Ciara Paez

## 2021-12-09 NOTE — TELEPHONE ENCOUNTER
Patient had a virtual visit last night. She is going to have testing done for COVID.   Patient explained that she has been having sinus symptoms for the past two weeks. She has had congestion, sinus pressure, drainage down the back of her throat, pain in teeth, both ears hurting. Breathing is ok - no shortness of breath. Denies temp. Patient tried to do an evisit today to get an antibiotic, but it is directing her to go to the ED. She is not able to sign up for an visit. She would like antibiotic to go to [facility]. Routed to provider.   Kristen Liu RN

## 2021-12-10 ENCOUNTER — LAB (OUTPATIENT)
Dept: FAMILY MEDICINE | Facility: CLINIC | Age: 61
End: 2021-12-10
Attending: NURSE PRACTITIONER
Payer: COMMERCIAL

## 2021-12-10 ENCOUNTER — TELEPHONE (OUTPATIENT)
Dept: FAMILY MEDICINE | Facility: CLINIC | Age: 61
End: 2021-12-10

## 2021-12-10 DIAGNOSIS — E11.22 TYPE 2 DIABETES MELLITUS WITH CHRONIC KIDNEY DISEASE, WITH LONG-TERM CURRENT USE OF INSULIN, UNSPECIFIED CKD STAGE (H): ICD-10-CM

## 2021-12-10 DIAGNOSIS — Z79.4 TYPE 2 DIABETES MELLITUS WITH CHRONIC KIDNEY DISEASE, WITH LONG-TERM CURRENT USE OF INSULIN, UNSPECIFIED CKD STAGE (H): ICD-10-CM

## 2021-12-10 DIAGNOSIS — E11.22 TYPE 2 DIABETES MELLITUS WITH CHRONIC KIDNEY DISEASE, WITH LONG-TERM CURRENT USE OF INSULIN, UNSPECIFIED CKD STAGE (H): Primary | ICD-10-CM

## 2021-12-10 DIAGNOSIS — Z79.4 TYPE 2 DIABETES MELLITUS WITH CHRONIC KIDNEY DISEASE, WITH LONG-TERM CURRENT USE OF INSULIN, UNSPECIFIED CKD STAGE (H): Primary | ICD-10-CM

## 2021-12-10 DIAGNOSIS — R06.00 DYSPNEA, UNSPECIFIED TYPE: ICD-10-CM

## 2021-12-10 LAB
FLUAV AG SPEC QL IA: NEGATIVE
FLUBV AG SPEC QL IA: NEGATIVE

## 2021-12-10 PROCEDURE — U0003 INFECTIOUS AGENT DETECTION BY NUCLEIC ACID (DNA OR RNA); SEVERE ACUTE RESPIRATORY SYNDROME CORONAVIRUS 2 (SARS-COV-2) (CORONAVIRUS DISEASE [COVID-19]), AMPLIFIED PROBE TECHNIQUE, MAKING USE OF HIGH THROUGHPUT TECHNOLOGIES AS DESCRIBED BY CMS-2020-01-R: HCPCS

## 2021-12-10 PROCEDURE — 87804 INFLUENZA ASSAY W/OPTIC: CPT | Performed by: NURSE PRACTITIONER

## 2021-12-10 PROCEDURE — 99207 PR NO CHARGE LOS: CPT

## 2021-12-10 PROCEDURE — U0005 INFEC AGEN DETEC AMPLI PROBE: HCPCS

## 2021-12-10 RX ORDER — BLOOD SUGAR DIAGNOSTIC
STRIP MISCELLANEOUS
Qty: 300 STRIP | Refills: 1 | Status: SHIPPED | OUTPATIENT
Start: 2021-12-10 | End: 2022-11-07

## 2021-12-10 NOTE — TELEPHONE ENCOUNTER
Patient has new insurance and they prefer the Contour next brand. Please send prescriptions for the lancets, meter kit and strips.   Thank you!!     Scripts pended. Routed to provider.  Kristen Liu RN

## 2021-12-10 NOTE — TELEPHONE ENCOUNTER
Patient notified of recommendations. She will try the warm compresses and afrin. She will wait until the COVID test comes back.   Kristen Liu RN

## 2021-12-11 LAB — SARS-COV-2 RNA RESP QL NAA+PROBE: NEGATIVE

## 2021-12-14 ENCOUNTER — TELEPHONE (OUTPATIENT)
Dept: FAMILY MEDICINE | Facility: CLINIC | Age: 61
End: 2021-12-14
Payer: COMMERCIAL

## 2021-12-14 NOTE — TELEPHONE ENCOUNTER
Patient reports that she gets nauseated when she gets an illness or when her eating is off.  She has had sinus symptoms since Tuesday. She had a COVID and flu swab and they were negative. Her sinus symptoms are better now but still has a cough and drainage. She has been taking Reglan 10 mg BID. It has not been helping. She is wondering if this could be increased or can she get a different prescription prescribed. Routed to provider.  Kristen Liu RN

## 2021-12-14 NOTE — TELEPHONE ENCOUNTER
Reason for call:  Patient reporting a symptom    Symptom or request: Pt has had increased nausea despite taking 2 Reglan daily.  Please call patient and advise.      Duration (how long have symptoms been present): ongoing    Have you been treated for this before? Yes    Additional comments:     Phone Number patient can be reached at:  Home number on file [phone] (home)    Best Time:  any    Can we leave a detailed message on this number:  YES    Call taken on 12/14/2021 at 1:12 PM by Ciara Paez

## 2021-12-15 ENCOUNTER — E-VISIT (OUTPATIENT)
Dept: FAMILY MEDICINE | Facility: CLINIC | Age: 61
End: 2021-12-15
Payer: COMMERCIAL

## 2021-12-15 DIAGNOSIS — R11.0 NAUSEA: Primary | ICD-10-CM

## 2021-12-15 PROCEDURE — 99421 OL DIG E/M SVC 5-10 MIN: CPT | Performed by: FAMILY MEDICINE

## 2021-12-15 RX ORDER — ONDANSETRON 4 MG/1
4 TABLET, FILM COATED ORAL EVERY 8 HOURS PRN
Qty: 12 TABLET | Refills: 3 | Status: SHIPPED | OUTPATIENT
Start: 2021-12-15 | End: 2022-03-30

## 2021-12-15 NOTE — PATIENT INSTRUCTIONS
Thank you for choosing us for your care. I have placed an order for a prescription so that you can start treatment.     I will send a prescription for Ondansetron (zofran) that you can use as needed, I would not schedule this medication.     If symptoms are not improving, we may need you to see GI again for the gastroparesis.    View your full visit summary for details by clicking on the link below. Your pharmacist will able to address any questions you may have about the medication.     If you're not feeling better within 5-7 days, please schedule an appointment.  You can schedule an appointment right here in [facility], or call [phone]  If the visit is for the same symptoms as your eVisit, we'll refund the cost of your eVisit if seen within seven days.

## 2022-01-01 ENCOUNTER — HEALTH MAINTENANCE LETTER (OUTPATIENT)
Age: 62
End: 2022-01-01

## 2022-01-13 ENCOUNTER — TELEPHONE (OUTPATIENT)
Dept: FAMILY MEDICINE | Facility: CLINIC | Age: 62
End: 2022-01-13

## 2022-01-13 ENCOUNTER — OFFICE VISIT (OUTPATIENT)
Dept: FAMILY MEDICINE | Facility: CLINIC | Age: 62
End: 2022-01-13
Payer: COMMERCIAL

## 2022-01-13 VITALS
HEIGHT: 64 IN | TEMPERATURE: 97.2 F | SYSTOLIC BLOOD PRESSURE: 138 MMHG | WEIGHT: 264 LBS | DIASTOLIC BLOOD PRESSURE: 70 MMHG | RESPIRATION RATE: 16 BRPM | OXYGEN SATURATION: 99 % | HEART RATE: 64 BPM | BODY MASS INDEX: 45.07 KG/M2

## 2022-01-13 DIAGNOSIS — J45.31 MILD PERSISTENT ASTHMA WITH ACUTE EXACERBATION: ICD-10-CM

## 2022-01-13 DIAGNOSIS — R06.09 DOE (DYSPNEA ON EXERTION): ICD-10-CM

## 2022-01-13 DIAGNOSIS — I49.9 IRREGULAR HEARTBEAT: Primary | ICD-10-CM

## 2022-01-13 LAB
ANION GAP SERPL CALCULATED.3IONS-SCNC: 8 MMOL/L (ref 3–14)
BUN SERPL-MCNC: 13 MG/DL (ref 7–30)
CALCIUM SERPL-MCNC: 9.4 MG/DL (ref 8.5–10.1)
CHLORIDE BLD-SCNC: 106 MMOL/L (ref 94–109)
CO2 SERPL-SCNC: 25 MMOL/L (ref 20–32)
CREAT SERPL-MCNC: 0.58 MG/DL (ref 0.52–1.04)
GFR SERPL CREATININE-BSD FRML MDRD: >90 ML/MIN/1.73M2
GLUCOSE BLD-MCNC: 215 MG/DL (ref 70–99)
MAGNESIUM SERPL-MCNC: 1.8 MG/DL (ref 1.6–2.3)
POTASSIUM BLD-SCNC: 4.1 MMOL/L (ref 3.4–5.3)
SODIUM SERPL-SCNC: 139 MMOL/L (ref 133–144)
TSH SERPL DL<=0.005 MIU/L-ACNC: 1.8 MU/L (ref 0.4–4)

## 2022-01-13 PROCEDURE — 83735 ASSAY OF MAGNESIUM: CPT | Performed by: NURSE PRACTITIONER

## 2022-01-13 PROCEDURE — 36415 COLL VENOUS BLD VENIPUNCTURE: CPT | Performed by: NURSE PRACTITIONER

## 2022-01-13 PROCEDURE — 99214 OFFICE O/P EST MOD 30 MIN: CPT | Performed by: NURSE PRACTITIONER

## 2022-01-13 PROCEDURE — 93000 ELECTROCARDIOGRAM COMPLETE: CPT | Performed by: NURSE PRACTITIONER

## 2022-01-13 PROCEDURE — 84443 ASSAY THYROID STIM HORMONE: CPT | Performed by: NURSE PRACTITIONER

## 2022-01-13 PROCEDURE — 80048 BASIC METABOLIC PNL TOTAL CA: CPT | Performed by: NURSE PRACTITIONER

## 2022-01-13 RX ORDER — ALBUTEROL SULFATE 0.83 MG/ML
2.5 SOLUTION RESPIRATORY (INHALATION) ONCE
Status: COMPLETED | OUTPATIENT
Start: 2022-01-13 | End: 2022-01-13

## 2022-01-13 RX ORDER — PREDNISONE 20 MG/1
40 TABLET ORAL DAILY
Qty: 10 TABLET | Refills: 0 | Status: SHIPPED | OUTPATIENT
Start: 2022-01-13 | End: 2022-01-18

## 2022-01-13 RX ORDER — ALBUTEROL SULFATE 0.83 MG/ML
2.5 SOLUTION RESPIRATORY (INHALATION) EVERY 6 HOURS PRN
Qty: 90 ML | Refills: 3 | Status: SHIPPED | OUTPATIENT
Start: 2022-01-13 | End: 2023-11-07

## 2022-01-13 RX ORDER — ALBUTEROL SULFATE 1.25 MG/3ML
1.25 SOLUTION RESPIRATORY (INHALATION) ONCE
Status: DISCONTINUED | OUTPATIENT
Start: 2022-01-13 | End: 2022-01-13 | Stop reason: CLARIF

## 2022-01-13 RX ADMIN — ALBUTEROL SULFATE 2.5 MG: 0.83 SOLUTION RESPIRATORY (INHALATION) at 09:53

## 2022-01-13 ASSESSMENT — MIFFLIN-ST. JEOR: SCORE: 1749.33

## 2022-01-13 NOTE — PROGRESS NOTES
"  Assessment & Plan     Irregular heartbeat  No acute concerns, recent echo and angiogram unremarkable. Will check thyroid/electrolytes  - EKG 12-lead complete w/read - Clinics  - TSH with free T4 reflex; Future  - Basic metabolic panel  (Ca, Cl, CO2, Creat, Gluc, K, Na, BUN); Future  - Magnesium; Future  - TSH with free T4 reflex  - Basic metabolic panel  (Ca, Cl, CO2, Creat, Gluc, K, Na, BUN)  - Magnesium    LALA (dyspnea on exertion)    - EKG 12-lead complete w/read - Clinics    Mild persistent asthma with acute exacerbation    - albuterol (PROVENTIL) neb solution 2.5 mg  - albuterol (PROVENTIL) (2.5 MG/3ML) 0.083% neb solution; Take 1 vial (2.5 mg) by nebulization every 6 hours as needed for shortness of breath / dyspnea or wheezing  - predniSONE (DELTASONE) 20 MG tablet; Take 2 tablets (40 mg) by mouth daily for 5 days             BMI:   Estimated body mass index is 45.6 kg/m  as calculated from the following:    Height as of this encounter: 1.621 m (5' 3.8\").    Weight as of this encounter: 119.7 kg (264 lb).       FUTURE APPOINTMENTS:       - Follow up in 1 week for persistent symptoms, sooner for new or worsening symptoms.     See Patient Instructions    Time spent in chart review in preparation to see patient, time with patient for interview/exam, ordering medications/tests/and/or procedures, and time spent in charting and coordinating care: 35 minutes.       No follow-ups on file.    ALYSON Lincoln CNP  [facility]    Susannah Ragsdale is a 61 year old who presents for the following health issues     HPI     Acute Illness  Acute illness concerns: cough, headache, worsening asthma, home covid test negative   Onset/Duration: 2 weeks, worsened 2 days ago   Symptoms:  Fever: no  Chills/Sweats: no  Headache (location?): YES  Sinus Pressure: no  Conjunctivitis:  no  Ear Pain: no  Rhinorrhea: no  Congestion: no  Sore Throat: no  Cough: YES-non-productive, with shortness of " "breath. Denies chest pain, is having palpitations.   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      Objective    /70   Pulse 64   Temp 97.2  F (36.2  C) (Tympanic)   Resp 16   Ht 1.621 m (5' 3.8\")   Wt 119.7 kg (264 lb)   LMP 01/14/2016 (Approximate)   SpO2 99%   BMI 45.60 kg/m    Body mass index is 45.6 kg/m .  Physical Exam   GENERAL: healthy, alert and no distress  EYES: Eyes grossly normal to inspection, PERRL and conjunctivae and sclerae normal  HENT: ear canals and TM's normal, nose and mouth without ulcers or lesions  NECK: no adenopathy, no asymmetry, masses, or scars and thyroid normal to palpation  RESP: lungs clear to auscultation - no rales, rhonchi or wheezes  CV: irregularly irregular rhythm, normal S1 S2, no S3 or S4 and no murmur, click or rub  MS: no gross musculoskeletal defects noted, no edema  NEURO: Normal strength and tone, mentation intact and speech normal  PSYCH: mentation appears normal, affect normal/bright    EKG - Reviewed and interpreted by me Normal Sinus Rhythm, bigeminy and PVCs, Left Bundle Branch Block, no acute ST or T wave changes.             "

## 2022-01-13 NOTE — TELEPHONE ENCOUNTER
Reason for call:  Patient reporting a symptom    Symptom or request: Pt's asthma has been getting worse x 2 weeks.  No SOB, sat is 97%.  Pt states that coughing is giving her a headache, sometimes.  Home Covid test was negative,  Pt tried to do e-visit and it told her to go to ER and she declines and just wants to speak to Dr. Elena's RN.  Please call patient and advise.      Duration (how long have symptoms been present): 2 weeks    Have you been treated for this before? Yes    Additional comments:     Phone Number patient can be reached at:  Home number on file [phone] (home)    Best Time:  any    Can we leave a detailed message on this number:  YES    Call taken on 1/13/2022 at 8:15 AM by Ciara Paez

## 2022-01-13 NOTE — PATIENT INSTRUCTIONS
Patient Education     Asthma (Adult)   Asthma is a disease where the medium and small air passages in the lung go into spasm and restrict air flow. Inflammation and swelling of the airways cause further blockage. During an acute asthma attack, these factors cause trouble breathing, wheezing, cough, and chest tightness.     An asthma attack can be triggered by many things. Common triggers include infections such as the common cold, bronchitis, and pneumonia. Irritants such as smoke or pollutants in the air, very cold air, emotional upset, and exercise can also trigger an attack. In many adults with asthma, allergies to dust, mold, pollen, and animal dander can cause an asthma attack. Skipping doses of daily asthma medicine can also bring on an asthma attack.   Asthma can be controlled using the correct medicines prescribed by your healthcare provider and staying away from known triggers including allergens and irritants.   Home care    Take prescribed medicine exactly at the times advised. If you need medicine such as from a handheld inhaler or aerosol breathing machine more than every 4 hours, contact your healthcare provider or get medical care right away. If you are prescribed an antibiotic or prednisone, take all of the medicine as prescribed. Keep taking it even if you are feeling better after a few days.    Don't smoke. Stay away from the smoke of others.    Some people with asthma find their symptoms get worse when they take aspirin and non-steroidal or fever-reducing medicines such as ibuprofen and naproxen. Talk with your healthcare provider if you think this may apply to you.    Follow-up care  Follow up with your healthcare provider, or as advised. Always bring all of your current medicines to any appointments with your healthcare provider. Also bring a complete list of medicines, even those not taken for asthma. If you don't already have one, talk with your healthcare provider about making your own  "Asthma Action Plan.   A pneumonia (pneumococcal) vaccine and yearly flu shot (every fall) are advised. Ask your provider about this.   When to get medical advice  Call your healthcare provider or get medical care right away if any of these occur:      More wheezing or shortness of breath    Need to use your inhalers more often than normal without relief    Fever of 100.4 F (38 C) or higher, or as directed by your provider    Coughing up lots of dark-colored or bloody sputum (mucus)    Chest pain with each breath    If you use a peak flow meter as part of an Asthma Action Plan, and you are still in the yellow zone (50% to 80%) 15 minutes after using inhaler medicine.  Call 911  Call 911 if any of these occur:     Trouble walking or talking because you are short of breath    If you use a peak flow meter as part of an Asthma Action Plan, and you are still in the red zone (less than 50%) 15 minutes after using inhaler medicine    Lips or fingernails turn gray, purple, or blue    Feeling faint or loss of consciousness  [employer] last reviewed this educational content on 10/1/2019    6610-6045 [employer]. All rights reserved. This information is not intended as a substitute for professional medical care. Always follow your healthcare professional's instructions.           Patient Education     Heart Palpitations    Palpitations are the feeling that your heart is beating hard, fast, or irregular. Some describe it as \"pounding,\" \"flip-flopping in the chest,\" or \"skipped beats.\" Palpitations may occur in someone with heart disease. But they can also occur in a healthy person.  Heart-related causes:    Heart rhythm problem (arrhythmia)    Heart valve disease    Disease of the heart muscle (cardiomyopathy)    Coronary artery disease    High blood pressure  Non-heart-related causes:    Certain medicines such as asthma inhalers and decongestants    Some herbal supplements, energy drinks and pills, and weight loss " pills    Illegal stimulant drugs such as cocaine, crank, methamphetamine, PCP, bath salts, and ecstasy    Caffeine, alcohol, and tobacco    Health conditions such as thyroid disease, anemia, anxiety, and panic disorder  Sometimes the cause can't be found.  Home care  Follow these home care tips:    Don't use too much caffeine, alcohol, or tobacco, or any stimulant drugs.    Tell your doctor about any prescription or over-the-counter or herbal medicines you take.  Follow-up care    Follow up with your doctor, or as advised.    Call 911  This is the fastest and safest way to get to the emergency department. The paramedics can also start treatment on the way to the hospital, if needed.  Don't wait until your symptoms are severe to call 911. These are reasons to call 911:    Chest pain    Shortness of breath    Feeling lightheaded, faint, or dizzy, or losing consciousness    Very irregular heartbeat    Rapid heartbeat that makes you uncomfortable    Slower than usual heart rate along with symptoms    Chest pain with weakness, dizziness, heavy sweating, nausea, or vomiting    Extreme drowsiness, confusion, or weakness    Weakness of an arm or leg, or on one side of the face    Trouble with speech or vision  When to seek medical advice  Call your healthcare provider right away if you have palpitations that last longer than normal, or are different from your past palpitations.  [employer] last reviewed this educational content on 11/1/2019 2000-2021 [employer]. All rights reserved. This information is not intended as a substitute for professional medical care. Always follow your healthcare professional's instructions.

## 2022-01-13 NOTE — TELEPHONE ENCOUNTER
"Patient reports that she has been having trouble with her asthma for the past 2 weeks. She has been coughing, wheezing and has \"heavylike\" breathing. She denies temp, cough, headache (except when coughing), no sore throat or congestion. She has been using her albuterol inhaler one time per day, but it does not seem to be helping much. Patient took an at home COVID test and it was negative. Advised patient to be seen so we can listen to her lungs and assess. Appointment made.  Kristen Liu RN    "

## 2022-01-14 ENCOUNTER — TELEPHONE (OUTPATIENT)
Dept: FAMILY MEDICINE | Facility: CLINIC | Age: 62
End: 2022-01-14
Payer: COMMERCIAL

## 2022-01-14 DIAGNOSIS — Z20.828 CONTACT WITH OR EXPOSURE TO VIRAL DISEASE: Primary | ICD-10-CM

## 2022-01-14 NOTE — RESULT ENCOUNTER NOTE
Rigo Fraser    Your lab results came back within normal limits with the exception of the blood sugar. No abnormalities to cause the premature beats. If you've not had a sleep study I would recommend to rule out sleep apnea as this can also be a cause of the premature beats. Please let us know if you have any questions.     Take care,    ALYSON Salinas CNP

## 2022-01-14 NOTE — TELEPHONE ENCOUNTER
Patient was seen in clinic 1/13/22 for shortness of breath. She reports that after the visit, her symptoms continued to worsen. She now has a fever of 99, cough, headache, congestion, and her whole body hurts.   Advised to drink fluids, tylenol for fever and discomfort, hard candy or cough drops for the cough. Can also drink tea with honey and lemon. Patient is wondering if she should be tested for COVID. Will route to MD pool as provider is out of office today.  Kristen Liu RN

## 2022-01-14 NOTE — TELEPHONE ENCOUNTER
Reason for Call:  Symptoms.     Detailed comments: Patient saw Rox Miguel 01/13 for asthma.  Now, has additional symptoms and questions.      Phone Number Patient can be reached at:  [phone]    Can we leave a detailed message on this number? Yes    Call taken on 1/14/2022 at 9:55 AM by Kathleen Genao

## 2022-01-31 ENCOUNTER — MYC MEDICAL ADVICE (OUTPATIENT)
Dept: FAMILY MEDICINE | Facility: CLINIC | Age: 62
End: 2022-01-31
Payer: COMMERCIAL

## 2022-01-31 NOTE — TELEPHONE ENCOUNTER
Advised patient to call the clinic to obtain an appointment, refer to [employer] message.     Candace Bray RN BSN

## 2022-02-01 ENCOUNTER — OFFICE VISIT (OUTPATIENT)
Dept: FAMILY MEDICINE | Facility: CLINIC | Age: 62
End: 2022-02-01
Payer: COMMERCIAL

## 2022-02-01 ENCOUNTER — ANCILLARY PROCEDURE (OUTPATIENT)
Dept: GENERAL RADIOLOGY | Facility: CLINIC | Age: 62
End: 2022-02-01
Attending: NURSE PRACTITIONER
Payer: COMMERCIAL

## 2022-02-01 VITALS
BODY MASS INDEX: 44.25 KG/M2 | WEIGHT: 259.2 LBS | SYSTOLIC BLOOD PRESSURE: 138 MMHG | DIASTOLIC BLOOD PRESSURE: 88 MMHG | OXYGEN SATURATION: 98 % | TEMPERATURE: 97.2 F | HEIGHT: 64 IN | RESPIRATION RATE: 18 BRPM | HEART RATE: 85 BPM

## 2022-02-01 DIAGNOSIS — R06.09 DOE (DYSPNEA ON EXERTION): Primary | ICD-10-CM

## 2022-02-01 DIAGNOSIS — J18.9 PNEUMONIA DUE TO INFECTIOUS ORGANISM, UNSPECIFIED LATERALITY, UNSPECIFIED PART OF LUNG: ICD-10-CM

## 2022-02-01 DIAGNOSIS — R06.09 DOE (DYSPNEA ON EXERTION): ICD-10-CM

## 2022-02-01 DIAGNOSIS — Z86.19 HISTORY OF CANDIDAL VULVOVAGINITIS: ICD-10-CM

## 2022-02-01 PROCEDURE — 71046 X-RAY EXAM CHEST 2 VIEWS: CPT | Mod: FY | Performed by: RADIOLOGY

## 2022-02-01 PROCEDURE — 93000 ELECTROCARDIOGRAM COMPLETE: CPT | Performed by: NURSE PRACTITIONER

## 2022-02-01 PROCEDURE — 99213 OFFICE O/P EST LOW 20 MIN: CPT | Performed by: NURSE PRACTITIONER

## 2022-02-01 RX ORDER — FLUCONAZOLE 150 MG/1
TABLET ORAL
Qty: 3 TABLET | Refills: 0 | Status: SHIPPED | OUTPATIENT
Start: 2022-02-01 | End: 2022-03-30

## 2022-02-01 RX ORDER — DOXYCYCLINE HYCLATE 100 MG
100 TABLET ORAL 2 TIMES DAILY
Qty: 14 TABLET | Refills: 0 | Status: SHIPPED | OUTPATIENT
Start: 2022-02-01 | End: 2022-02-08

## 2022-02-01 ASSESSMENT — ASTHMA QUESTIONNAIRES: ACT_TOTALSCORE: 10

## 2022-02-01 ASSESSMENT — MIFFLIN-ST. JEOR: SCORE: 1727.55

## 2022-02-01 NOTE — PATIENT INSTRUCTIONS
Patient Education     Shortness of Breath (Dyspnea)  Shortness of breath is the feeling that you can't catch your breath or get enough air. It is also known as dyspnea.  Dyspnea can be caused by many different conditions. They include:    Acute asthma attack    Worsening of chronic lung diseases such as chronic bronchitis and emphysema    Heart failure. This is when weak heart muscle allows extra fluid to collect in the lungs.    Panic attacks or anxiety. Fear can cause rapid breathing (hyperventilation).    Pneumonia, or an infection in the lung tissue    Exposure to toxic substances, fumes, smoke, or certain medicines    Blood clot in the lung (pulmonary embolism). This is often from a piece of blood clot in a deep vein of the leg (deep vein thrombosis) that breaks off and travels to the lungs.    Heart attack or heart-related chest pain (angina)    Anemia    Collapsed lung (pneumothorax)    Dehydration    Pregnancy  Based on your visit today, the exact cause of your shortness of breath is not certain. Your tests don t show any of the serious causes of dyspnea. You may need other tests to find out if you have a serious problem. It s important to watch for any new symptoms or symptoms that get worse. Follow up with your healthcare provider as directed.  Home care  Follow these tips to take care of yourself at home:    When your symptoms are better, go back to your usual activities.    If you smoke, you should stop. Join a quit-smoking program or ask your healthcare provider for help.    Eat a healthy diet and get plenty of sleep.    Get regular exercise. Talk with your healthcare provider before starting to exercise, especially if you have other medical problems.    Cut down on the amount of caffeine and stimulants you consume.  Follow-up care  Follow up with your healthcare provider, or as advised.  If tests were done, you will be told if your treatment needs to be changed. You can call as directed for the  results.  If an X-ray was taken, a specialist will review it. You will be notified of any new findings that may affect your care.  Call 911  Shortness of breath may be a sign of a serious medical problem. For example, it may be a problem with your heart or lungs. Call 911 if you have worsening shortness of breath or trouble breathing, especially with any of the symptoms below:    Confusion or difficulty waking    Fainting or loss of consciousness.    Fast or irregular heartbeat    Coughing up blood    Pain in your chest, arm, shoulder, neck, or upper back    Sweating  When to seek medical advice  Call your healthcare provider right away if any of these occur:    Slight shortness of breath or wheezing    Redness, pain or swelling in your leg, arm, or other body area    Swelling in both legs or ankles    Fast weight gain    Dizziness or weakness    Fever of 100.4 F (38 C) or higher, or as directed by your healthcare provider  Camilo last reviewed this educational content on 6/1/2018 2000-2021 [employer]. All rights reserved. This information is not intended as a substitute for professional medical care. Always follow your healthcare professional's instructions.

## 2022-02-01 NOTE — PROGRESS NOTES
"  Assessment & Plan     LALA (dyspnea on exertion)    - XR Chest 2 Views; Future  - EKG 12-lead complete w/read - Clinics    Pneumonia due to infectious organism, unspecified laterality, unspecified part of lung    - doxycycline hyclate (VIBRA-TABS) 100 MG tablet; Take 1 tablet (100 mg) by mouth 2 times daily for 7 days    History of candidal vulvovaginitis    - fluconazole (DIFLUCAN) 150 MG tablet; Take 1 tab by mouth at first signs of yeast infection, repeat 1 tab every 72 hours             BMI:   Estimated body mass index is 44.77 kg/m  as calculated from the following:    Height as of this encounter: 1.621 m (5' 3.8\").    Weight as of this encounter: 117.6 kg (259 lb 3.2 oz).       FUTURE APPOINTMENTS:       - Follow up in 3 days for symptoms that are not improving, sooner for new or worsening sx.     See Patient Instructions    No follow-ups on file.    ALYSON Lincoln CNP  [facility]    Susannah Ragsdale is a 61 year old who presents for the following health issues     HPI     Asthma Follow-Up    Was ACT completed today?    Yes    ACT Total Scores 2/1/2022   ACT TOTAL SCORE -   ASTHMA ER VISITS -   ASTHMA HOSPITALIZATIONS -   ACT TOTAL SCORE (Goal Greater than or Equal to 20) 10   In the past 12 months, how many times did you visit the emergency room for your asthma without being admitted to the hospital? 0   In the past 12 months, how many times were you hospitalized overnight because of your asthma? 0         How many days per week do you miss taking your asthma controller medication?  0    Please describe any recent triggers for your asthma: None    Have you had any Emergency Room Visits, Urgent Care Visits, or Hospital Admissions since your last office visit?  No      How many servings of fruits and vegetables do you eat daily?  2-3    On average, how many sweetened beverages do you drink each day (Examples: soda, juice, sweet tea, etc.  Do NOT count diet or " "artificially sweetened beverages)?   0    How many days per week do you exercise enough to make your heart beat faster? 3 or less    How many minutes a day do you exercise enough to make your heart beat faster? 9 or less    How many days per week do you miss taking your medication? 0    Acute Illness  Acute illness concerns: LALA  Onset/Duration: 1 month. Treated 2 weeks ago for asthma flare with prednisone and nebs- felt better for a couple days, went to the [facility] and was around smoke and then has been feeling like she has been getting worse again for the last week. This feels different- no wheezing, not much cough  Symptoms:  Fever: no  Chills/Sweats: no  Headache (location?): no  Sinus Pressure: no  Conjunctivitis:  no  Ear Pain: no  Rhinorrhea: no  Congestion: YES  Sore Throat: no  Cough: YES-non-productive- improved after prednisone 2 weeks ago  Wheeze: did have wheezing 2 weeks ago, now does not  Decreased Appetite: no  Nausea: no  Vomiting: no  Diarrhea: no  Dysuria/Freq.: no  Dysuria or Hematuria: no  Fatigue/Achiness: YES  Sick/Strep Exposure: YES- [facility]  Denies hx of DVT/PE, has not had any lower extremity swelling, has been having bilateral lower leg cramps. Had echo and angiogram in 2019 for abnormal stress test, both were unremarkable.     Therapies tried and outcome: Prednisone, nebs    Review of Systems   Constitutional, HEENT, cardiovascular, pulmonary, gi and gu systems are negative, except as otherwise noted.      Objective    /88   Pulse 85   Temp 97.2  F (36.2  C) (Tympanic)   Resp 18   Ht 1.621 m (5' 3.8\")   Wt 117.6 kg (259 lb 3.2 oz)   LMP 01/14/2016 (Approximate)   SpO2 98%   BMI 44.77 kg/m    Body mass index is 44.77 kg/m .  Physical Exam   GENERAL: healthy, alert and no distress  EYES: Eyes grossly normal to inspection, EOMI and conjunctivae and sclerae normal  NECK: no adenopathy, no asymmetry, masses, or scars and thyroid normal to palpation  RESP: lungs clear to " auscultation - no rales, rhonchi or wheezes  CV: regular rates and rhythm, normal S1 S2, no S3 or S4, no murmur, click or rub and no peripheral edema  MS: no gross musculoskeletal defects noted, no edema  SKIN: no suspicious lesions or rashes  NEURO: Normal strength and tone, mentation intact and speech normal    EKG - Reviewed and interpreted by me Normal Sinus Rhythm, normal axis, normal intervals, no acute ST/T changes c/w ischemia, Left Bundle Branch Block, Right Bundle Branch Block, unchanged from previous tracings  No results found for this or any previous visit (from the past 24 hour(s)).

## 2022-03-02 DIAGNOSIS — J31.0 CHRONIC RHINITIS: ICD-10-CM

## 2022-03-02 RX ORDER — PSEUDOEPHEDRINE HCL 30 MG
TABLET ORAL
Qty: 120 TABLET | Refills: 0 | Status: SHIPPED | OUTPATIENT
Start: 2022-03-02 | End: 2022-03-30

## 2022-03-02 NOTE — TELEPHONE ENCOUNTER
Routing refill request to provider for review/approval because:  Drug not on the [facility] refill protocol     Pending Prescriptions:                       Disp   Refills    pseudoePHEDrine (SUDAFED) 30 MG tablet [Ph*120 ta*0        Sig: TAKE TWO TABLETS BY MOUTH EVERY 12 HOURS AS NEEDED           CONGESTION    Chante Fuller RN on 3/2/2022 at 9:53 AM

## 2022-03-05 DIAGNOSIS — E78.5 HYPERLIPIDEMIA LDL GOAL <70: ICD-10-CM

## 2022-03-07 RX ORDER — ROSUVASTATIN CALCIUM 10 MG/1
10 TABLET, COATED ORAL DAILY
Qty: 90 TABLET | Refills: 1 | Status: SHIPPED | OUTPATIENT
Start: 2022-03-07 | End: 2022-08-29

## 2022-03-24 DIAGNOSIS — Z79.4 TYPE 2 DIABETES MELLITUS WITH CHRONIC KIDNEY DISEASE, WITH LONG-TERM CURRENT USE OF INSULIN, UNSPECIFIED CKD STAGE (H): ICD-10-CM

## 2022-03-24 DIAGNOSIS — E11.22 TYPE 2 DIABETES MELLITUS WITH CHRONIC KIDNEY DISEASE, WITH LONG-TERM CURRENT USE OF INSULIN, UNSPECIFIED CKD STAGE (H): ICD-10-CM

## 2022-03-25 RX ORDER — PEN NEEDLE, DIABETIC 32GX 5/32"
NEEDLE, DISPOSABLE MISCELLANEOUS
Qty: 100 EACH | Refills: 11 | Status: SHIPPED | OUTPATIENT
Start: 2022-03-25 | End: 2022-03-30

## 2022-03-30 ENCOUNTER — OFFICE VISIT (OUTPATIENT)
Dept: FAMILY MEDICINE | Facility: CLINIC | Age: 62
End: 2022-03-30
Payer: COMMERCIAL

## 2022-03-30 VITALS
OXYGEN SATURATION: 95 % | DIASTOLIC BLOOD PRESSURE: 70 MMHG | BODY MASS INDEX: 44.22 KG/M2 | SYSTOLIC BLOOD PRESSURE: 126 MMHG | HEART RATE: 62 BPM | WEIGHT: 259 LBS | RESPIRATION RATE: 14 BRPM | HEIGHT: 64 IN | TEMPERATURE: 97 F

## 2022-03-30 DIAGNOSIS — Z79.4 TYPE 2 DIABETES MELLITUS WITH CHRONIC KIDNEY DISEASE, WITH LONG-TERM CURRENT USE OF INSULIN, UNSPECIFIED CKD STAGE (H): ICD-10-CM

## 2022-03-30 DIAGNOSIS — E78.5 HYPERLIPIDEMIA LDL GOAL <70: ICD-10-CM

## 2022-03-30 DIAGNOSIS — I10 ESSENTIAL HYPERTENSION WITH GOAL BLOOD PRESSURE LESS THAN 140/90: ICD-10-CM

## 2022-03-30 DIAGNOSIS — E11.43 GASTROPARESIS DUE TO DM (H): ICD-10-CM

## 2022-03-30 DIAGNOSIS — E66.01 MORBID OBESITY (H): ICD-10-CM

## 2022-03-30 DIAGNOSIS — I20.89 OTHER FORMS OF ANGINA PECTORIS (H): ICD-10-CM

## 2022-03-30 DIAGNOSIS — Z00.00 ROUTINE GENERAL MEDICAL EXAMINATION AT A HEALTH CARE FACILITY: Primary | ICD-10-CM

## 2022-03-30 DIAGNOSIS — J31.0 CHRONIC RHINITIS: ICD-10-CM

## 2022-03-30 DIAGNOSIS — K31.84 GASTROPARESIS DUE TO DM (H): ICD-10-CM

## 2022-03-30 DIAGNOSIS — E11.22 TYPE 2 DIABETES MELLITUS WITH CHRONIC KIDNEY DISEASE, WITH LONG-TERM CURRENT USE OF INSULIN, UNSPECIFIED CKD STAGE (H): ICD-10-CM

## 2022-03-30 DIAGNOSIS — J45.20 MILD INTERMITTENT ASTHMA, UNCOMPLICATED: ICD-10-CM

## 2022-03-30 DIAGNOSIS — K21.9 GASTROESOPHAGEAL REFLUX DISEASE WITHOUT ESOPHAGITIS: ICD-10-CM

## 2022-03-30 DIAGNOSIS — R11.0 NAUSEA: ICD-10-CM

## 2022-03-30 DIAGNOSIS — J45.20 MILD INTERMITTENT ASTHMA, UNSPECIFIED WHETHER COMPLICATED: ICD-10-CM

## 2022-03-30 LAB
CHOLEST SERPL-MCNC: 128 MG/DL
FASTING STATUS PATIENT QL REPORTED: YES
HBA1C MFR BLD: 7.7 % (ref 0–5.6)
HDLC SERPL-MCNC: 41 MG/DL
LDLC SERPL CALC-MCNC: 53 MG/DL
NONHDLC SERPL-MCNC: 87 MG/DL
TRIGL SERPL-MCNC: 168 MG/DL

## 2022-03-30 PROCEDURE — 99214 OFFICE O/P EST MOD 30 MIN: CPT | Mod: 25 | Performed by: FAMILY MEDICINE

## 2022-03-30 PROCEDURE — 90715 TDAP VACCINE 7 YRS/> IM: CPT | Performed by: FAMILY MEDICINE

## 2022-03-30 PROCEDURE — 36415 COLL VENOUS BLD VENIPUNCTURE: CPT | Performed by: FAMILY MEDICINE

## 2022-03-30 PROCEDURE — 82043 UR ALBUMIN QUANTITATIVE: CPT | Performed by: FAMILY MEDICINE

## 2022-03-30 PROCEDURE — 90471 IMMUNIZATION ADMIN: CPT | Performed by: FAMILY MEDICINE

## 2022-03-30 PROCEDURE — 99396 PREV VISIT EST AGE 40-64: CPT | Mod: 25 | Performed by: FAMILY MEDICINE

## 2022-03-30 PROCEDURE — 83036 HEMOGLOBIN GLYCOSYLATED A1C: CPT | Performed by: FAMILY MEDICINE

## 2022-03-30 PROCEDURE — 80061 LIPID PANEL: CPT | Performed by: FAMILY MEDICINE

## 2022-03-30 RX ORDER — PSEUDOEPHEDRINE HCL 30 MG
60 TABLET ORAL 2 TIMES DAILY
Qty: 360 TABLET | Refills: 3 | Status: SHIPPED | OUTPATIENT
Start: 2022-03-30 | End: 2023-03-10

## 2022-03-30 RX ORDER — METOCLOPRAMIDE 10 MG/1
10 TABLET ORAL 2 TIMES DAILY
Qty: 180 TABLET | Refills: 1 | Status: SHIPPED | OUTPATIENT
Start: 2022-03-30 | End: 2022-10-05

## 2022-03-30 RX ORDER — SEMAGLUTIDE 1.34 MG/ML
INJECTION, SOLUTION SUBCUTANEOUS
Qty: 4.5 ML | Refills: 3 | Status: CANCELLED | OUTPATIENT
Start: 2022-03-30

## 2022-03-30 RX ORDER — ONDANSETRON 4 MG/1
4 TABLET, FILM COATED ORAL EVERY 8 HOURS PRN
Qty: 12 TABLET | Refills: 3 | Status: SHIPPED | OUTPATIENT
Start: 2022-03-30 | End: 2023-11-13

## 2022-03-30 RX ORDER — INSULIN GLARGINE 300 U/ML
100 INJECTION, SOLUTION SUBCUTANEOUS DAILY
Qty: 40.5 ML | Refills: 1 | Status: SHIPPED | OUTPATIENT
Start: 2022-03-30 | End: 2022-10-05

## 2022-03-30 RX ORDER — METFORMIN HCL 500 MG
1000 TABLET, EXTENDED RELEASE 24 HR ORAL 2 TIMES DAILY WITH MEALS
Qty: 360 TABLET | Refills: 1 | Status: SHIPPED | OUTPATIENT
Start: 2022-03-30 | End: 2022-10-05

## 2022-03-30 RX ORDER — INSULIN ASPART 100 [IU]/ML
INJECTION, SOLUTION INTRAVENOUS; SUBCUTANEOUS
Qty: 90 ML | Refills: 3 | Status: SHIPPED | OUTPATIENT
Start: 2022-03-30 | End: 2023-03-28

## 2022-03-30 RX ORDER — MONTELUKAST SODIUM 10 MG/1
1 TABLET ORAL AT BEDTIME
Qty: 90 TABLET | Refills: 1 | Status: SHIPPED | OUTPATIENT
Start: 2022-03-30 | End: 2022-12-19

## 2022-03-30 RX ORDER — METOPROLOL SUCCINATE 25 MG/1
25 TABLET, EXTENDED RELEASE ORAL DAILY
Qty: 90 TABLET | Refills: 3 | Status: SHIPPED | OUTPATIENT
Start: 2022-03-30 | End: 2023-03-28

## 2022-03-30 RX ORDER — ESOMEPRAZOLE MAGNESIUM 40 MG/1
40 CAPSULE, DELAYED RELEASE ORAL
Qty: 90 CAPSULE | Refills: 3 | Status: SHIPPED | OUTPATIENT
Start: 2022-03-30 | End: 2023-03-28

## 2022-03-30 RX ORDER — PEN NEEDLE, DIABETIC 32GX 5/32"
NEEDLE, DISPOSABLE MISCELLANEOUS
Qty: 400 EACH | Refills: 11 | Status: SHIPPED | OUTPATIENT
Start: 2022-03-30 | End: 2023-07-27

## 2022-03-30 RX ORDER — LOSARTAN POTASSIUM 100 MG/1
100 TABLET ORAL DAILY
Qty: 90 TABLET | Refills: 1 | Status: SHIPPED | OUTPATIENT
Start: 2022-03-30 | End: 2022-10-05

## 2022-03-30 ASSESSMENT — ENCOUNTER SYMPTOMS
SORE THROAT: 0
PALPITATIONS: 0
DIZZINESS: 0
FREQUENCY: 1
SHORTNESS OF BREATH: 1
HEMATOCHEZIA: 0
HEADACHES: 0
MYALGIAS: 0
HEARTBURN: 0
CONSTIPATION: 0
PARESTHESIAS: 0
COUGH: 0
HEMATURIA: 0
ABDOMINAL PAIN: 0
DYSURIA: 0
FEVER: 0
DIARRHEA: 0
CHILLS: 0
NERVOUS/ANXIOUS: 0
ARTHRALGIAS: 0
NAUSEA: 0
EYE PAIN: 0
WEAKNESS: 0
JOINT SWELLING: 0

## 2022-03-30 ASSESSMENT — PAIN SCALES - GENERAL: PAINLEVEL: NO PAIN (0)

## 2022-03-30 NOTE — PROGRESS NOTES
SUBJECTIVE:   CC: Bria Davis is an 61 year old woman who presents for preventive health visit.       Patient has been advised of split billing requirements and indicates understanding: Yes       Healthy Habits:     Getting at least 3 servings of Calcium per day:  Yes    Bi-annual eye exam:  Yes    Dental care twice a year:  Yes    Sleep apnea or symptoms of sleep apnea:  None    Diet:  Diabetic    Frequency of exercise:  None    Taking medications regularly:  Yes    Medication side effects:  None    PHQ-2 Total Score: 0    Additional concerns today:  Yes      Diabetes Follow-up    How often are you checking your blood sugar? Four or more times daily    Pre-meal numbers are in the 150s    Once a week gets low overnight. Will wake at 1-2am and blood sugar will be in the 60s.   Trying to check blood sugar at night to see if she can find a pattern.    In the am, blood sugar is 150-170    Blood sugar testing frequency justification:  having a lot of low episodes at night  What time of day are you checking your blood sugars (select all that apply)?  Before meals and At bedtime  Have you had any blood sugars above 200?  Yes   Have you had any blood sugars below 70?  Yes-pt remember a 56 being the lowest and some in the 60s    What symptoms do you notice when your blood sugar is low?  Shaky, Dizzy, Weak and Other: sweaty    What concerns do you have today about your diabetes? Low blood sugar-just at night.     Do you have any of these symptoms? (Select all that apply)  No numbness or tingling in feet.  No redness, sores or blisters on feet.  No complaints of excessive thirst.  No reports of blurry vision.  No significant changes to weight.    Have you had a diabetic eye exam in the last 12 months? Yes- Date of last eye exam: May 2021, [facility],  Location: [facility]        BP Readings from Last 2 Encounters:   03/30/22 126/70   02/01/22 138/88     Hemoglobin A1C POCT (%)   Date Value   04/14/2021 7.3 (H)    12/09/2020 7.9 (H)     Hemoglobin A1C (%)   Date Value   03/30/2022 7.7 (H)   09/23/2021 7.4 (H)     LDL Cholesterol Calculated (mg/dL)   Date Value   04/14/2021 63   06/15/2020 53           Today's PHQ-2 Score:   PHQ-2 ( 1999 [employer]) 3/30/2022   Q1: Little interest or pleasure in doing things 0   Q2: Feeling down, depressed or hopeless 0   PHQ-2 Score 0   PHQ-2 Total Score (12-17 Years)- Positive if 3 or more points; Administer PHQ-A if positive -   Q1: Little interest or pleasure in doing things Not at all   Q2: Feeling down, depressed or hopeless Not at all   PHQ-2 Score 0       Abuse: Current or Past (Physical, Sexual or Emotional) - No  Do you feel safe in your environment? Yes    Have you ever done Advance Care Planning? (For example, a Health Directive, POLST, or a discussion with a medical provider or your loved ones about your wishes): No, advance care planning information given to patient to review.  Patient plans to discuss their wishes with loved ones or provider.      Social History     Tobacco Use     Smoking status: Never Smoker     Smokeless tobacco: Never Used   Substance Use Topics     Alcohol use: No     If you drink alcohol do you typically have >3 drinks per day or >7 drinks per week? No    Alcohol Use 3/30/2022   Prescreen: >3 drinks/day or >7 drinks/week? Not Applicable   Prescreen: >3 drinks/day or >7 drinks/week? -       Reviewed orders with patient.  Reviewed health maintenance and updated orders accordingly - Yes  Lab work is in process    Breast Cancer Screening:    FHS-7:   Breast CA Risk Assessment (FHS-7) 3/30/2022   Did any of your first-degree relatives have breast or ovarian cancer? No   Did any of your relatives have bilateral breast cancer? Yes   Did any man in your family have breast cancer? No   Did any woman in your family have breast and ovarian cancer? No   Did any woman in your family have breast cancer before age 50 y? No   Do you have 2 or more relatives with breast  "and/or ovarian cancer? No   Do you have 2 or more relatives with breast and/or bowel cancer? No       Mammogram Screening: Recommended mammography every 1-2 years with patient discussion and risk factor consideration  Pertinent mammograms are reviewed under the imaging tab.    History of abnormal Pap smear: NO - age 30- 65 PAP every 3 years recommended - patient declined pap today - wants to find \"a new gynecologist\"     Reviewed and updated as needed this visit by clinical staff   Tobacco  Allergies  Meds   Med Hx  Surg Hx  Fam Hx  Soc Hx        Reviewed and updated as needed this visit by Provider                     Review of Systems   Constitutional: Negative for chills and fever.   HENT: Negative for congestion, ear pain, hearing loss and sore throat.    Eyes: Negative for pain and visual disturbance.   Respiratory: Positive for shortness of breath. Negative for cough.    Cardiovascular: Negative for chest pain, palpitations and peripheral edema.   Gastrointestinal: Negative for abdominal pain, constipation, diarrhea, heartburn, hematochezia and nausea.   Breasts:  Negative for tenderness and discharge.   Genitourinary: Positive for frequency. Negative for dysuria, genital sores, hematuria, pelvic pain, urgency, vaginal bleeding and vaginal discharge.   Musculoskeletal: Negative for arthralgias, joint swelling and myalgias.   Skin: Negative for rash.   Neurological: Negative for dizziness, weakness, headaches and paresthesias.   Psychiatric/Behavioral: Negative for mood changes. The patient is not nervous/anxious.      Gets upper chest \"tightness\" every day, lasts <1 minute until she's able to get a good breath  Worse when she's on her stomach  Happens sometimes with more physical exertion  Doesn't feel like it's related to reflux but we do know she has diabetic gastroparesis and reflux  Known asthma but doesn't feel like it's tight or wheezy, doesn't last long enough to try an inhaler     OBJECTIVE:   BP " "126/70   Pulse 62   Temp 97  F (36.1  C) (Tympanic)   Resp 14   Ht 1.621 m (5' 3.8\")   Wt 117.5 kg (259 lb)   LMP 01/14/2016 (Approximate)   SpO2 95%   Breastfeeding No   BMI 44.74 kg/m    Physical Exam  GENERAL: healthy, alert and no distress  EYES: Eyes grossly normal to inspection, PERRL and conjunctivae and sclerae normal  NECK: no adenopathy, no asymmetry, masses, or scars and thyroid normal to palpation  RESP: lungs clear to auscultation - no rales, rhonchi or wheezes  CV: regular rate and rhythm, normal S1 S2, no S3 or S4, no murmur, click or rub, no peripheral edema and peripheral pulses strong  ABDOMEN: soft, nontender, no hepatosplenomegaly, no masses and bowel sounds normal  MS: no gross musculoskeletal defects noted, no edema  PSYCH: mentation appears normal, affect normal/bright  LYMPH: no cervical, supraclavicular, axillary, or inguinal adenopathy    Results for orders placed or performed in visit on 03/30/22   HEMOGLOBIN A1C     Status: Abnormal   Result Value Ref Range    Hemoglobin A1C 7.7 (H) 0.0 - 5.6 %         ASSESSMENT/PLAN:   (Z00.00) Routine general medical examination at a health care facility  (primary encounter diagnosis)  Comment:    Plan:      (E11.22,  Z79.4) Type 2 diabetes mellitus with chronic kidney disease, with long-term current use of insulin, unspecified CKD stage (H)  Comment: increase ozempic to 1 mg weekly    Plan: HEMOGLOBIN A1C, Lipid panel reflex to direct         LDL Fasting, Albumin Random Urine Quantitative         with Creat Ratio, insulin aspart (NOVOLOG         FLEXPEN) 100 UNIT/ML pen, Insulin Glargine, 2         Unit Dial, (TOUJEO MAX SOLOSTAR) 300 UNIT/ML         SOPN, metFORMIN (GLUCOPHAGE-XR) 500 MG 24 hr         tablet, Semaglutide, 1 MG/DOSE, 4 MG/3ML SOPN,         insulin pen needle (BD PEDRO LUIS U/F) 32G X 4 MM         miscellaneous, OFFICE/OUTPT VISIT,ESTLEVL III             (E78.5) Hyperlipidemia LDL goal <70  Comment:    Plan: Lipid panel reflex to " "direct LDL Fasting,         OFFICE/OUTPT VISIT,EST,LEVL III             (K21.9) Gastroesophageal reflux disease without esophagitis  Comment:    Plan: esomeprazole (NEXIUM) 40 MG DR capsule,         OFFICE/OUTPT VISIT,EST,LEVL III             (I10) Essential hypertension with goal blood pressure less than 140/90  Comment:  Well controlled  Plan: losartan (COZAAR) 100 MG tablet, OFFICE/OUTPT         VISIT,EST,LEVL III             (E11.43,  K31.84) Gastroparesis due to DM (H)  Comment:    Plan: metoclopramide (REGLAN) 10 MG tablet         stable    (J45.20) Mild intermittent asthma, uncomplicated  Comment:    Plan: montelukast (SINGULAIR) 10 MG tablet             (I20.8) Other forms of angina pectoris (H)  Comment: stable  Plan: metoprolol succinate ER (TOPROL-XL) 25 MG 24 hr        tablet             (R11.0) Nausea  Comment:    Plan: ondansetron (ZOFRAN) 4 MG tablet             (J31.0) Chronic rhinitis  Comment:    Plan: pseudoePHEDrine (SUDAFED) 30 MG tablet             (E66.01) Morbid obesity (H)  Comment:    Plan:  Known issue that I take into account for their medical decisions, no current exacerbations or new concerns      (J45.20) Mild intermittent asthma, unspecified whether complicated  Comment:    Plan: General PFT Lab (Please always keep checked),         Pulmonary Function Test         In light of the upper chest \"tightness\" and shortness of breath, will update her PFTs      Patient has been advised of split billing requirements and indicates understanding: Yes    COUNSELING:  Reviewed preventive health counseling, as reflected in patient instructions       Regular exercise       Healthy diet/nutrition    Estimated body mass index is 44.74 kg/m  as calculated from the following:    Height as of this encounter: 1.621 m (5' 3.8\").    Weight as of this encounter: 117.5 kg (259 lb).    Weight management plan: Discussed healthy diet and exercise guidelines    She reports that she has never smoked. She has never " used smokeless tobacco.      Counseling Resources:  ATP IV Guidelines  Pooled Cohorts Equation Calculator  Breast Cancer Risk Calculator  BRCA-Related Cancer Risk Assessment: FHS-7 Tool  FRAX Risk Assessment  ICSI Preventive Guidelines  Dietary Guidelines for Americans, 2010  USDA's MyPlate  ASA Prophylaxis  Lung CA Screening    Kaia Elena MD  [facility]

## 2022-03-30 NOTE — PATIENT INSTRUCTIONS
Increase Ozempic to 1 mg a week        Preventive Health Recommendations  Female Ages 50 - 64    Yearly exam: See your health care provider every year in order to  o Review health changes.   o Discuss preventive care.    o Review your medicines if your doctor has prescribed any.      Get a Pap test every three years (unless you have an abnormal result and your provider advises testing more often).    If you get Pap tests with HPV test, you only need to test every 5 years, unless you have an abnormal result.     You do not need a Pap test if your uterus was removed (hysterectomy) and you have not had cancer.    You should be tested each year for STDs (sexually transmitted diseases) if you're at risk.     Have a mammogram every 1 to 2 years.    Have a colonoscopy at age 50, or have a yearly FIT test (stool test). These exams screen for colon cancer.      Have a cholesterol test every 5 years, or more often if advised.    Have a diabetes test (fasting glucose) every three years. If you are at risk for diabetes, you should have this test more often.     If you are at risk for osteoporosis (brittle bone disease), think about having a bone density scan (DEXA).    Shots: Get a flu shot each year. Get a tetanus shot every 10 years.    Nutrition:     Eat at least 5 servings of fruits and vegetables each day.    Eat whole-grain bread, whole-wheat pasta and brown rice instead of white grains and rice.    Get adequate Calcium and Vitamin D.     Lifestyle    Exercise at least 150 minutes a week (30 minutes a day, 5 days a week). This will help you control your weight and prevent disease.    Limit alcohol to one drink per day.    No smoking.     Wear sunscreen to prevent skin cancer.     See your dentist every six months for an exam and cleaning.    See your eye doctor every 1 to 2 years.

## 2022-03-31 LAB
CREAT UR-MCNC: 68 MG/DL
MICROALBUMIN UR-MCNC: 270 MG/L
MICROALBUMIN/CREAT UR: 397.06 MG/G CR (ref 0–25)

## 2022-05-09 DIAGNOSIS — J45.20 MILD INTERMITTENT ASTHMA, UNCOMPLICATED: ICD-10-CM

## 2022-05-10 RX ORDER — ALBUTEROL SULFATE 90 UG/1
AEROSOL, METERED RESPIRATORY (INHALATION)
Qty: 18 G | Refills: 3 | Status: SHIPPED | OUTPATIENT
Start: 2022-05-10 | End: 2023-06-02

## 2022-05-31 ENCOUNTER — TRANSFERRED RECORDS (OUTPATIENT)
Dept: HEALTH INFORMATION MANAGEMENT | Facility: CLINIC | Age: 62
End: 2022-05-31
Payer: COMMERCIAL

## 2022-05-31 LAB — RETINOPATHY: POSITIVE

## 2022-06-08 ENCOUNTER — OFFICE VISIT (OUTPATIENT)
Dept: OBGYN | Facility: CLINIC | Age: 62
End: 2022-06-08
Payer: COMMERCIAL

## 2022-06-08 ENCOUNTER — HOSPITAL ENCOUNTER (OUTPATIENT)
Dept: RESPIRATORY THERAPY | Facility: CLINIC | Age: 62
Discharge: HOME | End: 2022-06-08
Attending: FAMILY MEDICINE | Admitting: FAMILY MEDICINE
Payer: COMMERCIAL

## 2022-06-08 VITALS
DIASTOLIC BLOOD PRESSURE: 69 MMHG | RESPIRATION RATE: 16 BRPM | BODY MASS INDEX: 44.13 KG/M2 | SYSTOLIC BLOOD PRESSURE: 133 MMHG | HEART RATE: 43 BPM | WEIGHT: 258.5 LBS | HEIGHT: 64 IN | TEMPERATURE: 97.6 F

## 2022-06-08 DIAGNOSIS — Z01.419 WOMEN'S ANNUAL ROUTINE GYNECOLOGICAL EXAMINATION: ICD-10-CM

## 2022-06-08 DIAGNOSIS — Z12.4 CERVICAL CANCER SCREENING: Primary | ICD-10-CM

## 2022-06-08 DIAGNOSIS — J45.20 MILD INTERMITTENT ASTHMA, UNSPECIFIED WHETHER COMPLICATED: ICD-10-CM

## 2022-06-08 LAB
DLCOUNC-%PRED-PRE: 108 %
DLCOUNC-PRE: 21.79 ML/MIN/MMHG
DLCOUNC-PRED: 20.09 ML/MIN/MMHG
ERV-%PRED-PRE: 113 %
ERV-PRE: 0.2 L
ERV-PRED: 0.18 L
EXPTIME-PRE: 6.59 SEC
FEF2575-%PRED-POST: 108 %
FEF2575-%PRED-PRE: 110 %
FEF2575-POST: 2.46 L/SEC
FEF2575-PRE: 2.5 L/SEC
FEF2575-PRED: 2.26 L/SEC
FEFMAX-%PRED-PRE: 111 %
FEFMAX-PRE: 7.01 L/SEC
FEFMAX-PRED: 6.26 L/SEC
FEV1-%PRED-PRE: 106 %
FEV1-PRE: 2.64 L
FEV1FEV6-PRE: 81 %
FEV1FEV6-PRED: 81 %
FEV1FVC-PRE: 81 %
FEV1FVC-PRED: 79 %
FEV1SVC-PRE: 74 %
FEV1SVC-PRED: 76 %
FIFMAX-PRE: 6.6 L/SEC
FRCPLETH-%PRED-PRE: 98 %
FRCPLETH-PRE: 2.65 L
FRCPLETH-PRED: 2.69 L
FVC-%PRED-PRE: 104 %
FVC-PRE: 3.28 L
FVC-PRED: 3.15 L
IC-%PRED-PRE: 106 %
IC-PRE: 3.26 L
IC-PRED: 3.07 L
RVPLETH-%PRED-PRE: 124 %
RVPLETH-PRE: 2.36 L
RVPLETH-PRED: 1.89 L
TLCPLETH-%PRED-PRE: 120 %
TLCPLETH-PRE: 5.91 L
TLCPLETH-PRED: 4.91 L
VA-%PRED-PRE: 104 %
VA-PRE: 4.95 L
VC-%PRED-PRE: 109 %
VC-PRE: 3.55 L
VC-PRED: 3.25 L

## 2022-06-08 PROCEDURE — 87624 HPV HI-RISK TYP POOLED RSLT: CPT | Performed by: OBSTETRICS & GYNECOLOGY

## 2022-06-08 PROCEDURE — 99386 PREV VISIT NEW AGE 40-64: CPT | Performed by: OBSTETRICS & GYNECOLOGY

## 2022-06-08 PROCEDURE — G0145 SCR C/V CYTO,THINLAYER,RESCR: HCPCS | Performed by: OBSTETRICS & GYNECOLOGY

## 2022-06-08 PROCEDURE — 94726 PLETHYSMOGRAPHY LUNG VOLUMES: CPT

## 2022-06-08 PROCEDURE — 94729 DIFFUSING CAPACITY: CPT

## 2022-06-08 PROCEDURE — 250N000009 HC RX 250: Performed by: FAMILY MEDICINE

## 2022-06-08 PROCEDURE — 94060 EVALUATION OF WHEEZING: CPT

## 2022-06-08 RX ORDER — ALBUTEROL SULFATE 0.83 MG/ML
2.5 SOLUTION RESPIRATORY (INHALATION) ONCE
Status: COMPLETED | OUTPATIENT
Start: 2022-06-08 | End: 2022-06-08

## 2022-06-08 RX ADMIN — ALBUTEROL SULFATE 2.5 MG: 2.5 SOLUTION RESPIRATORY (INHALATION) at 08:25

## 2022-06-08 NOTE — PROGRESS NOTES
SUBJECTIVE:   CC: Bria Davis is an 61 year old woman who presents for preventive health visit.     Patient has been advised of split billing requirements and indicates understanding: Yes  Healthy Habits:    Do you get at least three servings of calcium containing foods daily (dairy, green leafy vegetables, etc.)? yes    Amount of exercise or daily activities, outside of work: 0 day(s) per week    Problems taking medications regularly No    Medication side effects: No    Have you had an eye exam in the past two years? yes    Do you see a dentist twice per year? yes    Do you have sleep apnea, excessive snoring or daytime drowsiness?no      Today's PHQ-2 Score:   PHQ-2 ( 1999 Pfizer) 3/30/2022 12/8/2021   Q1: Little interest or pleasure in doing things 0 0   Q2: Feeling down, depressed or hopeless 0 0   PHQ-2 Score 0 0   PHQ-2 Total Score (12-17 Years)- Positive if 3 or more points; Administer PHQ-A if positive - -   Q1: Little interest or pleasure in doing things Not at all Not at all   Q2: Feeling down, depressed or hopeless Not at all Not at all   PHQ-2 Score 0 0       Abuse: Current or Past(Physical, Sexual or Emotional)- No  Do you feel safe in your environment? Yes        Social History     Tobacco Use     Smoking status: Never Smoker     Smokeless tobacco: Never Used   Substance Use Topics     Alcohol use: No     If you drink alcohol do you typically have >3 drinks per day or >7 drinks per week? No                     Reviewed orders with patient.  Reviewed health maintenance and updated orders accordingly - Yes  Lab work is in process    FHS-7:   Breast CA Risk Assessment (FHS-7) 3/30/2022   Did any of your first-degree relatives have breast or ovarian cancer? No   Did any of your relatives have bilateral breast cancer? Yes   Did any man in your family have breast cancer? No   Did any woman in your family have breast and ovarian cancer? No   Did any woman in your family have breast cancer before age 50  y? No   Do you have 2 or more relatives with breast and/or ovarian cancer? No   Do you have 2 or more relatives with breast and/or bowel cancer? No     Mammogram Screening: Recommended mammography every 1-2 years with patient discussion and risk factor consideration  Pertinent mammograms are reviewed under the imaging tab.    Pertinent mammograms are reviewed under the imaging tab.  History of abnormal Pap smear: NO - age 30-65 PAP every 5 years with negative HPV co-testing recommended     Reviewed and updated as needed this visit by clinical staff   Tobacco  Allergies  Meds   Med Hx  Surg Hx  Fam Hx  Soc Hx          Reviewed and updated as needed this visit by Provider                   Past Medical History:   Diagnosis Date     Asthma      Diabetes mellitus (H)      Esophageal reflux      Obese      Other chronic sinusitis      PONV (postoperative nausea and vomiting)       Past Surgical History:   Procedure Laterality Date     COLONOSCOPY  01/31/2002     COLONOSCOPY  10/26/2012    Procedure: COLONOSCOPY;  Colonoscopy;  Surgeon: Margret Sales MD;  Location: [facility]     COLONOSCOPY N/A 11/08/2019    Procedure: COLONOSCOPY, WITH POLYPECTOMY AND BIOPSY;  Surgeon: Ariel Heck MD;  Location: [facility]     CV LEFT HEART CATH N/A 09/12/2019    Procedure: Left Heart Cath;  Surgeon: Mike Sanon MD;  Location: [facility] CARDIAC CATH LAB     CV LEFT VENTRICULOGRAM N/A 09/12/2019    Procedure: Left Ventriculogram;  Surgeon: Mike Sanon MD;  Location: [facility] CARDIAC CATH LAB     ESOPHAGOSCOPY, GASTROSCOPY, DUODENOSCOPY (EGD), COMBINED N/A 11/08/2019    Procedure: ESOPHAGOGASTRODUODENOSCOPY, WITH BIOPSY;  Surgeon: Ariel Heck MD;  Location: [facility]     GYN SURGERY      Hydroablation 2007, polyp removal 2018     INJECT EPIDURAL LUMBAR  12/07/2010    INJECT EPIDURAL LUMBAR performed by GENERIC ANESTHESIA PROVIDER at [facility]     INJECT EPIDURAL LUMBAR  01/17/2011    INJECT EPIDURAL LUMBAR performed  by GENERIC ANESTHESIA PROVIDER at [facility]     left long finger pulley release Left 2020     RELEASE CARPAL TUNNEL  2012    Procedure: RELEASE CARPAL TUNNEL;  Right Carpal Tunnel Release;  Surgeon: Mike Sparrow MD;  Location: [facility]     RELEASE CARPAL TUNNEL  2012    Procedure: RELEASE CARPAL TUNNEL;  Left Carpal Tunnel Release;  Surgeon: Mike Sparrow MD;  Location: [facility]     REPAIR TENDON ACHILLES Left 2019    Procedure: Left Foot: Achilles Tendon Repair/Remodel/Reattachment; calcaneal prominence removal;  Surgeon: Felix Watkins DPM;  Location: [facility]     SURGICAL HISTORY OF -   04/10/2000    bilateral total ethmoidectomies, bilateral maxillary antrostomies, bilateral SMR of inferior turbinates, reduction of lt turbinate porter bullosa     OB History    Para Term  AB Living   2 2 1 1 0 4   SAB IAB Ectopic Multiple Live Births   0 0 0 1 4      # Outcome Date GA Lbr Brendan/2nd Weight Sex Delivery Anes PTL Lv   2A  85 33w0d   M    MOISE   2B  85 33w0d   M   Y MOISE   2C  85 33w0d   M   Y MOISE   1 Term 81    M CS-Unspec   MOISE       ROS:  CONSTITUTIONAL: NEGATIVE for fever, chills, change in weight  INTEGUMENTARY/SKIN: NEGATIVE for worrisome rashes, moles or lesions  EYES: NEGATIVE for vision changes or irritation  ENT: NEGATIVE for ear, mouth and throat problems  RESP: NEGATIVE for significant cough or SOB  BREAST: NEGATIVE for masses, tenderness or discharge  CV: NEGATIVE for chest pain, palpitations or peripheral edema  GI: NEGATIVE for nausea, abdominal pain, heartburn, or change in bowel habits  : NEGATIVE for unusual urinary or vaginal symptoms. No vaginal bleeding.  MUSCULOSKELETAL: NEGATIVE for significant arthralgias or myalgia  NEURO: NEGATIVE for weakness, dizziness or paresthesias  PSYCHIATRIC: NEGATIVE for changes in mood or affect     OBJECTIVE:   /69 (BP Location: Right arm, Patient Position: Chair,  "Cuff Size: Adult Large)   Pulse (!) 43   Temp 97.6  F (36.4  C) (Tympanic)   Resp 16   Ht 1.613 m (5' 3.5\")   Wt 117.3 kg (258 lb 8 oz)   LMP 01/14/2016 (Approximate)   BMI 45.07 kg/m    EXAM:  GENERAL: healthy, alert and no distress  EYES: Eyes grossly normal to inspection  RESP: breathing comfortably on room air with no appreciable cough or wheeze  BREAST: normal without masses, tenderness or nipple discharge and no palpable axillary masses or adenopathy  CV: regular rate, warm and well-perfused   ABDOMEN: soft, nontender, no hepatosplenomegaly, no masses and bowel sounds normal   (female): normal female external genitalia, normal urethral meatus, vaginal mucosa pink, moist, well rugated, and normal cervix/adnexa/uterus without masses or discharge  MS: no gross musculoskeletal defects noted, no edema  GYN: normal external genitalia, vagina and cervix   SKIN: no suspicious lesions or rashes  NEURO: Normal strength and tone, mentation intact and speech normal  PSYCH: mentation appears normal, affect normal/bright    Diagnostic Test Results:  Labs reviewed in Epic    ASSESSMENT/PLAN:       ICD-10-CM    1. Cervical cancer screening  Z12.4 Pap screen with HPV - recommended age 30 - 65 years   2. Women's annual routine gynecological examination  Z01.419 *MA Screening Digital Bilateral       Patient has been advised of split billing requirements and indicates understanding: Yes  COUNSELING:   Reviewed preventive health counseling, as reflected in patient instructions  Special attention given to:        annual flu vaccine     Estimated body mass index is 45.07 kg/m  as calculated from the following:    Height as of this encounter: 1.613 m (5' 3.5\").    Weight as of this encounter: 117.3 kg (258 lb 8 oz).    Weight management plan: Discussed healthy diet and exercise guidelines; offered comp weight management and nutrition - declines     She reports that she has never smoked. She has never used smokeless " tobacco.      Counseling Resources:  ATP IV Guidelines  Pooled Cohorts Equation Calculator  Breast Cancer Risk Calculator  BRCA-Related Cancer Risk Assessment: FHS-7 Tool  FRAX Risk Assessment  ICSI Preventive Guidelines  Dietary Guidelines for Americans, 2010  USDA's MyPlate  ASA Prophylaxis  Lung CA Screening    Britney Magana MD  [facility]

## 2022-06-11 LAB
BKR LAB AP GYN ADEQUACY: NORMAL
BKR LAB AP GYN INTERPRETATION: NORMAL
BKR LAB AP HPV REFLEX: NORMAL
BKR LAB AP PREVIOUS ABNORMAL: NORMAL
PATH REPORT.COMMENTS IMP SPEC: NORMAL
PATH REPORT.COMMENTS IMP SPEC: NORMAL
PATH REPORT.RELEVANT HX SPEC: NORMAL

## 2022-06-13 LAB
HUMAN PAPILLOMA VIRUS 16 DNA: NEGATIVE
HUMAN PAPILLOMA VIRUS 18 DNA: NEGATIVE
HUMAN PAPILLOMA VIRUS FINAL DIAGNOSIS: NORMAL
HUMAN PAPILLOMA VIRUS OTHER HR: NEGATIVE

## 2022-06-17 ENCOUNTER — HOSPITAL ENCOUNTER (EMERGENCY)
Facility: CLINIC | Age: 62
Discharge: HOME | End: 2022-06-17
Attending: PHYSICIAN ASSISTANT | Admitting: PHYSICIAN ASSISTANT
Payer: COMMERCIAL

## 2022-06-17 VITALS
DIASTOLIC BLOOD PRESSURE: 77 MMHG | HEART RATE: 57 BPM | OXYGEN SATURATION: 97 % | BODY MASS INDEX: 45.71 KG/M2 | SYSTOLIC BLOOD PRESSURE: 172 MMHG | RESPIRATION RATE: 18 BRPM | TEMPERATURE: 97.9 F | HEIGHT: 63 IN | WEIGHT: 258 LBS

## 2022-06-17 DIAGNOSIS — H65.01 NON-RECURRENT ACUTE SEROUS OTITIS MEDIA OF RIGHT EAR: ICD-10-CM

## 2022-06-17 LAB
DEPRECATED S PYO AG THROAT QL EIA: NEGATIVE
FLUAV RNA SPEC QL NAA+PROBE: NEGATIVE
FLUBV RNA RESP QL NAA+PROBE: NEGATIVE
GROUP A STREP BY PCR: NOT DETECTED
SARS-COV-2 RNA RESP QL NAA+PROBE: NEGATIVE

## 2022-06-17 PROCEDURE — 87636 SARSCOV2 & INF A&B AMP PRB: CPT | Performed by: PHYSICIAN ASSISTANT

## 2022-06-17 PROCEDURE — G0463 HOSPITAL OUTPT CLINIC VISIT: HCPCS | Mod: CS | Performed by: PHYSICIAN ASSISTANT

## 2022-06-17 PROCEDURE — 87651 STREP A DNA AMP PROBE: CPT | Performed by: PHYSICIAN ASSISTANT

## 2022-06-17 PROCEDURE — C9803 HOPD COVID-19 SPEC COLLECT: HCPCS | Performed by: PHYSICIAN ASSISTANT

## 2022-06-17 PROCEDURE — 99213 OFFICE O/P EST LOW 20 MIN: CPT | Mod: CS | Performed by: PHYSICIAN ASSISTANT

## 2022-06-17 RX ORDER — FLUCONAZOLE 150 MG/1
TABLET ORAL
Qty: 2 TABLET | Refills: 1 | Status: SHIPPED | OUTPATIENT
Start: 2022-06-17 | End: 2022-08-05

## 2022-06-17 RX ORDER — AMOXICILLIN 875 MG
875 TABLET ORAL 2 TIMES DAILY
Qty: 14 TABLET | Refills: 0 | Status: SHIPPED | OUTPATIENT
Start: 2022-06-17 | End: 2022-06-24

## 2022-06-17 NOTE — ED PROVIDER NOTES
History     Chief Complaint   Patient presents with     Pharyngitis     HPI  Bria Davis is a 61 year old female who presents to urgent care with concern over right ear pain some present for approximate last week.  Patient reports that 10 days prior to arrival she developed sore throat, nasal congestion.  She has also developed subsequent headache, cough.  She does complain of intermittent shortness of breath, possible wheezing.  She has not had any objective fever, chills, nausea, vomiting, diarrhea or abdominal complaints.  She does have a history of asthma and has not felt the need to increase her albuterol inhaler.  She has been taking Nyquil without resolution of symptoms.  She denies any known ill contacts with influenza, strep throat, COVID-19.  She did perform a home COVID test yesterday which was negative.    Allergies:  Allergies   Allergen Reactions     Lisinopril Cough     Tape [Adhesive Tape] Rash     Problem List:    Patient Active Problem List    Diagnosis Date Noted     Nonobstructive atherosclerosis of coronary artery 09/20/2019     Priority: Medium     Status post coronary angiogram 09/12/2019     Priority: Medium     Essential hypertension with goal blood pressure less than 140/90 08/25/2016     Priority: Medium     Type 2 diabetes mellitus with kidney complication, with long-term current use of insulin (H) 10/21/2015     Priority: Medium     Morbid obesity (H) 10/21/2015     Priority: Medium     Hyperlipidemia LDL goal <70 03/26/2011     Priority: Medium     Microalbuminuria 01/06/2011     Priority: Medium     Lumbar radiculopathy 11/30/2010     Priority: Medium     Enthesopathy 11/27/2007     Priority: Medium     Problem list name updated by automated process. Provider to review       Restless legs syndrome (RLS) 04/12/2007     Priority: Medium     Esophageal reflux 02/07/2006     Priority: Medium     Allergic rhinitis 02/07/2006     Priority: Medium     Problem list name updated by  automated process. Provider to review       Mild intermittent asthma 11/10/2005     Priority: Medium      Past Medical History:    Past Medical History:   Diagnosis Date     Asthma      Diabetes mellitus (H)      Esophageal reflux      Obese      Other chronic sinusitis      PONV (postoperative nausea and vomiting)      Past Surgical History:    Past Surgical History:   Procedure Laterality Date     COLONOSCOPY  01/31/2002     COLONOSCOPY  10/26/2012    Procedure: COLONOSCOPY;  Colonoscopy;  Surgeon: Margret Sales MD;  Location: [facility]     COLONOSCOPY N/A 11/08/2019    Procedure: COLONOSCOPY, WITH POLYPECTOMY AND BIOPSY;  Surgeon: Ariel Heck MD;  Location: [facility]     CV LEFT HEART CATH N/A 09/12/2019    Procedure: Left Heart Cath;  Surgeon: Mike Sanon MD;  Location:  HEART CARDIAC CATH LAB     CV LEFT VENTRICULOGRAM N/A 09/12/2019    Procedure: Left Ventriculogram;  Surgeon: Mike Sanon MD;  Location:  HEART CARDIAC CATH LAB     ESOPHAGOSCOPY, GASTROSCOPY, DUODENOSCOPY (EGD), COMBINED N/A 11/08/2019    Procedure: ESOPHAGOGASTRODUODENOSCOPY, WITH BIOPSY;  Surgeon: Ariel Heck MD;  Location: [facility]     GYN SURGERY      Hydroablation 2007, polyp removal 2018     INJECT EPIDURAL LUMBAR  12/07/2010    INJECT EPIDURAL LUMBAR performed by GENERIC ANESTHESIA PROVIDER at [facility]     INJECT EPIDURAL LUMBAR  01/17/2011    INJECT EPIDURAL LUMBAR performed by GENERIC ANESTHESIA PROVIDER at [facility]     left long finger pulley release Left 07/2020     RELEASE CARPAL TUNNEL  06/19/2012    Procedure: RELEASE CARPAL TUNNEL;  Right Carpal Tunnel Release;  Surgeon: Mike Sparrow MD;  Location: [facility]     RELEASE CARPAL TUNNEL  11/09/2012    Procedure: RELEASE CARPAL TUNNEL;  Left Carpal Tunnel Release;  Surgeon: Mike Sparrow MD;  Location: [facility]     REPAIR TENDON ACHILLES Left 12/26/2019    Procedure: Left Foot: Achilles Tendon Repair/Remodel/Reattachment; calcaneal prominence removal;   Surgeon: Felix Watkins DPM;  Location: [facility]     SURGICAL HISTORY OF -   04/10/2000    bilateral total ethmoidectomies, bilateral maxillary antrostomies, bilateral SMR of inferior turbinates, reduction of lt turbinate porter bullosa     Family History:    Family History   Problem Relation Age of Onset     Hypertension Mother      Diabetes Mother      Respiratory Mother         asthma-COPD     Hypertension Father      Heart Disease Father      Cerebrovascular Disease Father      Cardiovascular Father      Breast Cancer Maternal Grandmother      Cancer Brother      Diabetes Brother      Respiratory Brother         copd     Chronic Obstructive Pulmonary Disease Brother      Depression Sister      Respiratory Sister         copd     Chronic Obstructive Pulmonary Disease Sister      Depression Sister      Chronic Obstructive Pulmonary Disease Sister      Depression Sister      Chronic Obstructive Pulmonary Disease Brother      Kidney failure Brother      Diabetes Son      Social History:  Marital Status:   [2]  Social History     Tobacco Use     Smoking status: Never Smoker     Smokeless tobacco: Never Used   Vaping Use     Vaping Use: Never used   Substance Use Topics     Alcohol use: No     Drug use: No      Medications:    amoxicillin (AMOXIL) 875 MG tablet  fluconazole (DIFLUCAN) 150 MG tablet  ACCU-CHEK GUIDE test strip  ADVAIR DISKUS 100-50 MCG/DOSE inhaler  albuterol (PROVENTIL) (2.5 MG/3ML) 0.083% neb solution  Ascorbic Acid (VITAMIN C) 500 MG CAPS  aspirin (ASA) 81 MG EC tablet  blood glucose (NO BRAND SPECIFIED) lancets standard  blood glucose (NO BRAND SPECIFIED) test strip  blood glucose monitoring (ACCU-CHEK FASTCLIX) lancets  blood glucose monitoring (NO BRAND SPECIFIED) meter device kit  Calcium Carb-Cholecalciferol (CALCIUM-VITAMIN D) 600-400 MG-UNIT TABS  esomeprazole (NEXIUM) 40 MG DR capsule  ferrous sulfate 140 (45 Fe) MG TBCR CR tablet  insulin aspart (NOVOLOG FLEXPEN) 100 UNIT/ML  "pen  Insulin Glargine, 2 Unit Dial, (TOUJEO MAX SOLOSTAR) 300 UNIT/ML SOPN  insulin pen needle (BD PEDRO LUIS U/F) 32G X 4 MM miscellaneous  losartan (COZAAR) 100 MG tablet  metFORMIN (GLUCOPHAGE-XR) 500 MG 24 hr tablet  metoclopramide (REGLAN) 10 MG tablet  metoprolol succinate ER (TOPROL-XL) 25 MG 24 hr tablet  montelukast (SINGULAIR) 10 MG tablet  Multiple Vitamins-Minerals (MULTIVITAMIN ADULTS 50+) TABS  ondansetron (ZOFRAN) 4 MG tablet  pseudoePHEDrine (SUDAFED) 30 MG tablet  rosuvastatin (CRESTOR) 10 MG tablet  Semaglutide, 1 MG/DOSE, 4 MG/3ML SOPN  VENTOLIN  (90 Base) MCG/ACT inhaler      Review of Systems  CONSTITUTIONAL:NEGATIVE for fever, chills, change in weight  INTEGUMENTARY/SKIN: NEGATIVE for worrisome rashes, moles or lesions  EYES: NEGATIVE for vision changes or irritation  ENT/MOUTH: POSITIVE for sore throat, nasal congestion and NEGATIVE for ear pain   RESP:POSITIVE for cough and NEGATIVE for SOB/dyspnea and wheezing  GI: NEGATIVE for abdominal pain, diarrhea, nausea and vomiting  Physical Exam   BP: (!) 172/77  Pulse: 57  Temp: 97.9  F (36.6  C)  Resp: 18  Height: 160 cm (5' 3\")  Weight: 117 kg (258 lb)  SpO2: 97 %  Physical Exam  GENERAL APPEARANCE: healthy, alert and no distress  EYES: EOMI,  PERRL, conjunctiva clear  HENT: ear canals are clear, right TM is injected, left TM pearly gray translucent  Nose and mouth without ulcers, erythema or lesions  NECK: supple, nontender, no lymphadenopathy  RESP: lungs clear to auscultation - no rales, rhonchi or wheezes  CV: regular rates and rhythm, normal S1 S2, no murmur noted  SKIN: no suspicious lesions or rashes  ED Course                 Procedures       Critical Care time:  none        Results for orders placed or performed during the hospital encounter of 06/17/22 (from the past 24 hour(s))   Symptomatic; Auto-generated order Influenza A/B & SARS-CoV2 (COVID-19) Virus PCR Multiplex Nasopharyngeal    Specimen: Nasopharyngeal; Swab   Result Value " Ref Range    Influenza A PCR Negative Negative    Influenza B PCR Negative Negative    SARS CoV2 PCR Negative Negative    Narrative    Testing was performed using the kamari SARS-CoV-2 & Influenza A/B Assay on the kamari Genesis System. This test should be ordered for the detection of SARS-CoV-2 and influenza viruses in individuals who meet clinical and/or epidemiological criteria. Test performance is unknown in asymptomatic patients. This test is for in vitro diagnostic use under the FDA EUA for laboratories certified under CLIA to perform moderate and/or high complexity testing. This test has not been FDA cleared or approved. A negative result does not rule out the presence of PCR inhibitors in the specimen or target RNA in concentration below the limit of detection for the assay. If only one viral target is positive but coinfection with multiple targets is suspected, the sample should be re-tested with another FDA cleared, approved or authorized test, if coinfection would change clinical management. [facility] are certified under the Clinical Laboratory Improvement Amendments of 1988 (CLIA-88) as  qualified to perform moderate and/or high complexity laboratory testing.   Streptococcus A Rapid Scr w Reflx to PCR    Specimen: Throat; Swab   Result Value Ref Range    Group A Strep antigen Negative Negative     Medications - No data to display    Assessments & Plan (with Medical Decision Making)     I have reviewed the nursing notes.  I have reviewed the findings, diagnosis, plan and need for follow up with the patient.       New Prescriptions    AMOXICILLIN (AMOXIL) 875 MG TABLET    Take 1 tablet (875 mg) by mouth 2 times daily for 7 days    FLUCONAZOLE (DIFLUCAN) 150 MG TABLET    Take one tablet now, and one tablet in three days     Final diagnoses:   Non-recurrent acute serous otitis media of right ear     61-year-old female presents the urgent care with concern over 10-day history of sore throat,  nasal congestion, cough.  She had elevated blood pressure upon arrival, remainder vital signs stable.  Physical exam findings as described above are somewhat concerning for otitis media infection however patient denies any complaints of ear pain at this time.  She did have negative rapid strep test, negative COVID-19, influenza testing.  We discussed symptoms are most likely secondary to viral illness recommended symptomatic treatment with OTC medications.  I did elect to provide prescription for amoxicillin to be started only if increasing significant ear pain develops.  She was also given prescription for Diflucan if she starts antibiotics as she states prone to secondary yeast infections.  Follow up with PCP if no improvement in 5-7 days. Worrisome reasons to return to ER/UC sooner discussed.     Disclaimer: This note consists of symbols derived from keyboarding, dictation, and/or voice recognition software. As a result, there may be errors in the script that have gone undetected.  Please consider this when interpreting information found in the chart.    6/17/2022   [facility] EMERGENCY DEPT     Mary Zheng PA-C  06/19/22 1800

## 2022-06-20 ENCOUNTER — ANCILLARY PROCEDURE (OUTPATIENT)
Dept: MAMMOGRAPHY | Facility: CLINIC | Age: 62
End: 2022-06-20
Attending: OBSTETRICS & GYNECOLOGY
Payer: COMMERCIAL

## 2022-06-20 DIAGNOSIS — Z01.419 WOMEN'S ANNUAL ROUTINE GYNECOLOGICAL EXAMINATION: ICD-10-CM

## 2022-06-20 PROCEDURE — 77063 BREAST TOMOSYNTHESIS BI: CPT | Mod: TC | Performed by: RADIOLOGY

## 2022-06-20 PROCEDURE — 77067 SCR MAMMO BI INCL CAD: CPT | Mod: TC | Performed by: RADIOLOGY

## 2022-07-01 ENCOUNTER — TELEPHONE (OUTPATIENT)
Dept: FAMILY MEDICINE | Facility: CLINIC | Age: 62
End: 2022-07-01

## 2022-07-01 NOTE — TELEPHONE ENCOUNTER
Reason for call:  Patient reporting a symptom    Symptom or request: Patient is asking if she needs to see PCP first or if a referral can be placed for ENT for her ears. What she was seen in UC. Patient said it is plugged and unplugged.     Phone Number patient can be reached at:  Home number on file [phone] (home)    Best Time:  any    Can we leave a detailed message on this number:  YES    Call taken on 7/1/2022 at 11:09 AM by Delia Greenberg

## 2022-07-01 NOTE — TELEPHONE ENCOUNTER
Writer called patient ans scheduled patient for a follow up 7/7/22 9am arrival time    Jun HALEY [facility]

## 2022-07-01 NOTE — TELEPHONE ENCOUNTER
"Patient last office visit with Dr Elena 3/30/22  Patient seen in Urgent Care 6/17/22 for \"plugged ears\"  Patient requesting referral for ENT    Routed to Dr Davidson  Can patient have referral as pended?        "

## 2022-07-01 NOTE — TELEPHONE ENCOUNTER
Sounds like she was told to follow up with PCP - I would need to see her to determine if ENT referral is needed    Kaia Elena M.D.

## 2022-07-07 ENCOUNTER — OFFICE VISIT (OUTPATIENT)
Dept: FAMILY MEDICINE | Facility: CLINIC | Age: 62
End: 2022-07-07
Payer: COMMERCIAL

## 2022-07-07 VITALS
WEIGHT: 256.4 LBS | HEIGHT: 64 IN | DIASTOLIC BLOOD PRESSURE: 89 MMHG | OXYGEN SATURATION: 96 % | SYSTOLIC BLOOD PRESSURE: 139 MMHG | RESPIRATION RATE: 20 BRPM | TEMPERATURE: 98.4 F | BODY MASS INDEX: 43.77 KG/M2 | HEART RATE: 81 BPM

## 2022-07-07 DIAGNOSIS — Z23 HIGH PRIORITY FOR 2019-NCOV VACCINE: ICD-10-CM

## 2022-07-07 DIAGNOSIS — H69.92 DYSFUNCTION OF LEFT EUSTACHIAN TUBE: Primary | ICD-10-CM

## 2022-07-07 PROCEDURE — 91306 COVID-19,PF,MODERNA (18+ YRS BOOSTER .25ML): CPT | Performed by: FAMILY MEDICINE

## 2022-07-07 PROCEDURE — 99213 OFFICE O/P EST LOW 20 MIN: CPT | Mod: 25 | Performed by: FAMILY MEDICINE

## 2022-07-07 PROCEDURE — 0064A COVID-19,PF,MODERNA (18+ YRS BOOSTER .25ML): CPT | Performed by: FAMILY MEDICINE

## 2022-07-07 ASSESSMENT — ASTHMA QUESTIONNAIRES
QUESTION_3 LAST FOUR WEEKS HOW OFTEN DID YOUR ASTHMA SYMPTOMS (WHEEZING, COUGHING, SHORTNESS OF BREATH, CHEST TIGHTNESS OR PAIN) WAKE YOU UP AT NIGHT OR EARLIER THAN USUAL IN THE MORNING: ONCE OR TWICE
QUESTION_4 LAST FOUR WEEKS HOW OFTEN HAVE YOU USED YOUR RESCUE INHALER OR NEBULIZER MEDICATION (SUCH AS ALBUTEROL): NOT AT ALL
QUESTION_5 LAST FOUR WEEKS HOW WOULD YOU RATE YOUR ASTHMA CONTROL: WELL CONTROLLED
ACT_TOTALSCORE: 22
QUESTION_1 LAST FOUR WEEKS HOW MUCH OF THE TIME DID YOUR ASTHMA KEEP YOU FROM GETTING AS MUCH DONE AT WORK, SCHOOL OR AT HOME: NONE OF THE TIME
ACT_TOTALSCORE: 22
QUESTION_2 LAST FOUR WEEKS HOW OFTEN HAVE YOU HAD SHORTNESS OF BREATH: ONCE OR TWICE A WEEK

## 2022-07-07 ASSESSMENT — PAIN SCALES - GENERAL: PAINLEVEL: NO PAIN (0)

## 2022-07-07 NOTE — NURSING NOTE
After obtaining informed consent, the Moderna Covid-19 Booster immunization is given by Payal Anderson in patient's left deltoid. Patient advised to remain in clinic for 15 minutes to monitor for adverse reactions.    ELIO Anderson LPN on 7/7/2022 at 9:30 AM

## 2022-07-07 NOTE — PATIENT INSTRUCTIONS
Thank you for choosing [facility].      Our Clinic hours are:  Mondays    7:20 am - 7 pm  Tues -  Fri  7:20 am - 5 pm      The clinic lab opens at 7:30 am Mon - Fri and appointments are required.    [facility]  Ph. [phone]  Monday-Thursday 8 am - 7pm  Tues/Wed/Fri 8 am - 5:30 pm

## 2022-07-28 NOTE — PROGRESS NOTES
SUBJECTIVE:   Bria Davis is a 57 year old female who presents to clinic today for the following health issues:      Diabetes Follow-up    Patient is checking blood sugars: three times daily.   Blood sugar testing frequency justification: Uncontrolled diabetes  Results are as follows:       am - 200       lunchtime - 220-250       suppertime - 180 or higher        Diabetic concerns: blood sugar frequently over 200     Symptoms of hypoglycemia (low blood sugar): none     Paresthesias (numbness or burning in feet) or sores: No   Date of last diabetic eye exam: 1/31/2017      Amount of exercise or physical activity: None    Problems taking medications regularly: No    Medication side effects: none  Diet: regular (no restrictions)    She admits that over the summer she was not very disciplined about following her diet or exercise regimen and actually has gained back some weight and her sugars have been running high as noted above      PROBLEMS TO ADD ON...  Hyperlipidemia Follow-Up      Rate your low fat/cholesterol diet?: good    Taking statin?  Yes, no muscle aches from statin    Other lipid medications/supplements?:  none    Hypertension Follow-up      Outpatient blood pressures are being checked at home.  Results are in the acceptable range.    Low Salt Diet: no added salt    Muscle contraction headaches: She has been using cyclobenzaprine at night and has definitely noted that she sleeps better and her headaches have not bothered her recently          Problem list and histories reviewed & adjusted, as indicated.  Additional history: none        Reviewed and updated as needed this visit by clinical staffTobacco  Allergies  Med Hx  Surg Hx  Fam Hx  Soc Hx      Reviewed and updated as needed this visit by Provider               ROS:  Constitutional, HEENT, cardiovascular, pulmonary, gi and gu systems are negative, except as otherwise noted.          OBJECTIVE:                                                 "    /80 (BP Location: Right arm, Patient Position: Chair, Cuff Size: Adult Regular)  Pulse 96  Ht 5' 3.5\" (1.613 m)  Wt 255 lb 9.6 oz (115.9 kg)  BMI 44.57 kg/m2    GENERAL: healthy, alert and no distress  EYES: Eyes grossly normal to inspection, extraocular movements - intact, and PERRL  NECK: no tenderness, no adenopathy, no asymmetry, no masses, no stiffness; thyroid- normal to palpation  RESP: lungs clear to auscultation - no rales, no rhonchi, no wheezes  CV: regular rates and rhythm, normal S1 S2, no S3 or S4 and no murmur, no click or rub -  MS: extremities- no gross deformities noted, no edema  Diabetic foot exam: no trophic changes or ulcerative lesions and normal monofilament exam    Diagnostic test results:  Results for orders placed or performed in visit on 09/18/17 (from the past 24 hour(s))   Hemoglobin A1c   Result Value Ref Range    Hemoglobin A1C 9.3 (H) 4.3 - 6.0 %          ASSESSMENT/PLAN:                                                    ASSESSMENT:  1. Type 2 diabetes mellitus with other diabetic kidney complication (H)    2. Essential hypertension with goal blood pressure less than 140/90    3. Hyperlipidemia LDL goal <100    4. Muscle contraction headache        PLAN:  Orders Placed This Encounter     Hemoglobin A1c     insulin glargine (LANTUS VIAL) 100 UNIT/ML injection     insulin aspart (NOVOLOG VIAL) 100 UNITS/ML injection     metFORMIN (GLUCOPHAGE-XR) 500 MG 24 hr tablet     dulaglutide (TRULICITY) 1.5 MG/0.5ML pen     losartan (COZAAR) 100 MG tablet     cyclobenzaprine (FLEXERIL) 10 MG tablet       Patient Instructions   Diabetes Plan  1. Hemoglobin A1c goal is between 7% and 8%  Your Hemoglobin A1c is not at goal  Plan is:Increase novolog as indicated on prescription and change Lantus to 36 units twice daily  2. LDL (bad cholesterol) goal is less than 100  Your LDL is at goal  Plan is: Continue medications at current doses  3. Blood pressure goal is less than 140/90.  Your " blood pressure is at goal.  Plan is: Continue medications at current doses  4. Microalbumin checks for protein in your urine which is an indicator of kidney damage.  Your microalbumin is acceptable.  Plan is: Recheck in one year  Recheck in 3 months                    IRVIN Conroy  [facility]      DISPLAY PLAN FREE TEXT

## 2022-08-05 DIAGNOSIS — Z86.19 HISTORY OF CANDIDAL VULVOVAGINITIS: Primary | ICD-10-CM

## 2022-08-05 RX ORDER — FLUCONAZOLE 150 MG/1
TABLET ORAL
Qty: 4 TABLET | Refills: 0 | Status: SHIPPED | OUTPATIENT
Start: 2022-08-05 | End: 2022-10-05

## 2022-08-05 NOTE — TELEPHONE ENCOUNTER
Dr. Roe:    Patient was called, she is asking for a refill on the Diflucan as she took two of a previous script from a [facility] provider and it helped her symptoms but a week later the patient developed itching in the groin, discomfort in the groin, denies fever and is not sure of the redness. Says the Diflucan is helpful for her symptoms.    Pharmacy is pended, please advise as PCP has left for the day.      TERA Farfan

## 2022-08-05 NOTE — TELEPHONE ENCOUNTER
Symptoms    Describe your symptoms: itching in groin/creases    Any pain: No    How long have you been having symptoms: 7    Have you been seen for this:  No    Appointment offered?: No    Triage offered?: No    Home remedies tried: Pt took diflucan twice, improved but isn't cleared    Preferred Pharmacy:   [place], [place] - [address] [facility]  [address] [facility] [address]  Phone: [phone] Fax: [phone] Alternate Fax: [phone], [phone]    [facility] [place], [place] - [address]  [address]  [facility] [address]  Phone: [phone] Fax: [phone] Alternate Fax: [phone]    [address] [facility] [address] [facility] [address]  Phone: [phone] Fax: [phone]      Could we send this information to you in [facility] or would you prefer to receive a phone call?:   Patient would prefer a phone call   Okay to leave a detailed message?: Yes at Cell number on file:    Telephone Information:   Mobile [phone]

## 2022-08-18 ENCOUNTER — OFFICE VISIT (OUTPATIENT)
Dept: FAMILY MEDICINE | Facility: CLINIC | Age: 62
End: 2022-08-18
Payer: COMMERCIAL

## 2022-08-18 VITALS
TEMPERATURE: 97.7 F | DIASTOLIC BLOOD PRESSURE: 76 MMHG | WEIGHT: 255.38 LBS | BODY MASS INDEX: 44.53 KG/M2 | RESPIRATION RATE: 16 BRPM | SYSTOLIC BLOOD PRESSURE: 128 MMHG | HEART RATE: 42 BPM | OXYGEN SATURATION: 96 %

## 2022-08-18 DIAGNOSIS — L30.4 INTERTRIGO: Primary | ICD-10-CM

## 2022-08-18 PROCEDURE — 99213 OFFICE O/P EST LOW 20 MIN: CPT | Performed by: FAMILY MEDICINE

## 2022-08-18 RX ORDER — TRIAMCINOLONE ACETONIDE 1 MG/G
CREAM TOPICAL 2 TIMES DAILY
Qty: 80 G | Refills: 1 | Status: SHIPPED | OUTPATIENT
Start: 2022-08-18

## 2022-08-18 ASSESSMENT — PAIN SCALES - GENERAL: PAINLEVEL: NO PAIN (0)

## 2022-08-18 NOTE — PROGRESS NOTES
Assessment & Plan     Intertrigo  Patient to let me know if the topical steroid cream is not effective    - triamcinolone (KENALOG) 0.1 % external cream; Apply topically 2 times daily      No follow-ups on file.    Kaia Elena MD  [facility]   Melyssa is a 62 year old, presenting for the following health issues:  Vaginal Itching      History of Present Illness       Reason for visit:  Itching  Symptom onset:  3-4 weeks ago  Symptoms include:  Itching  Symptom intensity:  Moderate  Symptom progression:  Staying the same  Had these symptoms before:  No  What makes it worse:  Soap makes it worse  What makes it better:  No    She eats 0-1 servings of fruits and vegetables daily.She consumes 0 sweetened beverage(s) daily.She exercises with enough effort to increase her heart rate 9 or less minutes per day.  She exercises with enough effort to increase her heart rate 3 or less days per week.   She is taking medications regularly.        She notes that is blood sugar is under 100 at diner she does not take evening insuin injection. Also she notes that she stopped using soap in area for the last week hoping this will help.    Vaginal Symptoms  Onset/Duration: 3 weeks  Description:  Vaginal Discharge: none   Itching (Pruritis): YES - in groin skin folds  Burning sensation:  YES  Odor: No  Accompanying Signs & Symptoms:  Urinary symptoms: No  Abdominal pain: No  Fever: No  History:   Sexually active: YES  New Partner: No  Possibility of Pregnancy:  No  Recent antibiotic use: YES  Previous vaginitis issues: YES  Precipitating or alleviating factors: None  Therapies tried and outcome: Fluconazole with initial improvement but didn't resolve    Already using a fan to dry skin folds well.       Answers for HPI/ROS submitted by the patient on 8/18/2022  How many servings of fruits and vegetables do you eat daily?: 0-1  On average, how many sweetened beverages do you drink each day  (Examples: soda, juice, sweet tea, etc.  Do NOT count diet or artificially sweetened beverages)?: 0  How many minutes a day do you exercise enough to make your heart beat faster?: 9 or less  How many days a week do you exercise enough to make your heart beat faster?: 3 or less  How many days per week do you miss taking your medication?: 0  What is the reason for your visit today?: Itching  When did your symptoms begin?: 3-4 weeks ago  What are your symptoms?: Itching  How would you describe these symptoms?: Moderate  Are your symptoms:: Staying the same  Have you had these symptoms before?: No  Is there anything that makes you feel worse?: Soap makes it worse  Is there anything that makes you feel better?: No      Review of Systems   Constitutional, HEENT, cardiovascular, pulmonary, gi and gu systems are negative, except as otherwise noted.      Objective    /76   Pulse (!) 42   Temp 97.7  F (36.5  C) (Tympanic)   Resp 16   Wt 115.8 kg (255 lb 6 oz)   LMP 01/14/2016 (Approximate)   SpO2 96%   Breastfeeding No   BMI 44.53 kg/m    Body mass index is 44.53 kg/m .  Physical Exam   GENERAL APPEARANCE: healthy, alert and no distress   (female): no obvious discharge  SKIN: mild intertrigo of groin creases.  Doesn't appear candidal (no satellite lesions, minor erythema)                    .  ..

## 2022-09-21 DIAGNOSIS — E11.22 TYPE 2 DIABETES MELLITUS WITH CHRONIC KIDNEY DISEASE, WITH LONG-TERM CURRENT USE OF INSULIN, UNSPECIFIED CKD STAGE (H): ICD-10-CM

## 2022-09-21 DIAGNOSIS — Z79.4 TYPE 2 DIABETES MELLITUS WITH CHRONIC KIDNEY DISEASE, WITH LONG-TERM CURRENT USE OF INSULIN, UNSPECIFIED CKD STAGE (H): ICD-10-CM

## 2022-09-21 RX ORDER — SEMAGLUTIDE 1.34 MG/ML
INJECTION, SOLUTION SUBCUTANEOUS
Qty: 9 ML | Refills: 0 | Status: SHIPPED | OUTPATIENT
Start: 2022-09-21 | End: 2022-10-05 | Stop reason: DRUGHIGH

## 2022-10-05 ENCOUNTER — OFFICE VISIT (OUTPATIENT)
Dept: FAMILY MEDICINE | Facility: CLINIC | Age: 62
End: 2022-10-05
Payer: COMMERCIAL

## 2022-10-05 VITALS
SYSTOLIC BLOOD PRESSURE: 150 MMHG | TEMPERATURE: 97.5 F | WEIGHT: 254 LBS | OXYGEN SATURATION: 95 % | HEART RATE: 76 BPM | DIASTOLIC BLOOD PRESSURE: 90 MMHG | BODY MASS INDEX: 44.29 KG/M2 | RESPIRATION RATE: 16 BRPM

## 2022-10-05 DIAGNOSIS — K31.84 GASTROPARESIS DUE TO DM (H): ICD-10-CM

## 2022-10-05 DIAGNOSIS — I10 ESSENTIAL HYPERTENSION WITH GOAL BLOOD PRESSURE LESS THAN 140/90: ICD-10-CM

## 2022-10-05 DIAGNOSIS — E11.22 TYPE 2 DIABETES MELLITUS WITH CHRONIC KIDNEY DISEASE, WITH LONG-TERM CURRENT USE OF INSULIN, UNSPECIFIED CKD STAGE (H): Primary | ICD-10-CM

## 2022-10-05 DIAGNOSIS — E78.5 HYPERLIPIDEMIA LDL GOAL <70: ICD-10-CM

## 2022-10-05 DIAGNOSIS — J45.20 MILD INTERMITTENT ASTHMA, UNCOMPLICATED: ICD-10-CM

## 2022-10-05 DIAGNOSIS — Z79.4 TYPE 2 DIABETES MELLITUS WITH CHRONIC KIDNEY DISEASE, WITH LONG-TERM CURRENT USE OF INSULIN, UNSPECIFIED CKD STAGE (H): Primary | ICD-10-CM

## 2022-10-05 DIAGNOSIS — E11.43 GASTROPARESIS DUE TO DM (H): ICD-10-CM

## 2022-10-05 LAB — HBA1C MFR BLD: 7.5 % (ref 0–5.6)

## 2022-10-05 PROCEDURE — 83036 HEMOGLOBIN GLYCOSYLATED A1C: CPT | Performed by: FAMILY MEDICINE

## 2022-10-05 PROCEDURE — 0134A COVID-19,PF,MODERNA BIVALENT: CPT | Performed by: FAMILY MEDICINE

## 2022-10-05 PROCEDURE — 90471 IMMUNIZATION ADMIN: CPT | Performed by: FAMILY MEDICINE

## 2022-10-05 PROCEDURE — 36415 COLL VENOUS BLD VENIPUNCTURE: CPT | Performed by: FAMILY MEDICINE

## 2022-10-05 PROCEDURE — 99214 OFFICE O/P EST MOD 30 MIN: CPT | Mod: 25 | Performed by: FAMILY MEDICINE

## 2022-10-05 PROCEDURE — 91313 COVID-19,PF,MODERNA BIVALENT: CPT | Performed by: FAMILY MEDICINE

## 2022-10-05 PROCEDURE — 99207 PR FOOT EXAM NO CHARGE: CPT | Performed by: FAMILY MEDICINE

## 2022-10-05 PROCEDURE — 90677 PCV20 VACCINE IM: CPT | Performed by: FAMILY MEDICINE

## 2022-10-05 RX ORDER — HYDROCHLOROTHIAZIDE 12.5 MG/1
12.5 TABLET ORAL DAILY
Qty: 30 TABLET | Refills: 1 | Status: SHIPPED | OUTPATIENT
Start: 2022-10-05 | End: 2022-10-27

## 2022-10-05 RX ORDER — FLUTICASONE PROPIONATE AND SALMETEROL 100; 50 UG/1; UG/1
POWDER RESPIRATORY (INHALATION)
Qty: 180 EACH | Refills: 3 | Status: SHIPPED | OUTPATIENT
Start: 2022-10-05 | End: 2023-09-21

## 2022-10-05 RX ORDER — ROSUVASTATIN CALCIUM 10 MG/1
10 TABLET, COATED ORAL DAILY
Qty: 90 TABLET | Refills: 1 | Status: SHIPPED | OUTPATIENT
Start: 2022-10-05 | End: 2023-03-28

## 2022-10-05 RX ORDER — METFORMIN HCL 500 MG
1000 TABLET, EXTENDED RELEASE 24 HR ORAL 2 TIMES DAILY WITH MEALS
Qty: 360 TABLET | Refills: 1 | Status: SHIPPED | OUTPATIENT
Start: 2022-10-05 | End: 2023-03-28

## 2022-10-05 RX ORDER — LOSARTAN POTASSIUM 100 MG/1
100 TABLET ORAL DAILY
Qty: 90 TABLET | Refills: 1 | Status: SHIPPED | OUTPATIENT
Start: 2022-10-05 | End: 2023-03-28

## 2022-10-05 RX ORDER — METOCLOPRAMIDE 10 MG/1
10 TABLET ORAL 2 TIMES DAILY
Qty: 180 TABLET | Refills: 1 | Status: SHIPPED | OUTPATIENT
Start: 2022-10-05 | End: 2023-03-28

## 2022-10-05 RX ORDER — SEMAGLUTIDE 2.68 MG/ML
2 INJECTION, SOLUTION SUBCUTANEOUS WEEKLY
Qty: 9 ML | Refills: 1 | Status: SHIPPED | OUTPATIENT
Start: 2022-10-05 | End: 2023-03-28

## 2022-10-05 RX ORDER — INSULIN GLARGINE 300 U/ML
100 INJECTION, SOLUTION SUBCUTANEOUS DAILY
Qty: 40.5 ML | Refills: 1 | Status: SHIPPED | OUTPATIENT
Start: 2022-10-05 | End: 2023-04-10

## 2022-10-05 ASSESSMENT — PAIN SCALES - GENERAL: PAINLEVEL: NO PAIN (0)

## 2022-10-05 NOTE — PATIENT INSTRUCTIONS
"  Add hydrochlorothiazide to your blood pressure regimen.  12.5 mg daily    Recheck with RN and lab in 2-3 weeks      Increase Ozempic to 2 mg weekly        Thank you for choosing [facility].      When you are out of refills or the refills say \"zero\", it is time to schedule your next appointment in clinic!    Our Clinic hours are:  Mondays    7:20 am - 7 pm  Tues -  Fri  7:20 am - 5 pm    To speak to the care team, call [phone] option #2      The clinic lab opens at 7:30 am Mon - Fri and appointments are required.    [facility]  Ph. [phone]  Monday-Thursday 8 am - 7pm  Tues/Wed/Fri 8 am - 5:30 pm       "

## 2022-10-05 NOTE — PROGRESS NOTES
Assessment & Plan     Type 2 diabetes mellitus with chronic kidney disease, with long-term current use of insulin, unspecified CKD stage (H)  Increase ozempic to 2 mg weekly    - Hemoglobin A1c; Future  - Hemoglobin A1c  - insulin glargine U-300 (TOUJEO MAX SOLOSTAR) 300 UNIT/ML (2 units dial) pen; Inject 100 Units Subcutaneous daily  - metFORMIN (GLUCOPHAGE XR) 500 MG 24 hr tablet; Take 2 tablets (1,000 mg) by mouth 2 times daily (with meals)  - FOOT EXAM  - Semaglutide, 2 MG/DOSE, (OZEMPIC, 2 MG/DOSE,) 8 MG/3ML SOPN; Inject 2 mg Subcutaneous once a week    Mild intermittent asthma, uncomplicated     - fluticasone-salmeterol (ADVAIR DISKUS) 100-50 MCG/ACT inhaler; INHALE 1 PUFF INTO THE LUNGS 2 TIMES DAILY    Essential hypertension with goal blood pressure less than 140/90  Uncontrolled  Add hydrochlorothiazide 12.5 mg  Recheck with RN in 2-3 weeks and lab at that time as well    - losartan (COZAAR) 100 MG tablet; Take 1 tablet (100 mg) by mouth daily  - hydrochlorothiazide (HYDRODIURIL) 12.5 MG tablet; Take 1 tablet (12.5 mg) by mouth daily  - Basic metabolic panel  (Ca, Cl, CO2, Creat, Gluc, K, Na, BUN); Future    Gastroparesis due to DM (H)   stable  - metoclopramide (REGLAN) 10 MG tablet; Take 1 tablet (10 mg) by mouth 2 times daily    Hyperlipidemia LDL goal <70     - rosuvastatin (CRESTOR) 10 MG tablet; Take 1 tablet (10 mg) by mouth daily                 No follow-ups on file.    Kaia Elena MD  [facility]    Susannah Ragsdale is a 62 year old, presenting for the following health issues:  Diabetes      History of Present Illness       Diabetes:   She presents for follow up of diabetes.  She is checking home blood glucose three times daily. She checks blood glucose before meals.  Blood glucose is sometimes over 200 and sometimes under 70. She is aware of hypoglycemia symptoms including shakiness, dizziness, weakness and confusion. She has no concerns regarding her diabetes  at this time.  She is not experiencing numbness or burning in feet, excessive thirst, blurry vision, weight changes or redness, sores or blisters on feet.         She eats 0-1 servings of fruits and vegetables daily.She consumes 0 sweetened beverage(s) daily.She exercises with enough effort to increase her heart rate 9 or less minutes per day.  She exercises with enough effort to increase her heart rate 3 or less days per week.   She is taking medications regularly.        Hypertension Follow-up      Do you check your blood pressure regularly outside of the clinic? No     Are you following a low salt diet? No    Are your blood pressures ever more than 140 on the top number (systolic) OR more   than 90 on the bottom number (diastolic), for example 140/90? Yes        Review of Systems   Constitutional, HEENT, cardiovascular, pulmonary, gi and gu systems are negative, except as otherwise noted.      Objective    LMP 01/14/2016 (Approximate)   There is no height or weight on file to calculate BMI.  Physical Exam   GENERAL: healthy, alert and no distress  NECK: no adenopathy, no asymmetry, masses, or scars and thyroid normal to palpation  RESP: lungs clear to auscultation - no rales, rhonchi or wheezes  CV: regular rate and rhythm, normal S1 S2, no S3 or S4, no murmur, click or rub, no peripheral edema and peripheral pulses strong  ABDOMEN: soft, nontender, no hepatosplenomegaly, no masses and bowel sounds normal  MS: trace edema    Diabetic Foot Screen:  Any complaints of increased pain or numbness ? No  Is there a foot ulcer now or a history of foot ulcer? No  Does the foot have an abnormal shape? No  Are the nails thick, too long or ingrown? No  Are there any redness or open areas? No         Sensation Testing done at all points on the diagram with monofilament     Right Foot: Sensation Normal at all points  Left Foot: Sensation Normal at all points     Risk Category: 0- No loss of protective sensation  Performed by  Kaia Elena MD                  Results for orders placed or performed in visit on 10/05/22   Hemoglobin A1c     Status: Abnormal   Result Value Ref Range    Hemoglobin A1C 7.5 (H) 0.0 - 5.6 %

## 2022-10-27 ENCOUNTER — ALLIED HEALTH/NURSE VISIT (OUTPATIENT)
Dept: FAMILY MEDICINE | Facility: CLINIC | Age: 62
End: 2022-10-27
Payer: COMMERCIAL

## 2022-10-27 ENCOUNTER — LAB (OUTPATIENT)
Dept: LAB | Facility: CLINIC | Age: 62
End: 2022-10-27
Payer: COMMERCIAL

## 2022-10-27 VITALS — HEART RATE: 73 BPM | OXYGEN SATURATION: 96 % | SYSTOLIC BLOOD PRESSURE: 136 MMHG | DIASTOLIC BLOOD PRESSURE: 78 MMHG

## 2022-10-27 DIAGNOSIS — I10 ESSENTIAL HYPERTENSION WITH GOAL BLOOD PRESSURE LESS THAN 140/90: ICD-10-CM

## 2022-10-27 DIAGNOSIS — Z01.30 BLOOD PRESSURE CHECK: Primary | ICD-10-CM

## 2022-10-27 LAB
ANION GAP SERPL CALCULATED.3IONS-SCNC: 7 MMOL/L (ref 7–15)
BUN SERPL-MCNC: 10.8 MG/DL (ref 8–23)
CALCIUM SERPL-MCNC: 9.6 MG/DL (ref 8.8–10.2)
CHLORIDE SERPL-SCNC: 104 MMOL/L (ref 98–107)
CREAT SERPL-MCNC: 0.57 MG/DL (ref 0.51–0.95)
DEPRECATED HCO3 PLAS-SCNC: 28 MMOL/L (ref 22–29)
GFR SERPL CREATININE-BSD FRML MDRD: >90 ML/MIN/1.73M2
GLUCOSE SERPL-MCNC: 104 MG/DL (ref 70–99)
POTASSIUM SERPL-SCNC: 4.3 MMOL/L (ref 3.4–5.3)
SODIUM SERPL-SCNC: 139 MMOL/L (ref 136–145)

## 2022-10-27 PROCEDURE — 80048 BASIC METABOLIC PNL TOTAL CA: CPT

## 2022-10-27 PROCEDURE — 36415 COLL VENOUS BLD VENIPUNCTURE: CPT

## 2022-10-27 PROCEDURE — 99207 PR NO CHARGE NURSE ONLY: CPT

## 2022-10-27 RX ORDER — HYDROCHLOROTHIAZIDE 12.5 MG/1
12.5 TABLET ORAL DAILY
Qty: 30 TABLET | Refills: 1 | Status: CANCELLED | OUTPATIENT
Start: 2022-10-27

## 2022-10-27 RX ORDER — HYDROCHLOROTHIAZIDE 12.5 MG/1
12.5 TABLET ORAL DAILY
Qty: 90 TABLET | Refills: 1 | Status: SHIPPED | OUTPATIENT
Start: 2022-10-27 | End: 2023-03-28

## 2022-10-27 NOTE — TELEPHONE ENCOUNTER
Bria Davis is a 62 year old year old patient who comes in today for a Blood Pressure check because of new medication. hydrochlorothiazide 12.5 mg  Vital Signs as repeated by /78 HR 76 SPO2 96%  Patient is taking medication as prescribed  Patient is tolerating medications well.  Patient is not monitoring Blood Pressure at home.  Average readings if yes are NA  Current complaints: none, patient reported leg cramps a couple weeks ago which have now resolved  Disposition:  patient to continue with the same medication or as advised    My chart message preferred if changes    hydrochlorothiazide refill needed, pended    Chante Fuller RN on 10/27/2022 at 9:21 AM

## 2022-10-27 NOTE — PROGRESS NOTES
Bria Davis is a 62 year old year old patient who comes in today for a Blood Pressure check because of new medication. hydrochlorothiazide 12.5 mg  Vital Signs as repeated by /78 HR 76 SPO2 96%  Patient is taking medication as prescribed  Patient is tolerating medications well.  Patient is not monitoring Blood Pressure at home.  Average readings if yes are NA  Current complaints: none  Disposition:  patient to continue with the same medication or as advised    My chart message preferred if changes    Chanteryan Fuller RN on 10/27/2022 at 9:21 AM

## 2022-10-27 NOTE — RESULT ENCOUNTER NOTE
Bria,    These labs are normal or acceptable.    Please contact my office if you have questions.    Kaia Elena M.D.

## 2022-11-07 ENCOUNTER — OFFICE VISIT (OUTPATIENT)
Dept: FAMILY MEDICINE | Facility: CLINIC | Age: 62
End: 2022-11-07
Payer: COMMERCIAL

## 2022-11-07 VITALS
DIASTOLIC BLOOD PRESSURE: 82 MMHG | HEART RATE: 80 BPM | SYSTOLIC BLOOD PRESSURE: 138 MMHG | WEIGHT: 250.8 LBS | RESPIRATION RATE: 14 BRPM | BODY MASS INDEX: 42.82 KG/M2 | OXYGEN SATURATION: 97 % | TEMPERATURE: 97.7 F | HEIGHT: 64 IN

## 2022-11-07 DIAGNOSIS — M25.561 CHRONIC PAIN OF RIGHT KNEE: ICD-10-CM

## 2022-11-07 DIAGNOSIS — M79.10 MYALGIA: Primary | ICD-10-CM

## 2022-11-07 DIAGNOSIS — G89.29 CHRONIC PAIN OF RIGHT KNEE: ICD-10-CM

## 2022-11-07 LAB
BASOPHILS # BLD AUTO: 0.1 10E3/UL (ref 0–0.2)
BASOPHILS NFR BLD AUTO: 1 %
CK SERPL-CCNC: 75 U/L (ref 26–192)
CRP SERPL-MCNC: 8.59 MG/L
DEPRECATED CALCIDIOL+CALCIFEROL SERPL-MC: 45 UG/L (ref 20–75)
EOSINOPHIL # BLD AUTO: 0.4 10E3/UL (ref 0–0.7)
EOSINOPHIL NFR BLD AUTO: 5 %
ERYTHROCYTE [DISTWIDTH] IN BLOOD BY AUTOMATED COUNT: 12.7 % (ref 10–15)
ERYTHROCYTE [SEDIMENTATION RATE] IN BLOOD BY WESTERGREN METHOD: 11 MM/HR (ref 0–30)
HCT VFR BLD AUTO: 42.7 % (ref 35–47)
HGB BLD-MCNC: 14.1 G/DL (ref 11.7–15.7)
IMM GRANULOCYTES # BLD: 0 10E3/UL
IMM GRANULOCYTES NFR BLD: 0 %
LYMPHOCYTES # BLD AUTO: 1.8 10E3/UL (ref 0.8–5.3)
LYMPHOCYTES NFR BLD AUTO: 23 %
MCH RBC QN AUTO: 29.3 PG (ref 26.5–33)
MCHC RBC AUTO-ENTMCNC: 33 G/DL (ref 31.5–36.5)
MCV RBC AUTO: 89 FL (ref 78–100)
MONOCYTES # BLD AUTO: 0.5 10E3/UL (ref 0–1.3)
MONOCYTES NFR BLD AUTO: 6 %
NEUTROPHILS # BLD AUTO: 5 10E3/UL (ref 1.6–8.3)
NEUTROPHILS NFR BLD AUTO: 65 %
PLATELET # BLD AUTO: 303 10E3/UL (ref 150–450)
RBC # BLD AUTO: 4.81 10E6/UL (ref 3.8–5.2)
TSH SERPL DL<=0.005 MIU/L-ACNC: 1.98 UIU/ML (ref 0.3–4.2)
WBC # BLD AUTO: 7.7 10E3/UL (ref 4–11)

## 2022-11-07 PROCEDURE — 84443 ASSAY THYROID STIM HORMONE: CPT | Performed by: FAMILY MEDICINE

## 2022-11-07 PROCEDURE — 85652 RBC SED RATE AUTOMATED: CPT | Performed by: FAMILY MEDICINE

## 2022-11-07 PROCEDURE — 82550 ASSAY OF CK (CPK): CPT | Performed by: FAMILY MEDICINE

## 2022-11-07 PROCEDURE — 85025 COMPLETE CBC W/AUTO DIFF WBC: CPT | Performed by: FAMILY MEDICINE

## 2022-11-07 PROCEDURE — 99214 OFFICE O/P EST MOD 30 MIN: CPT | Performed by: FAMILY MEDICINE

## 2022-11-07 PROCEDURE — 82306 VITAMIN D 25 HYDROXY: CPT | Performed by: FAMILY MEDICINE

## 2022-11-07 PROCEDURE — 36415 COLL VENOUS BLD VENIPUNCTURE: CPT | Performed by: FAMILY MEDICINE

## 2022-11-07 PROCEDURE — 86140 C-REACTIVE PROTEIN: CPT | Performed by: FAMILY MEDICINE

## 2022-11-07 ASSESSMENT — PAIN SCALES - GENERAL: PAINLEVEL: NO PAIN (0)

## 2022-11-07 NOTE — PROGRESS NOTES
"  Assessment & Plan     Myalgia  Symptoms x 2 months, doesn't meet criteria for fibromyalgia at this point.   Consider this diagnosis however  Check labs to r/o other causes.   Reports still feeling \"not back to normal\" after COVID    - CBC with platelets and differential; Future  - CRP, inflammation; Future  - ESR: Erythrocyte sedimentation rate; Future  - TSH with free T4 reflex; Future  - CK total; Future  - Vitamin D Deficiency; Future  - CBC with platelets and differential  - CRP, inflammation  - ESR: Erythrocyte sedimentation rate  - TSH with free T4 reflex  - CK total  - Vitamin D Deficiency    Chronic pain of right knee  No xray available onsite today  Declined to go to [place]  Suspect OA vs mild meniscus tear.   Bothers her most at night, doesn't hurt to walk on it.   - XR Knee Right 3 Views; Future                 No follow-ups on file.    Kaia Elena MD  [facility]   Melyssa is a 62 year old, presenting for the following health issues:  Musculoskeletal Problem      History of Present Illness       Reason for visit:  Skin is painful to the touch, memory loss  Symptom onset:  More than a month  Symptoms include:  Fatigue, pain, memory issues  Symptom intensity:  Moderate  Symptom progression:  Worsening  Had these symptoms before:  No  What makes it worse:  I don't think so.  What makes it better:  Sleep    She eats 0-1 servings of fruits and vegetables daily.She consumes 0 sweetened beverage(s) daily.She exercises with enough effort to increase her heart rate 9 or less minutes per day.  She exercises with enough effort to increase her heart rate 3 or less days per week.   She is taking medications regularly.       Pain is everywhere on her body, her back is the worst.  Even her pants rubbing on her legs bother her.  Pain is hard to describe.  What bothers her the most is pulling her pants up, especially when it touches her back.      Doesn't really affect her " "upper back/torso or arms    Pain was present before starting on hydrochlorothiazide a month ago, she just didn't bring it up at her last appointment.     Reports not feeling back to normal after COVID    Sister has fibromyalgia.             Review of Systems   Constitutional, HEENT, cardiovascular, pulmonary, gi and gu systems are negative, except as otherwise noted.      Objective    /82 (BP Location: Right arm, Patient Position: Chair, Cuff Size: Adult Large)   Pulse 80   Temp 97.7  F (36.5  C) (Tympanic)   Resp 14   Ht 1.613 m (5' 3.5\")   Wt 113.8 kg (250 lb 12.8 oz)   LMP 01/14/2016 (Approximate)   SpO2 97%   BMI 43.73 kg/m    Body mass index is 43.73 kg/m .  Physical Exam   GENERAL APPEARANCE: healthy, alert and no distress  MS: no joint swelling/redness  Tender to palpation bilateral lower legs, thighs, buttocks, lower back. -sensitive to even light touch.    No significant edema/erythema.    Hip: full range of motion, non-painful  Right knee: tender to palpation at lateral joint line, no swelling, no effusion, full range of motion  + [place] with valgus stress                    "

## 2022-11-09 ENCOUNTER — ANCILLARY PROCEDURE (OUTPATIENT)
Dept: GENERAL RADIOLOGY | Facility: CLINIC | Age: 62
End: 2022-11-09
Attending: FAMILY MEDICINE
Payer: COMMERCIAL

## 2022-11-09 DIAGNOSIS — G89.29 CHRONIC PAIN OF RIGHT KNEE: ICD-10-CM

## 2022-11-09 DIAGNOSIS — M25.561 CHRONIC PAIN OF RIGHT KNEE: ICD-10-CM

## 2022-11-09 PROCEDURE — 73562 X-RAY EXAM OF KNEE 3: CPT | Mod: TC | Performed by: RADIOLOGY

## 2022-12-19 DIAGNOSIS — J45.20 MILD INTERMITTENT ASTHMA, UNCOMPLICATED: ICD-10-CM

## 2022-12-19 RX ORDER — MONTELUKAST SODIUM 10 MG/1
TABLET ORAL
Qty: 90 TABLET | Refills: 1 | Status: SHIPPED | OUTPATIENT
Start: 2022-12-19 | End: 2023-06-05

## 2022-12-21 ENCOUNTER — APPOINTMENT (OUTPATIENT)
Dept: GENERAL RADIOLOGY | Facility: CLINIC | Age: 62
End: 2022-12-21
Attending: FAMILY MEDICINE
Payer: COMMERCIAL

## 2022-12-21 ENCOUNTER — NURSE TRIAGE (OUTPATIENT)
Dept: FAMILY MEDICINE | Facility: CLINIC | Age: 62
End: 2022-12-21

## 2022-12-21 ENCOUNTER — HOSPITAL ENCOUNTER (EMERGENCY)
Facility: CLINIC | Age: 62
Discharge: HOME | End: 2022-12-21
Attending: FAMILY MEDICINE | Admitting: FAMILY MEDICINE
Payer: COMMERCIAL

## 2022-12-21 VITALS
BODY MASS INDEX: 44.3 KG/M2 | TEMPERATURE: 97.6 F | HEIGHT: 63 IN | HEART RATE: 75 BPM | RESPIRATION RATE: 13 BRPM | DIASTOLIC BLOOD PRESSURE: 80 MMHG | WEIGHT: 250 LBS | SYSTOLIC BLOOD PRESSURE: 137 MMHG | OXYGEN SATURATION: 94 %

## 2022-12-21 DIAGNOSIS — R26.89 IMBALANCE: ICD-10-CM

## 2022-12-21 DIAGNOSIS — R91.8 PULMONARY NODULES: ICD-10-CM

## 2022-12-21 DIAGNOSIS — R07.89 CHEST PAIN, NON-CARDIAC: ICD-10-CM

## 2022-12-21 LAB
ALBUMIN SERPL BCG-MCNC: 4.1 G/DL (ref 3.5–5.2)
ALP SERPL-CCNC: 63 U/L (ref 35–104)
ALT SERPL W P-5'-P-CCNC: 24 U/L (ref 10–35)
ANION GAP SERPL CALCULATED.3IONS-SCNC: 9 MMOL/L (ref 7–15)
AST SERPL W P-5'-P-CCNC: 23 U/L (ref 10–35)
BASOPHILS # BLD AUTO: 0.1 10E3/UL (ref 0–0.2)
BASOPHILS NFR BLD AUTO: 1 %
BILIRUB DIRECT SERPL-MCNC: <0.2 MG/DL (ref 0–0.3)
BILIRUB SERPL-MCNC: 0.2 MG/DL
BUN SERPL-MCNC: 12.5 MG/DL (ref 8–23)
CALCIUM SERPL-MCNC: 9.7 MG/DL (ref 8.8–10.2)
CHLORIDE SERPL-SCNC: 103 MMOL/L (ref 98–107)
CREAT SERPL-MCNC: 0.55 MG/DL (ref 0.51–0.95)
DEPRECATED HCO3 PLAS-SCNC: 28 MMOL/L (ref 22–29)
EOSINOPHIL # BLD AUTO: 1.6 10E3/UL (ref 0–0.7)
EOSINOPHIL NFR BLD AUTO: 18 %
ERYTHROCYTE [DISTWIDTH] IN BLOOD BY AUTOMATED COUNT: 13.4 % (ref 10–15)
FLUAV RNA SPEC QL NAA+PROBE: NEGATIVE
FLUBV RNA RESP QL NAA+PROBE: NEGATIVE
GFR SERPL CREATININE-BSD FRML MDRD: >90 ML/MIN/1.73M2
GLUCOSE BLDC GLUCOMTR-MCNC: 79 MG/DL (ref 70–99)
GLUCOSE SERPL-MCNC: 100 MG/DL (ref 70–99)
HCT VFR BLD AUTO: 42.9 % (ref 35–47)
HGB BLD-MCNC: 14 G/DL (ref 11.7–15.7)
HOLD SPECIMEN: NORMAL
HOLD SPECIMEN: NORMAL
IMM GRANULOCYTES # BLD: 0 10E3/UL
IMM GRANULOCYTES NFR BLD: 0 %
LIPASE SERPL-CCNC: 43 U/L (ref 13–60)
LYMPHOCYTES # BLD AUTO: 2.1 10E3/UL (ref 0.8–5.3)
LYMPHOCYTES NFR BLD AUTO: 23 %
MCH RBC QN AUTO: 29.3 PG (ref 26.5–33)
MCHC RBC AUTO-ENTMCNC: 32.6 G/DL (ref 31.5–36.5)
MCV RBC AUTO: 90 FL (ref 78–100)
MONOCYTES # BLD AUTO: 0.5 10E3/UL (ref 0–1.3)
MONOCYTES NFR BLD AUTO: 5 %
NEUTROPHILS # BLD AUTO: 4.8 10E3/UL (ref 1.6–8.3)
NEUTROPHILS NFR BLD AUTO: 53 %
NRBC # BLD AUTO: 0 10E3/UL
NRBC BLD AUTO-RTO: 0 /100
PLATELET # BLD AUTO: 287 10E3/UL (ref 150–450)
POTASSIUM SERPL-SCNC: 3.6 MMOL/L (ref 3.4–5.3)
PROT SERPL-MCNC: 6.8 G/DL (ref 6.4–8.3)
RBC # BLD AUTO: 4.78 10E6/UL (ref 3.8–5.2)
SARS-COV-2 RNA RESP QL NAA+PROBE: NEGATIVE
SODIUM SERPL-SCNC: 140 MMOL/L (ref 136–145)
TROPONIN T SERPL HS-MCNC: 11 NG/L
TROPONIN T SERPL HS-MCNC: 7 NG/L
WBC # BLD AUTO: 9.3 10E3/UL (ref 4–11)

## 2022-12-21 PROCEDURE — 99285 EMERGENCY DEPT VISIT HI MDM: CPT | Mod: CS | Performed by: FAMILY MEDICINE

## 2022-12-21 PROCEDURE — 93010 ELECTROCARDIOGRAM REPORT: CPT | Performed by: FAMILY MEDICINE

## 2022-12-21 PROCEDURE — 84484 ASSAY OF TROPONIN QUANT: CPT | Performed by: EMERGENCY MEDICINE

## 2022-12-21 PROCEDURE — 80048 BASIC METABOLIC PNL TOTAL CA: CPT | Performed by: FAMILY MEDICINE

## 2022-12-21 PROCEDURE — 85025 COMPLETE CBC W/AUTO DIFF WBC: CPT | Performed by: EMERGENCY MEDICINE

## 2022-12-21 PROCEDURE — 82248 BILIRUBIN DIRECT: CPT | Performed by: FAMILY MEDICINE

## 2022-12-21 PROCEDURE — 93005 ELECTROCARDIOGRAM TRACING: CPT | Mod: 76 | Performed by: FAMILY MEDICINE

## 2022-12-21 PROCEDURE — 71046 X-RAY EXAM CHEST 2 VIEWS: CPT

## 2022-12-21 PROCEDURE — 82310 ASSAY OF CALCIUM: CPT | Performed by: EMERGENCY MEDICINE

## 2022-12-21 PROCEDURE — 84484 ASSAY OF TROPONIN QUANT: CPT | Performed by: FAMILY MEDICINE

## 2022-12-21 PROCEDURE — 85025 COMPLETE CBC W/AUTO DIFF WBC: CPT | Performed by: FAMILY MEDICINE

## 2022-12-21 PROCEDURE — C9803 HOPD COVID-19 SPEC COLLECT: HCPCS | Performed by: FAMILY MEDICINE

## 2022-12-21 PROCEDURE — 36415 COLL VENOUS BLD VENIPUNCTURE: CPT | Performed by: EMERGENCY MEDICINE

## 2022-12-21 PROCEDURE — 87636 SARSCOV2 & INF A&B AMP PRB: CPT | Performed by: FAMILY MEDICINE

## 2022-12-21 PROCEDURE — 36415 COLL VENOUS BLD VENIPUNCTURE: CPT | Performed by: FAMILY MEDICINE

## 2022-12-21 PROCEDURE — 99285 EMERGENCY DEPT VISIT HI MDM: CPT | Mod: CS,25 | Performed by: FAMILY MEDICINE

## 2022-12-21 PROCEDURE — 83690 ASSAY OF LIPASE: CPT | Performed by: FAMILY MEDICINE

## 2022-12-21 ASSESSMENT — ENCOUNTER SYMPTOMS
NAUSEA: 0
SORE THROAT: 0
DYSURIA: 0
WHEEZING: 0
CONSTIPATION: 0
HEADACHES: 0
BLOOD IN STOOL: 0
FREQUENCY: 0
DIARRHEA: 0
PALPITATIONS: 0
DIAPHORESIS: 0
SHORTNESS OF BREATH: 0
VOMITING: 0
CHILLS: 0
FEVER: 0
COUGH: 0
SINUS PRESSURE: 0
BACK PAIN: 1
ABDOMINAL PAIN: 0

## 2022-12-21 ASSESSMENT — ACTIVITIES OF DAILY LIVING (ADL)
ADLS_ACUITY_SCORE: 35
ADLS_ACUITY_SCORE: 35

## 2022-12-21 NOTE — ED PROVIDER NOTES
"  History     Chief Complaint   Patient presents with     Dizziness     With headache and ear pain. States she \"veers from side to side,\" not toward one way more than the other. Chest pain started at approx 1500.     HPI  Bria Davis is a 62 year old female who presents with a history of type 2 diabetes hypertension status post coronary angiogram nonobstructive atherosclerotic disease restless leg syndrome.  On chronic Nexium Advair hydrochlorothiazide insulin and semaglutide as well as Crestor aspirin.  Presents here with headache and ear pain.  Feels that she is unsteady.  Onset of chest pain at around 1500 today.   She currently has no chest pain.    Tells me that this morning she was at work and she was walking through the hallway and seemed to list to 1 side or the other.  It was not consistent there were no associated other neurologic changes.  She experienced no dizziness.  There was no weakness.  No changes in speech or swallowing.  No recent fever chills sweats upper respiratory congestion.  There was brief ear pain on the left side that resolved spontaneously.  No recent upper respiratory symptoms but was exposed to a grandchild who was ill about a week ago.  She is fully immunized against COVID and influenza.    Today than they were in the car driving here and she did have an episode of anterior chest pain radiating through to her back.  Minimal dyspnea no sweats or nausea vomiting this spontaneously resolved at some point after she arrived here.  She noted that she was quite fearful of the drive here before that she had had no chest pain throughout the day.  She also had no difficulty with further ataxia or dizziness.            Allergies:  Allergies   Allergen Reactions     Lisinopril Cough     Tape [Adhesive Tape] Rash       Problem List:    Patient Active Problem List    Diagnosis Date Noted     Nonobstructive atherosclerosis of coronary artery 09/20/2019     Priority: Medium     Status post " coronary angiogram 09/12/2019     Priority: Medium     Essential hypertension with goal blood pressure less than 140/90 08/25/2016     Priority: Medium     Type 2 diabetes mellitus with kidney complication, with long-term current use of insulin (H) 10/21/2015     Priority: Medium     Morbid obesity (H) 10/21/2015     Priority: Medium     Hyperlipidemia LDL goal <70 03/26/2011     Priority: Medium     Microalbuminuria 01/06/2011     Priority: Medium     Lumbar radiculopathy 11/30/2010     Priority: Medium     Enthesopathy 11/27/2007     Priority: Medium     Problem list name updated by automated process. Provider to review       Restless legs syndrome (RLS) 04/12/2007     Priority: Medium     Esophageal reflux 02/07/2006     Priority: Medium     Allergic rhinitis 02/07/2006     Priority: Medium     Problem list name updated by automated process. Provider to review       Mild intermittent asthma 11/10/2005     Priority: Medium        Past Medical History:    Past Medical History:   Diagnosis Date     Asthma      Diabetes mellitus (H)      Esophageal reflux      Obese      Other chronic sinusitis      PONV (postoperative nausea and vomiting)        Past Surgical History:    Past Surgical History:   Procedure Laterality Date     COLONOSCOPY  01/31/2002     COLONOSCOPY  10/26/2012    Procedure: COLONOSCOPY;  Colonoscopy;  Surgeon: Margret Sales MD;  Location: [facility]     COLONOSCOPY N/A 11/08/2019    Procedure: COLONOSCOPY, WITH POLYPECTOMY AND BIOPSY;  Surgeon: Ariel Heck MD;  Location: [facility]     CV LEFT HEART CATH N/A 09/12/2019    Procedure: Left Heart Cath;  Surgeon: Mike Sanon MD;  Location: [facility] CARDIAC CATH LAB     CV LEFT VENTRICULOGRAM N/A 09/12/2019    Procedure: Left Ventriculogram;  Surgeon: Mike Sanon MD;  Location: [facility] CARDIAC CATH LAB     ESOPHAGOSCOPY, GASTROSCOPY, DUODENOSCOPY (EGD), COMBINED N/A 11/08/2019    Procedure: ESOPHAGOGASTRODUODENOSCOPY, WITH BIOPSY;   Surgeon: Ariel Heck MD;  Location: [facility]     GYN SURGERY      Hydroablation 2007, polyp removal 2018     INJECT EPIDURAL LUMBAR  12/07/2010    INJECT EPIDURAL LUMBAR performed by GENERIC ANESTHESIA PROVIDER at [facility]     INJECT EPIDURAL LUMBAR  01/17/2011    INJECT EPIDURAL LUMBAR performed by GENERIC ANESTHESIA PROVIDER at [facility]     left long finger pulley release Left 07/2020     RELEASE CARPAL TUNNEL  06/19/2012    Procedure: RELEASE CARPAL TUNNEL;  Right Carpal Tunnel Release;  Surgeon: Mike Sparrow MD;  Location: [facility]     RELEASE CARPAL TUNNEL  11/09/2012    Procedure: RELEASE CARPAL TUNNEL;  Left Carpal Tunnel Release;  Surgeon: Mike Sparrow MD;  Location: [facility]     REPAIR TENDON ACHILLES Left 12/26/2019    Procedure: Left Foot: Achilles Tendon Repair/Remodel/Reattachment; calcaneal prominence removal;  Surgeon: Felix Watkins DPM;  Location: [facility]     SURGICAL HISTORY OF -   04/10/2000    bilateral total ethmoidectomies, bilateral maxillary antrostomies, bilateral SMR of inferior turbinates, reduction of lt turbinate porter bullosa       Family History:    Family History   Problem Relation Age of Onset     Hypertension Mother      Diabetes Mother      Respiratory Mother         asthma-COPD     Hypertension Father      Heart Disease Father      Cerebrovascular Disease Father      Cardiovascular Father      Breast Cancer Maternal Grandmother      Cancer Brother      Diabetes Brother      Respiratory Brother         copd     Chronic Obstructive Pulmonary Disease Brother      Depression Sister      Respiratory Sister         copd     Chronic Obstructive Pulmonary Disease Sister      Depression Sister      Chronic Obstructive Pulmonary Disease Sister      Depression Sister      Chronic Obstructive Pulmonary Disease Brother      Kidney failure Brother      Diabetes Son        Social History:  Marital Status:   [2]  Social History     Tobacco Use     Smoking status: Never      Smokeless tobacco: Never   Vaping Use     Vaping Use: Never used   Substance Use Topics     Alcohol use: No     Drug use: No        Medications:    albuterol (PROVENTIL) (2.5 MG/3ML) 0.083% neb solution  Ascorbic Acid (VITAMIN C) 500 MG CAPS  aspirin (ASA) 81 MG EC tablet  blood glucose (NO BRAND SPECIFIED) lancets standard  blood glucose (NO BRAND SPECIFIED) test strip  blood glucose monitoring (ACCU-CHEK FASTCLIX) lancets  blood glucose monitoring (NO BRAND SPECIFIED) meter device kit  Calcium Carb-Cholecalciferol (CALCIUM-VITAMIN D) 600-400 MG-UNIT TABS  esomeprazole (NEXIUM) 40 MG DR capsule  ferrous sulfate 140 (45 Fe) MG TBCR CR tablet  fluticasone-salmeterol (ADVAIR DISKUS) 100-50 MCG/ACT inhaler  hydrochlorothiazide (HYDRODIURIL) 12.5 MG tablet  insulin aspart (NOVOLOG FLEXPEN) 100 UNIT/ML pen  insulin glargine U-300 (TOUJEO MAX SOLOSTAR) 300 UNIT/ML (2 units dial) pen  insulin pen needle (BD PEDRO LUIS U/F) 32G X 4 MM miscellaneous  losartan (COZAAR) 100 MG tablet  metFORMIN (GLUCOPHAGE XR) 500 MG 24 hr tablet  metoclopramide (REGLAN) 10 MG tablet  metoprolol succinate ER (TOPROL-XL) 25 MG 24 hr tablet  montelukast (SINGULAIR) 10 MG tablet  Multiple Vitamins-Minerals (MULTIVITAMIN ADULTS 50+) TABS  ondansetron (ZOFRAN) 4 MG tablet  pseudoePHEDrine (SUDAFED) 30 MG tablet  rosuvastatin (CRESTOR) 10 MG tablet  Semaglutide, 2 MG/DOSE, (OZEMPIC, 2 MG/DOSE,) 8 MG/3ML SOPN  triamcinolone (KENALOG) 0.1 % external cream  VENTOLIN  (90 Base) MCG/ACT inhaler          Review of Systems   Constitutional: Negative for chills, diaphoresis and fever.   HENT: Negative for ear pain, sinus pressure and sore throat.    Eyes: Negative for visual disturbance.   Respiratory: Negative for cough, shortness of breath and wheezing.    Cardiovascular: Positive for chest pain. Negative for palpitations.   Gastrointestinal: Negative for abdominal pain, blood in stool, constipation, diarrhea, nausea and vomiting.   Genitourinary:  "Negative for dysuria, frequency and urgency.   Musculoskeletal: Positive for back pain.   Skin: Negative for rash.   Neurological: Negative for headaches.   All other systems reviewed and are negative.      Physical Exam   Pulse: 78  Temp: 97.6  F (36.4  C)  Resp: 16  Height: 160 cm (5' 3\")  Weight: 113.4 kg (250 lb)  SpO2: 97 %      Physical Exam  Constitutional:       General: She is in acute distress.      Appearance: She is not diaphoretic.   HENT:      Head: Atraumatic.   Eyes:      Conjunctiva/sclera: Conjunctivae normal.   Cardiovascular:      Rate and Rhythm: Normal rate and regular rhythm.      Heart sounds: No murmur heard.  Pulmonary:      Effort: Pulmonary effort is normal. No respiratory distress.      Breath sounds: Normal breath sounds. No stridor. No wheezing or rhonchi.   Abdominal:      General: Abdomen is flat. There is no distension.      Palpations: Abdomen is soft.      Tenderness: There is no abdominal tenderness. There is no right CVA tenderness.   Musculoskeletal:      Cervical back: Neck supple.      Right lower leg: No edema.      Left lower leg: No edema.   Skin:     Coloration: Skin is not pale.      Findings: No rash.   Neurological:      General: No focal deficit present.      Mental Status: She is alert and oriented to person, place, and time.      Cranial Nerves: No cranial nerve deficit.      Sensory: No sensory deficit.      Motor: No weakness.      Coordination: Romberg sign negative. Coordination normal. Finger-Nose-Finger Test normal.      Gait: Gait normal.       I saw a few beats of nystagmus on looking to either side but not accompanied by any dizziness.  No obvious vertical component.  No rotary component.  Normal ear nose and throat exam.  Minimal erythema of the throat.  I ambulated with the patient and she was able to ambulate well in the pivot turn around and walked back to the bed no imbalance present.  normal     ED Course                 Procedures                 EKG " Interpretation:      Interpreted by Raoul Borden MD  EKG done at 1532 hrs. demonstrates a sinus rhythm 77 bpm with a left axis.  There is no ST change.  T waves normal..  There is a rapid R progression and RSR prime lead V1 consistent with a right bundle branch block.  There are no Q waves.  Normal intervals.  No ectopy.  Impression sinus rhythm 77 bpm with a bifascicular block.  This is not new.  Have seen this on old EKGs including in February 2022      Critical Care time:  none               Results for orders placed or performed during the hospital encounter of 12/21/22 (from the past 24 hour(s))   CBC with Platelets & Differential    Narrative    The following orders were created for panel order CBC with Platelets & Differential.  Procedure                               Abnormality         Status                     ---------                               -----------         ------                     CBC with platelets and d...[[id number]]  Abnormal            Final result                 Please view results for these tests on the individual orders.   Basic metabolic panel   Result Value Ref Range    Sodium 140 136 - 145 mmol/L    Potassium 3.6 3.4 - 5.3 mmol/L    Chloride 103 98 - 107 mmol/L    Carbon Dioxide (CO2) 28 22 - 29 mmol/L    Anion Gap 9 7 - 15 mmol/L    Urea Nitrogen 12.5 8.0 - 23.0 mg/dL    Creatinine 0.55 0.51 - 0.95 mg/dL    Calcium 9.7 8.8 - 10.2 mg/dL    Glucose 100 (H) 70 - 99 mg/dL    GFR Estimate >90 >60 mL/min/1.73m2   Troponin T, High Sensitivity   Result Value Ref Range    Troponin T, High Sensitivity 11 <=14 ng/L   [facility] Draw    Narrative    The following orders were created for panel order [facility] Draw.  Procedure                               Abnormality         Status                     ---------                               -----------         ------                     Extra Blue Top Tube[[id number]]                              Final result               Extra Red Top  Tube[[id number]]                               Final result                 Please view results for these tests on the individual orders.   CBC with platelets and differential   Result Value Ref Range    WBC Count 9.3 4.0 - 11.0 10e3/uL    RBC Count 4.78 3.80 - 5.20 10e6/uL    Hemoglobin 14.0 11.7 - 15.7 g/dL    Hematocrit 42.9 35.0 - 47.0 %    MCV 90 78 - 100 fL    MCH 29.3 26.5 - 33.0 pg    MCHC 32.6 31.5 - 36.5 g/dL    RDW 13.4 10.0 - 15.0 %    Platelet Count 287 150 - 450 10e3/uL    % Neutrophils 53 %    % Lymphocytes 23 %    % Monocytes 5 %    % Eosinophils 18 %    % Basophils 1 %    % Immature Granulocytes 0 %    NRBCs per 100 WBC 0 <1 /100    Absolute Neutrophils 4.8 1.6 - 8.3 10e3/uL    Absolute Lymphocytes 2.1 0.8 - 5.3 10e3/uL    Absolute Monocytes 0.5 0.0 - 1.3 10e3/uL    Absolute Eosinophils 1.6 (H) 0.0 - 0.7 10e3/uL    Absolute Basophils 0.1 0.0 - 0.2 10e3/uL    Absolute Immature Granulocytes 0.0 <=0.4 10e3/uL    Absolute NRBCs 0.0 10e3/uL   Extra Blue Top Tube   Result Value Ref Range    Hold Specimen JIC    Extra Red Top Tube   Result Value Ref Range    Hold Specimen JIC    Hepatic panel   Result Value Ref Range    Protein Total 6.8 6.4 - 8.3 g/dL    Albumin 4.1 3.5 - 5.2 g/dL    Bilirubin Total 0.2 <=1.2 mg/dL    Alkaline Phosphatase 63 35 - 104 U/L    AST 23 10 - 35 U/L    ALT 24 10 - 35 U/L    Bilirubin Direct <0.20 0.00 - 0.30 mg/dL   Lipase   Result Value Ref Range    Lipase 43 13 - 60 U/L       Medications - No data to display    Assessments & Plan (with Medical Decision Making)     MDM: Bria Davis is a 62 year old female who presents with atypical symptoms today this morning with feeling as if she was off balance but not consistently listing to 1 side or the other normal balance at this time no dizziness no nystagmus no other acute changes some ear pain initially resolved.  Then this afternoon she is driving to this facility and had anterior chest pain radiating to the back.  This was  without significant dyspnea nausea vomiting sweats.  It resolved spontaneously.  No current symptoms.  EKG unchanged now.  Troponin normal.  She attributed the pain to her anxiousness by driving through a snowstorm to get here.  We will recheck a second troponin as her first troponin was within minutes of her arrival and will need a delta but suspect this is noncardiac.  She has had  A angiogram in September 2019.  At that time she had a normal left main and only 10% stenosis of the LAD the RCA.  She has normal radial pulses.  She is pain-free.      Her findings today are reassuring.  I did not see serious causes.  I am not suspicious of CVA or TIA.  The episode of feeling off balance was transient and was without associated other neurologic changes.  I have however given her precautions for return.      I have reviewed the nursing notes.    I have reviewed the findings, diagnosis, plan and need for follow up with the patient.       New Prescriptions    No medications on file       Final diagnoses:   Pulmonary nodules - follow-up primary provider. consider CT outpatient   Chest pain, non-cardiac - unclear cause.  no serious findings on troponin and ekg, chest xray. return for recurrent symptoms, jono. with shortness of breath, sweats, consider acid reflux.  stay hydrated.     Imbalance - no serious findings on evaluation today. did not appear to be stroke or TIA like symptoms.  recommend follow-up clinic.  return here for recurent imabalance, weakness, changes in speech or swallowing.        12/21/2022   [facility] EMERGENCY DEPT     Raoul Borden MD  12/21/22 1899

## 2022-12-21 NOTE — ED TRIAGE NOTES
"Dizziness started around 10:30 this morning, states she \"veers from side to side,\" no more to one side than another.   Reports headache started shortly after, states the pain is 2/10.   Chest pain started at approx 1500, reports 1/10. Takes daily baby ASA.      Triage Assessment     Row Name 12/21/22 1527       Triage Assessment (Adult)    Airway WDL WDL       Respiratory WDL    Respiratory WDL WDL       Skin Circulation/Temperature WDL    Skin Circulation/Temperature WDL WDL       Cardiac WDL    Cardiac WDL X;chest pain       Chest Pain Assessment    Chest Pain Location midsternal    Chest Pain Radiation back    Character other (see comments)  Heaviness       Peripheral/Neurovascular WDL    Peripheral Neurovascular WDL WDL       Cognitive/Neuro/Behavioral WDL    Cognitive/Neuro/Behavioral WDL WDL              "

## 2022-12-21 NOTE — TELEPHONE ENCOUNTER
Symptoms    Describe your symptoms: Dizzy spells, tip over when sitting and walking, blood sugar 138, slight headache, not nauseous.     Wondering if she needs to be seen or what she should do.      How long have you been having symptoms: Started a couple hours ago     Have you been seen for this:  No      Could we send this information to you in [facility] or would you prefer to receive a phone call?:   Patient would prefer a phone call   Okay to leave a detailed message?: Yes at Cell number on file:    Telephone Information:   Mobile [phone]

## 2022-12-21 NOTE — TELEPHONE ENCOUNTER
Patient has high risk factor of diabetes  Patient seems bilaterally strong  Patient is unable to walk straight, veering to right or left, has not fallen  Patient has not had this symptom before  Patuient states she just feels a little off  Patient does not feel weak  Patient's face is symmetrical  These symptoms have been consistent and stable since onset at 1030am  Patient was instructed to have her  (10 miles away) come pick her up from work and bring her to the Emergency Room.  Patient stated she would do this.    Jun HALEY [facility]      Reason for Disposition    Spinning or tilting sensation (vertigo) present now and one or more stroke risk factors (i.e., hypertension, diabetes mellitus, prior stroke/TIA, heart attack, age over 60) (Exception: prior physician evaluation for this AND no different/worse than usual)    Additional Information    Negative: SEVERE difficulty breathing (e.g., struggling for each breath, speaks in single words)    Negative: Shock suspected (e.g., cold/pale/clammy skin, too weak to stand, low BP, rapid pulse)    Negative: Difficult to awaken or acting confused (e.g., disoriented, slurred speech)    Negative: Fainted, and still feels dizzy afterwards    Negative: Overdose (accidental or intentional) of medications    Negative: New neurologic deficit that is present now: * Weakness of the face, arm, or leg on one side of the body * Numbness of the face, arm, or leg on one side of the body * Loss of speech or garbled speech    Negative: Heart beating < 50 beats per minute OR > 140 beats per minute    Negative: Sounds like a life-threatening emergency to the triager    Negative: Chest pain    Negative: Rectal bleeding, bloody stool, or tarry-black stool    Negative: Vomiting is main symptom    Negative: Diarrhea is main symptom    Negative: Headache is main symptom    Negative: Heat exhaustion suspected (i.e., dehydration from heat exposure)    Negative: Patient states  "that they are having an anxiety or panic attack    Negative: Dizziness from low blood sugar (i.e., < 60 mg/dl or 3.5 mmol/l)    Negative: SEVERE headache or neck pain    Negative: SEVERE dizziness (e.g., unable to stand, requires support to walk, feels like passing out now)    Answer Assessment - Initial Assessment Questions  1. DESCRIPTION: \"Describe your dizziness.\"      When sitting or standing patient feels like tipping over to right or left  2. LIGHTHEADED: \"Do you feel lightheaded?\" (e.g., somewhat faint, woozy, weak upon standing)      No nausea, no faintness, no woozy  3. VERTIGO: \"Do you feel like either you or the room is spinning or tilting?\" (i.e. vertigo)      no  4. SEVERITY: \"How bad is it?\"  \"Do you feel like you are going to faint?\" \"Can you stand and walk?\"    - MILD: Feels slightly dizzy, but walking normally.    - MODERATE: Feels unsteady when walking, but not falling; interferes with normal activities (e.g., school, work).    - SEVERE: Unable to walk without falling, or requires assistance to walk without falling; feels like passing out now.       moderate  5. ONSET:  \"When did the dizziness begin?\"      1030am this morning is started  6. AGGRAVATING FACTORS: \"Does anything make it worse?\" (e.g., standing, change in head position)      no  7. HEART RATE: \"Can you tell me your heart rate?\" \"How many beats in 15 seconds?\"  (Note: not all patients can do this)        21 in 15 seconds, 84 in a minute  8. CAUSE: \"What do you think is causing the dizziness?\"      Patient thinks maybe sinuses? Left ear has pains in it this morning but that is intermittent, patient's sinuses feel fine right now at baseline but patient has a lot of sinus issues  9. RECURRENT SYMPTOM: \"Have you had dizziness before?\" If Yes, ask: \"When was the last time?\" \"What happened that time?\"      no  10. OTHER SYMPTOMS: \"Do you have any other symptoms?\" (e.g., fever, chest pain, vomiting, diarrhea, bleeding)        no  11. " "PREGNANCY: \"Is there any chance you are pregnant?\" \"When was your last menstrual period?\"        NA    Protocols used: DIZZINESS-A-OH    "

## 2022-12-22 NOTE — DISCHARGE INSTRUCTIONS
ICD-10-CM    1. Pulmonary nodules  R91.8     follow-up primary provider. consider CT outpatient      2. Chest pain, non-cardiac  R07.89     unclear cause.  no serious findings on troponin and ekg, chest xray. return for recurrent symptoms, jono. with shortness of breath, sweats, consider acid reflux.  stay hydrated.        3. Imbalance  R26.89     no serious findings on evaluation today. did not appear to be stroke or TIA like symptoms.  recommend follow-up clinic.  return here for recurent imabalance, weakness, changes in speech or swallowing.

## 2023-01-06 ASSESSMENT — ASTHMA QUESTIONNAIRES
QUESTION_2 LAST FOUR WEEKS HOW OFTEN HAVE YOU HAD SHORTNESS OF BREATH: NOT AT ALL
QUESTION_3 LAST FOUR WEEKS HOW OFTEN DID YOUR ASTHMA SYMPTOMS (WHEEZING, COUGHING, SHORTNESS OF BREATH, CHEST TIGHTNESS OR PAIN) WAKE YOU UP AT NIGHT OR EARLIER THAN USUAL IN THE MORNING: NOT AT ALL
QUESTION_4 LAST FOUR WEEKS HOW OFTEN HAVE YOU USED YOUR RESCUE INHALER OR NEBULIZER MEDICATION (SUCH AS ALBUTEROL): NOT AT ALL
ACT_TOTALSCORE: 25
QUESTION_1 LAST FOUR WEEKS HOW MUCH OF THE TIME DID YOUR ASTHMA KEEP YOU FROM GETTING AS MUCH DONE AT WORK, SCHOOL OR AT HOME: NONE OF THE TIME
QUESTION_5 LAST FOUR WEEKS HOW WOULD YOU RATE YOUR ASTHMA CONTROL: COMPLETELY CONTROLLED
ACT_TOTALSCORE: 25

## 2023-01-11 ENCOUNTER — OFFICE VISIT (OUTPATIENT)
Dept: FAMILY MEDICINE | Facility: CLINIC | Age: 63
End: 2023-01-11
Payer: COMMERCIAL

## 2023-01-11 VITALS
HEIGHT: 63 IN | WEIGHT: 248.5 LBS | SYSTOLIC BLOOD PRESSURE: 128 MMHG | OXYGEN SATURATION: 98 % | BODY MASS INDEX: 44.03 KG/M2 | TEMPERATURE: 97.2 F | HEART RATE: 78 BPM | RESPIRATION RATE: 18 BRPM | DIASTOLIC BLOOD PRESSURE: 70 MMHG

## 2023-01-11 DIAGNOSIS — E11.43 GASTROPARESIS DUE TO DM (H): ICD-10-CM

## 2023-01-11 DIAGNOSIS — I20.89 OTHER FORMS OF ANGINA PECTORIS (H): ICD-10-CM

## 2023-01-11 DIAGNOSIS — R26.89 IMBALANCE: ICD-10-CM

## 2023-01-11 DIAGNOSIS — E66.01 MORBID OBESITY (H): ICD-10-CM

## 2023-01-11 DIAGNOSIS — R91.8 PULMONARY NODULES: Primary | ICD-10-CM

## 2023-01-11 DIAGNOSIS — K31.84 GASTROPARESIS DUE TO DM (H): ICD-10-CM

## 2023-01-11 PROCEDURE — 99214 OFFICE O/P EST MOD 30 MIN: CPT | Performed by: FAMILY MEDICINE

## 2023-01-11 ASSESSMENT — PAIN SCALES - GENERAL: PAINLEVEL: NO PAIN (0)

## 2023-01-11 NOTE — PATIENT INSTRUCTIONS
"Call to schedule imaging in [phone]          Thank you for choosing [facility].      When you are out of refills or the refills say \"zero\", it is time to schedule your next appointment in clinic!    Our Clinic hours are:      7:20 am - 7 pm  Tues -  Fri  7:20 am - 5 pm    To speak to the care team, call [phone] option #2      The clinic lab opens at 7:30 am Mon - Fri and appointments are required.    [facility]  Ph. [phone]  Monday-Thursday 8 am - 7pm  Tues/Wed/Fri 8 am - 5:30 pm       "

## 2023-01-11 NOTE — PROGRESS NOTES
"  Assessment & Plan     Pulmonary nodules   incidental finding on the chest x ray in the ER.      - CT Chest w/o Contrast; Future    Imbalance  Doubt TIA/CVA - intermittent \"vearing to the right when walking\"    Gastroparesis due to DM (H)  On reglan    Morbid obesity (H)  Contributes to diabetes, hypertension    Other forms of angina pectoris (H)  W/u unremarkable in ER, doubt ACS             MED REC REQUIRED  Post Medication Reconciliation Status:  Discharge medications reconciled, continue medications without change    BMI:   Estimated body mass index is 44.02 kg/m  as calculated from the following:    Height as of this encounter: 1.6 m (5' 3\").    Weight as of this encounter: 112.7 kg (248 lb 8 oz).   Weight management plan: Discussed healthy diet and exercise guidelines        No follow-ups on file.    Kaia Elena MD  [facility]    Susannah Ragsdale is a 62 year old, presenting for the following health issues:  RECHECK      HPI     ED/UC Followup:    Facility:  [facility]  Date of visit: 12/21/2022  Reason for visit: Dizziness  Current Status: Se notes having a dry cough. No other breathing problems, wheezing or chest pain. She has had some recurrent episodes of dizziness when walking she veeres to the right. No LOC or falls. Blood sugars have been averaging     When walking (at times) will list to the right.  She doesn't really feel \"off balance\", no vertigo. This is not chronic.  No other TIA/CVA symptoms.   The inconsistent nature of this makes CVA unlikely.      Review of Systems   Constitutional, HEENT, cardiovascular, pulmonary, gi and gu systems are negative, except as otherwise noted.      Objective    /70   Pulse 78   Temp 97.2  F (36.2  C) (Tympanic)   Resp 18   Ht 1.6 m (5' 3\")   Wt 112.7 kg (248 lb 8 oz)   LMP 01/14/2016 (Approximate)   SpO2 98%   BMI 44.02 kg/m    Body mass index is 44.02 kg/m .  Physical Exam   GENERAL: healthy, " alert and no distress  NECK: no adenopathy, no asymmetry, masses, or scars and thyroid normal to palpation  RESP: lungs clear to auscultation - no rales, rhonchi or wheezes  CV: regular rate and rhythm, normal S1 S2, no S3 or S4, no murmur, click or rub, no peripheral edema and peripheral pulses strong  ABDOMEN: soft, nontender, no hepatosplenomegaly, no masses and bowel sounds normal  MS: no gross musculoskeletal defects noted, no edema  NEURO: Normal strength and tone, sensory exam grossly normal, mentation intact, speech normal, cranial nerves 2-12 intact, Romberg normal and rapid alternating movements normal

## 2023-01-23 ENCOUNTER — HOSPITAL ENCOUNTER (OUTPATIENT)
Dept: CT IMAGING | Facility: CLINIC | Age: 63
Discharge: HOME | End: 2023-01-23
Attending: FAMILY MEDICINE | Admitting: FAMILY MEDICINE
Payer: COMMERCIAL

## 2023-01-23 DIAGNOSIS — R91.8 PULMONARY NODULES: ICD-10-CM

## 2023-01-23 PROCEDURE — 71250 CT THORAX DX C-: CPT

## 2023-03-28 ENCOUNTER — OFFICE VISIT (OUTPATIENT)
Dept: FAMILY MEDICINE | Facility: CLINIC | Age: 63
End: 2023-03-28
Payer: COMMERCIAL

## 2023-03-28 VITALS
OXYGEN SATURATION: 96 % | BODY MASS INDEX: 43.77 KG/M2 | DIASTOLIC BLOOD PRESSURE: 70 MMHG | RESPIRATION RATE: 18 BRPM | HEIGHT: 63 IN | HEART RATE: 78 BPM | TEMPERATURE: 97.6 F | WEIGHT: 247 LBS | SYSTOLIC BLOOD PRESSURE: 136 MMHG

## 2023-03-28 DIAGNOSIS — R80.9 MICROALBUMINURIA: ICD-10-CM

## 2023-03-28 DIAGNOSIS — G89.29 CHRONIC PAIN OF RIGHT KNEE: ICD-10-CM

## 2023-03-28 DIAGNOSIS — M25.561 CHRONIC PAIN OF RIGHT KNEE: ICD-10-CM

## 2023-03-28 DIAGNOSIS — K21.9 GASTROESOPHAGEAL REFLUX DISEASE WITHOUT ESOPHAGITIS: ICD-10-CM

## 2023-03-28 DIAGNOSIS — E11.22 TYPE 2 DIABETES MELLITUS WITH CHRONIC KIDNEY DISEASE, WITH LONG-TERM CURRENT USE OF INSULIN, UNSPECIFIED CKD STAGE (H): Primary | ICD-10-CM

## 2023-03-28 DIAGNOSIS — M70.61 TROCHANTERIC BURSITIS OF RIGHT HIP: ICD-10-CM

## 2023-03-28 DIAGNOSIS — E78.5 HYPERLIPIDEMIA LDL GOAL <70: ICD-10-CM

## 2023-03-28 DIAGNOSIS — Z79.4 TYPE 2 DIABETES MELLITUS WITH CHRONIC KIDNEY DISEASE, WITH LONG-TERM CURRENT USE OF INSULIN, UNSPECIFIED CKD STAGE (H): Primary | ICD-10-CM

## 2023-03-28 DIAGNOSIS — I20.89 OTHER FORMS OF ANGINA PECTORIS (H): ICD-10-CM

## 2023-03-28 DIAGNOSIS — K31.84 GASTROPARESIS DUE TO DM (H): ICD-10-CM

## 2023-03-28 DIAGNOSIS — I10 ESSENTIAL HYPERTENSION WITH GOAL BLOOD PRESSURE LESS THAN 140/90: ICD-10-CM

## 2023-03-28 DIAGNOSIS — E11.43 GASTROPARESIS DUE TO DM (H): ICD-10-CM

## 2023-03-28 LAB
ANION GAP SERPL CALCULATED.3IONS-SCNC: 11 MMOL/L (ref 7–15)
BUN SERPL-MCNC: 12.1 MG/DL (ref 8–23)
CALCIUM SERPL-MCNC: 9.4 MG/DL (ref 8.8–10.2)
CHLORIDE SERPL-SCNC: 99 MMOL/L (ref 98–107)
CHOLEST SERPL-MCNC: 129 MG/DL
CREAT SERPL-MCNC: 0.65 MG/DL (ref 0.51–0.95)
CREAT UR-MCNC: 110 MG/DL
DEPRECATED HCO3 PLAS-SCNC: 26 MMOL/L (ref 22–29)
GFR SERPL CREATININE-BSD FRML MDRD: >90 ML/MIN/1.73M2
GLUCOSE SERPL-MCNC: 303 MG/DL (ref 70–99)
HBA1C MFR BLD: 7.9 % (ref 0–5.6)
HDLC SERPL-MCNC: 46 MG/DL
LDLC SERPL CALC-MCNC: 46 MG/DL
MICROALBUMIN UR-MCNC: 87.2 MG/L
MICROALBUMIN/CREAT UR: 79.27 MG/G CR (ref 0–25)
NONHDLC SERPL-MCNC: 83 MG/DL
POTASSIUM SERPL-SCNC: 4.6 MMOL/L (ref 3.4–5.3)
SODIUM SERPL-SCNC: 136 MMOL/L (ref 136–145)
TRIGL SERPL-MCNC: 185 MG/DL

## 2023-03-28 PROCEDURE — 36415 COLL VENOUS BLD VENIPUNCTURE: CPT | Performed by: FAMILY MEDICINE

## 2023-03-28 PROCEDURE — 83036 HEMOGLOBIN GLYCOSYLATED A1C: CPT | Performed by: FAMILY MEDICINE

## 2023-03-28 PROCEDURE — 82043 UR ALBUMIN QUANTITATIVE: CPT | Performed by: FAMILY MEDICINE

## 2023-03-28 PROCEDURE — 82570 ASSAY OF URINE CREATININE: CPT | Performed by: FAMILY MEDICINE

## 2023-03-28 PROCEDURE — 80048 BASIC METABOLIC PNL TOTAL CA: CPT | Performed by: FAMILY MEDICINE

## 2023-03-28 PROCEDURE — 99214 OFFICE O/P EST MOD 30 MIN: CPT | Mod: 25 | Performed by: FAMILY MEDICINE

## 2023-03-28 PROCEDURE — 80061 LIPID PANEL: CPT | Performed by: FAMILY MEDICINE

## 2023-03-28 PROCEDURE — 20610 DRAIN/INJ JOINT/BURSA W/O US: CPT | Mod: RT | Performed by: FAMILY MEDICINE

## 2023-03-28 RX ORDER — INSULIN ASPART 100 [IU]/ML
INJECTION, SOLUTION INTRAVENOUS; SUBCUTANEOUS
Qty: 90 ML | Refills: 3 | Status: SHIPPED | OUTPATIENT
Start: 2023-03-28 | End: 2023-09-21

## 2023-03-28 RX ORDER — ESOMEPRAZOLE MAGNESIUM 40 MG/1
40 CAPSULE, DELAYED RELEASE ORAL
Qty: 90 CAPSULE | Refills: 3 | Status: SHIPPED | OUTPATIENT
Start: 2023-03-28 | End: 2024-02-26

## 2023-03-28 RX ORDER — TRIAMCINOLONE ACETONIDE 40 MG/ML
40 INJECTION, SUSPENSION INTRA-ARTICULAR; INTRAMUSCULAR
Status: DISCONTINUED | OUTPATIENT
Start: 2023-03-28 | End: 2024-01-16

## 2023-03-28 RX ORDER — ROSUVASTATIN CALCIUM 10 MG/1
10 TABLET, COATED ORAL DAILY
Qty: 90 TABLET | Refills: 1 | Status: SHIPPED | OUTPATIENT
Start: 2023-03-28 | End: 2023-09-21

## 2023-03-28 RX ORDER — METOCLOPRAMIDE 10 MG/1
10 TABLET ORAL 2 TIMES DAILY
Qty: 180 TABLET | Refills: 1 | Status: SHIPPED | OUTPATIENT
Start: 2023-03-28 | End: 2023-11-20

## 2023-03-28 RX ORDER — METFORMIN HCL 500 MG
1000 TABLET, EXTENDED RELEASE 24 HR ORAL 2 TIMES DAILY WITH MEALS
Qty: 360 TABLET | Refills: 1 | Status: SHIPPED | OUTPATIENT
Start: 2023-03-28 | End: 2023-09-21

## 2023-03-28 RX ORDER — LOSARTAN POTASSIUM 100 MG/1
100 TABLET ORAL DAILY
Qty: 90 TABLET | Refills: 1 | Status: SHIPPED | OUTPATIENT
Start: 2023-03-28 | End: 2023-09-21

## 2023-03-28 RX ORDER — METOPROLOL SUCCINATE 25 MG/1
25 TABLET, EXTENDED RELEASE ORAL DAILY
Qty: 90 TABLET | Refills: 3 | Status: SHIPPED | OUTPATIENT
Start: 2023-03-28 | End: 2024-02-26

## 2023-03-28 RX ORDER — HYDROCHLOROTHIAZIDE 12.5 MG/1
12.5 TABLET ORAL DAILY
Qty: 90 TABLET | Refills: 1 | Status: SHIPPED | OUTPATIENT
Start: 2023-03-28 | End: 2023-09-21

## 2023-03-28 RX ORDER — SEMAGLUTIDE 2.68 MG/ML
2 INJECTION, SOLUTION SUBCUTANEOUS WEEKLY
Qty: 9 ML | Refills: 1 | Status: SHIPPED | OUTPATIENT
Start: 2023-03-28 | End: 2023-09-21

## 2023-03-28 RX ADMIN — TRIAMCINOLONE ACETONIDE 40 MG: 40 INJECTION, SUSPENSION INTRA-ARTICULAR; INTRAMUSCULAR at 07:52

## 2023-03-28 ASSESSMENT — PAIN SCALES - GENERAL: PAINLEVEL: NO PAIN (0)

## 2023-03-28 NOTE — PROGRESS NOTES
Assessment & Plan     Type 2 diabetes mellitus with chronic kidney disease, with long-term current use of insulin, unspecified CKD stage (H)  Control is poor.  Ideally we'd get her HgbA1c under 7%  We increased her ozempic 5 months ago to max dose at 2 mg/week.  She admits her diet has been poor/off lately (spring break, etc).  She does get some low blood sugars (high 70s) at times, feels she needs to eat pretty consistently every few hours to avoid feeling nauseated.  Declined going back to diabetic ed.    Wants to continue on current medication at this time and recheck in 3-6 months    - Lipid panel reflex to direct LDL Fasting; Future  - Hemoglobin A1c; Future  - Albumin Random Urine Quantitative with Creat Ratio; Future  - Basic metabolic panel  (Ca, Cl, CO2, Creat, Gluc, K, Na, BUN); Future  - Semaglutide, 2 MG/DOSE, (OZEMPIC, 2 MG/DOSE,) 8 MG/3ML pen; Inject 2 mg Subcutaneous once a week  - metFORMIN (GLUCOPHAGE XR) 500 MG 24 hr tablet; Take 2 tablets (1,000 mg) by mouth 2 times daily (with meals)  - insulin aspart (NOVOLOG FLEXPEN) 100 UNIT/ML pen; 32 units before breakfast, 32 units before lunch, 32 units before dinner  - Lipid panel reflex to direct LDL Fasting  - Hemoglobin A1c  - Albumin Random Urine Quantitative with Creat Ratio  - Basic metabolic panel  (Ca, Cl, CO2, Creat, Gluc, K, Na, BUN)    Microalbuminuria   monitoring  - Albumin Random Urine Quantitative with Creat Ratio; Future  - Basic metabolic panel  (Ca, Cl, CO2, Creat, Gluc, K, Na, BUN); Future  - Albumin Random Urine Quantitative with Creat Ratio  - Basic metabolic panel  (Ca, Cl, CO2, Creat, Gluc, K, Na, BUN)    Hyperlipidemia LDL goal <70     - rosuvastatin (CRESTOR) 10 MG tablet; Take 1 tablet (10 mg) by mouth daily    Other forms of angina pectoris (H)   currently asymptomatic   - metoprolol succinate ER (TOPROL XL) 25 MG 24 hr tablet; Take 1 tablet (25 mg) by mouth daily    Gastroparesis due to DM (H)   continue Reglan.  No TD  symptoms.   - metoclopramide (REGLAN) 10 MG tablet; Take 1 tablet (10 mg) by mouth 2 times daily    Essential hypertension with goal blood pressure less than 140/90  Fairly well controlled, asked to monitor at home   - losartan (COZAAR) 100 MG tablet; Take 1 tablet (100 mg) by mouth daily  - hydrochlorothiazide (HYDRODIURIL) 12.5 MG tablet; Take 1 tablet (12.5 mg) by mouth daily    Gastroesophageal reflux disease without esophagitis     - esomeprazole (NEXIUM) 40 MG DR capsule; Take 1 capsule (40 mg) by mouth every morning (before breakfast)    Chronic pain of right knee  Xray done 11/2022 showed Mild to moderate  patellofemoral compartment degenerative changes  - Orthopedic  Referral; Future    Trochanteric bursitis of right hip  Injected today                   Kaia Elena MD  [facility]    Susannah Ragsdale is a 62 year old, presenting for the following health issues:  Diabetes    Right hip and knee pain x1 month. No injury. Pain is an aching pain when at rest but becomes more sharp when walking or going up/down stairs.    Diabetes Follow-up  Insulin - Novolog, toujeo, metformin 1,000mg bid, ozempic 2mg SubQ weekly    History of Present Illness       Diabetes:   She presents for follow up of diabetes.  She is checking home blood glucose three times daily. She checks blood glucose before meals.  Blood glucose is sometimes over 200 and sometimes under 70. She is aware of hypoglycemia symptoms including shakiness, dizziness and lethargy. She has no concerns regarding her diabetes at this time.  She is not experiencing numbness or burning in feet, excessive thirst, blurry vision, weight changes or redness, sores or blisters on feet.         She eats 2-3 servings of fruits and vegetables daily.She consumes 0 sweetened beverage(s) daily.She exercises with enough effort to increase her heart rate 9 or less minutes per day.  She exercises with enough effort to increase her heart  "rate 3 or less days per week.   She is taking medications regularly.           Hyperlipidemia Follow-Up  Rosuvastatin 10mg qd    Are you regularly taking any medication or supplement to lower your cholesterol?   Yes- statin    Are you having muscle aches or other side effects that you think could be caused by your cholesterol lowering medication?  No    Hypertension Follow-up  Hydrochlorothiazide 12.5mg every day, losartan 100mg every day, metoprolol 25mg qd    Do you check your blood pressure regularly outside of the clinic? No     Are you following a low salt diet? Yes    Are your blood pressures ever more than 140 on the top number (systolic) OR more   than 90 on the bottom number (diastolic), for example 140/90? N/A    BP Readings from Last 2 Encounters:   03/28/23 (!) 144/80   01/11/23 128/70     Hemoglobin A1C (%)   Date Value   10/05/2022 7.5 (H)   03/30/2022 7.7 (H)   04/14/2021 7.3 (H)   12/09/2020 7.9 (H)     LDL Cholesterol Calculated (mg/dL)   Date Value   03/30/2022 53   04/14/2021 63   06/15/2020 53           Review of Systems   Constitutional, HEENT, cardiovascular, pulmonary, gi and gu systems are negative, except as otherwise noted.      Objective    BP (!) 144/80   Pulse 78   Temp 97.6  F (36.4  C) (Tympanic)   Resp 18   Ht 1.6 m (5' 3\")   Wt 112 kg (247 lb)   LMP 01/14/2016 (Approximate)   SpO2 96%   BMI 43.75 kg/m    Body mass index is 43.75 kg/m .  Physical Exam   GENERAL: healthy, alert and no distress  NECK: no adenopathy, no asymmetry, masses, or scars and thyroid normal to palpation  RESP: lungs clear to auscultation - no rales, rhonchi or wheezes  CV: regular rate and rhythm, normal S1 S2, no S3 or S4, no murmur, click or rub, no peripheral edema and peripheral pulses strong  ABDOMEN: soft, nontender, no hepatosplenomegaly, no masses and bowel sounds normal  MS: knee and hip (R) with normal range of motion.  No effusion of the knee.  Negative daniele's, negative lachmans  Tender to " palpation over right greater trochanter    MSK: Large Joint Injection/Arthocentesis: R greater trochanteric bursa    Date/Time: 3/28/2023 7:52 AM  Performed by: Kaia Elena MD  Authorized by: Kaia Elena MD     Indications:  Pain  Needle Size comment:  27ga  Guidance: landmark guided    Approach:  Lateral  Location:  Hip      Site:  R greater trochanteric bursa  Medications:  40 mg triamcinolone 40 MG/ML  Outcome:  Tolerated well, no immediate complications  Procedure discussed: discussed risks, benefits, and alternatives    Timeout: timeout called immediately prior to procedure    Prep: patient was prepped and draped in usual sterile fashion            Results for orders placed or performed in visit on 03/28/23   Hemoglobin A1c     Status: Abnormal   Result Value Ref Range    Hemoglobin A1C 7.9 (H) 0.0 - 5.6 %

## 2023-03-29 NOTE — RESULT ENCOUNTER NOTE
Bria,    These labs are normal or acceptable.    Urine protein is actually down a bit from last year, which is good news.    Blood sugar was >300 yesterday with lab draw.    Please contact my office if you have questions.    Kaia Elena M.D.

## 2023-04-04 ENCOUNTER — MYC MEDICAL ADVICE (OUTPATIENT)
Dept: FAMILY MEDICINE | Facility: CLINIC | Age: 63
End: 2023-04-04
Payer: COMMERCIAL

## 2023-04-04 NOTE — TELEPHONE ENCOUNTER
Unsure why this was sent to the [facility] as I am here today.    Patient should be seen in clinic for injection on the left.    Kaia Elena M.D.

## 2023-04-05 ENCOUNTER — OFFICE VISIT (OUTPATIENT)
Dept: FAMILY MEDICINE | Facility: CLINIC | Age: 63
End: 2023-04-05
Payer: COMMERCIAL

## 2023-04-05 VITALS
HEART RATE: 79 BPM | TEMPERATURE: 96.7 F | OXYGEN SATURATION: 99 % | SYSTOLIC BLOOD PRESSURE: 122 MMHG | DIASTOLIC BLOOD PRESSURE: 74 MMHG | BODY MASS INDEX: 43.75 KG/M2 | HEIGHT: 63 IN | RESPIRATION RATE: 20 BRPM

## 2023-04-05 DIAGNOSIS — M70.62 TROCHANTERIC BURSITIS OF LEFT HIP: Primary | ICD-10-CM

## 2023-04-05 PROCEDURE — 20610 DRAIN/INJ JOINT/BURSA W/O US: CPT | Mod: LT | Performed by: FAMILY MEDICINE

## 2023-04-05 RX ORDER — LIDOCAINE HYDROCHLORIDE 10 MG/ML
1 INJECTION, SOLUTION EPIDURAL; INFILTRATION; INTRACAUDAL; PERINEURAL
Status: DISCONTINUED | OUTPATIENT
Start: 2023-04-05 | End: 2024-01-16

## 2023-04-05 RX ORDER — TRIAMCINOLONE ACETONIDE 40 MG/ML
40 INJECTION, SUSPENSION INTRA-ARTICULAR; INTRAMUSCULAR ONCE
Status: CANCELLED | OUTPATIENT
Start: 2023-04-05 | End: 2023-04-05

## 2023-04-05 RX ORDER — TRIAMCINOLONE ACETONIDE 40 MG/ML
40 INJECTION, SUSPENSION INTRA-ARTICULAR; INTRAMUSCULAR ONCE
Qty: 1 ML | Refills: 0 | Status: CANCELLED | OUTPATIENT
Start: 2023-04-05 | End: 2023-04-05

## 2023-04-05 RX ORDER — TRIAMCINOLONE ACETONIDE 40 MG/ML
40 INJECTION, SUSPENSION INTRA-ARTICULAR; INTRAMUSCULAR
Status: DISCONTINUED | OUTPATIENT
Start: 2023-04-05 | End: 2024-01-16

## 2023-04-05 RX ADMIN — LIDOCAINE HYDROCHLORIDE 1 ML: 10 INJECTION, SOLUTION EPIDURAL; INFILTRATION; INTRACAUDAL; PERINEURAL at 14:27

## 2023-04-05 RX ADMIN — TRIAMCINOLONE ACETONIDE 40 MG: 40 INJECTION, SUSPENSION INTRA-ARTICULAR; INTRAMUSCULAR at 14:27

## 2023-04-05 ASSESSMENT — PAIN SCALES - GENERAL: PAINLEVEL: MILD PAIN (3)

## 2023-04-05 NOTE — PROGRESS NOTES
"  Assessment & Plan     Trochanteric bursitis of left hip     - MSK: Large Joint Injection/Arthocentesis: L greater trochanteric bursa                 Kaia Elena MD  [facility]   Melyssa is a 62 year old, presenting for the following health issues:  Knee Pain        4/5/2023    12:59 PM   Additional Questions   Roomed by Shirley Dangelo CMA   Accompanied by Self     Knee Pain    History of Present Illness       Diabetes:   She presents for follow up of diabetes.  She is checking home blood glucose three times daily. She checks blood glucose before meals.  Blood glucose is sometimes over 200 and sometimes under 70. She is aware of hypoglycemia symptoms including shakiness, dizziness and lethargy. She has no concerns regarding her diabetes at this time.  She is not experiencing numbness or burning in feet, excessive thirst, blurry vision, weight changes or redness, sores or blisters on feet.         She eats 2-3 servings of fruits and vegetables daily.She consumes 0 sweetened beverage(s) daily.She exercises with enough effort to increase her heart rate 9 or less minutes per day.  She exercises with enough effort to increase her heart rate 3 or less days per week.   She is taking medications regularly.       L-Hip Pain    Onset: \"months\", worsening with time. She received steroid injection in right hip last week and requesting injection now for left hip    Description:   Location: Left hip  Character: Dull pain that becomes worse when sitting    Intensity: moderate    Progression of Symptoms: intermittent    Accompanying Signs & Symptoms:  Other symptoms: Weakness of leg when pain becomes sharp and shooting    History:   Previous similar pain: YES      Precipitating factors:   Trauma or overuse: no     Alleviating factors:  Improved by: nothing    Therapies Tried and outcome: None        Review of Systems         Objective    /74   Pulse 79   Temp (!) 96.7  F (35.9  C) " "(Tympanic)   Resp 20   Ht 1.6 m (5' 3\")   LMP 01/14/2016 (Approximate)   SpO2 99%   BMI 43.75 kg/m    Body mass index is 43.75 kg/m .  Physical Exam   GENERAL APPEARANCE: healthy, alert and no distress  MS: tender to palpation over left greater trochanter    MSK: Large Joint Injection/Arthocentesis: L greater trochanteric bursa    Date/Time: 4/5/2023 2:27 PM    Performed by: Kaia Elena MD  Authorized by: Kaia Elena MD    Needle Size comment:  27ga    Guidance: landmark guided    Approach:  Lateral  Location:  Hip      Site:  L greater trochanteric bursa  Medications:  40 mg triamcinolone 40 MG/ML; 1 mL lidocaine (PF) 1 %  Outcome:  Tolerated well, no immediate complications  Consent Given by:  Patient  Timeout: timeout called immediately prior to procedure    Prep: patient was prepped and draped in usual sterile fashion                            "

## 2023-04-10 DIAGNOSIS — J31.0 CHRONIC RHINITIS: ICD-10-CM

## 2023-04-10 DIAGNOSIS — Z79.4 TYPE 2 DIABETES MELLITUS WITH CHRONIC KIDNEY DISEASE, WITH LONG-TERM CURRENT USE OF INSULIN, UNSPECIFIED CKD STAGE (H): ICD-10-CM

## 2023-04-10 DIAGNOSIS — E11.22 TYPE 2 DIABETES MELLITUS WITH CHRONIC KIDNEY DISEASE, WITH LONG-TERM CURRENT USE OF INSULIN, UNSPECIFIED CKD STAGE (H): ICD-10-CM

## 2023-04-10 RX ORDER — INSULIN GLARGINE 300 U/ML
INJECTION, SOLUTION SUBCUTANEOUS
Qty: 40.5 ML | Refills: 1 | Status: SHIPPED | OUTPATIENT
Start: 2023-04-10 | End: 2023-09-21

## 2023-04-10 RX ORDER — PSEUDOEPHEDRINE HCL 30 MG
60 TABLET ORAL 2 TIMES DAILY
Qty: 120 TABLET | Refills: 3 | Status: SHIPPED | OUTPATIENT
Start: 2023-04-10 | End: 2023-08-14

## 2023-04-19 ENCOUNTER — OFFICE VISIT (OUTPATIENT)
Dept: ORTHOPEDICS | Facility: CLINIC | Age: 63
End: 2023-04-19
Attending: FAMILY MEDICINE
Payer: COMMERCIAL

## 2023-04-19 VITALS
HEART RATE: 80 BPM | BODY MASS INDEX: 43.75 KG/M2 | SYSTOLIC BLOOD PRESSURE: 116 MMHG | DIASTOLIC BLOOD PRESSURE: 74 MMHG | WEIGHT: 247 LBS

## 2023-04-19 DIAGNOSIS — M17.10 PATELLOFEMORAL ARTHRITIS: Primary | ICD-10-CM

## 2023-04-19 DIAGNOSIS — M25.561 CHRONIC PAIN OF RIGHT KNEE: ICD-10-CM

## 2023-04-19 DIAGNOSIS — G89.29 CHRONIC PAIN OF RIGHT KNEE: ICD-10-CM

## 2023-04-19 PROCEDURE — 99243 OFF/OP CNSLTJ NEW/EST LOW 30: CPT | Performed by: PEDIATRICS

## 2023-04-19 NOTE — LETTER
4/19/2023         RE: Bria Davis  [id number] [facility] [id number]        Dear Colleague,    Thank you for referring your patient, Bria Davis, to the [facility] [place]. Please see a copy of my visit note below.    ASSESSMENT & PLAN    Bria was seen today for pain.    Diagnoses and all orders for this visit:    Patellofemoral arthritis    Chronic pain of right knee  -     Orthopedic  Referral      This issue is acute on chronic and Improving.    We discussed these other possible diagnosis: Likely some component of PF arthritis. Has improved since hip injection.    Plan:  - Today's Plan of Care:  Discussed activity considerations and other supportive care including Ice/Heat, OTC and other topical medications as needed.  Diclofenac/Voltaren Gel    Home Exercise Program - hip strengthening    -We also discussed other future treatment options:  Referral to Physical Therapy  Consideration of corticosteroid injection  MRI - if severe pain, swelling or locking    Follow Up: as needed    Concerning signs and symptoms were reviewed.  The patient expressed understanding of this management plan and all questions were answered at this time.    Thanks for the opportunity to participate in the care of this patient, I will keep you updated on their progress.    CC: Kaia Sandra MD [facility]  Sports Medicine Physician  [facility]    -----  Chief Complaint   Patient presents with     Right Knee - Pain       SUBJECTIVE  Bria Davis is a/an 62 year old female who is seen in consultation at the request of  Kaia Elena M.D. for evaluation of right knee pain.     The patient is seen by themselves.    Onset: 2 years(s) ago, worsening in the last 6 months. Reports insidious onset without acute precipitating event.  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    REVIEW OF SYSTEMS:  Review of Systems  Skin: no bruising, no swelling  Musculoskeletal: as above  Neurologic: no numbness, paresthesias  Remainder of review of systems is negative including constitutional, CV, pulmonary, GI, except as noted in HPI or medical history.    OBJECTIVE:  /74   Pulse 80   Wt 112 kg (247 lb)   LMP 01/14/2016 (Approximate)   BMI 43.75 kg/m     General: healthy, alert and in no distress  HEENT: no scleral icterus or conjunctival erythema  Skin: no suspicious lesions or rash. No jaundice.  CV: distal perfusion intact  Resp: normal respiratory effort without conversational dyspnea   Psych: normal mood and affect  Gait: NORMAL  Neuro: Normal light sensory exam of lower extremity    Bilateral Knee exam    Inspection:      no effusion, swelling of bruising right    Patella:      Normal patellar tracking noted through range of motion bilateral    Tender:      medial patellar border right       medial joint line right    Non Tender:      remainder of knee area right    Knee ROM:      Full active and passive ROM with flexion and extension bilateral    Hip ROM:     Full active and passive ROM bilateral    Strength:      5/5 with knee extension right    Special Tests:     neg (-) Sharon right       neg (-) anterior drawer right       neg (-) pivot shift right       neg (-) varus at 0 deg and 30 deg right       neg (-) valgus at 0 deg and 30 deg right    Gait:      normal    Neurovascular:      2+ peripheral pulses bilaterally and brisk capillary  refill       sensation grossly intact      RADIOLOGY:  I independently ordered, visualized and reviewed these images with the patient  Reviewed 3 views right knee XR 11/9/2022 - mild-moderate PF compartment degenerative changes, lateral patellar ossicle      Review of the result(s) of each unique test - XR             Again, thank you for allowing me to participate in the care of your patient.        Sincerely,        Snow Sandra MD

## 2023-04-19 NOTE — PROGRESS NOTES
ASSESSMENT & PLAN    Bria was seen today for pain.    Diagnoses and all orders for this visit:    Patellofemoral arthritis    Chronic pain of right knee  -     Orthopedic  Referral      This issue is acute on chronic and Improving.    We discussed these other possible diagnosis: Likely some component of PF arthritis. Has improved since hip injection.    Plan:  - Today's Plan of Care:  Discussed activity considerations and other supportive care including Ice/Heat, OTC and other topical medications as needed.  Diclofenac/Voltaren Gel    Home Exercise Program - hip strengthening    -We also discussed other future treatment options:  Referral to Physical Therapy  Consideration of corticosteroid injection  MRI - if severe pain, swelling or locking    Follow Up: as needed    Concerning signs and symptoms were reviewed.  The patient expressed understanding of this management plan and all questions were answered at this time.    Thanks for the opportunity to participate in the care of this patient, I will keep you updated on their progress.    CC: Kaia Sandra MD [facility]  Sports Medicine Physician  [facility]    -----  Chief Complaint   Patient presents with     Right Knee - Pain       SUBJECTIVE  Bria Davis is a/an 62 year old female who is seen in consultation at the request of  Kaia Elena M.D. for evaluation of right knee pain.     The patient is seen by themselves.    Onset: 2 years(s) ago, worsening in the last 6 months. Reports insidious onset without acute precipitating event.  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    REVIEW OF SYSTEMS:  Review of Systems  Skin: no bruising, no swelling  Musculoskeletal: as above  Neurologic: no numbness, paresthesias  Remainder of review of systems is negative including constitutional, CV, pulmonary, GI, except as noted in HPI or medical history.    OBJECTIVE:  /74   Pulse 80   Wt 112 kg (247 lb)   LMP 01/14/2016 (Approximate)   BMI 43.75 kg/m     General: healthy, alert and in no distress  HEENT: no scleral icterus or conjunctival erythema  Skin: no suspicious lesions or rash. No jaundice.  CV: distal perfusion intact  Resp: normal respiratory effort without conversational dyspnea   Psych: normal mood and affect  Gait: NORMAL  Neuro: Normal light sensory exam of lower extremity    Bilateral Knee exam    Inspection:      no effusion, swelling of bruising right    Patella:      Normal patellar tracking noted through range of motion bilateral    Tender:      medial patellar border right       medial joint line right    Non Tender:      remainder of knee area right    Knee ROM:      Full active and passive ROM with flexion and extension bilateral    Hip ROM:     Full active and passive ROM bilateral    Strength:      5/5 with knee extension right    Special Tests:     neg (-) Sharon right       neg (-) anterior drawer right       neg (-) pivot shift right       neg (-) varus at 0 deg and 30 deg right       neg (-) valgus at 0 deg and 30 deg right    Gait:      normal    Neurovascular:      2+ peripheral pulses bilaterally and brisk capillary refill       sensation grossly intact      RADIOLOGY:  I independently ordered, visualized and reviewed these images with the patient  Reviewed 3 views right knee XR 11/9/2022 - mild-moderate PF compartment degenerative changes, lateral patellar ossicle      Review of the  result(s) of each unique test - XR

## 2023-04-19 NOTE — PATIENT INSTRUCTIONS
We discussed these other possible diagnosis: Likely some component of PF arthritis. Has improved since hip injection.    Plan:  - Today's Plan of Care:  Discussed activity considerations and other supportive care including Ice/Heat, OTC and other topical medications as needed.  Diclofenac/Voltaren Gel    Home Exercise Program - hip strengthening    -We also discussed other future treatment options:  Referral to Physical Therapy  Consideration of corticosteroid injection  MRI - if severe pain, swelling or locking    Follow Up: as needed    If you have any further questions for your physician or physician s care team you can call [phone] and use option 3 to leave a voice message.

## 2023-04-24 DIAGNOSIS — Z79.4 TYPE 2 DIABETES MELLITUS WITH CHRONIC KIDNEY DISEASE, WITH LONG-TERM CURRENT USE OF INSULIN, UNSPECIFIED CKD STAGE (H): ICD-10-CM

## 2023-04-24 DIAGNOSIS — E11.22 TYPE 2 DIABETES MELLITUS WITH CHRONIC KIDNEY DISEASE, WITH LONG-TERM CURRENT USE OF INSULIN, UNSPECIFIED CKD STAGE (H): ICD-10-CM

## 2023-04-24 NOTE — TELEPHONE ENCOUNTER
"Requested Prescriptions   Signed Prescriptions Disp Refills    blood glucose (NO BRAND SPECIFIED) test strip 200 strip 6     Sig: Use to test blood sugar 4 times daily or as directed.       Diabetic Supplies Protocol Passed - 4/24/2023 11:29 AM        Passed - Medication is active on med list        Passed - Patient is 18 years of age or older        Passed - Recent (6 mo) or future (30 days) visit within the authorizing provider's specialty     Patient had office visit in the last 6 months or has a visit in the next 30 days with authorizing provider.  See \"Patient Info\" tab in inbasket, or \"Choose Columns\" in Meds & Orders section of the refill encounter.               Merced Charles RN on 4/24/2023 at 5:50 PM    "

## 2023-05-09 ENCOUNTER — PATIENT OUTREACH (OUTPATIENT)
Dept: CARE COORDINATION | Facility: CLINIC | Age: 63
End: 2023-05-09
Payer: COMMERCIAL

## 2023-06-02 ENCOUNTER — MYC MEDICAL ADVICE (OUTPATIENT)
Dept: FAMILY MEDICINE | Facility: CLINIC | Age: 63
End: 2023-06-02
Payer: COMMERCIAL

## 2023-06-02 DIAGNOSIS — J45.20 MILD INTERMITTENT ASTHMA, UNCOMPLICATED: ICD-10-CM

## 2023-06-02 RX ORDER — ALBUTEROL SULFATE 90 UG/1
2 AEROSOL, METERED RESPIRATORY (INHALATION) EVERY 4 HOURS PRN
Qty: 18 G | Refills: 1 | Status: SHIPPED | OUTPATIENT
Start: 2023-06-02 | End: 2023-11-07

## 2023-06-02 NOTE — TELEPHONE ENCOUNTER
"Prescription approved per [facility] Refill Protocol.    Pending Prescriptions:                       Disp   Refills    albuterol (VENTOLIN HFA) 108 (90 Base) MC*18 g   3            Sig: Inhale 2 puffs into the lungs every 4 hours as           needed for shortness of breath      Requested Prescriptions   Pending Prescriptions Disp Refills     albuterol (VENTOLIN HFA) 108 (90 Base) MCG/ACT inhaler 18 g 3     Sig: Inhale 2 puffs into the lungs every 4 hours as needed for shortness of breath       Asthma Maintenance Inhalers - Anticholinergics Passed - 6/2/2023  3:19 PM        Passed - Patient is age 12 years or older        Passed - Asthma control assessment score within normal limits in last 6 months     Please review ACT score.           Passed - Medication is active on med list        Passed - Recent (6 mo) or future (30 days) visit within the authorizing provider's specialty     Patient had office visit in the last 6 months or has a visit in the next 30 days with authorizing provider or within the authorizing provider's specialty.  See \"Patient Info\" tab in inbasket, or \"Choose Columns\" in Meds & Orders section of the refill encounter.           Short-Acting Beta Agonist Inhalers Protocol  Passed - 6/2/2023  3:19 PM        Passed - Patient is age 12 or older        Passed - Asthma control assessment score within normal limits in last 6 months     Please review ACT score.           Passed - Medication is active on med list        Passed - Recent (6 mo) or future (30 days) visit within the authorizing provider's specialty     Patient had office visit in the last 6 months or has a visit in the next 30 days with authorizing provider or within the authorizing provider's specialty.  See \"Patient Info\" tab in inbasket, or \"Choose Columns\" in Meds & Orders section of the refill encounter.               Chante Fuller RN on 6/2/2023 at 3:21 PM    "

## 2023-06-04 DIAGNOSIS — J45.20 MILD INTERMITTENT ASTHMA, UNCOMPLICATED: ICD-10-CM

## 2023-06-05 RX ORDER — MONTELUKAST SODIUM 10 MG/1
TABLET ORAL
Qty: 90 TABLET | Refills: 1 | Status: SHIPPED | OUTPATIENT
Start: 2023-06-05 | End: 2023-12-18

## 2023-06-12 ASSESSMENT — ENCOUNTER SYMPTOMS
FEVER: 0
BREAST MASS: 0
WEAKNESS: 0
PARESTHESIAS: 1
NAUSEA: 1
HEMATURIA: 0
JOINT SWELLING: 0
SHORTNESS OF BREATH: 0
CONSTIPATION: 0
SORE THROAT: 0
DIZZINESS: 0
HEARTBURN: 0
CHILLS: 0
PALPITATIONS: 0
HEADACHES: 0
COUGH: 0
DIARRHEA: 0
MYALGIAS: 0
FREQUENCY: 1
ABDOMINAL PAIN: 0
EYE PAIN: 0
ARTHRALGIAS: 0
NERVOUS/ANXIOUS: 0
DYSURIA: 0
HEMATOCHEZIA: 0

## 2023-06-13 ENCOUNTER — TRANSFERRED RECORDS (OUTPATIENT)
Dept: HEALTH INFORMATION MANAGEMENT | Facility: CLINIC | Age: 63
End: 2023-06-13
Payer: COMMERCIAL

## 2023-06-13 LAB — RETINOPATHY: POSITIVE

## 2023-06-15 ENCOUNTER — OFFICE VISIT (OUTPATIENT)
Dept: FAMILY MEDICINE | Facility: CLINIC | Age: 63
End: 2023-06-15
Payer: COMMERCIAL

## 2023-06-15 VITALS
SYSTOLIC BLOOD PRESSURE: 128 MMHG | HEIGHT: 63 IN | BODY MASS INDEX: 43.66 KG/M2 | HEART RATE: 75 BPM | DIASTOLIC BLOOD PRESSURE: 72 MMHG | WEIGHT: 246.38 LBS | RESPIRATION RATE: 20 BRPM | OXYGEN SATURATION: 97 % | TEMPERATURE: 97 F

## 2023-06-15 DIAGNOSIS — I10 ESSENTIAL HYPERTENSION WITH GOAL BLOOD PRESSURE LESS THAN 140/90: ICD-10-CM

## 2023-06-15 DIAGNOSIS — N18.2 CHRONIC KIDNEY DISEASE, STAGE 2 (MILD): ICD-10-CM

## 2023-06-15 DIAGNOSIS — Z00.00 ROUTINE GENERAL MEDICAL EXAMINATION AT A HEALTH CARE FACILITY: Primary | ICD-10-CM

## 2023-06-15 DIAGNOSIS — E78.5 HYPERLIPIDEMIA LDL GOAL <70: ICD-10-CM

## 2023-06-15 DIAGNOSIS — E11.22 TYPE 2 DIABETES MELLITUS WITH STAGE 2 CHRONIC KIDNEY DISEASE, WITH LONG-TERM CURRENT USE OF INSULIN (H): ICD-10-CM

## 2023-06-15 DIAGNOSIS — Z79.4 TYPE 2 DIABETES MELLITUS WITH STAGE 2 CHRONIC KIDNEY DISEASE, WITH LONG-TERM CURRENT USE OF INSULIN (H): ICD-10-CM

## 2023-06-15 DIAGNOSIS — E66.01 MORBID OBESITY (H): ICD-10-CM

## 2023-06-15 DIAGNOSIS — R20.9 SKIN SENSATION DISTURBANCE: ICD-10-CM

## 2023-06-15 DIAGNOSIS — N18.2 TYPE 2 DIABETES MELLITUS WITH STAGE 2 CHRONIC KIDNEY DISEASE, WITH LONG-TERM CURRENT USE OF INSULIN (H): ICD-10-CM

## 2023-06-15 PROCEDURE — 99214 OFFICE O/P EST MOD 30 MIN: CPT | Mod: 25 | Performed by: FAMILY MEDICINE

## 2023-06-15 PROCEDURE — 99396 PREV VISIT EST AGE 40-64: CPT | Mod: 25 | Performed by: FAMILY MEDICINE

## 2023-06-15 ASSESSMENT — ENCOUNTER SYMPTOMS
WEAKNESS: 0
HEARTBURN: 0
COUGH: 0
HEADACHES: 0
JOINT SWELLING: 0
DIZZINESS: 0
CONSTIPATION: 0
BREAST MASS: 0
DYSURIA: 0
DIARRHEA: 0
PARESTHESIAS: 1
HEMATURIA: 0
ARTHRALGIAS: 0
MYALGIAS: 0
FREQUENCY: 1
PALPITATIONS: 0
SHORTNESS OF BREATH: 0
ABDOMINAL PAIN: 0
EYE PAIN: 0
NERVOUS/ANXIOUS: 0
CHILLS: 0
HEMATOCHEZIA: 0
NAUSEA: 1
SORE THROAT: 0
FEVER: 0

## 2023-06-15 ASSESSMENT — ASTHMA QUESTIONNAIRES: ACT_TOTALSCORE: 20

## 2023-06-15 ASSESSMENT — PAIN SCALES - GENERAL: PAINLEVEL: NO PAIN (0)

## 2023-06-15 NOTE — PROGRESS NOTES
SUBJECTIVE:   CC: Melyssa is an 62 year old who presents for preventive health visit.     Healthy Habits:    Getting at least 3 servings of Calcium per day:  Yes    Bi-annual eye exam:  Yes    Dental care twice a year:  Yes    Sleep apnea or symptoms of sleep apnea:  None    Diet:  Diabetic    Frequency of exercise:  None    Taking medications regularly:  Yes    Medication side effects:  None    PHQ-2 Total Score:    Additional concerns today:  No      - Skin irritation only if palpated. No rashes.    Diabetes Follow-up  Insulin - Novolog, metformin 1,000mg bid, Ozempic 2mg SubQ weekly, toujeo 100U Sub! qd  How often are you checking your blood sugar? Three times daily  Blood sugar testing frequency justification:  Uncontrolled diabetes  What time of day are you checking your blood sugars (select all that apply)?  Before meals  Have you had any blood sugars above 200?  Yes when she has an evening low and does not take evening injection  Have you had any blood sugars below 70?  Yes     What symptoms do you notice when your blood sugar is low?  Shaky, Dizzy and Weak    What concerns do you have today about your diabetes? Blood sugar is often over 200 and Low blood sugar     Do you have any of these symptoms? (Select all that apply)  No numbness or tingling in feet.  No redness, sores or blisters on feet.  No complaints of excessive thirst.  No reports of blurry vision.  No significant changes to weight.        Hyperlipidemia Follow-Up  Rosuvastatin 10mg qd    Are you regularly taking any medication or supplement to lower your cholesterol?   Yes- statin    Are you having muscle aches or other side effects that you think could be caused by your cholesterol lowering medication?  No    Hypertension Follow-up  Hydrochlorothiazide 12.5mg every day, losartan 100mg every day, metoprolol 25mg qd    Do you check your blood pressure regularly outside of the clinic? No     Are you following a low salt diet? No    Are your blood  pressures ever more than 140 on the top number (systolic) OR more   than 90 on the bottom number (diastolic), for example 140/90? N/A    BP Readings from Last 2 Encounters:   06/15/23 128/72   04/19/23 116/74     Hemoglobin A1C (%)   Date Value   03/28/2023 7.9 (H)   10/05/2022 7.5 (H)   04/14/2021 7.3 (H)   12/09/2020 7.9 (H)     LDL Cholesterol Calculated (mg/dL)   Date Value   03/28/2023 46   03/30/2022 53   04/14/2021 63   06/15/2020 53       Asthma Follow-Up  Advair 100-50mcg/ACT 1 puff bid, albuterol 108mcg/ACT 2 puffs q4h prn, albuterol 2.5mg via nebulizer q6h prn  Was ACT completed today?  Yes        6/15/2023     9:55 AM   ACT Total Scores   ACT TOTAL SCORE (Goal Greater than or Equal to 20) 20   In the past 12 months, how many times did you visit the emergency room for your asthma without being admitted to the hospital? 0   In the past 12 months, how many times were you hospitalized overnight because of your asthma? 0          How many days per week do you miss taking your asthma controller medication?  0    Please describe any recent triggers for your asthma: smoke    Have you had any Emergency Room Visits, Urgent Care Visits, or Hospital Admissions since your last office visit?  No          Social History     Tobacco Use     Smoking status: Never     Smokeless tobacco: Never   Vaping Use     Vaping status: Never Used     Passive vaping exposure: Yes   Substance Use Topics     Alcohol use: No             6/12/2023    10:41 AM   Alcohol Use   Prescreen: >3 drinks/day or >7 drinks/week? Not Applicable     Reviewed orders with patient.  Reviewed health maintenance and updated orders accordingly - Yes  Lab work is in process  Labs reviewed in EPIC    Breast Cancer Screening:    FHS-7:       3/30/2022     7:37 AM 6/20/2022     8:34 AM 6/12/2023    10:45 AM   Breast CA Risk Assessment (FHS-7)   Did any of your first-degree relatives have breast or ovarian cancer? No No Yes   Did any of your relatives have  bilateral breast cancer? Yes No Unknown   Did any man in your family have breast cancer? No No No   Did any woman in your family have breast and ovarian cancer? No No Yes   Did any woman in your family have breast cancer before age 50 y? No No No   Do you have 2 or more relatives with breast and/or ovarian cancer? No No Yes   Do you have 2 or more relatives with breast and/or bowel cancer? No No No     click delete button to remove this line now    Pertinent mammograms are reviewed under the imaging tab.    History of abnormal Pap smear: NO - age 30-65 PAP every 5 years with negative HPV co-testing recommended      Latest Ref Rng & Units 6/8/2022     8:56 AM   PAP / HPV   PAP  Negative for Intraepithelial Lesion or Malignancy (NILM)     HPV 16 DNA Negative Negative     HPV 18 DNA Negative Negative     Other HR HPV Negative Negative       Reviewed and updated as needed this visit by clinical staff   Tobacco  Allergies  Meds              Reviewed and updated as needed this visit by Provider                     Review of Systems   Constitutional: Negative for chills and fever.   HENT: Negative for congestion, ear pain, hearing loss and sore throat.    Eyes: Negative for pain and visual disturbance.   Respiratory: Negative for cough and shortness of breath.    Cardiovascular: Negative for chest pain, palpitations and peripheral edema.   Gastrointestinal: Positive for nausea. Negative for abdominal pain, constipation, diarrhea, heartburn and hematochezia.   Breasts:  Negative for tenderness, breast mass and discharge.   Genitourinary: Positive for frequency. Negative for dysuria, genital sores, hematuria, pelvic pain, urgency, vaginal bleeding and vaginal discharge.   Musculoskeletal: Negative for arthralgias, joint swelling and myalgias.   Skin: Negative for rash.   Neurological: Positive for paresthesias. Negative for dizziness, weakness and headaches.   Psychiatric/Behavioral: Negative for mood changes. The patient  "is not nervous/anxious.      Skin sensation is off - painful with even light touch mostly on torso, arms are affected, lower back, etc. - this has been going on at least 6 months.  Pain hasn't changed much over time - maybe a little more intense recently  No rashes associated  Doesn't hurt without touch.    For example - it hurts to pull up her pants as the pants are rubbing over her skin, but it doesn't hurt just to have her pants on once they are up.       OBJECTIVE:   /72   Pulse 75   Temp 97  F (36.1  C) (Tympanic)   Resp 20   Ht 1.6 m (5' 3\")   Wt 111.8 kg (246 lb 6 oz)   LMP 01/14/2016 (Approximate)   SpO2 97%   BMI 43.64 kg/m    Physical Exam  GENERAL: healthy, alert and no distress  NECK: no adenopathy, no asymmetry, masses, or scars and thyroid normal to palpation  RESP: lungs clear to auscultation - no rales, rhonchi or wheezes  CV: regular rate and rhythm, normal S1 S2, no S3 or S4, no murmur, click or rub, no peripheral edema and peripheral pulses strong  ABDOMEN: soft, nontender, no hepatosplenomegaly, no masses and bowel sounds normal  MS: no gross musculoskeletal defects noted, no edema  NEURO: Normal strength and tone, mentation intact and speech normal    Diagnostic Test Results:  Labs reviewed in Epic    ASSESSMENT/PLAN:   (Z00.00) Routine general medical examination at a health care facility  (primary encounter diagnosis)  Comment:    Plan:      (E66.01) Morbid obesity (H)  Comment: contributes to overall risk, diabetes, hypertension    Plan: continuing to work on weight loss    (N18.2) Chronic kidney disease, stage 2 (mild)  Comment:  Continue monitoring  Plan:      (E11.22,  N18.2,  Z79.4) Type 2 diabetes mellitus with stage 2 chronic kidney disease, with long-term current use of insulin (H)  Comment: well controlled  Plan: OFFICE/OUTPT VISIT,DAYAMI WARNER III             (I10) Essential hypertension with goal blood pressure less than 140/90  Comment: well controlled  Plan: OFFICE/OUTPT " VISIT,DAYAMI WARNER III             (E78.5) Hyperlipidemia LDL goal <70  Comment:    Plan:  Continue statin    (R20.9) Skin sensation disturbance  Comment:  Etiology uncertain.    Plan: Adult Neurology  Referral,         OFFICE/OUTPT VISIT,DAYAIM WARNER III               Patient has been advised of split billing requirements and indicates understanding: Yes      COUNSELING:  Reviewed preventive health counseling, as reflected in patient instructions       Regular exercise       Healthy diet/nutrition        She reports that she has never smoked. She has never used smokeless tobacco.      Kaia Elena MD  [facility]

## 2023-07-06 NOTE — PROGRESS NOTES
HPI and Plan:   See dictation [id number]    Orders Placed This Encounter   Procedures     Basic metabolic panel       Orders Placed This Encounter   Medications     metoprolol succinate ER (TOPROL-XL) 25 MG 24 hr tablet     Sig: Take 1 tablet (25 mg) by mouth daily     Dispense:  93 tablet     Refill:  3       There are no discontinued medications.      Encounter Diagnoses   Name Primary?     Other forms of angina pectoris (H) Yes     Type 2 diabetes mellitus with chronic kidney disease, with long-term current use of insulin, unspecified CKD stage (H)        CURRENT MEDICATIONS:  Current Outpatient Medications   Medication Sig Dispense Refill     albuterol (PROAIR HFA/PROVENTIL HFA/VENTOLIN HFA) 108 (90 Base) MCG/ACT inhaler Inhale 2 puffs into the lungs every 6 hours 1 Inhaler 11     aspirin 81 MG tablet Take 1 tablet by mouth daily. 100 tablet 3     blood glucose (NO BRAND SPECIFIED) lancets standard Use to test blood sugar four times daily or as directed. 100 each 2     blood glucose (NO BRAND SPECIFIED) test strip Use to test blood sugar four times daily or as directed. 400 each 1     blood glucose monitoring (ACCU-CHEK FASTCLIX) lancets USE TO TEST BLOOD SUGAR FOUR TIMES A DAY OR AS DIRECTED 408 each 3     Calcium Carb-Cholecalciferol (CALCIUM-VITAMIN D) 600-400 MG-UNIT TABS Take 1 tablet by mouth daily       dulaglutide (TRULICITY) 1.5 MG/0.5ML pen Inject 1.5 mg Subcutaneous every 7 days 6 mL 1     esomeprazole (NEXIUM) 40 MG DR capsule Take 1 capsule (40 mg) by mouth every morning (before breakfast) 90 capsule 3     fluticasone-salmeterol (ADVAIR DISKUS) 100-50 MCG/DOSE inhaler Inhale 1 puff into the lungs 2 times daily Discuss refills @ Next appt. 3 Inhaler 1     insulin aspart (NOVOLOG VIAL) 100 UNITS/ML vial Take 26 units with breakfast and lunch, 20 units with dinner.  If glucometer is over 200 take an extra 6 units 9 vial 1     insulin glargine (LANTUS VIAL) 100 UNIT/ML vial INJECT 46 UNITS UNDER THE SKIN  "twice daily 70 mL 2     insulin syringe-needle U-100 (31G X 5/16\" 1 ML) 31G X 5/16\" 1 ML miscellaneous Use 2 syringes daily or as directed. 180 each 1     insulin syringe-needle U-100 (BD INSULIN SYRINGE ULTRAFINE) 31G X 5/16\" 0.5 ML miscellaneous Use one syringe 3 times daily or as directed. 270 each 1     insulin syringe-needle U-100 30G X 1/2\" 0.3 ML Use four syringes daily or as directed. 400 each 3     losartan (COZAAR) 100 MG tablet Take 1 tablet (100 mg) by mouth daily 90 tablet 1     metFORMIN (GLUCOPHAGE-XR) 500 MG 24 hr tablet TAKE TWO TABLETS BY MOUTH TWICE A DAY WITH MEALS 360 tablet 1     metoclopramide (REGLAN) 10 MG tablet Take 1 tablet (10 mg) by mouth 2 times daily 180 tablet 1     metoprolol succinate ER (TOPROL-XL) 25 MG 24 hr tablet Take 1 tablet (25 mg) by mouth daily 93 tablet 3     montelukast (SINGULAIR) 10 MG tablet TAKE ONE TABLET BY MOUTH AT BEDTIME 90 tablet 1     Multiple Vitamins-Minerals (MULTIVITAMIN ADULTS 50+) TABS Take 1 tablet by mouth daily       Needle, Disp, (BD DISP NEEDLES) 30G X 1/2\" MISC To use with humalog insulin 3 times daily 300 each 1     pseudoePHEDrine (SUDAFED) 30 MG tablet TAKE TWO TABLETS BY MOUTH EVERY 12 HOURS 360 tablet 3     simvastatin (ZOCOR) 40 MG tablet Take 1 tablet (40 mg) by mouth At Bedtime at bedtime. 90 tablet 1       ALLERGIES     Allergies   Allergen Reactions     Lisinopril Cough     Tape [Adhesive Tape] Rash       PAST MEDICAL HISTORY:  Past Medical History:   Diagnosis Date     Asthma      Diabetes mellitus (H)      Esophageal reflux      Other chronic sinusitis        PAST SURGICAL HISTORY:  Past Surgical History:   Procedure Laterality Date     COLONOSCOPY  1/31/2002     COLONOSCOPY  10/26/2012    Procedure: COLONOSCOPY;  Colonoscopy;  Surgeon: Margret Sales MD;  Location: [facility]     INJECT EPIDURAL LUMBAR  12/7/2010    INJECT EPIDURAL LUMBAR performed by GENERIC ANESTHESIA PROVIDER at [facility]     INJECT EPIDURAL LUMBAR  1/17/2011 "    INJECT EPIDURAL LUMBAR performed by GENERIC ANESTHESIA PROVIDER at [facility]     RELEASE CARPAL TUNNEL  6/19/2012    Procedure: RELEASE CARPAL TUNNEL;  Right Carpal Tunnel Release;  Surgeon: Mike Sparrow MD;  Location: [facility]     RELEASE CARPAL TUNNEL  11/9/2012    Procedure: RELEASE CARPAL TUNNEL;  Left Carpal Tunnel Release;  Surgeon: Mike Sparrow MD;  Location: [facility]     SURGICAL HISTORY OF -   4/10/2000    bilateral total ethmoidectomies, bilateral maxillary antrostomies, bilateral SMR of inferior turbinates, reduction of lt turbinate porter bullosa       FAMILY HISTORY:  Family History   Problem Relation Age of Onset     Hypertension Mother      Diabetes Mother      Respiratory Mother         asthma-COPD     Hypertension Father      Heart Disease Father      Cerebrovascular Disease Father      Cardiovascular Father      Breast Cancer Maternal Grandmother      Cancer Brother      Diabetes Brother      Respiratory Brother         copd     Chronic Obstructive Pulmonary Disease Brother      Depression Sister      Respiratory Sister         copd     Chronic Obstructive Pulmonary Disease Sister      Depression Sister      Chronic Obstructive Pulmonary Disease Sister      Depression Sister      Chronic Obstructive Pulmonary Disease Brother      Kidney failure Brother        SOCIAL HISTORY:  Social History     Socioeconomic History     Marital status:      Spouse name: None     Number of children: None     Years of education: None     Highest education level: None   Occupational History     None   Social Needs     Financial resource strain: None     Food insecurity:     Worry: None     Inability: None     Transportation needs:     Medical: None     Non-medical: None   Tobacco Use     Smoking status: Never Smoker     Smokeless tobacco: Never Used   Substance and Sexual Activity     Alcohol use: No     Drug use: No     Sexual activity: Yes     Partners: Male     Comment:  vasectomy   Lifestyle      Physical activity:     Days per week: None     Minutes per session: None     Stress: None   Relationships     Social connections:     Talks on phone: None     Gets together: None     Attends [employer] service: None     Active member of club or organization: None     Attends meetings of clubs or organizations: None     Relationship status: None     Intimate partner violence:     Fear of current or ex partner: None     Emotionally abused: None     Physically abused: None     Forced sexual activity: None   Other Topics Concern     Parent/sibling w/ CABG, MI or angioplasty before 65F 55M? No   Social History Narrative     None       Review of Systems:  Skin:  Negative       Eyes:  Positive for glasses    ENT:  Negative      Respiratory:  Positive for shortness of breath     Cardiovascular:  Negative for;palpitations;edema;syncope or near-syncope chest pain;Positive for;dizziness;fatigue;lightheadedness    Gastroenterology: Positive for nausea    Genitourinary:  Negative      Musculoskeletal:  Negative      Neurologic:  Negative for memory problems;headaches;stroke;seizures;numbness or tingling of hands;numbness or tingling of feet    Psychiatric:  Negative for anxiety;depression    Heme/Lymph/Imm:  Negative for bleeding disorder    Endocrine:  Positive for diabetes      Physical Exam:  Vitals: BP (!) 144/89   Pulse 85   Wt 121.1 kg (267 lb)   LMP 01/14/2016 (Approximate)   SpO2 98%   BMI 47.30 kg/m      Constitutional:  cooperative, alert and oriented, well developed, well nourished, in no acute distress        Skin:  warm and dry to the touch          Head:  normocephalic        Eyes:  sclera white        Lymph:      ENT:           Neck:  carotid pulses are full and equal bilaterally, JVP normal, no carotid bruit        Respiratory:  normal breath sounds, clear to auscultation, normal A-P diameter, normal symmetry, normal respiratory excursion, no use of accessory muscles         Cardiac: regular rhythm,  normal S1/S2, no S3 or S4, apical impulse not displaced, no murmurs, gallops or rubs                                                         GI:           Extremities and Muscular Skeletal:  no edema              Neurological:  no gross motor deficits;affect appropriate        Psych:  Alert and Oriented x 3;affect appropriate, oriented to time, person and place          CC  Kaia Elena [place]  [address] [place] [address]                   How Severe Is It?: moderate Is This A New Presentation, Or A Follow-Up?: Sweating Sweating Severity Scale: 2- The sweating is tolerable but sometimes interferes with daily activities

## 2023-07-18 DIAGNOSIS — Z79.4 TYPE 2 DIABETES MELLITUS WITH CHRONIC KIDNEY DISEASE, WITH LONG-TERM CURRENT USE OF INSULIN, UNSPECIFIED CKD STAGE (H): ICD-10-CM

## 2023-07-18 DIAGNOSIS — E11.22 TYPE 2 DIABETES MELLITUS WITH CHRONIC KIDNEY DISEASE, WITH LONG-TERM CURRENT USE OF INSULIN, UNSPECIFIED CKD STAGE (H): ICD-10-CM

## 2023-07-19 RX ORDER — LANCETS
EACH MISCELLANEOUS
Qty: 200 EACH | Refills: 1 | Status: SHIPPED | OUTPATIENT
Start: 2023-07-19 | End: 2023-12-22

## 2023-07-19 NOTE — TELEPHONE ENCOUNTER
Prescription approved per [facility] Refill Protocol     Edel Xie     RN MSN

## 2023-07-27 DIAGNOSIS — Z79.4 TYPE 2 DIABETES MELLITUS WITH CHRONIC KIDNEY DISEASE, WITH LONG-TERM CURRENT USE OF INSULIN, UNSPECIFIED CKD STAGE (H): ICD-10-CM

## 2023-07-27 DIAGNOSIS — E11.22 TYPE 2 DIABETES MELLITUS WITH CHRONIC KIDNEY DISEASE, WITH LONG-TERM CURRENT USE OF INSULIN, UNSPECIFIED CKD STAGE (H): ICD-10-CM

## 2023-07-27 RX ORDER — PEN NEEDLE, DIABETIC 32GX 5/32"
NEEDLE, DISPOSABLE MISCELLANEOUS
Qty: 400 EACH | Refills: 1 | Status: SHIPPED | OUTPATIENT
Start: 2023-07-27 | End: 2024-02-26

## 2023-07-29 PROBLEM — Z98.890 STATUS POST CORONARY ANGIOGRAM: Status: RESOLVED | Noted: 2019-09-12 | Resolved: 2023-07-29

## 2023-07-29 NOTE — PROGRESS NOTES
NEUROLOGY CONSULTATION NOTE       [facility] NEUROLOGY [place]  [address] [place] [address]  Tel: [phone]  Fax: [phone]  [email or web address]     Bria Davis,  1960, MRN [id number]  PCP: Kaia Elena  Date: 2023     ASSESSMENT & PLAN     Visit Diagnosis  Paresthesia     Fibromyalgia syndrome  63-year-old female with history of HTN, HLD, DM 2, morbid obesity, CKD stage II, CAD with generalized paresthesias and body sensitivity to touch.  I suspect she has primary fibromyalgia syndrome.  Other possibilities include polymyalgia rheumatica but she also has findings on her exam to suggest peripheral neuropathy likely due to underlying diabetes.  I have recommended:    1.  EMG of bilateral lower extremity  2.  Lab work to include DIDI, CRP, ESR, folate, rheumatoid factor, B12.  3.  Start gabapentin 100 mg 3 times daily  4.  Follow-up the day she is scheduled for EMG    Thank you again for this referral, please feel free to contact me if you have any questions.    Jabari Santos MD  [facility]  (Formerly, [facility]     REASON FOR CONSULTATION Generalized Pain        HISTORY OF PRESENT ILLNESS     We have been requested by Dr. Elena to evaluate Bria Davis who is a 63 year old  female for paresthesia    Patient is a 63-year-old female with history of HTN, HLD, DM 2, morbid obesity, CKD stage II, CAD who was referred for evaluation of paresthesias and burning sensation.  Patient has a somewhat peculiar description of her symptoms.  She reports that she aches all over but touching the right side of the body tends to elicit most discomfort.  She has noticed that pulling up her pants or touching the right thigh makes her symptoms worse.  She denies any weakness but occasionally has swallowing difficulty.  There is no history of any skin color changes, arthralgias.  She denies any back pain or neck pain.   There is no history of any diplopia or any difficulty going up or down the stairs.     PROBLEM LIST   Patient Active Problem List   Diagnosis Code    Mild intermittent asthma J45.20    Esophageal reflux K21.9    Allergic rhinitis J30.9    Restless legs syndrome (RLS) G25.81    Enthesopathy M77.9    Lumbar radiculopathy M54.16    Microalbuminuria R80.9    Hyperlipidemia LDL goal <70 E78.5    Type 2 diabetes mellitus with kidney complication, with long-term current use of insulin (H) E11.29, Z79.4    Morbid obesity (H) E66.01    Essential hypertension with goal blood pressure less than 140/90 I10    Nonobstructive atherosclerosis of coronary artery I25.10    Chronic kidney disease, stage 2 (mild) N18.2         PAST MEDICAL & SURGICAL HISTORY     Past Medical History:   Patient  has a past medical history of Asthma, Diabetes mellitus (H), Esophageal reflux, Obese, Other chronic sinusitis, and PONV (postoperative nausea and vomiting).    Surgical History:  She  has a past surgical history that includes surgical history of -  (04/10/2000); colonoscopy (01/31/2002); Inject epidural lumbar (12/07/2010); Inject epidural lumbar (01/17/2011); Colonoscopy (10/26/2012); Release carpal tunnel (06/19/2012); Release carpal tunnel (11/09/2012); GYN surgery; Left Heart Catheterization (N/A, 09/12/2019); Left Ventriculogram (N/A, 09/12/2019); Colonoscopy (N/A, 11/08/2019); Esophagoscopy, gastroscopy, duodenoscopy (EGD), combined (N/A, 11/08/2019); Repair tendon achilles (Left, 12/26/2019); and left long finger pulley release (Left, 07/2020).     SOCIAL HISTORY     Reviewed, and she  reports that she has never smoked. She has never used smokeless tobacco. She reports that she does not drink alcohol and does not use drugs.     FAMILY HISTORY     Reviewed, and family history includes Breast Cancer in her maternal grandmother; Cancer in her brother; Cardiovascular in her father; Cerebrovascular Disease in her father; Chronic Obstructive  Pulmonary Disease in her brother, brother, sister, and sister; Depression in her sister, sister, and sister; Diabetes in her brother, mother, and son; Heart Disease in her father; Hypertension in her father and mother; Kidney failure in her brother; Respiratory in her brother, mother, and sister.     ALLERGIES     Allergies   Allergen Reactions    Lisinopril Cough    Tape [Adhesive Tape] Rash         REVIEW OF SYSTEMS     A 12 point review of system was performed and was negative except as outlined in the history of present illness.     HOME MEDICATIONS     Current Outpatient Rx   Medication Sig Dispense Refill    albuterol (PROVENTIL) (2.5 MG/3ML) 0.083% neb solution Take 1 vial (2.5 mg) by nebulization every 6 hours as needed for shortness of breath / dyspnea or wheezing 90 mL 3    albuterol (VENTOLIN HFA) 108 (90 Base) MCG/ACT inhaler Inhale 2 puffs into the lungs every 4 hours as needed for shortness of breath 18 g 1    Ascorbic Acid (VITAMIN C) 500 MG CAPS       aspirin (ASA) 81 MG EC tablet Take 1 tablet (81 mg) by mouth daily 1 tablet 0    blood glucose (NO BRAND SPECIFIED) lancets standard Use to test blood sugar 4 times daily or as directed. 200 lancet 6    blood glucose (NO BRAND SPECIFIED) test strip Use to test blood sugar 4 times daily or as directed. 200 strip 6    blood glucose monitoring (NO BRAND SPECIFIED) meter device kit Use to test blood sugar 4 times daily or as directed. 1 kit 0    Calcium Carb-Cholecalciferol (CALCIUM-VITAMIN D) 600-400 MG-UNIT TABS Take 1 tablet by mouth daily      esomeprazole (NEXIUM) 40 MG DR capsule Take 1 capsule (40 mg) by mouth every morning (before breakfast) 90 capsule 3    ferrous sulfate 140 (45 Fe) MG TBCR CR tablet Take 140 mg by mouth daily      fluticasone-salmeterol (ADVAIR DISKUS) 100-50 MCG/ACT inhaler INHALE 1 PUFF INTO THE LUNGS 2 TIMES DAILY 180 each 3    gabapentin (NEURONTIN) 100 MG capsule Take 1 capsule (100 mg) by mouth 3 times daily 90 capsule 11  "   hydrochlorothiazide (HYDRODIURIL) 12.5 MG tablet Take 1 tablet (12.5 mg) by mouth daily 90 tablet 1    insulin aspart (NOVOLOG FLEXPEN) 100 UNIT/ML pen 32 units before breakfast, 32 units before lunch, 32 units before dinner 90 mL 3    insulin pen needle (BD PEDRO LUIS U/F) 32G X 4 MM miscellaneous USE 4 DAILY OR AS DIRECTED 400 each 1    losartan (COZAAR) 100 MG tablet Take 1 tablet (100 mg) by mouth daily 90 tablet 1    metFORMIN (GLUCOPHAGE XR) 500 MG 24 hr tablet Take 2 tablets (1,000 mg) by mouth 2 times daily (with meals) 360 tablet 1    metoclopramide (REGLAN) 10 MG tablet Take 1 tablet (10 mg) by mouth 2 times daily 180 tablet 1    metoprolol succinate ER (TOPROL XL) 25 MG 24 hr tablet Take 1 tablet (25 mg) by mouth daily 90 tablet 3    Microlet Lancets MISC USE TO TEST BLOOD SUGAR 4 TIMES DAILY OR AS DIRECTED. 200 each 1    montelukast (SINGULAIR) 10 MG tablet TAKE 1 TABLET BY MOUTH AT BEDTIME 90 tablet 1    Multiple Vitamins-Minerals (MULTIVITAMIN ADULTS 50+) TABS Take 1 tablet by mouth daily      ondansetron (ZOFRAN) 4 MG tablet Take 1 tablet (4 mg) by mouth every 8 hours as needed for nausea 12 tablet 3    pseudoePHEDrine (SUDAFED) 30 MG tablet Take 2 tablets (60 mg) by mouth 2 times daily 120 tablet 3    rosuvastatin (CRESTOR) 10 MG tablet Take 1 tablet (10 mg) by mouth daily 90 tablet 1    Semaglutide, 2 MG/DOSE, (OZEMPIC, 2 MG/DOSE,) 8 MG/3ML pen Inject 2 mg Subcutaneous once a week 9 mL 1    TOUJEO MAX SOLOSTAR 300 UNIT/ML (2 units dial) pen INJECT 100 UNITS UNDER THE SKIN DAILY 40.5 mL 1    triamcinolone (KENALOG) 0.1 % external cream Apply topically 2 times daily 80 g 1         PHYSICAL EXAM     Vital signs  BP (!) 143/83 (BP Location: Right arm, Patient Position: Sitting)   Pulse 66   Ht 1.6 m (5' 3\")   Wt 111.6 kg (246 lb)   LMP 01/14/2016 (Approximate)   BMI 43.58 kg/m      Weight:   246 lbs 0 oz    Morbidly obese female who is alert and oriented vital signs were reviewed and documented in " electronic medical record.  Neck supple.  Neurologically speech was normal cranial nerves II through XII are intact.  Motor strength 5/5 reflexes symmetrical sensation decreased to light touch pinprick below knees gait normal Romberg negative     PERTINENT DIAGNOSTIC STUDIES     Following studies were reviewed:     ECHOCARDIOGRAM 9/17/2019  The visual ejection fraction is estimated at 60-65%.  There is mild to moderate concentric left ventricular hypertrophy.  There is no pericardial effusion.  Normal left ventricular wall motion.  Technically difficult study.  Hypertension.     PERTINENT LABS  Following labs were reviewed:  No visits with results within 3 Month(s) from this visit.   Latest known visit with results is:   Office Visit on 03/28/2023   Component Date Value Ref Range Status    Cholesterol 03/28/2023 129  <200 mg/dL Final    Triglycerides 03/28/2023 185 (H)  <150 mg/dL Final    Direct Measure HDL 03/28/2023 46 (L)  >=50 mg/dL Final    LDL Cholesterol Calculated 03/28/2023 46  <=100 mg/dL Final    Non HDL Cholesterol 03/28/2023 83  <130 mg/dL Final    Hemoglobin A1C 03/28/2023 7.9 (H)  0.0 - 5.6 % Final    Creatinine Urine mg/dL 03/28/2023 110.0  mg/dL Final    Albumin Urine mg/L 03/28/2023 87.2  mg/L Final    Albumin Urine mg/g Cr 03/28/2023 79.27 (H)  0.00 - 25.00 mg/g Cr Final    Sodium 03/28/2023 136  136 - 145 mmol/L Final    Potassium 03/28/2023 4.6  3.4 - 5.3 mmol/L Final    Chloride 03/28/2023 99  98 - 107 mmol/L Final    Carbon Dioxide (CO2) 03/28/2023 26  22 - 29 mmol/L Final    Anion Gap 03/28/2023 11  7 - 15 mmol/L Final    Urea Nitrogen 03/28/2023 12.1  8.0 - 23.0 mg/dL Final    Creatinine 03/28/2023 0.65  0.51 - 0.95 mg/dL Final    Calcium 03/28/2023 9.4  8.8 - 10.2 mg/dL Final    Glucose 03/28/2023 303 (H)  70 - 99 mg/dL Final    GFR Estimate 03/28/2023 >90  >60 mL/min/1.73m2 Final        Total time spent for face to face visit, reviewing labs/imaging studies, counseling and coordination of  care was: 1 Hour spent on the date of the encounter doing chart review, review of outside records, review of test results, interpretation of tests, patient visit, and documentation     This note was dictated using voice recognition software.  Any grammatical or context distortions are unintentional and inherent to the software.    Orders Placed This Encounter   Procedures    Anti Nuclear Kerri IgG by IFA with Reflex    Rheumatoid factor    Vitamin B12    Folate    Erythrocyte sedimentation rate auto    CRP inflammation    EMG      New Prescriptions    GABAPENTIN (NEURONTIN) 100 MG CAPSULE    Take 1 capsule (100 mg) by mouth 3 times daily      Modified Medications    No medications on file

## 2023-08-02 ENCOUNTER — OFFICE VISIT (OUTPATIENT)
Dept: NEUROLOGY | Facility: CLINIC | Age: 63
End: 2023-08-02
Attending: FAMILY MEDICINE
Payer: COMMERCIAL

## 2023-08-02 VITALS
DIASTOLIC BLOOD PRESSURE: 83 MMHG | SYSTOLIC BLOOD PRESSURE: 143 MMHG | WEIGHT: 246 LBS | BODY MASS INDEX: 43.59 KG/M2 | HEART RATE: 66 BPM | HEIGHT: 63 IN

## 2023-08-02 DIAGNOSIS — R20.2 PARESTHESIA: Primary | ICD-10-CM

## 2023-08-02 DIAGNOSIS — R76.8 POSITIVE ANA (ANTINUCLEAR ANTIBODY): ICD-10-CM

## 2023-08-02 DIAGNOSIS — M79.7 PRIMARY FIBROMYALGIA SYNDROME: ICD-10-CM

## 2023-08-02 PROCEDURE — 99205 OFFICE O/P NEW HI 60 MIN: CPT | Performed by: PSYCHIATRY & NEUROLOGY

## 2023-08-02 RX ORDER — GABAPENTIN 100 MG/1
100 CAPSULE ORAL 3 TIMES DAILY
Qty: 90 CAPSULE | Refills: 11 | Status: SHIPPED | OUTPATIENT
Start: 2023-08-02 | End: 2023-11-09

## 2023-08-02 NOTE — NURSING NOTE
Chief Complaint   Patient presents with    Generalized Pain     Patient reports pain all over body that began about 8 months ago      Clarita Kirk CMA on 8/2/2023 at 11:32 AM

## 2023-08-02 NOTE — LETTER
2023         RE: Bria Davis  [address] [facility] [id number]        Dear Colleague,    Thank you for referring your patient, Bria Davis, to the [facility]UR[facility] [place]. Please see a copy of my visit note below.    NEUROLOGY CONSULTATION NOTE       [facility] NEUROLOGY [place]  [address] [place] [address]  Tel: [phone]  Fax: [phone]  [email or web address]     Bria Davis,  1960, MRN [id number]  PCP: Kaia Elena  Date: 2023     ASSESSMENT & PLAN     Visit Diagnosis  Paresthesia     Fibromyalgia syndrome  63-year-old female with history of HTN, HLD, DM 2, morbid obesity, CKD stage II, CAD with generalized paresthesias and body sensitivity to touch.  I suspect she has primary fibromyalgia syndrome.  Other possibilities include polymyalgia rheumatica but she also has findings on her exam to suggest peripheral neuropathy likely due to underlying diabetes.  I have recommended:    1.  EMG of bilateral lower extremity  2.  Lab work to include DIDI, CRP, ESR, folate, rheumatoid factor, B12.  3.  Start gabapentin 100 mg 3 times daily  4.  Follow-up the day she is scheduled for EMG    Thank you again for this referral, please feel free to contact me if you have any questions.    Jabari Santos MD  [facility] NEUROLOGY, [place]  (Formerly, [facility]     REASON FOR CONSULTATION Generalized Pain        HISTORY OF PRESENT ILLNESS     We have been requested by Dr. Elena to evaluate Bria Davis who is a 63 year old  female for paresthesia    Patient is a 63-year-old female with history of HTN, HLD, DM 2, morbid obesity, CKD stage II, CAD who was referred for evaluation of paresthesias and burning sensation.  Patient has a somewhat peculiar description of her symptoms.  She reports that she aches all over but touching the right side of the body tends to elicit most  discomfort.  She has noticed that pulling up her pants or touching the right thigh makes her symptoms worse.  She denies any weakness but occasionally has swallowing difficulty.  There is no history of any skin color changes, arthralgias.  She denies any back pain or neck pain.  There is no history of any diplopia or any difficulty going up or down the stairs.     PROBLEM LIST   Patient Active Problem List   Diagnosis Code     Mild intermittent asthma J45.20     Esophageal reflux K21.9     Allergic rhinitis J30.9     Restless legs syndrome (RLS) G25.81     Enthesopathy M77.9     Lumbar radiculopathy M54.16     Microalbuminuria R80.9     Hyperlipidemia LDL goal <70 E78.5     Type 2 diabetes mellitus with kidney complication, with long-term current use of insulin (H) E11.29, Z79.4     Morbid obesity (H) E66.01     Essential hypertension with goal blood pressure less than 140/90 I10     Nonobstructive atherosclerosis of coronary artery I25.10     Chronic kidney disease, stage 2 (mild) N18.2         PAST MEDICAL & SURGICAL HISTORY     Past Medical History:   Patient  has a past medical history of Asthma, Diabetes mellitus (H), Esophageal reflux, Obese, Other chronic sinusitis, and PONV (postoperative nausea and vomiting).    Surgical History:  She  has a past surgical history that includes surgical history of -  (04/10/2000); colonoscopy (01/31/2002); Inject epidural lumbar (12/07/2010); Inject epidural lumbar (01/17/2011); Colonoscopy (10/26/2012); Release carpal tunnel (06/19/2012); Release carpal tunnel (11/09/2012); GYN surgery; Left Heart Catheterization (N/A, 09/12/2019); Left Ventriculogram (N/A, 09/12/2019); Colonoscopy (N/A, 11/08/2019); Esophagoscopy, gastroscopy, duodenoscopy (EGD), combined (N/A, 11/08/2019); Repair tendon achilles (Left, 12/26/2019); and left long finger pulley release (Left, 07/2020).     SOCIAL HISTORY     Reviewed, and she  reports that she has never smoked. She has never used smokeless  tobacco. She reports that she does not drink alcohol and does not use drugs.     FAMILY HISTORY     Reviewed, and family history includes Breast Cancer in her maternal grandmother; Cancer in her brother; Cardiovascular in her father; Cerebrovascular Disease in her father; Chronic Obstructive Pulmonary Disease in her brother, brother, sister, and sister; Depression in her sister, sister, and sister; Diabetes in her brother, mother, and son; Heart Disease in her father; Hypertension in her father and mother; Kidney failure in her brother; Respiratory in her brother, mother, and sister.     ALLERGIES     Allergies   Allergen Reactions     Lisinopril Cough     Tape [Adhesive Tape] Rash         REVIEW OF SYSTEMS     A 12 point review of system was performed and was negative except as outlined in the history of present illness.     HOME MEDICATIONS     Current Outpatient Rx   Medication Sig Dispense Refill     albuterol (PROVENTIL) (2.5 MG/3ML) 0.083% neb solution Take 1 vial (2.5 mg) by nebulization every 6 hours as needed for shortness of breath / dyspnea or wheezing 90 mL 3     albuterol (VENTOLIN HFA) 108 (90 Base) MCG/ACT inhaler Inhale 2 puffs into the lungs every 4 hours as needed for shortness of breath 18 g 1     Ascorbic Acid (VITAMIN C) 500 MG CAPS        aspirin (ASA) 81 MG EC tablet Take 1 tablet (81 mg) by mouth daily 1 tablet 0     blood glucose (NO BRAND SPECIFIED) lancets standard Use to test blood sugar 4 times daily or as directed. 200 lancet 6     blood glucose (NO BRAND SPECIFIED) test strip Use to test blood sugar 4 times daily or as directed. 200 strip 6     blood glucose monitoring (NO BRAND SPECIFIED) meter device kit Use to test blood sugar 4 times daily or as directed. 1 kit 0     Calcium Carb-Cholecalciferol (CALCIUM-VITAMIN D) 600-400 MG-UNIT TABS Take 1 tablet by mouth daily       esomeprazole (NEXIUM) 40 MG DR capsule Take 1 capsule (40 mg) by mouth every morning (before breakfast) 90  capsule 3     ferrous sulfate 140 (45 Fe) MG TBCR CR tablet Take 140 mg by mouth daily       fluticasone-salmeterol (ADVAIR DISKUS) 100-50 MCG/ACT inhaler INHALE 1 PUFF INTO THE LUNGS 2 TIMES DAILY 180 each 3     gabapentin (NEURONTIN) 100 MG capsule Take 1 capsule (100 mg) by mouth 3 times daily 90 capsule 11     hydrochlorothiazide (HYDRODIURIL) 12.5 MG tablet Take 1 tablet (12.5 mg) by mouth daily 90 tablet 1     insulin aspart (NOVOLOG FLEXPEN) 100 UNIT/ML pen 32 units before breakfast, 32 units before lunch, 32 units before dinner 90 mL 3     insulin pen needle (BD PEDRO LUIS U/F) 32G X 4 MM miscellaneous USE 4 DAILY OR AS DIRECTED 400 each 1     losartan (COZAAR) 100 MG tablet Take 1 tablet (100 mg) by mouth daily 90 tablet 1     metFORMIN (GLUCOPHAGE XR) 500 MG 24 hr tablet Take 2 tablets (1,000 mg) by mouth 2 times daily (with meals) 360 tablet 1     metoclopramide (REGLAN) 10 MG tablet Take 1 tablet (10 mg) by mouth 2 times daily 180 tablet 1     metoprolol succinate ER (TOPROL XL) 25 MG 24 hr tablet Take 1 tablet (25 mg) by mouth daily 90 tablet 3     Microlet Lancets MISC USE TO TEST BLOOD SUGAR 4 TIMES DAILY OR AS DIRECTED. 200 each 1     montelukast (SINGULAIR) 10 MG tablet TAKE 1 TABLET BY MOUTH AT BEDTIME 90 tablet 1     Multiple Vitamins-Minerals (MULTIVITAMIN ADULTS 50+) TABS Take 1 tablet by mouth daily       ondansetron (ZOFRAN) 4 MG tablet Take 1 tablet (4 mg) by mouth every 8 hours as needed for nausea 12 tablet 3     pseudoePHEDrine (SUDAFED) 30 MG tablet Take 2 tablets (60 mg) by mouth 2 times daily 120 tablet 3     rosuvastatin (CRESTOR) 10 MG tablet Take 1 tablet (10 mg) by mouth daily 90 tablet 1     Semaglutide, 2 MG/DOSE, (OZEMPIC, 2 MG/DOSE,) 8 MG/3ML pen Inject 2 mg Subcutaneous once a week 9 mL 1     TOUJEO MAX SOLOSTAR 300 UNIT/ML (2 units dial) pen INJECT 100 UNITS UNDER THE SKIN DAILY 40.5 mL 1     triamcinolone (KENALOG) 0.1 % external cream Apply topically 2 times daily 80 g 1      "    PHYSICAL EXAM     Vital signs  BP (!) 143/83 (BP Location: Right arm, Patient Position: Sitting)   Pulse 66   Ht 1.6 m (5' 3\")   Wt 111.6 kg (246 lb)   LMP 01/14/2016 (Approximate)   BMI 43.58 kg/m      Weight:   246 lbs 0 oz    Morbidly obese female who is alert and oriented vital signs were reviewed and documented in electronic medical record.  Neck supple.  Neurologically speech was normal cranial nerves II through XII are intact.  Motor strength 5/5 reflexes symmetrical sensation decreased to light touch pinprick below knees gait normal Romberg negative     PERTINENT DIAGNOSTIC STUDIES     Following studies were reviewed:     ECHOCARDIOGRAM 9/17/2019  The visual ejection fraction is estimated at 60-65%.  There is mild to moderate concentric left ventricular hypertrophy.  There is no pericardial effusion.  Normal left ventricular wall motion.  Technically difficult study.  Hypertension.     PERTINENT LABS  Following labs were reviewed:  No visits with results within 3 Month(s) from this visit.   Latest known visit with results is:   Office Visit on 03/28/2023   Component Date Value Ref Range Status     Cholesterol 03/28/2023 129  <200 mg/dL Final     Triglycerides 03/28/2023 185 (H)  <150 mg/dL Final     Direct Measure HDL 03/28/2023 46 (L)  >=50 mg/dL Final     LDL Cholesterol Calculated 03/28/2023 46  <=100 mg/dL Final     Non HDL Cholesterol 03/28/2023 83  <130 mg/dL Final     Hemoglobin A1C 03/28/2023 7.9 (H)  0.0 - 5.6 % Final     Creatinine Urine mg/dL 03/28/2023 110.0  mg/dL Final     Albumin Urine mg/L 03/28/2023 87.2  mg/L Final     Albumin Urine mg/g Cr 03/28/2023 79.27 (H)  0.00 - 25.00 mg/g Cr Final     Sodium 03/28/2023 136  136 - 145 mmol/L Final     Potassium 03/28/2023 4.6  3.4 - 5.3 mmol/L Final     Chloride 03/28/2023 99  98 - 107 mmol/L Final     Carbon Dioxide (CO2) 03/28/2023 26  22 - 29 mmol/L Final     Anion Gap 03/28/2023 11  7 - 15 mmol/L Final     Urea Nitrogen 03/28/2023 12.1  " 8.0 - 23.0 mg/dL Final     Creatinine 03/28/2023 0.65  0.51 - 0.95 mg/dL Final     Calcium 03/28/2023 9.4  8.8 - 10.2 mg/dL Final     Glucose 03/28/2023 303 (H)  70 - 99 mg/dL Final     GFR Estimate 03/28/2023 >90  >60 mL/min/1.73m2 Final        Total time spent for face to face visit, reviewing labs/imaging studies, counseling and coordination of care was: 1 Hour spent on the date of the encounter doing chart review, review of outside records, review of test results, interpretation of tests, patient visit, and documentation     This note was dictated using voice recognition software.  Any grammatical or context distortions are unintentional and inherent to the software.    Orders Placed This Encounter   Procedures     Anti Nuclear Kerri IgG by IFA with Reflex     Rheumatoid factor     Vitamin B12     Folate     Erythrocyte sedimentation rate auto     CRP inflammation     EMG      New Prescriptions    GABAPENTIN (NEURONTIN) 100 MG CAPSULE    Take 1 capsule (100 mg) by mouth 3 times daily      Modified Medications    No medications on file                Again, thank you for allowing me to participate in the care of your patient.        Sincerely,        Jabari Santos MD

## 2023-08-09 ENCOUNTER — ANCILLARY PROCEDURE (OUTPATIENT)
Dept: MAMMOGRAPHY | Facility: CLINIC | Age: 63
End: 2023-08-09
Attending: FAMILY MEDICINE
Payer: COMMERCIAL

## 2023-08-09 DIAGNOSIS — Z12.31 VISIT FOR SCREENING MAMMOGRAM: ICD-10-CM

## 2023-08-09 PROCEDURE — 77067 SCR MAMMO BI INCL CAD: CPT | Mod: TC | Performed by: RADIOLOGY

## 2023-08-09 PROCEDURE — 77063 BREAST TOMOSYNTHESIS BI: CPT | Mod: TC | Performed by: RADIOLOGY

## 2023-08-13 DIAGNOSIS — J31.0 CHRONIC RHINITIS: ICD-10-CM

## 2023-08-14 ENCOUNTER — LAB (OUTPATIENT)
Dept: LAB | Facility: CLINIC | Age: 63
End: 2023-08-14
Payer: COMMERCIAL

## 2023-08-14 DIAGNOSIS — R20.2 PARESTHESIA: ICD-10-CM

## 2023-08-14 DIAGNOSIS — M79.7 PRIMARY FIBROMYALGIA SYNDROME: ICD-10-CM

## 2023-08-14 DIAGNOSIS — R76.8 POSITIVE ANA (ANTINUCLEAR ANTIBODY): ICD-10-CM

## 2023-08-14 LAB
CRP SERPL-MCNC: 6.19 MG/L
ERYTHROCYTE [SEDIMENTATION RATE] IN BLOOD BY WESTERGREN METHOD: 8 MM/HR (ref 0–30)
FOLATE SERPL-MCNC: 26.8 NG/ML (ref 4.6–34.8)
VIT B12 SERPL-MCNC: 529 PG/ML (ref 232–1245)

## 2023-08-14 PROCEDURE — 86431 RHEUMATOID FACTOR QUANT: CPT

## 2023-08-14 PROCEDURE — 86140 C-REACTIVE PROTEIN: CPT

## 2023-08-14 PROCEDURE — 82607 VITAMIN B-12: CPT

## 2023-08-14 PROCEDURE — 86039 ANTINUCLEAR ANTIBODIES (ANA): CPT

## 2023-08-14 PROCEDURE — 36415 COLL VENOUS BLD VENIPUNCTURE: CPT

## 2023-08-14 PROCEDURE — 86038 ANTINUCLEAR ANTIBODIES: CPT

## 2023-08-14 PROCEDURE — 86225 DNA ANTIBODY NATIVE: CPT

## 2023-08-14 PROCEDURE — 85652 RBC SED RATE AUTOMATED: CPT

## 2023-08-14 PROCEDURE — 86235 NUCLEAR ANTIGEN ANTIBODY: CPT | Mod: 59

## 2023-08-14 PROCEDURE — 82746 ASSAY OF FOLIC ACID SERUM: CPT

## 2023-08-14 RX ORDER — PSEUDOEPHEDRINE HCL 30 MG
60 TABLET ORAL 2 TIMES DAILY
Qty: 120 TABLET | Refills: 3 | Status: SHIPPED | OUTPATIENT
Start: 2023-08-14 | End: 2023-11-07

## 2023-08-14 NOTE — RESULT ENCOUNTER NOTE
Dear Melyssa,     Your recent test results so far show elevated C-reactive protein.  It was elevated 9 months ago also and suggests nonspecific inflammation in the body.  Rest of the labs are normal so far.  We will discuss next steps once lab work is complete  Please continue with your current plan of care and let us know if you have any questions or concerns.    Jabari Santos MD

## 2023-08-15 LAB
ANA PAT SER IF-IMP: ABNORMAL
ANA SER QL IF: POSITIVE
ANA TITR SER IF: ABNORMAL {TITER}
RHEUMATOID FACT SER NEPH-ACNC: <6 IU/ML

## 2023-08-16 LAB
DSDNA AB SER-ACNC: 0.7 IU/ML
ENA SM IGG SER IA-ACNC: <0.7 U/ML
ENA SM IGG SER IA-ACNC: NEGATIVE
ENA SS-A AB SER IA-ACNC: <0.5 U/ML
ENA SS-A AB SER IA-ACNC: NEGATIVE
ENA SS-B IGG SER IA-ACNC: <0.6 U/ML
ENA SS-B IGG SER IA-ACNC: NEGATIVE
U1 SNRNP IGG SER IA-ACNC: 1.4 U/ML
U1 SNRNP IGG SER IA-ACNC: NEGATIVE

## 2023-08-25 ENCOUNTER — TELEPHONE (OUTPATIENT)
Dept: NEUROLOGY | Facility: CLINIC | Age: 63
End: 2023-08-25
Payer: COMMERCIAL

## 2023-08-25 NOTE — TELEPHONE ENCOUNTER
If patient feels her symptoms are worse on gabapentin she can discontinue.  We will discuss next step after the EMG on 11/9/2023

## 2023-08-25 NOTE — TELEPHONE ENCOUNTER
Pt will continue gabapentin for a few more weeks and discontinue if no improvement in symptoms. She is not having any side effects.     Pt asking about MAYNOR panel and anti-DNA antibody results as DIDI was positive. RN did relay that per my review the additional testing looked normal. Dr. Santos can you advise regarding DIDI positive result in relation to MAYNOR/Anti-DNA results?     Reinaldo Schneider, RN, BSN  [facility]

## 2023-08-25 NOTE — TELEPHONE ENCOUNTER
Antinuclear antibody was positive, subsequent testing with MAYNOR panel and double-stranded DNA antibody was normal.  No further intervention is needed but I would recommend repeating DIDI in 6 months and if still elevated rheumatology consult

## 2023-08-25 NOTE — TELEPHONE ENCOUNTER
[facility]    Phone Message    May a detailed message be left on voicemail: yes     Reason for Call: Requesting Results     Name/type of test: Labs  Date of test: 8/14/2023  Was test done at a location other than [facility] (Please fill in the location if not [facility])?: No    Patient is calling to discuss lab results. Patient would also like to discuss Gabapentin prescription. States feels like she's experiencing more pain/sensitivity since starting medication.    Please contact patient to discuss at [phone]        Action Taken: Other: [facility]    Travel Screening: Not Applicable

## 2023-08-28 NOTE — TELEPHONE ENCOUNTER
LDM informing pt of MD note below, asked her to keep EMG and follow up on 11/9 and that MD would discuss plan for future labs at that time also.     Encouraged call back with any questions or concerns.     Reinaldo Schneider RN, BSN  [facility]

## 2023-09-01 ENCOUNTER — HOSPITAL ENCOUNTER (OUTPATIENT)
Dept: MAMMOGRAPHY | Facility: CLINIC | Age: 63
Discharge: HOME | End: 2023-09-01
Attending: FAMILY MEDICINE
Payer: COMMERCIAL

## 2023-09-01 ENCOUNTER — HOSPITAL ENCOUNTER (OUTPATIENT)
Dept: ULTRASOUND IMAGING | Facility: CLINIC | Age: 63
Discharge: HOME | End: 2023-09-01
Attending: FAMILY MEDICINE
Payer: COMMERCIAL

## 2023-09-01 DIAGNOSIS — R92.8 ABNORMAL MAMMOGRAM: ICD-10-CM

## 2023-09-01 PROCEDURE — 76642 ULTRASOUND BREAST LIMITED: CPT | Mod: LT

## 2023-09-01 PROCEDURE — 77061 BREAST TOMOSYNTHESIS UNI: CPT | Mod: LT

## 2023-09-07 ENCOUNTER — HOSPITAL ENCOUNTER (OUTPATIENT)
Dept: MAMMOGRAPHY | Facility: CLINIC | Age: 63
Discharge: HOME | End: 2023-09-07
Attending: FAMILY MEDICINE
Payer: COMMERCIAL

## 2023-09-07 ENCOUNTER — HOSPITAL ENCOUNTER (OUTPATIENT)
Dept: ULTRASOUND IMAGING | Facility: CLINIC | Age: 63
Discharge: HOME | End: 2023-09-07
Attending: FAMILY MEDICINE
Payer: COMMERCIAL

## 2023-09-07 DIAGNOSIS — N63.0 BREAST MASS: ICD-10-CM

## 2023-09-07 DIAGNOSIS — N63.22 MASS OF UPPER INNER QUADRANT OF LEFT BREAST: ICD-10-CM

## 2023-09-07 PROCEDURE — A4648 IMPLANTABLE TISSUE MARKER: HCPCS

## 2023-09-07 PROCEDURE — 999N000065 MA POST PROCEDURE LEFT

## 2023-09-07 PROCEDURE — 88341 IMHCHEM/IMCYTCHM EA ADD ANTB: CPT | Mod: 26 | Performed by: PATHOLOGY

## 2023-09-07 PROCEDURE — 88305 TISSUE EXAM BY PATHOLOGIST: CPT | Mod: TC | Performed by: FAMILY MEDICINE

## 2023-09-07 PROCEDURE — 250N000009 HC RX 250: Performed by: RADIOLOGY

## 2023-09-07 PROCEDURE — 88342 IMHCHEM/IMCYTCHM 1ST ANTB: CPT | Mod: 26 | Performed by: PATHOLOGY

## 2023-09-07 PROCEDURE — 272N000431 US BREAST BIOPSY CORE NEEDLE LEFT

## 2023-09-07 PROCEDURE — 88305 TISSUE EXAM BY PATHOLOGIST: CPT | Mod: 26 | Performed by: PATHOLOGY

## 2023-09-07 RX ADMIN — LIDOCAINE HYDROCHLORIDE 8 ML: 10 INJECTION, SOLUTION EPIDURAL; INFILTRATION; INTRACAUDAL; PERINEURAL at 08:23

## 2023-09-07 NOTE — DISCHARGE INSTRUCTIONS
Breast Biopsy Care Instructions      Site Care:   You may have some discomfort in the breast and may see some bruising at the needle site.   You will be given an ice pack which should be kept in place over the dressing until bedtime tonight.Always keep a gauze pad between your skin and the ice pack.   Wearing your bra continuously for 24 hours post biopsy will give optimal support to the biopsy site.    Activity:      You may go back to your normal routine.    No heavy lifting for 24 hours.    Diet and Medications:      You may go back to your regular diet.    Tylenol is the best choice to relieve your discomfort, the first 24 hours. If you have no bleeding on the first day, Ibuprofen (such as Advil, or Motrin), may be taken on the second day after for pain.    Do not take aspirin for 72 hours following your biopsy.    Questions:    If you have any questions about your procedure performed in Ultrasound, you may contact us at [phone].If you have any questions about your procedure performed in Mammography, you may contact us at [phone].    Results:      The pathology report on your biopsy is usually available within 72 hours. The Radiologist or your personal physician will call you with the results.    You will receive information about any further follow up from your physician.    Call your doctor if you have:      More than slight bleeding or significant pain, or experience swelling of the breast.    If your physician is unavailable, please call the Nurse Advice Line at [phone], or [facility] Emergency Department at [phone].      Patient:______________________________  Time:_________  Date:_____________    Instructor:____________________________   Time:_________  Date:_____________

## 2023-09-12 DIAGNOSIS — N60.92 ATYPICAL DUCTAL HYPERPLASIA OF LEFT BREAST: ICD-10-CM

## 2023-09-12 DIAGNOSIS — N63.20 MASS OF LEFT BREAST, UNSPECIFIED QUADRANT: Primary | ICD-10-CM

## 2023-09-12 NOTE — PROGRESS NOTES
Atypical Path:  Pathology report reviewed with our breast radiologist Dr. Aneesh Taylor, who confirmed the recent breast imaging is concordant with the final surgical pathology results below.    Patient's primary provider- Dr. Kaia Elena sent patient a 2080 [employer] message to inform Ms. Bria HARMAN Davis of her Ultrasound Guided Right Breast Biopsy results showing Atypical Ductal Hyperplasia.      Recommended follow up is Surgical Consultation for Excisional Biopsy.  Dr. Kaia Elena placed an order for referral to General Surgery and a  will be reaching out to patient to set up this visit.    Patient was given the phone number to Surgical Clinic.  Ann Parrish RN, BSN  Breast Care Nurse Coordinator  [facility] [place]- [place]  [phone]      Bria Davis [id number]  F, 1960  Surgical Pathology Report (Final result) [id number]  Authorizing Provider: Kaia Elena MD Ordering Provider: Kaia Elena MD  Ordering Location: [facility]  Imaging  Collected: 09/07/2023 08:47 AM  Pathologist: Priyank Leigh MD Received: 09/07/2023 08:57 AM  .  Specimens  A Breast, Left  .  .  Final Diagnosis  Right breast, 4.0 cm mass, 10:00, 6.0 cm from nipple, ultrasound guided 14 gauge needle core biopsies:  - Two foci of atypical ductal hyperplasia, 1.5 mm and 2.0 mm.  - Fibrocystic change, with apocrine metaplasia, florid usual ductal hyperplasia, columnar cell change.  - No evidence of invasive malignancy.  - No evidence of lymphovascular invasion.  Electronically signed by Priyank Leigh MD on 9/11/2023 at 5:55 PM

## 2023-09-14 ENCOUNTER — MYC MEDICAL ADVICE (OUTPATIENT)
Dept: FAMILY MEDICINE | Facility: CLINIC | Age: 63
End: 2023-09-14
Payer: COMMERCIAL

## 2023-09-18 ENCOUNTER — OFFICE VISIT (OUTPATIENT)
Dept: SURGERY | Facility: CLINIC | Age: 63
End: 2023-09-18
Payer: COMMERCIAL

## 2023-09-18 VITALS
SYSTOLIC BLOOD PRESSURE: 147 MMHG | DIASTOLIC BLOOD PRESSURE: 90 MMHG | OXYGEN SATURATION: 95 % | TEMPERATURE: 96.9 F | HEART RATE: 79 BPM | BODY MASS INDEX: 46.78 KG/M2 | HEIGHT: 63 IN | WEIGHT: 264 LBS

## 2023-09-18 DIAGNOSIS — N60.92 ATYPICAL DUCTAL HYPERPLASIA OF LEFT BREAST: Primary | ICD-10-CM

## 2023-09-18 DIAGNOSIS — N63.22 MASS OF UPPER INNER QUADRANT OF LEFT BREAST: ICD-10-CM

## 2023-09-18 DIAGNOSIS — N63.20 MASS OF LEFT BREAST, UNSPECIFIED QUADRANT: ICD-10-CM

## 2023-09-18 PROCEDURE — 99203 OFFICE O/P NEW LOW 30 MIN: CPT | Performed by: STUDENT IN AN ORGANIZED HEALTH CARE EDUCATION/TRAINING PROGRAM

## 2023-09-18 ASSESSMENT — PAIN SCALES - GENERAL: PAINLEVEL: NO PAIN (0)

## 2023-09-18 NOTE — LETTER
9/18/2023         RE: Bria Davis  [address] [facility] [address]        Dear Colleague,    Thank you for referring your patient, Bria Davis, to the [facility]. Please see a copy of my visit note below.    Assessment:    Bria Davis is seen in consultation for a left breast mass, at the request of Kaia Elena MD.    Bria is a 63 year old female with a left breast mass at  10:00 and 6 cm from the nipple.  Her core needle biopsy reveals atypical ductal hyperplasia and fibrocystic changes.    PLAN:  Discussed options for close observation with self breast exams, follow up left breast ultrasound and bilateral mammography and physical exam vs an excisional biopsy.    I recommend the later.    We have discussed the indication, alternatives, risks and expected recovery.  Specifically, we have discussed incision, scarring, breast deformity, volume loss, postoperative infection, anesthesia, postoperative restrictions and physical limitations.  We have discussed the recommended interventions and treatments for these complications.  All questions have been answered to the best of my ability.    Bria elects excision.      We will schedule surgery at the patient's convenience.    Recommended time off work postop:  2 wks      HPI:  Bria Davis is a 63 year old female who was found to have a left breast mass on a screening mammogram on 8/9/23.  After diagnostic imaging, she underwent a biopsy of the mass which showed the pathology listed below.  Prior to the screening mammogram, she is not noticed any palpable mass in the left breast.  However after this was found on imaging, she has been able to feel the mass in the inner upper quadrant of the left breast.  There was some bruising of the left breast after the biopsy was performed, though this is improving now.  She has not noticed any nipple discharge, cervical or axillary lymphadenopathy in the left  breast.  Denies any palpable mass, skin changes, nipple discharge, cervical or axillary lymphadenopathy in the right breast.     Duration:  1 months  Location:  upper inner quadrant    Increase in size:  No      Skin rashes, dimpling or nipple changes:  none  Nipple discharge:  none  Breast pain:  No  Performs self breast exams:  No  Previous breast biopsies:  No  Previous cyst aspiration:  No  Previous other breast surgery:  No     First menstrual period when patient was 14 y.o.  Hormonal history:  menopausal (at age 55)  HRT no  Fertility treatment - on Clomiphene in 1984  Prior pregnancies: 2    Family History:  Family history of breast cancer: Yes - maternal grandmother's sister with breast cancer  Family history of ovarian cancer: Yes - Maternal grandmother    Imaging:  All imaging studies reviewed by me.    Recent Results (from the past 744 hour(s))   MA Diagnostic Left w/Juan    Narrative    MAMMOGRAPHY DIAGNOSTIC LEFT W/ TOMOSYNTHESIS,   US BREAST LEFT LIMITED 1-3 QUADRANTS -  9/1/2023 9:58 AM    HISTORY: Callback for asymmetry in the left breast.    COMPARISON: 8/9/2023, 1/23/2023, and 6/20/2022.    BREAST DENSITY: Scattered fibroglandular densities.    FINDINGS:    Left diagnostic mammogram with tomosynthesis: The previously noted  asymmetry persists on today's exam and will be further evaluated with  ultrasound.    Left breast ultrasound: In the left breast 10 o'clock region  approximately 6 cm from the nipple, there is a lobulated hypoechoic  mass that is ill-defined and measures roughly 4.0 x 1.5 x 1.7 cm.  Tissue diagnosis is recommended.      Impression    IMPRESSION:  1. The previously noted asymmetry persists on today's exam and there  is a hypoechoic mass on ultrasound. Tissue diagnosis is recommended.    BI-RADS CATEGORY: 4 - Suspicious.    RECOMMENDED FOLLOW-UP: Core biopsy of the left breast.    ZAKIYA CAMPO MD         SYSTEM ID:  [id number]     US Breast Left    Narrative    MAMMOGRAPHY  DIAGNOSTIC LEFT W/ TOMOSYNTHESIS,   US BREAST LEFT LIMITED 1-3 QUADRANTS -  9/1/2023 9:58 AM    HISTORY: Callback for asymmetry in the left breast.    COMPARISON: 8/9/2023, 1/23/2023, and 6/20/2022.    BREAST DENSITY: Scattered fibroglandular densities.    FINDINGS:    Left diagnostic mammogram with tomosynthesis: The previously noted  asymmetry persists on today's exam and will be further evaluated with  ultrasound.    Left breast ultrasound: In the left breast 10 o'clock region  approximately 6 cm from the nipple, there is a lobulated hypoechoic  mass that is ill-defined and measures roughly 4.0 x 1.5 x 1.7 cm.  Tissue diagnosis is recommended.      Impression    IMPRESSION:  1. The previously noted asymmetry persists on today's exam and there  is a hypoechoic mass on ultrasound. Tissue diagnosis is recommended.    BI-RADS CATEGORY: 4 - Suspicious.    RECOMMENDED FOLLOW-UP: Core biopsy of the left breast.    ZAKIYA CAMPO MD         SYSTEM ID:  [id number]     US Breast Biopsy Core Needle Left    Addendum: 9/12/2023    Bria Butler  Accession: [id number], [id number]    Pathologic results: ADH.    Radiologic correlation: Concordant.    Recommendation: Surgical consultation.    Zakiya Campo MD   Date of Addendum: 9/12/2023.    ZAKIYA CAMPO MD         SYSTEM ID:  [id number]      Narrative    ULTRASOUND-GUIDED LEFT BREAST CORE BIOPSY;   CLIP PLACEMENT;   POSTPROCEDURE DIGITAL MAMMOGRAM 9/7/2023 9:00 AM    INDICATION FOR PROCEDURE: Lobulated hypoechoic mass at the 10 o'clock  position, 6 cm from the nipple, measuring approximately 4 x 1.7 x 1.5  cm.    PROCEDURE: Written informed consent is obtained from the patient prior  to the procedure. The risks and benefits are discussed and the patient  wishes to continue. Approximately 3 mL lidocaine without epinephrine  was infiltrated for local anesthetic. A 13-gauge trocar was introduced  via lateral approach. The needle tip was placed adjacent to the  lesion.  A series of 5 samples were obtained with a 14-gauge  core-cutting needle. A coil-shaped clip was then deployed to óscar the  lesion.     Postbiopsy unilateral digital mammogram of the left breast showed the  clip to be appropriately placed. The patient tolerated the procedure  without difficulty and there was no immediate complication. Less than  5cc blood loss. No pain following biopsy.      Impression    IMPRESSION: Successful left breast ultrasound-guided core biopsy and  clip placement. Final pathology is pending.     ROHIT MENENDEZ MD         SYSTEM ID:  [id number]     MA Post Procedure Left    Addendum: 9/12/2023    Bria Butler  Accession: [id number], [id number]    Pathologic results: ADH.    Radiologic correlation: Concordant.    Recommendation: Surgical consultation.    Zakiya Campo MD   Date of Addendum: 9/12/2023.    ZAKIYA CAMPO MD         SYSTEM ID:  [id number]      [facility]    ULTRASOUND-GUIDED LEFT BREAST CORE BIOPSY;   CLIP PLACEMENT;   POSTPROCEDURE DIGITAL MAMMOGRAM 9/7/2023 9:00 AM    INDICATION FOR PROCEDURE: Lobulated hypoechoic mass at the 10 o'clock  position, 6 cm from the nipple, measuring approximately 4 x 1.7 x 1.5  cm.    PROCEDURE: Written informed consent is obtained from the patient prior  to the procedure. The risks and benefits are discussed and the patient  wishes to continue. Approximately 3 mL lidocaine without epinephrine  was infiltrated for local anesthetic. A 13-gauge trocar was introduced  via lateral approach. The needle tip was placed adjacent to the  lesion. A series of 5 samples were obtained with a 14-gauge  core-cutting needle. A coil-shaped clip was then deployed to óscar the  lesion.     Postbiopsy unilateral digital mammogram of the left breast showed the  clip to be appropriately placed. The patient tolerated the procedure  without difficulty and there was no immediate complication. Less than  5cc blood loss. No pain following biopsy.      Impression     "IMPRESSION: Successful left breast ultrasound-guided core biopsy and  clip placement. Final pathology is pending.     ROHIT MENENDEZ MD         SYSTEM ID:  [id number]       Percutaneous core needle biopsy:   9/7/23  Final Diagnosis   Right breast, 4.0 cm mass, 10:00, 6.0 cm from nipple, ultrasound guided 14 gauge needle core biopsies:  - Two foci of atypical ductal hyperplasia, 1.5 mm and 2.0 mm.    - Fibrocystic change, with apocrine metaplasia, florid usual ductal hyperplasia, columnar cell change.  - No evidence of invasive malignancy.  - No evidence of lymphovascular invasion.   Note, the report states \"right breast\", however this appears to be a typo as prior imaging and patient's physical exam support that this was taken from the left breast.  I will reach out to the pathologist to clarify.      Past Medical History:   has a past medical history of Asthma, Diabetes mellitus (H), Esophageal reflux, Obese, Other chronic sinusitis, and PONV (postoperative nausea and vomiting).    Past Surgical History:  Past Surgical History:   Procedure Laterality Date     COLONOSCOPY  01/31/2002     COLONOSCOPY  10/26/2012    Procedure: COLONOSCOPY;  Colonoscopy;  Surgeon: Margret Sales MD;  Location: [facility]     COLONOSCOPY N/A 11/08/2019    Procedure: COLONOSCOPY, WITH POLYPECTOMY AND BIOPSY;  Surgeon: Ariel Heck MD;  Location: [facility]     CV LEFT HEART CATH N/A 09/12/2019    Procedure: Left Heart Cath;  Surgeon: Mike Sanon MD;  Location: [facility] CARDIAC CATH LAB     CV LEFT VENTRICULOGRAM N/A 09/12/2019    Procedure: Left Ventriculogram;  Surgeon: Mike Sanon MD;  Location: [facility] CARDIAC CATH LAB     ESOPHAGOSCOPY, GASTROSCOPY, DUODENOSCOPY (EGD), COMBINED N/A 11/08/2019    Procedure: ESOPHAGOGASTRODUODENOSCOPY, WITH BIOPSY;  Surgeon: Ariel Heck MD;  Location: [facility]     GYN SURGERY      Hydroablation 2007, polyp removal 2018     INJECT EPIDURAL LUMBAR  12/07/2010    INJECT EPIDURAL " LUMBAR performed by GENERIC ANESTHESIA PROVIDER at [facility]     INJECT EPIDURAL LUMBAR  01/17/2011    INJECT EPIDURAL LUMBAR performed by GENERIC ANESTHESIA PROVIDER at [facility]     left long finger pulley release Left 07/2020     RELEASE CARPAL TUNNEL  06/19/2012    Procedure: RELEASE CARPAL TUNNEL;  Right Carpal Tunnel Release;  Surgeon: Mike Sparrow MD;  Location: [facility]     RELEASE CARPAL TUNNEL  11/09/2012    Procedure: RELEASE CARPAL TUNNEL;  Left Carpal Tunnel Release;  Surgeon: Mike Sparrow MD;  Location: [facility]     REPAIR TENDON ACHILLES Left 12/26/2019    Procedure: Left Foot: Achilles Tendon Repair/Remodel/Reattachment; calcaneal prominence removal;  Surgeon: Felix Watkins DPM;  Location: [facility]     SURGICAL HISTORY OF -   04/10/2000    bilateral total ethmoidectomies, bilateral maxillary antrostomies, bilateral SMR of inferior turbinates, reduction of lt turbinate porter bullosa        Social History:  Social History     Socioeconomic History     Marital status:      Spouse name: Not on file     Number of children: Not on file     Years of education: Not on file     Highest education level: Not on file   Occupational History     Not on file   Tobacco Use     Smoking status: Never     Smokeless tobacco: Never   Vaping Use     Vaping Use: Never used   Substance and Sexual Activity     Alcohol use: No     Drug use: No     Sexual activity: Yes     Partners: Male     Comment:  vasectomy   Other Topics Concern     Parent/sibling w/ CABG, MI or angioplasty before 65F 55M? No   Social History Narrative     Not on file     Social Determinants of Health     Financial Resource Strain: Not on file   Food Insecurity: Not on file   Transportation Needs: Not on file   Physical Activity: Not on file   Stress: Not on file   Social Connections: Not on file   Intimate Partner Violence: Not on file   Housing Stability: Not on file        ROS:  The 10 point review of systems is negative other than  noted in the HPI and above.    PE:  Vitals: LMP 01/14/2016 (Approximate)   General appearance: well-nourished, sitting comfortably, no apparent distress  Neck: Supple, without masses or lymphadenopathy   Respirations:  Unlabored  Extremities: Without edema  Neurologic: alert, speech is clear, moves all extremities with good strength  Psychiatric: Mood and affect are appropriate  Skin: Without lesions, rashes, or jaundice  Breast:    Symmetrical with no nipple changes.  Left breast with bruising in the inner upper quadrant.    Contour is normal.   Parenchyma is heterogeneously dense.   Masses- left - 4 cm diameter   Ecchymosis- left   Incisional scar- none    Lymph:      No supraclavicular/infraclavicular adenopathy.    Axillary adenopathy: none      This note was created using voice recognition software. Undetected word substitutions or other errors may have occurred.     Time spent with the patient with greater that 50% of the time in discussion was 33 minutes.     Bg Bryant MD    Please route or send letter to:  Primary Care Provider (PCP)          Again, thank you for allowing me to participate in the care of your patient.        Sincerely,        Bg Bryant MD

## 2023-09-18 NOTE — PROGRESS NOTES
Assessment:    Bria Davis is seen in consultation for a left breast mass, at the request of Kaia Elena MD.    Bria is a 63 year old female with a left breast mass at  10:00 and 6 cm from the nipple.  Her core needle biopsy reveals atypical ductal hyperplasia and fibrocystic changes.    PLAN:  Discussed options for close observation with self breast exams, follow up left breast ultrasound and bilateral mammography and physical exam vs an excisional biopsy.    I recommend the later.    We have discussed the indication, alternatives, risks and expected recovery.  Specifically, we have discussed incision, scarring, breast deformity, volume loss, postoperative infection, anesthesia, postoperative restrictions and physical limitations.  We have discussed the recommended interventions and treatments for these complications.  All questions have been answered to the best of my ability.    Bria elects excision.      We will schedule surgery at the patient's convenience.    Recommended time off work postop:  2 wks      HPI:  Bria Davis is a 63 year old female who was found to have a left breast mass on a screening mammogram on 8/9/23.  After diagnostic imaging, she underwent a biopsy of the mass which showed the pathology listed below.  Prior to the screening mammogram, she is not noticed any palpable mass in the left breast.  However after this was found on imaging, she has been able to feel the mass in the inner upper quadrant of the left breast.  There was some bruising of the left breast after the biopsy was performed, though this is improving now.  She has not noticed any nipple discharge, cervical or axillary lymphadenopathy in the left breast.  Denies any palpable mass, skin changes, nipple discharge, cervical or axillary lymphadenopathy in the right breast.     Duration:  1 months  Location:  upper inner quadrant    Increase in size:  No      Skin rashes, dimpling or nipple changes:   none  Nipple discharge:  none  Breast pain:  No  Performs self breast exams:  No  Previous breast biopsies:  No  Previous cyst aspiration:  No  Previous other breast surgery:  No     First menstrual period when patient was 14 y.o.  Hormonal history:  menopausal (at age 55)  HRT no  Fertility treatment - on Clomiphene in 1984  Prior pregnancies: 2    Family History:  Family history of breast cancer: Yes - maternal grandmother's sister with breast cancer  Family history of ovarian cancer: Yes - Maternal grandmother    Imaging:  All imaging studies reviewed by me.    Recent Results (from the past 744 hour(s))   MA Diagnostic Left w/Juan    Narrative    MAMMOGRAPHY DIAGNOSTIC LEFT W/ TOMOSYNTHESIS,   US BREAST LEFT LIMITED 1-3 QUADRANTS -  9/1/2023 9:58 AM    HISTORY: Callback for asymmetry in the left breast.    COMPARISON: 8/9/2023, 1/23/2023, and 6/20/2022.    BREAST DENSITY: Scattered fibroglandular densities.    FINDINGS:    Left diagnostic mammogram with tomosynthesis: The previously noted  asymmetry persists on today's exam and will be further evaluated with  ultrasound.    Left breast ultrasound: In the left breast 10 o'clock region  approximately 6 cm from the nipple, there is a lobulated hypoechoic  mass that is ill-defined and measures roughly 4.0 x 1.5 x 1.7 cm.  Tissue diagnosis is recommended.      Impression    IMPRESSION:  1. The previously noted asymmetry persists on today's exam and there  is a hypoechoic mass on ultrasound. Tissue diagnosis is recommended.    BI-RADS CATEGORY: 4 - Suspicious.    RECOMMENDED FOLLOW-UP: Core biopsy of the left breast.    ZAKIYA CAMPO MD         SYSTEM ID:  [id number]     US Breast Left    Narrative    MAMMOGRAPHY DIAGNOSTIC LEFT W/ TOMOSYNTHESIS,   US BREAST LEFT LIMITED 1-3 QUADRANTS -  9/1/2023 9:58 AM    HISTORY: Callback for asymmetry in the left breast.    COMPARISON: 8/9/2023, 1/23/2023, and 6/20/2022.    BREAST DENSITY: Scattered fibroglandular  densities.    FINDINGS:    Left diagnostic mammogram with tomosynthesis: The previously noted  asymmetry persists on today's exam and will be further evaluated with  ultrasound.    Left breast ultrasound: In the left breast 10 o'clock region  approximately 6 cm from the nipple, there is a lobulated hypoechoic  mass that is ill-defined and measures roughly 4.0 x 1.5 x 1.7 cm.  Tissue diagnosis is recommended.      Impression    IMPRESSION:  1. The previously noted asymmetry persists on today's exam and there  is a hypoechoic mass on ultrasound. Tissue diagnosis is recommended.    BI-RADS CATEGORY: 4 - Suspicious.    RECOMMENDED FOLLOW-UP: Core biopsy of the left breast.    ZAKIYA CAMPO MD         SYSTEM ID:  [id number]     US Breast Biopsy Core Needle Left    Addendum: 9/12/2023    Bria Butler  Accession: [id number], [id number]    Pathologic results: ADH.    Radiologic correlation: Concordant.    Recommendation: Surgical consultation.    Zakiya Campo MD   Date of Addendum: 9/12/2023.    ZAKIYA CAMPO MD         SYSTEM ID:  [id number]      Narrative    ULTRASOUND-GUIDED LEFT BREAST CORE BIOPSY;   CLIP PLACEMENT;   POSTPROCEDURE DIGITAL MAMMOGRAM 9/7/2023 9:00 AM    INDICATION FOR PROCEDURE: Lobulated hypoechoic mass at the 10 o'clock  position, 6 cm from the nipple, measuring approximately 4 x 1.7 x 1.5  cm.    PROCEDURE: Written informed consent is obtained from the patient prior  to the procedure. The risks and benefits are discussed and the patient  wishes to continue. Approximately 3 mL lidocaine without epinephrine  was infiltrated for local anesthetic. A 13-gauge trocar was introduced  via lateral approach. The needle tip was placed adjacent to the  lesion. A series of 5 samples were obtained with a 14-gauge  core-cutting needle. A coil-shaped clip was then deployed to óscar the  lesion.     Postbiopsy unilateral digital mammogram of the left breast showed the  clip to be appropriately placed.  The patient tolerated the procedure  without difficulty and there was no immediate complication. Less than  5cc blood loss. No pain following biopsy.      Impression    IMPRESSION: Successful left breast ultrasound-guided core biopsy and  clip placement. Final pathology is pending.     ROHIT MENENDEZ MD         SYSTEM ID:  [id number]     MA Post Procedure Left    Addendum: 9/12/2023    Bria Butler  Accession: [id number], [id number]    Pathologic results: ADH.    Radiologic correlation: Concordant.    Recommendation: Surgical consultation.    Zakiya Campo MD   Date of Addendum: 9/12/2023.    ZAKIYA CAMPO MD         SYSTEM ID:  [id number]      Narrative    ULTRASOUND-GUIDED LEFT BREAST CORE BIOPSY;   CLIP PLACEMENT;   POSTPROCEDURE DIGITAL MAMMOGRAM 9/7/2023 9:00 AM    INDICATION FOR PROCEDURE: Lobulated hypoechoic mass at the 10 o'clock  position, 6 cm from the nipple, measuring approximately 4 x 1.7 x 1.5  cm.    PROCEDURE: Written informed consent is obtained from the patient prior  to the procedure. The risks and benefits are discussed and the patient  wishes to continue. Approximately 3 mL lidocaine without epinephrine  was infiltrated for local anesthetic. A 13-gauge trocar was introduced  via lateral approach. The needle tip was placed adjacent to the  lesion. A series of 5 samples were obtained with a 14-gauge  core-cutting needle. A coil-shaped clip was then deployed to óscar the  lesion.     Postbiopsy unilateral digital mammogram of the left breast showed the  clip to be appropriately placed. The patient tolerated the procedure  without difficulty and there was no immediate complication. Less than  5cc blood loss. No pain following biopsy.      Impression    IMPRESSION: Successful left breast ultrasound-guided core biopsy and  clip placement. Final pathology is pending.     ROHIT MENENDEZ MD         SYSTEM ID:  [id number]       Percutaneous core needle biopsy:   9/7/23  Final Diagnosis   Right  "breast, 4.0 cm mass, 10:00, 6.0 cm from nipple, ultrasound guided 14 gauge needle core biopsies:  - Two foci of atypical ductal hyperplasia, 1.5 mm and 2.0 mm.    - Fibrocystic change, with apocrine metaplasia, florid usual ductal hyperplasia, columnar cell change.  - No evidence of invasive malignancy.  - No evidence of lymphovascular invasion.   Note, the report states \"right breast\", however this appears to be a typo as prior imaging and patient's physical exam support that this was taken from the left breast.  I will reach out to the pathologist to clarify.      Past Medical History:   has a past medical history of Asthma, Diabetes mellitus (H), Esophageal reflux, Obese, Other chronic sinusitis, and PONV (postoperative nausea and vomiting).    Past Surgical History:  Past Surgical History:   Procedure Laterality Date    COLONOSCOPY  01/31/2002    COLONOSCOPY  10/26/2012    Procedure: COLONOSCOPY;  Colonoscopy;  Surgeon: Margret Sales MD;  Location: [facility]    COLONOSCOPY N/A 11/08/2019    Procedure: COLONOSCOPY, WITH POLYPECTOMY AND BIOPSY;  Surgeon: Ariel Heck MD;  Location: [facility]    CV LEFT HEART CATH N/A 09/12/2019    Procedure: Left Heart Cath;  Surgeon: Mike Sanon MD;  Location:  [facility] CATH [facility]    CV LEFT VENTRICULOGRAM N/A 09/12/2019    Procedure: Left Ventriculogram;  Surgeon: Mike Sanon MD;  Location: [facility] CARDIAC CATH LAB    ESOPHAGOSCOPY, GASTROSCOPY, DUODENOSCOPY (EGD), COMBINED N/A 11/08/2019    Procedure: ESOPHAGOGASTRODUODENOSCOPY, WITH BIOPSY;  Surgeon: Ariel Heck MD;  Location: [facility]    GYN SURGERY      Hydroablation 2007, polyp removal 2018    INJECT EPIDURAL LUMBAR  12/07/2010    INJECT EPIDURAL LUMBAR performed by GENERIC ANESTHESIA PROVIDER at [facility]    INJECT EPIDURAL LUMBAR  01/17/2011    INJECT EPIDURAL LUMBAR performed by GENERIC ANESTHESIA PROVIDER at [facility]    left long finger pulley release Left 07/2020    RELEASE CARPAL TUNNEL  " 06/19/2012    Procedure: RELEASE CARPAL TUNNEL;  Right Carpal Tunnel Release;  Surgeon: Mike Sparrow MD;  Location: [facility]    RELEASE CARPAL TUNNEL  11/09/2012    Procedure: RELEASE CARPAL TUNNEL;  Left Carpal Tunnel Release;  Surgeon: Mike Sparrow MD;  Location: [facility]    REPAIR TENDON ACHILLES Left 12/26/2019    Procedure: Left Foot: Achilles Tendon Repair/Remodel/Reattachment; calcaneal prominence removal;  Surgeon: Felix Watkins DPM;  Location: [facility]    SURGICAL HISTORY OF -   04/10/2000    bilateral total ethmoidectomies, bilateral maxillary antrostomies, bilateral SMR of inferior turbinates, reduction of lt turbinate porter bullosa        Social History:  Social History     Socioeconomic History    Marital status:      Spouse name: Not on file    Number of children: Not on file    Years of education: Not on file    Highest education level: Not on file   Occupational History    Not on file   Tobacco Use    Smoking status: Never    Smokeless tobacco: Never   Vaping Use    Vaping Use: Never used   Substance and Sexual Activity    Alcohol use: No    Drug use: No    Sexual activity: Yes     Partners: Male     Comment:  vasectomy   Other Topics Concern    Parent/sibling w/ CABG, MI or angioplasty before 65F 55M? No   Social History Narrative    Not on file     Social Determinants of Health     Financial Resource Strain: Not on file   Food Insecurity: Not on file   Transportation Needs: Not on file   Physical Activity: Not on file   Stress: Not on file   Social Connections: Not on file   Intimate Partner Violence: Not on file   Housing Stability: Not on file        ROS:  The 10 point review of systems is negative other than noted in the HPI and above.    PE:  Vitals: LMP 01/14/2016 (Approximate)   General appearance: well-nourished, sitting comfortably, no apparent distress  Neck: Supple, without masses or lymphadenopathy   Respirations:  Unlabored  Extremities: Without  edema  Neurologic: alert, speech is clear, moves all extremities with good strength  Psychiatric: Mood and affect are appropriate  Skin: Without lesions, rashes, or jaundice  Breast:    Symmetrical with no nipple changes.  Left breast with bruising in the inner upper quadrant.    Contour is normal.   Parenchyma is heterogeneously dense.   Masses- left - 4 cm diameter   Ecchymosis- left   Incisional scar- none    Lymph:      No supraclavicular/infraclavicular adenopathy.    Axillary adenopathy: none      This note was created using voice recognition software. Undetected word substitutions or other errors may have occurred.     Time spent with the patient with greater that 50% of the time in discussion was 33 minutes.     Bg Bryant MD    Please route or send letter to:  Primary Care Provider (PCP)

## 2023-09-19 ENCOUNTER — TELEPHONE (OUTPATIENT)
Dept: SURGERY | Facility: CLINIC | Age: 63
End: 2023-09-19
Payer: COMMERCIAL

## 2023-09-19 LAB
PATH REPORT.ADDENDUM SPEC: NORMAL
PATH REPORT.COMMENTS IMP SPEC: NORMAL
PATH REPORT.FINAL DX SPEC: NORMAL
PATH REPORT.GROSS SPEC: NORMAL
PATH REPORT.MICROSCOPIC SPEC OTHER STN: NORMAL
PATH REPORT.MICROSCOPIC SPEC OTHER STN: NORMAL
PATH REPORT.RELEVANT HX SPEC: NORMAL
PHOTO IMAGE: NORMAL

## 2023-09-19 NOTE — TELEPHONE ENCOUNTER
Type of surgery: BIOPSY, LEFT BREAST, WITH NEEDLE LOCALIZATION (Left)   Location of surgery: [facility]  Date and time of surgery: 10/06/2023  Surgeon: Annette  Pre-Op Appt Date: 09/21/2023  Post-Op Appt Date: 10/16/2023   Packet sent out: Yes  Pre-cert/Authorization completed:  No  Date:

## 2023-09-21 ENCOUNTER — OFFICE VISIT (OUTPATIENT)
Dept: FAMILY MEDICINE | Facility: CLINIC | Age: 63
End: 2023-09-21
Payer: COMMERCIAL

## 2023-09-21 ENCOUNTER — LAB (OUTPATIENT)
Dept: LAB | Facility: CLINIC | Age: 63
End: 2023-09-21
Payer: COMMERCIAL

## 2023-09-21 VITALS
HEART RATE: 77 BPM | OXYGEN SATURATION: 97 % | BODY MASS INDEX: 43.8 KG/M2 | SYSTOLIC BLOOD PRESSURE: 118 MMHG | RESPIRATION RATE: 16 BRPM | HEIGHT: 63 IN | DIASTOLIC BLOOD PRESSURE: 72 MMHG | WEIGHT: 247.2 LBS

## 2023-09-21 DIAGNOSIS — N60.92 ATYPICAL DUCTAL HYPERPLASIA OF LEFT BREAST: ICD-10-CM

## 2023-09-21 DIAGNOSIS — Z79.4 TYPE 2 DIABETES MELLITUS WITH CHRONIC KIDNEY DISEASE, WITH LONG-TERM CURRENT USE OF INSULIN, UNSPECIFIED CKD STAGE (H): ICD-10-CM

## 2023-09-21 DIAGNOSIS — E78.5 HYPERLIPIDEMIA LDL GOAL <70: ICD-10-CM

## 2023-09-21 DIAGNOSIS — N18.2 CHRONIC KIDNEY DISEASE, STAGE 2 (MILD): ICD-10-CM

## 2023-09-21 DIAGNOSIS — E11.22 TYPE 2 DIABETES MELLITUS WITH CHRONIC KIDNEY DISEASE, WITH LONG-TERM CURRENT USE OF INSULIN, UNSPECIFIED CKD STAGE (H): ICD-10-CM

## 2023-09-21 DIAGNOSIS — Z01.818 PREOP GENERAL PHYSICAL EXAM: ICD-10-CM

## 2023-09-21 DIAGNOSIS — Z01.818 PREOP GENERAL PHYSICAL EXAM: Primary | ICD-10-CM

## 2023-09-21 DIAGNOSIS — J45.20 MILD INTERMITTENT ASTHMA, UNCOMPLICATED: ICD-10-CM

## 2023-09-21 DIAGNOSIS — R93.5 ABNORMAL CT SCAN, PELVIS: ICD-10-CM

## 2023-09-21 DIAGNOSIS — R10.9 ABDOMINAL PAIN, UNSPECIFIED ABDOMINAL LOCATION: ICD-10-CM

## 2023-09-21 DIAGNOSIS — I10 ESSENTIAL HYPERTENSION WITH GOAL BLOOD PRESSURE LESS THAN 140/90: ICD-10-CM

## 2023-09-21 DIAGNOSIS — E66.01 MORBID OBESITY (H): ICD-10-CM

## 2023-09-21 DIAGNOSIS — K42.9 UMBILICAL HERNIA WITHOUT OBSTRUCTION AND WITHOUT GANGRENE: ICD-10-CM

## 2023-09-21 LAB
ANION GAP SERPL CALCULATED.3IONS-SCNC: 14 MMOL/L (ref 7–15)
BUN SERPL-MCNC: 11 MG/DL (ref 8–23)
CALCIUM SERPL-MCNC: 9.4 MG/DL (ref 8.8–10.2)
CHLORIDE SERPL-SCNC: 100 MMOL/L (ref 98–107)
CREAT SERPL-MCNC: 0.63 MG/DL (ref 0.51–0.95)
DEPRECATED HCO3 PLAS-SCNC: 24 MMOL/L (ref 22–29)
EGFRCR SERPLBLD CKD-EPI 2021: >90 ML/MIN/1.73M2
GLUCOSE SERPL-MCNC: 168 MG/DL (ref 70–99)
HBA1C MFR BLD: 7.7 % (ref 0–5.6)
POTASSIUM SERPL-SCNC: 3.7 MMOL/L (ref 3.4–5.3)
SODIUM SERPL-SCNC: 138 MMOL/L (ref 136–145)

## 2023-09-21 PROCEDURE — 80048 BASIC METABOLIC PNL TOTAL CA: CPT

## 2023-09-21 PROCEDURE — 99214 OFFICE O/P EST MOD 30 MIN: CPT | Mod: 25 | Performed by: FAMILY MEDICINE

## 2023-09-21 PROCEDURE — 90471 IMMUNIZATION ADMIN: CPT | Performed by: FAMILY MEDICINE

## 2023-09-21 PROCEDURE — 83036 HEMOGLOBIN GLYCOSYLATED A1C: CPT

## 2023-09-21 PROCEDURE — 90682 RIV4 VACC RECOMBINANT DNA IM: CPT | Performed by: FAMILY MEDICINE

## 2023-09-21 PROCEDURE — 36415 COLL VENOUS BLD VENIPUNCTURE: CPT

## 2023-09-21 RX ORDER — LOSARTAN POTASSIUM 100 MG/1
100 TABLET ORAL DAILY
Qty: 90 TABLET | Refills: 1 | Status: SHIPPED | OUTPATIENT
Start: 2023-09-21 | End: 2024-02-26

## 2023-09-21 RX ORDER — INSULIN GLARGINE 300 U/ML
INJECTION, SOLUTION SUBCUTANEOUS
Qty: 40.5 ML | Refills: 1 | Status: SHIPPED | OUTPATIENT
Start: 2023-09-21 | End: 2024-04-22

## 2023-09-21 RX ORDER — ROSUVASTATIN CALCIUM 10 MG/1
10 TABLET, COATED ORAL DAILY
Qty: 90 TABLET | Refills: 1 | Status: SHIPPED | OUTPATIENT
Start: 2023-09-21 | End: 2023-11-07

## 2023-09-21 RX ORDER — SEMAGLUTIDE 2.68 MG/ML
2 INJECTION, SOLUTION SUBCUTANEOUS WEEKLY
Qty: 9 ML | Refills: 1 | Status: SHIPPED | OUTPATIENT
Start: 2023-09-21 | End: 2024-04-11

## 2023-09-21 RX ORDER — FLUTICASONE PROPIONATE AND SALMETEROL 100; 50 UG/1; UG/1
POWDER RESPIRATORY (INHALATION)
Qty: 180 EACH | Refills: 3 | Status: SHIPPED | OUTPATIENT
Start: 2023-09-21 | End: 2024-09-18

## 2023-09-21 RX ORDER — METFORMIN HCL 500 MG
1000 TABLET, EXTENDED RELEASE 24 HR ORAL 2 TIMES DAILY WITH MEALS
Qty: 360 TABLET | Refills: 1 | Status: SHIPPED | OUTPATIENT
Start: 2023-09-21 | End: 2024-02-26

## 2023-09-21 RX ORDER — INSULIN ASPART 100 [IU]/ML
INJECTION, SOLUTION INTRAVENOUS; SUBCUTANEOUS
Qty: 90 ML | Refills: 3 | Status: SHIPPED | OUTPATIENT
Start: 2023-09-21 | End: 2024-09-18

## 2023-09-21 RX ORDER — HYDROCHLOROTHIAZIDE 12.5 MG/1
12.5 TABLET ORAL DAILY
Qty: 90 TABLET | Refills: 1 | Status: SHIPPED | OUTPATIENT
Start: 2023-09-21 | End: 2024-02-26

## 2023-09-21 NOTE — H&P (VIEW-ONLY)
[facility]  [address]  [facility] [address]  Phone: [phone]  Primary Provider: Kaia Graham  Pre-op Performing Provider: KAIA GRAHAM      PREOPERATIVE EVALUATION:  Today's date: 9/21/2023    Melyssa is a 63 year old female who presents for a preoperative evaluation.      9/21/2023     1:41 PM   Additional Questions   Roomed by Bronwyn CUNNINGHAM MA   Accompanied by Self       Surgical Information:  Surgery/Procedure: BIOPSY, LEFT BREAST, WITH NEEDLE LOCALIZATION   Surgery Location: [facility]  Surgeon: Bg Bryant MD  Surgery Date: 10/6/2023  Time of Surgery: 9:30 AM  Where patient plans to recover: At home with family  Fax number for surgical facility: Note does not need to be faxed, will be available electronically in Epic.    Assessment & Plan     The proposed surgical procedure is considered LOW risk.    Preop general physical exam       Atypical ductal hyperplasia of left breast   Scheduled for excisional lumpectomy    Type 2 diabetes mellitus with kidney complication, with long-term current use of insulin (H)  Fair control  Medications refilled   HgbA1c today    Morbid obesity (H)  Contributes to risk, hypertension/hld/diabetes    Essential hypertension with goal blood pressure less than 140/90  Well controlled    Chronic kidney disease, stage 2 (mild)         Risks and Recommendations:  The patient has the following additional risks and recommendations for perioperative complications:   - Morbid obesity (BMI >40)  Diabetes:  - Patient is on insulin therapy; diabetic NPO guidelines provided and discussed.    Antiplatelet or Anticoagulation Medication Instructions:   - aspirin: Discontinue aspirin 7-10 days prior to procedure to reduce bleeding risk. It should be resumed postoperatively.     Additional Medication Instructions:  Patient is to take all scheduled medications on the day of surgery EXCEPT for modifications listed below:   - ACE/ARB: HOLD on  day of surgery (minimum 11 hours for general anesthesia).   - Beta Blockers: Continue taking on the day of surgery.   - Diuretics: HOLD on the day of surgery.   - Statins: Continue taking on the day of surgery.    - Long acting insulin (e.g. glargine, detemir): Take 80% of the usual evening or morning dose before surgery.    - short acting insulin (e.g. regular, lispro, aspart): HOLD on the morning of surgery.    - metformin: HOLD day of surgery.   - GLP-1 Injectable (exenitide, liraglutide, semaglutide, dulaglutide, etc.): HOLD 7 days before surgery     RECOMMENDATION:  APPROVAL GIVEN to proceed with proposed procedure, without further diagnostic evaluation.            Subjective       HPI related to upcoming procedure:  Bria is a 63 year old female (PMH significant for type 2 diabetes, obesity, HLD, hypertension, RLS and stage 2 CKD) with a left breast mass at  10:00 and 6 cm from the nipple.  Her core needle biopsy reveals atypical ductal hyperplasia and fibrocystic changes. She is scheduled for lumpectomy/excisional biopsy.        9/21/2023     1:25 PM   Preop Questions   1. Have you ever had a heart attack or stroke? No   2. Have you ever had surgery on your heart or blood vessels, such as a stent placement, a coronary artery bypass, or surgery on an artery in your head, neck, heart, or legs? No   3. Do you have chest pain with activity? No   4. Do you have a history of  heart failure? No   5. Do you currently have a cold, bronchitis or symptoms of other infection? No   6. Do you have a cough, shortness of breath, or wheezing? No   7. Do you or anyone in your family have previous history of blood clots? No   8. Do you or does anyone in your family have a serious bleeding problem such as prolonged bleeding following surgeries or cuts? No   9. Have you ever had problems with anemia or been told to take iron pills? YES - remote history   10. Have you had any abnormal blood loss such as black, tarry or bloody  stools, or abnormal vaginal bleeding? No   11. Have you ever had a blood transfusion? No   12. Are you willing to have a blood transfusion if it is medically needed before, during, or after your surgery? Yes   13. Have you or any of your relatives ever had problems with anesthesia? No   14. Do you have sleep apnea, excessive snoring or daytime drowsiness? No   15. Do you have any artifical heart valves or other implanted medical devices like a pacemaker, defibrillator, or continuous glucose monitor? No   16. Do you have artificial joints? No   17. Are you allergic to latex? No       Health Care Directive:  Patient does not have a Health Care Directive or Living Will: Discussed advance care planning with patient; however, patient declined at this time.    Preoperative Review of :   reviewed - no record of controlled substances prescribed.      Status of Chronic Conditions:  See problem list for active medical problems.  Problems all longstanding and stable, except as noted/documented.  See ROS for pertinent symptoms related to these conditions.    Review of Systems  CONSTITUTIONAL: NEGATIVE for fever, chills, change in weight  ENT/MOUTH: NEGATIVE for ear, mouth and throat problems  RESP: NEGATIVE for significant cough or SOB  CV: NEGATIVE for chest pain, palpitations or peripheral edema    Patient Active Problem List    Diagnosis Date Noted    Atypical ductal hyperplasia of left breast 09/18/2023     Priority: Medium    Mass of upper inner quadrant of left breast 09/18/2023     Priority: Medium    Chronic kidney disease, stage 2 (mild) 06/15/2023     Priority: Medium    Nonobstructive atherosclerosis of coronary artery 09/20/2019     Priority: Medium    Essential hypertension with goal blood pressure less than 140/90 08/25/2016     Priority: Medium    Type 2 diabetes mellitus with kidney complication, with long-term current use of insulin (H) 10/21/2015     Priority: Medium    Morbid obesity (H) 10/21/2015      Priority: Medium    Hyperlipidemia LDL goal <70 03/26/2011     Priority: Medium    Microalbuminuria 01/06/2011     Priority: Medium    Lumbar radiculopathy 11/30/2010     Priority: Medium    Enthesopathy 11/27/2007     Priority: Medium     Problem list name updated by automated process. Provider to review      Restless legs syndrome (RLS) 04/12/2007     Priority: Medium    Esophageal reflux 02/07/2006     Priority: Medium    Allergic rhinitis 02/07/2006     Priority: Medium     Problem list name updated by automated process. Provider to review      Mild intermittent asthma 11/10/2005     Priority: Medium      Past Medical History:   Diagnosis Date    Arthritis     Asthma     Diabetes mellitus (H)     Esophageal reflux     Hypertension     Obese     Other chronic sinusitis     PONV (postoperative nausea and vomiting)      Past Surgical History:   Procedure Laterality Date    BIOPSY  9/7/2023    COLONOSCOPY  01/31/2002    COLONOSCOPY  10/26/2012    Procedure: COLONOSCOPY;  Colonoscopy;  Surgeon: Margret Sales MD;  Location: [facility]    COLONOSCOPY N/A 11/08/2019    Procedure: COLONOSCOPY, WITH POLYPECTOMY AND BIOPSY;  Surgeon: Ariel Heck MD;  Location: [facility]    CV LEFT HEART CATH N/A 09/12/2019    Procedure: Left Heart Cath;  Surgeon: Mike Sanon MD;  Location: [facility] CARDIAC CATH LAB    CV LEFT VENTRICULOGRAM N/A 09/12/2019    Procedure: Left Ventriculogram;  Surgeon: Mike Sanon MD;  Location:  HEART CARDIAC CATH LAB    ENT SURGERY      ESOPHAGOSCOPY, GASTROSCOPY, DUODENOSCOPY (EGD), COMBINED N/A 11/08/2019    Procedure: ESOPHAGOGASTRODUODENOSCOPY, WITH BIOPSY;  Surgeon: Ariel Heck MD;  Location: [facility]    EYE SURGERY  2022    GYN SURGERY      Hydroablation 2007, polyp removal 2018    INJECT EPIDURAL LUMBAR  12/07/2010    INJECT EPIDURAL LUMBAR performed by GENERIC ANESTHESIA PROVIDER at [facility]    INJECT EPIDURAL LUMBAR  01/17/2011    INJECT EPIDURAL LUMBAR performed by GENERIC  ANESTHESIA PROVIDER at [facility]    left long finger pulley release Left 07/2020    RELEASE CARPAL TUNNEL  06/19/2012    Procedure: RELEASE CARPAL TUNNEL;  Right Carpal Tunnel Release;  Surgeon: Mike Sparrow MD;  Location: [facility]    RELEASE CARPAL TUNNEL  11/09/2012    Procedure: RELEASE CARPAL TUNNEL;  Left Carpal Tunnel Release;  Surgeon: Mike Sparrow MD;  Location: [facility]    REPAIR TENDON ACHILLES Left 12/26/2019    Procedure: Left Foot: Achilles Tendon Repair/Remodel/Reattachment; calcaneal prominence removal;  Surgeon: Felix Watkins DPM;  Location: [facility]    SURGICAL HISTORY OF -   04/10/2000    bilateral total ethmoidectomies, bilateral maxillary antrostomies, bilateral SMR of inferior turbinates, reduction of lt turbinate porter bullosa     Current Outpatient Medications   Medication Sig Dispense Refill    albuterol (PROVENTIL) (2.5 MG/3ML) 0.083% neb solution Take 1 vial (2.5 mg) by nebulization every 6 hours as needed for shortness of breath / dyspnea or wheezing 90 mL 3    albuterol (VENTOLIN HFA) 108 (90 Base) MCG/ACT inhaler Inhale 2 puffs into the lungs every 4 hours as needed for shortness of breath 18 g 1    Ascorbic Acid (VITAMIN C) 500 MG CAPS       aspirin (ASA) 81 MG EC tablet Take 1 tablet (81 mg) by mouth daily 1 tablet 0    blood glucose (NO BRAND SPECIFIED) lancets standard Use to test blood sugar 4 times daily or as directed. 200 lancet 6    blood glucose (NO BRAND SPECIFIED) test strip Use to test blood sugar 4 times daily or as directed. 200 strip 6    blood glucose monitoring (NO BRAND SPECIFIED) meter device kit Use to test blood sugar 4 times daily or as directed. 1 kit 0    Calcium Carb-Cholecalciferol (CALCIUM-VITAMIN D) 600-400 MG-UNIT TABS Take 1 tablet by mouth daily      esomeprazole (NEXIUM) 40 MG DR capsule Take 1 capsule (40 mg) by mouth every morning (before breakfast) 90 capsule 3    ferrous sulfate 140 (45 Fe) MG TBCR CR tablet Take 140 mg by mouth daily       fluticasone-salmeterol (ADVAIR DISKUS) 100-50 MCG/ACT inhaler INHALE 1 PUFF INTO THE LUNGS 2 TIMES DAILY 180 each 3    gabapentin (NEURONTIN) 100 MG capsule Take 1 capsule (100 mg) by mouth 3 times daily 90 capsule 11    hydrochlorothiazide (HYDRODIURIL) 12.5 MG tablet Take 1 tablet (12.5 mg) by mouth daily 90 tablet 1    insulin aspart (NOVOLOG FLEXPEN) 100 UNIT/ML pen 32 units before breakfast, 32 units before lunch, 32 units before dinner 90 mL 3    insulin pen needle (BD PEDRO LUIS U/F) 32G X 4 MM miscellaneous USE 4 DAILY OR AS DIRECTED 400 each 1    losartan (COZAAR) 100 MG tablet Take 1 tablet (100 mg) by mouth daily 90 tablet 1    metFORMIN (GLUCOPHAGE XR) 500 MG 24 hr tablet Take 2 tablets (1,000 mg) by mouth 2 times daily (with meals) 360 tablet 1    metoclopramide (REGLAN) 10 MG tablet Take 1 tablet (10 mg) by mouth 2 times daily 180 tablet 1    metoprolol succinate ER (TOPROL XL) 25 MG 24 hr tablet Take 1 tablet (25 mg) by mouth daily 90 tablet 3    Microlet Lancets MISC USE TO TEST BLOOD SUGAR 4 TIMES DAILY OR AS DIRECTED. 200 each 1    montelukast (SINGULAIR) 10 MG tablet TAKE 1 TABLET BY MOUTH AT BEDTIME 90 tablet 1    Multiple Vitamins-Minerals (MULTIVITAMIN ADULTS 50+) TABS Take 1 tablet by mouth daily      ondansetron (ZOFRAN) 4 MG tablet Take 1 tablet (4 mg) by mouth every 8 hours as needed for nausea 12 tablet 3    pseudoePHEDrine (SUDAFED) 30 MG tablet TAKE 2 TABLETS (60 MG) BY MOUTH 2 TIMES DAILY 120 tablet 3    rosuvastatin (CRESTOR) 10 MG tablet Take 1 tablet (10 mg) by mouth daily 90 tablet 1    Semaglutide, 2 MG/DOSE, (OZEMPIC, 2 MG/DOSE,) 8 MG/3ML pen Inject 2 mg Subcutaneous once a week 9 mL 1    TOUJEO MAX SOLOSTAR 300 UNIT/ML (2 units dial) pen INJECT 100 UNITS UNDER THE SKIN DAILY 40.5 mL 1    triamcinolone (KENALOG) 0.1 % external cream Apply topically 2 times daily 80 g 1       Allergies   Allergen Reactions    Lisinopril Cough    Tape [Adhesive Tape] Rash        Social History     Tobacco  "Use    Smoking status: Never    Smokeless tobacco: Never   Substance Use Topics    Alcohol use: No     Family History   Problem Relation Age of Onset    Hypertension Mother     Diabetes Mother     Respiratory Mother         asthma-COPD    Hypertension Father     Heart Disease Father     Cerebrovascular Disease Father     Cardiovascular Father     Breast Cancer Maternal Grandmother     Cancer Brother     Diabetes Brother     Respiratory Brother         copd    Chronic Obstructive Pulmonary Disease Brother     Depression Sister     Respiratory Sister         copd    Chronic Obstructive Pulmonary Disease Sister     Depression Sister     Chronic Obstructive Pulmonary Disease Sister     Anxiety Disorder Sister     Depression Sister     Chronic Obstructive Pulmonary Disease Brother     Kidney failure Brother     Depression Brother     Asthma Brother     Diabetes Son     Anxiety Disorder Other     Obesity Other     Obesity Other      History   Drug Use No         Objective     /72 (BP Location: Right arm, Patient Position: Chair, Cuff Size: Adult Large)   Pulse 77   Resp 16   Ht 1.6 m (5' 3\")   Wt 112.1 kg (247 lb 3.2 oz)   LMP 01/14/2016 (Approximate)   SpO2 97%   BMI 43.79 kg/m      Physical Exam  GENERAL APPEARANCE: healthy, alert and no distress  HENT: ear canals and TM's normal and nose and mouth without ulcers or lesions  RESP: lungs clear to auscultation - no rales, rhonchi or wheezes  CV: regular rate and rhythm, normal S1 S2, no S3 or S4 and no murmur, click or rub   ABDOMEN: tender to palpation lower abdomen ?silk glove sign - difficult exam with body habitus  NEURO: Normal strength and tone, sensory exam grossly normal, mentation intact and speech normal    Diabetic Foot Screen:  Any complaints of increased pain or numbness ? No  Is there a foot ulcer now or a history of foot ulcer? No  Does the foot have an abnormal shape? No  Are the nails thick, too long or ingrown? No  Are there any redness or " open areas? No         Sensation Testing done at all points on the diagram with monofilament     Right Foot: Sensation Normal at all points  Left Foot: Sensation Normal at all points     Risk Category: 0- No loss of protective sensation  Performed by Kaia Elena MD      Recent Labs   Lab Test 03/28/23  0728 12/21/22  1535 11/07/22  1034 10/27/22  0822 10/05/22  0707   HGB  --  14.0 14.1  --   --    PLT  --  287 303  --   --     140  --    < >  --    POTASSIUM 4.6 3.6  --    < >  --    CR 0.65 0.55  --    < >  --    A1C 7.9*  --   --   --  7.5*    < > = values in this interval not displayed.        Diagnostics:  Labs pending at this time.  Results will be reviewed when available.   No EKG required for low risk surgery (cataract, skin procedure, breast biopsy, etc).    Revised Cardiac Risk Index (RCRI):  The patient has the following serious cardiovascular risks for perioperative complications:   - Diabetes Mellitus (on Insulin) = 1 point     RCRI Interpretation: 1 point: Class II (low risk - 0.9% complication rate)         Signed Electronically by: Kaia Elena MD  Copy of this evaluation report is provided to requesting physician.

## 2023-09-21 NOTE — PROGRESS NOTES
[facility]  [address]  [facility] [address]  Phone: [phone]  Primary Provider: Kaia Graham  Pre-op Performing Provider: KAIA GRAHAM      PREOPERATIVE EVALUATION:  Today's date: 9/21/2023    Melyssa is a 63 year old female who presents for a preoperative evaluation.      9/21/2023     1:41 PM   Additional Questions   Roomed by Bronwyn CUNNINGHAM MA   Accompanied by Self       Surgical Information:  Surgery/Procedure: BIOPSY, LEFT BREAST, WITH NEEDLE LOCALIZATION   Surgery Location: [facility]  Surgeon: Bg Bryant MD  Surgery Date: 10/6/2023  Time of Surgery: 9:30 AM  Where patient plans to recover: At home with family  Fax number for surgical facility: Note does not need to be faxed, will be available electronically in Epic.    Assessment & Plan     The proposed surgical procedure is considered LOW risk.    Preop general physical exam       Atypical ductal hyperplasia of left breast   Scheduled for excisional lumpectomy    Type 2 diabetes mellitus with kidney complication, with long-term current use of insulin (H)  Fair control  Medications refilled   HgbA1c today    Morbid obesity (H)  Contributes to risk, hypertension/hld/diabetes    Essential hypertension with goal blood pressure less than 140/90  Well controlled    Chronic kidney disease, stage 2 (mild)         Risks and Recommendations:  The patient has the following additional risks and recommendations for perioperative complications:   - Morbid obesity (BMI >40)  Diabetes:  - Patient is on insulin therapy; diabetic NPO guidelines provided and discussed.    Antiplatelet or Anticoagulation Medication Instructions:   - aspirin: Discontinue aspirin 7-10 days prior to procedure to reduce bleeding risk. It should be resumed postoperatively.     Additional Medication Instructions:  Patient is to take all scheduled medications on the day of surgery EXCEPT for modifications listed below:   - ACE/ARB: HOLD on  day of surgery (minimum 11 hours for general anesthesia).   - Beta Blockers: Continue taking on the day of surgery.   - Diuretics: HOLD on the day of surgery.   - Statins: Continue taking on the day of surgery.    - Long acting insulin (e.g. glargine, detemir): Take 80% of the usual evening or morning dose before surgery.    - short acting insulin (e.g. regular, lispro, aspart): HOLD on the morning of surgery.    - metformin: HOLD day of surgery.   - GLP-1 Injectable (exenitide, liraglutide, semaglutide, dulaglutide, etc.): HOLD 7 days before surgery     RECOMMENDATION:  APPROVAL GIVEN to proceed with proposed procedure, without further diagnostic evaluation.            Subjective       HPI related to upcoming procedure:  Bria is a 63 year old female (PMH significant for type 2 diabetes, obesity, HLD, hypertension, RLS and stage 2 CKD) with a left breast mass at  10:00 and 6 cm from the nipple.  Her core needle biopsy reveals atypical ductal hyperplasia and fibrocystic changes. She is scheduled for lumpectomy/excisional biopsy.        9/21/2023     1:25 PM   Preop Questions   1. Have you ever had a heart attack or stroke? No   2. Have you ever had surgery on your heart or blood vessels, such as a stent placement, a coronary artery bypass, or surgery on an artery in your head, neck, heart, or legs? No   3. Do you have chest pain with activity? No   4. Do you have a history of  heart failure? No   5. Do you currently have a cold, bronchitis or symptoms of other infection? No   6. Do you have a cough, shortness of breath, or wheezing? No   7. Do you or anyone in your family have previous history of blood clots? No   8. Do you or does anyone in your family have a serious bleeding problem such as prolonged bleeding following surgeries or cuts? No   9. Have you ever had problems with anemia or been told to take iron pills? YES - remote history   10. Have you had any abnormal blood loss such as black, tarry or bloody  stools, or abnormal vaginal bleeding? No   11. Have you ever had a blood transfusion? No   12. Are you willing to have a blood transfusion if it is medically needed before, during, or after your surgery? Yes   13. Have you or any of your relatives ever had problems with anesthesia? No   14. Do you have sleep apnea, excessive snoring or daytime drowsiness? No   15. Do you have any artifical heart valves or other implanted medical devices like a pacemaker, defibrillator, or continuous glucose monitor? No   16. Do you have artificial joints? No   17. Are you allergic to latex? No       Health Care Directive:  Patient does not have a Health Care Directive or Living Will: Discussed advance care planning with patient; however, patient declined at this time.    Preoperative Review of :   reviewed - no record of controlled substances prescribed.      Status of Chronic Conditions:  See problem list for active medical problems.  Problems all longstanding and stable, except as noted/documented.  See ROS for pertinent symptoms related to these conditions.    Review of Systems  CONSTITUTIONAL: NEGATIVE for fever, chills, change in weight  ENT/MOUTH: NEGATIVE for ear, mouth and throat problems  RESP: NEGATIVE for significant cough or SOB  CV: NEGATIVE for chest pain, palpitations or peripheral edema    Patient Active Problem List    Diagnosis Date Noted    Atypical ductal hyperplasia of left breast 09/18/2023     Priority: Medium    Mass of upper inner quadrant of left breast 09/18/2023     Priority: Medium    Chronic kidney disease, stage 2 (mild) 06/15/2023     Priority: Medium    Nonobstructive atherosclerosis of coronary artery 09/20/2019     Priority: Medium    Essential hypertension with goal blood pressure less than 140/90 08/25/2016     Priority: Medium    Type 2 diabetes mellitus with kidney complication, with long-term current use of insulin (H) 10/21/2015     Priority: Medium    Morbid obesity (H) 10/21/2015      Priority: Medium    Hyperlipidemia LDL goal <70 03/26/2011     Priority: Medium    Microalbuminuria 01/06/2011     Priority: Medium    Lumbar radiculopathy 11/30/2010     Priority: Medium    Enthesopathy 11/27/2007     Priority: Medium     Problem list name updated by automated process. Provider to review      Restless legs syndrome (RLS) 04/12/2007     Priority: Medium    Esophageal reflux 02/07/2006     Priority: Medium    Allergic rhinitis 02/07/2006     Priority: Medium     Problem list name updated by automated process. Provider to review      Mild intermittent asthma 11/10/2005     Priority: Medium      Past Medical History:   Diagnosis Date    Arthritis     Asthma     Diabetes mellitus (H)     Esophageal reflux     Hypertension     Obese     Other chronic sinusitis     PONV (postoperative nausea and vomiting)      Past Surgical History:   Procedure Laterality Date    BIOPSY  9/7/2023    COLONOSCOPY  01/31/2002    COLONOSCOPY  10/26/2012    Procedure: COLONOSCOPY;  Colonoscopy;  Surgeon: Margret Sales MD;  Location: [facility]    COLONOSCOPY N/A 11/08/2019    Procedure: COLONOSCOPY, WITH POLYPECTOMY AND BIOPSY;  Surgeon: Ariel Heck MD;  Location: [facility]    CV LEFT HEART CATH N/A 09/12/2019    Procedure: Left Heart Cath;  Surgeon: Mike Sanon MD;  Location: [facility] CARDIAC CATH LAB    CV LEFT VENTRICULOGRAM N/A 09/12/2019    Procedure: Left Ventriculogram;  Surgeon: Mike Sanon MD;  Location:  HEART CARDIAC CATH LAB    ENT SURGERY      ESOPHAGOSCOPY, GASTROSCOPY, DUODENOSCOPY (EGD), COMBINED N/A 11/08/2019    Procedure: ESOPHAGOGASTRODUODENOSCOPY, WITH BIOPSY;  Surgeon: Ariel Heck MD;  Location: [facility]    EYE SURGERY  2022    GYN SURGERY      Hydroablation 2007, polyp removal 2018    INJECT EPIDURAL LUMBAR  12/07/2010    INJECT EPIDURAL LUMBAR performed by GENERIC ANESTHESIA PROVIDER at [facility]    INJECT EPIDURAL LUMBAR  01/17/2011    INJECT EPIDURAL LUMBAR performed by GENERIC  ANESTHESIA PROVIDER at [facility]    left long finger pulley release Left 07/2020    RELEASE CARPAL TUNNEL  06/19/2012    Procedure: RELEASE CARPAL TUNNEL;  Right Carpal Tunnel Release;  Surgeon: Mike Sparrow MD;  Location: [facility]    RELEASE CARPAL TUNNEL  11/09/2012    Procedure: RELEASE CARPAL TUNNEL;  Left Carpal Tunnel Release;  Surgeon: Mike Sparrow MD;  Location: [facility]    REPAIR TENDON ACHILLES Left 12/26/2019    Procedure: Left Foot: Achilles Tendon Repair/Remodel/Reattachment; calcaneal prominence removal;  Surgeon: Felxi Watkins DPM;  Location: [facility]    SURGICAL HISTORY OF -   04/10/2000    bilateral total ethmoidectomies, bilateral maxillary antrostomies, bilateral SMR of inferior turbinates, reduction of lt turbinate porter bullosa     Current Outpatient Medications   Medication Sig Dispense Refill    albuterol (PROVENTIL) (2.5 MG/3ML) 0.083% neb solution Take 1 vial (2.5 mg) by nebulization every 6 hours as needed for shortness of breath / dyspnea or wheezing 90 mL 3    albuterol (VENTOLIN HFA) 108 (90 Base) MCG/ACT inhaler Inhale 2 puffs into the lungs every 4 hours as needed for shortness of breath 18 g 1    Ascorbic Acid (VITAMIN C) 500 MG CAPS       aspirin (ASA) 81 MG EC tablet Take 1 tablet (81 mg) by mouth daily 1 tablet 0    blood glucose (NO BRAND SPECIFIED) lancets standard Use to test blood sugar 4 times daily or as directed. 200 lancet 6    blood glucose (NO BRAND SPECIFIED) test strip Use to test blood sugar 4 times daily or as directed. 200 strip 6    blood glucose monitoring (NO BRAND SPECIFIED) meter device kit Use to test blood sugar 4 times daily or as directed. 1 kit 0    Calcium Carb-Cholecalciferol (CALCIUM-VITAMIN D) 600-400 MG-UNIT TABS Take 1 tablet by mouth daily      esomeprazole (NEXIUM) 40 MG DR capsule Take 1 capsule (40 mg) by mouth every morning (before breakfast) 90 capsule 3    ferrous sulfate 140 (45 Fe) MG TBCR CR tablet Take 140 mg by mouth daily       fluticasone-salmeterol (ADVAIR DISKUS) 100-50 MCG/ACT inhaler INHALE 1 PUFF INTO THE LUNGS 2 TIMES DAILY 180 each 3    gabapentin (NEURONTIN) 100 MG capsule Take 1 capsule (100 mg) by mouth 3 times daily 90 capsule 11    hydrochlorothiazide (HYDRODIURIL) 12.5 MG tablet Take 1 tablet (12.5 mg) by mouth daily 90 tablet 1    insulin aspart (NOVOLOG FLEXPEN) 100 UNIT/ML pen 32 units before breakfast, 32 units before lunch, 32 units before dinner 90 mL 3    insulin pen needle (BD PEDRO LUIS U/F) 32G X 4 MM miscellaneous USE 4 DAILY OR AS DIRECTED 400 each 1    losartan (COZAAR) 100 MG tablet Take 1 tablet (100 mg) by mouth daily 90 tablet 1    metFORMIN (GLUCOPHAGE XR) 500 MG 24 hr tablet Take 2 tablets (1,000 mg) by mouth 2 times daily (with meals) 360 tablet 1    metoclopramide (REGLAN) 10 MG tablet Take 1 tablet (10 mg) by mouth 2 times daily 180 tablet 1    metoprolol succinate ER (TOPROL XL) 25 MG 24 hr tablet Take 1 tablet (25 mg) by mouth daily 90 tablet 3    Microlet Lancets MISC USE TO TEST BLOOD SUGAR 4 TIMES DAILY OR AS DIRECTED. 200 each 1    montelukast (SINGULAIR) 10 MG tablet TAKE 1 TABLET BY MOUTH AT BEDTIME 90 tablet 1    Multiple Vitamins-Minerals (MULTIVITAMIN ADULTS 50+) TABS Take 1 tablet by mouth daily      ondansetron (ZOFRAN) 4 MG tablet Take 1 tablet (4 mg) by mouth every 8 hours as needed for nausea 12 tablet 3    pseudoePHEDrine (SUDAFED) 30 MG tablet TAKE 2 TABLETS (60 MG) BY MOUTH 2 TIMES DAILY 120 tablet 3    rosuvastatin (CRESTOR) 10 MG tablet Take 1 tablet (10 mg) by mouth daily 90 tablet 1    Semaglutide, 2 MG/DOSE, (OZEMPIC, 2 MG/DOSE,) 8 MG/3ML pen Inject 2 mg Subcutaneous once a week 9 mL 1    TOUJEO MAX SOLOSTAR 300 UNIT/ML (2 units dial) pen INJECT 100 UNITS UNDER THE SKIN DAILY 40.5 mL 1    triamcinolone (KENALOG) 0.1 % external cream Apply topically 2 times daily 80 g 1       Allergies   Allergen Reactions    Lisinopril Cough    Tape [Adhesive Tape] Rash        Social History     Tobacco  "Use    Smoking status: Never    Smokeless tobacco: Never   Substance Use Topics    Alcohol use: No     Family History   Problem Relation Age of Onset    Hypertension Mother     Diabetes Mother     Respiratory Mother         asthma-COPD    Hypertension Father     Heart Disease Father     Cerebrovascular Disease Father     Cardiovascular Father     Breast Cancer Maternal Grandmother     Cancer Brother     Diabetes Brother     Respiratory Brother         copd    Chronic Obstructive Pulmonary Disease Brother     Depression Sister     Respiratory Sister         copd    Chronic Obstructive Pulmonary Disease Sister     Depression Sister     Chronic Obstructive Pulmonary Disease Sister     Anxiety Disorder Sister     Depression Sister     Chronic Obstructive Pulmonary Disease Brother     Kidney failure Brother     Depression Brother     Asthma Brother     Diabetes Son     Anxiety Disorder Other     Obesity Other     Obesity Other      History   Drug Use No         Objective     /72 (BP Location: Right arm, Patient Position: Chair, Cuff Size: Adult Large)   Pulse 77   Resp 16   Ht 1.6 m (5' 3\")   Wt 112.1 kg (247 lb 3.2 oz)   LMP 01/14/2016 (Approximate)   SpO2 97%   BMI 43.79 kg/m      Physical Exam  GENERAL APPEARANCE: healthy, alert and no distress  HENT: ear canals and TM's normal and nose and mouth without ulcers or lesions  RESP: lungs clear to auscultation - no rales, rhonchi or wheezes  CV: regular rate and rhythm, normal S1 S2, no S3 or S4 and no murmur, click or rub   ABDOMEN: tender to palpation lower abdomen ?silk glove sign - difficult exam with body habitus  NEURO: Normal strength and tone, sensory exam grossly normal, mentation intact and speech normal    Diabetic Foot Screen:  Any complaints of increased pain or numbness ? No  Is there a foot ulcer now or a history of foot ulcer? No  Does the foot have an abnormal shape? No  Are the nails thick, too long or ingrown? No  Are there any redness or " open areas? No         Sensation Testing done at all points on the diagram with monofilament     Right Foot: Sensation Normal at all points  Left Foot: Sensation Normal at all points     Risk Category: 0- No loss of protective sensation  Performed by Kaia Elena MD      Recent Labs   Lab Test 03/28/23  0728 12/21/22  1535 11/07/22  1034 10/27/22  0822 10/05/22  0707   HGB  --  14.0 14.1  --   --    PLT  --  287 303  --   --     140  --    < >  --    POTASSIUM 4.6 3.6  --    < >  --    CR 0.65 0.55  --    < >  --    A1C 7.9*  --   --   --  7.5*    < > = values in this interval not displayed.        Diagnostics:  Labs pending at this time.  Results will be reviewed when available.   No EKG required for low risk surgery (cataract, skin procedure, breast biopsy, etc).    Revised Cardiac Risk Index (RCRI):  The patient has the following serious cardiovascular risks for perioperative complications:   - Diabetes Mellitus (on Insulin) = 1 point     RCRI Interpretation: 1 point: Class II (low risk - 0.9% complication rate)         Signed Electronically by: Kaia Elena MD  Copy of this evaluation report is provided to requesting physician.

## 2023-09-21 NOTE — PATIENT INSTRUCTIONS
Toujeo:  take 85 units the night before    Hold novolog, hydrochlorothiazide, metformin the morning of surgery    Do not take the Ozempic 7 days before surgery

## 2023-09-22 ENCOUNTER — TELEPHONE (OUTPATIENT)
Dept: FAMILY MEDICINE | Facility: CLINIC | Age: 63
End: 2023-09-22
Payer: COMMERCIAL

## 2023-09-28 ENCOUNTER — HOSPITAL ENCOUNTER (OUTPATIENT)
Dept: CT IMAGING | Facility: CLINIC | Age: 63
Discharge: HOME | End: 2023-09-28
Attending: FAMILY MEDICINE | Admitting: FAMILY MEDICINE
Payer: COMMERCIAL

## 2023-09-28 DIAGNOSIS — R10.9 ABDOMINAL PAIN, UNSPECIFIED ABDOMINAL LOCATION: ICD-10-CM

## 2023-09-28 PROCEDURE — 250N000011 HC RX IP 250 OP 636: Performed by: FAMILY MEDICINE

## 2023-09-28 PROCEDURE — 250N000009 HC RX 250: Performed by: FAMILY MEDICINE

## 2023-09-28 PROCEDURE — 74177 CT ABD & PELVIS W/CONTRAST: CPT

## 2023-09-28 RX ORDER — IOPAMIDOL 755 MG/ML
100 INJECTION, SOLUTION INTRAVASCULAR ONCE
Status: COMPLETED | OUTPATIENT
Start: 2023-09-28 | End: 2023-09-28

## 2023-09-28 RX ADMIN — SODIUM CHLORIDE 70 ML: 9 INJECTION, SOLUTION INTRAVENOUS at 17:37

## 2023-09-28 RX ADMIN — IOPAMIDOL 100 ML: 755 INJECTION, SOLUTION INTRAVENOUS at 17:37

## 2023-10-01 ENCOUNTER — HOSPITAL ENCOUNTER (OUTPATIENT)
Dept: ULTRASOUND IMAGING | Facility: CLINIC | Age: 63
Discharge: HOME | End: 2023-10-01
Attending: FAMILY MEDICINE | Admitting: FAMILY MEDICINE
Payer: COMMERCIAL

## 2023-10-01 DIAGNOSIS — R93.5 ABNORMAL CT SCAN, PELVIS: ICD-10-CM

## 2023-10-01 PROCEDURE — 76856 US EXAM PELVIC COMPLETE: CPT

## 2023-10-02 ENCOUNTER — ANESTHESIA EVENT (OUTPATIENT)
Dept: SURGERY | Facility: CLINIC | Age: 63
End: 2023-10-02
Payer: COMMERCIAL

## 2023-10-02 ENCOUNTER — OFFICE VISIT (OUTPATIENT)
Dept: SURGERY | Facility: CLINIC | Age: 63
End: 2023-10-02
Attending: FAMILY MEDICINE
Payer: COMMERCIAL

## 2023-10-02 VITALS
DIASTOLIC BLOOD PRESSURE: 79 MMHG | HEART RATE: 79 BPM | BODY MASS INDEX: 43.58 KG/M2 | OXYGEN SATURATION: 97 % | SYSTOLIC BLOOD PRESSURE: 145 MMHG | RESPIRATION RATE: 18 BRPM | WEIGHT: 246 LBS

## 2023-10-02 DIAGNOSIS — E11.22 TYPE 2 DIABETES MELLITUS WITH CHRONIC KIDNEY DISEASE, WITH LONG-TERM CURRENT USE OF INSULIN, UNSPECIFIED CKD STAGE (H): ICD-10-CM

## 2023-10-02 DIAGNOSIS — Z79.4 TYPE 2 DIABETES MELLITUS WITH CHRONIC KIDNEY DISEASE, WITH LONG-TERM CURRENT USE OF INSULIN, UNSPECIFIED CKD STAGE (H): ICD-10-CM

## 2023-10-02 DIAGNOSIS — N60.92 ATYPICAL DUCTAL HYPERPLASIA OF LEFT BREAST: ICD-10-CM

## 2023-10-02 DIAGNOSIS — K42.9 UMBILICAL HERNIA WITHOUT OBSTRUCTION AND WITHOUT GANGRENE: Primary | ICD-10-CM

## 2023-10-02 DIAGNOSIS — K21.9 GASTROESOPHAGEAL REFLUX DISEASE, UNSPECIFIED WHETHER ESOPHAGITIS PRESENT: ICD-10-CM

## 2023-10-02 DIAGNOSIS — E66.01 MORBID OBESITY (H): ICD-10-CM

## 2023-10-02 PROCEDURE — 99213 OFFICE O/P EST LOW 20 MIN: CPT | Performed by: STUDENT IN AN ORGANIZED HEALTH CARE EDUCATION/TRAINING PROGRAM

## 2023-10-02 NOTE — PROGRESS NOTES
Surgical Consultation/History and Physical  [facility] General Surgery    Bria is seen in consultation for umbilical hernia, at the request of Kaia Elena MD.    Chief Complaint:  Umbilical hernia    History of Present Illness: Bria Davis is a 63 year old female well-known to me who presents with an umbilical hernia.  She was seen by me on 9/18/2023 for evaluation of a left breast mass for which we are planning an excisional biopsy at the end of this week.  As part of her preoperative evaluation by her PCP, she was noted to have an umbilical hernia and she now presents to discuss management options.  She denies any associated pain or discomfort.  No overlying skin changes.  No new nausea, emesis, bloating, or obstipation.  She states that she is never really noticed the hernia and has never felt bothered by it.    Patient Active Problem List   Diagnosis    Mild intermittent asthma    Esophageal reflux    Allergic rhinitis    Restless legs syndrome (RLS)    Enthesopathy    Lumbar radiculopathy    Microalbuminuria    Hyperlipidemia LDL goal <70    Type 2 diabetes mellitus with kidney complication, with long-term current use of insulin (H)    Morbid obesity (H)    Essential hypertension with goal blood pressure less than 140/90    Nonobstructive atherosclerosis of coronary artery    Chronic kidney disease, stage 2 (mild)    Atypical ductal hyperplasia of left breast    Mass of upper inner quadrant of left breast       Past Medical History:   Diagnosis Date    Arthritis     Asthma     Diabetes mellitus (H)     Esophageal reflux     Hypertension     Obese     Other chronic sinusitis     PONV (postoperative nausea and vomiting)        Past Surgical History:   Procedure Laterality Date    BIOPSY  9/7/2023    COLONOSCOPY  01/31/2002    COLONOSCOPY  10/26/2012    Procedure: COLONOSCOPY;  Colonoscopy;  Surgeon: Margret Sales MD;  Location: [facility]    COLONOSCOPY N/A 11/08/2019    Procedure:  COLONOSCOPY, WITH POLYPECTOMY AND BIOPSY;  Surgeon: Ariel Heck MD;  Location: [facility]    CV LEFT HEART CATH N/A 09/12/2019    Procedure: Left Heart Cath;  Surgeon: Mike Sanon MD;  Location:  HEART CARDIAC CATH LAB    CV LEFT VENTRICULOGRAM N/A 09/12/2019    Procedure: Left Ventriculogram;  Surgeon: Mike Sanon MD;  Location:  HEART CARDIAC CATH LAB    ENT SURGERY      ESOPHAGOSCOPY, GASTROSCOPY, DUODENOSCOPY (EGD), COMBINED N/A 11/08/2019    Procedure: ESOPHAGOGASTRODUODENOSCOPY, WITH BIOPSY;  Surgeon: Ariel Heck MD;  Location: [facility]    EYE SURGERY  2022    GYN SURGERY      Hydroablation 2007, polyp removal 2018    INJECT EPIDURAL LUMBAR  12/07/2010    INJECT EPIDURAL LUMBAR performed by GENERIC ANESTHESIA PROVIDER at [facility]    INJECT EPIDURAL LUMBAR  01/17/2011    INJECT EPIDURAL LUMBAR performed by GENERIC ANESTHESIA PROVIDER at [facility]    [facility] long finger pulley release Left 07/2020    RELEASE CARPAL TUNNEL  06/19/2012    Procedure: RELEASE CARPAL TUNNEL;  Right Carpal Tunnel Release;  Surgeon: Mike Sparrow MD;  Location: [facility]    RELEASE CARPAL TUNNEL  11/09/2012    Procedure: RELEASE CARPAL TUNNEL;  Left Carpal Tunnel Release;  Surgeon: Mike Sparrow MD;  Location: [facility]    REPAIR TENDON ACHILLES Left 12/26/2019    Procedure: Left Foot: Achilles Tendon Repair/Remodel/Reattachment; calcaneal prominence removal;  Surgeon: Felix Watkins DPM;  Location: [facility]    SURGICAL HISTORY OF -   04/10/2000    bilateral total ethmoidectomies, bilateral maxillary antrostomies, bilateral SMR of inferior turbinates, reduction of lt turbinate porter bullosa       Family History   Problem Relation Age of Onset    Hypertension Mother     Diabetes Mother     Respiratory Mother         asthma-COPD    Hypertension Father     Heart Disease Father     Cerebrovascular Disease Father     Cardiovascular Father     Breast Cancer Maternal Grandmother     Cancer Brother     Diabetes Brother      Respiratory Brother         copd    Chronic Obstructive Pulmonary Disease Brother     Depression Sister     Respiratory Sister         copd    Chronic Obstructive Pulmonary Disease Sister     Depression Sister     Chronic Obstructive Pulmonary Disease Sister     Anxiety Disorder Sister     Depression Sister     Chronic Obstructive Pulmonary Disease Brother     Kidney failure Brother     Depression Brother     Asthma Brother     Diabetes Son     Anxiety Disorder Other     Obesity Other     Obesity Other        Social History     Tobacco Use    Smoking status: Never    Smokeless tobacco: Never   Substance Use Topics    Alcohol use: No        History   Drug Use No       Current Outpatient Medications   Medication Sig Dispense Refill    albuterol (PROVENTIL) (2.5 MG/3ML) 0.083% neb solution Take 1 vial (2.5 mg) by nebulization every 6 hours as needed for shortness of breath / dyspnea or wheezing 90 mL 3    albuterol (VENTOLIN HFA) 108 (90 Base) MCG/ACT inhaler Inhale 2 puffs into the lungs every 4 hours as needed for shortness of breath 18 g 1    Ascorbic Acid (VITAMIN C) 500 MG CAPS       aspirin (ASA) 81 MG EC tablet Take 1 tablet (81 mg) by mouth daily 1 tablet 0    blood glucose (NO BRAND SPECIFIED) lancets standard Use to test blood sugar 4 times daily or as directed. 200 lancet 6    blood glucose (NO BRAND SPECIFIED) test strip Use to test blood sugar 4 times daily or as directed. 200 strip 6    blood glucose monitoring (NO BRAND SPECIFIED) meter device kit Use to test blood sugar 4 times daily or as directed. 1 kit 0    Calcium Carb-Cholecalciferol (CALCIUM-VITAMIN D) 600-400 MG-UNIT TABS Take 1 tablet by mouth daily      esomeprazole (NEXIUM) 40 MG DR capsule Take 1 capsule (40 mg) by mouth every morning (before breakfast) 90 capsule 3    ferrous sulfate 140 (45 Fe) MG TBCR CR tablet Take 140 mg by mouth daily      fluticasone-salmeterol (ADVAIR DISKUS) 100-50 MCG/ACT inhaler INHALE 1 PUFF INTO THE LUNGS 2 TIMES  DAILY 180 each 3    gabapentin (NEURONTIN) 100 MG capsule Take 1 capsule (100 mg) by mouth 3 times daily 90 capsule 11    hydrochlorothiazide (HYDRODIURIL) 12.5 MG tablet Take 1 tablet (12.5 mg) by mouth daily 90 tablet 1    insulin aspart (NOVOLOG FLEXPEN) 100 UNIT/ML pen 32 units before breakfast, 32 units before lunch, 32 units before dinner 90 mL 3    insulin glargine U-300 (TOUJEO MAX SOLOSTAR) 300 UNIT/ML (2 units dial) pen INJECT 100 UNITS UNDER THE SKIN DAILY 40.5 mL 1    insulin pen needle (BD PEDRO LUIS U/F) 32G X 4 MM miscellaneous USE 4 DAILY OR AS DIRECTED 400 each 1    losartan (COZAAR) 100 MG tablet Take 1 tablet (100 mg) by mouth daily 90 tablet 1    metFORMIN (GLUCOPHAGE XR) 500 MG 24 hr tablet Take 2 tablets (1,000 mg) by mouth 2 times daily (with meals) 360 tablet 1    metoclopramide (REGLAN) 10 MG tablet Take 1 tablet (10 mg) by mouth 2 times daily 180 tablet 1    metoprolol succinate ER (TOPROL XL) 25 MG 24 hr tablet Take 1 tablet (25 mg) by mouth daily 90 tablet 3    Microlet Lancets MISC USE TO TEST BLOOD SUGAR 4 TIMES DAILY OR AS DIRECTED. 200 each 1    montelukast (SINGULAIR) 10 MG tablet TAKE 1 TABLET BY MOUTH AT BEDTIME 90 tablet 1    Multiple Vitamins-Minerals (MULTIVITAMIN ADULTS 50+) TABS Take 1 tablet by mouth daily      ondansetron (ZOFRAN) 4 MG tablet Take 1 tablet (4 mg) by mouth every 8 hours as needed for nausea 12 tablet 3    pseudoePHEDrine (SUDAFED) 30 MG tablet TAKE 2 TABLETS (60 MG) BY MOUTH 2 TIMES DAILY 120 tablet 3    rosuvastatin (CRESTOR) 10 MG tablet Take 1 tablet (10 mg) by mouth daily 90 tablet 1    Semaglutide, 2 MG/DOSE, (OZEMPIC, 2 MG/DOSE,) 8 MG/3ML pen Inject 2 mg Subcutaneous once a week 9 mL 1    triamcinolone (KENALOG) 0.1 % external cream Apply topically 2 times daily 80 g 1       Allergies   Allergen Reactions    Lisinopril Cough    Tape [Adhesive Tape] Rash       Review of Systems:   10 point ROS negative other than what was listed in HPI    Physical Exam:  BP  (!) 145/79   Pulse 79   Resp 18   Wt 111.6 kg (246 lb)   LMP 01/14/2016 (Approximate)   SpO2 97%   BMI 43.58 kg/m      Constitutional- No acute distress, well nourished, non-toxic  Eyes: Anicteric, no injection.  PERRL  ENT:  Normocephalic, atraumatic,   Neck - supple, no LAD  Respiratory-nonlabored breathing on room air  Cardiovascular -extremities warm and well-perfused   Abdomen - Soft, non-tender, +BS, no hepatosplenomegaly, no palpable masses.  Palpable umbilical hernia, reducible.  No overlying skin changes.  Defect measures roughly 3 cm.  Neuro - No focal neuro deficits, Alert and oriented x 3  Psych: Appropriate mood and affect  Musculoskeletal: Normal gait, symmetric strength.  FROM upper and lower extremities.  Skin: Warm, Dry    Imaging:  CT Abdomen/pelvis 9/28/23                                                           IMPRESSION:   1.  Moderate to large sized fat-containing umbilical hernia. No bowel containing hernia.  2.  Abnormal hypoattenuating appearance of the uterine body and fundus, not well characterized by CT but could represent a thickened endometrium. This finding appears new as compared to the prior CT from 06/07/2018. Recommend pelvic ultrasound for   further characterization.  3.  Hepatic steatosis.      Assessment:  1. Bria Davis presents with an asymptomatic umbilical hernia identified as part of her pre-operative H&P in preparation for surgery this week (left breast excisional biopsy).  CT abdomen pelvis was obtained which showed the hernia to contain preperitoneal fat with no evidence of bowel within the hernia sac.  The hernia is reducible on exam.  We discussed at length the natural history of hernias.  I explained that these do not spontaneously improve, postsurgical repair is not automatically required if the hernia is asymptomatic.  Given her lack of symptoms and her upcoming procedure, I recommended watchful waiting and plan to reevaluate in clinic if she develops  symptoms after her left breast excisional biopsy.  Patient expressed understanding and was amenable to the proposed plan    Plan:   1. Proceed as indicated with left breast excisional biopsy  2. Will continue to monitor for any symptoms regarding her umbilical hernia. Will manage non-operatively at this time as patient is asymptomatic        Bg Bryant MD  10/2/2023 at 3:04 PM

## 2023-10-02 NOTE — NURSING NOTE
No chief complaint on file.      Vitals:    10/02/23 1502   Pulse: 79   Resp: 18   SpO2: 97%   Weight: 111.6 kg (246 lb)     Wt Readings from Last 1 Encounters:   10/02/23 111.6 kg (246 lb)     Lin Meyers MA

## 2023-10-02 NOTE — LETTER
10/2/2023         RE: Bria Davis  [id number] [address] [facility] [id number]        Dear Colleague,    Thank you for referring your patient, Bria Davis, to the [facility]. Please see a copy of my visit note below.    Surgical Consultation/History and Physical  [facility]    Bria is seen in consultation for umbilical hernia, at the request of Kaia Elena MD.    Chief Complaint:  Umbilical hernia    History of Present Illness: Bria Davis is a 63 year old female well-known to me who presents with an umbilical hernia.  She was seen by me on 9/18/2023 for evaluation of a left breast mass for which we are planning an excisional biopsy at the end of this week.  As part of her preoperative evaluation by her PCP, she was noted to have an umbilical hernia and she now presents to discuss management options.  She denies any associated pain or discomfort.  No overlying skin changes.  No new nausea, emesis, bloating, or obstipation.  She states that she is never really noticed the hernia and has never felt bothered by it.    Patient Active Problem List   Diagnosis     Mild intermittent asthma     Esophageal reflux     Allergic rhinitis     Restless legs syndrome (RLS)     Enthesopathy     Lumbar radiculopathy     Microalbuminuria     Hyperlipidemia LDL goal <70     Type 2 diabetes mellitus with kidney complication, with long-term current use of insulin (H)     Morbid obesity (H)     Essential hypertension with goal blood pressure less than 140/90     Nonobstructive atherosclerosis of coronary artery     Chronic kidney disease, stage 2 (mild)     Atypical ductal hyperplasia of left breast     Mass of upper inner quadrant of left breast       Past Medical History:   Diagnosis Date     Arthritis      Asthma      Diabetes mellitus (H)      Esophageal reflux      Hypertension      Obese      Other chronic sinusitis      PONV (postoperative nausea and vomiting)         Past Surgical History:   Procedure Laterality Date     BIOPSY  9/7/2023     COLONOSCOPY  01/31/2002     COLONOSCOPY  10/26/2012    Procedure: COLONOSCOPY;  Colonoscopy;  Surgeon: Margret Sales MD;  Location: [facility]     COLONOSCOPY N/A 11/08/2019    Procedure: COLONOSCOPY, WITH POLYPECTOMY AND BIOPSY;  Surgeon: Ariel Heck MD;  Location: [facility]     CV LEFT HEART CATH N/A 09/12/2019    Procedure: Left Heart Cath;  Surgeon: Mike Sanon MD;  Location:  HEART CARDIAC CATH LAB     CV LEFT VENTRICULOGRAM N/A 09/12/2019    Procedure: Left Ventriculogram;  Surgeon: Mike Sanon MD;  Location:  HEART CARDIAC CATH LAB     ENT SURGERY       ESOPHAGOSCOPY, GASTROSCOPY, DUODENOSCOPY (EGD), COMBINED N/A 11/08/2019    Procedure: ESOPHAGOGASTRODUODENOSCOPY, WITH BIOPSY;  Surgeon: Ariel Heck MD;  Location: [facility]     EYE SURGERY  2022     GYN SURGERY      Hydroablation 2007, polyp removal 2018     INJECT EPIDURAL LUMBAR  12/07/2010    INJECT EPIDURAL LUMBAR performed by GENERIC ANESTHESIA PROVIDER at [facility]     INJECT EPIDURAL LUMBAR  01/17/2011    INJECT EPIDURAL LUMBAR performed by GENERIC ANESTHESIA PROVIDER at [facility]     [facility] long finger pulley release Left 07/2020     RELEASE CARPAL TUNNEL  06/19/2012    Procedure: RELEASE CARPAL TUNNEL;  Right Carpal Tunnel Release;  Surgeon: Mike Sparrow MD;  Location: [facility]     RELEASE CARPAL TUNNEL  11/09/2012    Procedure: RELEASE CARPAL TUNNEL;  Left Carpal Tunnel Release;  Surgeon: Mike Sparrow MD;  Location: [facility]     REPAIR TENDON ACHILLES Left 12/26/2019    Procedure: Left Foot: Achilles Tendon Repair/Remodel/Reattachment; calcaneal prominence removal;  Surgeon: Felix Watknis DPM;  Location: [facility]     SURGICAL HISTORY OF -   04/10/2000    bilateral total ethmoidectomies, bilateral maxillary antrostomies, bilateral SMR of inferior turbinates, reduction of lt turbinate porter bullosa       Family History   Problem Relation  Age of Onset     Hypertension Mother      Diabetes Mother      Respiratory Mother         asthma-COPD     Hypertension Father      Heart Disease Father      Cerebrovascular Disease Father      Cardiovascular Father      Breast Cancer Maternal Grandmother      Cancer Brother      Diabetes Brother      Respiratory Brother         copd     Chronic Obstructive Pulmonary Disease Brother      Depression Sister      Respiratory Sister         copd     Chronic Obstructive Pulmonary Disease Sister      Depression Sister      Chronic Obstructive Pulmonary Disease Sister      Anxiety Disorder Sister      Depression Sister      Chronic Obstructive Pulmonary Disease Brother      Kidney failure Brother      Depression Brother      Asthma Brother      Diabetes Son      Anxiety Disorder Other      Obesity Other      Obesity Other        Social History     Tobacco Use     Smoking status: Never     Smokeless tobacco: Never   Substance Use Topics     Alcohol use: No        History   Drug Use No       Current Outpatient Medications   Medication Sig Dispense Refill     albuterol (PROVENTIL) (2.5 MG/3ML) 0.083% neb solution Take 1 vial (2.5 mg) by nebulization every 6 hours as needed for shortness of breath / dyspnea or wheezing 90 mL 3     albuterol (VENTOLIN HFA) 108 (90 Base) MCG/ACT inhaler Inhale 2 puffs into the lungs every 4 hours as needed for shortness of breath 18 g 1     Ascorbic Acid (VITAMIN C) 500 MG CAPS        aspirin (ASA) 81 MG EC tablet Take 1 tablet (81 mg) by mouth daily 1 tablet 0     blood glucose (NO BRAND SPECIFIED) lancets standard Use to test blood sugar 4 times daily or as directed. 200 lancet 6     blood glucose (NO BRAND SPECIFIED) test strip Use to test blood sugar 4 times daily or as directed. 200 strip 6     blood glucose monitoring (NO BRAND SPECIFIED) meter device kit Use to test blood sugar 4 times daily or as directed. 1 kit 0     Calcium Carb-Cholecalciferol (CALCIUM-VITAMIN D) 600-400 MG-UNIT TABS  Take 1 tablet by mouth daily       esomeprazole (NEXIUM) 40 MG DR capsule Take 1 capsule (40 mg) by mouth every morning (before breakfast) 90 capsule 3     ferrous sulfate 140 (45 Fe) MG TBCR CR tablet Take 140 mg by mouth daily       fluticasone-salmeterol (ADVAIR DISKUS) 100-50 MCG/ACT inhaler INHALE 1 PUFF INTO THE LUNGS 2 TIMES DAILY 180 each 3     gabapentin (NEURONTIN) 100 MG capsule Take 1 capsule (100 mg) by mouth 3 times daily 90 capsule 11     hydrochlorothiazide (HYDRODIURIL) 12.5 MG tablet Take 1 tablet (12.5 mg) by mouth daily 90 tablet 1     insulin aspart (NOVOLOG FLEXPEN) 100 UNIT/ML pen 32 units before breakfast, 32 units before lunch, 32 units before dinner 90 mL 3     insulin glargine U-300 (TOUJEO MAX SOLOSTAR) 300 UNIT/ML (2 units dial) pen INJECT 100 UNITS UNDER THE SKIN DAILY 40.5 mL 1     insulin pen needle (BD PEDRO LUIS U/F) 32G X 4 MM miscellaneous USE 4 DAILY OR AS DIRECTED 400 each 1     losartan (COZAAR) 100 MG tablet Take 1 tablet (100 mg) by mouth daily 90 tablet 1     metFORMIN (GLUCOPHAGE XR) 500 MG 24 hr tablet Take 2 tablets (1,000 mg) by mouth 2 times daily (with meals) 360 tablet 1     metoclopramide (REGLAN) 10 MG tablet Take 1 tablet (10 mg) by mouth 2 times daily 180 tablet 1     metoprolol succinate ER (TOPROL XL) 25 MG 24 hr tablet Take 1 tablet (25 mg) by mouth daily 90 tablet 3     Microlet Lancets MISC USE TO TEST BLOOD SUGAR 4 TIMES DAILY OR AS DIRECTED. 200 each 1     montelukast (SINGULAIR) 10 MG tablet TAKE 1 TABLET BY MOUTH AT BEDTIME 90 tablet 1     Multiple Vitamins-Minerals (MULTIVITAMIN ADULTS 50+) TABS Take 1 tablet by mouth daily       ondansetron (ZOFRAN) 4 MG tablet Take 1 tablet (4 mg) by mouth every 8 hours as needed for nausea 12 tablet 3     pseudoePHEDrine (SUDAFED) 30 MG tablet TAKE 2 TABLETS (60 MG) BY MOUTH 2 TIMES DAILY 120 tablet 3     rosuvastatin (CRESTOR) 10 MG tablet Take 1 tablet (10 mg) by mouth daily 90 tablet 1     Semaglutide, 2 MG/DOSE,  (OZEMPIC, 2 MG/DOSE,) 8 MG/3ML pen Inject 2 mg Subcutaneous once a week 9 mL 1     triamcinolone (KENALOG) 0.1 % external cream Apply topically 2 times daily 80 g 1       Allergies   Allergen Reactions     Lisinopril Cough     Tape [Adhesive Tape] Rash       Review of Systems:   10 point ROS negative other than what was listed in HPI    Physical Exam:  BP (!) 145/79   Pulse 79   Resp 18   Wt 111.6 kg (246 lb)   LMP 01/14/2016 (Approximate)   SpO2 97%   BMI 43.58 kg/m      Constitutional- No acute distress, well nourished, non-toxic  Eyes: Anicteric, no injection.  PERRL  ENT:  Normocephalic, atraumatic,   Neck - supple, no LAD  Respiratory-nonlabored breathing on room air  Cardiovascular -extremities warm and well-perfused   Abdomen - Soft, non-tender, +BS, no hepatosplenomegaly, no palpable masses.  Palpable umbilical hernia, reducible.  No overlying skin changes.  Defect measures roughly 3 cm.  Neuro - No focal neuro deficits, Alert and oriented x 3  Psych: Appropriate mood and affect  Musculoskeletal: Normal gait, symmetric strength.  FROM upper and lower extremities.  Skin: Warm, Dry    Imaging:  CT Abdomen/pelvis 9/28/23                                                           IMPRESSION:   1.  Moderate to large sized fat-containing umbilical hernia. No bowel containing hernia.  2.  Abnormal hypoattenuating appearance of the uterine body and fundus, not well characterized by CT but could represent a thickened endometrium. This finding appears new as compared to the prior CT from 06/07/2018. Recommend pelvic ultrasound for   further characterization.  3.  Hepatic steatosis.      Assessment:  1. Bria Davis presents with an asymptomatic umbilical hernia identified as part of her pre-operative H&P in preparation for surgery this week (left breast excisional biopsy).  CT abdomen pelvis was obtained which showed the hernia to contain preperitoneal fat with no evidence of bowel within the hernia sac.  The  hernia is reducible on exam.  We discussed at length the natural history of hernias.  I explained that these do not spontaneously improve, postsurgical repair is not automatically required if the hernia is asymptomatic.  Given her lack of symptoms and her upcoming procedure, I recommended watchful waiting and plan to reevaluate in clinic if she develops symptoms after her left breast excisional biopsy.  Patient expressed understanding and was amenable to the proposed plan    Plan:   1. Proceed as indicated with left breast excisional biopsy  2. Will continue to monitor for any symptoms regarding her umbilical hernia. Will manage non-operatively at this time as patient is asymptomatic        Bg Bryant MD  10/2/2023 at 3:04 PM      Again, thank you for allowing me to participate in the care of your patient.        Sincerely,        Bg Bryant MD

## 2023-10-06 ENCOUNTER — ANESTHESIA (OUTPATIENT)
Dept: SURGERY | Facility: CLINIC | Age: 63
End: 2023-10-06
Payer: COMMERCIAL

## 2023-10-06 ENCOUNTER — HOSPITAL ENCOUNTER (OUTPATIENT)
Facility: CLINIC | Age: 63
Discharge: HOME | End: 2023-10-06
Attending: STUDENT IN AN ORGANIZED HEALTH CARE EDUCATION/TRAINING PROGRAM | Admitting: STUDENT IN AN ORGANIZED HEALTH CARE EDUCATION/TRAINING PROGRAM
Payer: COMMERCIAL

## 2023-10-06 ENCOUNTER — HOSPITAL ENCOUNTER (OUTPATIENT)
Dept: ULTRASOUND IMAGING | Facility: CLINIC | Age: 63
Discharge: HOME | End: 2023-10-06
Attending: STUDENT IN AN ORGANIZED HEALTH CARE EDUCATION/TRAINING PROGRAM
Payer: COMMERCIAL

## 2023-10-06 ENCOUNTER — HOSPITAL ENCOUNTER (OUTPATIENT)
Dept: MAMMOGRAPHY | Facility: CLINIC | Age: 63
Discharge: HOME | End: 2023-10-06
Attending: STUDENT IN AN ORGANIZED HEALTH CARE EDUCATION/TRAINING PROGRAM
Payer: COMMERCIAL

## 2023-10-06 VITALS
WEIGHT: 246 LBS | RESPIRATION RATE: 15 BRPM | OXYGEN SATURATION: 94 % | TEMPERATURE: 97.8 F | HEART RATE: 75 BPM | HEIGHT: 63 IN | DIASTOLIC BLOOD PRESSURE: 69 MMHG | BODY MASS INDEX: 43.59 KG/M2 | SYSTOLIC BLOOD PRESSURE: 124 MMHG

## 2023-10-06 DIAGNOSIS — N63.22 MASS OF UPPER INNER QUADRANT OF LEFT BREAST: Primary | ICD-10-CM

## 2023-10-06 DIAGNOSIS — N60.92 ATYPICAL DUCTAL HYPERPLASIA OF LEFT BREAST: ICD-10-CM

## 2023-10-06 LAB
GLUCOSE BLDC GLUCOMTR-MCNC: 166 MG/DL (ref 70–99)
GLUCOSE BLDC GLUCOMTR-MCNC: 169 MG/DL (ref 70–99)

## 2023-10-06 PROCEDURE — 360N000075 HC SURGERY LEVEL 2, PER MIN: Performed by: STUDENT IN AN ORGANIZED HEALTH CARE EDUCATION/TRAINING PROGRAM

## 2023-10-06 PROCEDURE — 250N000011 HC RX IP 250 OP 636: Performed by: STUDENT IN AN ORGANIZED HEALTH CARE EDUCATION/TRAINING PROGRAM

## 2023-10-06 PROCEDURE — 999N000065 MA POST PROCEDURE LEFT

## 2023-10-06 PROCEDURE — 250N000013 HC RX MED GY IP 250 OP 250 PS 637: Performed by: NURSE ANESTHETIST, CERTIFIED REGISTERED

## 2023-10-06 PROCEDURE — C1819 TISSUE LOCALIZATION-EXCISION: HCPCS

## 2023-10-06 PROCEDURE — 258N000003 HC RX IP 258 OP 636: Performed by: NURSE ANESTHETIST, CERTIFIED REGISTERED

## 2023-10-06 PROCEDURE — 250N000011 HC RX IP 250 OP 636: Mod: JZ | Performed by: NURSE ANESTHETIST, CERTIFIED REGISTERED

## 2023-10-06 PROCEDURE — 710N000009 HC RECOVERY PHASE 1, LEVEL 1, PER MIN: Performed by: STUDENT IN AN ORGANIZED HEALTH CARE EDUCATION/TRAINING PROGRAM

## 2023-10-06 PROCEDURE — 76998 US GUIDE INTRAOP: CPT | Mod: 26 | Performed by: STUDENT IN AN ORGANIZED HEALTH CARE EDUCATION/TRAINING PROGRAM

## 2023-10-06 PROCEDURE — 88360 TUMOR IMMUNOHISTOCHEM/MANUAL: CPT | Mod: 26 | Performed by: PATHOLOGY

## 2023-10-06 PROCEDURE — 250N000025 HC SEVOFLURANE, PER MIN: Performed by: STUDENT IN AN ORGANIZED HEALTH CARE EDUCATION/TRAINING PROGRAM

## 2023-10-06 PROCEDURE — 88341 IMHCHEM/IMCYTCHM EA ADD ANTB: CPT | Mod: 26 | Performed by: PATHOLOGY

## 2023-10-06 PROCEDURE — 250N000009 HC RX 250: Performed by: NURSE ANESTHETIST, CERTIFIED REGISTERED

## 2023-10-06 PROCEDURE — 999N000141 HC STATISTIC PRE-PROCEDURE NURSING ASSESSMENT: Performed by: STUDENT IN AN ORGANIZED HEALTH CARE EDUCATION/TRAINING PROGRAM

## 2023-10-06 PROCEDURE — 82962 GLUCOSE BLOOD TEST: CPT

## 2023-10-06 PROCEDURE — 370N000017 HC ANESTHESIA TECHNICAL FEE, PER MIN: Performed by: STUDENT IN AN ORGANIZED HEALTH CARE EDUCATION/TRAINING PROGRAM

## 2023-10-06 PROCEDURE — 272N000001 HC OR GENERAL SUPPLY STERILE: Performed by: STUDENT IN AN ORGANIZED HEALTH CARE EDUCATION/TRAINING PROGRAM

## 2023-10-06 PROCEDURE — 88307 TISSUE EXAM BY PATHOLOGIST: CPT | Mod: 26 | Performed by: PATHOLOGY

## 2023-10-06 PROCEDURE — 88307 TISSUE EXAM BY PATHOLOGIST: CPT | Mod: TC | Performed by: STUDENT IN AN ORGANIZED HEALTH CARE EDUCATION/TRAINING PROGRAM

## 2023-10-06 PROCEDURE — 710N000012 HC RECOVERY PHASE 2, PER MINUTE: Performed by: STUDENT IN AN ORGANIZED HEALTH CARE EDUCATION/TRAINING PROGRAM

## 2023-10-06 PROCEDURE — 76098 X-RAY EXAM SURGICAL SPECIMEN: CPT

## 2023-10-06 PROCEDURE — 250N000009 HC RX 250: Performed by: RADIOLOGY

## 2023-10-06 PROCEDURE — 250N000009 HC RX 250: Performed by: STUDENT IN AN ORGANIZED HEALTH CARE EDUCATION/TRAINING PROGRAM

## 2023-10-06 PROCEDURE — 88342 IMHCHEM/IMCYTCHM 1ST ANTB: CPT | Mod: 26 | Performed by: PATHOLOGY

## 2023-10-06 PROCEDURE — 19125 EXCISION BREAST LESION: CPT | Mod: LT | Performed by: STUDENT IN AN ORGANIZED HEALTH CARE EDUCATION/TRAINING PROGRAM

## 2023-10-06 RX ORDER — ONDANSETRON 4 MG/1
4 TABLET, ORALLY DISINTEGRATING ORAL EVERY 30 MIN PRN
Status: DISCONTINUED | OUTPATIENT
Start: 2023-10-06 | End: 2023-10-06 | Stop reason: HOSPADM

## 2023-10-06 RX ORDER — EPHEDRINE SULFATE 50 MG/ML
INJECTION, SOLUTION INTRAMUSCULAR; INTRAVENOUS; SUBCUTANEOUS PRN
Status: DISCONTINUED | OUTPATIENT
Start: 2023-10-06 | End: 2023-10-06

## 2023-10-06 RX ORDER — KETOROLAC TROMETHAMINE 30 MG/ML
INJECTION, SOLUTION INTRAMUSCULAR; INTRAVENOUS PRN
Status: DISCONTINUED | OUTPATIENT
Start: 2023-10-06 | End: 2023-10-06

## 2023-10-06 RX ORDER — GABAPENTIN 300 MG/1
300 CAPSULE ORAL
Status: DISCONTINUED | OUTPATIENT
Start: 2023-10-06 | End: 2023-10-06 | Stop reason: HOSPADM

## 2023-10-06 RX ORDER — ONDANSETRON 2 MG/ML
4 INJECTION INTRAMUSCULAR; INTRAVENOUS EVERY 30 MIN PRN
Status: DISCONTINUED | OUTPATIENT
Start: 2023-10-06 | End: 2023-10-06 | Stop reason: HOSPADM

## 2023-10-06 RX ORDER — BUPIVACAINE HYDROCHLORIDE 5 MG/ML
INJECTION, SOLUTION PERINEURAL PRN
Status: DISCONTINUED | OUTPATIENT
Start: 2023-10-06 | End: 2023-10-06 | Stop reason: HOSPADM

## 2023-10-06 RX ORDER — ACETAMINOPHEN 325 MG/1
975 TABLET ORAL ONCE
Status: DISCONTINUED | OUTPATIENT
Start: 2023-10-06 | End: 2023-10-06

## 2023-10-06 RX ORDER — CEFAZOLIN SODIUM/WATER 2 G/20 ML
2 SYRINGE (ML) INTRAVENOUS SEE ADMIN INSTRUCTIONS
Status: DISCONTINUED | OUTPATIENT
Start: 2023-10-06 | End: 2023-10-06 | Stop reason: HOSPADM

## 2023-10-06 RX ORDER — ACETAMINOPHEN 325 MG/1
650 TABLET ORAL EVERY 4 HOURS PRN
Qty: 50 TABLET | Refills: 0 | Status: SHIPPED | OUTPATIENT
Start: 2023-10-06 | End: 2023-11-07

## 2023-10-06 RX ORDER — MAGNESIUM SULFATE HEPTAHYDRATE 40 MG/ML
INJECTION, SOLUTION INTRAVENOUS PRN
Status: DISCONTINUED | OUTPATIENT
Start: 2023-10-06 | End: 2023-10-06

## 2023-10-06 RX ORDER — HYDROXYZINE HYDROCHLORIDE 25 MG/1
25 TABLET, FILM COATED ORAL EVERY 6 HOURS PRN
Status: DISCONTINUED | OUTPATIENT
Start: 2023-10-06 | End: 2023-10-06 | Stop reason: HOSPADM

## 2023-10-06 RX ORDER — KETAMINE HYDROCHLORIDE 10 MG/ML
INJECTION INTRAMUSCULAR; INTRAVENOUS PRN
Status: DISCONTINUED | OUTPATIENT
Start: 2023-10-06 | End: 2023-10-06

## 2023-10-06 RX ORDER — HEPARIN SODIUM 5000 [USP'U]/.5ML
5000 INJECTION, SOLUTION INTRAVENOUS; SUBCUTANEOUS
Status: COMPLETED | OUTPATIENT
Start: 2023-10-06 | End: 2023-10-06

## 2023-10-06 RX ORDER — OXYCODONE HYDROCHLORIDE 5 MG/1
5-10 TABLET ORAL EVERY 4 HOURS PRN
Qty: 12 TABLET | Refills: 0 | Status: SHIPPED | OUTPATIENT
Start: 2023-10-06 | End: 2023-11-07

## 2023-10-06 RX ORDER — PROPOFOL 10 MG/ML
INJECTION, EMULSION INTRAVENOUS PRN
Status: DISCONTINUED | OUTPATIENT
Start: 2023-10-06 | End: 2023-10-06

## 2023-10-06 RX ORDER — LIDOCAINE HYDROCHLORIDE AND EPINEPHRINE 10; 10 MG/ML; UG/ML
INJECTION, SOLUTION INFILTRATION; PERINEURAL PRN
Status: DISCONTINUED | OUTPATIENT
Start: 2023-10-06 | End: 2023-10-06 | Stop reason: HOSPADM

## 2023-10-06 RX ORDER — CEFAZOLIN SODIUM/WATER 2 G/20 ML
2 SYRINGE (ML) INTRAVENOUS
Status: COMPLETED | OUTPATIENT
Start: 2023-10-06 | End: 2023-10-06

## 2023-10-06 RX ORDER — ALBUTEROL SULFATE 0.83 MG/ML
2.5 SOLUTION RESPIRATORY (INHALATION) EVERY 4 HOURS PRN
Status: DISCONTINUED | OUTPATIENT
Start: 2023-10-06 | End: 2023-10-06 | Stop reason: HOSPADM

## 2023-10-06 RX ORDER — DEXAMETHASONE SODIUM PHOSPHATE 4 MG/ML
INJECTION, SOLUTION INTRA-ARTICULAR; INTRALESIONAL; INTRAMUSCULAR; INTRAVENOUS; SOFT TISSUE PRN
Status: DISCONTINUED | OUTPATIENT
Start: 2023-10-06 | End: 2023-10-06

## 2023-10-06 RX ORDER — HYDRALAZINE HYDROCHLORIDE 20 MG/ML
2.5-5 INJECTION INTRAMUSCULAR; INTRAVENOUS EVERY 10 MIN PRN
Status: DISCONTINUED | OUTPATIENT
Start: 2023-10-06 | End: 2023-10-06 | Stop reason: HOSPADM

## 2023-10-06 RX ORDER — ACETAMINOPHEN 325 MG/1
650 TABLET ORAL
Status: DISCONTINUED | OUTPATIENT
Start: 2023-10-06 | End: 2023-10-06 | Stop reason: HOSPADM

## 2023-10-06 RX ORDER — FENTANYL CITRATE 50 UG/ML
50 INJECTION, SOLUTION INTRAMUSCULAR; INTRAVENOUS EVERY 5 MIN PRN
Status: DISCONTINUED | OUTPATIENT
Start: 2023-10-06 | End: 2023-10-06 | Stop reason: HOSPADM

## 2023-10-06 RX ORDER — SODIUM CHLORIDE, SODIUM LACTATE, POTASSIUM CHLORIDE, CALCIUM CHLORIDE 600; 310; 30; 20 MG/100ML; MG/100ML; MG/100ML; MG/100ML
INJECTION, SOLUTION INTRAVENOUS CONTINUOUS
Status: DISCONTINUED | OUTPATIENT
Start: 2023-10-06 | End: 2023-10-06 | Stop reason: HOSPADM

## 2023-10-06 RX ORDER — ACETAMINOPHEN 325 MG/1
975 TABLET ORAL ONCE
Status: COMPLETED | OUTPATIENT
Start: 2023-10-06 | End: 2023-10-06

## 2023-10-06 RX ORDER — FENTANYL CITRATE 50 UG/ML
INJECTION, SOLUTION INTRAMUSCULAR; INTRAVENOUS PRN
Status: DISCONTINUED | OUTPATIENT
Start: 2023-10-06 | End: 2023-10-06

## 2023-10-06 RX ORDER — GLYCOPYRROLATE 0.2 MG/ML
INJECTION, SOLUTION INTRAMUSCULAR; INTRAVENOUS PRN
Status: DISCONTINUED | OUTPATIENT
Start: 2023-10-06 | End: 2023-10-06

## 2023-10-06 RX ORDER — MEPERIDINE HYDROCHLORIDE 25 MG/ML
12.5 INJECTION INTRAMUSCULAR; INTRAVENOUS; SUBCUTANEOUS EVERY 5 MIN PRN
Status: DISCONTINUED | OUTPATIENT
Start: 2023-10-06 | End: 2023-10-06 | Stop reason: HOSPADM

## 2023-10-06 RX ORDER — HYDROMORPHONE HCL IN WATER/PF 6 MG/30 ML
0.4 PATIENT CONTROLLED ANALGESIA SYRINGE INTRAVENOUS EVERY 5 MIN PRN
Status: DISCONTINUED | OUTPATIENT
Start: 2023-10-06 | End: 2023-10-06 | Stop reason: HOSPADM

## 2023-10-06 RX ORDER — LIDOCAINE 40 MG/G
CREAM TOPICAL
Status: DISCONTINUED | OUTPATIENT
Start: 2023-10-06 | End: 2023-10-06 | Stop reason: HOSPADM

## 2023-10-06 RX ORDER — DEXAMETHASONE SODIUM PHOSPHATE 4 MG/ML
4 INJECTION, SOLUTION INTRA-ARTICULAR; INTRALESIONAL; INTRAMUSCULAR; INTRAVENOUS; SOFT TISSUE
Status: DISCONTINUED | OUTPATIENT
Start: 2023-10-06 | End: 2023-10-06 | Stop reason: HOSPADM

## 2023-10-06 RX ORDER — OXYCODONE HYDROCHLORIDE 5 MG/1
5 TABLET ORAL ONCE
Status: COMPLETED | OUTPATIENT
Start: 2023-10-06 | End: 2023-10-06

## 2023-10-06 RX ORDER — ONDANSETRON 2 MG/ML
INJECTION INTRAMUSCULAR; INTRAVENOUS PRN
Status: DISCONTINUED | OUTPATIENT
Start: 2023-10-06 | End: 2023-10-06

## 2023-10-06 RX ADMIN — Medication 2 G: at 09:30

## 2023-10-06 RX ADMIN — HEPARIN SODIUM 5000 UNITS: 10000 INJECTION, SOLUTION INTRAVENOUS; SUBCUTANEOUS at 09:30

## 2023-10-06 RX ADMIN — DEXAMETHASONE SODIUM PHOSPHATE 10 MG: 4 INJECTION, SOLUTION INTRA-ARTICULAR; INTRALESIONAL; INTRAMUSCULAR; INTRAVENOUS; SOFT TISSUE at 09:40

## 2023-10-06 RX ADMIN — Medication 25 MG: at 11:22

## 2023-10-06 RX ADMIN — PROPOFOL 70 MG: 10 INJECTION, EMULSION INTRAVENOUS at 11:00

## 2023-10-06 RX ADMIN — SODIUM CHLORIDE, POTASSIUM CHLORIDE, SODIUM LACTATE AND CALCIUM CHLORIDE: 600; 310; 30; 20 INJECTION, SOLUTION INTRAVENOUS at 09:32

## 2023-10-06 RX ADMIN — PROPOFOL 50 MG: 10 INJECTION, EMULSION INTRAVENOUS at 12:26

## 2023-10-06 RX ADMIN — PROPOFOL 100 MCG/KG/MIN: 10 INJECTION, EMULSION INTRAVENOUS at 09:45

## 2023-10-06 RX ADMIN — ACETAMINOPHEN 975 MG: 325 TABLET, FILM COATED ORAL at 09:27

## 2023-10-06 RX ADMIN — SODIUM CHLORIDE, POTASSIUM CHLORIDE, SODIUM LACTATE AND CALCIUM CHLORIDE: 600; 310; 30; 20 INJECTION, SOLUTION INTRAVENOUS at 09:51

## 2023-10-06 RX ADMIN — SUGAMMADEX 200 MG: 100 INJECTION, SOLUTION INTRAVENOUS at 10:15

## 2023-10-06 RX ADMIN — LIDOCAINE HYDROCHLORIDE 3 ML: 10 INJECTION, SOLUTION EPIDURAL; INFILTRATION; INTRACAUDAL; PERINEURAL at 08:47

## 2023-10-06 RX ADMIN — GLYCOPYRROLATE 0.2 MG: 0.2 INJECTION, SOLUTION INTRAMUSCULAR; INTRAVENOUS at 10:52

## 2023-10-06 RX ADMIN — KETOROLAC TROMETHAMINE 15 MG: 30 INJECTION, SOLUTION INTRAMUSCULAR at 10:00

## 2023-10-06 RX ADMIN — KETAMINE HYDROCHLORIDE 25 MG: 10 INJECTION INTRAMUSCULAR; INTRAVENOUS at 09:40

## 2023-10-06 RX ADMIN — ROCURONIUM BROMIDE 50 MG: 50 INJECTION, SOLUTION INTRAVENOUS at 09:40

## 2023-10-06 RX ADMIN — ONDANSETRON 4 MG: 2 INJECTION INTRAMUSCULAR; INTRAVENOUS at 09:40

## 2023-10-06 RX ADMIN — FENTANYL CITRATE 100 MCG: 50 INJECTION INTRAMUSCULAR; INTRAVENOUS at 09:36

## 2023-10-06 RX ADMIN — KETAMINE HYDROCHLORIDE 25 MG: 10 INJECTION INTRAMUSCULAR; INTRAVENOUS at 09:38

## 2023-10-06 RX ADMIN — GLYCOPYRROLATE 0.2 MG: 0.2 INJECTION, SOLUTION INTRAMUSCULAR; INTRAVENOUS at 09:40

## 2023-10-06 RX ADMIN — Medication 25 MG: at 10:20

## 2023-10-06 RX ADMIN — PROPOFOL 200 MG: 10 INJECTION, EMULSION INTRAVENOUS at 12:15

## 2023-10-06 RX ADMIN — MIDAZOLAM 2 MG: 1 INJECTION INTRAMUSCULAR; INTRAVENOUS at 09:34

## 2023-10-06 RX ADMIN — MAGNESIUM SULFATE HEPTAHYDRATE 2 G: 40 INJECTION, SOLUTION INTRAVENOUS at 09:45

## 2023-10-06 RX ADMIN — OXYCODONE HYDROCHLORIDE 5 MG: 5 TABLET ORAL at 14:09

## 2023-10-06 RX ADMIN — PROPOFOL 200 MG: 10 INJECTION, EMULSION INTRAVENOUS at 09:40

## 2023-10-06 ASSESSMENT — ACTIVITIES OF DAILY LIVING (ADL)
ADLS_ACUITY_SCORE: 35

## 2023-10-06 NOTE — OP NOTE
[facility]  Operative Note    Pre-operative diagnosis: Atypical ductal hyperplasia of left breast [N60.92]   Post-operative diagnosis Same   Procedure: Procedure(s):  BIOPSY, LEFT BREAST, WITH NEEDLE LOCALIZATION   Surgeon: Bg Bryant MD   Assistants(s): None   Anesthesia: General    Estimated blood loss: 30 ml                Drains: None     Specimens       ID Type Source Tests Collected by Time Destination   1 : left breast mass Tissue Breast, Left SURGICAL PATHOLOGY EXAM Bg Bryant MD 10/6/2023 11:46 AM       Implants: None   Findings: Left breast wire-localized mass excision.   Complications: Inadvertent cautery injury to skin inferior to incision. This lesion was excised and closed primarily.   Condition: Stable       Indications for the procedure:   Bria Davis is a 63 year old female who was referred to my clinic for evaluation of new mammographic findings in her left breast. Core needle biopsy was obtained which revealed atypical ductal hyperplasia. After a discussion with the patient regarding therapeutic options, risks, and benefits, an excisional biopsy was recommended. Patient was amenable to the proposed plan and provided informed consent     Description of procedure:  Patient was prepped and identified in the preop holding area.  Patient was then taken to operative suite.  Placed in the supine position.  Anesthesia was induced per anesthesia protocol.  Patient then prepped and draped in usual sterile fashion.  A timeout was then done to confirm the correct patient positioning and procedure.    Limited intraoperative ultrasound was done on the left breast; markings were drawn on the breast to keep the biopsy clip centered.  At this point, we focused onto the left breast, noted the wire.  I reviewed the film prior to starting of the case. Utilizing local infiltration I infiltrated the area around the wire.  Utilizing a #15 blade scalpel, a curvilinear linear  incision was then made on the medial aspect of the left breast, taking care to fashion the incision along natural skin creases.  This was deepened down into the breast tissue with the electrocautery Bovie after initial incision utilizing Tom retractors.  Flaps were then made superiorly and utilizing the wire as a guide, I was able to excise a good size breast tissue circumferentially around the wire to ensure good oncologic margin.  This was done utilizing retractions and also the electrical cautery Bovie.  During the dissection, a 3 cm skin burn was noted inferior to the initial incision due to thermal spread from within the wound.     Once I completely excised this large breast tissue that encompassed the mass and the wire, I reoriented and labeled the mass with the Vector inking system. The excised tissue was measured at 9 x 9 x 2 cm in size, and this was sent for radiology.  Radiology did call back to confirm that the mass does contain the wire that was placed, though no clip was identified on imaging.   Therefore, at this point the mass will be sent to pathology for further examination.  Hemostasis was achieved utilizing the electrical cautery Bovie.  At this point, the wound was copiously irrigated and hemostasis was achieved.      Hemoclips were placed in the cavity of the breast to óscar the superior, inferior, medial and lateral margins of dissection.  The burned area of skin was excised sharply with a transverse elliptical incision and then closed in layers with 3-0 vicryl interrupted sutures for the deep dermal layer and a running 4-0 monocryl for the subcuticular layer. The initial incision was closed in layers in a similar manner.     The entire breast and axilla was clean and dried and dermabond was then applied to both incisions.  Fluff dressing was then used on the breast and compressive bra was then placed.  The patient tolerated the procedure well.  All counts were correct x2 prior to closing of  the incision.      Bg Bryant MD

## 2023-10-06 NOTE — ANESTHESIA POSTPROCEDURE EVALUATION
Patient: Bria Davis    Procedure: Procedure(s):  BIOPSY, LEFT BREAST, WITH NEEDLE LOCALIZATION       Anesthesia Type:  General    Note:  Disposition: Outpatient   Postop Pain Control: Uneventful            Sign Out: Well controlled pain   PONV: No   Neuro/Psych: Uneventful            Sign Out: Acceptable/Baseline neuro status   Airway/Respiratory: Uneventful            Sign Out: Acceptable/Baseline resp. status   CV/Hemodynamics: Uneventful            Sign Out: Acceptable CV status; No obvious hypovolemia; No obvious fluid overload   Other NRE: NONE   DID A NON-ROUTINE EVENT OCCUR? No           Last vitals:  Vitals Value Taken Time   BP     Temp     Pulse 73 10/06/23 1253   Resp 13 10/06/23 1253   SpO2 96 % 10/06/23 1253   Vitals shown include unvalidated device data.    Electronically Signed By: ALYSON Hoang CRNA  October 6, 2023  12:55 PM   CALL the DOCTOR:    - Any pain that appears to be getting worse.  -  If you have  not urinated 8 hours after surgery or have any difficulty urinating.     A responsible adult should be with you for the rest of the day and night for your safety and to help you if you needed.    Review attached FACT SHEET if applicable.

## 2023-10-06 NOTE — DISCHARGE INSTRUCTIONS
Same Day Surgery Discharge Instructions  Special Precautions After Surgery - Adult    It is not unusual to feel lightheaded or faint, up to 24 hours after surgery or while taking pain medication.  If you have these symptoms; sit for a few minutes before standing and have someone assist you when getting up.  You should rest and relax for the next 24 hours and must have someone stay with you for at least 24 hours after your discharge.  DO NOT DRIVE any vehicle or operate mechanical equipment for 24 hours following the end of your surgery.  DO NOT DRIVE while taking narcotic pain medications that have been prescribed by your physician.  If you had a limb operated on, you must be able to use it fully to drive.  DO NOT drink alcoholic beverages for 24 hours following surgery or while taking prescription pain medication.  Drink clear liquids (apple juice, ginger ale, broth, 7-Up, etc.).  Progress to your regular diet as you feel able.  Any questions call your physician and do not make important decisions for 24 hours.    Nausea and Vomiting: Nausea and vomiting can occur any time after receiving anesthesia. If you experience nausea and vomiting we encourage you to move to a clear liquid diet and advance your diet as tolerated. If nausea and vomiting do not improve within 12 hours please call the surgeon or present to the Emergency department.     Break-through Bleeding: If your experience bleeding from your surgical site apply pressure and additional dressing per nurse instruction. For simple problems such as a saturated dressing, you may need to reinforce the dressing with more gauze and tape and put slight pressure on the site. If bleeding does not subside contact the surgeon or present to the Emergency Department.    Post-op Infection: If you develop a fever of 100.4 or greater, have pus like drainage, redness, swelling or severe pain at the surgical site not alleviated with pain medications; please  contact the surgeon or present to the Emergency Department.     Medications:  Acetaminophen (Tylenol):  Next dose: Okay to take at or after 3:30pm.  Ibuprofen (Motrin, Advil):  Next dose: Okay to take at or after 4:00pm.  Follow the instructions on the bottle for any prescription medications you receive from the pharmacy  __________________________________________________________________________________________________________________________________  IMPORTANT NUMBERS:    [facility] [place]:  [phone], [phone]  Pharmacy:  [phone]  Same Day Surgery:  [phone], for general post-op questions call Monday - Thursday until 8:30 p.m., Fridays until 6:00 p.m.  Nurse Advice Line:  [phone]                                                                         [facility]:  [phone]

## 2023-10-06 NOTE — ANESTHESIA CARE TRANSFER NOTE
Patient: Bria Davis    Procedure: Procedure(s):  BIOPSY, LEFT BREAST, WITH NEEDLE LOCALIZATION       Diagnosis: Atypical ductal hyperplasia of left breast [N60.92]  Diagnosis Additional Information: No value filed.    Anesthesia Type:   General     Note:    Oropharynx: oral airway in place and spontaneously breathing  Level of Consciousness: drowsy  Oxygen Supplementation: face mask    Independent Airway: airway patency satisfactory and stable  Dentition: dentition unchanged  Vital Signs Stable: post-procedure vital signs reviewed and stable  Report to RN Given: handoff report given  Patient transferred to: PACU    Handoff Report: Identifed the Patient, Identified the Reponsible Provider, Reviewed the pertinent medical history, Discussed the surgical course, Reviewed Intra-OP anesthesia mangement and issues during anesthesia, Set expectations for post-procedure period and Allowed opportunity for questions and acknowledgement of understanding      Vitals:  Vitals Value Taken Time   BP     Temp     Pulse     Resp     SpO2         Electronically Signed By: ALYSON Hoang CRNA  October 6, 2023  12:45 PM

## 2023-10-06 NOTE — BRIEF OP NOTE
[facility]    Brief Operative Note    Pre-operative diagnosis: Atypical ductal hyperplasia of left breast [N60.92]  Post-operative diagnosis Same as pre-operative diagnosis    Procedure: BIOPSY, LEFT BREAST, WITH NEEDLE LOCALIZATION, Left - Breast    Surgeon: Surgeon(s) and Role:     * Bg Bryant MD - Primary  Anesthesia: General   Estimated Blood Loss: 30 ml    Drains: None  Specimens:   ID Type Source Tests Collected by Time Destination   1 : left breast mass Tissue Breast, Left SURGICAL PATHOLOGY EXAM Bg Bryant MD 10/6/2023 11:46 AM      Findings:   Left breast wire-localized excisional biopsy. Inadvertent skin tear with cautery inferior to incision. Burned skin excised and closed primarily. Specimen inked and sent for imaging, revealed wire contained within specimen but no clip present on plain film. Specimen measured 9 x 9 x 2 cm and skin tear measured 3 cm. Margins within wound bed clipped using clip applier  Complications: Unintended puncture/laceration of the skin inferior to biopsy incision .  Implants: * No implants in log *

## 2023-10-06 NOTE — ANESTHESIA PREPROCEDURE EVALUATION
Anesthesia Pre-Procedure Evaluation    Patient: Bria Davis   MRN: [id number] : 1960        Procedure : Procedure(s):  BIOPSY, LEFT BREAST, WITH NEEDLE LOCALIZATION          Past Medical History:   Diagnosis Date    Arthritis     Asthma     Diabetes mellitus (H)     Esophageal reflux     Hypertension     Obese     Other chronic sinusitis     PONV (postoperative nausea and vomiting)       Past Surgical History:   Procedure Laterality Date    BIOPSY  2023    COLONOSCOPY  2002    COLONOSCOPY  10/26/2012    Procedure: COLONOSCOPY;  Colonoscopy;  Surgeon: Margret Sales MD;  Location: [facility]    COLONOSCOPY N/A 2019    Procedure: COLONOSCOPY, WITH POLYPECTOMY AND BIOPSY;  Surgeon: Ariel Heck MD;  Location: [facility]    CV LEFT HEART CATH N/A 2019    Procedure: Left Heart Cath;  Surgeon: Mike Sanon MD;  Location:  HEART CARDIAC CATH LAB    CV LEFT VENTRICULOGRAM N/A 2019    Procedure: Left Ventriculogram;  Surgeon: Mike Sanon MD;  Location:  HEART CARD[facility]AC CATH LAB    ENT SURGERY      ESOPHAGOSCOPY, GASTROSCOPY, DUODENOSCOPY (EGD), COMBINED N/A 2019    Procedure: ESOPHAGOGASTRODUODENOSCOPY, WITH BIOPSY;  Surgeon: Ariel Heck MD;  Location: [facility]    EYE SURGERY      GYN SURGERY      Hydroablation , polyp removal     INJECT EPIDURAL LUMBAR  2010    INJECT EPIDURAL LUMBAR performed by GENERIC ANESTHESIA PROVIDER at [facility]    INJECT EPIDURAL LUMBAR  2011    INJECT EPIDURAL LUMBAR performed by GENERIC ANESTHESIA PROVIDER at [facility]    [facility] long finger pulley release Left 2020    RELEASE CARPAL TUNNEL  2012    Procedure: RELEASE CARPAL TUNNEL;  Right Carpal Tunnel Release;  Surgeon: Mike Sparrow MD;  Location: [facility]    RELEASE CARPAL TUNNEL  2012    Procedure: RELEASE CARPAL TUNNEL;  Left Carpal Tunnel Release;  Surgeon: Mike Sparrow MD;  Location: [facility]    REPAIR TENDON ACHILLES Left 2019     Procedure: Left Foot: Achilles Tendon Repair/Remodel/Reattachment; calcaneal prominence removal;  Surgeon: Felix Watkins DPM;  Location: [facility]    SURGICAL HISTORY OF -   04/10/2000    bilateral total ethmoidectomies, bilateral maxillary antrostomies, bilateral SMR of inferior turbinates, reduction of lt turbinate porter bullosa      Allergies   Allergen Reactions    Lisinopril Cough    Tape [Adhesive Tape] Rash      Social History     Tobacco Use    Smoking status: Never    Smokeless tobacco: Never   Substance Use Topics    Alcohol use: No      Wt Readings from Last 1 Encounters:   10/06/23 111.6 kg (246 lb)        Anesthesia Evaluation   Pt has had prior anesthetic. Type: General and MAC.    History of anesthetic complications  - PONV.      ROS/MED HX  ENT/Pulmonary:     (+)                     asthma                  Neurologic:  - neg neurologic ROS     Cardiovascular:     (+)  hypertension- -  CAD -  - -                                      METS/Exercise Tolerance:     Hematologic:  - neg hematologic  ROS     Musculoskeletal:  - neg musculoskeletal ROS     GI/Hepatic:     (+) GERD,                   Renal/Genitourinary:     (+) renal disease,             Endo:     (+)  type II DM,             Obesity,       Psychiatric/Substance Use:  - neg psychiatric ROS     Infectious Disease:       Malignancy:       Other:  - neg other ROS          Physical Exam    Airway  airway exam normal      Mallampati: I   TM distance: > 3 FB   Neck ROM: full   Mouth opening: > 3 cm    Respiratory Devices and Support         Dental       (+) Modest Abnormalities - crowns, retainers, 1 or 2 missing teeth      Cardiovascular   cardiovascular exam normal          Pulmonary   pulmonary exam normal                OUTSIDE LABS:  CBC:   Lab Results   Component Value Date    WBC 9.3 12/21/2022    WBC 7.7 11/07/2022    HGB 14.0 12/21/2022    HGB 14.1 11/07/2022    HCT 42.9 12/21/2022    HCT 42.7 11/07/2022     12/21/2022    PLT  303 11/07/2022     BMP:   Lab Results   Component Value Date     09/21/2023     03/28/2023    POTASSIUM 3.7 09/21/2023    POTASSIUM 4.6 03/28/2023    CHLORIDE 100 09/21/2023    CHLORIDE 99 03/28/2023    CO2 24 09/21/2023    CO2 26 03/28/2023    BUN 11.0 09/21/2023    BUN 12.1 03/28/2023    CR 0.63 09/21/2023    CR 0.65 03/28/2023     (H) 10/06/2023     (H) 09/21/2023     COAGS:   Lab Results   Component Value Date    PTT 29 09/12/2019    INR 0.96 09/12/2019     POC:   Lab Results   Component Value Date    BGM 99 12/26/2019    HCG Negative 11/09/2012     HEPATIC:   Lab Results   Component Value Date    ALBUMIN 4.1 12/21/2022    PROTTOTAL 6.8 12/21/2022    ALT 24 12/21/2022    AST 23 12/21/2022    ALKPHOS 63 12/21/2022    BILITOTAL 0.2 12/21/2022     OTHER:   Lab Results   Component Value Date    A1C 7.7 (H) 09/21/2023    MARLON 9.4 09/21/2023    MAG 1.8 01/13/2022    LIPASE 43 12/21/2022    AMYLASE 54 04/26/2010    TSH 1.98 11/07/2022    SED 8 08/14/2023       Anesthesia Plan    ASA Status:  3       Anesthesia Type: General.     - Airway: ETT   Induction: Intravenous.   Maintenance: Balanced.        Consents    Anesthesia Plan(s) and associated risks, benefits, and realistic alternatives discussed. Questions answered and patient/representative(s) expressed understanding.     - Discussed: Risks, Benefits and Alternatives for BOTH SEDATION and the PROCEDURE were discussed     - Discussed with:  Patient            Postoperative Care       PONV prophylaxis: Background Propofol Infusion, Ondansetron (or other 5HT-3), Dexamethasone or Solumedrol     Comments:                ALYSON Hoang CRNA

## 2023-10-06 NOTE — INTERVAL H&P NOTE
"I have reviewed the surgical (or preoperative) H&P that is linked to this encounter, and examined the patient. There are no significant changes    Clinical Conditions Present on Arrival:  Clinically Significant Risk Factors Present on Admission                 # Drug Induced Platelet Defect: home medication list includes an antiplatelet medication  # DMII: A1C = 7.7 % (Ref range: 0.0 - 5.6 %) within past 6 months  # Severe Obesity: Estimated body mass index is 43.59 kg/m  as calculated from the following:    Height as of this encounter: 1.6 m (5' 2.99\").    Weight as of this encounter: 111.6 kg (246 lb).       "

## 2023-10-07 ENCOUNTER — HOSPITAL ENCOUNTER (EMERGENCY)
Facility: CLINIC | Age: 63
Discharge: HOME | End: 2023-10-07
Attending: EMERGENCY MEDICINE | Admitting: EMERGENCY MEDICINE
Payer: COMMERCIAL

## 2023-10-07 ENCOUNTER — NURSE TRIAGE (OUTPATIENT)
Dept: NURSING | Facility: CLINIC | Age: 63
End: 2023-10-07
Payer: COMMERCIAL

## 2023-10-07 VITALS
WEIGHT: 246 LBS | BODY MASS INDEX: 43.59 KG/M2 | RESPIRATION RATE: 14 BRPM | SYSTOLIC BLOOD PRESSURE: 189 MMHG | TEMPERATURE: 97.4 F | DIASTOLIC BLOOD PRESSURE: 91 MMHG | OXYGEN SATURATION: 99 % | HEART RATE: 77 BPM

## 2023-10-07 DIAGNOSIS — L76.22 POSTOPERATIVE HEMORRHAGE OF SUBCUTANEOUS TISSUE FOLLOWING NON-DERMATOLOGIC PROCEDURE: ICD-10-CM

## 2023-10-07 PROCEDURE — 99283 EMERGENCY DEPT VISIT LOW MDM: CPT | Performed by: EMERGENCY MEDICINE

## 2023-10-07 PROCEDURE — 99284 EMERGENCY DEPT VISIT MOD MDM: CPT | Performed by: EMERGENCY MEDICINE

## 2023-10-07 RX ORDER — CEPHALEXIN 500 MG/1
500 CAPSULE ORAL 4 TIMES DAILY
Qty: 28 CAPSULE | Refills: 0 | Status: SHIPPED | OUTPATIENT
Start: 2023-10-07 | End: 2023-10-14

## 2023-10-07 ASSESSMENT — ACTIVITIES OF DAILY LIVING (ADL): ADLS_ACUITY_SCORE: 33

## 2023-10-07 NOTE — DISCHARGE INSTRUCTIONS
Follow-up with your surgeon as scheduled.    Begin taking Keflex as directed.    You may take a third dose of Reglan during the day if needed for nausea.    Return to the emergency department for fevers, worsening symptoms, or any other problems.

## 2023-10-07 NOTE — TELEPHONE ENCOUNTER
Pt gave verbal consent to speak with  about pts medical care - pt did come on the phone to speak with writer     Pt had left breast biopsy completed yesterday 10/06/2023 and states that there was another incision that was accidentally made with the cauterization tool and that site is now leaking light pink fluid that is not stopping and has saturated 2 dressings and through pts bra. Pt is unable to visibly see the site where the drainage is coming from.     No fever     No pain at this time     Pt informed nurse that provider discussed putting pt on an antibitoic yesterday and this did not occur    Per pt, provider was clear that he wanted pt on an antibiotic     Per disposition: Go to ED Now     Pt will have  drive pt to the ED now     Care advice given per protocol and when to call back. Pt verbalized understanding and agrees to plan of care.    Poornima Garrido RN  [place] Nurse Advisor  2:29 PM 10/7/2023        Reason for Disposition   [1] Bleeding from incision AND [2] won't stop after 10 minutes of direct pressure    Additional Information   Negative: [1] Major abdominal surgical incision AND [2] wound gaping open AND [3] visible internal organs   Negative: Sounds like a life-threatening emergency to the triager   Negative: Patient has a Negative Pressure Wound Therapy device   Negative: Patient is followed by a wound clinic or wound specialist for this wound    Protocols used: Post-Op Incision Symptoms and [id number]-IP

## 2023-10-07 NOTE — ED TRIAGE NOTES
Incision on breast bleeding started last night, nurse line told to come in, also concern was to be put on an antibiotic post op and never received Rx, requests Rx for something for nausea due to zofran made vomit     Triage Assessment       Row Name 10/07/23 1272       Triage Assessment (Adult)    Airway WDL WDL       Respiratory WDL    Respiratory WDL WDL       Peripheral/Neurovascular WDL    Peripheral Neurovascular WDL WDL       Cognitive/Neuro/Behavioral WDL    Cognitive/Neuro/Behavioral WDL WDL

## 2023-10-07 NOTE — ED PROVIDER NOTES
History     Chief Complaint   Patient presents with    Wound Check     HPI  Bria Davis is a 63 year old female who presents to the emergency department reporting some bleeding that she noticed this morning from surgical site on left breast.  She underwent biopsy of left breast yesterday and has 2 incisions, the more inferior incision is where she believes she was having the bleeding from.  She also reports that she was supposed to get prescribed antibiotics after the procedure but did not receive this prescription.  She states that she had some postoperative nausea and vomiting that was not helped with Zofran and was wondering also if there was something else she could take for nausea.  She does report that she takes Reglan twice a day and that she has done well with this medication.  She denies fevers and denies active bleeding currently.    Allergies:  Allergies   Allergen Reactions    Lisinopril Cough    Tape [Adhesive Tape] Rash       Problem List:    Patient Active Problem List    Diagnosis Date Noted    Atypical ductal hyperplasia of left breast 09/18/2023     Priority: Medium    Mass of upper inner quadrant of left breast 09/18/2023     Priority: Medium    Chronic kidney disease, stage 2 (mild) 06/15/2023     Priority: Medium    Nonobstructive atherosclerosis of coronary artery 09/20/2019     Priority: Medium    Essential hypertension with goal blood pressure less than 140/90 08/25/2016     Priority: Medium    Type 2 diabetes mellitus with kidney complication, with long-term current use of insulin (H) 10/21/2015     Priority: Medium    Morbid obesity (H) 10/21/2015     Priority: Medium    Hyperlipidemia LDL goal <70 03/26/2011     Priority: Medium    Microalbuminuria 01/06/2011     Priority: Medium    Lumbar radiculopathy 11/30/2010     Priority: Medium    Enthesopathy 11/27/2007     Priority: Medium     Problem list name updated by automated process. Provider to review      Restless legs syndrome  (RLS) 04/12/2007     Priority: Medium    Esophageal reflux 02/07/2006     Priority: Medium    Allergic rhinitis 02/07/2006     Priority: Medium     Problem list name updated by automated process. Provider to review      Mild intermittent asthma 11/10/2005     Priority: Medium        Past Medical History:    Past Medical History:   Diagnosis Date    Arthritis     Asthma     Diabetes mellitus (H)     Esophageal reflux     Hypertension     Obese     Other chronic sinusitis     PONV (postoperative nausea and vomiting)        Past Surgical History:    Past Surgical History:   Procedure Laterality Date    BIOPSY  9/7/2023    BIOPSY BREAST NEEDLE LOCALIZATION Left 10/6/2023    Procedure: BIOPSY, LEFT BREAST, WITH NEEDLE LOCALIZATION;  Surgeon: Bg Bryant MD;  Location: [facility]    COLONOSCOPY  01/31/2002    COLONOSCOPY  10/26/2012    Procedure: COLONOSCOPY;  Colonoscopy;  Surgeon: Margret Sales MD;  Location: [facility]    COLONOSCOPY N/A 11/08/2019    Procedure: COLONOSCOPY, WITH POLYPECTOMY AND BIOPSY;  Surgeon: Ariel Heck MD;  Location: [facility]    CV LEFT HEART CATH N/A 09/12/2019    Procedure: Left Heart Cath;  Surgeon: Mike Sanon MD;  Location:  [facility] CATH [facility]    CV LEFT VENTRICULOGRAM N/A 09/12/2019    Procedure: Left Ventriculogram;  Surgeon: Mike Sanon MD;  Location:  [facility]ATH [facility]    ENT SURGERY      ESOPHAGOSCOPY, GASTROSCOPY, DUODENOSCOPY (EGD), COMBINED N/A 11/08/2019    Procedure: ESOPHAGOGASTRODUODENOSCOPY, WITH BIOPSY;  Surgeon: Ariel Heck MD;  Location: [facility]    EYE SURGERY  2022    GYN SURGERY      Hydroablation 2007, polyp removal 2018    INJECT EPIDURAL LUMBAR  12/07/2010    INJECT EPIDURAL LUMBAR performed by GENERIC ANESTHESIA PROVIDER at [facility]    INJECT EPIDURAL LUMBAR  01/17/2011    INJECT EPIDURAL LUMBAR performed by GENERIC ANESTHESIA PROVIDER at [facility]    left long finger pulley release Left 07/2020    RELEASE CARPAL TUNNEL  06/19/2012     Procedure: RELEASE CARPAL TUNNEL;  Right Carpal Tunnel Release;  Surgeon: Mike Sparrow MD;  Location: [facility]    RELEASE CARPAL TUNNEL  11/09/2012    Procedure: RELEASE CARPAL TUNNEL;  Left Carpal Tunnel Release;  Surgeon: Mike Sparrow MD;  Location: [facility]    REPAIR TENDON ACHILLES Left 12/26/2019    Procedure: Left Foot: Achilles Tendon Repair/Remodel/Reattachment; calcaneal prominence removal;  Surgeon: Felix Watkins DPM;  Location: [facility]    SURGICAL HISTORY OF -   04/10/2000    bilateral total ethmoidectomies, bilateral maxillary antrostomies, bilateral SMR of inferior turbinates, reduction of lt turbinate porter bullosa       Family History:    Family History   Problem Relation Age of Onset    Hypertension Mother     Diabetes Mother     Respiratory Mother         asthma-COPD    Hypertension Father     Heart Disease Father     Cerebrovascular Disease Father     Cardiovascular Father     Breast Cancer Maternal Grandmother     Cancer Brother     Diabetes Brother     Respiratory Brother         copd    Chronic Obstructive Pulmonary Disease Brother     Depression Sister     Respiratory Sister         copd    Chronic Obstructive Pulmonary Disease Sister     Depression Sister     Chronic Obstructive Pulmonary Disease Sister     Anxiety Disorder Sister     Depression Sister     Chronic Obstructive Pulmonary Disease Brother     Kidney failure Brother     Depression Brother     Asthma Brother     Diabetes Son     Anxiety Disorder Other     Obesity Other     Obesity Other        Social History:  Marital Status:   [2]  Social History     Tobacco Use    Smoking status: Never    Smokeless tobacco: Never   Vaping Use    Vaping Use: Never used   Substance Use Topics    Alcohol use: No    Drug use: No        Medications:    cephALEXin (KEFLEX) 500 MG capsule  acetaminophen (TYLENOL) 325 MG tablet  albuterol (PROVENTIL) (2.5 MG/3ML) 0.083% neb solution  albuterol (VENTOLIN HFA) 108 (90 Base) MCG/ACT  inhaler  Ascorbic Acid (VITAMIN C) 500 MG CAPS  aspirin (ASA) 81 MG EC tablet  blood glucose (NO BRAND SPECIFIED) lancets standard  blood glucose (NO BRAND SPECIFIED) test strip  blood glucose monitoring (NO BRAND SPECIFIED) meter device kit  Calcium Carb-Cholecalciferol (CALCIUM-VITAMIN D) 600-400 MG-UNIT TABS  esomeprazole (NEXIUM) 40 MG DR capsule  ferrous sulfate 140 (45 Fe) MG TBCR CR tablet  fluticasone-salmeterol (ADVAIR DISKUS) 100-50 MCG/ACT inhaler  gabapentin (NEURONTIN) 100 MG capsule  hydrochlorothiazide (HYDRODIURIL) 12.5 MG tablet  insulin aspart (NOVOLOG FLEXPEN) 100 UNIT/ML pen  insulin glargine U-300 (TOUJEO MAX SOLOSTAR) 300 UNIT/ML (2 units dial) pen  insulin pen needle (BD PEDRO LUIS U/F) 32G X 4 MM miscellaneous  losartan (COZAAR) 100 MG tablet  metFORMIN (GLUCOPHAGE XR) 500 MG 24 hr tablet  metoclopramide (REGLAN) 10 MG tablet  metoprolol succinate ER (TOPROL XL) 25 MG 24 hr tablet  Microlet Lancets MISC  montelukast (SINGULAIR) 10 MG tablet  Multiple Vitamins-Minerals (MULTIVITAMIN ADULTS 50+) TABS  ondansetron (ZOFRAN) 4 MG tablet  oxyCODONE (ROXICODONE) 5 MG tablet  pseudoePHEDrine (SUDAFED) 30 MG tablet  rosuvastatin (CRESTOR) 10 MG tablet  Semaglutide, 2 MG/DOSE, (OZEMPIC, 2 MG/DOSE,) 8 MG/3ML pen  triamcinolone (KENALOG) 0.1 % external cream          Review of Systems   All other systems reviewed and are negative.      Physical Exam   BP: (!) 189/91  Pulse: 77  Temp: 97.4  F (36.3  C)  Resp: 14  Weight: 111.6 kg (246 lb)  SpO2: 99 %      Physical Exam  Vitals and nursing note reviewed.   Constitutional:       General: She is not in acute distress.     Appearance: She is well-developed. She is not ill-appearing or toxic-appearing.   HENT:      Head: Normocephalic and atraumatic.   Eyes:      General: No scleral icterus.     Pupils: Pupils are equal, round, and reactive to light.   Cardiovascular:      Rate and Rhythm: Normal rate.   Pulmonary:      Effort: Pulmonary effort is normal. No  respiratory distress.   Chest:      Comments: Patient has 2 incisions on left breast.  She has surrounding ecchymosis with some mild erythema.  No palpable induration and no obvious active bleeding.  No significant tenderness to palpation.  Musculoskeletal:      Cervical back: Normal range of motion and neck supple.   Skin:     General: Skin is warm and dry.      Coloration: Skin is not pale.      Findings: No rash.   Neurological:      Mental Status: She is alert and oriented to person, place, and time.      Sensory: No sensory deficit.   Psychiatric:         Behavior: Behavior normal.         ED Course                 Procedures           Assessments & Plan (with Medical Decision Making)     I have reviewed the nursing notes.    I have reviewed the findings, diagnosis, plan and need for follow up with the patient.  This patient presented to the emergency department complaining of bleeding after left breast biopsy.  No signs of active bleeding or significant hematoma on exam.  Patient will return to the emergency department if she begins to experience more episodes of hemorrhage.  We did discuss taking an extra dose of Reglan if needed for nausea as Zofran did not appear to work.  She was also prescribed Keflex as she was supposed to be prescribed antibiotics postoperatively.  She was discharged with instructions for care and follow-up in good condition.        New Prescriptions    CEPHALEXIN (KEFLEX) 500 MG CAPSULE    Take 1 capsule (500 mg) by mouth 4 times daily for 7 days       Final diagnoses:   Postoperative hemorrhage of subcutaneous tissue following non-dermatologic procedure       10/7/2023   [facility] EMERGENCY DEPT       Blake Jim MD  10/07/23 [id number]

## 2023-10-10 ENCOUNTER — PATIENT OUTREACH (OUTPATIENT)
Dept: FAMILY MEDICINE | Facility: CLINIC | Age: 63
End: 2023-10-10
Payer: COMMERCIAL

## 2023-10-10 NOTE — TELEPHONE ENCOUNTER
Called and spoke with pt. Pt states she is doing well, pain is well managed and she does not have any questions or concerns. Has follow up appointment scheduled for Monday, 10/16. Pt will reach out if she has any further questions.    Dilma Zafar RN

## 2023-10-13 ENCOUNTER — PATIENT OUTREACH (OUTPATIENT)
Dept: GASTROENTEROLOGY | Facility: CLINIC | Age: 63
End: 2023-10-13
Payer: COMMERCIAL

## 2023-10-16 ENCOUNTER — PATIENT OUTREACH (OUTPATIENT)
Dept: ONCOLOGY | Facility: CLINIC | Age: 63
End: 2023-10-16

## 2023-10-16 ENCOUNTER — OFFICE VISIT (OUTPATIENT)
Dept: SURGERY | Facility: CLINIC | Age: 63
End: 2023-10-16
Payer: COMMERCIAL

## 2023-10-16 ENCOUNTER — TRANSCRIBE ORDERS (OUTPATIENT)
Dept: OTHER | Age: 63
End: 2023-10-16

## 2023-10-16 ENCOUNTER — TELEPHONE (OUTPATIENT)
Dept: SURGERY | Facility: CLINIC | Age: 63
End: 2023-10-16

## 2023-10-16 VITALS
DIASTOLIC BLOOD PRESSURE: 79 MMHG | SYSTOLIC BLOOD PRESSURE: 133 MMHG | TEMPERATURE: 96.9 F | OXYGEN SATURATION: 95 % | BODY MASS INDEX: 43.59 KG/M2 | HEIGHT: 63 IN | WEIGHT: 246 LBS | HEART RATE: 75 BPM

## 2023-10-16 DIAGNOSIS — D05.12 DUCTAL CARCINOMA IN SITU (DCIS) OF LEFT BREAST: Primary | ICD-10-CM

## 2023-10-16 DIAGNOSIS — I10 ESSENTIAL HYPERTENSION WITH GOAL BLOOD PRESSURE LESS THAN 140/90: ICD-10-CM

## 2023-10-16 DIAGNOSIS — D05.90 BREAST CANCER IN SITU: Primary | ICD-10-CM

## 2023-10-16 DIAGNOSIS — E66.01 MORBID OBESITY (H): ICD-10-CM

## 2023-10-16 DIAGNOSIS — Z79.4 TYPE 2 DIABETES MELLITUS WITH CHRONIC KIDNEY DISEASE, WITH LONG-TERM CURRENT USE OF INSULIN, UNSPECIFIED CKD STAGE (H): ICD-10-CM

## 2023-10-16 DIAGNOSIS — E11.22 TYPE 2 DIABETES MELLITUS WITH CHRONIC KIDNEY DISEASE, WITH LONG-TERM CURRENT USE OF INSULIN, UNSPECIFIED CKD STAGE (H): ICD-10-CM

## 2023-10-16 DIAGNOSIS — N61.0 CELLULITIS OF BREAST: ICD-10-CM

## 2023-10-16 DIAGNOSIS — N63.22 MASS OF UPPER INNER QUADRANT OF LEFT BREAST: ICD-10-CM

## 2023-10-16 LAB
PATH REPORT.ADDENDUM SPEC: ABNORMAL
PATH REPORT.ADDENDUM SPEC: ABNORMAL
PATH REPORT.COMMENTS IMP SPEC: ABNORMAL
PATH REPORT.COMMENTS IMP SPEC: YES
PATH REPORT.FINAL DX SPEC: ABNORMAL
PATH REPORT.GROSS SPEC: ABNORMAL
PATH REPORT.MICROSCOPIC SPEC OTHER STN: ABNORMAL
PATH REPORT.MICROSCOPIC SPEC OTHER STN: ABNORMAL
PATH REPORT.RELEVANT HX SPEC: ABNORMAL
PATHOLOGY SYNOPTIC REPORT: ABNORMAL
PHOTO IMAGE: ABNORMAL

## 2023-10-16 PROCEDURE — 99024 POSTOP FOLLOW-UP VISIT: CPT | Performed by: STUDENT IN AN ORGANIZED HEALTH CARE EDUCATION/TRAINING PROGRAM

## 2023-10-16 RX ORDER — DOXYCYCLINE 100 MG/1
100 CAPSULE ORAL 2 TIMES DAILY
Qty: 20 CAPSULE | Refills: 0 | Status: ON HOLD | OUTPATIENT
Start: 2023-10-16 | End: 2023-10-22

## 2023-10-16 ASSESSMENT — PAIN SCALES - GENERAL: PAINLEVEL: NO PAIN (0)

## 2023-10-16 NOTE — PATIENT INSTRUCTIONS
- Perform dressing changes daily with xeroform gauze (clear gauze) directly over the skin then place white gauze over that. Change daily or as needed if saturated  - Antibiotic (doxycycline) prescribed, take for 10 days  - Return to clinic in 1 week for wound check and to discuss surgical options for your newly found DCIS  - Please call if any new questions or concerns

## 2023-10-16 NOTE — PROGRESS NOTES
New Patient Oncology Nurse Navigator Note     Referring provider: Self      Referred to (specialty:) Cancer Surgery      Date Referral Received: October 16, 2023     Evaluation for:  D05.90 (ICD-10-CM) - Breast cancer in situ    Clinical History (per Nurse review of records provided):      Melyssa had bilateral screening mammograms on 8/9/23 and a possible asymmetry in the left breast at the 10 o'clock position, middle depth. Diagnostic left breast imaging followed on 9/1 and left diagnostic mammogram with tomosynthesis: The previously noted asymmetry persists on today's exam and will be further evaluated with ultrasound. Left breast ultrasound: In the left breast 10 o'clock region approximately 6 cm from the nipple, there is a lobulated hypoechoic mass that is ill-defined and measures roughly 4.0 x 1.5 x 1.7 cm. Tissue diagnosis is recommended.    9/7/23 - [id number]  Left breast, 4.0 cm mass, 10:00, 6.0 cm from nipple, ultrasound guided 14 gauge needle core biopsies:  - Two foci of atypical ductal hyperplasia, 1.5 mm and 2.0 mm.  - Fibrocystic change, with apocrine metaplasia, florid usual ductal hyperplasia, columnar cell change.  - No evidence of invasive malignancy.  - No evidence of lymphovascular invasion    Dr. Kaia Elena placed a referral to general surgery on 9/29/23 and patient subsequently met with Bg Bryant MD who proceeded with left breast wire-localized mass excision.    10/6/23 - [id number]  A. Left breast excisional lumpectomy, oriented breast mass at 10:00, 6 cm from the nipple:  - Negative for invasive carcinoma  - Ductal carcinoma in situ, papillary, cribriform and solid  - Grade: Grade 1  - Tumor size: 37 x 32 x 20 mm  - Margins of resection status for carcinoma in-situ: POSITIVE MEDIAL MARGIN    There was an inadvertent cautery injury to the left breast inferior to the planned incision. The skin overlying this area was excised, however there has been blistering over that area consistent  with cellulitis, possibly due to some flap necrosis from the procedure. Dr. Bryant felt the cosmetic outcome with margin re-excision would be suboptimal given the large size of tissue that has already been excised. In addition, proceeding with simple mastectomy would allow excision of the affected skin. Provider ordered 10-day course of doxycycline for breast cellulitis and directed patient in wound care. She had follow up with provider on 10/16 and will visit again on 10/23.     Patient resides in [place], [place].    Do you have any previous cancer diagnoses? No    Records Location: See Bookmarked material     Records Needed:   All breast imaging for past 5 years    10/17 9:29 - Telephoned and spoke with Melyssa to assist in scheduling breast surgery consult. She is comfortable with video appointments and we will proceed with Dr. Olson on 10/19 by virtual consult. Writer received referral, reviewed for appropriate plan, and call transferred to New Patient Scheduling for completion.

## 2023-10-16 NOTE — LETTER
10/16/2023         RE: Bria Davis  [id number] [facility] [id number]        Dear Colleague,    Thank you for referring your patient, Bria Davis, to the [facility]. Please see a copy of my visit note below.    Breast Cancer Follow-up Visit    Bria Davis is a 63 year old female who presents to discuss the results from her recent left breast wire-guided lumpectomy.  This was done on 10/6/23 for initial pathology of atypical ductal hyperplasia.  Final pathology returned with DCIS.  ER positive, AR and HER2 status pending.  The medial margin was positive, and both the anterior and lateral margins were close (less than 2 mm).  There was an inadvertent cautery injury to the left breast inferior to the planned incision.  The skin overlying this area was excised, however there has been blistering over that area consistent with cellulitis, possibly due to some flap necrosis from the procedure.    I discussed the pathology report at length with the patient.  Given these findings, further surgical management is indicated. Re-excision of margins, mastectomy, bilateral mastectomies, sentinel lymph node biopsy with possible axillary node dissection and reconstructive options have been offered and discussed at length.    I feel the cosmetic outcome with margin re-excision would be suboptimal given the large size of tissue that has already been excised. In addition, proceeding with simple mastectomy would allow excision of the affected skin. Patient states she would like some time to think about what she would like to do.     All questions have been answered to the best of my ability.    Oncology consult:  Pending further management  Plastic surgery consult:  undecided    Bria is undecided and would like some time to think about her options. I will prescribe a 10 day course of doxycycline for her breast cellulitis and have instructed her on wound care (xeroform covered with  "gauze to catch drainage).     Patient to return to clinic in 1 week for wound check and to continue to discuss surgical options.    Imaging:  All imaging studies reviewed by me.  See imaging and Pathology reports below.      This note was created using voice recognition software. Undetected word substitutions or other errors may have occurred.     Time spent, greater than 50% of which was counseling and coordining care: 30 minutes.    Bg Bryant MD    Please route or send letter to:  Primary Care Provider (PCP) and Referring Provider    History:  Bria Davis is a 63 year old female who returns to clinic after undergoing a left breast lumpectomy for atypical ductal hyperplasia on 10/6/23.  A cautery injury to her left breast inferior to the planned incision was inadvertently created during the procedure.  The overlying skin was excised and closed primarily.  Since her surgery, she states that she has noticed some \"weeping\" from the area of excised skin.  She has been covering this area with gauze and wearing a sports bra with some improvement of symptoms.  Denies any fevers or chills.  The drainage is described as light pink and clear, no purulence or active bleeding.  Pain has been well controlled.    /79 (BP Location: Right arm, Patient Position: Chair, Cuff Size: Adult Regular)   Pulse 75   Temp 96.9  F (36.1  C) (Tympanic)   Ht 1.6 m (5' 2.99\")   Wt 111.6 kg (246 lb)   LMP 01/14/2016 (Approximate)   SpO2 95%   BMI 43.59 kg/m      Physical Exam  General appearance: well-nourished, sitting comfortably, no apparent distress  Neck: Supple, without masses or lymphadenopathy   Respirations:  Unlabored  Extremities: Without edema  Neurologic: alert, speech is clear, moves all extremities with good strength  Psychiatric: Mood and affect are appropriate  Skin: Without lesions, rashes, or jaundice  Breast:    Left breast with two transverse incisions on medial aspect of breast. Superior incision " healing well, edges well approximated. Inferior incision well approximated, but surrounded by area of blistering and erythema. No palpable fluctuance or induration within breast.       PATHOLOGY:  Final Diagnosis   A.  Left breast excisional lumpectomy, oriented breast mass at 10:00, 6 cm from the nipple:    - Negative for invasive carcinoma     - Ductal carcinoma in situ, papillary, cribriform and solid    - Grade:  Grade 1    - Tumor size: 37 x 32 x 20 mm    - Margins of resection status for carcinoma in-situ: POSITIVE MEDIAL MARGIN     Margin Status DCIS present at margin    Margin(s) Involved by DCIS Medial: Present at cauterized medial yellow inked margin in blocks A15, A16 and A17    Distance from DCIS to Anterior Margin 1.0 mm   Distance from DCIS to Posterior Margin 4.2 mm   Distance from DCIS to Superior Margin Greater than: 5 mm   Distance from DCIS to Inferior Margin Greater than: 5 mm   Distance from DCIS to Lateral Margin 1.9 mm        - Staging: pTis pN not assigned (no nodes submitted)    - Estrogen receptor:  POSITIVE (%, strong to weak nuclear staining)    - Progesterone receptor:  Pending     - Additional histopathologic findings: Microcalcifications associated with malignant and benign breast tissue, florid usual type ductal hyperplasia, intraductal papillomas, and columnar cell change.   Electronically signed by Dalton Leigh MD on 10/15/2023 at       IMAGING:  All imaging studies reviewed by me.    Recent Results (from the past 744 hour(s))   CT Abdomen Pelvis w Contrast    Narrative    EXAM: CT ABDOMEN PELVIS W CONTRAST  LOCATION: [facility]  DATE: 9/28/2023    INDICATION: suspect ventral hernia based on history.  difficult exam given body habitus  COMPARISON: CT abdomen/pelvis with contrast 06/07/2018  TECHNIQUE: CT scan of the abdomen and pelvis was performed following injection of IV contrast. Multiplanar reformats were obtained. Dose reduction  techniques were used.  CONTRAST: 100 mL Isovue 370    FINDINGS:   LOWER CHEST: Left hilar calcified nodes compatible with prior granulomatous disease.    HEPATOBILIARY: Hepatic steatosis. Unremarkable gallbladder.    PANCREAS: Normal.    SPLEEN: Numerous splenic calcified granulomas.    ADRENAL GLANDS: Normal.    KIDNEYS/BLADDER: Multiple renal cysts which require no dedicated follow-up. No hydronephrosis. Unremarkable urinary bladder.    BOWEL: Colonic diverticulosis. Normal appendix. No bowel obstruction.    LYMPH NODES: Normal.    VASCULATURE: Scattered atherosclerotic calcifications of the aortoiliac vessels without evidence of aneurysmal dilatation.    PELVIC ORGANS: Abnormal hypoattenuating appearance of the uterine body and fundus, not well characterized by CT but could represent a thickened endometrium. This finding appears new as compared to the prior CT from 6/7/2018.    MUSCULOSKELETAL: Moderate to large sized fat-containing umbilical hernia. No bowel containing hernia. Multilevel degenerative changes of the thoracolumbar spine. No acute osseous abnormality or suspicious bony lesion.      Impression    IMPRESSION:   1.  Moderate to large sized fat-containing umbilical hernia. No bowel containing hernia.  2.  Abnormal hypoattenuating appearance of the uterine body and fundus, not well characterized by CT but could represent a thickened endometrium. This finding appears new as compared to the prior CT from 06/07/2018. Recommend pelvic ultrasound for   further characterization.  3.  Hepatic steatosis.   US Pelvic Complete with Transvaginal    Narrative    EXAM: US PELVIC TRANSABDOMINAL AND TRANSVAGINAL  LOCATION: [facility]  DATE: 10/1/2023    INDICATION: Possibly thickened endometrium on CT dated 09/28/2023.  COMPARISON: CT 09/28/2023.  TECHNIQUE: Transabdominal scans were performed. Endovaginal ultrasound was performed to better visualize the adnexa and  endometrium.    FINDINGS:    UTERUS: 8.4 x 3.8 x 4.6 cm. Normal in size and position with no masses.    ENDOMETRIUM: 7 mm. Normal smooth endometrium.    RIGHT OVARY: 1.4 x 1.0 x 1.3 cm. Normal.    LEFT OVARY: Not visualized, likely due to overlying bowel gas. No left adnexal abnormalities are identified. cm. Normal.    No significant free fluid.      Impression    IMPRESSION:    Normal uterus and endometrium.   US Breast Wire Placement Left    Narrative    US BREAST WIRE PLACEMENT 1ST LESION LEFT, MA POST PROCEDURE LEFT;  10/6/2023 9:35 AM    HISTORY: Patient is undergoing lumpectomy.    FINDINGS: Following a discussion of risks and benefits, verbal and  written consent were obtained.  The marking clip placed at the time of  recent biopsy was targeted.  Using continuous imaging guidance, the  needle wire was advanced to the marking clip.  The needle was  subsequently withdrawn and adequate placement of the wire was  confirmed with two orthogonal mammographic views.  The films were  annotated and sent to surgery along with the patient.  There were no  complications.      Impression    IMPRESSION:  Successful placement of a wire for guided lumpectomy.      ZAKIYA CAMPO MD         SYSTEM ID:  [id number]   MA Post Procedure Left    Narrative    US BREAST WIRE PLACEMENT 1ST LESION LEFT, MA POST PROCEDURE LEFT;  10/6/2023 9:35 AM    HISTORY: Patient is undergoing lumpectomy.    FINDINGS: Following a discussion of risks and benefits, verbal and  written consent were obtained.  The marking clip placed at the time of  recent biopsy was targeted.  Using continuous imaging guidance, the  needle wire was advanced to the marking clip.  The needle was  subsequently withdrawn and adequate placement of the wire was  confirmed with two orthogonal mammographic views.  The films were  annotated and sent to surgery along with the patient.  There were no  complications.      Impression    IMPRESSION:  Successful placement of a wire  for guided lumpectomy.      ZAKIYA CAMPO MD         SYSTEM ID:  [id number]   MA Breast Specimen Left    Narrative    BREAST SPECIMEN RADIOGRAPH  10/6/2023    Radiograph of the specimen shows the distal aspect of the hookwire and  the asymmetry and calcifications seen on previous mammogram. The  biopsy clip is not included. These findings were discussed with Dr. Bryant at approximately 1210 hours on 10/6/2023.    ZAKIYA CAMPO MD         SYSTEM ID:  [id number]             Again, thank you for allowing me to participate in the care of your patient.        Sincerely,        Bg Bryant MD

## 2023-10-16 NOTE — TELEPHONE ENCOUNTER
Reason for Call:  Other question about referral    Detailed comments: Pt calling because she had an appointment today with Dr. Bryant and she was wanting to know if she was to call oncology or if they were going to call her. She was stated she wants to speak to the oncology doctor to discuss what surgery he recommends and does not want to make that decision until after she speaks with him. Number was given to pt to call oncology.     Phone Number Patient can be reached at: Cell number on file:    Telephone Information:   Mobile [phone]       Best Time:       Can we leave a detailed message on this number? YES    Call taken on 10/16/2023 at 12:03 PM by Julia Singleton MA

## 2023-10-16 NOTE — NURSING NOTE
"Initial /79 (BP Location: Right arm, Patient Position: Chair, Cuff Size: Adult Regular)   Pulse 75   Temp 96.9  F (36.1  C) (Tympanic)   Ht 1.6 m (5' 2.99\")   Wt 111.6 kg (246 lb)   LMP 01/14/2016 (Approximate)   SpO2 95%   BMI 43.59 kg/m   Estimated body mass index is 43.59 kg/m  as calculated from the following:    Height as of this encounter: 1.6 m (5' 2.99\").    Weight as of this encounter: 111.6 kg (246 lb). .  Lenin Marroquin on 10/16/2023 at 8:04 AM    "

## 2023-10-16 NOTE — PROGRESS NOTES
Breast Cancer Follow-up Visit    Bria Davis is a 63 year old female who presents to discuss the results from her recent left breast wire-guided lumpectomy.  This was done on 10/6/23 for initial pathology of atypical ductal hyperplasia.  Final pathology returned with DCIS.  ER positive, [place] and HER2 status pending.  The medial margin was positive, and both the anterior and lateral margins were close (less than 2 mm).  There was an inadvertent cautery injury to the left breast inferior to the planned incision.  The skin overlying this area was excised, however there has been blistering over that area consistent with cellulitis, possibly due to some flap necrosis from the procedure.    I discussed the pathology report at length with the patient.  Given these findings, further surgical management is indicated. Re-excision of margins, mastectomy, bilateral mastectomies, sentinel lymph node biopsy with possible axillary node dissection and reconstructive options have been offered and discussed at length.    I feel the cosmetic outcome with margin re-excision would be suboptimal given the large size of tissue that has already been excised. In addition, proceeding with simple mastectomy would allow excision of the affected skin. Patient states she would like some time to think about what she would like to do.     All questions have been answered to the best of my ability.    Oncology consult:  Pending further management  Plastic surgery consult:  undecided    Bria is undecided and would like some time to think about her options. I will prescribe a 10 day course of doxycycline for her breast cellulitis and have instructed her on wound care (xeroform covered with gauze to catch drainage).     Patient to return to clinic in 1 week for wound check and to continue to discuss surgical options.    Imaging:  All imaging studies reviewed by me.  See imaging and Pathology reports below.      This note was created using voice  "recognition software. Undetected word substitutions or other errors may have occurred.     Time spent, greater than 50% of which was counseling and coordining care: 30 minutes.    Bg Bryant MD    Please route or send letter to:  Primary Care Provider (PCP) and Referring Provider    History:  Bria Davis is a 63 year old female who returns to clinic after undergoing a left breast lumpectomy for atypical ductal hyperplasia on 10/6/23.  A cautery injury to her left breast inferior to the planned incision was inadvertently created during the procedure.  The overlying skin was excised and closed primarily.  Since her surgery, she states that she has noticed some \"weeping\" from the area of excised skin.  She has been covering this area with gauze and wearing a sports bra with some improvement of symptoms.  Denies any fevers or chills.  The drainage is described as light pink and clear, no purulence or active bleeding.  Pain has been well controlled.    /79 (BP Location: Right arm, Patient Position: Chair, Cuff Size: Adult Regular)   Pulse 75   Temp 96.9  F (36.1  C) (Tympanic)   Ht 1.6 m (5' 2.99\")   Wt 111.6 kg (246 lb)   LMP 01/14/2016 (Approximate)   SpO2 95%   BMI 43.59 kg/m      Physical Exam  General appearance: well-nourished, sitting comfortably, no apparent distress  Neck: Supple, without masses or lymphadenopathy   Respirations:  Unlabored  Extremities: Without edema  Neurologic: alert, speech is clear, moves all extremities with good strength  Psychiatric: Mood and affect are appropriate  Skin: Without lesions, rashes, or jaundice  Breast:    Left breast with two transverse incisions on medial aspect of breast. Superior incision healing well, edges well approximated. Inferior incision well approximated, but surrounded by area of blistering and erythema. No palpable fluctuance or induration within breast.       PATHOLOGY:  Final Diagnosis   A.  Left breast excisional lumpectomy, oriented " breast mass at 10:00, 6 cm from the nipple:    - Negative for invasive carcinoma     - Ductal carcinoma in situ, papillary, cribriform and solid    - Grade:  Grade 1    - Tumor size: 37 x 32 x 20 mm    - Margins of resection status for carcinoma in-situ: POSITIVE MEDIAL MARGIN     Margin Status DCIS present at margin    Margin(s) Involved by DCIS Medial: Present at cauterized medial yellow inked margin in blocks A15, A16 and A17    Distance from DCIS to Anterior Margin 1.0 mm   Distance from DCIS to Posterior Margin 4.2 mm   Distance from DCIS to Superior Margin Greater than: 5 mm   Distance from DCIS to Inferior Margin Greater than: 5 mm   Distance from DCIS to Lateral Margin 1.9 mm        - Staging: pTis pN not assigned (no nodes submitted)    - Estrogen receptor:  POSITIVE (%, strong to weak nuclear staining)    - Progesterone receptor:  Pending     - Additional histopathologic findings: Microcalcifications associated with malignant and benign breast tissue, florid usual type ductal hyperplasia, intraductal papillomas, and columnar cell change.   Electronically signed by Dalton Leigh MD on 10/15/2023 at       IMAGING:  All imaging studies reviewed by me.    Recent Results (from the past 744 hour(s))   CT Abdomen Pelvis w Contrast    Narrative    EXAM: CT ABDOMEN PELVIS W CONTRAST  LOCATION: [facility]  DATE: 9/28/2023    INDICATION: suspect ventral hernia based on history.  difficult exam given body habitus  COMPARISON: CT abdomen/pelvis with contrast 06/07/2018  TECHNIQUE: CT scan of the abdomen and pelvis was performed following injection of IV contrast. Multiplanar reformats were obtained. Dose reduction techniques were used.  CONTRAST: 100 mL Isovue 370    FINDINGS:   LOWER CHEST: Left hilar calcified nodes compatible with prior granulomatous disease.    HEPATOBILIARY: Hepatic steatosis. Unremarkable gallbladder.    PANCREAS: Normal.    SPLEEN: Numerous splenic  calcified granulomas.    ADRENAL GLANDS: Normal.    KIDNEYS/BLADDER: Multiple renal cysts which require no dedicated follow-up. No hydronephrosis. Unremarkable urinary bladder.    BOWEL: Colonic diverticulosis. Normal appendix. No bowel obstruction.    LYMPH NODES: Normal.    VASCULATURE: Scattered atherosclerotic calcifications of the aortoiliac vessels without evidence of aneurysmal dilatation.    PELVIC ORGANS: Abnormal hypoattenuating appearance of the uterine body and fundus, not well characterized by CT but could represent a thickened endometrium. This finding appears new as compared to the prior CT from 6/7/2018.    MUSCULOSKELETAL: Moderate to large sized fat-containing umbilical hernia. No bowel containing hernia. Multilevel degenerative changes of the thoracolumbar spine. No acute osseous abnormality or suspicious bony lesion.      Impression    IMPRESSION:   1.  Moderate to large sized fat-containing umbilical hernia. No bowel containing hernia.  2.  Abnormal hypoattenuating appearance of the uterine body and fundus, not well characterized by CT but could represent a thickened endometrium. This finding appears new as compared to the prior CT from 06/07/2018. Recommend pelvic ultrasound for   further characterization.  3.  Hepatic steatosis.   US Pelvic Complete with Transvaginal    Narrative    EXAM: US PELVIC TRANSABDOMINAL AND TRANSVAGINAL  LOCATION: [facility]  DATE: 10/1/2023    INDICATION: Possibly thickened endometrium on CT dated 09/28/2023.  COMPARISON: CT 09/28/2023.  TECHNIQUE: Transabdominal scans were performed. Endovaginal ultrasound was performed to better visualize the adnexa and endometrium.    FINDINGS:    UTERUS: 8.4 x 3.8 x 4.6 cm. Normal in size and position with no masses.    ENDOMETRIUM: 7 mm. Normal smooth endometrium.    RIGHT OVARY: 1.4 x 1.0 x 1.3 cm. Normal.    LEFT OVARY: Not visualized, likely due to overlying bowel gas. No left adnexal  abnormalities are identified. cm. Normal.    No significant free fluid.      Impression    IMPRESSION:    Normal uterus and endometrium.   US Breast Wire Placement Left    Narrative    US BREAST WIRE PLACEMENT 1ST LESION LEFT, MA POST PROCEDURE LEFT;  10/6/2023 9:35 AM    HISTORY: Patient is undergoing lumpectomy.    FINDINGS: Following a discussion of risks and benefits, verbal and  written consent were obtained.  The marking clip placed at the time of  recent biopsy was targeted.  Using continuous imaging guidance, the  needle wire was advanced to the marking clip.  The needle was  subsequently withdrawn and adequate placement of the wire was  confirmed with two orthogonal mammographic views.  The films were  annotated and sent to surgery along with the patient.  There were no  complications.      Impression    IMPRESSION:  Successful placement of a wire for guided lumpectomy.      ZAKIYA CAMPO MD         SYSTEM ID:  [id number]   MA Post Procedure Left    Narrative    US BREAST WIRE PLACEMENT 1ST LESION LEFT, MA POST PROCEDURE LEFT;  10/6/2023 9:35 AM    HISTORY: Patient is undergoing lumpectomy.    FINDINGS: Following a discussion of risks and benefits, verbal and  written consent were obtained.  The marking clip placed at the time of  recent biopsy was targeted.  Using continuous imaging guidance, the  needle wire was advanced to the marking clip.  The needle was  subsequently withdrawn and adequate placement of the wire was  confirmed with two orthogonal mammographic views.  The films were  annotated and sent to surgery along with the patient.  There were no  complications.      Impression    IMPRESSION:  Successful placement of a wire for guided lumpectomy.      ZAKIYA CAMPO MD         SYSTEM ID:  [id number]   MA Breast Specimen Left    Narrative    BREAST SPECIMEN RADIOGRAPH  10/6/2023    Radiograph of the specimen shows the distal aspect of the hookwire and  the asymmetry and calcifications seen on  previous mammogram. The  biopsy clip is not included. These findings were discussed with Dr. Bryant at approximately 1210 hours on 10/6/2023.    ZAKIYA CAMPO MD         SYSTEM ID:  [id number]

## 2023-10-17 ENCOUNTER — MYC MEDICAL ADVICE (OUTPATIENT)
Dept: FAMILY MEDICINE | Facility: CLINIC | Age: 63
End: 2023-10-17
Payer: COMMERCIAL

## 2023-10-17 DIAGNOSIS — B37.9 YEAST INFECTION: Primary | ICD-10-CM

## 2023-10-17 RX ORDER — FLUCONAZOLE 150 MG/1
150 TABLET ORAL
Qty: 3 TABLET | Refills: 0 | Status: SHIPPED | OUTPATIENT
Start: 2023-10-17 | End: 2023-10-24

## 2023-10-17 NOTE — PROGRESS NOTES
RECORDS STATUS - BREAST    RECORDS REQUESTED FROM: Epic   DATE REQUESTED:    NOTES DETAILS STATUS   OFFICE NOTE from referring provider SELF    OFFICE NOTE from surgeon Epic 10/16/23: Dr. Bg Bryant   DISCHARGE SUMMARY from hospital [facility] 10/06/23: [facility]   DISCHARGE REPORT from the ER [facility] 10/07/23: [facility]   OPERATIVE REPORT [facility] 10/06/23: BIOPSY, LEFT BREAST, WITH NEEDLE LOCALIZATION    MEDICATION LIST [facility]    LABS     PATHOLOGY REPORTS  (Tissue diagnosis, Stage, ER/MI percentage positive and intensity of staining, HER2 IHC, FISH, and all biopsies from breast and any distant metastasis)                 Reports in EPIC 10/06/23: [id number]  09/07/23: [id number]   IMAGING (NEED IMAGES & REPORT)     MAMMO PACS 10/06/23-12/28/12   ULTRASOUND PACS 10/06/23-09/01/23: US Breast

## 2023-10-19 ENCOUNTER — VIRTUAL VISIT (OUTPATIENT)
Dept: ONCOLOGY | Facility: CLINIC | Age: 63
End: 2023-10-19
Attending: SURGERY
Payer: COMMERCIAL

## 2023-10-19 ENCOUNTER — PRE VISIT (OUTPATIENT)
Dept: ONCOLOGY | Facility: CLINIC | Age: 63
End: 2023-10-19
Payer: COMMERCIAL

## 2023-10-19 ENCOUNTER — PATIENT OUTREACH (OUTPATIENT)
Dept: ONCOLOGY | Facility: CLINIC | Age: 63
End: 2023-10-19
Payer: COMMERCIAL

## 2023-10-19 DIAGNOSIS — D05.12 DUCTAL CARCINOMA IN SITU (DCIS) OF LEFT BREAST: Primary | ICD-10-CM

## 2023-10-19 PROCEDURE — 99215 OFFICE O/P EST HI 40 MIN: CPT | Mod: VID | Performed by: SURGERY

## 2023-10-19 ASSESSMENT — PAIN SCALES - GENERAL: PAINLEVEL: NO PAIN (0)

## 2023-10-19 NOTE — LETTER
10/19/2023         RE: Bria Davis  [address] [address] [address]        Dear Colleague,    Thank you for referring your patient, Bria Davis, to the [facility] [place]. Please see a copy of my visit note below.    Virtual Visit Details      The patient is a 63-year-old woman with a new diagnosis of DCIS.  On August 9 she underwent a screening mammogram which demonstrated an asymmetry in her left breast.  When I reviewed her mammograms I can see this asymmetry on previous mammograms.  On September 1 she underwent a diagnostic mammogram and ultrasound which demonstrated a 4 cm mass.  On September 7 she underwent a biopsy which demonstrated atypical ductal hyperplasia with no evidence of malignancy.  This was thought to be a discordant finding.  In October 6 she underwent a wire localized excisional breast biopsy.  9 cm of tissue were removed.  The pathology demonstrated grade 1 DCIS measuring 3.7 cm in size.  The medial margin was positive.  There were 2 other close margins.  During this operation she developed what sounds like a cautery burn to her skin.  So she underwent an excision of her skin.  She is now here to talk about treatment options.  She has had no prior history of any breast problems.  Her past medical history significant for type 2 diabetes asthma obesity hypertension and chronic kidney disease.  Her family history significant for paternal grandmother who had ovarian cancer and a paternal great aunt who had breast cancer.    Impression DCIS    Plan I talked about the treatment options.  I did recommend reexcision of the lumpectomy cavity at a minimum.  I also talked her about the role of a mastectomy with or without reconstruction.  Given her diabetes and other medical problems my first choice was to reexcise her margins with or without radiation therapy.  I have also recommended genetic testing.  Her and her  are going to think about  treatment options that they like to pursue and contact me next week.    The video started at 9:16 AM and ended at 9:35 AM and the total time of the consultation was 45 minutes which included reviewing her imaging the video visit and coordinating her care    Sincerely,        Arron Olson MD

## 2023-10-19 NOTE — PROGRESS NOTES
[facility]: Surgical Oncology Cancer Care Short Note                                     Discussion with Patient:                                                         Met with Melyssa after her consultation with Dr. Olson on 10/19/2023  We reviewed Dr. Olson's plan of care:     - Genetic testing on 10/23  - Genetic Referral placed  - Melyssa will call headley when she has made a decision regarding next steps.        Introduced self and role of RN Care Coordinator at [facility]. Provided direct contact information. Reviewed use of [employer] to contact team with questions or concerns while follow-up is being scheduled.       Writer answered all questions to the best of her ability. Approximately 10 minutes was spent in consultation with the patient.         KURTIS Denis, RN  RN Care Coordinator  [facility]  Surgical Oncology

## 2023-10-19 NOTE — PROGRESS NOTES
The patient is a 63-year-old woman with a new diagnosis of DCIS.  On August 9 she underwent a screening mammogram which demonstrated an asymmetry in her left breast.  When I reviewed her mammograms I can see this asymmetry on previous mammograms.  On September 1 she underwent a diagnostic mammogram and ultrasound which demonstrated a 4 cm mass.  On September 7 she underwent a biopsy which demonstrated atypical ductal hyperplasia with no evidence of malignancy.  This was thought to be a discordant finding.  In October 6 she underwent a wire localized excisional breast biopsy.  9 cm of tissue were removed.  The pathology demonstrated grade 1 DCIS measuring 3.7 cm in size.  The medial margin was positive.  There were 2 other close margins.  During this operation she developed what sounds like a cautery burn to her skin.  So she underwent an excision of her skin.  She is now here to talk about treatment options.  She has had no prior history of any breast problems.  Her past medical history significant for type 2 diabetes asthma obesity hypertension and chronic kidney disease.  Her family history significant for paternal grandmother who had ovarian cancer and a paternal great aunt who had breast cancer.    Impression DCIS    Plan I talked about the treatment options.  I did recommend reexcision of the lumpectomy cavity at a minimum.  I also talked her about the role of a mastectomy with or without reconstruction.  Given her diabetes and other medical problems my first choice was to reexcise her margins with or without radiation therapy.  I have also recommended genetic testing.  Her and her  are going to think about treatment options that they like to pursue and contact me next week.    The video started at 9:16 AM and ended at 9:35 AM and the total time of the consultation was 45 minutes which included reviewing her imaging the video visit and coordinating her care

## 2023-10-19 NOTE — NURSING NOTE
Is the patient currently in the state of [place]? YES    Visit mode:VIDEO    If the visit is dropped, the patient can be reconnected by: VIDEO VISIT: Text to cell phone:   Telephone Information:   Mobile [phone]       Will anyone else be joining the visit? NO  (If patient encounters technical issues they should call [phone]    How would you like to obtain your AVS? MyChart    Are changes needed to the allergy or medication list? No    Reason for visit: Consult (First visit - 2nd opinion)    Nehemias STRONG

## 2023-10-19 NOTE — PROGRESS NOTES
Virtual Visit Details     What Type Of Note Output Would You Prefer (Optional)?: Bullet Format Hpi Title: Evaluation of Skin Lesions How Severe Are Your Spot(S)?: mild Have Your Spot(S) Been Treated In The Past?: has not been treated

## 2023-10-19 NOTE — PROGRESS NOTES
Writer received referral to Cancer Risk Management/Genetic Counseling.    Referred for:     Newly diagnosed DCIS   Her family history significant for paternal grandmother who had ovarian cancer and a paternal great aunt who had breast cancer.     Referral reviewed for appropriate plan, and sent to New Patient Scheduling for completion.    Araceli Jones, RN, BSN  Oncology New Patient Nurse Navigator   [facility]  [phone]

## 2023-10-20 ENCOUNTER — TELEPHONE (OUTPATIENT)
Dept: SURGERY | Facility: CLINIC | Age: 63
End: 2023-10-20
Payer: COMMERCIAL

## 2023-10-20 ENCOUNTER — HOSPITAL ENCOUNTER (INPATIENT)
Facility: CLINIC | Age: 63
LOS: 2 days | Discharge: HOME | End: 2023-10-22
Attending: INTERNAL MEDICINE | Admitting: SURGERY
Payer: COMMERCIAL

## 2023-10-20 ENCOUNTER — MYC MEDICAL ADVICE (OUTPATIENT)
Dept: SURGERY | Facility: CLINIC | Age: 63
End: 2023-10-20
Payer: COMMERCIAL

## 2023-10-20 ENCOUNTER — ANCILLARY PROCEDURE (OUTPATIENT)
Dept: ULTRASOUND IMAGING | Facility: CLINIC | Age: 63
End: 2023-10-20
Attending: INTERNAL MEDICINE
Payer: COMMERCIAL

## 2023-10-20 DIAGNOSIS — T14.8XXA WOUND INFECTION: Primary | ICD-10-CM

## 2023-10-20 DIAGNOSIS — N61.1 BREAST ABSCESS: ICD-10-CM

## 2023-10-20 DIAGNOSIS — L08.9 WOUND INFECTION: Primary | ICD-10-CM

## 2023-10-20 LAB
ACANTHOCYTES BLD QL SMEAR: SLIGHT
ALBUMIN SERPL BCG-MCNC: 4 G/DL (ref 3.5–5.2)
ALP SERPL-CCNC: 65 U/L (ref 35–104)
ALT SERPL W P-5'-P-CCNC: 14 U/L (ref 0–50)
ANION GAP SERPL CALCULATED.3IONS-SCNC: 14 MMOL/L (ref 7–15)
AST SERPL W P-5'-P-CCNC: 21 U/L (ref 0–45)
AUER BODIES BLD QL SMEAR: ABNORMAL
BASO STIPL BLD QL SMEAR: ABNORMAL
BASO+EOS+MONOS # BLD AUTO: ABNORMAL 10*3/UL
BASO+EOS+MONOS NFR BLD AUTO: ABNORMAL %
BASOPHILS # BLD AUTO: 0.1 10E3/UL (ref 0–0.2)
BASOPHILS NFR BLD AUTO: 1 %
BILIRUB SERPL-MCNC: 0.4 MG/DL
BITE CELLS BLD QL SMEAR: ABNORMAL
BLISTER CELLS BLD QL SMEAR: ABNORMAL
BUN SERPL-MCNC: 8.2 MG/DL (ref 8–23)
BURR CELLS BLD QL SMEAR: ABNORMAL
CALCIUM SERPL-MCNC: 9.2 MG/DL (ref 8.8–10.2)
CHLORIDE SERPL-SCNC: 99 MMOL/L (ref 98–107)
CREAT SERPL-MCNC: 0.5 MG/DL (ref 0.51–0.95)
DACRYOCYTES BLD QL SMEAR: ABNORMAL
DEPRECATED HCO3 PLAS-SCNC: 22 MMOL/L (ref 22–29)
EGFRCR SERPLBLD CKD-EPI 2021: >90 ML/MIN/1.73M2
ELLIPTOCYTES BLD QL SMEAR: ABNORMAL
EOSINOPHIL # BLD AUTO: 0.3 10E3/UL (ref 0–0.7)
EOSINOPHIL NFR BLD AUTO: 3 %
ERYTHROCYTE [DISTWIDTH] IN BLOOD BY AUTOMATED COUNT: 13.2 % (ref 10–15)
FRAGMENTS BLD QL SMEAR: ABNORMAL
GLUCOSE BLDC GLUCOMTR-MCNC: 155 MG/DL (ref 70–99)
GLUCOSE BLDC GLUCOMTR-MCNC: 268 MG/DL (ref 70–99)
GLUCOSE SERPL-MCNC: 251 MG/DL (ref 70–99)
HCT VFR BLD AUTO: 40.1 % (ref 35–47)
HGB BLD-MCNC: 13.3 G/DL (ref 11.7–15.7)
HGB C CRYSTALS: ABNORMAL
HOWELL-JOLLY BOD BLD QL SMEAR: ABNORMAL
IMM GRANULOCYTES # BLD: 0.1 10E3/UL
IMM GRANULOCYTES NFR BLD: 1 %
LACTATE SERPL-SCNC: 1.6 MMOL/L (ref 0.7–2)
LACTATE SERPL-SCNC: 2.6 MMOL/L (ref 0.7–2)
LYMPHOCYTES # BLD AUTO: 1.6 10E3/UL (ref 0.8–5.3)
LYMPHOCYTES NFR BLD AUTO: 14 %
MCH RBC QN AUTO: 29.7 PG (ref 26.5–33)
MCHC RBC AUTO-ENTMCNC: 33.2 G/DL (ref 31.5–36.5)
MCV RBC AUTO: 90 FL (ref 78–100)
MONOCYTES # BLD AUTO: 0.6 10E3/UL (ref 0–1.3)
MONOCYTES NFR BLD AUTO: 6 %
MRSA DNA SPEC QL NAA+PROBE: NEGATIVE
NEUTROPHILS # BLD AUTO: 8.4 10E3/UL (ref 1.6–8.3)
NEUTROPHILS NFR BLD AUTO: 75 %
NEUTS HYPERSEG BLD QL SMEAR: ABNORMAL
NRBC # BLD AUTO: 0 10E3/UL
NRBC BLD AUTO-RTO: 0 /100
PLAT MORPH BLD: ABNORMAL
PLATELET # BLD AUTO: 268 10E3/UL (ref 150–450)
POLYCHROMASIA BLD QL SMEAR: SLIGHT
POTASSIUM SERPL-SCNC: 4.3 MMOL/L (ref 3.4–5.3)
PROT SERPL-MCNC: 6.4 G/DL (ref 6.4–8.3)
RBC # BLD AUTO: 4.48 10E6/UL (ref 3.8–5.2)
RBC AGGLUT BLD QL: ABNORMAL
RBC MORPH BLD: ABNORMAL
ROULEAUX BLD QL SMEAR: ABNORMAL
SA TARGET DNA: POSITIVE
SICKLE CELLS BLD QL SMEAR: ABNORMAL
SMUDGE CELLS BLD QL SMEAR: ABNORMAL
SODIUM SERPL-SCNC: 135 MMOL/L (ref 135–145)
SPHEROCYTES BLD QL SMEAR: ABNORMAL
STOMATOCYTES BLD QL SMEAR: ABNORMAL
TARGETS BLD QL SMEAR: ABNORMAL
TOXIC GRANULES BLD QL SMEAR: ABNORMAL
VARIANT LYMPHS BLD QL SMEAR: ABNORMAL
WBC # BLD AUTO: 10.9 10E3/UL (ref 4–11)

## 2023-10-20 PROCEDURE — 87040 BLOOD CULTURE FOR BACTERIA: CPT | Performed by: NURSE PRACTITIONER

## 2023-10-20 PROCEDURE — 250N000011 HC RX IP 250 OP 636: Performed by: INTERNAL MEDICINE

## 2023-10-20 PROCEDURE — 80053 COMPREHEN METABOLIC PANEL: CPT | Performed by: NURSE PRACTITIONER

## 2023-10-20 PROCEDURE — 83605 ASSAY OF LACTIC ACID: CPT | Performed by: NURSE PRACTITIONER

## 2023-10-20 PROCEDURE — 99285 EMERGENCY DEPT VISIT HI MDM: CPT | Mod: 25

## 2023-10-20 PROCEDURE — 258N000003 HC RX IP 258 OP 636: Performed by: NURSE PRACTITIONER

## 2023-10-20 PROCEDURE — 93005 ELECTROCARDIOGRAM TRACING: CPT

## 2023-10-20 PROCEDURE — 250N000013 HC RX MED GY IP 250 OP 250 PS 637: Performed by: STUDENT IN AN ORGANIZED HEALTH CARE EDUCATION/TRAINING PROGRAM

## 2023-10-20 PROCEDURE — 258N000003 HC RX IP 258 OP 636: Performed by: INTERNAL MEDICINE

## 2023-10-20 PROCEDURE — 250N000011 HC RX IP 250 OP 636: Performed by: STUDENT IN AN ORGANIZED HEALTH CARE EDUCATION/TRAINING PROGRAM

## 2023-10-20 PROCEDURE — 82962 GLUCOSE BLOOD TEST: CPT

## 2023-10-20 PROCEDURE — 36415 COLL VENOUS BLD VENIPUNCTURE: CPT | Performed by: NURSE PRACTITIONER

## 2023-10-20 PROCEDURE — 96365 THER/PROPH/DIAG IV INF INIT: CPT

## 2023-10-20 PROCEDURE — 93010 ELECTROCARDIOGRAM REPORT: CPT | Mod: 59 | Performed by: INTERNAL MEDICINE

## 2023-10-20 PROCEDURE — 76642 ULTRASOUND BREAST LIMITED: CPT

## 2023-10-20 PROCEDURE — 87641 MR-STAPH DNA AMP PROBE: CPT | Performed by: STUDENT IN AN ORGANIZED HEALTH CARE EDUCATION/TRAINING PROGRAM

## 2023-10-20 PROCEDURE — 120N000002 HC R&B MED SURG/OB UMMC

## 2023-10-20 PROCEDURE — 99285 EMERGENCY DEPT VISIT HI MDM: CPT | Mod: 25 | Performed by: INTERNAL MEDICINE

## 2023-10-20 PROCEDURE — 0H9U3ZX DRAINAGE OF LEFT BREAST, PERCUTANEOUS APPROACH, DIAGNOSTIC: ICD-10-PCS | Performed by: SURGERY

## 2023-10-20 PROCEDURE — 87205 SMEAR GRAM STAIN: CPT | Performed by: STUDENT IN AN ORGANIZED HEALTH CARE EDUCATION/TRAINING PROGRAM

## 2023-10-20 PROCEDURE — 85025 COMPLETE CBC W/AUTO DIFF WBC: CPT | Performed by: NURSE PRACTITIONER

## 2023-10-20 PROCEDURE — 87075 CULTR BACTERIA EXCEPT BLOOD: CPT | Performed by: STUDENT IN AN ORGANIZED HEALTH CARE EDUCATION/TRAINING PROGRAM

## 2023-10-20 PROCEDURE — 96368 THER/DIAG CONCURRENT INF: CPT

## 2023-10-20 PROCEDURE — 76642 ULTRASOUND BREAST LIMITED: CPT | Mod: 26 | Performed by: INTERNAL MEDICINE

## 2023-10-20 PROCEDURE — 250N000011 HC RX IP 250 OP 636: Mod: JZ | Performed by: NURSE PRACTITIONER

## 2023-10-20 RX ORDER — PIPERACILLIN SODIUM, TAZOBACTAM SODIUM 4; .5 G/20ML; G/20ML
4.5 INJECTION, POWDER, LYOPHILIZED, FOR SOLUTION INTRAVENOUS ONCE
Status: COMPLETED | OUTPATIENT
Start: 2023-10-20 | End: 2023-10-20

## 2023-10-20 RX ORDER — GABAPENTIN 100 MG/1
100 CAPSULE ORAL 3 TIMES DAILY
Status: DISCONTINUED | OUTPATIENT
Start: 2023-10-21 | End: 2023-10-22 | Stop reason: HOSPADM

## 2023-10-20 RX ORDER — FLUTICASONE FUROATE AND VILANTEROL 100; 25 UG/1; UG/1
1 POWDER RESPIRATORY (INHALATION) DAILY
Status: DISCONTINUED | OUTPATIENT
Start: 2023-10-21 | End: 2023-10-22 | Stop reason: HOSPADM

## 2023-10-20 RX ORDER — HYDROCHLOROTHIAZIDE 12.5 MG/1
12.5 TABLET ORAL DAILY
Status: DISCONTINUED | OUTPATIENT
Start: 2023-10-21 | End: 2023-10-22 | Stop reason: HOSPADM

## 2023-10-20 RX ORDER — METOPROLOL SUCCINATE 25 MG/1
25 TABLET, EXTENDED RELEASE ORAL DAILY
Status: DISCONTINUED | OUTPATIENT
Start: 2023-10-21 | End: 2023-10-22 | Stop reason: HOSPADM

## 2023-10-20 RX ORDER — LOSARTAN POTASSIUM 100 MG/1
100 TABLET ORAL DAILY
Status: DISCONTINUED | OUTPATIENT
Start: 2023-10-21 | End: 2023-10-22 | Stop reason: HOSPADM

## 2023-10-20 RX ORDER — ACETAMINOPHEN 650 MG/1
650 SUPPOSITORY RECTAL EVERY 6 HOURS PRN
Status: DISCONTINUED | OUTPATIENT
Start: 2023-10-20 | End: 2023-10-22 | Stop reason: HOSPADM

## 2023-10-20 RX ORDER — PANTOPRAZOLE SODIUM 40 MG/1
40 TABLET, DELAYED RELEASE ORAL
Status: DISCONTINUED | OUTPATIENT
Start: 2023-10-21 | End: 2023-10-22 | Stop reason: HOSPADM

## 2023-10-20 RX ORDER — DEXTROSE MONOHYDRATE 25 G/50ML
25-50 INJECTION, SOLUTION INTRAVENOUS
Status: DISCONTINUED | OUTPATIENT
Start: 2023-10-20 | End: 2023-10-22 | Stop reason: HOSPADM

## 2023-10-20 RX ORDER — ONDANSETRON 4 MG/1
4 TABLET, ORALLY DISINTEGRATING ORAL EVERY 6 HOURS PRN
Status: DISCONTINUED | OUTPATIENT
Start: 2023-10-20 | End: 2023-10-21

## 2023-10-20 RX ORDER — ACETAMINOPHEN 325 MG/1
650 TABLET ORAL EVERY 6 HOURS PRN
Status: DISCONTINUED | OUTPATIENT
Start: 2023-10-20 | End: 2023-10-22 | Stop reason: HOSPADM

## 2023-10-20 RX ORDER — ENOXAPARIN SODIUM 100 MG/ML
40 INJECTION SUBCUTANEOUS EVERY 24 HOURS
Status: DISCONTINUED | OUTPATIENT
Start: 2023-10-21 | End: 2023-10-22 | Stop reason: HOSPADM

## 2023-10-20 RX ORDER — AMPICILLIN AND SULBACTAM 2; 1 G/1; G/1
3 INJECTION, POWDER, FOR SOLUTION INTRAMUSCULAR; INTRAVENOUS EVERY 6 HOURS
Status: DISCONTINUED | OUTPATIENT
Start: 2023-10-20 | End: 2023-10-22 | Stop reason: HOSPADM

## 2023-10-20 RX ORDER — NICOTINE POLACRILEX 4 MG
15-30 LOZENGE BUCCAL
Status: DISCONTINUED | OUTPATIENT
Start: 2023-10-20 | End: 2023-10-22 | Stop reason: HOSPADM

## 2023-10-20 RX ORDER — MONTELUKAST SODIUM 10 MG/1
10 TABLET ORAL AT BEDTIME
Status: DISCONTINUED | OUTPATIENT
Start: 2023-10-20 | End: 2023-10-22 | Stop reason: HOSPADM

## 2023-10-20 RX ORDER — ONDANSETRON 2 MG/ML
4 INJECTION INTRAMUSCULAR; INTRAVENOUS EVERY 6 HOURS PRN
Status: DISCONTINUED | OUTPATIENT
Start: 2023-10-20 | End: 2023-10-21

## 2023-10-20 RX ADMIN — VANCOMYCIN HYDROCHLORIDE 2000 MG: 1 INJECTION, POWDER, LYOPHILIZED, FOR SOLUTION INTRAVENOUS at 13:58

## 2023-10-20 RX ADMIN — PIPERACILLIN SODIUM AND TAZOBACTAM SODIUM 4.5 G: 4; .5 INJECTION, POWDER, LYOPHILIZED, FOR SOLUTION INTRAVENOUS at 13:40

## 2023-10-20 RX ADMIN — SODIUM CHLORIDE 1000 ML: 9 INJECTION, SOLUTION INTRAVENOUS at 13:12

## 2023-10-20 RX ADMIN — AMPICILLIN SODIUM AND SULBACTAM SODIUM 3 G: 2; 1 INJECTION, POWDER, FOR SOLUTION INTRAMUSCULAR; INTRAVENOUS at 20:01

## 2023-10-20 RX ADMIN — MONTELUKAST 10 MG: 10 TABLET, FILM COATED ORAL at 23:29

## 2023-10-20 ASSESSMENT — ACTIVITIES OF DAILY LIVING (ADL)
ADLS_ACUITY_SCORE: 35
ADLS_ACUITY_SCORE: 33
ADLS_ACUITY_SCORE: 35

## 2023-10-20 NOTE — PROCEDURES
Bedside Aspiration of Left breast fluid collection  October 20, 2023    Procedure: Fluid aspiration of left breast  Pre-procedure diagnosis: Surgical site infection, post-operative breast seroma  Post-procedure Diagnosis: Same as pre-procedure  Resident(s): Dat Preciado MD, Anita Crespo MD  Attending Surgeon: Nemesio Arreola MD    Prior to bedside aspiration, the patient was advised on the indications, Pros, and Cons of aspiration including the risks of bleeding, infection, and damage to surrounding structures of the breast, as well as alternatives to the procedure. The patient expressed understanding of the procedure and wished to proceed.    The patient's left breast was evaluated with POC US which revealed a large several cm fluid collection beneath her lumpectomy incision and the necrotic skin flap. This area was the prepped Chlora Prep stick. No local anesthetic was used given the insensate skin. Under ultrasound guidance, an 18g needle was inserted into the fluid collection cavity. Roughly 8cc of serosanguinous fluid was aspirated from the cavity and placed into aerobic and anaerobic collecting tubes. After withdrawing the needle, hemostasis was ensured and there was no further drainage from the surgical site. The wound was dressed with 4x4 gauze top dressing. Fluid samples were then sent to the lab for culture. The patient tolerated the procedure well and was without any complaints.     Specimen(s):   1. Left breast fluid collection aspirate, sent for aerobic culture  2. Left breast fluid collection aspirate, sent for anaerobic culture.       Chief resident was present and Staff Surgeon, Dr. Arreola was available at all times during the procedure.     Please page if questions,    Dat Preciado MD, MS  PGY-3, General Surgery

## 2023-10-20 NOTE — TELEPHONE ENCOUNTER
Pt had lumpectomy done 10/10 and seen for POP 10/16.  Since then, area between incisions has started to turn a little green.  Does state that there isn't any puss or pain in the area, but has no feeling around the nipple at all.  Can take and upload picture if requested and is scheduled to RTC on Mon 10/23.

## 2023-10-20 NOTE — PHARMACY-VANCOMYCIN DOSING SERVICE
"Pharmacy Vancomycin Initial Note  Date of Service 2023  Patient's  1960  63 year old, female    Indication: Skin and Soft Tissue Infection    Current estimated CrCl = Estimated Creatinine Clearance: 140.2 mL/min (A) (based on SCr of 0.5 mg/dL (L)).    Creatinine for last 3 days  10/20/2023:  1:08 PM Creatinine 0.50 mg/dL    Recent Vancomycin Level(s) for last 3 days  No results found for requested labs within last 3 days.      Vancomycin IV Administrations (past 72 hours)                     vancomycin (VANCOCIN) 2,000 mg in sodium chloride 0.9 % 500 mL intermittent infusion (mg) 2,000 mg New Bag 10/20/23 1358                    Nephrotoxins and other renal medications (From now, onward)      Start     Dose/Rate Route Frequency Ordered Stop    10/21/23 0200  vancomycin (VANCOCIN) 1,500 mg in 0.9% NaCl 250 mL intermittent infusion         1,500 mg  over 90 Minutes Intravenous EVERY 12 HOURS 10/20/23 1405      10/20/23 1400  vancomycin (VANCOCIN) 2,000 mg in sodium chloride 0.9 % 500 mL intermittent infusion         2,000 mg  over 120 Minutes Intravenous ONCE 10/20/23 1323      10/20/23 1320  piperacillin-tazobactam (ZOSYN) 4.5 g vial to attach to  mL bag        Note to Pharmacy: For [facility], [facility] and [facility]: For Zosyn-naive patients, use the \"Zosyn initial dose + extended infusion\" order panel.    4.5 g  over 30 Minutes Intravenous ONCE 10/20/23 1318              Contrast Orders - past 72 hours (72h ago, onward)      None            [employer] Prediction of Planned Initial Vancomycin Regimen  Loading dose: 2000 mg IV 13:58 on 10/20/2023  Regimen: 1500 mg IV every 12 hours.  Start time: 01:58 on 10/21/2023  Exposure target: AUC24 (range)400-600 mg/L.hr   AUC24,ss: 577 mg/L.hr  Probability of AUC24 > 400: 75 %  Ctrough,ss: 15.1 mg/L  Probability of Ctrough,ss > 20: 37 %  Probability of nephrotoxicity (Lodise DAVID ): 10 %          Plan:  Give vancomycin 2000mg IV x1 now, then start vancomycin 1500mg " IV q12 hours  Vancomycin monitoring method: AUC  Vancomycin therapeutic monitoring goal: 400-600 mg*h/L  Pharmacy will check vancomycin levels as appropriate in 1-3 Days.    Serum creatinine levels will be ordered daily for the first week of therapy and at least twice weekly for subsequent weeks.      Nicholas Bucio, PharmD, BCPS
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
## 2023-10-20 NOTE — ED TRIAGE NOTES
Pt comes in for a post-op problem. Pt had a lumpectomy on October 6th and had two incisions. Over the last 2 days pt has noticed that between the two incisions the wound has turned green and redness had been there the whole time but is worse as well.      Triage Assessment (Adult)       Row Name 10/20/23 1148          Triage Assessment    Airway WDL WDL        Respiratory WDL    Respiratory WDL WDL        Skin Circulation/Temperature WDL    Skin Circulation/Temperature WDL X  wound between two incisions on left breast is green        Cardiac WDL    Cardiac WDL WDL        Peripheral/Neurovascular WDL    Peripheral Neurovascular WDL WDL        Cognitive/Neuro/Behavioral WDL    Cognitive/Neuro/Behavioral WDL WDL

## 2023-10-20 NOTE — ED PROVIDER NOTES
ED Provider Note  [facility]      History     Chief Complaint   Patient presents with    Post-op Problem     HPI  Bria Davis is a 63 year old female with past medical history significant for type 2 diabetes, obesity, HTN, CKD 2 who presents for evaluation of a wound on her breast following a lumpectomy on 10/6/2023.  Patient reports she was given IV antibiotics during the procedure, was seen in the emergency department on 10/7/2023 as she was having drainage from the incision site and started on Keflex.  States she completed this course of Keflex and was seen in her clinic on 10/16/2023 and started on doxycycline which she has been taking.  Patient states she has noticed changes to the skin with increasing greenish color over the last several days since starting on the doxycycline.  Her spouse also notes that there has been increasing redness and warmth around the site that he has noticed over the last several days.  Patient denies any fever, chills, nausea, vomiting or other illness or systemic symptoms.    Past Medical History  Past Medical History:   Diagnosis Date    Arthritis     Asthma     Diabetes mellitus (H)     Esophageal reflux     Hypertension     Obese     Other chronic sinusitis     PONV (postoperative nausea and vomiting)      Past Surgical History:   Procedure Laterality Date    BIOPSY  9/7/2023    BIOPSY BREAST NEEDLE LOCALIZATION Left 10/6/2023    Procedure: BIOPSY, LEFT BREAST, WITH NEEDLE LOCALIZATION;  Surgeon: Bg Bryant MD;  Location: [facility]    COLONOSCOPY  01/31/2002    COLONOSCOPY  10/26/2012    Procedure: COLONOSCOPY;  Colonoscopy;  Surgeon: Margret Sales MD;  Location: [facility]    COLONOSCOPY N/A 11/08/2019    Procedure: COLONOSCOPY, WITH POLYPECTOMY AND BIOPSY;  Surgeon: Ariel Heck MD;  Location: [facility]    CV LEFT HEART CATH N/A 09/12/2019    Procedure: Left Heart Cath;  Surgeon: Mike Sanon MD;  Location:  HEART CARDIAC CATH  LAB    CV LEFT VENTRICULOGRAM N/A 09/12/2019    Procedure: Left Ventriculogram;  Surgeon: Mike Sanon MD;  Location:  HEART CARDIAC CATH LAB    ENT SURGERY      ESOPHAGOSCOPY, GASTROSCOPY, DUODENOSCOPY (EGD), COMBINED N/A 11/08/2019    Procedure: ESOPHAGOGASTRODUODENOSCOPY, WITH BIOPSY;  Surgeon: Ariel Heck MD;  Location: [facility]    EYE SURGERY  2022    GYN SURGERY      Hydroablation 2007, polyp removal 2018    INJECT EPIDURAL LUMBAR  12/07/2010    INJECT EPIDURAL LUMBAR performed by GENERIC ANESTHESIA PROVIDER at [facility] OR    INJECT EPIDURAL LUMBAR  01/17/2011    INJECT EPIDURAL LUMBAR performed by GENERIC ANESTHESIA PROVIDER at [facility] OR    left long finger pulley release Left 07/2020    RELEASE CARPAL TUNNEL  06/19/2012    Procedure: RELEASE CARPAL TUNNEL;  Right Carpal Tunnel Release;  Surgeon: Mike Sparrow MD;  Location: [facility]    RELEASE CARPAL TUNNEL  11/09/2012    Procedure: RELEASE CARPAL TUNNEL;  Left Carpal Tunnel Release;  Surgeon: Mike Sparrow MD;  Location: [facility]    REPAIR TENDON ACHILLES Left 12/26/2019    Procedure: Left Foot: Achilles Tendon Repair/Remodel/Reattachment; calcaneal prominence removal;  Surgeon: Felix Watkins DPM;  Location: [facility]    SURGICAL HISTORY OF -   04/10/2000    bilateral total ethmoidectomies, bilateral maxillary antrostomies, bilateral SMR of inferior turbinates, reduction of lt turbinate porter bullosa     acetaminophen (TYLENOL) 325 MG tablet  albuterol (PROVENTIL) (2.5 MG/3ML) 0.083% neb solution  albuterol (VENTOLIN HFA) 108 (90 Base) MCG/ACT inhaler  Ascorbic Acid (VITAMIN C) 500 MG CAPS  aspirin (ASA) 81 MG EC tablet  blood glucose (NO BRAND SPECIFIED) lancets standard  blood glucose (NO BRAND SPECIFIED) test strip  blood glucose monitoring (NO BRAND SPECIFIED) meter device kit  Calcium Carb-Cholecalciferol (CALCIUM-VITAMIN D) 600-400 MG-UNIT TABS  doxycycline hyclate (VIBRAMYCIN) 100 MG capsule  esomeprazole (NEXIUM) 40 MG DR capsule  ferrous  sulfate 140 (45 Fe) MG TBCR CR tablet  fluconazole (DIFLUCAN) 150 MG tablet  fluticasone-salmeterol (ADVAIR DISKUS) 100-50 MCG/ACT inhaler  gabapentin (NEURONTIN) 100 MG capsule  hydrochlorothiazide (HYDRODIURIL) 12.5 MG tablet  insulin aspart (NOVOLOG FLEXPEN) 100 UNIT/ML pen  insulin glargine U-300 (TOUJEO MAX SOLOSTAR) 300 UNIT/ML (2 units dial) pen  insulin pen needle (BD PEDRO LUIS U/F) 32G X 4 MM miscellaneous  losartan (COZAAR) 100 MG tablet  metFORMIN (GLUCOPHAGE XR) 500 MG 24 hr tablet  metoclopramide (REGLAN) 10 MG tablet  metoprolol succinate ER (TOPROL XL) 25 MG 24 hr tablet  Microlet Lancets MISC  montelukast (SINGULAIR) 10 MG tablet  Multiple Vitamins-Minerals (MULTIVITAMIN ADULTS 50+) TABS  ondansetron (ZOFRAN) 4 MG tablet  oxyCODONE (ROXICODONE) 5 MG tablet  pseudoePHEDrine (SUDAFED) 30 MG tablet  rosuvastatin (CRESTOR) 10 MG tablet  Semaglutide, 2 MG/DOSE, (OZEMPIC, 2 MG/DOSE,) 8 MG/3ML pen  triamcinolone (KENALOG) 0.1 % external cream      Allergies   Allergen Reactions    Lisinopril Cough    Tape [Adhesive Tape] Rash     Family History  Family History   Problem Relation Age of Onset    Hypertension Mother     Diabetes Mother     Respiratory Mother         asthma-COPD    Hypertension Father     Heart Disease Father     Cerebrovascular Disease Father     Cardiovascular Father     Breast Cancer Maternal Grandmother     Cancer Brother     Diabetes Brother     Respiratory Brother         copd    Chronic Obstructive Pulmonary Disease Brother     Depression Sister     Respiratory Sister         copd    Chronic Obstructive Pulmonary Disease Sister     Depression Sister     Chronic Obstructive Pulmonary Disease Sister     Anxiety Disorder Sister     Depression Sister     Chronic Obstructive Pulmonary Disease Brother     Kidney failure Brother     Depression Brother     Asthma Brother     Diabetes Son     Anxiety Disorder Other     Obesity Other     Obesity Other      Social History   Social History     Tobacco  Use    Smoking status: Never    Smokeless tobacco: Never   Vaping Use    Vaping Use: Never used   Substance Use Topics    Alcohol use: No    Drug use: No         A medically appropriate review of systems was performed with pertinent positives and negatives noted in the HPI, and all other systems negative.    Physical Exam   BP: (!) 151/80  Pulse: 91  Temp: 98.2  F (36.8  C)  Resp: 18  SpO2: 97 %  Physical Exam  Vitals and nursing note reviewed.   Constitutional:       Appearance: Normal appearance. She is not toxic-appearing.   HENT:      Head: Normocephalic and atraumatic.   Cardiovascular:      Rate and Rhythm: Normal rate and regular rhythm.   Pulmonary:      Effort: Pulmonary effort is normal.      Breath sounds: Normal breath sounds.   Skin:     Capillary Refill: Capillary refill takes less than 2 seconds.      Comments: See images of left breast below   Neurological:      General: No focal deficit present.      Mental Status: She is alert and oriented to person, place, and time.   Psychiatric:         Mood and Affect: Mood normal.         Behavior: Behavior normal.             ED Course, Procedures, & Data      Procedures  Results for orders placed during the hospital encounter of 10/20/23    POC US SOFT TISSUE    Impression  Limited Soft Tissue Ultrasound, performed and interpreted by me.    Indication:  Skin redness warmth pain. Evaluate for cellulitis vs abscess.    Body location: left breast    Findings:  There is cobblestoning suggestive of cellulitis in the evaluated area. There is a fluid collection measuring 4x4 to suggest abscess. No foreign body identified    IMPRESSION: Abscess and Cellulitis                   Results for orders placed or performed during the hospital encounter of 10/20/23   POC US SOFT TISSUE     Status: None    Impression    Limited Soft Tissue Ultrasound, performed and interpreted by me.    Indication:  Skin redness warmth pain. Evaluate for cellulitis vs abscess.     Body  location: left breast    Findings:  There is cobblestoning suggestive of cellulitis in the evaluated area. There is a fluid collection measuring 4x4 to suggest abscess. No foreign body identified    IMPRESSION: Abscess and Cellulitis          Comprehensive metabolic panel     Status: Abnormal   Result Value Ref Range    Sodium 135 135 - 145 mmol/L    Potassium 4.3 3.4 - 5.3 mmol/L    Carbon Dioxide (CO2) 22 22 - 29 mmol/L    Anion Gap 14 7 - 15 mmol/L    Urea Nitrogen 8.2 8.0 - 23.0 mg/dL    Creatinine 0.50 (L) 0.51 - 0.95 mg/dL    GFR Estimate >90 >60 mL/min/1.73m2    Calcium 9.2 8.8 - 10.2 mg/dL    Chloride 99 98 - 107 mmol/L    Glucose 251 (H) 70 - 99 mg/dL    Alkaline Phosphatase 65 35 - 104 U/L    AST 21 0 - 45 U/L    ALT 14 0 - 50 U/L    Protein Total 6.4 6.4 - 8.3 g/dL    Albumin 4.0 3.5 - 5.2 g/dL    Bilirubin Total 0.4 <=1.2 mg/dL   Lactic acid whole blood     Status: Abnormal   Result Value Ref Range    Lactic Acid 2.6 (H) 0.7 - 2.0 mmol/L   CBC with platelets and differential     Status: Abnormal   Result Value Ref Range    WBC Count 10.9 4.0 - 11.0 10e3/uL    RBC Count 4.48 3.80 - 5.20 10e6/uL    Hemoglobin 13.3 11.7 - 15.7 g/dL    Hematocrit 40.1 35.0 - 47.0 %    MCV 90 78 - 100 fL    MCH 29.7 26.5 - 33.0 pg    MCHC 33.2 31.5 - 36.5 g/dL    RDW 13.2 10.0 - 15.0 %    Platelet Count 268 150 - 450 10e3/uL    % Neutrophils 75 %    % Lymphocytes 14 %    % Monocytes 6 %    Mids % (Monos, Eos, Basos)      % Eosinophils 3 %    % Basophils 1 %    % Immature Granulocytes 1 %    NRBCs per 100 WBC 0 <1 /100    Absolute Neutrophils 8.4 (H) 1.6 - 8.3 10e3/uL    Absolute Lymphocytes 1.6 0.8 - 5.3 10e3/uL    Absolute Monocytes 0.6 0.0 - 1.3 10e3/uL    Mids Abs (Monos, Eos, Basos)      Absolute Eosinophils 0.3 0.0 - 0.7 10e3/uL    Absolute Basophils 0.1 0.0 - 0.2 10e3/uL    Absolute Immature Granulocytes 0.1 <=0.4 10e3/uL    Absolute NRBCs 0.0 10e3/uL   RBC and Platelet Morphology     Status: Abnormal   Result Value Ref  Range    Platelet Assessment  Automated Count Confirmed. Platelet morphology is normal.     Automated Count Confirmed. Platelet morphology is normal.    Acanthocytes Slight (A) None Seen    Caleb Rods      Basophilic Stippling      Bite Cells      Blister Cells      [place] Cells      Elliptocytes      Hgb C Crystals      Cline-Jolly Bodies      Hypersegmented Neutrophils      Polychromasia Slight (A) None Seen    RBC agglutination      RBC Fragments      Reactive Lymphocytes      Rouleaux      Sickle Cells      Smudge Cells      Spherocytes      Stomatocytes      Target Cells      Teardrop Cells      Toxic Neutrophils      RBC Morphology Confirmed RBC Indices    Glucose by meter     Status: Abnormal   Result Value Ref Range    GLUCOSE BY METER POCT 155 (H) 70 - 99 mg/dL   CBC with platelets differential     Status: Abnormal    Narrative    The following orders were created for panel order CBC with platelets differential.  Procedure                               Abnormality         Status                     ---------                               -----------         ------                     CBC with platelets and d...[[id number]]  Abnormal            Final result               RBC and Platelet Morphology[[id number]]  Abnormal            Final result                 Please view results for these tests on the individual orders.     Medications   vancomycin (VANCOCIN) 2,000 mg in sodium chloride 0.9 % 500 mL intermittent infusion (2,000 mg Intravenous $New Bag 10/20/23 1358)   pharmacy alert - intermittent dosing (has no administration in time range)   vancomycin (VANCOCIN) 1,500 mg in 0.9% NaCl 250 mL intermittent infusion (has no administration in time range)   sodium chloride 0.9% BOLUS 1,000 mL (1,000 mLs Intravenous $New Bag 10/20/23 1312)   piperacillin-tazobactam (ZOSYN) 4.5 g vial to attach to  mL bag (4.5 g Intravenous $New Bag 10/20/23 1340)     Labs Ordered and Resulted from Time of ED Arrival to Time of  ED Departure   COMPREHENSIVE METABOLIC PANEL - Abnormal       Result Value    Sodium 135      Potassium 4.3      Carbon Dioxide (CO2) 22      Anion Gap 14      Urea Nitrogen 8.2      Creatinine 0.50 (*)     GFR Estimate >90      Calcium 9.2      Chloride 99      Glucose 251 (*)     Alkaline Phosphatase 65      AST 21      ALT 14      Protein Total 6.4      Albumin 4.0      Bilirubin Total 0.4     LACTIC ACID WHOLE BLOOD - Abnormal    Lactic Acid 2.6 (*)    CBC WITH PLATELETS AND DIFFERENTIAL - Abnormal    WBC Count 10.9      RBC Count 4.48      Hemoglobin 13.3      Hematocrit 40.1      MCV 90      MCH 29.7      MCHC 33.2      RDW 13.2      Platelet Count 268      % Neutrophils 75      % Lymphocytes 14      % Monocytes 6      Mids % (Monos, Eos, Basos)        % Eosinophils 3      % Basophils 1      % Immature Granulocytes 1      NRBCs per 100 WBC 0      Absolute Neutrophils 8.4 (*)     Absolute Lymphocytes 1.6      Absolute Monocytes 0.6      Mids Abs (Monos, Eos, Basos)        Absolute Eosinophils 0.3      Absolute Basophils 0.1      Absolute Immature Granulocytes 0.1      Absolute NRBCs 0.0     RBC AND PLATELET MORPHOLOGY - Abnormal    Platelet Assessment        Value: Automated Count Confirmed. Platelet morphology is normal.    Acanthocytes Slight (*)     Caleb Rods        Basophilic Stippling        Bite Cells        Blister Cells        [place] Cells        Elliptocytes        Hgb C Crystals        Cline-Jolly Bodies        Hypersegmented Neutrophils        Polychromasia Slight (*)     RBC agglutination        RBC Fragments        Reactive Lymphocytes        Rouleaux        Sickle Cells        Smudge Cells        Spherocytes        Stomatocytes        Target Cells        Teardrop Cells        Toxic Neutrophils        RBC Morphology Confirmed RBC Indices     GLUCOSE BY METER - Abnormal    GLUCOSE BY METER POCT 155 (*)    LACTIC ACID WHOLE BLOOD   BLOOD CULTURE   BLOOD CULTURE   ANAEROBIC BACTERIAL CULTURE ROUTINE    AEROBIC BACTERIAL CULTURE ROUTINE   MRSA MSSA PCR, NASAL SWAB     POC US SOFT TISSUE   Final Result   Limited Soft Tissue Ultrasound, performed and interpreted by me.      Indication:  Skin redness warmth pain. Evaluate for cellulitis vs abscess.       Body location: left breast      Findings:  There is cobblestoning suggestive of cellulitis in the evaluated area. There is a fluid collection measuring 4x4 to suggest abscess. No foreign body identified      IMPRESSION: Abscess and Cellulitis                       Critical care was not performed.     Medical Decision Making  The patient's presentation was of moderate complexity (an acute illness with systemic symptoms).    The patient's evaluation involved:  review of external note(s) from 3+ sources (see separate area of note for details)  review of 3+ test result(s) ordered prior to this encounter (see separate area of note for details)  ordering and/or review of 3+ test(s) in this encounter (see separate area of note for details)  discussion of management or test interpretation with another health professional (surgical oncology)    The patient's management necessitated high risk (a decision regarding hospitalization).    Assessment & Plan    Bria Davis is a 63 year old female with past medical history significant for type 2 diabetes, obesity, HTN, CKD 2 who presents for evaluation of a wound on her breast following a lumpectomy on 10/6/2023.  She does not have any tenderness to the area, however well obtain a bedside ultrasound to evaluate for abscess as she does have a cellulitic appearance to the tissue surrounding the wound. No drainage from the wound.  Ultrasound did show a large fluid collection within the breast underneath the wound.    I reviewed the labs which were remarkable for a lactate of 2.6.  Patient given 1L IV fluids as well as started on Zosyn and vancomycin in the emergency department.  Blood cultures obtained and pending prior to  initiation of antibiotics.  Other labs remarkable for no leukocytosis, no anemia, no electrolyte or metabolic abnormalities, normal renal function.    Consulted with surgical oncology who evaluated the patient in the emergency department.  They were able to aspirate fluid from patient's breast abscess which was sent for further evaluation.  Please see their note. Patient will be admitted to the hospital under the surgical oncology service for ongoing treatment.    I have reviewed the nursing notes. I have reviewed the findings, diagnosis, plan with the patient.    New Prescriptions    No medications on file       Final diagnoses:   Breast abscess       ALYSON Ardon CNP   [facility] EMERGENCY DEPARTMENT  10/20/2023     Payal Burgos APRN CNP  10/20/23 1538    --    ED Attending Physician Attestation    I Hattie Olmedo MD, MD, cared for this patient with the Advanced Practice Provider (PJ). I have performed a history and physical examination of the patient independent of the PJ. I reviewed the PJ's documentation above and agree with the documented findings and plan of care. I personally provided a substantive portion of the care for this patient, including the complete Medical Decision Making. Please see the PJ's documentation for full details.    Summary of HPI, PE, ED Course   Patient is a 63 year old female evaluated in the emergency department for post lumpectomy site pain and redness even after keflex and doxycycline. Exam and ED course notable for very red numb wound, POCUS with obvious abscess/cellulitis, started zosyn and vanco, Surgery consult. After the completion of care in the emergency department, the patient was admitted to inpatient.    Critical Care & Procedures  Not applicable.        Medical Decision Making  The patient's presentation was of moderate complexity (an acute illness with systemic symptoms).    The patient's evaluation involved:  ordering and/or review of 3+  test(s) in this encounter (see separate area of note for details)    The patient's management necessitated high risk (a decision regarding hospitalization).          Hattie Olmedo MD, MD  Emergency Medicine       Hattie Olmedo MD  10/20/23 6570

## 2023-10-21 ENCOUNTER — ANESTHESIA (OUTPATIENT)
Dept: SURGERY | Facility: CLINIC | Age: 63
End: 2023-10-21
Payer: COMMERCIAL

## 2023-10-21 ENCOUNTER — ANESTHESIA EVENT (OUTPATIENT)
Dept: SURGERY | Facility: CLINIC | Age: 63
End: 2023-10-21
Payer: COMMERCIAL

## 2023-10-21 ENCOUNTER — HOSPITAL ENCOUNTER (INPATIENT)
Facility: CLINIC | Age: 63
Setting detail: SURGERY ADMIT
End: 2023-10-21
Attending: SURGERY | Admitting: SURGERY
Payer: COMMERCIAL

## 2023-10-21 LAB
ANION GAP SERPL CALCULATED.3IONS-SCNC: 12 MMOL/L (ref 7–15)
BUN SERPL-MCNC: 7.7 MG/DL (ref 8–23)
CALCIUM SERPL-MCNC: 9.1 MG/DL (ref 8.8–10.2)
CHLORIDE SERPL-SCNC: 105 MMOL/L (ref 98–107)
CREAT SERPL-MCNC: 0.47 MG/DL (ref 0.51–0.95)
CREAT SERPL-MCNC: 0.5 MG/DL (ref 0.51–0.95)
DEPRECATED HCO3 PLAS-SCNC: 24 MMOL/L (ref 22–29)
EGFRCR SERPLBLD CKD-EPI 2021: >90 ML/MIN/1.73M2
EGFRCR SERPLBLD CKD-EPI 2021: >90 ML/MIN/1.73M2
ERYTHROCYTE [DISTWIDTH] IN BLOOD BY AUTOMATED COUNT: 13.2 % (ref 10–15)
GLUCOSE BLDC GLUCOMTR-MCNC: 144 MG/DL (ref 70–99)
GLUCOSE BLDC GLUCOMTR-MCNC: 149 MG/DL (ref 70–99)
GLUCOSE BLDC GLUCOMTR-MCNC: 156 MG/DL (ref 70–99)
GLUCOSE BLDC GLUCOMTR-MCNC: 159 MG/DL (ref 70–99)
GLUCOSE BLDC GLUCOMTR-MCNC: 175 MG/DL (ref 70–99)
GLUCOSE BLDC GLUCOMTR-MCNC: 178 MG/DL (ref 70–99)
GLUCOSE BLDC GLUCOMTR-MCNC: 183 MG/DL (ref 70–99)
GLUCOSE BLDC GLUCOMTR-MCNC: 210 MG/DL (ref 70–99)
GLUCOSE BLDC GLUCOMTR-MCNC: 235 MG/DL (ref 70–99)
GLUCOSE BLDC GLUCOMTR-MCNC: 246 MG/DL (ref 70–99)
GLUCOSE BLDC GLUCOMTR-MCNC: 247 MG/DL (ref 70–99)
GLUCOSE BLDC GLUCOMTR-MCNC: 279 MG/DL (ref 70–99)
GLUCOSE BLDC GLUCOMTR-MCNC: 297 MG/DL (ref 70–99)
GLUCOSE BLDC GLUCOMTR-MCNC: 307 MG/DL (ref 70–99)
GLUCOSE SERPL-MCNC: 190 MG/DL (ref 70–99)
HCT VFR BLD AUTO: 38.7 % (ref 35–47)
HGB BLD-MCNC: 12.9 G/DL (ref 11.7–15.7)
MCH RBC QN AUTO: 29.5 PG (ref 26.5–33)
MCHC RBC AUTO-ENTMCNC: 33.3 G/DL (ref 31.5–36.5)
MCV RBC AUTO: 89 FL (ref 78–100)
PLATELET # BLD AUTO: 252 10E3/UL (ref 150–450)
POTASSIUM SERPL-SCNC: 3.9 MMOL/L (ref 3.4–5.3)
RBC # BLD AUTO: 4.37 10E6/UL (ref 3.8–5.2)
SODIUM SERPL-SCNC: 141 MMOL/L (ref 135–145)
WBC # BLD AUTO: 8.8 10E3/UL (ref 4–11)

## 2023-10-21 PROCEDURE — 120N000002 HC R&B MED SURG/OB UMMC

## 2023-10-21 PROCEDURE — 82565 ASSAY OF CREATININE: CPT | Performed by: STUDENT IN AN ORGANIZED HEALTH CARE EDUCATION/TRAINING PROGRAM

## 2023-10-21 PROCEDURE — 710N000010 HC RECOVERY PHASE 1, LEVEL 2, PER MIN: Performed by: SURGERY

## 2023-10-21 PROCEDURE — 258N000003 HC RX IP 258 OP 636: Performed by: STUDENT IN AN ORGANIZED HEALTH CARE EDUCATION/TRAINING PROGRAM

## 2023-10-21 PROCEDURE — 250N000011 HC RX IP 250 OP 636: Performed by: ANESTHESIOLOGY

## 2023-10-21 PROCEDURE — 250N000011 HC RX IP 250 OP 636: Performed by: STUDENT IN AN ORGANIZED HEALTH CARE EDUCATION/TRAINING PROGRAM

## 2023-10-21 PROCEDURE — 88304 TISSUE EXAM BY PATHOLOGIST: CPT | Mod: TC | Performed by: SURGERY

## 2023-10-21 PROCEDURE — 250N000009 HC RX 250: Performed by: ANESTHESIOLOGY

## 2023-10-21 PROCEDURE — 80048 BASIC METABOLIC PNL TOTAL CA: CPT | Performed by: STUDENT IN AN ORGANIZED HEALTH CARE EDUCATION/TRAINING PROGRAM

## 2023-10-21 PROCEDURE — 999N000141 HC STATISTIC PRE-PROCEDURE NURSING ASSESSMENT: Performed by: SURGERY

## 2023-10-21 PROCEDURE — 36415 COLL VENOUS BLD VENIPUNCTURE: CPT | Performed by: STUDENT IN AN ORGANIZED HEALTH CARE EDUCATION/TRAINING PROGRAM

## 2023-10-21 PROCEDURE — 272N000001 HC OR GENERAL SUPPLY STERILE: Performed by: SURGERY

## 2023-10-21 PROCEDURE — 360N000075 HC SURGERY LEVEL 2, PER MIN: Performed by: SURGERY

## 2023-10-21 PROCEDURE — 82962 GLUCOSE BLOOD TEST: CPT

## 2023-10-21 PROCEDURE — 370N000017 HC ANESTHESIA TECHNICAL FEE, PER MIN: Performed by: SURGERY

## 2023-10-21 PROCEDURE — 250N000011 HC RX IP 250 OP 636: Performed by: SURGERY

## 2023-10-21 PROCEDURE — 0JB60ZZ EXCISION OF CHEST SUBCUTANEOUS TISSUE AND FASCIA, OPEN APPROACH: ICD-10-PCS | Performed by: SURGERY

## 2023-10-21 PROCEDURE — 250N000013 HC RX MED GY IP 250 OP 250 PS 637: Performed by: STUDENT IN AN ORGANIZED HEALTH CARE EDUCATION/TRAINING PROGRAM

## 2023-10-21 PROCEDURE — 250N000012 HC RX MED GY IP 250 OP 636 PS 637: Performed by: STUDENT IN AN ORGANIZED HEALTH CARE EDUCATION/TRAINING PROGRAM

## 2023-10-21 PROCEDURE — 250N000009 HC RX 250: Performed by: STUDENT IN AN ORGANIZED HEALTH CARE EDUCATION/TRAINING PROGRAM

## 2023-10-21 PROCEDURE — 88304 TISSUE EXAM BY PATHOLOGIST: CPT | Mod: 26 | Performed by: PATHOLOGY

## 2023-10-21 PROCEDURE — 10180 I&D COMPLEX PO WOUND INFCTJ: CPT | Mod: 78 | Performed by: STUDENT IN AN ORGANIZED HEALTH CARE EDUCATION/TRAINING PROGRAM

## 2023-10-21 PROCEDURE — 250N000011 HC RX IP 250 OP 636: Mod: JZ | Performed by: STUDENT IN AN ORGANIZED HEALTH CARE EDUCATION/TRAINING PROGRAM

## 2023-10-21 PROCEDURE — 88305 TISSUE EXAM BY PATHOLOGIST: CPT | Mod: 26 | Performed by: PATHOLOGY

## 2023-10-21 PROCEDURE — 85027 COMPLETE CBC AUTOMATED: CPT | Performed by: STUDENT IN AN ORGANIZED HEALTH CARE EDUCATION/TRAINING PROGRAM

## 2023-10-21 RX ORDER — SODIUM CHLORIDE, SODIUM LACTATE, POTASSIUM CHLORIDE, CALCIUM CHLORIDE 600; 310; 30; 20 MG/100ML; MG/100ML; MG/100ML; MG/100ML
INJECTION, SOLUTION INTRAVENOUS CONTINUOUS
Status: DISCONTINUED | OUTPATIENT
Start: 2023-10-21 | End: 2023-10-21 | Stop reason: HOSPADM

## 2023-10-21 RX ORDER — FENTANYL CITRATE 50 UG/ML
25 INJECTION, SOLUTION INTRAMUSCULAR; INTRAVENOUS EVERY 5 MIN PRN
Status: DISCONTINUED | OUTPATIENT
Start: 2023-10-21 | End: 2023-10-21 | Stop reason: HOSPADM

## 2023-10-21 RX ORDER — NALOXONE HYDROCHLORIDE 0.4 MG/ML
0.2 INJECTION, SOLUTION INTRAMUSCULAR; INTRAVENOUS; SUBCUTANEOUS
Status: DISCONTINUED | OUTPATIENT
Start: 2023-10-21 | End: 2023-10-22 | Stop reason: HOSPADM

## 2023-10-21 RX ORDER — LIDOCAINE 40 MG/G
CREAM TOPICAL
Status: DISCONTINUED | OUTPATIENT
Start: 2023-10-21 | End: 2023-10-21 | Stop reason: HOSPADM

## 2023-10-21 RX ORDER — NICOTINE POLACRILEX 4 MG
15-30 LOZENGE BUCCAL
Status: DISCONTINUED | OUTPATIENT
Start: 2023-10-21 | End: 2023-10-21

## 2023-10-21 RX ORDER — HYDROMORPHONE HCL IN WATER/PF 6 MG/30 ML
0.4 PATIENT CONTROLLED ANALGESIA SYRINGE INTRAVENOUS EVERY 5 MIN PRN
Status: DISCONTINUED | OUTPATIENT
Start: 2023-10-21 | End: 2023-10-21 | Stop reason: HOSPADM

## 2023-10-21 RX ORDER — NALOXONE HYDROCHLORIDE 0.4 MG/ML
0.4 INJECTION, SOLUTION INTRAMUSCULAR; INTRAVENOUS; SUBCUTANEOUS
Status: DISCONTINUED | OUTPATIENT
Start: 2023-10-21 | End: 2023-10-22 | Stop reason: HOSPADM

## 2023-10-21 RX ORDER — METOCLOPRAMIDE HYDROCHLORIDE 5 MG/ML
10 INJECTION INTRAMUSCULAR; INTRAVENOUS ONCE
Status: DISCONTINUED | OUTPATIENT
Start: 2023-10-21 | End: 2023-10-21

## 2023-10-21 RX ORDER — BUPIVACAINE HYDROCHLORIDE 2.5 MG/ML
INJECTION, SOLUTION INFILTRATION; PERINEURAL PRN
Status: DISCONTINUED | OUTPATIENT
Start: 2023-10-21 | End: 2023-10-21 | Stop reason: HOSPADM

## 2023-10-21 RX ORDER — LABETALOL HYDROCHLORIDE 5 MG/ML
10 INJECTION, SOLUTION INTRAVENOUS ONCE
Status: COMPLETED | OUTPATIENT
Start: 2023-10-21 | End: 2023-10-21

## 2023-10-21 RX ORDER — DEXTROSE MONOHYDRATE 100 MG/ML
INJECTION, SOLUTION INTRAVENOUS CONTINUOUS PRN
Status: DISCONTINUED | OUTPATIENT
Start: 2023-10-21 | End: 2023-10-22 | Stop reason: HOSPADM

## 2023-10-21 RX ORDER — ONDANSETRON 2 MG/ML
INJECTION INTRAMUSCULAR; INTRAVENOUS PRN
Status: DISCONTINUED | OUTPATIENT
Start: 2023-10-21 | End: 2023-10-21

## 2023-10-21 RX ORDER — HYDROMORPHONE HCL IN WATER/PF 6 MG/30 ML
0.2 PATIENT CONTROLLED ANALGESIA SYRINGE INTRAVENOUS EVERY 5 MIN PRN
Status: DISCONTINUED | OUTPATIENT
Start: 2023-10-21 | End: 2023-10-21 | Stop reason: HOSPADM

## 2023-10-21 RX ORDER — CEFAZOLIN SODIUM 2 G/100ML
2 INJECTION, SOLUTION INTRAVENOUS
Status: CANCELLED | OUTPATIENT
Start: 2023-10-21

## 2023-10-21 RX ORDER — ONDANSETRON 2 MG/ML
4 INJECTION INTRAMUSCULAR; INTRAVENOUS EVERY 6 HOURS PRN
Status: DISCONTINUED | OUTPATIENT
Start: 2023-10-21 | End: 2023-10-22 | Stop reason: HOSPADM

## 2023-10-21 RX ORDER — PROPOFOL 10 MG/ML
INJECTION, EMULSION INTRAVENOUS CONTINUOUS PRN
Status: DISCONTINUED | OUTPATIENT
Start: 2023-10-21 | End: 2023-10-21

## 2023-10-21 RX ORDER — ONDANSETRON 4 MG/1
4 TABLET, ORALLY DISINTEGRATING ORAL EVERY 6 HOURS PRN
Status: DISCONTINUED | OUTPATIENT
Start: 2023-10-21 | End: 2023-10-22 | Stop reason: HOSPADM

## 2023-10-21 RX ORDER — FENTANYL CITRATE 50 UG/ML
INJECTION, SOLUTION INTRAMUSCULAR; INTRAVENOUS PRN
Status: DISCONTINUED | OUTPATIENT
Start: 2023-10-21 | End: 2023-10-21

## 2023-10-21 RX ORDER — POLYETHYLENE GLYCOL 3350 17 G/17G
17 POWDER, FOR SOLUTION ORAL DAILY
Status: DISCONTINUED | OUTPATIENT
Start: 2023-10-22 | End: 2023-10-22 | Stop reason: HOSPADM

## 2023-10-21 RX ORDER — ONDANSETRON 4 MG/1
4 TABLET, ORALLY DISINTEGRATING ORAL EVERY 30 MIN PRN
Status: DISCONTINUED | OUTPATIENT
Start: 2023-10-21 | End: 2023-10-21 | Stop reason: HOSPADM

## 2023-10-21 RX ORDER — LIDOCAINE 40 MG/G
CREAM TOPICAL
Status: DISCONTINUED | OUTPATIENT
Start: 2023-10-21 | End: 2023-10-22 | Stop reason: HOSPADM

## 2023-10-21 RX ORDER — FLUCONAZOLE 150 MG/1
150 TABLET ORAL
Status: DISCONTINUED | OUTPATIENT
Start: 2023-10-21 | End: 2023-10-22 | Stop reason: HOSPADM

## 2023-10-21 RX ORDER — LIDOCAINE HYDROCHLORIDE 20 MG/ML
INJECTION, SOLUTION INFILTRATION; PERINEURAL PRN
Status: DISCONTINUED | OUTPATIENT
Start: 2023-10-21 | End: 2023-10-21

## 2023-10-21 RX ORDER — BISACODYL 10 MG
10 SUPPOSITORY, RECTAL RECTAL DAILY PRN
Status: DISCONTINUED | OUTPATIENT
Start: 2023-10-21 | End: 2023-10-22 | Stop reason: HOSPADM

## 2023-10-21 RX ORDER — LORAZEPAM 2 MG/ML
0.5 INJECTION INTRAMUSCULAR ONCE
Status: COMPLETED | OUTPATIENT
Start: 2023-10-21 | End: 2023-10-21

## 2023-10-21 RX ORDER — PROCHLORPERAZINE MALEATE 5 MG
10 TABLET ORAL EVERY 6 HOURS PRN
Status: DISCONTINUED | OUTPATIENT
Start: 2023-10-21 | End: 2023-10-22 | Stop reason: HOSPADM

## 2023-10-21 RX ORDER — SODIUM CHLORIDE, SODIUM LACTATE, POTASSIUM CHLORIDE, CALCIUM CHLORIDE 600; 310; 30; 20 MG/100ML; MG/100ML; MG/100ML; MG/100ML
INJECTION, SOLUTION INTRAVENOUS CONTINUOUS PRN
Status: DISCONTINUED | OUTPATIENT
Start: 2023-10-21 | End: 2023-10-21

## 2023-10-21 RX ORDER — FENTANYL CITRATE 50 UG/ML
50 INJECTION, SOLUTION INTRAMUSCULAR; INTRAVENOUS EVERY 5 MIN PRN
Status: DISCONTINUED | OUTPATIENT
Start: 2023-10-21 | End: 2023-10-21 | Stop reason: HOSPADM

## 2023-10-21 RX ORDER — CEFAZOLIN SODIUM 2 G/100ML
2 INJECTION, SOLUTION INTRAVENOUS SEE ADMIN INSTRUCTIONS
Status: CANCELLED | OUTPATIENT
Start: 2023-10-21

## 2023-10-21 RX ORDER — PROPOFOL 10 MG/ML
INJECTION, EMULSION INTRAVENOUS PRN
Status: DISCONTINUED | OUTPATIENT
Start: 2023-10-21 | End: 2023-10-21

## 2023-10-21 RX ORDER — OXYCODONE HYDROCHLORIDE 5 MG/1
5 TABLET ORAL EVERY 4 HOURS PRN
Status: DISCONTINUED | OUTPATIENT
Start: 2023-10-21 | End: 2023-10-22 | Stop reason: HOSPADM

## 2023-10-21 RX ORDER — OXYCODONE HYDROCHLORIDE 10 MG/1
10 TABLET ORAL EVERY 4 HOURS PRN
Status: DISCONTINUED | OUTPATIENT
Start: 2023-10-21 | End: 2023-10-22 | Stop reason: HOSPADM

## 2023-10-21 RX ORDER — DEXTROSE MONOHYDRATE 25 G/50ML
25-50 INJECTION, SOLUTION INTRAVENOUS
Status: DISCONTINUED | OUTPATIENT
Start: 2023-10-21 | End: 2023-10-21

## 2023-10-21 RX ORDER — ONDANSETRON 2 MG/ML
4 INJECTION INTRAMUSCULAR; INTRAVENOUS EVERY 30 MIN PRN
Status: DISCONTINUED | OUTPATIENT
Start: 2023-10-21 | End: 2023-10-21 | Stop reason: HOSPADM

## 2023-10-21 RX ORDER — ACETAMINOPHEN 325 MG/1
975 TABLET ORAL ONCE
Status: CANCELLED | OUTPATIENT
Start: 2023-10-21 | End: 2023-10-21

## 2023-10-21 RX ORDER — AMOXICILLIN 250 MG
1 CAPSULE ORAL 2 TIMES DAILY
Status: DISCONTINUED | OUTPATIENT
Start: 2023-10-21 | End: 2023-10-22 | Stop reason: HOSPADM

## 2023-10-21 RX ADMIN — SUCCINYLCHOLINE CHLORIDE 120 MG: 20 INJECTION, SOLUTION INTRAMUSCULAR; INTRAVENOUS; PARENTERAL at 15:18

## 2023-10-21 RX ADMIN — MIDAZOLAM 1 MG: 1 INJECTION INTRAMUSCULAR; INTRAVENOUS at 15:00

## 2023-10-21 RX ADMIN — GABAPENTIN 100 MG: 100 CAPSULE ORAL at 08:24

## 2023-10-21 RX ADMIN — MONTELUKAST 10 MG: 10 TABLET, FILM COATED ORAL at 21:37

## 2023-10-21 RX ADMIN — PROPOFOL 150 MCG/KG/MIN: 10 INJECTION, EMULSION INTRAVENOUS at 15:15

## 2023-10-21 RX ADMIN — LIDOCAINE HYDROCHLORIDE 100 MG: 20 INJECTION, SOLUTION INFILTRATION; PERINEURAL at 15:16

## 2023-10-21 RX ADMIN — PHENYLEPHRINE HYDROCHLORIDE 150 MCG: 10 INJECTION INTRAVENOUS at 15:56

## 2023-10-21 RX ADMIN — SODIUM CHLORIDE, POTASSIUM CHLORIDE, SODIUM LACTATE AND CALCIUM CHLORIDE: 600; 310; 30; 20 INJECTION, SOLUTION INTRAVENOUS at 15:02

## 2023-10-21 RX ADMIN — PHENYLEPHRINE HYDROCHLORIDE 150 MCG: 10 INJECTION INTRAVENOUS at 15:49

## 2023-10-21 RX ADMIN — ACETAMINOPHEN 650 MG: 325 TABLET, FILM COATED ORAL at 01:11

## 2023-10-21 RX ADMIN — GABAPENTIN 100 MG: 100 CAPSULE ORAL at 20:36

## 2023-10-21 RX ADMIN — AMPICILLIN SODIUM AND SULBACTAM SODIUM 3 G: 2; 1 INJECTION, POWDER, FOR SOLUTION INTRAMUSCULAR; INTRAVENOUS at 14:33

## 2023-10-21 RX ADMIN — PROPOFOL 150 MG: 10 INJECTION, EMULSION INTRAVENOUS at 15:18

## 2023-10-21 RX ADMIN — LORAZEPAM 0.5 MG: 2 INJECTION INTRAMUSCULAR; INTRAVENOUS at 13:34

## 2023-10-21 RX ADMIN — AMPICILLIN SODIUM AND SULBACTAM SODIUM 3 G: 2; 1 INJECTION, POWDER, FOR SOLUTION INTRAMUSCULAR; INTRAVENOUS at 01:09

## 2023-10-21 RX ADMIN — PHENYLEPHRINE HYDROCHLORIDE 100 MCG: 10 INJECTION INTRAVENOUS at 15:25

## 2023-10-21 RX ADMIN — INSULIN ASPART 3 UNITS: 100 INJECTION, SOLUTION INTRAVENOUS; SUBCUTANEOUS at 08:19

## 2023-10-21 RX ADMIN — PHENYLEPHRINE HYDROCHLORIDE 100 MCG: 10 INJECTION INTRAVENOUS at 16:05

## 2023-10-21 RX ADMIN — PHENYLEPHRINE HYDROCHLORIDE 100 MCG: 10 INJECTION INTRAVENOUS at 15:46

## 2023-10-21 RX ADMIN — INSULIN GLARGINE 50 UNITS: 300 INJECTION, SOLUTION SUBCUTANEOUS at 09:19

## 2023-10-21 RX ADMIN — ACETAMINOPHEN 650 MG: 325 TABLET, FILM COATED ORAL at 08:23

## 2023-10-21 RX ADMIN — HYDROCHLOROTHIAZIDE 12.5 MG: 12.5 TABLET ORAL at 08:25

## 2023-10-21 RX ADMIN — Medication 1500 MG: at 02:43

## 2023-10-21 RX ADMIN — AMPICILLIN SODIUM AND SULBACTAM SODIUM 3 G: 2; 1 INJECTION, POWDER, FOR SOLUTION INTRAMUSCULAR; INTRAVENOUS at 20:35

## 2023-10-21 RX ADMIN — MIDAZOLAM 1 MG: 1 INJECTION INTRAMUSCULAR; INTRAVENOUS at 15:16

## 2023-10-21 RX ADMIN — Medication 1500 MG: at 15:02

## 2023-10-21 RX ADMIN — AMPICILLIN SODIUM AND SULBACTAM SODIUM 3 G: 2; 1 INJECTION, POWDER, FOR SOLUTION INTRAMUSCULAR; INTRAVENOUS at 08:24

## 2023-10-21 RX ADMIN — INSULIN ASPART 4 UNITS: 100 INJECTION, SOLUTION INTRAVENOUS; SUBCUTANEOUS at 11:48

## 2023-10-21 RX ADMIN — PHENYLEPHRINE HYDROCHLORIDE 100 MCG: 10 INJECTION INTRAVENOUS at 15:18

## 2023-10-21 RX ADMIN — FENTANYL CITRATE 100 MCG: 50 INJECTION INTRAMUSCULAR; INTRAVENOUS at 15:16

## 2023-10-21 RX ADMIN — ENOXAPARIN SODIUM 40 MG: 40 INJECTION SUBCUTANEOUS at 09:56

## 2023-10-21 RX ADMIN — LABETALOL HYDROCHLORIDE 10 MG: 5 INJECTION, SOLUTION INTRAVENOUS at 12:57

## 2023-10-21 RX ADMIN — FLUTICASONE FUROATE AND VILANTEROL TRIFENATATE 1 PUFF: 100; 25 POWDER RESPIRATORY (INHALATION) at 08:24

## 2023-10-21 RX ADMIN — FENTANYL CITRATE 50 MCG: 50 INJECTION INTRAMUSCULAR; INTRAVENOUS at 15:40

## 2023-10-21 RX ADMIN — METOPROLOL SUCCINATE 25 MG: 25 TABLET, EXTENDED RELEASE ORAL at 08:26

## 2023-10-21 RX ADMIN — ACETAMINOPHEN 650 MG: 325 TABLET, FILM COATED ORAL at 21:37

## 2023-10-21 RX ADMIN — ROSUVASTATIN CALCIUM 10 MG: 10 TABLET, FILM COATED ORAL at 08:24

## 2023-10-21 RX ADMIN — PHENYLEPHRINE HYDROCHLORIDE 100 MCG: 10 INJECTION INTRAVENOUS at 15:23

## 2023-10-21 RX ADMIN — SENNOSIDES AND DOCUSATE SODIUM 1 TABLET: 50; 8.6 TABLET ORAL at 20:36

## 2023-10-21 RX ADMIN — INSULIN HUMAN 3.5 UNITS/HR: 1 INJECTION, SOLUTION INTRAVENOUS at 13:02

## 2023-10-21 RX ADMIN — LOSARTAN POTASSIUM 100 MG: 100 TABLET, FILM COATED ORAL at 08:23

## 2023-10-21 RX ADMIN — METOCLOPRAMIDE 10 MG: 5 INJECTION, SOLUTION INTRAMUSCULAR; INTRAVENOUS at 13:33

## 2023-10-21 RX ADMIN — ONDANSETRON 4 MG: 2 INJECTION INTRAMUSCULAR; INTRAVENOUS at 16:04

## 2023-10-21 RX ADMIN — PANTOPRAZOLE SODIUM 40 MG: 40 TABLET, DELAYED RELEASE ORAL at 08:23

## 2023-10-21 RX ADMIN — PROPOFOL 200 MG: 10 INJECTION, EMULSION INTRAVENOUS at 15:16

## 2023-10-21 ASSESSMENT — ACTIVITIES OF DAILY LIVING (ADL)
ADLS_ACUITY_SCORE: 35
ADLS_ACUITY_SCORE: 20
ADLS_ACUITY_SCORE: 35
ADLS_ACUITY_SCORE: 35
ADLS_ACUITY_SCORE: 20
ADLS_ACUITY_SCORE: 35

## 2023-10-21 ASSESSMENT — ENCOUNTER SYMPTOMS: SEIZURES: 0

## 2023-10-21 NOTE — PROGRESS NOTES
"Paged about patient being hypertensive (185/101) w/sudden drop in HR to 45 from baseline of 60s/70s. Went down to see patient. She reports being nervous and that she didn't \"feel right\". Denies chest pain and SOB.    Ordered stat EKG, which showed 2nd degree AV block w/RBB and LFBB. Senior resident aware.    Sahara Floyd MD  Urology PGY-1  Cross cover resident      "

## 2023-10-21 NOTE — PROVIDER NOTIFICATION
/101, HR = 79. Blood glucose 307 at 11:15am. Pt said she's a little sweaty. Surg Onc MD notified. Also, her HR dropped down to 45, but came back up to 75. STAT EKG done. Pt is A &O x4. Denies chest pain

## 2023-10-21 NOTE — BRIEF OP NOTE
[facility]    Brief Operative Note    Pre-operative diagnosis: * No pre-op diagnosis entered *  Post-operative diagnosis L breast wound infection, skin necrosis    Procedure: Irrigation and debridement breast, Left - Trunk    Surgeon: Surgeon(s) and Role:     * Nemesio Arreola MD - Primary     * Anita Crespo MD - Resident - Assisting  Anesthesia: General   Estimated Blood Loss: 2mL    Drains: None  Specimens:   ID Type Source Tests Collected by Time Destination   1 : excised necrotic skin, left breast Tissue Breast, Left SURGICAL PATHOLOGY EXAM Nemesio Arreola MD 10/21/2023  3:54 PM    2 : tissue, left breast Tissue Breast, Left SURGICAL PATHOLOGY EXAM Nemesio Arreola MD 10/21/2023  3:55 PM      Findings:   Necrotic skin excised, large underlying cavity with cloudy serous fluid drained .  Complications: None.  Implants: * No implants in log *        Plan:   Return to floor  Continue unasyn, discontinue vanc  Regular diet  BID wet to dry dressings starting tomorrow  Likely home tomorrow    Anita Crespo  General Surgery PGY5

## 2023-10-21 NOTE — PROGRESS NOTES
"Shift: 7261-0527  VS: BP (!) 154/87   Pulse 79   Temp 98.6  F (37  C) (Oral)   Resp 16   Ht 1.6 m (5' 3\")   Wt 109.3 kg (241 lb)   LMP 01/14/2016 (Approximate)   SpO2 98%   BMI 42.69 kg/m    Pain: L breast incision pain managed with tylenol  Neuro: A&Ox4  Cardiac: WDL  Respiratory: WDL  Diet/Appetite:  consistent carb  /GI: WDL, voiding spontaneously  LDA's: :PIV L lower forearm  Skin: L breast lumpectomy incision site; dressed w/ vaseline gauze   Activity: Ind  Procedures: Fluid aspiration of L breast completed 10/20       Plan: Iv antibiotics, monitoring incision site  "

## 2023-10-21 NOTE — PROGRESS NOTES
"Brief Surgery Note    Surgery paged about /100 w sudden bradycardia to 45.    At bedside, patient denies symptoms; no cp, no sob, no diaphoresis, no dizziness/light-headedness    BP (!) 185/101   Pulse 78   Temp 98.6  F (37  C) (Oral)   Resp 16   Ht 1.6 m (5' 3\")   Wt 109.3 kg (241 lb)   LMP 01/14/2016 (Approximate)   SpO2 97%   BMI 42.69 kg/m      Laying in bed, comfortable, NAD  NLB on RA  CV: RRR, regular on radial pulse palpation    ECG with 2nd degree AV block, RBBB, and left fascicular BBB. This was previously demonstrated on ECG from 2022. No evidence of elevated ST intervals or T-wave changes.     No concerns for ACS at this time. Episode of HTN likely 2/2 to stress related to upcoming procedure. Continue to monitor CV function.     Ngozi Richardson  [facility] General Surgery PGY2   "

## 2023-10-21 NOTE — ANESTHESIA PROCEDURE NOTES
Airway       Patient location during procedure: OR       Procedure Start/Stop Times: 10/21/2023 3:20 PM  Staff -        Anesthesiologist:  Lin Ortega MD       Resident/Fellow: Payal Donald MD       Performed By: resident  Consent for Airway        Urgency: elective  Indications and Patient Condition       Indications for airway management: sherie-procedural       Induction type:intravenous       Mask difficulty assessment: 2 - vent by mask + OA or adjuvant +/- NMBA    Final Airway Details       Final airway type: endotracheal airway       Successful airway: ETT - single and Oral  Endotracheal Airway Details        ETT size (mm): 7.0       Cuffed: yes       Successful intubation technique: direct laryngoscopy       DL Blade Type: MAC 3       Grade View of Cords: 1       Adjucts: stylet       Position: Right       Measured from: lips       Secured at (cm): 21       Bite block used: Soft    Post intubation assessment        Placement verified by: capnometry, equal breath sounds and chest rise        Number of attempts at approach: 1       Secured with: silk tape       Ease of procedure: easy       Dentition: Intact and Unchanged    Medication(s) Administered   Medication Administration Time: 10/21/2023 3:20 PM

## 2023-10-21 NOTE — PROGRESS NOTES
Pt went to to the OR for Irrigation and debridement of left breast at 1230pm. All belongings sent with Pt.  at bedside. Report given to floor nurse

## 2023-10-21 NOTE — ANESTHESIA CARE TRANSFER NOTE
Patient: Bria Davis    Procedure: Procedure(s):  Irrigation and debridement breast       Diagnosis: * No pre-op diagnosis entered *  Diagnosis Additional Information: No value filed.    Anesthesia Type:   General     Note:    Oropharynx: oropharynx clear of all foreign objects and spontaneously breathing  Level of Consciousness: awake  Oxygen Supplementation: face mask  Level of Supplemental Oxygen (L/min / FiO2): 4  Independent Airway: airway patency satisfactory and stable  Dentition: dentition unchanged  Vital Signs Stable: post-procedure vital signs reviewed and stable  Report to RN Given: handoff report given  Patient transferred to: PACU    Handoff Report: Identifed the Patient, Identified the Reponsible Provider, Reviewed the pertinent medical history, Discussed the surgical course, Reviewed Intra-OP anesthesia mangement and issues during anesthesia, Set expectations for post-procedure period and Allowed opportunity for questions and acknowledgement of understanding      Vitals:  Vitals Value Taken Time   /70 10/21/23 1630   Temp 36.1    Pulse 78 10/21/23 1635   Resp 14 10/21/23 1635   SpO2 93 % 10/21/23 1635   Vitals shown include unfiled device data.    Electronically Signed By: ABBI GARAY MD  October 21, 2023  4:36 PM
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
## 2023-10-21 NOTE — ANESTHESIA PREPROCEDURE EVALUATION
Anesthesia Pre-Procedure Evaluation    Patient: Bria Davis   MRN: [id number] : 1960        Procedure : Procedure(s):  Irrigation and debridement breast          Past Medical History:   Diagnosis Date    Arthritis     Asthma     Diabetes mellitus (H)     Esophageal reflux     Hypertension     Obese     Other chronic sinusitis     PONV (postoperative nausea and vomiting)       Past Surgical History:   Procedure Laterality Date    BIOPSY  2023    BIOPSY BREAST NEEDLE LOCALIZATION Left 10/6/2023    Procedure: BIOPSY, LEFT BREAST, WITH NEEDLE LOCALIZATION;  Surgeon: Bg Bryant MD;  Location: [facility]    COLONOSCOPY  2002    COLONOSCOPY  10/26/2012    Procedure: COLONOSCOPY;  Colonoscopy;  Surgeon: Margret Sales MD;  Location: [facility]    COLONOSCOPY N/A 2019    Procedure: COLONOSCOPY, WITH POLYPECTOMY AND BIOPSY;  Surgeon: Ariel Heck MD;  Location: [facility]    CV LEFT HEART CATH N/A 2019    Procedure: Left Heart Cath;  Surgeon: Mike Sanon MD;  Location:  [facility]    CV LEFT VENTRICULOGRAM N/A 2019    Procedure: Left Ventriculogram;  Surgeon: Mike Sanon MD;  Location:  [facility]    ENT SURGERY      ESOPHAGOSCOPY, GASTROSCOPY, DUODENOSCOPY (EGD), COMBINED N/A 2019    Procedure: ESOPHAGOGASTRODUODENOSCOPY, WITH BIOPSY;  Surgeon: Ariel Heck MD;  Location: [facility]    EYE SURGERY      GYN SURGERY      Hydroablation , polyp removal     INJECT EPIDURAL LUMBAR  2010    INJECT EPIDURAL LUMBAR performed by GENERIC ANESTHESIA PROVIDER at [facility]    INJECT EPIDURAL LUMBAR  2011    INJECT EPIDURAL LUMBAR performed by GENERIC ANESTHESIA PROVIDER at [facility]    left long finger pulley release Left 2020    RELEASE CARPAL TUNNEL  2012    Procedure: RELEASE CARPAL TUNNEL;  Right Carpal Tunnel Release;  Surgeon: Mike Sparrow MD;  Location: [facility]    RELEASE CARPAL TUNNEL  2012     Procedure: RELEASE CARPAL TUNNEL;  Left Carpal Tunnel Release;  Surgeon: Mike Sparorw MD;  Location: [facility]    REPAIR TENDON ACHILLES Left 12/26/2019    Procedure: Left Foot: Achilles Tendon Repair/Remodel/Reattachment; calcaneal prominence removal;  Surgeon: Felix Watkins DPM;  Location: [facility]    SURGICAL HISTORY OF -   04/10/2000    bilateral total ethmoidectomies, bilateral maxillary antrostomies, bilateral SMR of inferior turbinates, reduction of lt turbinate porter bullosa      Allergies   Allergen Reactions    Lisinopril Cough      Social History     Tobacco Use    Smoking status: Never    Smokeless tobacco: Never   Substance Use Topics    Alcohol use: No      Wt Readings from Last 1 Encounters:   10/20/23 109.3 kg (241 lb)        Anesthesia Evaluation   Pt has had prior anesthetic. Type: General.    History of anesthetic complications  - PONV.      ROS/MED HX  ENT/Pulmonary:     (+)                    Intermittent, asthma  Treatment: Inhaler prn,                 Neurologic:    (-) no seizures and no CVA   Cardiovascular:     (+) Dyslipidemia hypertension- -  CAD -  - -                                 Previous cardiac testing   Echo: Date: 9/2019 Results:     Interpretation Summary     The visual ejection fraction is estimated at 60-65%.  There is mild to moderate concentric left ventricular hypertrophy.  There is no pericardial effusion.  Normal left ventricular wall motion.  Technically difficult study.  Hypertension.    Stress Test:  Date: 9/2019 Results:  Impression  1.  Myocardial perfusion imaging using single isotope technique  demonstrated normal myocardial perfusion without evidence of infarct  or ischemia.   2. Gated images demonstrated normal left ventricular cavity with  normal segmental wall motion.  The left ventricular systolic function  is normal. Ejection fraction is greater than 75%.       3. The patient developed left bundle-branch block 3 minutes into  exercise after reaching a  heart rate of 126 bpm and stayed in it for  the duration of exercise with spontaneous resolution 6 minutes into  recovery.  4. No prior study available for comparison.    ECG Reviewed:  Date: 12/2021 Results:  NSR RBBB  Cath:  Date: 9/2019 Results:    Left ventricular filling pressures are mildly elevated .    Mid LAD lesion is 10% stenosed.    Prox RCA lesion is 10% stenosed.    The ejection fraction is greater than 55% by visual estimate.      METS/Exercise Tolerance:     Hematologic:  - neg hematologic  ROS     Musculoskeletal:  - neg musculoskeletal ROS     GI/Hepatic:     (+) GERD, Asymptomatic on medication,               (-) liver disease   Renal/Genitourinary:    (-) renal disease   Endo:     (+)  type II DM, Last HgA1c: 7.7,            Obesity,    Type I DM: 9/2023.   Psychiatric/Substance Use:  - neg psychiatric ROS     Infectious Disease: Comment: Necrosis & cellulitis of L breast skin graft site after lumpectomy      Malignancy:   (+) Malignancy, History of Breast.Breast CA Active status post Surgery.      Other:            Physical Exam    Airway        Mallampati: II   TM distance: > 3 FB   Neck ROM: full   Mouth opening: > 3 cm    Respiratory Devices and Support         Dental       (+) Minor Abnormalities - some fillings, tiny chips      Cardiovascular          Rhythm and rate: regular and normal     Pulmonary           breath sounds clear to auscultation           OUTSIDE LABS:  CBC:   Lab Results   Component Value Date    WBC 8.8 10/21/2023    WBC 10.9 10/20/2023    HGB 12.9 10/21/2023    HGB 13.3 10/20/2023    HCT 38.7 10/21/2023    HCT 40.1 10/20/2023     10/21/2023     10/20/2023     BMP:   Lab Results   Component Value Date     10/21/2023     10/20/2023    POTASSIUM 3.9 10/21/2023    POTASSIUM 4.3 10/20/2023    CHLORIDE 105 10/21/2023    CHLORIDE 99 10/20/2023    CO2 24 10/21/2023    CO2 22 10/20/2023    BUN 7.7 (L) 10/21/2023    BUN 8.2 10/20/2023    CR 0.47 (L)  10/21/2023    CR 0.50 (L) 10/21/2023     (H) 10/21/2023     (H) 10/21/2023     COAGS:   Lab Results   Component Value Date    PTT 29 09/12/2019    INR 0.96 09/12/2019     POC:   Lab Results   Component Value Date    BGM 99 12/26/2019    HCG Negative 11/09/2012     HEPATIC:   Lab Results   Component Value Date    ALBUMIN 4.0 10/20/2023    PROTTOTAL 6.4 10/20/2023    ALT 14 10/20/2023    AST 21 10/20/2023    ALKPHOS 65 10/20/2023    BILITOTAL 0.4 10/20/2023     OTHER:   Lab Results   Component Value Date    LACT 1.6 10/20/2023    A1C 7.7 (H) 09/21/2023    MARLON 9.1 10/21/2023    MAG 1.8 01/13/2022    LIPASE 43 12/21/2022    AMYLASE 54 04/26/2010    TSH 1.98 11/07/2022    SED 8 08/14/2023       Anesthesia Plan    ASA Status:  3    NPO Status:  Will be NPO Appropriate at ... 10/21/2023 3:00 PM   Anesthesia Type: General.     - Airway: ETT   Induction: Intravenous, Propofol.   Maintenance: TIVA.   Techniques and Equipment:     - Airway: Shoulder Madhav/Ramp     - Lines/Monitors: BIS     Consents    Anesthesia Plan(s) and associated risks, benefits, and realistic alternatives discussed. Questions answered and patient/representative(s) expressed understanding.     - Discussed:     - Discussed with:  Patient, Spouse            Postoperative Care    Pain management: IV analgesics, Oral pain medications, Multi-modal analgesia.   PONV prophylaxis: Ondansetron (or other 5HT-3), Background Propofol Infusion, Promethazine or metoclopramide     Comments:                Rakel Guerrero MD

## 2023-10-21 NOTE — ANESTHESIA POSTPROCEDURE EVALUATION
Patient: Bria Davis    Procedure: Procedure(s):  Irrigation and debridement breast       Anesthesia Type:  General    Note:  Disposition: Inpatient   Postop Pain Control: Uneventful            Sign Out: Well controlled pain   PONV: No   Neuro/Psych: Uneventful            Sign Out: Acceptable/Baseline neuro status   Airway/Respiratory: Uneventful            Sign Out: Acceptable/Baseline resp. status   CV/Hemodynamics: Uneventful            Sign Out: Acceptable CV status; No obvious hypovolemia; No obvious fluid overload   Other NRE:             Procedure/ Equipment: Extravasation   DID A NON-ROUTINE EVENT OCCUR? YES    Event details/Postop Comments:  IV infiltrated on induction of anesthesia. Lidocaine, fentanyl, propofol and phenylephrine (100mcg) extravasated into right upper arm. Arm was elevated and warm blankets applied. The arm remained warm, well perfused and non tender. Swelling was resolving in PACU and patient was alert and feeling well.            Last vitals:  Vitals Value Taken Time   /78 10/21/23 1715   Temp 36.2  C (97.2  F) 10/21/23 1700   Pulse 70 10/21/23 1724   Resp 14 10/21/23 1724   SpO2 92 % 10/21/23 1724   Vitals shown include unfiled device data.    Electronically Signed By: GOSIA LUGO MD  October 21, 2023  5:25 PM

## 2023-10-22 VITALS
BODY MASS INDEX: 43.36 KG/M2 | HEIGHT: 63 IN | SYSTOLIC BLOOD PRESSURE: 131 MMHG | DIASTOLIC BLOOD PRESSURE: 66 MMHG | RESPIRATION RATE: 16 BRPM | WEIGHT: 244.7 LBS | OXYGEN SATURATION: 96 % | TEMPERATURE: 98.7 F | HEART RATE: 80 BPM

## 2023-10-22 LAB
ATRIAL RATE - MUSE: 79 BPM
DIASTOLIC BLOOD PRESSURE - MUSE: NORMAL MMHG
GLUCOSE BLDC GLUCOMTR-MCNC: 108 MG/DL (ref 70–99)
GLUCOSE BLDC GLUCOMTR-MCNC: 110 MG/DL (ref 70–99)
GLUCOSE BLDC GLUCOMTR-MCNC: 156 MG/DL (ref 70–99)
GLUCOSE BLDC GLUCOMTR-MCNC: 181 MG/DL (ref 70–99)
GLUCOSE BLDC GLUCOMTR-MCNC: 62 MG/DL (ref 70–99)
GLUCOSE BLDC GLUCOMTR-MCNC: 65 MG/DL (ref 70–99)
GLUCOSE BLDC GLUCOMTR-MCNC: 66 MG/DL (ref 70–99)
GLUCOSE BLDC GLUCOMTR-MCNC: 68 MG/DL (ref 70–99)
GLUCOSE BLDC GLUCOMTR-MCNC: 81 MG/DL (ref 70–99)
GLUCOSE BLDC GLUCOMTR-MCNC: 82 MG/DL (ref 70–99)
GLUCOSE BLDC GLUCOMTR-MCNC: 86 MG/DL (ref 70–99)
GLUCOSE BLDC GLUCOMTR-MCNC: 90 MG/DL (ref 70–99)
GLUCOSE BLDC GLUCOMTR-MCNC: 95 MG/DL (ref 70–99)
INTERPRETATION ECG - MUSE: NORMAL
P AXIS - MUSE: 37 DEGREES
PR INTERVAL - MUSE: NORMAL MS
QRS DURATION - MUSE: 134 MS
QT - MUSE: 442 MS
QTC - MUSE: 463 MS
R AXIS - MUSE: -64 DEGREES
SYSTOLIC BLOOD PRESSURE - MUSE: NORMAL MMHG
T AXIS - MUSE: 54 DEGREES
VENTRICULAR RATE- MUSE: 66 BPM

## 2023-10-22 PROCEDURE — 250N000011 HC RX IP 250 OP 636: Performed by: STUDENT IN AN ORGANIZED HEALTH CARE EDUCATION/TRAINING PROGRAM

## 2023-10-22 PROCEDURE — 250N000013 HC RX MED GY IP 250 OP 250 PS 637

## 2023-10-22 PROCEDURE — 258N000003 HC RX IP 258 OP 636: Performed by: STUDENT IN AN ORGANIZED HEALTH CARE EDUCATION/TRAINING PROGRAM

## 2023-10-22 PROCEDURE — 250N000013 HC RX MED GY IP 250 OP 250 PS 637: Performed by: STUDENT IN AN ORGANIZED HEALTH CARE EDUCATION/TRAINING PROGRAM

## 2023-10-22 RX ADMIN — AMPICILLIN SODIUM AND SULBACTAM SODIUM 3 G: 2; 1 INJECTION, POWDER, FOR SOLUTION INTRAMUSCULAR; INTRAVENOUS at 02:11

## 2023-10-22 RX ADMIN — AMPICILLIN SODIUM AND SULBACTAM SODIUM 3 G: 2; 1 INJECTION, POWDER, FOR SOLUTION INTRAMUSCULAR; INTRAVENOUS at 08:28

## 2023-10-22 RX ADMIN — ENOXAPARIN SODIUM 40 MG: 40 INJECTION SUBCUTANEOUS at 08:22

## 2023-10-22 RX ADMIN — LOSARTAN POTASSIUM 100 MG: 100 TABLET, FILM COATED ORAL at 08:22

## 2023-10-22 RX ADMIN — GABAPENTIN 100 MG: 100 CAPSULE ORAL at 14:00

## 2023-10-22 RX ADMIN — ACETAMINOPHEN 650 MG: 325 TABLET, FILM COATED ORAL at 04:41

## 2023-10-22 RX ADMIN — FLUCONAZOLE 150 MG: 150 TABLET ORAL at 00:18

## 2023-10-22 RX ADMIN — Medication 1500 MG: at 02:42

## 2023-10-22 RX ADMIN — PANTOPRAZOLE SODIUM 40 MG: 40 TABLET, DELAYED RELEASE ORAL at 06:14

## 2023-10-22 RX ADMIN — POLYETHYLENE GLYCOL 3350 17 G: 17 POWDER, FOR SOLUTION ORAL at 08:22

## 2023-10-22 RX ADMIN — SENNOSIDES AND DOCUSATE SODIUM 1 TABLET: 50; 8.6 TABLET ORAL at 08:22

## 2023-10-22 RX ADMIN — METOPROLOL SUCCINATE 25 MG: 25 TABLET, EXTENDED RELEASE ORAL at 08:22

## 2023-10-22 RX ADMIN — GABAPENTIN 100 MG: 100 CAPSULE ORAL at 08:22

## 2023-10-22 RX ADMIN — FLUTICASONE FUROATE AND VILANTEROL TRIFENATATE 1 PUFF: 100; 25 POWDER RESPIRATORY (INHALATION) at 08:22

## 2023-10-22 RX ADMIN — HYDROCHLOROTHIAZIDE 12.5 MG: 12.5 TABLET ORAL at 08:22

## 2023-10-22 RX ADMIN — DEXTROSE 15 G: 15 GEL ORAL at 03:02

## 2023-10-22 ASSESSMENT — ACTIVITIES OF DAILY LIVING (ADL)
ADLS_ACUITY_SCORE: 22
ADLS_ACUITY_SCORE: 22
ADLS_ACUITY_SCORE: 24
ADLS_ACUITY_SCORE: 22
ADLS_ACUITY_SCORE: 22
ADLS_ACUITY_SCORE: 24
ADLS_ACUITY_SCORE: 22

## 2023-10-22 NOTE — PROGRESS NOTES
[id number]:  A&O x4.  Afebrile.  OVSS on room air.  Denies pain, nausea, vomiting, chest pain and SOB.  Good PO intake.   and 108, given insulin per pt's home regimen (see EMAR).  ROBIN.  Had BM.  BID WTC dressing changed this AM by surgery MD and education provided to pt and her .  Up ad iván.  PIVs removed.  All personal belongings taken home by pt at time of discharge.    Discharge  D: Orders for discharge and outpatient medications written.    I: Home medications and return to clinic schedule reviewed with patient. Discharge instructions and parameters for calling Health Care Provider reviewed. Patient left at 1455 accompanied by her .     A: Patient/family verbalized understanding and was ready for discharge.     P: Patient instructed to  medications in Pharmacy. Follow up as scheduled Dr. Whitney MIRELES's office will call patient to schedule appointment.

## 2023-10-22 NOTE — PROGRESS NOTES
"Post Op Check    10/21/2023    Bria Davis is a 63 year old female with h/o L breast wound infection and necrosis now POD#0 s/p irrigation and debridement of L breast.    Patient doing well. Denies chest pain, SOB, abdominal pain, nausea, vomiting. Getting up to pee in bathroom. Would like to restart PTA Diflucan for yeast infections.Pain well managed.     /69 (BP Location: Left arm)   Pulse 68   Temp 97.4  F (36.3  C) (Oral)   Resp 18   Ht 1.6 m (5' 3\")   Wt 111 kg (244 lb 11.2 oz)   LMP 01/14/2016 (Approximate)   SpO2 91%   BMI 43.35 kg/m      Gen: A&O, NAD  Chest: breathing non-labored  - L breast wound packed with kerlix, covered with dressing. No erythema, warmth, tenderness.   Abdomen: soft, non-tender, non-distended   Extremities: warm and well perfused    A/P: No acute post-op issues. Continue plan of care  - Encourage IS     Margret Garcia, DO  General Surgery, PGY-1       "

## 2023-10-22 NOTE — PROVIDER NOTIFICATION
Paged Night cross cover regarding discontinuation of insulin drip in light of BG's below . Treatment for hypoglycemia given. Will continue to monitor. Treat as needed and not restart drip unless BG reaches 150.
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
## 2023-10-22 NOTE — PLAN OF CARE
"Goal Outcome Evaluation:      Plan of Care Reviewed With: patient    Overall Patient Progress: improving    /56 (BP Location: Left arm)   Pulse 65   Temp 97.6  F (36.4  C) (Oral)   Resp 16   Ht 1.6 m (5' 3\")   Wt 111 kg (244 lb 11.2 oz)   LMP 01/14/2016 (Approximate)   SpO2 96%   BMI 43.35 kg/m      Neuro: A&Ox4.   Cardiac: Afebrile, VSS.   Respiratory: 1 lpm nc. CAPNO wdl  GI/: Voiding spontaneously. No BM this shift.   Diet/appetite: Tolerating diet. Denies nausea   Endocrine: insulin gtt stopped at 0100 per order when BG below 100. MD notified.   2 episodes of Hypoglycemia through night.(See flowsheets)   Activity: Up with stand by assist    Pain: .tylenol admin x1 for headache  Skin: Dressing over L breast surgical site reinforced x1. Skin otherwise intact.  Lines: PIV x2- sl'd and tko    Rested btwn frequent interruptions. Able to make needs known. Continue to monitor. Notify MD of changes/concerns           "

## 2023-10-22 NOTE — PROVIDER NOTIFICATION
Paged surg onc resident Margret Garcia re: [phone] [phone] KL Patient on insulin drip, Novolog with meals, and regular diet. Do you want to continue insulin drip? Also, patient states she is currently on Diflucan but it's not on the MAR. Please advise. Umu RODRIGUEZ -[phone]

## 2023-10-22 NOTE — PLAN OF CARE
Patient transferred to unit from PACU ~1740. Assumed nursing care from [id number]. Patient alert, oriented, and up with stand-by assistance. Vital signs stable on 1 LPM via nasal cannula. Pain (headache) managed with PRN Tylenol x 1. Tolerating regular diet; appetite is good; denies nausea. Insulin drip with Q1H blood glucose checks; currently on algorithm 1. Voiding spontaneously with adequate urine output. +bowel sounds in all quadrants; no BM yet post surgery. L breast surgical site packed and covered; dressing is clean, dry, and intact. L PIV infusing insulin drip and normal saline carrier fluid; R PIV saline locked.

## 2023-10-23 ENCOUNTER — PATIENT OUTREACH (OUTPATIENT)
Dept: ONCOLOGY | Facility: CLINIC | Age: 63
End: 2023-10-23

## 2023-10-23 ENCOUNTER — PATIENT OUTREACH (OUTPATIENT)
Dept: CARE COORDINATION | Facility: CLINIC | Age: 63
End: 2023-10-23

## 2023-10-23 ENCOUNTER — ONCOLOGY VISIT (OUTPATIENT)
Dept: ONCOLOGY | Facility: CLINIC | Age: 63
End: 2023-10-23
Attending: SURGERY
Payer: COMMERCIAL

## 2023-10-23 ENCOUNTER — LAB (OUTPATIENT)
Dept: LAB | Facility: CLINIC | Age: 63
End: 2023-10-23
Attending: SURGERY
Payer: COMMERCIAL

## 2023-10-23 VITALS
WEIGHT: 249 LBS | OXYGEN SATURATION: 99 % | SYSTOLIC BLOOD PRESSURE: 124 MMHG | BODY MASS INDEX: 44.11 KG/M2 | TEMPERATURE: 98.2 F | HEART RATE: 79 BPM | DIASTOLIC BLOOD PRESSURE: 85 MMHG

## 2023-10-23 DIAGNOSIS — D05.12 DUCTAL CARCINOMA IN SITU (DCIS) OF LEFT BREAST: ICD-10-CM

## 2023-10-23 DIAGNOSIS — D05.12 DUCTAL CARCINOMA IN SITU (DCIS) OF LEFT BREAST: Primary | ICD-10-CM

## 2023-10-23 LAB
INTERPRETATION: NORMAL
LAB PDF RESULT: NORMAL
SIGNIFICANT RESULTS: NORMAL
SPECIMEN DESCRIPTION: NORMAL
TEST DETAILS, MDL: NORMAL

## 2023-10-23 PROCEDURE — 99204 OFFICE O/P NEW MOD 45 MIN: CPT | Performed by: SURGERY

## 2023-10-23 PROCEDURE — 36415 COLL VENOUS BLD VENIPUNCTURE: CPT

## 2023-10-23 PROCEDURE — 99211 OFF/OP EST MAY X REQ PHY/QHP: CPT | Performed by: SURGERY

## 2023-10-23 ASSESSMENT — PAIN SCALES - GENERAL: PAINLEVEL: NO PAIN (0)

## 2023-10-23 NOTE — PROGRESS NOTES
[facility]: Surgical Oncology Cancer Care Short Note                                     Discussion with Patient:                                                          OUTBOUND CALL:     Spoke with Melyssa following her discharge from the hospital on 10/22. She stated she is feeling well, denies pain, fever/chills. Is completing WTD dressing changes BID.     Plan made for visit with Dr. Olson on 10/23 at 3:10 to review surgery plan. Lab appt for Genetics testing will be completed at this time.     Encouraged patient to call with any further questions or concerns.     Ofelia Becerra RNCC  [facility]   Surgical Oncology         Approximately 10 min was spent in conversation with the patient.

## 2023-10-23 NOTE — LETTER
October 24, 2023      Re: Bria Davis  [address] [address]         Dear Ms. Mendoza;     Ms. Bria Davis is currently under my professional care. While Ms. Davis awaits the plan for her next surgery, she is able to return to work as needed for training purposes.     Please contact our clinic at [phone] with additional questions.         Sincerely,      Arron Olson MD  [facility]  Surgical Oncology   Breast Center

## 2023-10-23 NOTE — LETTER
10/23/2023         RE: Bria Davis  [address] [address] [address]        Dear Colleague,    Thank you for referring your patient, Bria Davis, to the [facility] BREAST [facility]. Please see a copy of my visit note below.    The patient is here for a follow-up visit.  Since I have seen her last she went to the operating room and had a debridement of dead skin between her 2 breast incisions.  She now has a large area of debrided skin and a large open wound.  I do not see any evidence of cellulitis.  Her wound is actually healing well.  Today we talked about treatment options for her DCIS.  She has 1 positive margin and a couple of close margins.  It would be relatively easy now to reexcise her margins because the wound is open.  She would prefer this route over a formal mastectomy.  After reexcising her margins and confirming that she has negative margins, I will refer her to plastic surgery to help decide the best type of closure of her skin.    Total time 15 minutes which included reviewing her records and in person visit      Sincerely,        Arron Olson MD

## 2023-10-23 NOTE — PROGRESS NOTES
[facility]: Surgical Oncology Plan of Care Education Note                                     Discussion with Patient:                                                         Met with Melyssa following her visit with Dr. Olson on 10/23/2023. Introduced self and explained role of RNCC.       Plan:                                                       Reviewed plan for re-excision of lumpectomy site at the ASC. Provided appropriate OR location map. Discussed need for H&P within 30 days of surgery. Melyssa prefers .  Reviewed shower instructions and provided written hand-out and bottle of surgical scrub.     Informed patient they should get a call within the next three business days from our OR  with surgery date, H&P date and date of post-op visit with their surgeon. Writer answered all questions and concerns to the best of her ability and provided her contact information. Reviewed use of [employer] as alternative way to contact team.  Encouraged patient to contact with questions.         Ofelia Becerra, RNCC  [facility] Cancer [facility]  Surgical Onolcogy      Approximately 10 minutes was spent in discussion with patient.

## 2023-10-23 NOTE — PROGRESS NOTES
The patient is here for a follow-up visit.  Since I have seen her last she went to the operating room and had a debridement of dead skin between her 2 breast incisions.  She now has a large area of debrided skin and a large open wound.  I do not see any evidence of cellulitis.  Her wound is actually healing well.  Today we talked about treatment options for her DCIS.  She has 1 positive margin and a couple of close margins.  It would be relatively easy now to reexcise her margins because the wound is open.  She would prefer this route over a formal mastectomy.  After reexcising her margins and confirming that she has negative margins, I will refer her to plastic surgery to help decide the best type of closure of her skin.    Total time 15 minutes which included reviewing her records and in person visit

## 2023-10-23 NOTE — LETTER
October 24, 2023      Re: Bria Davis  [address] [address] [address]         Dear Ms. Mendoza;     Ms. Bria Davis is currently under my professional care. While Ms. Davis awaits the plan for her next surgery, she is able to return to work as needed for training purposes.     Please contact our clinic at [phone] with additional questions.         Sincerely,      Arron Olson MD  [facility]  Surgical Oncology   Breast Center

## 2023-10-23 NOTE — PROGRESS NOTES
[facility]    Background: Transitional Care Management program identified per system criteria and reviewed by [facility] team for possible outreach.    Assessment: Upon chart review, CCR Team member will not proceed with patient outreach related to this episode of Transitional Care Management program due to reason below:    Patient has active communication with a nurse, provider or care team for reason of post-hospital follow up plan.  Outreach call by CCR team not indicated to minimize duplicative efforts.     Plan: Transitional Care Management episode addressed appropriately per reason noted above.      Leena Do RN  [facility] Resource [place], [facility]    *Connected Care Resource Team does NOT follow patient ongoing. Referrals are identified based on internal discharge reports and the outreach is to ensure patient has an understanding of their discharge instructions.

## 2023-10-23 NOTE — NURSING NOTE
Chief Complaint   Patient presents with    Blood Draw    Labs Only     Labs drawn via  by RN.     Labs collected from venipuncture by RN.    Cori Merritt RN

## 2023-10-24 LAB
PATH REPORT.COMMENTS IMP SPEC: NORMAL
PATH REPORT.COMMENTS IMP SPEC: NORMAL
PATH REPORT.FINAL DX SPEC: NORMAL
PATH REPORT.GROSS SPEC: NORMAL
PATH REPORT.MICROSCOPIC SPEC OTHER STN: NORMAL
PATH REPORT.RELEVANT HX SPEC: NORMAL
PHOTO IMAGE: NORMAL

## 2023-10-25 ENCOUNTER — TELEPHONE (OUTPATIENT)
Dept: ONCOLOGY | Facility: CLINIC | Age: 63
End: 2023-10-25
Payer: COMMERCIAL

## 2023-10-25 ENCOUNTER — HOSPITAL ENCOUNTER (OUTPATIENT)
Facility: CLINIC | Age: 63
End: 2023-10-25
Attending: SURGERY | Admitting: SURGERY
Payer: COMMERCIAL

## 2023-10-25 ENCOUNTER — HOSPITAL ENCOUNTER (OUTPATIENT)
Facility: AMBULATORY SURGERY CENTER | Age: 63
End: 2023-10-25
Attending: SURGERY | Admitting: SURGERY
Payer: COMMERCIAL

## 2023-10-25 DIAGNOSIS — D05.12 DUCTAL CARCINOMA IN SITU (DCIS) OF LEFT BREAST: Primary | ICD-10-CM

## 2023-10-25 DIAGNOSIS — N61.1 BREAST ABSCESS: ICD-10-CM

## 2023-10-25 LAB
BACTERIA ASPIRATE CULT: NO GROWTH
BACTERIA BLD CULT: NO GROWTH
BACTERIA BLD CULT: NO GROWTH
GRAM STAIN RESULT: NORMAL
GRAM STAIN RESULT: NORMAL
INTERPRETATION: NORMAL
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
## 2023-10-26 ENCOUNTER — TELEPHONE (OUTPATIENT)
Dept: PLASTIC SURGERY | Facility: CLINIC | Age: 63
End: 2023-10-26
Payer: COMMERCIAL

## 2023-10-26 NOTE — TELEPHONE ENCOUNTER
.[facility] Call Center    Phone Message    May a detailed message be left on voicemail: yes     Reason for Call: Other: Pt has a referral from Dr Olson. Writer called backline for clarification of scheduling. Backline nurse instructed writer to send a high priority TE to clinic. Pt call back number is .     Action Taken: Other: plast surg    Travel Screening: Not Applicable

## 2023-10-26 NOTE — TELEPHONE ENCOUNTER
"Spoke with pt today after discussing case via secure chat with TERA Moran with surg/onc to clarify urgency of the appointment. I offered pt appt with both Dr Rodriguez and Dr Gramajo the week after appt on 11/27 to discuss tumor margins after surgery. Pt preferred ASAP and chose Tuesday appt with Dr Rodriguez to discuss closure as she has a \"big gapping wound that needs to be closed\". Appt scheduled.  Dagoberto SPENCER RN  "

## 2023-10-27 ENCOUNTER — TELEPHONE (OUTPATIENT)
Dept: FAMILY MEDICINE | Facility: CLINIC | Age: 63
End: 2023-10-27

## 2023-10-27 ENCOUNTER — PATIENT OUTREACH (OUTPATIENT)
Dept: SURGERY | Facility: CLINIC | Age: 63
End: 2023-10-27

## 2023-10-27 ENCOUNTER — NURSE TRIAGE (OUTPATIENT)
Dept: ONCOLOGY | Facility: CLINIC | Age: 63
End: 2023-10-27

## 2023-10-27 ENCOUNTER — TUMOR CONFERENCE (OUTPATIENT)
Dept: ONCOLOGY | Facility: CLINIC | Age: 63
End: 2023-10-27
Payer: COMMERCIAL

## 2023-10-27 DIAGNOSIS — N61.0 CELLULITIS OF BREAST: Primary | ICD-10-CM

## 2023-10-27 LAB — BACTERIA ASPIRATE CULT: NORMAL

## 2023-10-27 NOTE — PROGRESS NOTES
Surgical Oncology RN Care Coordination Note:     Patient called triage to arrange for dressing supplies to be picked up at [facility]. Called and left a message to patient and informed her that we are not able to arrange for supplies at [facility] as we don't have a clinic with Dr. Olson at this location. Explained in message that we can send an Rx to the pharmacy if they can provide but otherwise she would need to come to the [facility] to get needed supplies.     Patient returned call and stated she will come to the [facility] to  supplies as she will run out over the weekend. Informed her that the supplies will be in a bag with the check in staff on the 2nd floor. Also informed her that we would send an Rx to the pharmacy for the flushes requested as she can stop at the clinic pharmacy to .     Note routed to Ofelia SALINAS to follow up with patient regarding wound care supplies moving forward to ensure she has necessary supplies.     Luisa Fuller, RN, BSN  Care Coordinator

## 2023-10-27 NOTE — TELEPHONE ENCOUNTER
FUTURE VISIT INFORMATION      FUTURE VISIT INFORMATION:  Date: 11/28/23  Time: 9:30am  Location: [facility]  REFERRAL INFORMATION:  Reason for visit/diagnosis  Dr Olson to discuss closure after recent surgery, needs negative margins after appt with Dr PORTER on 11/27.     RECORDS REQUESTED FROM:       Clinic name Comments Records Status Imaging Status   [facility] OV 10/23/23    Irrigation and debridement breast done 10/21/23 EPIC

## 2023-10-27 NOTE — TELEPHONE ENCOUNTER
"Melyssa is calling because she needs to get some supplies before the weekend or she will run out.    She states she called Dr. Olson's office and the person on the phone told her to call [phone] to ask Nurse to arrange for her to  the following supplies at the [facility] today:    Abdominal pads, 5\"x 9\"--needs 20  Saline syringes --needs 105  Gauze Roll 2 1/4\" x 9 feet--needs 35    This writer unable to contact RNCC at the time of the call.  Message will be routed to RNCC for follow up with pt today.  "

## 2023-10-27 NOTE — TELEPHONE ENCOUNTER
"Patient calling due needing supplies for twice daily dressing changes. It is difficult for patient to travel to [facility] surgery [facility] to  the supplies due to the wound.    10/6/23 lumpectomy in [place]. Patient reports she has a 3 inch by 3 inch wound that is open and will remain open until her next lumpectomy. 11/13/23 is her next procedure.    Patient needs supplies for dressing changes twice daily.  Abdominal pad 5 inch x 9 inch needs 20   Saline syringes needs 105   Abigail 2.25 inches x 9 feet needs 35    Per Clinic RN Supervisor Edel Xie, \"I would direct her to call the site she had her procedure at and they can help assist her in getting what is needed, whether it is orders to get through insurance or if she needs to purchase the needed supplies.\"    Patient confirmed she will call the site she had her procedure for recommendations.    Merced Charles RN on 10/27/2023 at 10:50 AM    "

## 2023-10-30 DIAGNOSIS — D05.12 DUCTAL CARCINOMA IN SITU (DCIS) OF LEFT BREAST: ICD-10-CM

## 2023-10-30 DIAGNOSIS — N61.1 BREAST ABSCESS: Primary | ICD-10-CM

## 2023-10-30 DIAGNOSIS — T14.8XXA WOUND INFECTION: ICD-10-CM

## 2023-10-30 DIAGNOSIS — L08.9 WOUND INFECTION: ICD-10-CM

## 2023-10-31 ENCOUNTER — TELEPHONE (OUTPATIENT)
Dept: FAMILY MEDICINE | Facility: CLINIC | Age: 63
End: 2023-10-31
Payer: COMMERCIAL

## 2023-10-31 ENCOUNTER — PATIENT OUTREACH (OUTPATIENT)
Dept: ONCOLOGY | Facility: CLINIC | Age: 63
End: 2023-10-31
Payer: COMMERCIAL

## 2023-10-31 DIAGNOSIS — U07.1 INFECTION DUE TO 2019 NOVEL CORONAVIRUS: Primary | ICD-10-CM

## 2023-10-31 NOTE — TELEPHONE ENCOUNTER
COVID Positive/Requesting COVID treatment    Patient is positive for COVID and requesting treatment options.    Date of positive COVID test (PCR or at home)? 10/31/23    Current COVID symptoms: cough, muscle or body aches, headache, sore throat, and nausea or vomiting    Date COVID symptoms began: 10/30/23    Message should be routed to clinic RN pool. Best phone number to use for call back: [phone]

## 2023-10-31 NOTE — PROGRESS NOTES
POST-OP CALL  Oct 31, 2023    Bria Davis is a 63 year old female with history of lumpectomy for DCIS 2 weeks ago. She developed skin necrosis with cellulitis. She underwent irrigation and debridement with Dr. Arreola 10/21/23. She was discharged with Augmentin.   She has some bleeding on the gauze with dressing changes. No concerns for active bleeding. No fever. She will keep us updated on her progress.     Arely Estrada PA-C

## 2023-10-31 NOTE — PROGRESS NOTES
[facility]: Surgical Oncology Cancer Care Short Note                                     Discussion with Patient:                                                       INBOUND CALL:     Received call from Melyssa stating she tested positive for Covid today. Her symptoms include a headache, sore throat and sinus congestion.     Reviewed that as long as her symptoms improve, she should still be able to have surgery on 11/13 with Dr. Olson since this is more than 10 days out from her positive test.     Plan made for writer to call Melyssa in one week to reassess symptoms and readiness for surgery. Melyssa will be contacting her PCP for Paxlovid.     Melyssa stated the drainage on her gauze from her wound site has become more bloody since yesterday. She is changing her dressing 2 times a day. She stated the drainage was a light pink but has become more red. She denies any increase in pain, fever/chills or any other concerns with the wound. Requested Melyssa continue to monitor the area and drainage and call if she develops any new concerns.     Message sent to Dr. Olson and Arely Rosario PA-C for any additional recommendations.        Encouraged patient to call with any further questions or concerns.     Ofelia Becerra, RNCC  [facility]   Surgical Oncology     Approximately 5 minutes was spent in conversation with the patient.

## 2023-10-31 NOTE — TELEPHONE ENCOUNTER
RN COVID TREATMENT VISIT  10/31/23      The patient has been triaged and does not require a higher level of care.    Bria Davis  63 year old  Current weight? 246 pounds    Has the patient been seen by a primary care provider at an [facility] or [facility] within the past two years? Yes.   Have you been in close proximity to/do you have a known exposure to a person with a confirmed case of influenza? No.     General treatment eligibility:  Date of positive COVID test (PCR or at home)?  10/31/23    Are you or have you been hospitalized for this COVID-19 infection? No.   Have you received monoclonal antibodies or antiviral treatment for COVID-19 since this positive test? No.   Do you have any of the following conditions that place you at risk of being very sick from COVID-19?   - Age 50 years or older  - Cancer/active malignancy including patients with cancer who are currently receiving chemotherapy or radiation, metastatic cancer, advance cancer and are receiving palliative care  - Chronic kidney disease (mild to moderate; eGFR or GFR 30-59 mL/min)  - Chronic lung diseases such as asthma, bronchiectasis, COPD, interstitial lung disease, pulmonary embolism, pulmonary hypertension   - Diabetes mellitus, type 1 and type 2  - Heart conditions such as cardiomyopathies, congenital heart defects, coronary artery disease, heart arrhythmias, heart failure, hypertension, valve disorders   - Overweight or Obesity (BMI >85th percentile or BMI 25 or higher)  - Patient reports sedentary lifestyle (physically inactive)  Yes, patient has at least one high risk condition as noted above.     Current COVID symptoms:   - fever or chills  - cough  - fatigue  - muscle or body aches  - headache  - sore throat  - congestion or runny nose  - nausea or vomiting  Yes. Patient has at least one symptom as selected.     How many days since symptoms started? 5 days or less. Established patient, 12 years or older  weighing at least 88.2 lbs, who has symptoms that started in the past 5 days, has not been hospitalized nor received treatment already, and is at risk for being very sick from COVID-19.     Treatment eligibility by RN:  Are you currently pregnant or nursing? No  Do you have a clinically significant hypersensitivity to nirmatrelvir or ritonavir, or toxic epidermal necrolysis (TEN) or Burks-Jonny Syndrome? No  Do you have a history of hepatitis, any hepatic impairment on the Problem List (such as Child-Garner Class C, cirrhosis, fatty liver disease, alcoholic liver disease), or was the last liver lab (hepatic panel, ALT, AST, ALK Phos, bilirubin) elevated in the past 6 months? No  Do you have any history of severe renal impairment (eGFR < 30mL/min)? No    Is patient eligible to continue? Yes, patient meets all eligibility requirements for the RN COVID treatment (as denoted by all no responses above).     Current Outpatient Medications   Medication Sig Dispense Refill    acetaminophen (TYLENOL) 325 MG tablet Take 2 tablets (650 mg) by mouth every 4 hours as needed for mild pain 50 tablet 0    albuterol (PROVENTIL) (2.5 MG/3ML) 0.083% neb solution Take 1 vial (2.5 mg) by nebulization every 6 hours as needed for shortness of breath / dyspnea or wheezing 90 mL 3    albuterol (VENTOLIN HFA) 108 (90 Base) MCG/ACT inhaler Inhale 2 puffs into the lungs every 4 hours as needed for shortness of breath 18 g 1    Ascorbic Acid (VITAMIN C) 500 MG CAPS       aspirin (ASA) 81 MG EC tablet Take 1 tablet (81 mg) by mouth daily 1 tablet 0    Calcium Carb-Cholecalciferol (CALCIUM-VITAMIN D) 600-400 MG-UNIT TABS Take 1 tablet by mouth daily      esomeprazole (NEXIUM) 40 MG DR capsule Take 1 capsule (40 mg) by mouth every morning (before breakfast) 90 capsule 3    ferrous sulfate 140 (45 Fe) MG TBCR CR tablet Take 140 mg by mouth daily      fluticasone-salmeterol (ADVAIR DISKUS) 100-50 MCG/ACT inhaler INHALE 1 PUFF INTO THE LUNGS 2  TIMES DAILY 180 each 3    gabapentin (NEURONTIN) 100 MG capsule Take 1 capsule (100 mg) by mouth 3 times daily 90 capsule 11    hydrochlorothiazide (HYDRODIURIL) 12.5 MG tablet Take 1 tablet (12.5 mg) by mouth daily 90 tablet 1    insulin aspart (NOVOLOG FLEXPEN) 100 UNIT/ML pen 32 units before breakfast, 32 units before lunch, 32 units before dinner 90 mL 3    insulin glargine U-300 (TOUJEO MAX SOLOSTAR) 300 UNIT/ML (2 units dial) pen INJECT 100 UNITS UNDER THE SKIN DAILY 40.5 mL 1    losartan (COZAAR) 100 MG tablet Take 1 tablet (100 mg) by mouth daily 90 tablet 1    metFORMIN (GLUCOPHAGE XR) 500 MG 24 hr tablet Take 2 tablets (1,000 mg) by mouth 2 times daily (with meals) 360 tablet 1    metoclopramide (REGLAN) 10 MG tablet Take 1 tablet (10 mg) by mouth 2 times daily 180 tablet 1    metoprolol succinate ER (TOPROL XL) 25 MG 24 hr tablet Take 1 tablet (25 mg) by mouth daily 90 tablet 3    montelukast (SINGULAIR) 10 MG tablet TAKE 1 TABLET BY MOUTH AT BEDTIME 90 tablet 1    Multiple Vitamins-Minerals (MULTIVITAMIN ADULTS 50+) TABS Take 1 tablet by mouth daily      pseudoePHEDrine (SUDAFED) 30 MG tablet TAKE 2 TABLETS (60 MG) BY MOUTH 2 TIMES DAILY 120 tablet 3    rosuvastatin (CRESTOR) 10 MG tablet Take 1 tablet (10 mg) by mouth daily 90 tablet 1    Semaglutide, 2 MG/DOSE, (OZEMPIC, 2 MG/DOSE,) 8 MG/3ML pen Inject 2 mg Subcutaneous once a week 9 mL 1    sodium chloride, PF, 0.9% PF flush Irrigate with 10 mLs as directed every 24 hours 600 mL 0    blood glucose (NO BRAND SPECIFIED) lancets standard Use to test blood sugar 4 times daily or as directed. 200 lancet 6    blood glucose (NO BRAND SPECIFIED) test strip Use to test blood sugar 4 times daily or as directed. 200 strip 6    blood glucose monitoring (NO BRAND SPECIFIED) meter device kit Use to test blood sugar 4 times daily or as directed. 1 kit 0    insulin pen needle (BD PEDRO LUIS U/F) 32G X 4 MM miscellaneous USE 4 DAILY OR AS DIRECTED 400 each 1    Microlet  Lancets MISC USE TO TEST BLOOD SUGAR 4 TIMES DAILY OR AS DIRECTED. 200 each 1    ondansetron (ZOFRAN) 4 MG tablet Take 1 tablet (4 mg) by mouth every 8 hours as needed for nausea 12 tablet 3    oxyCODONE (ROXICODONE) 5 MG tablet Take 1-2 tablets (5-10 mg) by mouth every 4 hours as needed for moderate to severe pain 12 tablet 0    triamcinolone (KENALOG) 0.1 % external cream Apply topically 2 times daily 80 g 1       Medications from List 1 of the standing order (on medications that exclude the use of Paxlovid) that patient is taking: NONE. Is patient taking Libra's Wort? No  Is patient taking Libra's Wort or any meds from List 1? No.   Medications from List 2 of the standing order (on meds that provider needs to adjust) that patient is taking: salmeterol and salmeterol-containing medications (Advair), explained a provider visit is necessary to discuss medication adjustments while taking Paxlovid. Is patient on any of the meds from List 2? Yes. Patient will be scheduled or transferred to a  at the end of this call.       Patient was scheduled a virtual with Urmlia Ernst on 11/1/23.  Ludy Wylei RN

## 2023-11-01 ENCOUNTER — VIRTUAL VISIT (OUTPATIENT)
Dept: FAMILY MEDICINE | Facility: CLINIC | Age: 63
End: 2023-11-01
Payer: COMMERCIAL

## 2023-11-01 DIAGNOSIS — U07.1 INFECTION DUE TO 2019 NOVEL CORONAVIRUS: Primary | ICD-10-CM

## 2023-11-01 PROCEDURE — 99213 OFFICE O/P EST LOW 20 MIN: CPT | Mod: 95 | Performed by: NURSE PRACTITIONER

## 2023-11-01 NOTE — PATIENT INSTRUCTIONS
Coping with Life After COVID-19  Being in the hospital because of COVID-19 is scary. Going home can be scary, too. You may face changes to your life, the way you work or what you can eat. It s hard to adjust to change, and it s normal to feel afraid, frustrated or even angry. These feelings usually go away over time. If your feelings don t start to get better, it s called  adjustment disorder.      What signs should I look out for?  Adjustment disorder can happen to anyone in a time of stress. It makes it hard to cope with daily life. You may feel lonely or fight with loved ones, even if you re glad to be home. Watch for these signs:  Fear or worry  Hard time focusing  Sadness or anger  Trouble talking to family or friends  Feeling like you don t fit in or isolating yourself  Problems with sleep   Drinking alcohol or taking drugs to cope    What can I do?  You can help yourself get better. Feeling you have control helps you move forward. You may wonder if you ll be able to do things you did before. Be patient. Do your best to make the most of every day. Try to build relationships, be as active as you can, eat right and keep a sense of humor. Avoid smoking and drinking too much alcohol. Call your family doctor or clinic if you re not sure what to do. They can guide you to care or other services.    When should I get help?  Think about getting counseling if your sadness or frustration gets worse. Together with a trained counselor, you can talk about your problems adjusting to life after your hospital stay. You can come up with new ways to handle changes that give you more control. Your family doctor or care team can help you find a counselor.     Get help if you re thinking about hurting yourself. If you need help right away, call 911 or go to the nearest emergency room. You can also try the Crisis Text Line.    Crisis Text Line: 966-356 (http://www.crisistextline.org)  The Crisis Text Line serves anyone, in any  crisis. It gives free, 24/7 support. Here's how it works:  Text HOME to 937326 from anywhere in the [place], anytime, about any type of crisis.  A live, trained Crisis Counselor will text you back quickly.  The volunteer Crisis Counselor can help you move from a  hot moment  to a  cool moment.  They can also help you work out a safety plan.

## 2023-11-01 NOTE — PROGRESS NOTES
"Melyssa is a 63 year old who is being evaluated via a billable video visit.      How would you like to obtain your AVS? MyChart  If the video visit is dropped, the invitation should be resent by: Text to cell phone: [phone]  Will anyone else be joining your video visit? No          Assessment & Plan     Infection due to 2019 novel coronavirus  Patient tested positive for COVID-19 yesterday with symptom onset on Sunday.  Symptoms waxing and waning, worse yesterday.  DayQuil is some help. Discussed antiviral therapy, risks versus benefits, high risk indications as well as side effects.  Patient does meet high risk criteria.  We will treat with alternate antiviral given multiple medication reactions.  Reviewed supportive cares as well as quarantine guidelines.  Patient follow-up if symptoms not proving or worsening.  - molnupiravir (LAGEVRIO) 200 MG capsule; Take 4 capsules (800 mg) by mouth every 12 hours for 5 days             BMI:   Estimated body mass index is 44.11 kg/m  as calculated from the following:    Height as of 10/20/23: 1.6 m (5' 3\").    Weight as of 10/23/23: 112.9 kg (249 lb).   Weight management plan: Patient was referred to their PCP to discuss a diet and exercise plan.    See Patient Instructions     Myra Ernst DNP, APRN-CNP   [facility]    Subjective   Melyssa is a 63 year old, presenting for the following health issues:  Covid Concern        11/1/2023    10:15 AM   Additional Questions   Roomed by Kiki JHA CMA       HPI     COVID-19 Symptom Review  How many days ago did these symptoms start? Sunday night, tested positive yesterday morning    Are any of the following symptoms significant for you?  New or worsening difficulty breathing? No  Worsening cough? No  Fever or chills? Yes, I felt feverish or had chills.  Headache: YES  Sore throat: YES  Chest pain: No  Diarrhea: No  Body aches? YES    What treatments has patient tried? Dayquil and NyQuil   Does patient " live in a nursing home, group home, or shelter? No  Does patient have a way to get food/medications during quarantined? Yes, I have a friend or family member who can help me.  -Pt wants to know if she should do her Ozempic shot today or wait.   Dayquil does help               Review of Systems   Constitutional, HEENT, cardiovascular, pulmonary, gi and gu systems are negative, except as otherwise noted.      Objective           Vitals:  No vitals were obtained today due to virtual visit.    Physical Exam   GENERAL: Healthy, alert and no distress  EYES: Eyes grossly normal to inspection.  No discharge or erythema, or obvious scleral/conjunctival abnormalities.  RESP: No audible wheeze, cough, or visible cyanosis.  No visible retractions or increased work of breathing.    SKIN: Visible skin clear. No significant rash, abnormal pigmentation or lesions.  NEURO: Cranial nerves grossly intact.  Mentation and speech appropriate for age.  PSYCH: Mentation appears normal, affect normal/bright, judgement and insight intact, normal speech and appearance well-groomed.    Diagnostic Test Results:  Labs reviewed in Epic  none            Video-Visit Details    Type of service:  Video Visit     Originating Location (pt. Location): Home    Distant Location (provider location):  On-site  Platform used for Video Visit: [employer]    Chart documentation with Dragon Voice recognition Software. Although reviewed after completion, some words and grammatical errors may remain.

## 2023-11-03 ENCOUNTER — TELEPHONE (OUTPATIENT)
Dept: ONCOLOGY | Facility: CLINIC | Age: 63
End: 2023-11-03
Payer: COMMERCIAL

## 2023-11-03 ENCOUNTER — LAB (OUTPATIENT)
Dept: LAB | Facility: CLINIC | Age: 63
End: 2023-11-03
Payer: COMMERCIAL

## 2023-11-03 DIAGNOSIS — D05.12 DUCTAL CARCINOMA IN SITU (DCIS) OF LEFT BREAST: Primary | ICD-10-CM

## 2023-11-03 PROCEDURE — G0452 MOLECULAR PATHOLOGY INTERPR: HCPCS | Mod: 26 | Performed by: PATHOLOGY

## 2023-11-03 PROCEDURE — 81162 BRCA1&2 GEN FULL SEQ DUP/DEL: CPT | Performed by: SURGERY

## 2023-11-03 NOTE — TELEPHONE ENCOUNTER
RN CARE COORDINATION      Date Received in Clinic: n/a  Name/Type of Form: DME for wound supplies  Date Completed: 11/2/2023  Copy Mailed to patient: no  Disposition of Form: Faxed to [facility] location, Attn: Sharon @ [phone].     Melyssa is aware of the plan. Sharon can be reached by callig [phone].        Ofelia Becerra RNCC  [facility]  Surgical Oncology

## 2023-11-06 DIAGNOSIS — N61.0 CELLULITIS OF BREAST: ICD-10-CM

## 2023-11-08 NOTE — PROGRESS NOTES
NEUROLOGY FOLLOW UP VISIT  NOTE       [facility]  [address] [place] [address]  Tel: [phone]  Fax: [phone]  [email or web address]     Bria Davis,  1960, MRN [id number]  PCP: Kaia Elena  Date: 2023      ASSESSMENT & PLAN     Visit Diagnosis  Paresthesia     Primary fibromyalgia syndrome  63-year-old female with HTN, HLD, DM2, morbid obesity, CKD stage II, CAD and breast cancer who was evaluated for generalized body aches and sensitivity to touch.  She had lab work that included normal B12, folate, rheumatoid factor.  Her CRP was elevated and antinuclear antibody was borderline positive at 1: 160.  Double-stranded DNA and MAYNOR panel was normal.  Follow-up antinuclear antibody testing was recommended in 6 months.  EMG was also within normal limits.  I have explained to her that she likely has primary fibromyalgia syndrome and I have recommended increasing the dose of gabapentin to 300 mg twice daily.  If she fails to show improvement we can consider switching her to Lyrica.  Follow-up will be in 1 year    Thank you again for this referral, please feel free to contact me if you have any questions.    Jabari Santos MD  [facility]  (Formerly, [facility]     HISTORY OF PRESENT ILLNESS     Patient is a 63-year-old female with history of HTN, HLD, DM 2, morbid obesity, CKD stage II and CAD who was initially evaluated on 2023 with complaint of generalized paresthesia and body sensitivity to touch.  I suspected fibromyalgia syndrome.  Lab work included sed rate, folate, B12, rheumatoid factor.  CRP was elevated and antinuclear antibody was borderline positive at 1: 160.  Double-stranded DNA and MAYNOR panel was normal.  Plan was to repeat antinuclear antibody after 6 months.  Patient was started on gabapentin for possible fibromyalgia but was unable to tolerate and was told to discontinue.   EMG of bilateral lower extremities was within normal limits.  Since her last visit she was diagnosed with breast cancer detected on routine mammogram and is undergoing treatment     PROBLEM LIST   Patient Active Problem List   Diagnosis Code     Mild intermittent asthma J45.20     Esophageal reflux K21.9     Allergic rhinitis J30.9     Restless legs syndrome (RLS) G25.81     Enthesopathy M77.9     Lumbar radiculopathy M54.16     Microalbuminuria R80.9     Hyperlipidemia LDL goal <70 E78.5     Type 2 diabetes mellitus with kidney complication, with long-term current use of insulin (H) E11.29, Z79.4     Morbid obesity (H) E66.01     Essential hypertension with goal blood pressure less than 140/90 I10     Nonobstructive atherosclerosis of coronary artery I25.10     Chronic kidney disease, stage 2 (mild) N18.2     Atypical ductal hyperplasia of left breast N60.92     Mass of upper inner quadrant of left breast N63.22     Breast abscess N61.1     Ductal carcinoma in situ (DCIS) of left breast D05.12         PAST MEDICAL & SURGICAL HISTORY     Past Medical History:   Patient  has a past medical history of Arthritis, Asthma, Diabetes mellitus (H), Esophageal reflux, Hypertension, Obese, Other chronic sinusitis, and PONV (postoperative nausea and vomiting).    Surgical History:  She  has a past surgical history that includes surgical history of -  (04/10/2000); colonoscopy (01/31/2002); Inject epidural lumbar (12/07/2010); Inject epidural lumbar (01/17/2011); Colonoscopy (10/26/2012); Release carpal tunnel (06/19/2012); Release carpal tunnel (11/09/2012); GYN surgery; Left Heart Catheterization (N/A, 09/12/2019); Left Ventriculogram (N/A, 09/12/2019); Colonoscopy (N/A, 11/08/2019); Esophagoscopy, gastroscopy, duodenoscopy (EGD), combined (N/A, 11/08/2019); Repair tendon achilles (Left, 12/26/2019); left long finger pulley release (Left, 07/2020); biopsy (9/7/2023); ENT surgery; Eye surgery (2022); Biopsy breast needle  localization (Left, 10/6/2023); and Irrigation and debridement breast (Left, 10/21/2023).     SOCIAL HISTORY     Reviewed, and she  reports that she has never smoked. She has never used smokeless tobacco. She reports that she does not drink alcohol and does not use drugs.     FAMILY HISTORY     Reviewed, and family history includes Anxiety Disorder in her sister and another family member; Asthma in her brother; Breast Cancer in her maternal grandmother; Cancer in her brother; Cardiovascular in her father; Cerebrovascular Disease in her father; Chronic Obstructive Pulmonary Disease in her brother, brother, sister, and sister; Depression in her brother, sister, sister, and sister; Diabetes in her brother, mother, and son; Heart Disease in her father; Hypertension in her father and mother; Kidney failure in her brother; Obesity in some other family members; Respiratory in her brother, mother, and sister.     ALLERGIES     Allergies   Allergen Reactions     Lisinopril Cough         REVIEW OF SYSTEMS     A 12 point review of system was performed and was negative except as outlined in the history of present illness.     HOME MEDICATIONS     Current Outpatient Rx   Medication Sig Dispense Refill     Ascorbic Acid (VITAMIN C) 500 MG CAPS        aspirin (ASA) 81 MG EC tablet Take 1 tablet (81 mg) by mouth daily 1 tablet 0     blood glucose (NO BRAND SPECIFIED) lancets standard Use to test blood sugar 4 times daily or as directed. 200 lancet 6     blood glucose (NO BRAND SPECIFIED) test strip Use to test blood sugar 4 times daily or as directed. 200 strip 6     blood glucose monitoring (NO BRAND SPECIFIED) meter device kit Use to test blood sugar 4 times daily or as directed. 1 kit 0     Calcium Carb-Cholecalciferol (CALCIUM-VITAMIN D) 600-400 MG-UNIT TABS Take 1 tablet by mouth daily       esomeprazole (NEXIUM) 40 MG DR capsule Take 1 capsule (40 mg) by mouth every morning (before breakfast) 90 capsule 3     ferrous sulfate  140 (45 Fe) MG TBCR CR tablet Take 140 mg by mouth daily       fluticasone-salmeterol (ADVAIR DISKUS) 100-50 MCG/ACT inhaler INHALE 1 PUFF INTO THE LUNGS 2 TIMES DAILY 180 each 3     gabapentin (NEURONTIN) 300 MG capsule Take 1 capsule (300 mg) by mouth 2 times daily 180 capsule 3     hydrochlorothiazide (HYDRODIURIL) 12.5 MG tablet Take 1 tablet (12.5 mg) by mouth daily 90 tablet 1     insulin aspart (NOVOLOG FLEXPEN) 100 UNIT/ML pen 32 units before breakfast, 32 units before lunch, 32 units before dinner 90 mL 3     insulin glargine U-300 (TOUJEO MAX SOLOSTAR) 300 UNIT/ML (2 units dial) pen INJECT 100 UNITS UNDER THE SKIN DAILY 40.5 mL 1     insulin pen needle (BD PEDRO LUIS U/F) 32G X 4 MM miscellaneous USE 4 DAILY OR AS DIRECTED 400 each 1     losartan (COZAAR) 100 MG tablet Take 1 tablet (100 mg) by mouth daily 90 tablet 1     metFORMIN (GLUCOPHAGE XR) 500 MG 24 hr tablet Take 2 tablets (1,000 mg) by mouth 2 times daily (with meals) 360 tablet 1     metoclopramide (REGLAN) 10 MG tablet Take 1 tablet (10 mg) by mouth 2 times daily 180 tablet 1     metoprolol succinate ER (TOPROL XL) 25 MG 24 hr tablet Take 1 tablet (25 mg) by mouth daily 90 tablet 3     Microlet Lancets MISC USE TO TEST BLOOD SUGAR 4 TIMES DAILY OR AS DIRECTED. 200 each 1     montelukast (SINGULAIR) 10 MG tablet TAKE 1 TABLET BY MOUTH AT BEDTIME 90 tablet 1     Multiple Vitamins-Minerals (MULTIVITAMIN ADULTS 50+) TABS Take 1 tablet by mouth daily       ondansetron (ZOFRAN) 4 MG tablet Take 1 tablet (4 mg) by mouth every 8 hours as needed for nausea 12 tablet 3     Semaglutide, 2 MG/DOSE, (OZEMPIC, 2 MG/DOSE,) 8 MG/3ML pen Inject 2 mg Subcutaneous once a week 9 mL 1     sodium chloride, PF, 0.9% PF flush Irrigate with 10 mLs as directed 2 times daily for 25 days 500 mL 0     triamcinolone (KENALOG) 0.1 % external cream Apply topically 2 times daily 80 g 1         PHYSICAL EXAM     Vital signs  /82 (BP Location: Left arm, Patient Position:  "Sitting)   Pulse 79   Ht 1.6 m (5' 3\")   Wt 112.9 kg (249 lb)   LMP 01/14/2016 (Approximate)   BMI 44.11 kg/m      Weight:   249 lbs 0 oz    Morbidly obese female who is alert and oriented vital signs were reviewed and documented in electronic medical record.  Neck supple.  Neurologically speech was normal cranial nerves II through XII are intact.  Motor strength 5/5 reflexes symmetrical sensation decreased to light touch pinprick below knees gait normal Romberg negative      PERTINENT DIAGNOSTIC STUDIES     Following studies were reviewed:     ECHOCARDIOGRAM 9/17/2019  The visual ejection fraction is estimated at 60-65%.  There is mild to moderate concentric left ventricular hypertrophy.  There is no pericardial effusion.  Normal left ventricular wall motion.  Technically difficult study    EMG 11/9/2023  This is a normal nerve conduction and EMG study of bilateral lower extremities.     PERTINENT LABS  Following labs were reviewed:  Lab on 10/23/2023   Component Date Value Ref Range Status     Interpretation 10/23/2023    Final                    Value:This result contains rich text formatting which cannot be displayed here.   Admission on 10/20/2023, Discharged on 10/22/2023   Component Date Value Ref Range Status     Sodium 10/20/2023 135  135 - 145 mmol/L Final     Potassium 10/20/2023 4.3  3.4 - 5.3 mmol/L Final     Carbon Dioxide (CO2) 10/20/2023 22  22 - 29 mmol/L Final     Anion Gap 10/20/2023 14  7 - 15 mmol/L Final     Urea Nitrogen 10/20/2023 8.2  8.0 - 23.0 mg/dL Final     Creatinine 10/20/2023 0.50 (L)  0.51 - 0.95 mg/dL Final     GFR Estimate 10/20/2023 >90  >60 mL/min/1.73m2 Final     Calcium 10/20/2023 9.2  8.8 - 10.2 mg/dL Final     Chloride 10/20/2023 99  98 - 107 mmol/L Final     Glucose 10/20/2023 251 (H)  70 - 99 mg/dL Final     Alkaline Phosphatase 10/20/2023 65  35 - 104 U/L Final     AST 10/20/2023 21  0 - 45 U/L Final     ALT 10/20/2023 14  0 - 50 U/L Final     Protein Total 10/20/2023 " 6.4  6.4 - 8.3 g/dL Final     Albumin 10/20/2023 4.0  3.5 - 5.2 g/dL Final     Bilirubin Total 10/20/2023 0.4  <=1.2 mg/dL Final     Lactic Acid 10/20/2023 2.6 (H)  0.7 - 2.0 mmol/L Final     WBC Count 10/20/2023 10.9  4.0 - 11.0 10e3/uL Final     RBC Count 10/20/2023 4.48  3.80 - 5.20 10e6/uL Final     Hemoglobin 10/20/2023 13.3  11.7 - 15.7 g/dL Final     Hematocrit 10/20/2023 40.1  35.0 - 47.0 % Final     MCV 10/20/2023 90  78 - 100 fL Final     MCH 10/20/2023 29.7  26.5 - 33.0 pg Final     MCHC 10/20/2023 33.2  31.5 - 36.5 g/dL Final     RDW 10/20/2023 13.2  10.0 - 15.0 % Final     Platelet Count 10/20/2023 268  150 - 450 10e3/uL Final     % Neutrophils 10/20/2023 75  % Final     % Lymphocytes 10/20/2023 14  % Final     % Monocytes 10/20/2023 6  % Final     % Eosinophils 10/20/2023 3  % Final     % Basophils 10/20/2023 1  % Final     % Immature Granulocytes 10/20/2023 1  % Final     NRBCs per 100 WBC 10/20/2023 0  <1 /100 Final     Absolute Neutrophils 10/20/2023 8.4 (H)  1.6 - 8.3 10e3/uL Final     Absolute Lymphocytes 10/20/2023 1.6  0.8 - 5.3 10e3/uL Final     Absolute Monocytes 10/20/2023 0.6  0.0 - 1.3 10e3/uL Final     Absolute Eosinophils 10/20/2023 0.3  0.0 - 0.7 10e3/uL Final     Absolute Basophils 10/20/2023 0.1  0.0 - 0.2 10e3/uL Final     Absolute Immature Granulocytes 10/20/2023 0.1  <=0.4 10e3/uL Final     Absolute NRBCs 10/20/2023 0.0  10e3/uL Final     Culture 10/20/2023 No Growth   Final     Culture 10/20/2023 No Growth   Final     Culture 10/20/2023 No anaerobic organisms isolated   Final     Culture 10/20/2023 No Growth   Final     Gram Stain Result 10/20/2023 No organisms seen   Final     Gram Stain Result 10/20/2023 3+ WBC seen   Final     MRSA Target DNA 10/20/2023 Negative  Negative Final     SA Target DNA 10/20/2023 Positive   Final     Platelet Assessment 10/20/2023 Automated Count Confirmed. Platelet morphology is normal.  Automated Count Confirmed. Platelet morphology is normal. Final      Acanthocytes 10/20/2023 Slight (A)  None Seen Final     Polychromasia 10/20/2023 Slight (A)  None Seen Final     RBC Morphology 10/20/2023 Confirmed RBC Indices   Final     Lactic Acid 10/20/2023 1.6  0.7 - 2.0 mmol/L Final     GLUCOSE BY METER POCT 10/20/2023 155 (H)  70 - 99 mg/dL Final     Creatinine 10/21/2023 0.50 (L)  0.51 - 0.95 mg/dL Final     GFR Estimate 10/21/2023 >90  >60 mL/min/1.73m2 Final     GLUCOSE BY METER POCT 10/20/2023 268 (H)  70 - 99 mg/dL Final     Sodium 10/21/2023 141  135 - 145 mmol/L Final     Potassium 10/21/2023 3.9  3.4 - 5.3 mmol/L Final     Chloride 10/21/2023 105  98 - 107 mmol/L Final     Carbon Dioxide (CO2) 10/21/2023 24  22 - 29 mmol/L Final     Anion Gap 10/21/2023 12  7 - 15 mmol/L Final     Urea Nitrogen 10/21/2023 7.7 (L)  8.0 - 23.0 mg/dL Final     Creatinine 10/21/2023 0.47 (L)  0.51 - 0.95 mg/dL Final     GFR Estimate 10/21/2023 >90  >60 mL/min/1.73m2 Final     Calcium 10/21/2023 9.1  8.8 - 10.2 mg/dL Final     Glucose 10/21/2023 190 (H)  70 - 99 mg/dL Final     WBC Count 10/21/2023 8.8  4.0 - 11.0 10e3/uL Final     RBC Count 10/21/2023 4.37  3.80 - 5.20 10e6/uL Final     Hemoglobin 10/21/2023 12.9  11.7 - 15.7 g/dL Final     Hematocrit 10/21/2023 38.7  35.0 - 47.0 % Final     MCV 10/21/2023 89  78 - 100 fL Final     MCH 10/21/2023 29.5  26.5 - 33.0 pg Final     MCHC 10/21/2023 33.3  31.5 - 36.5 g/dL Final     RDW 10/21/2023 13.2  10.0 - 15.0 % Final     Platelet Count 10/21/2023 252  150 - 450 10e3/uL Final     GLUCOSE BY METER POCT 10/21/2023 183 (H)  70 - 99 mg/dL Final     GLUCOSE BY METER POCT 10/21/2023 247 (H)  70 - 99 mg/dL Final     GLUCOSE BY METER POCT 10/21/2023 307 (H)  70 - 99 mg/dL Final     Ventricular Rate 10/21/2023 66  BPM Final     Atrial Rate 10/21/2023 79  BPM Final     QRS Duration 10/21/2023 134  ms Final     QT 10/21/2023 442  ms Final     QTc 10/21/2023 463  ms Final     P Axis 10/21/2023 37  degrees Final     R AXIS 10/21/2023 -64  degrees  Final     T [place] 10/21/2023 54  degrees Final     Interpretation ECG 10/21/2023    Final        GLUCOSE BY METER POCT 10/21/2023 297 (H)  70 - 99 mg/dL Final     GLUCOSE BY METER POCT 10/21/2023 246 (H)  70 - 99 mg/dL Final     GLUCOSE BY METER POCT 10/21/2023 178 (H)  70 - 99 mg/dL Final     Case Report 10/21/2023    Final                    Value:Surgical Pathology Report                         Case: [id number]                                  Authorizing Provider:  Nemesio Arreola MD          Collected:           10/21/2023 03:54 PM          Ordering Location:      MAIN OR                 Received:            10/23/2023 08:54 AM          Pathologist:           Shelby Euceda MD                                                   Specimens:   A) - Breast, Left, excised necrotic skin, left breast                                               B) - Breast, Left, tissue, left breast                                                      Final Diagnosis 10/21/2023    Final                    Value:This result contains rich text formatting which cannot be displayed here.     Clinical Information 10/21/2023    Final                    Value:This result contains rich text formatting which cannot be displayed here.     Gross Description 10/21/2023    Final                    Value:This result contains rich text formatting which cannot be displayed here.     Microscopic Description 10/21/2023    Final                    Value:This result contains rich text formatting which cannot be displayed here.     Performing Labs 10/21/2023    Final                    Value:This result contains rich text formatting which cannot be displayed here.     GLUCOSE BY METER POCT 10/21/2023 175 (H)  70 - 99 mg/dL Final     GLUCOSE BY METER POCT 10/21/2023 156 (H)  70 - 99 mg/dL Final     GLUCOSE BY METER POCT 10/21/2023 144 (H)  70 - 99 mg/dL Final     GLUCOSE BY METER POCT 10/21/2023 149 (H)  70 - 99 mg/dL Final     GLUCOSE BY METER  POCT 10/21/2023 235 (H)  70 - 99 mg/dL Final     GLUCOSE BY METER POCT 10/21/2023 279 (H)  70 - 99 mg/dL Final     GLUCOSE BY METER POCT 10/21/2023 210 (H)  70 - 99 mg/dL Final     GLUCOSE BY METER POCT 10/21/2023 159 (H)  70 - 99 mg/dL Final     GLUCOSE BY METER POCT 10/22/2023 110 (H)  70 - 99 mg/dL Final     GLUCOSE BY METER POCT 10/22/2023 81  70 - 99 mg/dL Final     GLUCOSE BY METER POCT 10/22/2023 95  70 - 99 mg/dL Final     GLUCOSE BY METER POCT 10/22/2023 66 (L)  70 - 99 mg/dL Final     GLUCOSE BY METER POCT 10/22/2023 62 (L)  70 - 99 mg/dL Final     GLUCOSE BY METER POCT 10/22/2023 82  70 - 99 mg/dL Final     GLUCOSE BY METER POCT 10/22/2023 90  70 - 99 mg/dL Final     GLUCOSE BY METER POCT 10/22/2023 86  70 - 99 mg/dL Final     GLUCOSE BY METER POCT 10/22/2023 65 (L)  70 - 99 mg/dL Final     GLUCOSE BY METER POCT 10/22/2023 68 (L)  70 - 99 mg/dL Final     GLUCOSE BY METER POCT 10/22/2023 181 (H)  70 - 99 mg/dL Final     GLUCOSE BY METER POCT 10/22/2023 156 (H)  70 - 99 mg/dL Final     GLUCOSE BY METER POCT 10/22/2023 108 (H)  70 - 99 mg/dL Final   Admission on 10/06/2023, Discharged on 10/06/2023   Component Date Value Ref Range Status     GLUCOSE BY METER POCT 10/06/2023 169 (H)  70 - 99 mg/dL Final     Case Report 10/06/2023    Final                    Value:Surgical Pathology Report                         Case: [id number]                                  Authorizing Provider:  Bg Bryant MD       Collected:           10/06/2023 11:46 AM          Ordering Location:     [facility]    Received:            10/06/2023 12:32 PM                                 Main OR                                                                      Pathologist:           Dalton Leigh MD                                                     Specimen:    Breast, Left, left breast mass                                                              Addendum 2 10/06/2023    Final                     Value:This result contains rich text formatting which cannot be displayed here.     Addendum 10/06/2023    Final                    Value:This result contains rich text formatting which cannot be displayed here.     Final Diagnosis 10/06/2023    Final                    Value:This result contains rich text formatting which cannot be displayed here.     Comment 10/06/2023    Final                    Value:This result contains rich text formatting which cannot be displayed here.     Synoptic Checklist 10/06/2023    Corrected                    Value:DCIS OF THE BREAST: Resection                            DCIS OF THE BREAST: COMPLETE EXCISION - All Specimens                            8th Edition - Protocol posted: 3/23/2022                                                        SPECIMEN                               Procedure:    Excision (less than total mastectomy)                                Specimen Laterality:    Left                                                         TUMOR                               Tumor Site:    Clock position                                :    10 o'clock                              Tumor Site:    Distance from nipple (Centimeters): 6 cm                             Histologic Type:    Ductal carcinoma in situ                              Size (Extent) of DCIS:    Estimated size (extent) of DCIS is at least (Millimeters): 37 mm                               Additional Dimension (Millimeters):    32 mm                               Additional Dimension (Millimeters):    25 mm                               Number of Blocks with DCIS:    12                                Number of Blocks Examined:    31                              Architectural Patterns:    Cribriform                              Architectural Patterns:    Papillary                              Architectural Patterns:    Solid                              Nuclear Grade:    Grade I (low)                               Necrosis:    Not identified                              Microcalcifications:    Present in DCIS                              Microcalcifications:    Present in nonneoplastic tissue                                                         MARGINS                             Margin Status:    DCIS present at margin                                Margin(s) Involved by DCIS:    Medial: Present at cauterized medial yellow inked margin in blocks A15, A16 and A17                                Distance from DCIS to Anterior Margin:    1.0 mm                               Distance from DCIS to Posterior Margin:    4.2 mm                               Distance from DCIS to Superior Margin:    Greater than: 5 mm                               Distance from DCIS to Inferior Margin:    Greater than: 5 mm                               Distance from DCIS to Lateral Margin:    1.9 mm                                                        REGIONAL LYMPH NODES                             Regional Lymph Node Status:    Not applicable (no regional lymph nodes submitted or found)                                                         PATHOLOGIC STAGE CLASSIFICATION (pTNM, AJCC 8th Edition)                               Reporting of pT, pN, and (when applicable) pM categories is based on information available to the pathologist at the time the report is issued. As per the AJCC (Chapter 1, 8th Ed.) it is the managing physician s responsibility to establish the final pathologic stage based upon all pertinent information, including but potentially not limited to this pathology report.                             pT Category:    pTis (DCIS)                              pN Category:    pN not assigned (no nodes submitted or found)                             Breast Biomarker Reporting Template                            (Added in Addendum) BREAST: BIOMARKER REPORTING TEMPLATE - All Specimens                            Protocol posted:  3/22/2023                                                           Test(s) Performed:                                     Estrogen Receptor (ER) Status:    Positive (greater than 10% of cells demonstrate nuclear positivity)                                    Percentage of Cells with Nuclear Positivity:    %                                    Average Intensity of Staining:    Strong                                  Test Type:    Food and Drug Administration (FDA) cleared (test / vendor): Q [employer]                                  Primary Antibody:    SP1                                Test(s) Performed:                                     Progesterone Receptor (PgR) Status:    Positive                                    Percentage of Cells with Nuclear Positivity:    %                                    Average Intensity of Staining:    Strong                                  Test Type:    Food and Drug Administration (FDA) cleared (test / vendor): [employer]                                  Primary Antibody:    1E2                                Cold Ischemia and Fixation Times:    Meet requirements specified in latest version of the ASCO / CAP Guidelines                                Cold Ischemia Time (minutes):    41 min                               Fixation Time (hours):    9.3 hours                                                        METHODS                               Fixative:    Formalin        Clinical Information 10/06/2023    Final                    Value:This result contains rich text formatting which cannot be displayed here.     Gross Description 10/06/2023    Final                    Value:This result contains rich text formatting which cannot be displayed here.     Microscopic Description 10/06/2023    Final                    Value:This result contains rich text formatting which cannot be displayed here.     Special Stains 10/06/2023    Final                    Value:This result  contains rich text formatting which cannot be displayed here.     MCRS 10/06/2023 Yes (A)  N/A Final     Performing Labs 10/06/2023    Final                    Value:This result contains rich text formatting which cannot be displayed here.     GLUCOSE BY METER POCT 10/06/2023 166 (H)  70 - 99 mg/dL Final   Lab on 09/21/2023   Component Date Value Ref Range Status     Hemoglobin A1C 09/21/2023 7.7 (H)  0.0 - 5.6 % Final     Sodium 09/21/2023 138  136 - 145 mmol/L Final     Potassium 09/21/2023 3.7  3.4 - 5.3 mmol/L Final     Chloride 09/21/2023 100  98 - 107 mmol/L Final     Carbon Dioxide (CO2) 09/21/2023 24  22 - 29 mmol/L Final     Anion Gap 09/21/2023 14  7 - 15 mmol/L Final     Urea Nitrogen 09/21/2023 11.0  8.0 - 23.0 mg/dL Final     Creatinine 09/21/2023 0.63  0.51 - 0.95 mg/dL Final     Calcium 09/21/2023 9.4  8.8 - 10.2 mg/dL Final     Glucose 09/21/2023 168 (H)  70 - 99 mg/dL Final     GFR Estimate 09/21/2023 >90  >60 mL/min/1.73m2 Final   Hospital Outpatient Visit on 09/07/2023   Component Date Value Ref Range Status     Case Report 09/07/2023    Final                    Value:Surgical Pathology Report                         Case: [id number]                                  Authorizing Provider:  Kaia Elena MD        Collected:           09/07/2023 08:47 AM          Ordering Location:     [facility]  Received:            09/07/2023 08:57 AM                                 Imaging                                                                      Pathologist:           Priyank Leigh MD                                                          Specimen:    Breast, Left                                                                                Addendum 09/07/2023    Final                    Value:This result contains rich text formatting which cannot be displayed here.     Final Diagnosis 09/07/2023    Final                    Value:This result contains rich text formatting which  cannot be displayed here.     Comment 09/07/2023    Final                    Value:This result contains rich text formatting which cannot be displayed here.     Clinical Information 09/07/2023    Final                    Value:This result contains rich text formatting which cannot be displayed here.     Gross Description 09/07/2023    Final                    Value:This result contains rich text formatting which cannot be displayed here.     Microscopic Description 09/07/2023    Final                    Value:This result contains rich text formatting which cannot be displayed here.     Special Stains 09/07/2023    Final                    Value:This result contains rich text formatting which cannot be displayed here.     Performing Labs 09/07/2023    Final                    Value:This result contains rich text formatting which cannot be displayed here.   Lab on 08/14/2023   Component Date Value Ref Range Status     DIDI interpretation 08/14/2023 Positive (A)  Negative Final     DIDI pattern 1 08/14/2023 Speckled   Final     DIDI titer 1 08/14/2023 1:160   Final     Rheumatoid Factor 08/14/2023 <6  <12 IU/mL Final     Vitamin B12 08/14/2023 529  232 - 1,245 pg/mL Final     Folic Acid 08/14/2023 26.8  4.6 - 34.8 ng/mL Final     Erythrocyte Sedimentation Rate 08/14/2023 8  0 - 30 mm/hr Final     CRP Inflammation 08/14/2023 6.19 (H)  <5.00 mg/L Final     DNA (ds) Antibody 08/14/2023 0.7  <10.0 IU/mL Final     RNP Kerri IgG Instrument Value 08/14/2023 1.4  <5.0 U/mL Final     RNP Antibody IgG 08/14/2023 Negative  Negative Final     Henson MAYNOR Kerri IgG Instrument Value 08/14/2023 <0.7  <7.0 U/mL Final     Henson MAYONR Antibody IgG 08/14/2023 Negative  Negative Final     SSA Kerri IgG Instrument Value 08/14/2023 <0.5  <7.0 U/mL Final     SSA (Ro) Antibody IgG 08/14/2023 Negative  Negative Final     SSB Kerri IgG Instrument Value 08/14/2023 <0.6  <7.0 U/mL Final     SSB (La) Antibody IgG 08/14/2023 Negative  Negative Final         Total  time spent for face to face visit, reviewing labs/imaging studies, counseling and coordination of care was: 30 Minutes spent on the date of the encounter doing chart review, review of outside records, review of test results, interpretation of tests, patient visit, and documentation     This note was dictated using voice recognition software.  Any grammatical or context distortions are unintentional and inherent to the software.    No orders of the defined types were placed in this encounter.     New Prescriptions    No medications on file     Modified Medications    Modified Medication Previous Medication    GABAPENTIN (NEURONTIN) 300 MG CAPSULE gabapentin (NEURONTIN) 100 MG capsule       Take 1 capsule (300 mg) by mouth 2 times daily    Take 1 capsule (100 mg) by mouth 3 times daily

## 2023-11-09 ENCOUNTER — OFFICE VISIT (OUTPATIENT)
Dept: NEUROLOGY | Facility: CLINIC | Age: 63
End: 2023-11-09
Attending: PSYCHIATRY & NEUROLOGY
Payer: COMMERCIAL

## 2023-11-09 VITALS
WEIGHT: 249 LBS | SYSTOLIC BLOOD PRESSURE: 138 MMHG | DIASTOLIC BLOOD PRESSURE: 82 MMHG | BODY MASS INDEX: 44.12 KG/M2 | HEIGHT: 63 IN | HEART RATE: 79 BPM

## 2023-11-09 DIAGNOSIS — R20.2 PARESTHESIA: ICD-10-CM

## 2023-11-09 DIAGNOSIS — M79.7 PRIMARY FIBROMYALGIA SYNDROME: ICD-10-CM

## 2023-11-09 DIAGNOSIS — R20.2 PARESTHESIA: Primary | ICD-10-CM

## 2023-11-09 PROCEDURE — 95910 NRV CNDJ TEST 7-8 STUDIES: CPT | Performed by: PSYCHIATRY & NEUROLOGY

## 2023-11-09 PROCEDURE — 99213 OFFICE O/P EST LOW 20 MIN: CPT | Mod: 25 | Performed by: PSYCHIATRY & NEUROLOGY

## 2023-11-09 PROCEDURE — 95886 MUSC TEST DONE W/N TEST COMP: CPT | Mod: RT | Performed by: PSYCHIATRY & NEUROLOGY

## 2023-11-09 RX ORDER — GABAPENTIN 300 MG/1
300 CAPSULE ORAL 2 TIMES DAILY
Qty: 180 CAPSULE | Refills: 3 | Status: SHIPPED | OUTPATIENT
Start: 2023-11-09

## 2023-11-09 NOTE — LETTER
2023         RE: Bria Davis  [address] [facility] [id number]        Dear Colleague,    Thank you for referring your patient, Bria Davis, to the [facility] [place]. Please see a copy of my visit note below.    NEUROLOGY FOLLOW UP VISIT  NOTE       [facility] [place]  [address] [place] [address]  Tel: [phone]  Fax: [phone]  [email or web address]     Bria Davis,  1960, MRN [id number]  PCP: Kaia Elena  Date: 2023      ASSESSMENT & PLAN     Visit Diagnosis  Paresthesia     Primary fibromyalgia syndrome  63-year-old female with HTN, HLD, DM2, morbid obesity, CKD stage II, CAD and breast cancer who was evaluated for generalized body aches and sensitivity to touch.  She had lab work that included normal B12, folate, rheumatoid factor.  Her CRP was elevated and antinuclear antibody was borderline positive at 1: 160.  Double-stranded DNA and MAYNOR panel was normal.  Follow-up antinuclear antibody testing was recommended in 6 months.  EMG was also within normal limits.  I have explained to her that she likely has primary fibromyalgia syndrome and I have recommended increasing the dose of gabapentin to 300 mg twice daily.  If she fails to show improvement we can consider switching her to Lyrica.  Follow-up will be in 1 year    Thank you again for this referral, please feel free to contact me if you have any questions.    Jabari Santos MD  [facility]  (Formerly, [facility]     HISTORY OF PRESENT ILLNESS     Patient is a 63-year-old female with history of HTN, HLD, DM 2, morbid obesity, CKD stage II and CAD who was initially evaluated on 2023 with complaint of generalized paresthesia and body sensitivity to touch.  I suspected fibromyalgia syndrome.  Lab work included sed rate, folate, B12, rheumatoid factor.  CRP was elevated and  antinuclear antibody was borderline positive at 1: 160.  Double-stranded DNA and MAYNOR panel was normal.  Plan was to repeat antinuclear antibody after 6 months.  Patient was started on gabapentin for possible fibromyalgia but was unable to tolerate and was told to discontinue.  EMG of bilateral lower extremities was within normal limits.  Since her last visit she was diagnosed with breast cancer detected on routine mammogram and is undergoing treatment     PROBLEM LIST   Patient Active Problem List   Diagnosis Code     Mild intermittent asthma J45.20     Esophageal reflux K21.9     Allergic rhinitis J30.9     Restless legs syndrome (RLS) G25.81     Enthesopathy M77.9     Lumbar radiculopathy M54.16     Microalbuminuria R80.9     Hyperlipidemia LDL goal <70 E78.5     Type 2 diabetes mellitus with kidney complication, with long-term current use of insulin (H) E11.29, Z79.4     Morbid obesity (H) E66.01     Essential hypertension with goal blood pressure less than 140/90 I10     Nonobstructive atherosclerosis of coronary artery I25.10     Chronic kidney disease, stage 2 (mild) N18.2     Atypical ductal hyperplasia of left breast N60.92     Mass of upper inner quadrant of left breast N63.22     Breast abscess N61.1     Ductal carcinoma in situ (DCIS) of left breast D05.12         PAST MEDICAL & SURGICAL HISTORY     Past Medical History:   Patient  has a past medical history of Arthritis, Asthma, Diabetes mellitus (H), Esophageal reflux, Hypertension, Obese, Other chronic sinusitis, and PONV (postoperative nausea and vomiting).    Surgical History:  She  has a past surgical history that includes surgical history of -  (04/10/2000); colonoscopy (01/31/2002); Inject epidural lumbar (12/07/2010); Inject epidural lumbar (01/17/2011); Colonoscopy (10/26/2012); Release carpal tunnel (06/19/2012); Release carpal tunnel (11/09/2012); GYN surgery; Left Heart Catheterization (N/A, 09/12/2019); Left Ventriculogram (N/A, 09/12/2019);  Colonoscopy (N/A, 11/08/2019); Esophagoscopy, gastroscopy, duodenoscopy (EGD), combined (N/A, 11/08/2019); Repair tendon achilles (Left, 12/26/2019); left long finger pulley release (Left, 07/2020); biopsy (9/7/2023); ENT surgery; Eye surgery (2022); Biopsy breast needle localization (Left, 10/6/2023); and Irrigation and debridement breast (Left, 10/21/2023).     SOCIAL HISTORY     Reviewed, and she  reports that she has never smoked. She has never used smokeless tobacco. She reports that she does not drink alcohol and does not use drugs.     FAMILY HISTORY     Reviewed, and family history includes Anxiety Disorder in her sister and another family member; Asthma in her brother; Breast Cancer in her maternal grandmother; Cancer in her brother; Cardiovascular in her father; Cerebrovascular Disease in her father; Chronic Obstructive Pulmonary Disease in her brother, brother, sister, and sister; Depression in her brother, sister, sister, and sister; Diabetes in her brother, mother, and son; Heart Disease in her father; Hypertension in her father and mother; Kidney failure in her brother; Obesity in some other family members; Respiratory in her brother, mother, and sister.     ALLERGIES     Allergies   Allergen Reactions     Lisinopril Cough         REVIEW OF SYSTEMS     A 12 point review of system was performed and was negative except as outlined in the history of present illness.     HOME MEDICATIONS     Current Outpatient Rx   Medication Sig Dispense Refill     Ascorbic Acid (VITAMIN C) 500 MG CAPS        aspirin (ASA) 81 MG EC tablet Take 1 tablet (81 mg) by mouth daily 1 tablet 0     blood glucose (NO BRAND SPECIFIED) lancets standard Use to test blood sugar 4 times daily or as directed. 200 lancet 6     blood glucose (NO BRAND SPECIFIED) test strip Use to test blood sugar 4 times daily or as directed. 200 strip 6     blood glucose monitoring (NO BRAND SPECIFIED) meter device kit Use to test blood sugar 4 times  daily or as directed. 1 kit 0     Calcium Carb-Cholecalciferol (CALCIUM-VITAMIN D) 600-400 MG-UNIT TABS Take 1 tablet by mouth daily       esomeprazole (NEXIUM) 40 MG DR capsule Take 1 capsule (40 mg) by mouth every morning (before breakfast) 90 capsule 3     ferrous sulfate 140 (45 Fe) MG TBCR CR tablet Take 140 mg by mouth daily       fluticasone-salmeterol (ADVAIR DISKUS) 100-50 MCG/ACT inhaler INHALE 1 PUFF INTO THE LUNGS 2 TIMES DAILY 180 each 3     gabapentin (NEURONTIN) 300 MG capsule Take 1 capsule (300 mg) by mouth 2 times daily 180 capsule 3     hydrochlorothiazide (HYDRODIURIL) 12.5 MG tablet Take 1 tablet (12.5 mg) by mouth daily 90 tablet 1     insulin aspart (NOVOLOG FLEXPEN) 100 UNIT/ML pen 32 units before breakfast, 32 units before lunch, 32 units before dinner 90 mL 3     insulin glargine U-300 (TOUJEO MAX SOLOSTAR) 300 UNIT/ML (2 units dial) pen INJECT 100 UNITS UNDER THE SKIN DAILY 40.5 mL 1     insulin pen needle (BD PEDRO LUIS U/F) 32G X 4 MM miscellaneous USE 4 DAILY OR AS DIRECTED 400 each 1     losartan (COZAAR) 100 MG tablet Take 1 tablet (100 mg) by mouth daily 90 tablet 1     metFORMIN (GLUCOPHAGE XR) 500 MG 24 hr tablet Take 2 tablets (1,000 mg) by mouth 2 times daily (with meals) 360 tablet 1     metoclopramide (REGLAN) 10 MG tablet Take 1 tablet (10 mg) by mouth 2 times daily 180 tablet 1     metoprolol succinate ER (TOPROL XL) 25 MG 24 hr tablet Take 1 tablet (25 mg) by mouth daily 90 tablet 3     Microlet Lancets MISC USE TO TEST BLOOD SUGAR 4 TIMES DAILY OR AS DIRECTED. 200 each 1     montelukast (SINGULAIR) 10 MG tablet TAKE 1 TABLET BY MOUTH AT BEDTIME 90 tablet 1     Multiple Vitamins-Minerals (MULTIVITAMIN ADULTS 50+) TABS Take 1 tablet by mouth daily       ondansetron (ZOFRAN) 4 MG tablet Take 1 tablet (4 mg) by mouth every 8 hours as needed for nausea 12 tablet 3     Semaglutide, 2 MG/DOSE, (OZEMPIC, 2 MG/DOSE,) 8 MG/3ML pen Inject 2 mg Subcutaneous once a week 9 mL 1     sodium  "chloride, PF, 0.9% PF flush Irrigate with 10 mLs as directed 2 times daily for 25 days 500 mL 0     triamcinolone (KENALOG) 0.1 % external cream Apply topically 2 times daily 80 g 1         PHYSICAL EXAM     Vital signs  /82 (BP Location: Left arm, Patient Position: Sitting)   Pulse 79   Ht 1.6 m (5' 3\")   Wt 112.9 kg (249 lb)   LMP 01/14/2016 (Approximate)   BMI 44.11 kg/m      Weight:   249 lbs 0 oz    Morbidly obese female who is alert and oriented vital signs were reviewed and documented in electronic medical record.  Neck supple.  Neurologically speech was normal cranial nerves II through XII are intact.  Motor strength 5/5 reflexes symmetrical sensation decreased to light touch pinprick below knees gait normal Romberg negative      PERTINENT DIAGNOSTIC STUDIES     Following studies were reviewed:     ECHOCARDIOGRAM 9/17/2019  The visual ejection fraction is estimated at 60-65%.  There is mild to moderate concentric left ventricular hypertrophy.  There is no pericardial effusion.  Normal left ventricular wall motion.  Technically difficult study    EMG 11/9/2023  This is a normal nerve conduction and EMG study of bilateral lower extremities.     PERTINENT LABS  Following labs were reviewed:  Lab on 10/23/2023   Component Date Value Ref Range Status     Interpretation 10/23/2023    Final                    Value:This result contains rich text formatting which cannot be displayed here.   Admission on 10/20/2023, Discharged on 10/22/2023   Component Date Value Ref Range Status     Sodium 10/20/2023 135  135 - 145 mmol/L Final     Potassium 10/20/2023 4.3  3.4 - 5.3 mmol/L Final     Carbon Dioxide (CO2) 10/20/2023 22  22 - 29 mmol/L Final     Anion Gap 10/20/2023 14  7 - 15 mmol/L Final     Urea Nitrogen 10/20/2023 8.2  8.0 - 23.0 mg/dL Final     Creatinine 10/20/2023 0.50 (L)  0.51 - 0.95 mg/dL Final     GFR Estimate 10/20/2023 >90  >60 mL/min/1.73m2 Final     Calcium 10/20/2023 9.2  8.8 - 10.2 mg/dL " Final     Chloride 10/20/2023 99  98 - 107 mmol/L Final     Glucose 10/20/2023 251 (H)  70 - 99 mg/dL Final     Alkaline Phosphatase 10/20/2023 65  35 - 104 U/L Final     AST 10/20/2023 21  0 - 45 U/L Final     ALT 10/20/2023 14  0 - 50 U/L Final     Protein Total 10/20/2023 6.4  6.4 - 8.3 g/dL Final     Albumin 10/20/2023 4.0  3.5 - 5.2 g/dL Final     Bilirubin Total 10/20/2023 0.4  <=1.2 mg/dL Final     Lactic Acid 10/20/2023 2.6 (H)  0.7 - 2.0 mmol/L Final     WBC Count 10/20/2023 10.9  4.0 - 11.0 10e3/uL Final     RBC Count 10/20/2023 4.48  3.80 - 5.20 10e6/uL Final     Hemoglobin 10/20/2023 13.3  11.7 - 15.7 g/dL Final     Hematocrit 10/20/2023 40.1  35.0 - 47.0 % Final     MCV 10/20/2023 90  78 - 100 fL Final     MCH 10/20/2023 29.7  26.5 - 33.0 pg Final     MCHC 10/20/2023 33.2  31.5 - 36.5 g/dL Final     RDW 10/20/2023 13.2  10.0 - 15.0 % Final     Platelet Count 10/20/2023 268  150 - 450 10e3/uL Final     % Neutrophils 10/20/2023 75  % Final     % Lymphocytes 10/20/2023 14  % Final     % Monocytes 10/20/2023 6  % Final     % Eosinophils 10/20/2023 3  % Final     % Basophils 10/20/2023 1  % Final     % Immature Granulocytes 10/20/2023 1  % Final     NRBCs per 100 WBC 10/20/2023 0  <1 /100 Final     Absolute Neutrophils 10/20/2023 8.4 (H)  1.6 - 8.3 10e3/uL Final     Absolute Lymphocytes 10/20/2023 1.6  0.8 - 5.3 10e3/uL Final     Absolute Monocytes 10/20/2023 0.6  0.0 - 1.3 10e3/uL Final     Absolute Eosinophils 10/20/2023 0.3  0.0 - 0.7 10e3/uL Final     Absolute Basophils 10/20/2023 0.1  0.0 - 0.2 10e3/uL Final     Absolute Immature Granulocytes 10/20/2023 0.1  <=0.4 10e3/uL Final     Absolute NRBCs 10/20/2023 0.0  10e3/uL Final     Culture 10/20/2023 No Growth   Final     Culture 10/20/2023 No Growth   Final     Culture 10/20/2023 No anaerobic organisms isolated   Final     Culture 10/20/2023 No Growth   Final     Gram Stain Result 10/20/2023 No organisms seen   Final     Gram Stain Result 10/20/2023 3+  WBC seen   Final     MRSA Target DNA 10/20/2023 Negative  Negative Final     SA Target DNA 10/20/2023 Positive   Final     Platelet Assessment 10/20/2023 Automated Count Confirmed. Platelet morphology is normal.  Automated Count Confirmed. Platelet morphology is normal. Final     Acanthocytes 10/20/2023 Slight (A)  None Seen Final     Polychromasia 10/20/2023 Slight (A)  None Seen Final     RBC Morphology 10/20/2023 Confirmed RBC Indices   Final     Lactic Acid 10/20/2023 1.6  0.7 - 2.0 mmol/L Final     GLUCOSE BY METER POCT 10/20/2023 155 (H)  70 - 99 mg/dL Final     Creatinine 10/21/2023 0.50 (L)  0.51 - 0.95 mg/dL Final     GFR Estimate 10/21/2023 >90  >60 mL/min/1.73m2 Final     GLUCOSE BY METER POCT 10/20/2023 268 (H)  70 - 99 mg/dL Final     Sodium 10/21/2023 141  135 - 145 mmol/L Final     Potassium 10/21/2023 3.9  3.4 - 5.3 mmol/L Final     Chloride 10/21/2023 105  98 - 107 mmol/L Final     Carbon Dioxide (CO2) 10/21/2023 24  22 - 29 mmol/L Final     Anion Gap 10/21/2023 12  7 - 15 mmol/L Final     Urea Nitrogen 10/21/2023 7.7 (L)  8.0 - 23.0 mg/dL Final     Creatinine 10/21/2023 0.47 (L)  0.51 - 0.95 mg/dL Final     GFR Estimate 10/21/2023 >90  >60 mL/min/1.73m2 Final     Calcium 10/21/2023 9.1  8.8 - 10.2 mg/dL Final     Glucose 10/21/2023 190 (H)  70 - 99 mg/dL Final     WBC Count 10/21/2023 8.8  4.0 - 11.0 10e3/uL Final     RBC Count 10/21/2023 4.37  3.80 - 5.20 10e6/uL Final     Hemoglobin 10/21/2023 12.9  11.7 - 15.7 g/dL Final     Hematocrit 10/21/2023 38.7  35.0 - 47.0 % Final     MCV 10/21/2023 89  78 - 100 fL Final     MCH 10/21/2023 29.5  26.5 - 33.0 pg Final     MCHC 10/21/2023 33.3  31.5 - 36.5 g/dL Final     RDW 10/21/2023 13.2  10.0 - 15.0 % Final     Platelet Count 10/21/2023 252  150 - 450 10e3/uL Final     GLUCOSE BY METER POCT 10/21/2023 183 (H)  70 - 99 mg/dL Final     GLUCOSE BY METER POCT 10/21/2023 247 (H)  70 - 99 mg/dL Final     GLUCOSE BY METER POCT 10/21/2023 307 (H)  70 - 99 mg/dL  Final     Ventricular Rate 10/21/2023 66  BPM Final     Atrial Rate 10/21/2023 79  BPM Final     QRS Duration 10/21/2023 134  ms Final     QT 10/21/2023 442  ms Final     QTc 10/21/2023 463  ms Final     P Axis 10/21/2023 37  degrees Final     R AXIS 10/21/2023 -64  degrees Final     T Axis 10/21/2023 54  degrees Final     Interpretation ECG 10/21/2023    Final        GLUCOSE BY METER POCT 10/21/2023 297 (H)  70 - 99 mg/dL Final     GLUCOSE BY METER POCT 10/21/2023 246 (H)  70 - 99 mg/dL Final     GLUCOSE BY METER POCT 10/21/2023 178 (H)  70 - 99 mg/dL Final     Case Report 10/21/2023    Final                    Value:Surgical Pathology Report                         Case: [id number]                                  Authorizing Provider:  Nemesio Arreola MD          Collected:           10/21/2023 03:54 PM          Ordering Location:      MAIN OR                 Received:            10/23/2023 08:54 AM          Pathologist:           Shelby Euceda MD                                                   Specimens:   A) - Breast, Left, excised necrotic skin, left breast                                               B) - Breast, Left, tissue, left breast                                                      Final Diagnosis 10/21/2023    Final                    Value:This result contains rich text formatting which cannot be displayed here.     Clinical Information 10/21/2023    Final                    Value:This result contains rich text formatting which cannot be displayed here.     Gross Description 10/21/2023    Final                    Value:This result contains rich text formatting which cannot be displayed here.     Microscopic Description 10/21/2023    Final                    Value:This result contains rich text formatting which cannot be displayed here.     Performing Labs 10/21/2023    Final                    Value:This result contains rich text formatting which cannot be displayed here.     GLUCOSE  BY METER POCT 10/21/2023 175 (H)  70 - 99 mg/dL Final     GLUCOSE BY METER POCT 10/21/2023 156 (H)  70 - 99 mg/dL Final     GLUCOSE BY METER POCT 10/21/2023 144 (H)  70 - 99 mg/dL Final     GLUCOSE BY METER POCT 10/21/2023 149 (H)  70 - 99 mg/dL Final     GLUCOSE BY METER POCT 10/21/2023 235 (H)  70 - 99 mg/dL Final     GLUCOSE BY METER POCT 10/21/2023 279 (H)  70 - 99 mg/dL Final     GLUCOSE BY METER POCT 10/21/2023 210 (H)  70 - 99 mg/dL Final     GLUCOSE BY METER POCT 10/21/2023 159 (H)  70 - 99 mg/dL Final     GLUCOSE BY METER POCT 10/22/2023 110 (H)  70 - 99 mg/dL Final     GLUCOSE BY METER POCT 10/22/2023 81  70 - 99 mg/dL Final     GLUCOSE BY METER POCT 10/22/2023 95  70 - 99 mg/dL Final     GLUCOSE BY METER POCT 10/22/2023 66 (L)  70 - 99 mg/dL Final     GLUCOSE BY METER POCT 10/22/2023 62 (L)  70 - 99 mg/dL Final     GLUCOSE BY METER POCT 10/22/2023 82  70 - 99 mg/dL Final     GLUCOSE BY METER POCT 10/22/2023 90  70 - 99 mg/dL Final     GLUCOSE BY METER POCT 10/22/2023 86  70 - 99 mg/dL Final     GLUCOSE BY METER POCT 10/22/2023 65 (L)  70 - 99 mg/dL Final     GLUCOSE BY METER POCT 10/22/2023 68 (L)  70 - 99 mg/dL Final     GLUCOSE BY METER POCT 10/22/2023 181 (H)  70 - 99 mg/dL Final     GLUCOSE BY METER POCT 10/22/2023 156 (H)  70 - 99 mg/dL Final     GLUCOSE BY METER POCT 10/22/2023 108 (H)  70 - 99 mg/dL Final   Admission on 10/06/2023, Discharged on 10/06/2023   Component Date Value Ref Range Status     GLUCOSE BY METER POCT 10/06/2023 169 (H)  70 - 99 mg/dL Final     Case Report 10/06/2023    Final                    Value:Surgical Pathology Report                         Case: [id number]                                  Authorizing Provider:  Bg Bryant MD       Collected:           10/06/2023 11:46 AM          Ordering Location:     [facility]    Received:            10/06/2023 12:32 PM                                 Main OR                                                                       Pathologist:           Dalton Leigh MD                                                     Specimen:    Breast, Left, left breast mass                                                              Addendum 2 10/06/2023    Final                    Value:This result contains rich text formatting which cannot be displayed here.     Addendum 10/06/2023    Final                    Value:This result contains rich text formatting which cannot be displayed here.     Final Diagnosis 10/06/2023    Final                    Value:This result contains rich text formatting which cannot be displayed here.     Comment 10/06/2023    Final                    Value:This result contains rich text formatting which cannot be displayed here.     Synoptic Checklist 10/06/2023    Corrected                    Value:DCIS OF THE BREAST: Resection                            DCIS OF THE BREAST: COMPLETE EXCISION - All Specimens                            8th Edition - Protocol posted: 3/23/2022                                                        SPECIMEN                               Procedure:    Excision (less than total mastectomy)                                Specimen Laterality:    Left                                                         TUMOR                               Tumor Site:    Clock position                                :    10 o'clock                              Tumor Site:    Distance from nipple (Centimeters): 6 cm                             Histologic Type:    Ductal carcinoma in situ                              Size (Extent) of DCIS:    Estimated size (extent) of DCIS is at least (Millimeters): 37 mm                               Additional Dimension (Millimeters):    32 mm                               Additional Dimension (Millimeters):    25 mm                               Number of Blocks with DCIS:    12                                Number of Blocks Examined:    31                               Architectural Patterns:    Cribriform                              Architectural Patterns:    Papillary                              Architectural Patterns:    Solid                              Nuclear Grade:    Grade I (low)                              Necrosis:    Not identified                              Microcalcifications:    Present in DCIS                              Microcalcifications:    Present in nonneoplastic tissue                                                         MARGINS                             Margin Status:    DCIS present at margin                                Margin(s) Involved by DCIS:    Medial: Present at cauterized medial yellow inked margin in blocks A15, A16 and A17                                Distance from DCIS to Anterior Margin:    1.0 mm                               Distance from DCIS to Posterior Margin:    4.2 mm                               Distance from DCIS to Superior Margin:    Greater than: 5 mm                               Distance from DCIS to Inferior Margin:    Greater than: 5 mm                               Distance from DCIS to Lateral Margin:    1.9 mm                                                        REGIONAL LYMPH NODES                             Regional Lymph Node Status:    Not applicable (no regional lymph nodes submitted or found)                                                         PATHOLOGIC STAGE CLASSIFICATION (pTNM, AJCC 8th Edition)                               Reporting of pT, pN, and (when applicable) pM categories is based on information available to the pathologist at the time the report is issued. As per the AJCC (Chapter 1, 8th Ed.) it is the managing physician s responsibility to establish the final pathologic stage based upon all pertinent information, including but potentially not limited to this pathology report.                             pT Category:    pTis (DCIS)                              pN  Category:    pN not assigned (no nodes submitted or found)                             Breast Biomarker Reporting Template                            (Added in Addendum) BREAST: BIOMARKER REPORTING TEMPLATE - All Specimens                            Protocol posted: 3/22/2023                                                           Test(s) Performed:                                     Estrogen Receptor (ER) Status:    Positive (greater than 10% of cells demonstrate nuclear positivity)                                    Percentage of Cells with Nuclear Positivity:    %                                    Average Intensity of Staining:    Strong                                  Test Type:    Food and Drug Administration (FDA) cleared (test / vendor): [employer]                                  Primary Antibody:    SP1                                Test(s) Performed:                                     Progesterone Receptor (PgR) Status:    Positive                                    Percentage of Cells with Nuclear Positivity:    %                                    Average Intensity of Staining:    Strong                                  Test Type:    Food and Drug Administration (FDA) cleared (test / vendor): [employer]                                  Primary Antibody:    1E2                                Cold Ischemia and Fixation Times:    Meet requirements specified in latest version of the ASCO / CAP Guidelines                                Cold Ischemia Time (minutes):    41 min                               Fixation Time (hours):    9.3 hours                                                        METHODS                               Fixative:    Formalin        Clinical Information 10/06/2023    Final                    Value:This result contains rich text formatting which cannot be displayed here.     Gross Description 10/06/2023    Final                    Value:This result contains rich text  formatting which cannot be displayed here.     Microscopic Description 10/06/2023    Final                    Value:This result contains rich text formatting which cannot be displayed here.     Special Stains 10/06/2023    Final                    Value:This result contains rich text formatting which cannot be displayed here.     MCRS 10/06/2023 Yes (A)  N/A Final     Performing Labs 10/06/2023    Final                    Value:This result contains rich text formatting which cannot be displayed here.     GLUCOSE BY METER POCT 10/06/2023 166 (H)  70 - 99 mg/dL Final   Lab on 09/21/2023   Component Date Value Ref Range Status     Hemoglobin A1C 09/21/2023 7.7 (H)  0.0 - 5.6 % Final     Sodium 09/21/2023 138  136 - 145 mmol/L Final     Potassium 09/21/2023 3.7  3.4 - 5.3 mmol/L Final     Chloride 09/21/2023 100  98 - 107 mmol/L Final     Carbon Dioxide (CO2) 09/21/2023 24  22 - 29 mmol/L Final     Anion Gap 09/21/2023 14  7 - 15 mmol/L Final     Urea Nitrogen 09/21/2023 11.0  8.0 - 23.0 mg/dL Final     Creatinine 09/21/2023 0.63  0.51 - 0.95 mg/dL Final     Calcium 09/21/2023 9.4  8.8 - 10.2 mg/dL Final     Glucose 09/21/2023 168 (H)  70 - 99 mg/dL Final     GFR Estimate 09/21/2023 >90  >60 mL/min/1.73m2 Final   Hospital Outpatient Visit on 09/07/2023   Component Date Value Ref Range Status     Case Report 09/07/2023    Final                    Value:Surgical Pathology Report                         Case: [id number]                                  Authorizing Provider:  Kaia Elena MD        Collected:           09/07/2023 08:47 AM          Ordering Location:     [facility]  Received:            09/07/2023 08:57 AM                                 Imaging                                                                      Pathologist:           Priyank Leigh MD                                                          Specimen:    Breast, Left                                                                                 Addendum 09/07/2023    Final                    Value:This result contains rich text formatting which cannot be displayed here.     Final Diagnosis 09/07/2023    Final                    Value:This result contains rich text formatting which cannot be displayed here.     Comment 09/07/2023    Final                    Value:This result contains rich text formatting which cannot be displayed here.     Clinical Information 09/07/2023    Final                    Value:This result contains rich text formatting which cannot be displayed here.     Gross Description 09/07/2023    Final                    Value:This result contains rich text formatting which cannot be displayed here.     Microscopic Description 09/07/2023    Final                    Value:This result contains rich text formatting which cannot be displayed here.     Special Stains 09/07/2023    Final                    Value:This result contains rich text formatting which cannot be displayed here.     Performing Labs 09/07/2023    Final                    Value:This result contains rich text formatting which cannot be displayed here.   Lab on 08/14/2023   Component Date Value Ref Range Status     DIDI interpretation 08/14/2023 Positive (A)  Negative Final     DIDI pattern 1 08/14/2023 Speckled   Final     DIDI titer 1 08/14/2023 1:160   Final     Rheumatoid Factor 08/14/2023 <6  <12 IU/mL Final     Vitamin B12 08/14/2023 529  232 - 1,245 pg/mL Final     Folic Acid 08/14/2023 26.8  4.6 - 34.8 ng/mL Final     Erythrocyte Sedimentation Rate 08/14/2023 8  0 - 30 mm/hr Final     CRP Inflammation 08/14/2023 6.19 (H)  <5.00 mg/L Final     DNA (ds) Antibody 08/14/2023 0.7  <10.0 IU/mL Final     RNP Kerri IgG Instrument Value 08/14/2023 1.4  <5.0 U/mL Final     RNP Antibody IgG 08/14/2023 Negative  Negative Final     Henson MAYNOR Kerri IgG Instrument Value 08/14/2023 <0.7  <7.0 U/mL Final     Henson MAYNOR Antibody IgG 08/14/2023 Negative  Negative Final      SSA Kerri IgG Instrument Value 08/14/2023 <0.5  <7.0 U/mL Final     SSA (Ro) Antibody IgG 08/14/2023 Negative  Negative Final     SSB Kerri IgG Instrument Value 08/14/2023 <0.6  <7.0 U/mL Final     SSB (La) Antibody IgG 08/14/2023 Negative  Negative Final         Total time spent for face to face visit, reviewing labs/imaging studies, counseling and coordination of care was: 30 Minutes spent on the date of the encounter doing chart review, review of outside records, review of test results, interpretation of tests, patient visit, and documentation     This note was dictated using voice recognition software.  Any grammatical or context distortions are unintentional and inherent to the software.    No orders of the defined types were placed in this encounter.     New Prescriptions    No medications on file     Modified Medications    Modified Medication Previous Medication    GABAPENTIN (NEURONTIN) 300 MG CAPSULE gabapentin (NEURONTIN) 100 MG capsule       Take 1 capsule (300 mg) by mouth 2 times daily    Take 1 capsule (100 mg) by mouth 3 times daily                 Again, thank you for allowing me to participate in the care of your patient.        Sincerely,        Jabari Santos MD

## 2023-11-09 NOTE — LETTER
11/9/2023         RE: Bria Davis  [id number] [facility] [id number]        Dear Colleague,    Thank you for referring your patient, Bria Davis, to the [facility] [place]. Please see a copy of my visit note below.    See procedure note for EMG report      Again, thank you for allowing me to participate in the care of your patient.        Sincerely,        Jabari Santos MD

## 2023-11-09 NOTE — NURSING NOTE
Chief Complaint   Patient presents with    Numbness     EMG and lab results      Clarita Kirk CMA on 11/9/2023 at 12:36 PM  [facility]

## 2023-11-09 NOTE — PROCEDURES
ELECTROMYOGRAPHY (EMG) REPORT       [facility] NEUROLOGY [place] [address] [place] [address]  Tel: [phone]  Fax: [phone]  [email or web address]     Bria Davis,  1960, MRN [id number]  PCP: Kaia Elena  Date: 2023     Principal Diagnosis:    Paresthesia  Primary fibromyalgia syndrome     Height: 5 feet 0 inch  Reason for referral: Evaluate bilateral lowers. c/o pain in both legs/feet > 6 months. Right > Left. Diabetic > 10 years. h/o surgery in both ankles. Recently dx with breast cancer.       Motor NCS      Nerve / Sites Lat Amp Dist Wilmer    ms mV cm m/s   R Peroneal - EDB      Ankle 4.58 9.1 8       Fib head 10.00 8.2 25 46      Pop fossa 12.60 8.0 12 46   L Peroneal - EDB      Ankle 4.69 9.8 8       Fib head 10.36 9.7 27 48      Pop fossa 12.76 9.6 11 46   R Tibial - AH      Ankle 5.16 9.0 8       Pop fossa 12.86 9.0 37 48   L Tibial - AH      Ankle 5.10 8.2 8       Pop fossa 13.33 7.3 39 47       F  Wave      Nerve Fmin    ms   R Tibial - AH 48.91   L Tibial - AH 48.13       Sensory NCS      Nerve / Sites Onset Lat Pk Lat Amp.2-3 Dist Wilmer    ms ms  V cm m/s   R Sural - Ankle (Calf)      Calf 3.02 3.70 13.1 14 46   L Sural - Ankle (Calf)      Calf 3.02 3.39 15.6 14 46   R Superficial peroneal - Ankle      Lat leg 2.40 3.49 15.1 12 50   L Superficial peroneal - Ankle      Lat leg 2.40 2.81 11.3 12 50       EMG Summary Table     Spontaneous MUAP Rcmt Note   Muscle Fib PSW Fasc IA # Amp Dur PPP Rate Type   R. Gluteus medius None None None N N N N N N N   R. Gluteus jesse None None None N N N N N N N   R. Upper paraspinal None None None N N N N N N N   R. Middle paraspinal None None None N N N N N N N   R. Lower paraspinal None None None N N N N N N N   R. Adductor waqar None None None N N N N N N N   R. Quadriceps None None None N N N N N N N   R. Tibialis anterior None None None N N N N N N N   R. Gastrocnemius (Medial head) None None None N N N N N N N    R. Tibialis posterior None None None N N N N N N N   L. Adductor waqar None None None N N N N N N N   L. Quadriceps None None None N N N N N N N   L. Tibialis anterior None None None N N N N N N N   L. Gastrocnemius (Medial head) None None None N N N N N N N   L. Tibialis posterior None None None N N N N N N N        Summary: Nerve conduction and EMG study of bilateral lower extremities shows:  Normal bilateral peroneal and tibial distal motor latencies, amplitudes and conduction velocities.  Normal bilateral tibial F latencies  Normal bilateral sural and superficial peroneal SNAP  Needle exam was normal    Impression:   This is a normal nerve conduction and EMG study of bilateral lower extremities.      Jabari Santos MD  [facility]  (Formerly, [facility]      This note was dictated using voice recognition software.  Any grammatical or context distortions are unintentional and inherent to the software.

## 2023-11-10 ENCOUNTER — ANESTHESIA EVENT (OUTPATIENT)
Dept: SURGERY | Facility: AMBULATORY SURGERY CENTER | Age: 63
End: 2023-11-10
Payer: COMMERCIAL

## 2023-11-13 ENCOUNTER — ANESTHESIA (OUTPATIENT)
Dept: SURGERY | Facility: AMBULATORY SURGERY CENTER | Age: 63
End: 2023-11-13
Payer: COMMERCIAL

## 2023-11-13 ENCOUNTER — HOSPITAL ENCOUNTER (OUTPATIENT)
Facility: AMBULATORY SURGERY CENTER | Age: 63
Discharge: HOME | End: 2023-11-13
Attending: SURGERY
Payer: COMMERCIAL

## 2023-11-13 VITALS
TEMPERATURE: 97 F | WEIGHT: 249 LBS | OXYGEN SATURATION: 97 % | HEART RATE: 77 BPM | BODY MASS INDEX: 44.12 KG/M2 | DIASTOLIC BLOOD PRESSURE: 85 MMHG | HEIGHT: 63 IN | RESPIRATION RATE: 14 BRPM | SYSTOLIC BLOOD PRESSURE: 155 MMHG

## 2023-11-13 LAB
GLUCOSE BLDC GLUCOMTR-MCNC: 225 MG/DL (ref 70–99)
GLUCOSE BLDC GLUCOMTR-MCNC: 235 MG/DL (ref 70–99)

## 2023-11-13 PROCEDURE — 88307 TISSUE EXAM BY PATHOLOGIST: CPT | Mod: TC | Performed by: SURGERY

## 2023-11-13 PROCEDURE — 19301 PARTIAL MASTECTOMY: CPT | Mod: 58,LT

## 2023-11-13 PROCEDURE — 88307 TISSUE EXAM BY PATHOLOGIST: CPT | Mod: 26 | Performed by: PATHOLOGY

## 2023-11-13 PROCEDURE — 19301 PARTIAL MASTECTOMY: CPT | Mod: 58 | Performed by: SURGERY

## 2023-11-13 PROCEDURE — 82962 GLUCOSE BLOOD TEST: CPT | Performed by: PATHOLOGY

## 2023-11-13 RX ORDER — HYDROMORPHONE HYDROCHLORIDE 1 MG/ML
0.4 INJECTION, SOLUTION INTRAMUSCULAR; INTRAVENOUS; SUBCUTANEOUS EVERY 5 MIN PRN
Status: DISCONTINUED | OUTPATIENT
Start: 2023-11-13 | End: 2023-11-13 | Stop reason: HOSPADM

## 2023-11-13 RX ORDER — HYDROMORPHONE HYDROCHLORIDE 1 MG/ML
0.2 INJECTION, SOLUTION INTRAMUSCULAR; INTRAVENOUS; SUBCUTANEOUS EVERY 5 MIN PRN
Status: DISCONTINUED | OUTPATIENT
Start: 2023-11-13 | End: 2023-11-13 | Stop reason: HOSPADM

## 2023-11-13 RX ORDER — ONDANSETRON 2 MG/ML
4 INJECTION INTRAMUSCULAR; INTRAVENOUS EVERY 30 MIN PRN
Status: DISCONTINUED | OUTPATIENT
Start: 2023-11-13 | End: 2023-11-13 | Stop reason: HOSPADM

## 2023-11-13 RX ORDER — ACETAMINOPHEN 325 MG/1
975 TABLET ORAL ONCE
Status: DISCONTINUED | OUTPATIENT
Start: 2023-11-13 | End: 2023-11-13 | Stop reason: HOSPADM

## 2023-11-13 RX ORDER — FENTANYL CITRATE 50 UG/ML
INJECTION, SOLUTION INTRAMUSCULAR; INTRAVENOUS PRN
Status: DISCONTINUED | OUTPATIENT
Start: 2023-11-13 | End: 2023-11-13

## 2023-11-13 RX ORDER — PROPOFOL 10 MG/ML
INJECTION, EMULSION INTRAVENOUS PRN
Status: DISCONTINUED | OUTPATIENT
Start: 2023-11-13 | End: 2023-11-13

## 2023-11-13 RX ORDER — SODIUM CHLORIDE, SODIUM LACTATE, POTASSIUM CHLORIDE, CALCIUM CHLORIDE 600; 310; 30; 20 MG/100ML; MG/100ML; MG/100ML; MG/100ML
INJECTION, SOLUTION INTRAVENOUS CONTINUOUS
Status: DISCONTINUED | OUTPATIENT
Start: 2023-11-13 | End: 2023-11-13 | Stop reason: HOSPADM

## 2023-11-13 RX ORDER — FENTANYL CITRATE 50 UG/ML
25 INJECTION, SOLUTION INTRAMUSCULAR; INTRAVENOUS EVERY 5 MIN PRN
Status: DISCONTINUED | OUTPATIENT
Start: 2023-11-13 | End: 2023-11-13 | Stop reason: HOSPADM

## 2023-11-13 RX ORDER — LIDOCAINE HYDROCHLORIDE 20 MG/ML
INJECTION, SOLUTION INFILTRATION; PERINEURAL PRN
Status: DISCONTINUED | OUTPATIENT
Start: 2023-11-13 | End: 2023-11-13

## 2023-11-13 RX ORDER — FENTANYL CITRATE 50 UG/ML
50 INJECTION, SOLUTION INTRAMUSCULAR; INTRAVENOUS EVERY 5 MIN PRN
Status: DISCONTINUED | OUTPATIENT
Start: 2023-11-13 | End: 2023-11-13 | Stop reason: HOSPADM

## 2023-11-13 RX ORDER — CEFAZOLIN SODIUM 2 G/50ML
2 SOLUTION INTRAVENOUS
Status: COMPLETED | OUTPATIENT
Start: 2023-11-13 | End: 2023-11-13

## 2023-11-13 RX ORDER — PROPOFOL 10 MG/ML
INJECTION, EMULSION INTRAVENOUS CONTINUOUS PRN
Status: DISCONTINUED | OUTPATIENT
Start: 2023-11-13 | End: 2023-11-13

## 2023-11-13 RX ORDER — LIDOCAINE 40 MG/G
CREAM TOPICAL
Status: DISCONTINUED | OUTPATIENT
Start: 2023-11-13 | End: 2023-11-13 | Stop reason: HOSPADM

## 2023-11-13 RX ORDER — ACETAMINOPHEN 325 MG/1
975 TABLET ORAL ONCE
Status: DISCONTINUED | OUTPATIENT
Start: 2023-11-13 | End: 2023-11-14 | Stop reason: HOSPADM

## 2023-11-13 RX ORDER — OXYCODONE HYDROCHLORIDE 5 MG/1
5 TABLET ORAL
Status: DISCONTINUED | OUTPATIENT
Start: 2023-11-13 | End: 2023-11-14 | Stop reason: HOSPADM

## 2023-11-13 RX ORDER — ACETAMINOPHEN 325 MG/1
650 TABLET ORAL
Status: DISCONTINUED | OUTPATIENT
Start: 2023-11-13 | End: 2023-11-14 | Stop reason: HOSPADM

## 2023-11-13 RX ORDER — ONDANSETRON 2 MG/ML
4 INJECTION INTRAMUSCULAR; INTRAVENOUS EVERY 30 MIN PRN
Status: DISCONTINUED | OUTPATIENT
Start: 2023-11-13 | End: 2023-11-14 | Stop reason: HOSPADM

## 2023-11-13 RX ORDER — CEFAZOLIN SODIUM 2 G/50ML
2 SOLUTION INTRAVENOUS SEE ADMIN INSTRUCTIONS
Status: DISCONTINUED | OUTPATIENT
Start: 2023-11-13 | End: 2023-11-13 | Stop reason: HOSPADM

## 2023-11-13 RX ORDER — ACETAMINOPHEN 325 MG/1
975 TABLET ORAL ONCE
Status: COMPLETED | OUTPATIENT
Start: 2023-11-13 | End: 2023-11-13

## 2023-11-13 RX ORDER — ONDANSETRON 4 MG/1
4 TABLET, ORALLY DISINTEGRATING ORAL EVERY 30 MIN PRN
Status: DISCONTINUED | OUTPATIENT
Start: 2023-11-13 | End: 2023-11-14 | Stop reason: HOSPADM

## 2023-11-13 RX ORDER — DEXAMETHASONE SODIUM PHOSPHATE 4 MG/ML
INJECTION, SOLUTION INTRA-ARTICULAR; INTRALESIONAL; INTRAMUSCULAR; INTRAVENOUS; SOFT TISSUE PRN
Status: DISCONTINUED | OUTPATIENT
Start: 2023-11-13 | End: 2023-11-13

## 2023-11-13 RX ORDER — OXYCODONE HYDROCHLORIDE 5 MG/1
10 TABLET ORAL
Status: DISCONTINUED | OUTPATIENT
Start: 2023-11-13 | End: 2023-11-14 | Stop reason: HOSPADM

## 2023-11-13 RX ORDER — ONDANSETRON 4 MG/1
4 TABLET, ORALLY DISINTEGRATING ORAL EVERY 30 MIN PRN
Status: DISCONTINUED | OUTPATIENT
Start: 2023-11-13 | End: 2023-11-13 | Stop reason: HOSPADM

## 2023-11-13 RX ADMIN — ACETAMINOPHEN 975 MG: 325 TABLET ORAL at 07:33

## 2023-11-13 RX ADMIN — ONDANSETRON 4 MG: 2 INJECTION INTRAMUSCULAR; INTRAVENOUS at 09:12

## 2023-11-13 RX ADMIN — FENTANYL CITRATE 50 MCG: 50 INJECTION, SOLUTION INTRAMUSCULAR; INTRAVENOUS at 08:52

## 2023-11-13 RX ADMIN — DEXAMETHASONE SODIUM PHOSPHATE 4 MG: 4 INJECTION, SOLUTION INTRA-ARTICULAR; INTRALESIONAL; INTRAMUSCULAR; INTRAVENOUS; SOFT TISSUE at 08:54

## 2023-11-13 RX ADMIN — PROPOFOL 150 MG: 10 INJECTION, EMULSION INTRAVENOUS at 08:48

## 2023-11-13 RX ADMIN — FENTANYL CITRATE 50 MCG: 50 INJECTION, SOLUTION INTRAMUSCULAR; INTRAVENOUS at 09:13

## 2023-11-13 RX ADMIN — LIDOCAINE HYDROCHLORIDE 100 MG: 20 INJECTION, SOLUTION INFILTRATION; PERINEURAL at 08:48

## 2023-11-13 RX ADMIN — PROPOFOL 150 MCG/KG/MIN: 10 INJECTION, EMULSION INTRAVENOUS at 08:48

## 2023-11-13 RX ADMIN — CEFAZOLIN SODIUM 2 G: 2 SOLUTION INTRAVENOUS at 08:44

## 2023-11-13 RX ADMIN — SODIUM CHLORIDE, SODIUM LACTATE, POTASSIUM CHLORIDE, CALCIUM CHLORIDE: 600; 310; 30; 20 INJECTION, SOLUTION INTRAVENOUS at 07:34

## 2023-11-13 ASSESSMENT — ENCOUNTER SYMPTOMS: SEIZURES: 0

## 2023-11-13 NOTE — ANESTHESIA PREPROCEDURE EVALUATION
Anesthesia Pre-Procedure Evaluation    Patient: Bria Davis   MRN: [id number] : 1960        Procedure : Procedure(s):  Re-excision of left lumpectomy site          Past Medical History:   Diagnosis Date    Arthritis     Asthma     Diabetes mellitus (H)     Esophageal reflux     Hypertension     Obese     Other chronic sinusitis     PONV (postoperative nausea and vomiting)       Past Surgical History:   Procedure Laterality Date    BIOPSY  2023    BIOPSY BREAST NEEDLE LOCALIZATION Left 10/6/2023    Procedure: BIOPSY, LEFT BREAST, WITH NEEDLE LOCALIZATION;  Surgeon: Bg Bryant MD;  Location: [facility]    COLONOSCOPY  2002    COLONOSCOPY  10/26/2012    Procedure: COLONOSCOPY;  Colonoscopy;  Surgeon: Margret Sales MD;  Location: [facility]    COLONOSCOPY N/A 2019    Procedure: COLONOSCOPY, WITH POLYPECTOMY AND BIOPSY;  Surgeon: Ariel Heck MD;  Location: [facility]    CV LEFT HEART CATH N/A 2019    Procedure: Left Heart Cath;  Surgeon: Mike Sanon MD;  Location:  HEART CARDIAC CATH LAB    CV LEFT VENTRICULOGRAM N/A 2019    Procedure: Left Ventriculogram;  Surgeon: Mike Sanon MD;  Location:  HEART CARDIAC CATH LAB    ENT SURGERY      ESOPHAGOSCOPY, GASTROSCOPY, DUODENOSCOPY (EGD), COMBINED N/A 2019    Procedure: ESOPHAGOGASTRODUODENOSCOPY, WITH BIOPSY;  Surgeon: Ariel Heck MD;  Location: [facility]    EYE SURGERY      GYN SURGERY      Hydroablation , polyp removal     INJECT EPIDURAL LUMBAR  2010    INJECT EPIDURAL LUMBAR performed by GENERIC ANESTHESIA PROVIDER at [facility] OR    INJECT EPIDURAL LUMBAR  2011    INJECT EPIDURAL LUMBAR performed by GENERIC ANESTHESIA PROVIDER at [facility]    IRRIGATION AND DEBRIDEMENT BREAST Left 10/21/2023    Procedure: Irrigation and debridement breast;  Surgeon: Nemesio Arreola MD;  Location: [facility]    left long finger pulley release Left 2020    RELEASE CARPAL TUNNEL  2012    Procedure:  RELEASE CARPAL TUNNEL;  Right Carpal Tunnel Release;  Surgeon: Mike Sparrow MD;  Location: [facility]    RELEASE CARPAL TUNNEL  11/09/2012    Procedure: RELEASE CARPAL TUNNEL;  Left Carpal Tunnel Release;  Surgeon: Mike Sparrow MD;  Location: [facility]    REPAIR TENDON ACHILLES Left 12/26/2019    Procedure: Left Foot: Achilles Tendon Repair/Remodel/Reattachment; calcaneal prominence removal;  Surgeon: Felix Watkins DPM;  Location: [facility]    SURGICAL HISTORY OF -   04/10/2000    bilateral total ethmoidectomies, bilateral maxillary antrostomies, bilateral SMR of inferior turbinates, reduction of lt turbinate porter bullosa      Allergies   Allergen Reactions    Lisinopril Cough      Social History     Tobacco Use    Smoking status: Never    Smokeless tobacco: Never   Substance Use Topics    Alcohol use: No      Wt Readings from Last 1 Encounters:   11/13/23 112.9 kg (249 lb)        Anesthesia Evaluation   Pt has had prior anesthetic. Type: General.    History of anesthetic complications  - PONV.      ROS/MED HX  ENT/Pulmonary:     (+)                    Mild Persistent, asthma  Treatment: Inhaler daily,                 Neurologic:    (-) no seizures and no CVA   Cardiovascular:     (+) Dyslipidemia hypertension- -  CAD -  - -                                 Previous cardiac testing   Echo: Date: 9/2019 Results:     Interpretation Summary     The visual ejection fraction is estimated at 60-65%.  There is mild to moderate concentric left ventricular hypertrophy.  There is no pericardial effusion.  Normal left ventricular wall motion.  Technically difficult study.  Hypertension.    Stress Test:  Date: 9/2019 Results:  Impression  1.  Myocardial perfusion imaging using single isotope technique  demonstrated normal myocardial perfusion without evidence of infarct  or ischemia.   2. Gated images demonstrated normal left ventricular cavity with  normal segmental wall motion.  The left ventricular systolic  function  is normal. Ejection fraction is greater than 75%.       3. The patient developed left bundle-branch block 3 minutes into  exercise after reaching a heart rate of 126 bpm and stayed in it for  the duration of exercise with spontaneous resolution 6 minutes into  recovery.  4. No prior study available for comparison.    ECG Reviewed:  Date: 12/2021 Results:  NSR RBBB  Cath:  Date: 9/2019 Results:    Left ventricular filling pressures are mildly elevated .    Mid LAD lesion is 10% stenosed.    Prox RCA lesion is 10% stenosed.    The ejection fraction is greater than 55% by visual estimate.      METS/Exercise Tolerance:     Hematologic:  - neg hematologic  ROS     Musculoskeletal:  - neg musculoskeletal ROS     GI/Hepatic:     (+) GERD, Asymptomatic on medication,               (-) liver disease   Renal/Genitourinary:    (-) renal disease   Endo:     (+)  type II DM, Last HgA1c: 7.7,            Obesity,    Type I DM: 9/2023.   Psychiatric/Substance Use:  - neg psychiatric ROS     Infectious Disease: Comment: Necrosis & cellulitis of L breast skin graft site after lumpectomy      Malignancy:   (+) Malignancy, History of Breast.Breast CA Active status post Surgery.      Other:            Physical Exam    Airway        Mallampati: III   TM distance: > 3 FB   Neck ROM: full   Mouth opening: > 3 cm    Respiratory Devices and Support         Dental       (+) Minor Abnormalities - some fillings, tiny chips      Cardiovascular             Pulmonary                   OUTSIDE LABS:  CBC:   Lab Results   Component Value Date    WBC 8.8 10/21/2023    WBC 10.9 10/20/2023    HGB 12.9 10/21/2023    HGB 13.3 10/20/2023    HCT 38.7 10/21/2023    HCT 40.1 10/20/2023     10/21/2023     10/20/2023     BMP:   Lab Results   Component Value Date     10/21/2023     10/20/2023    POTASSIUM 3.9 10/21/2023    POTASSIUM 4.3 10/20/2023    CHLORIDE 105 10/21/2023    CHLORIDE 99 10/20/2023    CO2 24 10/21/2023     CO2 22 10/20/2023    BUN 7.7 (L) 10/21/2023    BUN 8.2 10/20/2023    CR 0.47 (L) 10/21/2023    CR 0.50 (L) 10/21/2023     (H) 11/13/2023     (H) 10/22/2023     COAGS:   Lab Results   Component Value Date    PTT 29 09/12/2019    INR 0.96 09/12/2019     POC:   Lab Results   Component Value Date    BGM 99 12/26/2019    HCG Negative 11/09/2012     HEPATIC:   Lab Results   Component Value Date    ALBUMIN 4.0 10/20/2023    PROTTOTAL 6.4 10/20/2023    ALT 14 10/20/2023    AST 21 10/20/2023    ALKPHOS 65 10/20/2023    BILITOTAL 0.4 10/20/2023     OTHER:   Lab Results   Component Value Date    LACT 1.6 10/20/2023    A1C 7.7 (H) 09/21/2023    MARLON 9.1 10/21/2023    MAG 1.8 01/13/2022    LIPASE 43 12/21/2022    AMYLASE 54 04/26/2010    TSH 1.98 11/07/2022    SED 8 08/14/2023       Anesthesia Plan    ASA Status:  3    NPO Status:  NPO Appropriate    Anesthesia Type: General.     - Airway: LMA   Induction: Intravenous, Propofol.   Maintenance: Balanced.        Consents    Anesthesia Plan(s) and associated risks, benefits, and realistic alternatives discussed. Questions answered and patient/representative(s) expressed understanding.     - Discussed:     - Discussed with:  Patient      - Extended Intubation/Ventilatory Support Discussed: No.      - Patient is DNR/DNI Status: No          Postoperative Care    Pain management: IV analgesics, Oral pain medications, Multi-modal analgesia.   PONV prophylaxis: Ondansetron (or other 5HT-3), Dexamethasone or Solumedrol, Background Propofol Infusion     Comments:           H&P reviewed: Unable to attach H&P to encounter due to EHR limitations. H&P Update: appropriate H&P reviewed, patient examined. No interval changes since H&P (within 30 days).         Felix Devlin MD

## 2023-11-13 NOTE — ANESTHESIA PROCEDURE NOTES
Airway       Patient location during procedure: OR  Staff -        Performed By: CRNA  Consent for Airway        Urgency: elective  Indications and Patient Condition       Indications for airway management: sherie-procedural         Mask difficulty assessment: 0 - not attempted    Final Airway Details       Final airway type: supraglottic airway    Supraglottic Airway Details        Type: LMA       Brand: LMA Unique       LMA size: 4    Post intubation assessment        Placement verified by: capnometry, equal breath sounds and chest rise        Number of attempts at approach: 1       Secured with: pink tape       Ease of procedure: easy       Dentition: Intact and Unchanged

## 2023-11-13 NOTE — BRIEF OP NOTE
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  Complications: None.  Implants: * No implants in log *        Anita Crespo  General Surgery PGY5

## 2023-11-13 NOTE — ANESTHESIA CARE TRANSFER NOTE
Patient: Bria Davis    Procedure: Procedure(s):  Re-excision of left lumpectomy site       Diagnosis: Ductal carcinoma in situ (DCIS) of left breast [D05.12]  Diagnosis Additional Information: No value filed.    Anesthesia Type:   General     Note:    Oropharynx: oropharynx clear of all foreign objects and spontaneously breathing  Level of Consciousness: awake  Oxygen Supplementation: face mask  Level of Supplemental Oxygen (L/min / FiO2): 6  Independent Airway: airway patency satisfactory and stable  Dentition: dentition unchanged  Vital Signs Stable: post-procedure vital signs reviewed and stable  Report to RN Given: handoff report given  Patient transferred to: PACU    Handoff Report: Identifed the Patient, Identified the Reponsible Provider, Reviewed the pertinent medical history, Discussed the surgical course, Reviewed Intra-OP anesthesia mangement and issues during anesthesia, Set expectations for post-procedure period and Allowed opportunity for questions and acknowledgement of understanding      Vitals:  Vitals Value Taken Time   /78 11/13/23 0922   Temp 97    Pulse 82 11/13/23 0928   Resp 14 11/13/23 0928   SpO2 93 % 11/13/23 0928   Vitals shown include unfiled device data.    Electronically Signed By: ALYSON Puga CRNA  November 13, 2023  9:28 AM

## 2023-11-13 NOTE — OR NURSING
Patient presents for re-incision of left breast lumpectomy with Dr. Olson today.  Patient is diabetic and has held Ozempic since 11/1/2023.  Held Metformin today.  She has not taken long acting or short acting insulin since yesterday (short acting insulin aspart last evening 1800; long acting glargine insulin yesterday 1000 AM).  BS is 235 mg/dL.  Informed Anesthesiologist Dr. Devlin.  No new orders.    Nicholas Mahoney RN on 11/13/2023 at 730

## 2023-11-13 NOTE — OP NOTE
Preoperative Diagnosis: Left breast ductal carcinoma in situ    Postoperative Diagnosis: Same    Surgeons: Dr. Arron Olson and Dr. Anita Crespo    Procedure: Reexcision of left lumpectomy margins    Anesthesia: General    Indications for Surgery: The patient is a 63-year-old woman who underwent a lumpectomy for what turned out to be DCIS.  During procedure the surgeon burned another portion of her skin.  He excise that portion of the skin.  Unfortunately the skin between the 2 incisions became necrotic requiring a debridement.  She has a positive medial margin for DCIS on the medial margin.  She now presents for reexcision    Procedure in Detail: After informed consent the patient was brought the operating room and given a general anesthetic.  She was prepped and draped.  I excised the entire medial wall of the lumpectomy incision with the Bovie cautery.  Thickened to the thickness of the resection was approximately 7 to 10 mm.  Specimen was removed oriented and sent to pathology.  Strict hemostasis was obtained with the Bovie cautery.  Local anesthetic was administered into the breast cavity.  The breast cavity was repacked with sterile dressing.          Arron Olson MD, MS  Surgical Oncology

## 2023-11-13 NOTE — ANESTHESIA POSTPROCEDURE EVALUATION
Patient: Bria Davis    Procedure: Procedure(s):  Re-excision of left lumpectomy site       Anesthesia Type:  General    Note:  Disposition: Outpatient   Postop Pain Control: Uneventful            Sign Out: Well controlled pain   PONV: No   Neuro/Psych: Uneventful            Sign Out: Acceptable/Baseline neuro status   Airway/Respiratory: Uneventful            Sign Out: Acceptable/Baseline resp. status   CV/Hemodynamics: Uneventful            Sign Out: Acceptable CV status; No obvious hypovolemia; No obvious fluid overload   Other NRE: NONE   DID A NON-ROUTINE EVENT OCCUR? No           Last vitals:  Vitals Value Taken Time   /87 11/13/23 0940   Temp 36.3  C (97.3  F) 11/13/23 0930   Pulse 77 11/13/23 0940   Resp 16 11/13/23 0940   SpO2 94 % 11/13/23 0940       Electronically Signed By: Felix Devlin MD  November 13, 2023  10:22 AM

## 2023-11-13 NOTE — DISCHARGE INSTRUCTIONS
[facility]  Home Care Following Anesthesia  For 24 hours after surgery:  Get plenty of rest.  A responsible adult must stay with you for at least 24 hours after you leave the surgery center.  Do not drive or use heavy equipment.  If you have weakness or tingling, don't drive or use heavy equipment until this feeling goes away.   Do not drink alcohol.   Avoid strenuous or risky activities.  Ask for help when climbing stairs.  You may feel lightheaded.  IF so, sit for a few minutes before standing.  Have someone help you get up.   If you have nausea (feel sick to your stomach): Drink only clear liquids such as apple juice, ginger ale, broth or 7-Up.  Rest may also help.  Be sure to drink enough fluids.  Move to a regular diet as you feel able.   You may have a slight fever.  Call the doctor if your fever is over 100 F (37.7 C) (taken under the tongue) or lasts longer than 24 hours.  You may have a dry mouth, a sore throat, muscle aches or trouble sleeping. These should go away after 24 hours.  Do not make important or legal decisions.   It is recommended to avoid smoking.               Tips for taking pain medications  To get the best pain relief possible, remember these points:  Take pain medications as directed, before pain becomes severe.  Pain medication can upset your stomach: taking it with food may help.  Constipation is a common side effect of pain medication. Drink plenty of  fluids.  Eat foods high in fiber. Take a stool softener if recommended by your doctor or pharmacist.  Do not drink alcohol, drive or operate machinery while taking pain medications.  Ask about other ways to control pain, such as with heat, ice or relaxation.    Tylenol/Acetaminophen Consumption    If you feel your pain relief is insufficient, you may take Tylenol/Acetaminophen in addition to your narcotic pain medication.   Be careful not to exceed 4,000 mg of Tylenol/Acetaminophen in a 24 hour  period from all sources.  If you are taking extra strength Tylenol/acetaminophen (500 mg), the maximum dose is 8 tablets in 24 hours.  If you are taking regular strength acetaminophen (325 mg), the maximum dose is 12 tablets in 24 hours.    Call a doctor for any of the following:  Signs of infection (fever, growing tenderness at the surgery site, a large amount of drainage or bleeding, severe pain, foul-smelling drainage, redness, swelling).  It has been over 8 to 10 hours since surgery and you are still not able to urinate (pass water).  Headache for over 24 hours.  Numbness, tingling or weakness the day after surgery (if you had spinal anesthesia).  Signs of Covid-19 infection (temperature over 100 degrees, shortness of breath, cough, loss of taste/smell, generalized body aches, persistent headache, chills, sore throat, nausea/vomiting/diarrhea)  Your doctor is:       Dr. Arron Olson, [facility]: [phone]               Or dial [phone] and ask for the resident on call for:  General Surgery  For emergency care, call the:  [facility]:  [phone] (TTY for hearing impaired: [phone])

## 2023-11-15 DIAGNOSIS — N61.0 CELLULITIS OF BREAST: ICD-10-CM

## 2023-11-18 ENCOUNTER — MYC MEDICAL ADVICE (OUTPATIENT)
Dept: ONCOLOGY | Facility: CLINIC | Age: 63
End: 2023-11-18
Payer: COMMERCIAL

## 2023-11-20 ENCOUNTER — MYC MEDICAL ADVICE (OUTPATIENT)
Dept: FAMILY MEDICINE | Facility: CLINIC | Age: 63
End: 2023-11-20

## 2023-11-20 ENCOUNTER — TELEPHONE (OUTPATIENT)
Dept: SURGERY | Facility: CLINIC | Age: 63
End: 2023-11-20
Payer: COMMERCIAL

## 2023-11-20 ENCOUNTER — ONCOLOGY VISIT (OUTPATIENT)
Dept: SURGERY | Facility: CLINIC | Age: 63
End: 2023-11-20
Attending: PHYSICIAN ASSISTANT
Payer: COMMERCIAL

## 2023-11-20 VITALS
TEMPERATURE: 98.3 F | RESPIRATION RATE: 12 BRPM | SYSTOLIC BLOOD PRESSURE: 150 MMHG | BODY MASS INDEX: 43.61 KG/M2 | OXYGEN SATURATION: 98 % | HEART RATE: 72 BPM | WEIGHT: 246.2 LBS | DIASTOLIC BLOOD PRESSURE: 79 MMHG

## 2023-11-20 DIAGNOSIS — J31.0 CHRONIC RHINITIS: ICD-10-CM

## 2023-11-20 DIAGNOSIS — D05.12 DUCTAL CARCINOMA IN SITU (DCIS) OF LEFT BREAST: Primary | ICD-10-CM

## 2023-11-20 DIAGNOSIS — E11.43 GASTROPARESIS DUE TO DM (H): ICD-10-CM

## 2023-11-20 DIAGNOSIS — K31.84 GASTROPARESIS DUE TO DM (H): ICD-10-CM

## 2023-11-20 PROCEDURE — 99024 POSTOP FOLLOW-UP VISIT: CPT | Performed by: PHYSICIAN ASSISTANT

## 2023-11-20 PROCEDURE — 99213 OFFICE O/P EST LOW 20 MIN: CPT | Performed by: PHYSICIAN ASSISTANT

## 2023-11-20 RX ORDER — METOCLOPRAMIDE 10 MG/1
10 TABLET ORAL 2 TIMES DAILY
Qty: 180 TABLET | Refills: 2 | Status: SHIPPED | OUTPATIENT
Start: 2023-11-20 | End: 2024-08-20

## 2023-11-20 ASSESSMENT — PAIN SCALES - GENERAL: PAINLEVEL: NO PAIN (0)

## 2023-11-20 NOTE — PROGRESS NOTES
FOLLOW-UP  Nov 20, 2023    Bria Davis is a 63 year old female who returns for a post-operative follow-up visit.    HPI:    She underwent a reexcision lumpectomy with Dr. Olson 11/13/23. The wound was left open and she has been doing packing changes.      Over the weekend she began having increase pain with packing changes. No erythema or swelling. Tmax 98.5.     BP (!) 150/79 (BP Location: Right arm, Patient Position: Sitting, Cuff Size: Adult Large)   Pulse 72   Temp 98.3  F (36.8  C) (Oral)   Resp 12   Wt 111.7 kg (246 lb 3.2 oz)   LMP 01/14/2016 (Approximate)   SpO2 98%   BMI 43.61 kg/m     Physical Exam  Left breast medial 5x5x7 cm wound with granulation tissue. No erythema or signs of infection. The wound packing was changed.     ASSESSMENT:    Bria Davis is a 63 year old female s/p lumpectomy with open wound. There are no concerning findings on exam. She will continue packing changes 1-2 times daily. She will follow up next week as scheduled.     Arely Estrada PA-C

## 2023-11-20 NOTE — TELEPHONE ENCOUNTER
"Prescription approved per [facility] Refill Protocol.     Requested Prescriptions   Pending Prescriptions Disp Refills    metoclopramide (REGLAN) 10 MG tablet [Pharmacy Med Name: METOCLOPRAMIDE HCL 10MG TABS] 180 tablet 2     Sig: TAKE ONE TABLET BY MOUTH TWICE A DAY        Antivertigo/Antiemetic Agents Passed - 11/20/2023 10:19 AM        Passed - Recent (12 mo) or future (30 days) visit within the authorizing provider's specialty     Patient has had an office visit with the authorizing provider or a provider within the authorizing providers department within the previous 12 mos or has a future within next 30 days. See \"Patient Info\" tab in inbasket, or \"Choose Columns\" in Meds & Orders section of the refill encounter.              Passed - Medication is active on med list        Passed - Patient is 18 years of age or older                 Beverly Carvajal RN 11/20/23 3:35 PM   "

## 2023-11-20 NOTE — NURSING NOTE
"Oncology Rooming Note    November 20, 2023 11:21 AM   Bria Davis is a 63 year old female who presents for:    Chief Complaint   Patient presents with    Oncology Clinic Visit     Ductal carcinoma in situ of left breast      Initial Vitals: BP (!) 150/79 (BP Location: Right arm, Patient Position: Sitting, Cuff Size: Adult Large)   Pulse 72   Temp 98.3  F (36.8  C) (Oral)   Resp 12   Wt 111.7 kg (246 lb 3.2 oz)   LMP 01/14/2016 (Approximate)   SpO2 98%   BMI 43.61 kg/m   Estimated body mass index is 43.61 kg/m  as calculated from the following:    Height as of 11/13/23: 1.6 m (5' 3\").    Weight as of this encounter: 111.7 kg (246 lb 3.2 oz). Body surface area is 2.23 meters squared.  No Pain (0) Comment: Data Unavailable   Patient's last menstrual period was 01/14/2016 (approximate).  Allergies reviewed: Yes  Medications reviewed: Yes    Medications: Medication refills not needed today.  Pharmacy name entered into [facility]:    [facility] [place] - [place], [place] - [address] [facility] [place], [place] - [address] [facility] - [place], [place] - [address] [place] [address] DRUG STORE #[id number] - [place], [place] - [address]    Clinical concerns:  none      Debbie Butt              "

## 2023-11-20 NOTE — LETTER
11/20/2023         RE: Bria Davis  [address] [address] [facility] [id number]        Dear Colleague,    Thank you for referring your patient, Bria Davis, to the [facility]. Please see a copy of my visit note below.    FOLLOW-UP  Nov 20, 2023    Bria Davis is a 63 year old female who returns for a post-operative follow-up visit.    HPI:    She underwent a reexcision lumpectomy with Dr. Olson 11/13/23. The wound was left open and she has been doing packing changes.      Over the weekend she began having increase pain with packing changes. No erythema or swelling. Tmax 98.5.     BP (!) 150/79 (BP Location: Right arm, Patient Position: Sitting, Cuff Size: Adult Large)   Pulse 72   Temp 98.3  F (36.8  C) (Oral)   Resp 12   Wt 111.7 kg (246 lb 3.2 oz)   LMP 01/14/2016 (Approximate)   SpO2 98%   BMI 43.61 kg/m     Physical Exam  Left breast medial 5x5x7 cm wound with granulation tissue. No erythema or signs of infection. The wound packing was changed.     ASSESSMENT:    Bria Davis is a 63 year old female s/p lumpectomy with open wound. There are no concerning findings on exam. She will continue packing changes 1-2 times daily. She will follow up next week as scheduled.     Arely Estrada PA-C

## 2023-11-20 NOTE — CONFIDENTIAL NOTE
POST-OP CALL  Nov 20, 2023    Bria Davis is a 63 year old female s/p reexcision lumpectomy with Dr. Olson 11/13/23. The wound was left open and she has been doing packing changes.     Over the weekend she began having increase pain with packing changes. No erythema or swelling. Tmax 98.5. She sent an image of the wound that shows granulation tissue with fibrinous tissue. No erythema. Offered to see her in clinic today and she plans to come for a wound check.     Arely Estrada PA-C

## 2023-11-20 NOTE — CONFIDENTIAL NOTE
Oncology Nurse Triage - Reporting Symptoms    Situation:   Bria reporting the following symptoms: possible infection in lumpectomy sight       Background:   Treating Provider:   DR Becerra     Date of last office visit: 10/23/23 Dr Olson     Recent treatments: No      Assessment  Onset of symptoms: Having increased pain since Wednesday in   Has been taking ibuprofen and tylenol but ibuprofen did not seem to help. SO has only been taking tylenol every 6 hours.   No smell to wound.   Dressing change 1-2 times per day.   Where exactly do they need to place gauze.   New area is where pain is most extreme. Just one section upper and towards and mid line is where pain is most and   When does dressing change has steady dull pain underneath breast.    Running temp of 98.5 normally runs temp of 97.5.   Not thick drainage on gauze, mostly bloody discharge on the dressing.   Rates pain a 4/10 Friday and Saturday 9/10   When they do dressing change at night it keeps her up half the night from pain. Rates the pain 10/10 when they pack the dressing.   Uses ice pack after dressing change and this helps some with pain control.   Skin around wound is not red, just sensitive to touch.     Do they need to place gauze into upper middline portion of wound on left breast   Should she be in this much pain?     Paged provider: 9:38 message sent to Ofelia Becerra and Arely Estrada.   They requested the message be routed to them.     Recommendations:

## 2023-11-20 NOTE — TELEPHONE ENCOUNTER
"Please see MyChart message from patient.        pseudoePHEDrine (SUDAFED) 30 MG tablet (Discontinued) 120 tablet 3 8/14/2023 11/7/2023 No   Sig - Route: TAKE 2 TABLETS (60 MG) BY MOUTH 2 TIMES DAILY - Oral   Patient not taking: Reported on 11/1/2023        Sent to pharmacy as: Pseudoephedrine HCl 30 MG Oral Tablet (SUDAFED)          Patient had 11/9/23 office visit with Neurology with Dr. Barboza at the [facility].  Per chart review; it appears this medication was discontinued at that appointment and indicated \"patient not taking.\" The medication was listed as discontinued on the patient's AVS at that appointment.    Routing cued up medication to Dr. Elena to please evaluate.    Merced Charles RN on 11/20/2023 at 5:32 PM      "

## 2023-11-21 RX ORDER — PSEUDOEPHEDRINE HCL 30 MG
60 TABLET ORAL 2 TIMES DAILY
Qty: 120 TABLET | Refills: 3 | Status: SHIPPED | OUTPATIENT
Start: 2023-11-21 | End: 2024-03-25

## 2023-11-27 ENCOUNTER — ONCOLOGY VISIT (OUTPATIENT)
Dept: ONCOLOGY | Facility: CLINIC | Age: 63
End: 2023-11-27
Attending: SURGERY
Payer: COMMERCIAL

## 2023-11-27 VITALS
DIASTOLIC BLOOD PRESSURE: 76 MMHG | HEART RATE: 79 BPM | SYSTOLIC BLOOD PRESSURE: 147 MMHG | BODY MASS INDEX: 42.87 KG/M2 | WEIGHT: 242 LBS | OXYGEN SATURATION: 96 % | TEMPERATURE: 97.6 F

## 2023-11-27 DIAGNOSIS — D05.12 DUCTAL CARCINOMA IN SITU (DCIS) OF LEFT BREAST: Primary | ICD-10-CM

## 2023-11-27 LAB
PATH REPORT.COMMENTS IMP SPEC: ABNORMAL
PATH REPORT.COMMENTS IMP SPEC: YES
PATH REPORT.FINAL DX SPEC: ABNORMAL
PATH REPORT.GROSS SPEC: ABNORMAL
PATH REPORT.MICROSCOPIC SPEC OTHER STN: ABNORMAL
PATH REPORT.RELEVANT HX SPEC: ABNORMAL
PHOTO IMAGE: ABNORMAL

## 2023-11-27 ASSESSMENT — PAIN SCALES - GENERAL: PAINLEVEL: NO PAIN (0)

## 2023-11-27 NOTE — PROGRESS NOTES
Follow-up visit after excision of + margin for DCIS  Final path: DCIS with negative margins  PE: Wound is smaller and granulating    Plan: Plastics consult tomorrow, Radiation Consult soon.

## 2023-11-27 NOTE — LETTER
11/27/2023         RE: Bria Davis  [address] [address] [facility] [id number]        Dear Colleague,    Thank you for referring your patient, Bria Davis, to the [facility]. Please see a copy of my visit note below.    Follow-up visit after excision of + margin for DCIS  Final path: DCIS with negative margins  PE: Wound is smaller and granulating    Plan: Plastics consult tomorrow, Radiation Consult soon.      Sincerely,        Arron Olson MD

## 2023-11-27 NOTE — NURSING NOTE
"Oncology Rooming Note    November 27, 2023 4:23 PM   Bria Davis is a 63 year old female who presents for:    Chief Complaint   Patient presents with    Oncology Clinic Visit     2 week post op     Initial Vitals: BP (!) 147/76   Pulse 79   Temp 97.6  F (36.4  C)   Wt 109.8 kg (242 lb)   LMP 01/14/2016 (Approximate)   SpO2 96%   BMI 42.87 kg/m   Estimated body mass index is 42.87 kg/m  as calculated from the following:    Height as of 11/13/23: 1.6 m (5' 3\").    Weight as of this encounter: 109.8 kg (242 lb). Body surface area is 2.21 meters squared.  No Pain (0) Comment: Data Unavailable   Patient's last menstrual period was 01/14/2016 (approximate).  Allergies reviewed: Yes  Medications reviewed: Yes    Medications: Medication refills not needed today.  Pharmacy name entered into [facility]:    [facility] [place] - [place], [place] - [address] [facility] [place] - [place], [place] - [address]  [facility] - [place], [place] - [address] [place] [address] DRUG STORE #[id number] - [place], [place] - [address] AT [facility] OF [address] [place] [address]    Clinical concerns:        Carmen Phillips              "

## 2023-11-28 ENCOUNTER — OFFICE VISIT (OUTPATIENT)
Dept: PLASTIC SURGERY | Facility: CLINIC | Age: 63
End: 2023-11-28
Attending: PLASTIC SURGERY
Payer: COMMERCIAL

## 2023-11-28 ENCOUNTER — PRE VISIT (OUTPATIENT)
Dept: PLASTIC SURGERY | Facility: CLINIC | Age: 63
End: 2023-11-28
Payer: COMMERCIAL

## 2023-11-28 ENCOUNTER — PATIENT OUTREACH (OUTPATIENT)
Dept: ONCOLOGY | Facility: CLINIC | Age: 63
End: 2023-11-28

## 2023-11-28 VITALS
BODY MASS INDEX: 42.22 KG/M2 | OXYGEN SATURATION: 97 % | SYSTOLIC BLOOD PRESSURE: 134 MMHG | WEIGHT: 247.3 LBS | HEART RATE: 89 BPM | DIASTOLIC BLOOD PRESSURE: 76 MMHG | RESPIRATION RATE: 12 BRPM | HEIGHT: 64 IN | TEMPERATURE: 98.3 F

## 2023-11-28 DIAGNOSIS — D05.12 DUCTAL CARCINOMA IN SITU (DCIS) OF LEFT BREAST: ICD-10-CM

## 2023-11-28 DIAGNOSIS — N61.1 BREAST ABSCESS: ICD-10-CM

## 2023-11-28 PROCEDURE — 99205 OFFICE O/P NEW HI 60 MIN: CPT | Performed by: PLASTIC SURGERY

## 2023-11-28 PROCEDURE — 99213 OFFICE O/P EST LOW 20 MIN: CPT | Performed by: PLASTIC SURGERY

## 2023-11-28 RX ORDER — CEFAZOLIN SODIUM 2 G/50ML
2 SOLUTION INTRAVENOUS SEE ADMIN INSTRUCTIONS
Status: CANCELLED | OUTPATIENT
Start: 2023-11-28

## 2023-11-28 RX ORDER — CEFAZOLIN SODIUM 2 G/50ML
2 SOLUTION INTRAVENOUS
Status: CANCELLED | OUTPATIENT
Start: 2023-11-28

## 2023-11-28 ASSESSMENT — PAIN SCALES - GENERAL: PAINLEVEL: NO PAIN (0)

## 2023-11-28 NOTE — PROGRESS NOTES
New Patient Oncology Nurse Navigator Note     Referring provider: Arron Olson MD     Referring Clinic/Organization: [facility] [place]     Referred to (specialty:) Radiation Oncology      Date Referral Received: November 27, 2023     Evaluation for:  D05.12 (ICD-10-CM) - Ductal carcinoma in situ (DCIS) of left breast     Clinical History (per Nurse review of records provided):    Melyssa had bilateral screening mammograms on 8/9/23 and a possible asymmetry in the left breast at the 10 o'clock position, middle depth. Diagnostic left breast imaging followed on 9/1 and left diagnostic mammogram with tomosynthesis: The previously noted asymmetry persists on today's exam and will be further evaluated with ultrasound. Left breast ultrasound: In the left breast 10 o'clock region approximately 6 cm from the nipple, there is a lobulated hypoechoic mass that is ill-defined and measures roughly 4.0 x 1.5 x 1.7 cm. Tissue diagnosis is recommended.     9/7/23 - [id number]  Left breast, 4.0 cm mass, 10:00, 6.0 cm from nipple, ultrasound guided 14 gauge needle core biopsies:  - Two foci of atypical ductal hyperplasia, 1.5 mm and 2.0 mm.  - Fibrocystic change, with apocrine metaplasia, florid usual ductal hyperplasia, columnar cell change.  - No evidence of invasive malignancy.  - No evidence of lymphovascular invasion     Dr. Kaia Elena placed a referral to general surgery on 9/29/23 and patient subsequently met with Bg Bryant MD who proceeded with left breast wire-localized mass excision.     10/6/23 - [id number]  A. Left breast excisional lumpectomy, oriented breast mass at 10:00, 6 cm from the nipple:  - Negative for invasive carcinoma  - Ductal carcinoma in situ, papillary, cribriform and solid  - Grade: Grade 1  - Tumor size: 37 x 32 x 20 mm  - Margins of resection status for carcinoma in-situ: POSITIVE MEDIAL MARGIN     There was an inadvertent cautery injury to the left breast inferior to the planned  incision. The skin overlying this area was excised, however there has been blistering over that area consistent with cellulitis, possibly due to some flap necrosis from the procedure. Dr. Bryant felt the cosmetic outcome with margin re-excision would be suboptimal given the large size of tissue that has already been excised. In addition, proceeding with simple mastectomy would allow excision of the affected skin. Provider ordered 10-day course of doxycycline for breast cellulitis and directed patient in wound care. She had follow up with provider on 10/16 and will visit again on 10/23.      Melyssa met with Dr. Arron Olson on 10/19/23 who recommended reexcision of lumpectomy cavity at a minimum.     She did require I&D of left breast on 10/21/23 and was discharged on Augmentin. .   Dr. Olson performed reexcision of left lumpectomy margins on 11/13/2023.  11/13/23 -   LEFT breast, new medial margin, excision:  -Ductal carcinoma in situ (DCIS), nuclear grade 1, cribriform, micropapillary, and papillary type(s), residual after prior lumpectomy  -DCIS spans an estimated 15 mm  -No invasive carcinoma identified  -New medial margin is uninvolved by DCIS  -DCIS is 1.5 mm from the new medial margin  -Post-operative changes, including granulation tissue  -See comment  Comment  See lumpectomy report [id number] for estrogen receptor and progesterone receptor immunohistochemistry results and  for tumor synoptic. There is no change in the tumor stage (pTis).    The wound was left open and she has been doing packing changes. Per Dr. Olson note on 11/27/23 wound is smaller and granulating. Plastic surgery consult 11/28/23. She did meet with Dr. Rodriguez and several options of closure/reconstruction were discussed. It appears the current plan is to reconstruct the left breast and a reduction paten manner to try and salvage the nipple areolar complex and excised the wound and reconstructed with remaining tissue of the breast  interval local flap fashion. At the same time a reduction of the right side to give her symmetry could be done. This would not only reconstruct her left breast and the most wholesome manner but also result in a reduction that she was looking for the Schnur scale is about 750 g. We could potentially do that on the right side and give her around a C cup size. Intention is to spare left nipple. Dr. Rodriguez would like patient to meet with radiation oncology now to discuss if radiation is needed, but to radiate after the reduction paten reconstruction is completed and healed.    Patient resides in [place], [place].    Records Location: See Bookmarked material     11/30 9:27 - Telephoned and spoke with Melyssa. Introduced my role and purpose for the call. Writer received referral, reviewed for appropriate plan, and call warm transferred to New Patient Scheduling for completion.    Seeing Dr. Haddad on 12/18. Follow up with Dr. Rodriguez on 12/13. Dr. Rodriguez suggests 6 weeks from 12/7 surgery before beginning radiation therapy.

## 2023-11-28 NOTE — NURSING NOTE
"Oncology Rooming Note    November 28, 2023 9:48 AM   Bria Davis is a 63 year old female who presents for:    Chief Complaint   Patient presents with    Oncology Clinic Visit     Ductal carcinoma in situ of left breast      Initial Vitals: /76 (BP Location: Right arm, Patient Position: Sitting, Cuff Size: Adult Large)   Pulse 89   Temp 98.3  F (36.8  C) (Oral)   Resp 12   Ht 1.617 m (5' 3.66\")   Wt 112.2 kg (247 lb 4.8 oz)   LMP 01/14/2016 (Approximate)   SpO2 97%   BMI 42.90 kg/m   Estimated body mass index is 42.9 kg/m  as calculated from the following:    Height as of this encounter: 1.617 m (5' 3.66\").    Weight as of this encounter: 112.2 kg (247 lb 4.8 oz). Body surface area is 2.24 meters squared.  No Pain (0) Comment: Data Unavailable   Patient's last menstrual period was 01/14/2016 (approximate).  Allergies reviewed: Yes  Medications reviewed: Yes    Medications: Medication refills not needed today.  Pharmacy name entered into [facility]:    [facility] [place] - [place], [place] - [address] [facility] [place] - [place] - [address]  [facility] - [place], [place] - [address] [place] [address] DRUG STORE #[id number] - [place], [place] - [address] AT [address]    Clinical concerns:  none      Debbie Butt              "

## 2023-11-28 NOTE — LETTER
2023         RE: Bria Davis  [id number] [facility] [id number]        Dear Colleague,    Thank you for referring your patient, Bria Davis, to the [facility]. Please see a copy of my visit note below.    Referring Provider:  Arron Olson MD  [address] [facility] [address]  [place] [address]     Primary Care Provider:  Kaia Elena      RE: Bria Davis.  : 1960.  QUINTIN: 2023.    Reason for visit: Left breast wound    HPI: The patient was diagnosed with left-sided DCIS.  Underwent a lumpectomy in October of this year.  Unfortunately developed skin necrosis and also had positive margins.  Needed a reexcision in November of this year.  The wound was left open and allowed to heal in secondarily.  Has been sent to me for my recommendations for reconstruction of her left breast wound.  In addition to this the patient has been large breasted all of her adult life.  She wears a triple D to E cup.  Would like to be around a C cup ideally.  Last mammogram was in 2023.  She is diabetic and her last hemoglobin A1c was 7.9.  Ever smoked.    Unknown whether she needs radiation therapy.  She is seeing radiation oncology in the coming weeks.    Medical history:  Past Medical History:   Diagnosis Date    Arthritis     Asthma     Diabetes mellitus (H)     Esophageal reflux     Hypertension     Obese     Other chronic sinusitis     PONV (postoperative nausea and vomiting)        Surgical history:  Past Surgical History:   Procedure Laterality Date    BIOPSY  2023    BIOPSY BREAST NEEDLE LOCALIZATION Left 10/6/2023    Procedure: BIOPSY, LEFT BREAST, WITH NEEDLE LOCALIZATION;  Surgeon: Bg Bryant MD;  Location: [facility]    COLONOSCOPY  2002    COLONOSCOPY  10/26/2012    Procedure: COLONOSCOPY;  Colonoscopy;  Surgeon: Margret Sales MD;  Location: [facility]    COLONOSCOPY N/A 2019    Procedure: COLONOSCOPY, WITH  POLYPECTOMY AND BIOPSY;  Surgeon: Ariel Heck MD;  Location: [facility]    CV LEFT HEART CATH N/A 09/12/2019    Procedure: Left Heart Cath;  Surgeon: Mike Sanon MD;  Location:  HEART CARDIAC CATH LAB    CV LEFT VENTRICULOGRAM N/A 09/12/2019    Procedure: Left Ventriculogram;  Surgeon: Mike Sanon MD;  Location:  HEART CARDIAC CATH LAB    ENT SURGERY      ESOPHAGOSCOPY, GASTROSCOPY, DUODENOSCOPY (EGD), COMBINED N/A 11/08/2019    Procedure: ESOPHAGOGASTRODUODENOSCOPY, WITH BIOPSY;  Surgeon: Ariel Heck MD;  Location: [facility]    EYE SURGERY  2022    GYN SURGERY      Hydroablation 2007, polyp removal 2018    INJECT EPIDURAL LUMBAR  12/07/2010    INJECT EPIDURAL LUMBAR performed by GENERIC ANESTHESIA PROVIDER at [facility] OR    INJECT EPIDURAL LUMBAR  01/17/2011    INJECT EPIDURAL LUMBAR performed by GENERIC ANESTHESIA PROVIDER at [facility]    IRRIGATION AND DEBRIDEMENT BREAST Left 10/21/2023    Procedure: Irrigation and debridement breast;  Surgeon: Nemesio Arreola MD;  Location:  OR    left long finger pulley release Left 07/2020    LUMPECTOMY BREAST Left 11/13/2023    Procedure: Re-excision of left lumpectomy site;  Surgeon: Arron Olson MD;  Location: [facility] [place]    RELEASE CARPAL TUNNEL  06/19/2012    Procedure: RELEASE CARPAL TUNNEL;  Right Carpal Tunnel Release;  Surgeon: Mike Sparrow MD;  Location: [facility]    RELEASE CARPAL TUNNEL  11/09/2012    Procedure: RELEASE CARPAL TUNNEL;  Left Carpal Tunnel Release;  Surgeon: Mike Sparrow MD;  Location: [facility]    REPAIR TENDON ACHILLES Left 12/26/2019    Procedure: Left Foot: Achilles Tendon Repair/Remodel/Reattachment; calcaneal prominence removal;  Surgeon: Felix Watkins DPM;  Location: [facility]    SURGICAL HISTORY OF -   04/10/2000    bilateral total ethmoidectomies, bilateral maxillary antrostomies, bilateral SMR of inferior turbinates, reduction of lt turbinate porter bullosa       Family history:  Family History   Problem Relation Age of  Onset    Hypertension Mother     Diabetes Mother     Respiratory Mother         asthma-COPD    Hypertension Father     Heart Disease Father     Cerebrovascular Disease Father     Cardiovascular Father     Breast Cancer Maternal Grandmother     Cancer Brother     Diabetes Brother     Respiratory Brother         copd    Chronic Obstructive Pulmonary Disease Brother     Depression Sister     Respiratory Sister         copd    Chronic Obstructive Pulmonary Disease Sister     Depression Sister     Chronic Obstructive Pulmonary Disease Sister     Anxiety Disorder Sister     Depression Sister     Chronic Obstructive Pulmonary Disease Brother     Kidney failure Brother     Depression Brother     Asthma Brother     Diabetes Son     Anxiety Disorder Other     Obesity Other     Obesity Other        Medications:  Current Outpatient Medications   Medication Sig Dispense Refill    Ascorbic Acid (VITAMIN C) 500 MG CAPS       aspirin (ASA) 81 MG EC tablet Take 1 tablet (81 mg) by mouth daily 1 tablet 0    blood glucose (NO BRAND SPECIFIED) lancets standard Use to test blood sugar 4 times daily or as directed. 200 lancet 6    blood glucose (NO BRAND SPECIFIED) test strip Use to test blood sugar 4 times daily or as directed. 200 strip 6    blood glucose monitoring (NO BRAND SPECIFIED) meter device kit Use to test blood sugar 4 times daily or as directed. 1 kit 0    Calcium Carb-Cholecalciferol (CALCIUM-VITAMIN D) 600-400 MG-UNIT TABS Take 1 tablet by mouth daily      esomeprazole (NEXIUM) 40 MG DR capsule Take 1 capsule (40 mg) by mouth every morning (before breakfast) 90 capsule 3    ferrous sulfate 140 (45 Fe) MG TBCR CR tablet Take 140 mg by mouth daily      fluticasone-salmeterol (ADVAIR DISKUS) 100-50 MCG/ACT inhaler INHALE 1 PUFF INTO THE LUNGS 2 TIMES DAILY 180 each 3    gabapentin (NEURONTIN) 300 MG capsule Take 1 capsule (300 mg) by mouth 2 times daily 180 capsule 3    hydrochlorothiazide (HYDRODIURIL) 12.5 MG tablet Take 1  "tablet (12.5 mg) by mouth daily 90 tablet 1    insulin aspart (NOVOLOG FLEXPEN) 100 UNIT/ML pen 32 units before breakfast, 32 units before lunch, 32 units before dinner 90 mL 3    insulin glargine U-300 (TOUJEO MAX SOLOSTAR) 300 UNIT/ML (2 units dial) pen INJECT 100 UNITS UNDER THE SKIN DAILY 40.5 mL 1    insulin pen needle (BD PEDRO LUIS U/F) 32G X 4 MM miscellaneous USE 4 DAILY OR AS DIRECTED 400 each 1    losartan (COZAAR) 100 MG tablet Take 1 tablet (100 mg) by mouth daily 90 tablet 1    metFORMIN (GLUCOPHAGE XR) 500 MG 24 hr tablet Take 2 tablets (1,000 mg) by mouth 2 times daily (with meals) 360 tablet 1    metoclopramide (REGLAN) 10 MG tablet TAKE ONE TABLET BY MOUTH TWICE A  tablet 2    metoprolol succinate ER (TOPROL XL) 25 MG 24 hr tablet Take 1 tablet (25 mg) by mouth daily 90 tablet 3    Microlet Lancets MISC USE TO TEST BLOOD SUGAR 4 TIMES DAILY OR AS DIRECTED. 200 each 1    montelukast (SINGULAIR) 10 MG tablet TAKE 1 TABLET BY MOUTH AT BEDTIME 90 tablet 1    Multiple Vitamins-Minerals (MULTIVITAMIN ADULTS 50+) TABS Take 1 tablet by mouth daily      pseudoePHEDrine (SUDAFED) 30 MG tablet Take 2 tablets (60 mg) by mouth 2 times daily 120 tablet 3    Semaglutide, 2 MG/DOSE, (OZEMPIC, 2 MG/DOSE,) 8 MG/3ML pen Inject 2 mg Subcutaneous once a week 9 mL 1    sodium chloride, PF, 0.9% PF flush Irrigate with 10 mLs as directed 2 times daily 1200 mL 1    triamcinolone (KENALOG) 0.1 % external cream Apply topically 2 times daily 80 g 1       Allergies:  Allergies   Allergen Reactions    Lisinopril Cough       Social history:   Social History     Tobacco Use    Smoking status: Never    Smokeless tobacco: Never   Substance Use Topics    Alcohol use: No         Physical Examination:  /76 (BP Location: Right arm, Patient Position: Sitting, Cuff Size: Adult Large)   Pulse 89   Temp 98.3  F (36.8  C) (Oral)   Resp 12   Ht 1.617 m (5' 3.66\")   Wt 112.2 kg (247 lb 4.8 oz)   LMP 01/14/2016 (Approximate)   " SpO2 97%   BMI 42.90 kg/m    Body mass index is 42.9 kg/m .    General: No acute distress.    Left breast: There is approximately a 6 x 3 cm open wound that is granulating.  Not infected.  This is at the 9 o'clock position of the left breast.  The nipple is indented towards the medial aspect.  The left breast is smaller than the right breast.  Right breast has grade 3 ptosis.  Sternal notch to nipple distance under 40 cm.        ASSESMENT and PLAN:     Based upon the above findings, a diagnosis of left breast wound was made.  I had a krystle, detailed discussion with the patient, in the presence of my nurse, who was present from beginning to end.  I discussed with the patient the pathophysiology behind the problem, the concept behind the procedure/treatment proposed, expectations of the planned procedure(s), and all sherie-operative steps.     Based on the findings I discussed with the patient the options of leaving the wound alone and allowing it to heal in secondarily.  This would take months to ultimately heal.  It would lead to a significant indentation aesthetic compromise.  The other option would be to excise the wound and close it primarily.  This would at least get the wound to heal in quicker but again would have significant aesthetic compromise.  The next option would be to excise in a horizontal manner the wound itself and some of the breast tissue on the lateral aspect along with the nipple areolar complex thus giving her a better shaped breast mound but she would lose her nipple areolar complex.  The last option is to reconstruct the left breast and a reduction paten manner to try and salvage the nipple areolar complex and excised the wound and reconstructed with remaining tissue of the breast interval local flap fashion.  At the same time a reduction of the right side to give her symmetry could be done.  This would not only reconstruct her left breast and the most wholesome manner but also result in a  reduction that she was looking for  The Schnur scale is about 750 g.  We could potentially do that on the right side and give her around a C cup size.  I could not guarantee that I could salvage the left nipple but that would be the plan.    He would like to proceed with the last option which I think is reasonable.    Try and find her time as soon as possible.  In the meantime she will follow-up with radiation oncology to see if she needs radiation.  If she does might advice would be to radiate after the reduction paten reconstruction is completed and healed.    I also reiterated the importance of controlling her sugars.    All risks, benefits and alternatives, including but not limited to (what applies), pain, infection, bleeding, scarring, asymmetry, seromas, hematomas, wound breakdown, wound dehiscence, loss of the implants/flaps, abdominal wall-healing issues, abdominal wall weakness, bulges, hernias, sensation loss, requirement of further staged procedures, Implant specific issues and complications as discussed above, removal of infected or exposed implants, pneumothoraces, contour abnormalities, cannula injuries to deeper structures, hernias, fat necrosis, lumps and bumps, loss of grafted material, loss of nipple, requirement of a nipple graft/tattoo, asymmetries, requirement of further surgeries, DVT, PE, MI, CVA, pneumonia, renal failure and death, were explained. They were understood and agreed upon by the patient, they were acknowledged by the patient, all the patient's questions were answered in detail to the patient's fullest understanding that they acknowledged, the team approach for treatment in the operating room was agreed upon by the patient, and proceeding with surgery was agreed upon by the patient.    All questions were answered. The patient was happy with the visit. I look forward to helping the patient out in the near future as indicated.       Total time spent in the encounter today including  chart review, visit itself, and post-visit paperwork was 60 minutes.       Jules Rodriguez MD    Chief, Division of Plastic Surgery  Department of Surgery  [facility]      CC: Arron Olson MD  [address] [place] [address] [address]  [place] [address]  CC: Kaia Elena

## 2023-11-28 NOTE — PROGRESS NOTES
Referring Provider:  Arron Olson MD  [address] [facility] [address]  [place] [address]     Primary Care Provider:  Kaia Elena      RE: Bria Davis.  : 1960.  QUINTIN: 2023.    Reason for visit: Left breast wound    HPI: The patient was diagnosed with left-sided DCIS.  Underwent a lumpectomy in October of this year.  Unfortunately developed skin necrosis and also had positive margins.  Needed a reexcision in November of this year.  The wound was left open and allowed to heal in secondarily.  Has been sent to me for my recommendations for reconstruction of her left breast wound.  In addition to this the patient has been large breasted all of her adult life.  She wears a triple D to E cup.  Would like to be around a C cup ideally.  Last mammogram was in 2023.  She is diabetic and her last hemoglobin A1c was 7.9.  Ever smoked.    Unknown whether she needs radiation therapy.  She is seeing radiation oncology in the coming weeks.    Medical history:  Past Medical History:   Diagnosis Date    Arthritis     Asthma     Diabetes mellitus (H)     Esophageal reflux     Hypertension     Obese     Other chronic sinusitis     PONV (postoperative nausea and vomiting)        Surgical history:  Past Surgical History:   Procedure Laterality Date    BIOPSY  2023    BIOPSY BREAST NEEDLE LOCALIZATION Left 10/6/2023    Procedure: BIOPSY, LEFT BREAST, WITH NEEDLE LOCALIZATION;  Surgeon: Bg Bryant MD;  Location: [facility]    COLONOSCOPY  2002    COLONOSCOPY  10/26/2012    Procedure: COLONOSCOPY;  Colonoscopy;  Surgeon: Margret Sales MD;  Location: [facility]    COLONOSCOPY N/A 2019    Procedure: COLONOSCOPY, WITH POLYPECTOMY AND BIOPSY;  Surgeon: Ariel Heck MD;  Location: [facility]    CV LEFT HEART CATH N/A 2019    Procedure: Left Heart Cath;  Surgeon: Mike Sanon MD;  Location:  HEART CARDIAC CATH LAB    CV LEFT VENTRICULOGRAM N/A 2019    Procedure: Left  Ventriculogram;  Surgeon: Mike Sanon MD;  Location:  HEART CARDIAC CATH LAB    ENT SURGERY      ESOPHAGOSCOPY, GASTROSCOPY, DUODENOSCOPY (EGD), COMBINED N/A 11/08/2019    Procedure: ESOPHAGOGASTRODUODENOSCOPY, WITH BIOPSY;  Surgeon: Ariel Heck MD;  Location: [facility]    EYE SURGERY  2022    GYN SURGERY      Hydroablation 2007, polyp removal 2018    INJECT EPIDURAL LUMBAR  12/07/2010    INJECT EPIDURAL LUMBAR performed by GENERIC ANESTHESIA PROVIDER at [facility]    INJECT EPIDURAL LUMBAR  01/17/2011    INJECT EPIDURAL LUMBAR performed by GENERIC ANESTHESIA PROVIDER at [facility]    IRRIGATION AND DEBRIDEMENT BREAST Left 10/21/2023    Procedure: Irrigation and debridement breast;  Surgeon: Nemesio Arreola MD;  Location: [facility] OR    left long finger pulley release Left 07/2020    LUMPECTOMY BREAST Left 11/13/2023    Procedure: Re-excision of left lumpectomy site;  Surgeon: Arron Olson MD;  Location: [facility]    RELEASE CARPAL TUNNEL  06/19/2012    Procedure: RELEASE CARPAL TUNNEL;  Right Carpal Tunnel Release;  Surgeon: Mike Sparrow MD;  Location: [facility]    RELEASE CARPAL TUNNEL  11/09/2012    Procedure: RELEASE CARPAL TUNNEL;  Left Carpal Tunnel Release;  Surgeon: Mike Sparrow MD;  Location: [facility]    REPAIR TENDON ACHILLES Left 12/26/2019    Procedure: Left Foot: Achilles Tendon Repair/Remodel/Reattachment; calcaneal prominence removal;  Surgeon: Felix Watkins DPM;  Location: [facility]    SURGICAL HISTORY OF -   04/10/2000    bilateral total ethmoidectomies, bilateral maxillary antrostomies, bilateral SMR of inferior turbinates, reduction of lt turbinate porter bullosa       Family history:  Family History   Problem Relation Age of Onset    Hypertension Mother     Diabetes Mother     Respiratory Mother         asthma-COPD    Hypertension Father     Heart Disease Father     Cerebrovascular Disease Father     Cardiovascular Father     Breast Cancer Maternal Grandmother     Cancer Brother     Diabetes  Brother     Respiratory Brother         copd    Chronic Obstructive Pulmonary Disease Brother     Depression Sister     Respiratory Sister         copd    Chronic Obstructive Pulmonary Disease Sister     Depression Sister     Chronic Obstructive Pulmonary Disease Sister     Anxiety Disorder Sister     Depression Sister     Chronic Obstructive Pulmonary Disease Brother     Kidney failure Brother     Depression Brother     Asthma Brother     Diabetes Son     Anxiety Disorder Other     Obesity Other     Obesity Other        Medications:  Current Outpatient Medications   Medication Sig Dispense Refill    Ascorbic Acid (VITAMIN C) 500 MG CAPS       aspirin (ASA) 81 MG EC tablet Take 1 tablet (81 mg) by mouth daily 1 tablet 0    blood glucose (NO BRAND SPECIFIED) lancets standard Use to test blood sugar 4 times daily or as directed. 200 lancet 6    blood glucose (NO BRAND SPECIFIED) test strip Use to test blood sugar 4 times daily or as directed. 200 strip 6    blood glucose monitoring (NO BRAND SPECIFIED) meter device kit Use to test blood sugar 4 times daily or as directed. 1 kit 0    Calcium Carb-Cholecalciferol (CALCIUM-VITAMIN D) 600-400 MG-UNIT TABS Take 1 tablet by mouth daily      esomeprazole (NEXIUM) 40 MG DR capsule Take 1 capsule (40 mg) by mouth every morning (before breakfast) 90 capsule 3    ferrous sulfate 140 (45 Fe) MG TBCR CR tablet Take 140 mg by mouth daily      fluticasone-salmeterol (ADVAIR DISKUS) 100-50 MCG/ACT inhaler INHALE 1 PUFF INTO THE LUNGS 2 TIMES DAILY 180 each 3    gabapentin (NEURONTIN) 300 MG capsule Take 1 capsule (300 mg) by mouth 2 times daily 180 capsule 3    hydrochlorothiazide (HYDRODIURIL) 12.5 MG tablet Take 1 tablet (12.5 mg) by mouth daily 90 tablet 1    insulin aspart (NOVOLOG FLEXPEN) 100 UNIT/ML pen 32 units before breakfast, 32 units before lunch, 32 units before dinner 90 mL 3    insulin glargine U-300 (TOUJEO MAX SOLOSTAR) 300 UNIT/ML (2 units dial) pen INJECT 100 UNITS  "UNDER THE SKIN DAILY 40.5 mL 1    insulin pen needle (BD PEDRO LUIS U/F) 32G X 4 MM miscellaneous USE 4 DAILY OR AS DIRECTED 400 each 1    losartan (COZAAR) 100 MG tablet Take 1 tablet (100 mg) by mouth daily 90 tablet 1    metFORMIN (GLUCOPHAGE XR) 500 MG 24 hr tablet Take 2 tablets (1,000 mg) by mouth 2 times daily (with meals) 360 tablet 1    metoclopramide (REGLAN) 10 MG tablet TAKE ONE TABLET BY MOUTH TWICE A  tablet 2    metoprolol succinate ER (TOPROL XL) 25 MG 24 hr tablet Take 1 tablet (25 mg) by mouth daily 90 tablet 3    Microlet Lancets MISC USE TO TEST BLOOD SUGAR 4 TIMES DAILY OR AS DIRECTED. 200 each 1    montelukast (SINGULAIR) 10 MG tablet TAKE 1 TABLET BY MOUTH AT BEDTIME 90 tablet 1    Multiple Vitamins-Minerals (MULTIVITAMIN ADULTS 50+) TABS Take 1 tablet by mouth daily      pseudoePHEDrine (SUDAFED) 30 MG tablet Take 2 tablets (60 mg) by mouth 2 times daily 120 tablet 3    Semaglutide, 2 MG/DOSE, (OZEMPIC, 2 MG/DOSE,) 8 MG/3ML pen Inject 2 mg Subcutaneous once a week 9 mL 1    sodium chloride, PF, 0.9% PF flush Irrigate with 10 mLs as directed 2 times daily 1200 mL 1    triamcinolone (KENALOG) 0.1 % external cream Apply topically 2 times daily 80 g 1       Allergies:  Allergies   Allergen Reactions    Lisinopril Cough       Social history:   Social History     Tobacco Use    Smoking status: Never    Smokeless tobacco: Never   Substance Use Topics    Alcohol use: No         Physical Examination:  /76 (BP Location: Right arm, Patient Position: Sitting, Cuff Size: Adult Large)   Pulse 89   Temp 98.3  F (36.8  C) (Oral)   Resp 12   Ht 1.617 m (5' 3.66\")   Wt 112.2 kg (247 lb 4.8 oz)   LMP 01/14/2016 (Approximate)   SpO2 97%   BMI 42.90 kg/m    Body mass index is 42.9 kg/m .    General: No acute distress.    Left breast: There is approximately a 6 x 3 cm open wound that is granulating.  Not infected.  This is at the 9 o'clock position of the left breast.  The nipple is indented towards " the medial aspect.  The left breast is smaller than the right breast.  Right breast has grade 3 ptosis.  Sternal notch to nipple distance under 40 cm.        ASSESMENT and PLAN:     Based upon the above findings, a diagnosis of left breast wound was made.  I had a krystle, detailed discussion with the patient, in the presence of my nurse, who was present from beginning to end.  I discussed with the patient the pathophysiology behind the problem, the concept behind the procedure/treatment proposed, expectations of the planned procedure(s), and all sherie-operative steps.     Based on the findings I discussed with the patient the options of leaving the wound alone and allowing it to heal in secondarily.  This would take months to ultimately heal.  It would lead to a significant indentation aesthetic compromise.  The other option would be to excise the wound and close it primarily.  This would at least get the wound to heal in quicker but again would have significant aesthetic compromise.  The next option would be to excise in a horizontal manner the wound itself and some of the breast tissue on the lateral aspect along with the nipple areolar complex thus giving her a better shaped breast mound but she would lose her nipple areolar complex.  The last option is to reconstruct the left breast and a reduction paten manner to try and salvage the nipple areolar complex and excised the wound and reconstructed with remaining tissue of the breast interval local flap fashion.  At the same time a reduction of the right side to give her symmetry could be done.  This would not only reconstruct her left breast and the most wholesome manner but also result in a reduction that she was looking for  The Schnur scale is about 750 g.  We could potentially do that on the right side and give her around a C cup size.  I could not guarantee that I could salvage the left nipple but that would be the plan.    He would like to proceed with the  last option which I think is reasonable.    Try and find her time as soon as possible.  In the meantime she will follow-up with radiation oncology to see if she needs radiation.  If she does might advice would be to radiate after the reduction paten reconstruction is completed and healed.    I also reiterated the importance of controlling her sugars.    All risks, benefits and alternatives, including but not limited to (what applies), pain, infection, bleeding, scarring, asymmetry, seromas, hematomas, wound breakdown, wound dehiscence, loss of the implants/flaps, abdominal wall-healing issues, abdominal wall weakness, bulges, hernias, sensation loss, requirement of further staged procedures, Implant specific issues and complications as discussed above, removal of infected or exposed implants, pneumothoraces, contour abnormalities, cannula injuries to deeper structures, hernias, fat necrosis, lumps and bumps, loss of grafted material, loss of nipple, requirement of a nipple graft/tattoo, asymmetries, requirement of further surgeries, DVT, PE, MI, CVA, pneumonia, renal failure and death, were explained. They were understood and agreed upon by the patient, they were acknowledged by the patient, all the patient's questions were answered in detail to the patient's fullest understanding that they acknowledged, the team approach for treatment in the operating room was agreed upon by the patient, and proceeding with surgery was agreed upon by the patient.    All questions were answered. The patient was happy with the visit. I look forward to helping the patient out in the near future as indicated.       Total time spent in the encounter today including chart review, visit itself, and post-visit paperwork was 60 minutes.       Jules Rodriguez MD    Chief, Division of Plastic Surgery  Department of Surgery  [facility]      CC: Arron Olson MD  [address] [place] [address] [address]  [place]  [address]  CC: Kaia Elena

## 2023-11-29 ENCOUNTER — TELEPHONE (OUTPATIENT)
Dept: PLASTIC SURGERY | Facility: CLINIC | Age: 63
End: 2023-11-29
Payer: COMMERCIAL

## 2023-11-29 ENCOUNTER — HOSPITAL ENCOUNTER (OUTPATIENT)
Facility: CLINIC | Age: 63
End: 2023-11-29
Attending: PLASTIC SURGERY | Admitting: PLASTIC SURGERY
Payer: COMMERCIAL

## 2023-11-29 NOTE — TELEPHONE ENCOUNTER
RN Care Coordinator: Dorys Fuller    Surgery is scheduled with Dr. Rodriguez  Date: 12/4   Location: [facility]      H&P to be completed by: PAC clinic - scheduled on 11/30 as a video visit     Post-op: 12/12 with Dr. Rodriguez, [facility]      Patient will receive surgery arrival and start time from PAC. Patient is aware that if times change after they see PAC, they will receive a call from the pre-admission nurses 1-2 days prior to surgery with updated arrival time and NPO instructions. Approximate arrival time/surgery time given to patient: 8 AM. Patient aware times are subject to change up until day before surgery.         Spoke with the patient and was able to confirm all scheduled information.       Patient questions/concerns: N/A       Surgery packet: was sent via .com    __    Angelina Fair, Senior Perioperative Coordinator, on 11/29/2023 at 4:21 PM  P: [phone]

## 2023-11-29 NOTE — TELEPHONE ENCOUNTER
FUTURE VISIT INFORMATION      SURGERY INFORMATION:  Date: 12/4/23  Location: [facility] OR  Surgeon:  DELORIS Rodriguez MD  Anesthesia Type:  General with Block  Procedure: Left breast reconstruction with local flaps and right breast reduction for symmetry  Consult: 11/28/23    RECORDS REQUESTED FROM:       Primary Care Provider: Kaia Elena MD - [facility]     Pertinent Medical History: breats cancer; CKD stage 2; Essential hypertension; Diabetes mellitus type 2 with long-term use of insulin: Hyperlipidemia; mild asthma; Ischemic vascular disease     Most recent EKG+ Tracing: 10/20/23    Most recent ECHO: 9/17/19    Most recent Cardiac Stress Test:  8/14/19    Most recent Coronary Angiogram: 9/12/19    Most recent PFT's: 6/8/22

## 2023-11-30 ENCOUNTER — VIRTUAL VISIT (OUTPATIENT)
Dept: SURGERY | Facility: CLINIC | Age: 63
End: 2023-11-30
Payer: COMMERCIAL

## 2023-11-30 ENCOUNTER — PRE VISIT (OUTPATIENT)
Dept: SURGERY | Facility: CLINIC | Age: 63
End: 2023-11-30

## 2023-11-30 ENCOUNTER — PATIENT OUTREACH (OUTPATIENT)
Dept: PLASTIC SURGERY | Facility: CLINIC | Age: 63
End: 2023-11-30

## 2023-11-30 ENCOUNTER — ANESTHESIA EVENT (OUTPATIENT)
Dept: SURGERY | Facility: AMBULATORY SURGERY CENTER | Age: 63
End: 2023-11-30
Payer: COMMERCIAL

## 2023-11-30 DIAGNOSIS — Z01.818 PREOP EXAMINATION: Primary | ICD-10-CM

## 2023-11-30 DIAGNOSIS — N61.1 BREAST ABSCESS: ICD-10-CM

## 2023-11-30 DIAGNOSIS — D05.12 DUCTAL CARCINOMA IN SITU (DCIS) OF LEFT BREAST: ICD-10-CM

## 2023-11-30 PROCEDURE — 99204 OFFICE O/P NEW MOD 45 MIN: CPT | Mod: 24 | Performed by: CLINICAL NURSE SPECIALIST

## 2023-11-30 ASSESSMENT — ENCOUNTER SYMPTOMS
DYSRHYTHMIAS: 1
SEIZURES: 0

## 2023-11-30 ASSESSMENT — LIFESTYLE VARIABLES: TOBACCO_USE: 0

## 2023-11-30 NOTE — H&P
Pre-Operative H & P     CC:  Preoperative exam to assess for increased cardiopulmonary risk while undergoing surgery and anesthesia.    Date of Encounter: 11/30/2023  Primary Care Physician:  Kaia Elena     Reason for visit:   Encounter Diagnoses   Name Primary?    Preop examination Yes    Breast abscess     Ductal carcinoma in situ (DCIS) of left breast        HPI  Bria Davis is a 63 year old female who presents for pre-operative H & P in preparation for  Procedure Information       Case: [id number] Date/Time: 12/07/23 0845    Procedure: Left breast reconstruction with local flaps and right breast reduction for symmetry (Bilateral: Breast)    Anesthesia type: General with Block    Diagnosis:       Ductal carcinoma in situ (DCIS) of left breast [D05.12]      Breast abscess [N61.1]    Pre-op diagnosis:       Ductal carcinoma in situ (DCIS) of left breast [D05.12]      Breast abscess [N61.1]    Location: [place] [address] / [facility]    Providers: DELORIS Rodriguez MD          History is obtained from the patient and chart review    Patient with history of DCIS of left breast. She is s/p lumpectomy in 10/2023. Unfortunately she developed skin necrosis, and also had positive margins. She underwent a reexcision in 11/2023. The wound was then left open and allowed to heal by secondary intention. She has recently been evaluated by Dr. Rodriguez in consideration for reconstruction of her left breast wound and she desired breast reduction. She has been counseled for above procedures.     Her history is otherwise significant for obesity, HYPERTENSION, allergic rhinitis, asthma, GERD, DIABETES MELLITUS, CKD, RLS, and OA. She was recently diagnosed with fibromyalgia and started on gabapentin.     Hx of abnormal bleeding or anti-platelet use: ASA 81 mg daily    Menstrual history: Patient's last menstrual period was 01/14/2016 (approximate).     Past Medical History  Past  Medical History:   Diagnosis Date    Arthritis     Asthma     Chronic kidney disease     Diabetes mellitus (H)     Ductal carcinoma in situ (DCIS) of left breast     Esophageal reflux     Fibromyalgia     Hypertension     Obese     Other chronic sinusitis     PONV (postoperative nausea and vomiting)     Restless legs syndrome (RLS)        Past Surgical History  Past Surgical History:   Procedure Laterality Date    BIOPSY  9/7/2023    BIOPSY BREAST NEEDLE LOCALIZATION Left 10/6/2023    Procedure: BIOPSY, LEFT BREAST, WITH NEEDLE LOCALIZATION;  Surgeon: Bg Bryant MD;  Location: [facility]    COLONOSCOPY  01/31/2002    COLONOSCOPY  10/26/2012    Procedure: COLONOSCOPY;  Colonoscopy;  Surgeon: Margret Sales MD;  Location: [facility]    COLONOSCOPY N/A 11/08/2019    Procedure: COLONOSCOPY, WITH POLYPECTOMY AND BIOPSY;  Surgeon: Ariel Heck MD;  Location: [facility]    CV LEFT HEART CATH N/A 09/12/2019    Procedure: Left Heart Cath;  Surgeon: Mike Sanon MD;  Location: [facility] CARDIAC CATH LAB    CV LEFT VENTRICULOGRAM N/A 09/12/2019    Procedure: Left Ventriculogram;  Surgeon: Mike Sanon MD;  Location:  HEART CARDIAC CATH LAB    ENT SURGERY      ESOPHAGOSCOPY, GASTROSCOPY, DUODENOSCOPY (EGD), COMBINED N/A 11/08/2019    Procedure: ESOPHAGOGASTRODUODENOSCOPY, WITH BIOPSY;  Surgeon: Ariel Heck MD;  Location: [facility]    EYE SURGERY  2022    GYN SURGERY      Hydroablation 2007, polyp removal 2018    INJECT EPIDURAL LUMBAR  12/07/2010    INJECT EPIDURAL LUMBAR performed by GENERIC ANESTHESIA PROVIDER at [facility] OR    INJECT EPIDURAL LUMBAR  01/17/2011    INJECT EPIDURAL LUMBAR performed by GENERIC ANESTHESIA PROVIDER at [facility] OR    IRRIGATION AND DEBRIDEMENT BREAST Left 10/21/2023    Procedure: Irrigation and debridement breast;  Surgeon: Nemesio Arreola MD;  Location: [facility] OR    left long finger pulley release Left 07/2020    LUMPECTOMY BREAST Left 11/13/2023    Procedure: Re-excision of left lumpectomy  site;  Surgeon: Arron Olson MD;  Location: [facility] OR    RELEASE CARPAL TUNNEL  06/19/2012    Procedure: RELEASE CARPAL TUNNEL;  Right Carpal Tunnel Release;  Surgeon: Mike Sparrow MD;  Location: [facility]    RELEASE CARPAL TUNNEL  11/09/2012    Procedure: RELEASE CARPAL TUNNEL;  Left Carpal Tunnel Release;  Surgeon: Mike Sparrow MD;  Location: [facility]    REPAIR TENDON ACHILLES Left 12/26/2019    Procedure: Left Foot: Achilles Tendon Repair/Remodel/Reattachment; calcaneal prominence removal;  Surgeon: Felix Watkins DPM;  Location: [facility]    SURGICAL HISTORY OF -   04/10/2000    bilateral total ethmoidectomies, bilateral maxillary antrostomies, bilateral SMR of inferior turbinates, reduction of lt turbinate porter bullosa       Prior to Admission Medications  Current Outpatient Medications   Medication Sig Dispense Refill    Ascorbic Acid (VITAMIN C) 500 MG CAPS Take 1 tablet by mouth every evening      aspirin (ASA) 81 MG EC tablet Take 81 mg by mouth every evening 1 tablet 0    Calcium Carb-Cholecalciferol (CALCIUM-VITAMIN D) 600-400 MG-UNIT TABS Take 1 tablet by mouth every evening      esomeprazole (NEXIUM) 40 MG DR capsule Take 1 capsule (40 mg) by mouth every morning (before breakfast) 90 capsule 3    ferrous sulfate 140 (45 Fe) MG TBCR CR tablet Take 140 mg by mouth 2 times daily      fluticasone-salmeterol (ADVAIR DISKUS) 100-50 MCG/ACT inhaler INHALE 1 PUFF INTO THE LUNGS 2 TIMES DAILY (Patient taking differently: 1 puff 2 times daily INHALE 1 PUFF INTO THE LUNGS 2 TIMES DAILY) 180 each 3    gabapentin (NEURONTIN) 300 MG capsule Take 1 capsule (300 mg) by mouth 2 times daily 180 capsule 3    hydrochlorothiazide (HYDRODIURIL) 12.5 MG tablet Take 1 tablet (12.5 mg) by mouth daily (Patient taking differently: Take 12.5 mg by mouth every morning) 90 tablet 1    insulin aspart (NOVOLOG FLEXPEN) 100 UNIT/ML pen 32 units before breakfast, 32 units before lunch, 32 units before dinner (Patient taking  differently: 3 times daily (with meals) 32 units before breakfast, 32 units before lunch, 32 units before dinner) 90 mL 3    insulin glargine U-300 (TOUJEO MAX SOLOSTAR) 300 UNIT/ML (2 units dial) pen INJECT 100 UNITS UNDER THE SKIN DAILY (Patient taking differently: 100 Units every morning INJECT 100 UNITS UNDER THE SKIN DAILY) 40.5 mL 1    losartan (COZAAR) 100 MG tablet Take 1 tablet (100 mg) by mouth daily (Patient taking differently: Take 100 mg by mouth every morning) 90 tablet 1    metFORMIN (GLUCOPHAGE XR) 500 MG 24 hr tablet Take 2 tablets (1,000 mg) by mouth 2 times daily (with meals) 360 tablet 1    metoclopramide (REGLAN) 10 MG tablet TAKE ONE TABLET BY MOUTH TWICE A  tablet 2    metoprolol succinate ER (TOPROL XL) 25 MG 24 hr tablet Take 1 tablet (25 mg) by mouth daily (Patient taking differently: Take 25 mg by mouth every morning) 90 tablet 3    montelukast (SINGULAIR) 10 MG tablet TAKE 1 TABLET BY MOUTH AT BEDTIME (Patient taking differently: Take 10 mg by mouth at bedtime) 90 tablet 1    Multiple Vitamins-Minerals (MULTIVITAMIN ADULTS 50+) TABS Take 1 tablet by mouth every morning      pseudoePHEDrine (SUDAFED) 30 MG tablet Take 2 tablets (60 mg) by mouth 2 times daily 120 tablet 3    Semaglutide, 2 MG/DOSE, (OZEMPIC, 2 MG/DOSE,) 8 MG/3ML pen Inject 2 mg Subcutaneous once a week (Patient taking differently: Inject 2 mg Subcutaneous every 7 days Wed) 9 mL 1    blood glucose (NO BRAND SPECIFIED) lancets standard Use to test blood sugar 4 times daily or as directed. 200 lancet 6    blood glucose (NO BRAND SPECIFIED) test strip Use to test blood sugar 4 times daily or as directed. 200 strip 6    blood glucose monitoring (NO BRAND SPECIFIED) meter device kit Use to test blood sugar 4 times daily or as directed. 1 kit 0    insulin pen needle (BD PEDRO LUIS U/F) 32G X 4 MM miscellaneous USE 4 DAILY OR AS DIRECTED 400 each 1    Microlet Lancets MISC USE TO TEST BLOOD SUGAR 4 TIMES DAILY OR AS DIRECTED. 200  each 1    sodium chloride, PF, 0.9% PF flush Irrigate with 10 mLs as directed 2 times daily 1200 mL 1    triamcinolone (KENALOG) 0.1 % external cream Apply topically 2 times daily 80 g 1       Allergies  Allergies   Allergen Reactions    Lisinopril Cough       Social History  Social History     Socioeconomic History    Marital status:      Spouse name: Not on file    Number of children: Not on file    Years of education: Not on file    Highest education level: Not on file   Occupational History    Not on file   Tobacco Use    Smoking status: Never    Smokeless tobacco: Never   Vaping Use    Vaping Use: Never used   Substance and Sexual Activity    Alcohol use: Not Currently    Drug use: No    Sexual activity: Yes     Partners: Male     Birth control/protection: Male Surgical     Comment:  vasectomy   Other Topics Concern    Parent/sibling w/ CABG, MI or angioplasty before 65F 55M? Yes   Social History Narrative    Not on file     Social Determinants of Health     Financial Resource Strain: Low Risk  (9/21/2023)    Financial Resource Strain     Within the past 12 months, have you or your family members you live with been unable to get utilities (heat, electricity) when it was really needed?: No   Food Insecurity: Low Risk  (9/21/2023)    Food Insecurity     Within the past 12 months, did you worry that your food would run out before you got money to buy more?: No     Within the past 12 months, did the food you bought just not last and you didn t have money to get more?: No   Transportation Needs: Low Risk  (9/21/2023)    Transportation Needs     Within the past 12 months, has lack of transportation kept you from medical appointments, getting your medicines, non-medical meetings or appointments, work, or from getting things that you need?: No   Physical Activity: Not on file   Stress: Not on file   Social Connections: Not on file   Interpersonal Safety: Not on file   Housing Stability: Low Risk   (9/21/2023)    Housing Stability     Do you have housing? : Yes     Are you worried about losing your housing?: No       Family History  Family History   Problem Relation Age of Onset    Hypertension Mother     Diabetes Mother     Respiratory Mother         asthma-COPD    Hypertension Father     Heart Disease Father     Cerebrovascular Disease Father     Cardiovascular Father     Depression Sister     Respiratory Sister         copd    Chronic Obstructive Pulmonary Disease Sister     Depression Sister     Chronic Obstructive Pulmonary Disease Sister     Anxiety Disorder Sister     Depression Sister     Cancer Brother     Diabetes Brother     Respiratory Brother         copd    Chronic Obstructive Pulmonary Disease Brother     Chronic Obstructive Pulmonary Disease Brother     Kidney failure Brother     Depression Brother     Asthma Brother     Breast Cancer Maternal Grandmother     Diabetes Son     Anxiety Disorder Other     Obesity Other     Obesity Other     Anesthesia Reaction No family hx of     Clotting Disorder No family hx of        Review of Systems  The complete review of systems is negative other than noted in the HPI or here.   Anesthesia Evaluation   Pt has had prior anesthetic. Type: General and MAC.    History of anesthetic complications  - PONV.  Nausea mostly but no issues with last surgery.    ROS/MED HX  ENT/Pulmonary:     (+)     ALTA risk factors,  hypertension, obese,            Intermittent, asthma Last exacerbation: No recent exacerbation or use of albuterol,  Treatment: Inhaled steroids and Inhaler prn,              (-) tobacco use and recent URI   Neurologic: Comment: Lumbar radiculopathy  Recent diagnosis of fibromyalgia   (-) no seizures and no CVA   Cardiovascular: Comment: Nonobstructive CAD    (+)  hypertension-range: 130s/80s in clinic/ -  CAD -  - -   Taking blood thinners Pt has not received instructions: Instructions Given to patient: Planned 7 day hold for surgery.                    dysrhythmias, 2nd Deg Heart Block,        Previous cardiac testing   Echo: Date:  Results:    Stress Test:  Date: Results:    ECG Reviewed:  Date: 10/21/23 Results:  EKG: 10/21/23 Sinus rhythm with 2nd degree A-V block (Mobitz I)   Right bundle branch block   Left anterior fascicular block    Bifascicular block     EK22 Sinus  Rhythm   -Right bundle branch block with left axis -bifascicular block.      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 Normal glucose range: erratic recently ,        Obesity,       Psychiatric/Substance Use:     (+) psychiatric history anxiety       Infectious Disease:  - neg infectious disease ROS     Malignancy:   (+) Malignancy, History of Breast.Breast CA Remission status post Surgery.      Other:  - neg other ROS    (+)  , H/O Chronic Pain,         Virtual visit -  No vitals were obtained    Physical Exam  Constitutional: Awake, alert, no apparent distress, and appears stated age.  HENT: Normocephalic  Respiratory: non labored breathing; no cough   Neurologic: Oriented to name, place and time.   Neuropsychiatric: Calm, cooperative. Normal affect.      Prior Labs/Diagnostic Studies   All labs and imaging personally reviewed   Lab Results   Component Value Date    WBC 8.8 10/21/2023    WBC 8.4 2019     Lab Results   Component Value Date    RBC 4.37 10/21/2023    RBC 5.26 2019     Lab Results    Component Value Date    HGB 12.9 10/21/2023    HGB 13.1 12/09/2020     Lab Results   Component Value Date    HCT 38.7 10/21/2023    HCT 42.8 12/19/2019     Lab Results   Component Value Date    MCV 89 10/21/2023    MCV 81 12/19/2019     Lab Results   Component Value Date    MCH 29.5 10/21/2023    MCH 25.5 12/19/2019     Lab Results   Component Value Date    MCHC 33.3 10/21/2023    MCHC 31.3 12/19/2019     Lab Results   Component Value Date    RDW 13.2 10/21/2023    RDW 22.0 12/19/2019     Lab Results   Component Value Date     10/21/2023     12/19/2019     Last Comprehensive Metabolic Panel:  Sodium   Date Value Ref Range Status   10/21/2023 141 135 - 145 mmol/L Final     Comment:     Reference intervals for this test were updated on 09/26/2023 to more accurately reflect our healthy population. There may be differences in the flagging of prior results with similar values performed with this method. Interpretation of those prior results can be made in the context of the updated reference intervals.    04/14/2021 137 133 - 144 mmol/L Final     Potassium   Date Value Ref Range Status   10/21/2023 3.9 3.4 - 5.3 mmol/L Final   01/13/2022 4.1 3.4 - 5.3 mmol/L Final   04/14/2021 4.3 3.4 - 5.3 mmol/L Final     Chloride   Date Value Ref Range Status   10/21/2023 105 98 - 107 mmol/L Final   01/13/2022 106 94 - 109 mmol/L Final   04/14/2021 103 94 - 109 mmol/L Final     Carbon Dioxide   Date Value Ref Range Status   04/14/2021 30 20 - 32 mmol/L Final     Carbon Dioxide (CO2)   Date Value Ref Range Status   10/21/2023 24 22 - 29 mmol/L Final   01/13/2022 25 20 - 32 mmol/L Final     Anion Gap   Date Value Ref Range Status   10/21/2023 12 7 - 15 mmol/L Final   01/13/2022 8 3 - 14 mmol/L Final   04/14/2021 4 3 - 14 mmol/L Final     Glucose   Date Value Ref Range Status   01/13/2022 215 (H) 70 - 99 mg/dL Final   04/14/2021 93 70 - 99 mg/dL Final     Comment:     Fasting specimen     GLUCOSE BY METER POCT   Date  Value Ref Range Status   11/13/2023 225 (H) 70 - 99 mg/dL Final     Urea Nitrogen   Date Value Ref Range Status   10/21/2023 7.7 (L) 8.0 - 23.0 mg/dL Final   01/13/2022 13 7 - 30 mg/dL Final   04/14/2021 11 7 - 30 mg/dL Final     Creatinine   Date Value Ref Range Status   10/21/2023 0.47 (L) 0.51 - 0.95 mg/dL Final   04/14/2021 0.62 0.52 - 1.04 mg/dL Final     GFR Estimate   Date Value Ref Range Status   10/21/2023 >90 >60 mL/min/1.73m2 Final   04/14/2021 >90 >60 mL/min/[1.73_m2] Final     Comment:     Non  GFR Calc  Starting 12/18/2018, serum creatinine based estimated GFR (eGFR) will be   calculated using the Chronic Kidney Disease Epidemiology Collaboration   (CKD-EPI) equation.       Calcium   Date Value Ref Range Status   10/21/2023 9.1 8.8 - 10.2 mg/dL Final   04/14/2021 9.3 8.5 - 10.1 mg/dL Final     Bilirubin Total   Date Value Ref Range Status   10/20/2023 0.4 <=1.2 mg/dL Final   04/14/2021 0.4 0.2 - 1.3 mg/dL Final     Alkaline Phosphatase   Date Value Ref Range Status   10/20/2023 65 35 - 104 U/L Final   04/14/2021 65 40 - 150 U/L Final     ALT   Date Value Ref Range Status   10/20/2023 14 0 - 50 U/L Final     Comment:     Reference intervals for this test were updated on 6/12/2023 to more accurately reflect our healthy population. There may be differences in the flagging of prior results with similar values performed with this method. Interpretation of those prior results can be made in the context of the updated reference intervals.     04/14/2021 28 0 - 50 U/L Final     AST   Date Value Ref Range Status   10/20/2023 21 0 - 45 U/L Final     Comment:     Reference intervals for this test were updated on 6/12/2023 to more accurately reflect our healthy population. There may be differences in the flagging of prior results with similar values performed with this method. Interpretation of those prior results can be made in the context of the updated reference intervals.   04/14/2021 16 0 - 45  U/L Final     EKG: 10/21/23 Sinus rhythm with 2nd degree A-V block (Mobitz I)   Right bundle branch block   Left anterior fascicular block    Bifascicular block     EK22 Sinus  Rhythm   -Right bundle branch block with left axis -bifascicular block.    Echocardiogram 2019    Interpretation Summary     The visual ejection fraction is estimated at 60-65%.  There is mild to moderate concentric left ventricular hypertrophy.  There is no pericardial effusion.  Normal left ventricular wall motion.  Technically difficult study.  Hypertension.    Cardiac catheterization     Left ventricular filling pressures are mildly elevated .  Mid LAD lesion is 10% stenosed.  Prox RCA lesion is 10% stenosed.  The ejection fraction is greater than 55% by visual estimate.     1. Trivial CAD  HTN and mild elevated LVEDP. Additional BP management recommended  3. Normal LV EF but there may be a gradient on pullback to Aorta. Echocardiogram above          Latest Ref Rng & Units 2022     7:42 AM   PFT   FVC L 3.28    FEV1 L 2.64    FVC% % 104    FEV1% % 106      The patient's records and results personally reviewed by this provider.     Outside records reviewed from: Care Everywhere      Assessment    Bria Davis is a 63 year old female seen as a PAC referral for risk assessment and optimization for anesthesia.    Plan/Recommendations  Pt will be optimized for the proposed procedure.  See below for details on the assessment, risk, and preoperative recommendations    NEUROLOGY  - No history of TIA, CVA or seizure  - Chronic Pain-recent all over body pain with evaluation and diagnosis of fibromyalgia. Gabapentin was initiated with improvement and she will take on DOS  -Post Op delirium risk factors:  High co-morbid index    ENT  - No current airway concerns.  Will need to be reassessed day of surgery.  Mallampati: Unable to assess  TM: Unable to assess  Lower right side implant    CARDIAC  HYPERTENSION. Will hold losartan  "and hydrochlorothiazide, but take metoprolol on DOS. Recently higher readings. Nonobstructive, trivial CAD on cath during evaluation during time of anemia. ASA 81 mg daily with planned 7 day hold for surgery. Echocardiogram above. Admission on 10/21/23 for breast abscess. At one point MD was called to bedside with concern for hypertension(185/101) w/sudden drop in HR to 45 from baseline of 60s/70s. She reported being nervous and that she didn't \"feel right\". Denied chest pain and SOB. Ordered stat EKG, which showed 2nd degree AV block w/RBB and LFBB. There was no recurrence of symptoms, and no recommended follow up. Patient was taken to the OR later that same day for I and D of breast wound without difficulties. She then had additional re-excision surgery on 11/13/23.    Today patient denies chest pain, irregular HR, ankle edema or DYSPNEA ON EXERTION. 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 She reports constantly moving and walking halls without exertional symptoms.   - METS (Metabolic Equivalents)>4    RCRI: 0.9% risk of serious cardiac events    PULMONARY  Mild intermittent asthma with no recent exacerbation or use of albuterol.   Will use Advair inhaler on DOS.   Singulair at HS  Denies recent cough or shortness of breath    Intermediate risk for ALTA    - Tobacco History    History   Smoking Status    Never   Smokeless Tobacco    Never       GI: GERD, controlled with Nexium and will take on DOS  Will take Reglan on DOS.   PONV High Risk  Total Score: 3           1 AN PONV: Pt is Female    1 AN PONV: Patient is not a current smoker    1 AN PONV: Patient has history of PONV      PONV. Significant history of nausea but no issues with last procedure. Final decisions regarding prophylaxis by Anesthesia on DOS.    /RENAL  - Baseline Creatinine  0.47    ENDOCRINE    - BMI: Estimated body mass index is 42.9 kg/m  as calculated " "from the following:    Height as of 11/28/23: 1.617 m (5' 3.66\").    Weight as of 11/28/23: 112.2 kg (247 lb 4.8 oz).  Class 3 Obesity (BMI > 40)  DIABETES MELLITUS II. Last A1C 7.7. Will hold metformin and Novolog insulin on DOS. Will take 80% of Toujeo insulin on DOS. Patient reported today that she did take her Ozempic yesterday 11/29/23 before knowing about her surgery date. Her hold would only be 5 days before surgery. I discussed anesthesia concern for this and reached out to Dr. Loera. Informed her that surgery would need to be postponed for a full 7 day hold.     Patient reports recent erratic BS, she attributes to stress.     HEME  VTE Low Risk 0.26%            Total Score: 0      Denies personal or family history of blood clots  Denies history of blood transfusion   History of anemia, on oral iron supplement  Last Hgb: 12.9    MSK: OA    PSYCH  - Anxiety surrounding recent issues with breast wound. No specific meds. Is anxious to have surgery due to open wound    Different anesthesia methods/types have been discussed with the patient, but they are aware that the final plan will be decided by the assigned anesthesia provider on the date of service.  Patient was discussed with Dr Evaristo Rodriguez and team notified of need to postpone surgery. They will reach out to patient with updated scheduling.     The patient is optimized for their procedure otherwise. AVS with information on surgery time/arrival time, meds and NPO status given by nursing staff. No further diagnostic testing indicated.    Please refer to the physical examination documented by the anesthesiologist in the anesthesia record on the day of surgery.    Video-Visit Details    Type of service:  Video Visit    Provider received verbal consent for a Video Visit from the patient? Yes     Originating Location (pt. Location): Home    Distant Location (provider location):  Off-site  Mode of Communication:  Video Conference via [employer]  On " the day of service:     Prep time: 20 minutes  Visit time: 15 minutes  Documentation time: 14 minutes  ------------------------------------------  Total time: 49 minutes      ALYSON Sanchez CNS  Preoperative Assessment Center  [facility]  [facility]  Phone: [phone]  Fax: [phone]

## 2023-11-30 NOTE — PATIENT INSTRUCTIONS
Preparing for Your Surgery      Name:  Bria Davis   MRN:  [id number]   :  1960   Today's Date:  2023       Arriving for surgery:  Surgery date:  23  Arrival time:  8:35AM    Please come to:     Please come to:      [facility] [address]  [address] [place] [place]  [address]      The [facility] Patient /Visitor Ramp is located at [address]. Patients and visitors who self-park will receive the reduced hospital parking rate. If the Patient /Visitor Ramp is full, please follow the signs to the  parking located at the main hospital entrance.     parking is available ( 24 hours/ 7 days a week)    Discounted parking pass options are available for patients and visitors. They can be purchased at the [employer] desk at the main hospital entrance.    -    Stop at the security desk and they will direct surgery patients to the 3rd floor Surgery Waiting Room. [phone]     -  If you are in need of directions, wheelchair or escort please stop at the Information/security desk in the lobby.       What can I eat or drink?  -  You may eat and drink normally up to 8 hours prior to arrival time. (Until MIDNIGHT)  -  You may have clear liquids until 2 hours prior to arrival time. (Until 23, 6:35AM)    Examples of clear liquids:  Water  Clear broth  Juices (apple, white grape, white cranberry  and cider) without pulp  Noncarbonated, powder based beverages  (lemonade and Sesar-Aid)  Sodas (Sprite, 7-Up, ginger ale and seltzer)  Coffee or tea (without milk or cream)  Gatorade    -  No Alcohol or cannabis products for at least 24 hours before surgery.     Which medicines can I take?    Hold Semaglutide (Ozempic) for at least 7 days prior to surgery, last dose 23.   Hold Aspirin for 7 days before surgery.   Hold Multivitamins for 7 days before surgery.  Hold Supplements, Vitamin C, for 7 days before  surgery.  Hold Ibuprofen (Advil, Motrin) for 1 day(s) before surgery--unless otherwise directed by surgeon.  Hold Naproxen (Aleve) for 4 days before surgery.    -  DO NOT take these medications the day of surgery:    Aspart Insulin(Novolog)    Hydrochlorothiazide    Losartan(Cozaar)    Metformin(Glucophage)    Pseudoephedrine(Sudafed)    Calcium/Vitamin D    -  PLEASE TAKE these medications the day of surgery:    Please take 80 Units Glargine(Toujeo) Insulin the morning of surgery    Esomeprazole(Nexium)    Advair    Gabapentin(Neurontin)    Metoclopramide(Reglan)    Metoprolol    How do I prepare myself?  - Please take 2 showers (one the night prior to surgery and one the morning of surgery) using Scrubcare or Hibiclens soap.    Use this soap only from the neck to your toes.     Leave the soap on your skin for one minute--then rinse thoroughly.      You may use your own shampoo and conditioner. No other hair products.   - Please remove all jewelry and body piercings.  - No lotions, deodorants or fragrance.  - No makeup or fingernail polish.   - Bring your ID and insurance card.    -If you use a CPAP machine, please bring the CPAP machine, tubing, and mask to hospital.    -If you have a Deep Brain Stimulator, Spinal Cord Stimulator, or any Neuro Stimulator device---you must bring the remote control to the hospital.      ALL PATIENTS GOING HOME THE SAME DAY OF SURGERY ARE REQUIRED TO HAVE A RESPONSIBLE ADULT TO DRIVE AND BE IN ATTENDANCE WITH THEM FOR 24 HOURS FOLLOWING SURGERY.    Covid testing policy as of 12/06/2022  Your surgeon will notify and schedule you for a COVID test if one is needed before surgery--please direct any questions or COVID symptoms to your surgeon      Questions or Concerns:    - For any questions regarding the day of surgery or your hospital stay, please contact the Pre Admission Nursing Office at [phone].       - If you have health changes between today and your surgery, please call  your surgeon.       - For questions after surgery, please call your surgeons office.           Current Visitor Guidelines    You may have 2 visitors in the pre op area.    Visiting hours: 8 a.m. to 8:30 p.m.    You may have four visitors during your inpatient hospital stay.    Patients confirmed or suspected to have symptoms of COVID 19 or flu:     No visitors allowed for adult patients.   Children (under age 18) can have 1 named visitor.     People who are sick or showing symptoms of COVID 19 or flu:    Are not allowed to visit patients--we can only make exceptions in special situations.       Please follow these guidelines for your visit:          Please maintain social distance          Masking is optional--however at times you may be asked to wear a mask for the safety of yourself and others     Clean your hands with alcohol hand . Do this when you arrive at and leave the building and patient room,    And again after you touch your mask or anything in the room.     Go directly to and from the room you are visiting.     Stay in the patient s room during your visit. Limit going to other places in the hospital as much as possible     Leave bags and jackets at home or in the car.     For everyone s health, please don t come and go during your visit. That includes for smoking   during your visit.

## 2023-11-30 NOTE — TELEPHONE ENCOUNTER
Spoke with pt, pre-op teaching completed. Will send her [employer] message with pre and post op instructions and clinic contact info. She knows to not take any more ozempic prior to surgery. She has chlorhexidine soap at home for pre-op showers.

## 2023-11-30 NOTE — TELEPHONE ENCOUNTER
Rescheduled surgery due to patient taking Ozempic on 11/29. Reschedule per PAC and anesthesia.     Spoke with the patient and was able to confirm all scheduled information.     Surgery is rescheduled with Dr. Rodriguez   Date: 12/7   [facility]      Post-op: rescheduled to 12/13 with Dr. Rodriguez, [facility]      Patient will receive a phone call from pre-admission nurses 1-2 days prior to surgery with arrival time and NPO instructions. Approximate arrival time/surgery time given to patient: 7:30 AM. Patient aware times are subject to change up until day before surgery.       Patient questions/concerns: N/A       __    Angelina Fair, Senior Perioperative Coordinator, on 11/30/2023 at 10:30 AM  P: [phone]

## 2023-11-30 NOTE — PROGRESS NOTES
Melyssa is a 63 year old who is being evaluated via a billable video visit.      How would you like to obtain your AVS? Hans Davalos   Melyssa is a 63 year old, presenting for the following health issues:  Pre-Op Exam (/)          KARLA Klein LPN

## 2023-12-01 DIAGNOSIS — E78.5 HYPERLIPIDEMIA LDL GOAL <70: ICD-10-CM

## 2023-12-01 RX ORDER — ROSUVASTATIN CALCIUM 10 MG/1
10 TABLET, COATED ORAL DAILY
Qty: 90 TABLET | Refills: 1 | Status: SHIPPED | OUTPATIENT
Start: 2023-12-01 | End: 2024-05-21

## 2023-12-01 RX ORDER — ROSUVASTATIN CALCIUM 5 MG/1
10 TABLET, COATED ORAL DAILY
Status: CANCELLED | OUTPATIENT
Start: 2023-12-01

## 2023-12-01 NOTE — TELEPHONE ENCOUNTER
Patient is asking for a refill on Rosuvastatin 10mg tablet, but it looks like it was cancelled earlier this month. Does the patient need to still be taking this? If so, please send a prescription.    Thank you,    Melyssa Castillo  Certified Pharmacy Technician II, [facility]  Ph: [phone]  Fx: [phone]

## 2023-12-01 NOTE — TELEPHONE ENCOUNTER
Pt is still taking ,reports it was taken off her list by mistake.  Rimma Grace RN on 12/1/2023 at 3:16 PM

## 2023-12-05 NOTE — TELEPHONE ENCOUNTER
MEDICAL RECORDS REQUEST   Radiation Oncology  [address] [place] [address]  Fax: [phone]        FUTURE VISIT INFORMATION                                                   Bria Davis, : 1960 scheduled for future visit at [facility] Radiation Oncology    RECORDS REQUESTED FOR VISIT                                                     BREAST     OFFICE NOTE from surgeon/plastic surgeon [facility] Surgery:  23: Dr. Arron Olson    PS:  23: Dr. DELORIS Rodriguez   OPERATIVE/BREAST BIOPSY REPORTS [facility] 23: Re-excision of left lumpectomy site     10/21/23: Irrigation and debridement breast     10/06/23: BIOPSY, LEFT BREAST, WITH NEEDLE LOCALIZATION    MEDICATION LIST [facility]    LABS     PATHOLOGY REPORTS Reports in EPIC 23: [id number]  10/21/23: [id number]  10/06/23: [id number]  23: [id number]   ANYTHING RELATED TO DIAGNOSIS Epic Most recent 23   IMAGING (NEED IMAGES & REPORT)     MAMMO/SURGICAL BREAST IMGS/SPECIMEN RADIOGRAPH PACS 10/06/23-12   ULTRASOUND PACS 10/06/23-23: US Breast

## 2023-12-07 ENCOUNTER — ANESTHESIA (OUTPATIENT)
Dept: SURGERY | Facility: AMBULATORY SURGERY CENTER | Age: 63
End: 2023-12-07
Payer: COMMERCIAL

## 2023-12-07 ENCOUNTER — HOSPITAL ENCOUNTER (OUTPATIENT)
Facility: AMBULATORY SURGERY CENTER | Age: 63
Discharge: HOME | End: 2023-12-07
Attending: PLASTIC SURGERY
Payer: COMMERCIAL

## 2023-12-07 VITALS
TEMPERATURE: 96.9 F | DIASTOLIC BLOOD PRESSURE: 82 MMHG | HEIGHT: 63 IN | BODY MASS INDEX: 43.77 KG/M2 | WEIGHT: 247 LBS | OXYGEN SATURATION: 98 % | RESPIRATION RATE: 14 BRPM | SYSTOLIC BLOOD PRESSURE: 138 MMHG | HEART RATE: 73 BPM

## 2023-12-07 DIAGNOSIS — Z98.890 S/P LUMPECTOMY, LEFT BREAST: Primary | ICD-10-CM

## 2023-12-07 LAB — GLUCOSE BLDC GLUCOMTR-MCNC: 159 MG/DL (ref 70–99)

## 2023-12-07 PROCEDURE — 88305 TISSUE EXAM BY PATHOLOGIST: CPT | Mod: 26 | Performed by: STUDENT IN AN ORGANIZED HEALTH CARE EDUCATION/TRAINING PROGRAM

## 2023-12-07 PROCEDURE — 19318 BREAST REDUCTION: CPT | Mod: 50 | Performed by: PLASTIC SURGERY

## 2023-12-07 PROCEDURE — 14301 TIS TRNFR ANY 30.1-60 SQ CM: CPT | Mod: 78 | Performed by: PLASTIC SURGERY

## 2023-12-07 PROCEDURE — 14302 TIS TRNFR ADDL 30 SQ CM: CPT | Performed by: PLASTIC SURGERY

## 2023-12-07 PROCEDURE — 14301 TIS TRNFR ANY 30.1-60 SQ CM: CPT | Performed by: PLASTIC SURGERY

## 2023-12-07 PROCEDURE — 82962 GLUCOSE BLOOD TEST: CPT | Performed by: PATHOLOGY

## 2023-12-07 PROCEDURE — 14302 TIS TRNFR ADDL 30 SQ CM: CPT | Mod: 78 | Performed by: PLASTIC SURGERY

## 2023-12-07 PROCEDURE — 88307 TISSUE EXAM BY PATHOLOGIST: CPT | Mod: 26 | Performed by: STUDENT IN AN ORGANIZED HEALTH CARE EDUCATION/TRAINING PROGRAM

## 2023-12-07 PROCEDURE — 88305 TISSUE EXAM BY PATHOLOGIST: CPT | Mod: TC | Performed by: PLASTIC SURGERY

## 2023-12-07 RX ORDER — NALOXONE HYDROCHLORIDE 0.4 MG/ML
0.4 INJECTION, SOLUTION INTRAMUSCULAR; INTRAVENOUS; SUBCUTANEOUS
Status: DISCONTINUED | OUTPATIENT
Start: 2023-12-07 | End: 2023-12-07 | Stop reason: HOSPADM

## 2023-12-07 RX ORDER — HYDROMORPHONE HYDROCHLORIDE 1 MG/ML
0.4 INJECTION, SOLUTION INTRAMUSCULAR; INTRAVENOUS; SUBCUTANEOUS EVERY 5 MIN PRN
Status: DISCONTINUED | OUTPATIENT
Start: 2023-12-07 | End: 2023-12-07 | Stop reason: HOSPADM

## 2023-12-07 RX ORDER — PROPOFOL 10 MG/ML
INJECTION, EMULSION INTRAVENOUS CONTINUOUS PRN
Status: DISCONTINUED | OUTPATIENT
Start: 2023-12-07 | End: 2023-12-07

## 2023-12-07 RX ORDER — ONDANSETRON 4 MG/1
4 TABLET, ORALLY DISINTEGRATING ORAL EVERY 8 HOURS PRN
Qty: 4 TABLET | Refills: 0 | Status: SHIPPED | OUTPATIENT
Start: 2023-12-07 | End: 2024-02-26

## 2023-12-07 RX ORDER — LIDOCAINE 40 MG/G
CREAM TOPICAL
Status: DISCONTINUED | OUTPATIENT
Start: 2023-12-07 | End: 2023-12-07 | Stop reason: HOSPADM

## 2023-12-07 RX ORDER — NALOXONE HYDROCHLORIDE 0.4 MG/ML
0.2 INJECTION, SOLUTION INTRAMUSCULAR; INTRAVENOUS; SUBCUTANEOUS
Status: DISCONTINUED | OUTPATIENT
Start: 2023-12-07 | End: 2023-12-07 | Stop reason: HOSPADM

## 2023-12-07 RX ORDER — CEFAZOLIN SODIUM 2 G/50ML
2 SOLUTION INTRAVENOUS SEE ADMIN INSTRUCTIONS
Status: DISCONTINUED | OUTPATIENT
Start: 2023-12-07 | End: 2023-12-07 | Stop reason: HOSPADM

## 2023-12-07 RX ORDER — ONDANSETRON 4 MG/1
4 TABLET, ORALLY DISINTEGRATING ORAL EVERY 30 MIN PRN
Status: DISCONTINUED | OUTPATIENT
Start: 2023-12-07 | End: 2023-12-08 | Stop reason: HOSPADM

## 2023-12-07 RX ORDER — LIDOCAINE HYDROCHLORIDE 20 MG/ML
INJECTION, SOLUTION INFILTRATION; PERINEURAL PRN
Status: DISCONTINUED | OUTPATIENT
Start: 2023-12-07 | End: 2023-12-07

## 2023-12-07 RX ORDER — SODIUM CHLORIDE, SODIUM LACTATE, POTASSIUM CHLORIDE, CALCIUM CHLORIDE 600; 310; 30; 20 MG/100ML; MG/100ML; MG/100ML; MG/100ML
INJECTION, SOLUTION INTRAVENOUS CONTINUOUS
Status: DISCONTINUED | OUTPATIENT
Start: 2023-12-07 | End: 2023-12-07 | Stop reason: HOSPADM

## 2023-12-07 RX ORDER — FENTANYL CITRATE 50 UG/ML
25-50 INJECTION, SOLUTION INTRAMUSCULAR; INTRAVENOUS
Status: DISCONTINUED | OUTPATIENT
Start: 2023-12-07 | End: 2023-12-07 | Stop reason: HOSPADM

## 2023-12-07 RX ORDER — FENTANYL CITRATE 50 UG/ML
50 INJECTION, SOLUTION INTRAMUSCULAR; INTRAVENOUS EVERY 5 MIN PRN
Status: DISCONTINUED | OUTPATIENT
Start: 2023-12-07 | End: 2023-12-07 | Stop reason: HOSPADM

## 2023-12-07 RX ORDER — OXYCODONE HYDROCHLORIDE 5 MG/1
10 TABLET ORAL
Status: DISCONTINUED | OUTPATIENT
Start: 2023-12-07 | End: 2023-12-08 | Stop reason: HOSPADM

## 2023-12-07 RX ORDER — KETAMINE HYDROCHLORIDE 10 MG/ML
INJECTION INTRAMUSCULAR; INTRAVENOUS PRN
Status: DISCONTINUED | OUTPATIENT
Start: 2023-12-07 | End: 2023-12-07

## 2023-12-07 RX ORDER — BUPIVACAINE HYDROCHLORIDE 2.5 MG/ML
INJECTION, SOLUTION EPIDURAL; INFILTRATION; INTRACAUDAL
Status: COMPLETED | OUTPATIENT
Start: 2023-12-07 | End: 2023-12-07

## 2023-12-07 RX ORDER — ACETAMINOPHEN 325 MG/1
325-650 TABLET ORAL EVERY 6 HOURS PRN
COMMUNITY
End: 2024-09-18

## 2023-12-07 RX ORDER — FLUMAZENIL 0.1 MG/ML
0.2 INJECTION, SOLUTION INTRAVENOUS
Status: DISCONTINUED | OUTPATIENT
Start: 2023-12-07 | End: 2023-12-07 | Stop reason: HOSPADM

## 2023-12-07 RX ORDER — FENTANYL CITRATE 50 UG/ML
25 INJECTION, SOLUTION INTRAMUSCULAR; INTRAVENOUS EVERY 5 MIN PRN
Status: DISCONTINUED | OUTPATIENT
Start: 2023-12-07 | End: 2023-12-07 | Stop reason: HOSPADM

## 2023-12-07 RX ORDER — ONDANSETRON 2 MG/ML
4 INJECTION INTRAMUSCULAR; INTRAVENOUS EVERY 30 MIN PRN
Status: DISCONTINUED | OUTPATIENT
Start: 2023-12-07 | End: 2023-12-08 | Stop reason: HOSPADM

## 2023-12-07 RX ORDER — GLYCOPYRROLATE 0.2 MG/ML
INJECTION, SOLUTION INTRAMUSCULAR; INTRAVENOUS PRN
Status: DISCONTINUED | OUTPATIENT
Start: 2023-12-07 | End: 2023-12-07

## 2023-12-07 RX ORDER — ONDANSETRON 2 MG/ML
INJECTION INTRAMUSCULAR; INTRAVENOUS PRN
Status: DISCONTINUED | OUTPATIENT
Start: 2023-12-07 | End: 2023-12-07

## 2023-12-07 RX ORDER — ACETAMINOPHEN 325 MG/1
975 TABLET ORAL ONCE
Status: COMPLETED | OUTPATIENT
Start: 2023-12-07 | End: 2023-12-07

## 2023-12-07 RX ORDER — ONDANSETRON 4 MG/1
4 TABLET, ORALLY DISINTEGRATING ORAL EVERY 30 MIN PRN
Status: DISCONTINUED | OUTPATIENT
Start: 2023-12-07 | End: 2023-12-07 | Stop reason: HOSPADM

## 2023-12-07 RX ORDER — ONDANSETRON 2 MG/ML
4 INJECTION INTRAMUSCULAR; INTRAVENOUS EVERY 30 MIN PRN
Status: DISCONTINUED | OUTPATIENT
Start: 2023-12-07 | End: 2023-12-07 | Stop reason: HOSPADM

## 2023-12-07 RX ORDER — OXYCODONE HYDROCHLORIDE 5 MG/1
5 TABLET ORAL
Status: COMPLETED | OUTPATIENT
Start: 2023-12-07 | End: 2023-12-07

## 2023-12-07 RX ORDER — FENTANYL CITRATE 50 UG/ML
INJECTION, SOLUTION INTRAMUSCULAR; INTRAVENOUS PRN
Status: DISCONTINUED | OUTPATIENT
Start: 2023-12-07 | End: 2023-12-07

## 2023-12-07 RX ORDER — AMOXICILLIN 250 MG
1-2 CAPSULE ORAL 2 TIMES DAILY
Qty: 30 TABLET | Refills: 0 | Status: SHIPPED | OUTPATIENT
Start: 2023-12-07 | End: 2024-02-26

## 2023-12-07 RX ORDER — OXYCODONE HYDROCHLORIDE 5 MG/1
5-10 TABLET ORAL EVERY 6 HOURS PRN
Qty: 20 TABLET | Refills: 0 | Status: SHIPPED | OUTPATIENT
Start: 2023-12-07 | End: 2023-12-08

## 2023-12-07 RX ORDER — PROPOFOL 10 MG/ML
INJECTION, EMULSION INTRAVENOUS PRN
Status: DISCONTINUED | OUTPATIENT
Start: 2023-12-07 | End: 2023-12-07

## 2023-12-07 RX ORDER — CEFAZOLIN SODIUM 2 G/50ML
2 SOLUTION INTRAVENOUS
Status: COMPLETED | OUTPATIENT
Start: 2023-12-07 | End: 2023-12-07

## 2023-12-07 RX ORDER — DEXAMETHASONE SODIUM PHOSPHATE 4 MG/ML
INJECTION, SOLUTION INTRA-ARTICULAR; INTRALESIONAL; INTRAMUSCULAR; INTRAVENOUS; SOFT TISSUE PRN
Status: DISCONTINUED | OUTPATIENT
Start: 2023-12-07 | End: 2023-12-07

## 2023-12-07 RX ORDER — HYDROMORPHONE HYDROCHLORIDE 1 MG/ML
0.2 INJECTION, SOLUTION INTRAMUSCULAR; INTRAVENOUS; SUBCUTANEOUS EVERY 5 MIN PRN
Status: DISCONTINUED | OUTPATIENT
Start: 2023-12-07 | End: 2023-12-07 | Stop reason: HOSPADM

## 2023-12-07 RX ORDER — LABETALOL HYDROCHLORIDE 5 MG/ML
10 INJECTION, SOLUTION INTRAVENOUS
Status: DISCONTINUED | OUTPATIENT
Start: 2023-12-07 | End: 2023-12-07 | Stop reason: HOSPADM

## 2023-12-07 RX ADMIN — FENTANYL CITRATE 50 MCG: 50 INJECTION, SOLUTION INTRAMUSCULAR; INTRAVENOUS at 09:04

## 2023-12-07 RX ADMIN — DEXAMETHASONE SODIUM PHOSPHATE 4 MG: 4 INJECTION, SOLUTION INTRA-ARTICULAR; INTRALESIONAL; INTRAMUSCULAR; INTRAVENOUS; SOFT TISSUE at 09:04

## 2023-12-07 RX ADMIN — FENTANYL CITRATE 50 MCG: 50 INJECTION, SOLUTION INTRAMUSCULAR; INTRAVENOUS at 12:25

## 2023-12-07 RX ADMIN — Medication 100 MCG: at 10:42

## 2023-12-07 RX ADMIN — Medication 200 MCG: at 09:28

## 2023-12-07 RX ADMIN — KETAMINE HYDROCHLORIDE 20 MG: 10 INJECTION INTRAMUSCULAR; INTRAVENOUS at 10:09

## 2023-12-07 RX ADMIN — FENTANYL CITRATE 50 MCG: 50 INJECTION, SOLUTION INTRAMUSCULAR; INTRAVENOUS at 09:47

## 2023-12-07 RX ADMIN — PROPOFOL 200 MG: 10 INJECTION, EMULSION INTRAVENOUS at 09:04

## 2023-12-07 RX ADMIN — ACETAMINOPHEN 975 MG: 325 TABLET ORAL at 07:51

## 2023-12-07 RX ADMIN — BUPIVACAINE HYDROCHLORIDE 20 ML: 2.5 INJECTION, SOLUTION EPIDURAL; INFILTRATION; INTRACAUDAL at 08:35

## 2023-12-07 RX ADMIN — Medication 100 MCG: at 09:42

## 2023-12-07 RX ADMIN — KETAMINE HYDROCHLORIDE 10 MG: 10 INJECTION INTRAMUSCULAR; INTRAVENOUS at 10:21

## 2023-12-07 RX ADMIN — OXYCODONE HYDROCHLORIDE 5 MG: 5 TABLET ORAL at 12:39

## 2023-12-07 RX ADMIN — Medication 100 MCG: at 10:20

## 2023-12-07 RX ADMIN — Medication 100 MCG: at 11:17

## 2023-12-07 RX ADMIN — PROPOFOL 150 MCG/KG/MIN: 10 INJECTION, EMULSION INTRAVENOUS at 09:02

## 2023-12-07 RX ADMIN — ONDANSETRON 4 MG: 2 INJECTION INTRAMUSCULAR; INTRAVENOUS at 10:31

## 2023-12-07 RX ADMIN — FENTANYL CITRATE 50 MCG: 50 INJECTION, SOLUTION INTRAMUSCULAR; INTRAVENOUS at 08:30

## 2023-12-07 RX ADMIN — SODIUM CHLORIDE, SODIUM LACTATE, POTASSIUM CHLORIDE, CALCIUM CHLORIDE: 600; 310; 30; 20 INJECTION, SOLUTION INTRAVENOUS at 07:51

## 2023-12-07 RX ADMIN — Medication 200 MCG: at 10:29

## 2023-12-07 RX ADMIN — GLYCOPYRROLATE 0.2 MG: 0.2 INJECTION, SOLUTION INTRAMUSCULAR; INTRAVENOUS at 10:17

## 2023-12-07 RX ADMIN — Medication 200 MCG: at 10:57

## 2023-12-07 RX ADMIN — CEFAZOLIN SODIUM 2 G: 2 SOLUTION INTRAVENOUS at 09:05

## 2023-12-07 RX ADMIN — SODIUM CHLORIDE, SODIUM LACTATE, POTASSIUM CHLORIDE, CALCIUM CHLORIDE: 600; 310; 30; 20 INJECTION, SOLUTION INTRAVENOUS at 10:59

## 2023-12-07 RX ADMIN — Medication 100 MCG: at 09:41

## 2023-12-07 RX ADMIN — Medication 100 MCG: at 09:14

## 2023-12-07 RX ADMIN — LIDOCAINE HYDROCHLORIDE 100 MG: 20 INJECTION, SOLUTION INFILTRATION; PERINEURAL at 09:04

## 2023-12-07 RX ADMIN — FENTANYL CITRATE 50 MCG: 50 INJECTION, SOLUTION INTRAMUSCULAR; INTRAVENOUS at 12:19

## 2023-12-07 RX ADMIN — KETAMINE HYDROCHLORIDE 20 MG: 10 INJECTION INTRAMUSCULAR; INTRAVENOUS at 10:39

## 2023-12-07 RX ADMIN — Medication 100 MCG: at 10:43

## 2023-12-07 ASSESSMENT — ENCOUNTER SYMPTOMS
DYSRHYTHMIAS: 1
SEIZURES: 0

## 2023-12-07 ASSESSMENT — LIFESTYLE VARIABLES: TOBACCO_USE: 0

## 2023-12-07 NOTE — ANESTHESIA PREPROCEDURE EVALUATION
Anesthesia Pre-Procedure Evaluation    Patient: Bria Davis   MRN: [id number] : 1960        Procedure : Procedure(s):  Left breast reconstruction with local flaps and right breast reduction for symmetry          Past Medical History:   Diagnosis Date    Arthritis     Asthma     Chronic kidney disease     Diabetes mellitus (H)     Ductal carcinoma in situ (DCIS) of left breast     Esophageal reflux     Fibromyalgia     Hypertension     Obese     Other chronic sinusitis     PONV (postoperative nausea and vomiting)     Restless legs syndrome (RLS)       Past Surgical History:   Procedure Laterality Date    BIOPSY  2023    BIOPSY BREAST NEEDLE LOCALIZATION Left 10/6/2023    Procedure: BIOPSY, LEFT BREAST, WITH NEEDLE LOCALIZATION;  Surgeon: Bg Bryant MD;  Location: [facility]    COLONOSCOPY  2002    COLONOSCOPY  10/26/2012    Procedure: COLONOSCOPY;  Colonoscopy;  Surgeon: Margret Sales MD;  Location: [facility]    COLONOSCOPY N/A 2019    Procedure: COLONOSCOPY, WITH POLYPECTOMY AND BIOPSY;  Surgeon: Ariel Heck MD;  Location: [facility]    CV LEFT HEART CATH N/A 2019    Procedure: Left Heart Cath;  Surgeon: Mike Sanon MD;  Location: [facility] CARDIAC CATH LAB    CV LEFT VENTRICULOGRAM N/A 2019    Procedure: Left Ventriculogram;  Surgeon: Mike Sanon MD;  Location:  HEART CARDIAC CATH LAB    ENT SURGERY      ESOPHAGOSCOPY, GASTROSCOPY, DUODENOSCOPY (EGD), COMBINED N/A 2019    Procedure: ESOPHAGOGASTRODUODENOSCOPY, WITH BIOPSY;  Surgeon: Ariel Heck MD;  Location: [facility]    EYE SURGERY      GYN SURGERY      Hydroablation , polyp removal     INJECT EPIDURAL LUMBAR  2010    INJECT EPIDURAL LUMBAR performed by GENERIC ANESTHESIA PROVIDER at [facility] OR    INJECT EPIDURAL LUMBAR  2011    INJECT EPIDURAL LUMBAR performed by GENERIC ANESTHESIA PROVIDER at [facility] OR    IRRIGATION AND DEBRIDEMENT BREAST Left 10/21/2023    Procedure:  Irrigation and debridement breast;  Surgeon: Nemesio Arreola MD;  Location: [facility] OR    left long finger pulley release Left 07/2020    LUMPECTOMY BREAST Left 11/13/2023    Procedure: Re-excision of left lumpectomy site;  Surgeon: Arron [facility] MD;  Location: [facility] OR    RELEASE CARPAL TUNNEL  06/19/2012    Procedure: RELEASE CARPAL TUNNEL;  Right Carpal Tunnel Release;  Surgeon: Mike Sparrow MD;  Location: [facility]    RELEASE CARPAL TUNNEL  11/09/2012    Procedure: RELEASE CARPAL TUNNEL;  Left Carpal Tunnel Release;  Surgeon: Mike Sparrow MD;  Location: [facility]    REPAIR TENDON ACHILLES Left 12/26/2019    Procedure: Left Foot: Achilles Tendon Repair/Remodel/Reattachment; calcaneal prominence removal;  Surgeon: Felix Watkins DPM;  Location: [facility]    SURGICAL HISTORY OF -   04/10/2000    bilateral total ethmoidectomies, bilateral maxillary antrostomies, bilateral SMR of inferior turbinates, reduction of lt turbinate porter bullosa      Allergies   Allergen Reactions    Lisinopril Cough      Social History     Tobacco Use    Smoking status: Never    Smokeless tobacco: Never   Substance Use Topics    Alcohol use: Not Currently      Wt Readings from Last 1 Encounters:   12/07/23 112 kg (247 lb)        Anesthesia Evaluation    Type: General and MAC.    History of anesthetic complications   Nausea mostly but no issues with last surgery.    ROS/MED HX  ENT/Pulmonary:     (+)     ALTA risk factors,  hypertension, obese,            Intermittent, asthma Last exacerbation: No recent exacerbation or use of albuterol,  Treatment: Inhaled steroids and Inhaler prn,              (-) tobacco use and recent URI   Neurologic: Comment: Lumbar radiculopathy  Recent diagnosis of fibromyalgia   (-) no seizures and no CVA   Cardiovascular: Comment: Nonobstructive CAD    (+)  hypertension-range: 130s/80s in clinic/ -  CAD -  - -   Taking blood thinners Pt has not received instructions: Instructions Given to patient: Planned 7  day hold for surgery.                   dysrhythmias, 2nd Deg Heart Block,        Previous cardiac testing   Echo: Date:  Results:  Interpretation Summary     The visual ejection fraction is estimated at 60-65%.  There is mild to moderate concentric left ventricular hypertrophy.  There is no pericardial effusion.  Normal left ventricular wall motion.  Technically difficult study.  Hypertension.  _____________________________________________________________________________  __        Left Ventricle  The left ventricle is normal in size. There is mild to moderate concentric  left ventricular hypertrophy. The visual ejection fraction is estimated at 60-  65%. Diastolic Doppler findings (E/E' ratio and/or other parameters) suggest  left ventricular filling pressures are indeterminate. Normal left ventricular  wall motion.     Right Ventricle  The right ventricle is normal in size and function.     Atria  Normal left atrial size. Right atrial size is normal.     Mitral Valve  There is physiologic mitral regurgitation.        Tricuspid Valve  There is trace tricuspid regurgitation. Right ventricular systolic pressure  could not be approximated due to inadequate tricuspid regurgitation.     Aortic Valve  The aortic valve is trileaflet. There is trace aortic regurgitation. No  hemodynamically significant valvular aortic stenosis.     Pulmonic Valve  The pulmonic valve is not well visualized. There is trace pulmonic valvular  regurgitation.     Vessels  The aortic root is normal size.     Pericardium  There is no pericardial effusion.    Stress Test:  Date: Results:    ECG Reviewed:  Date: 10/21/23 Results:  EKG: 10/21/23 Sinus rhythm with 2nd degree A-V block (Mobitz I)   Right bundle branch block   Left anterior fascicular block    Bifascicular block     EK22 Sinus  Rhythm   -Right bundle branch block with left axis -bifascicular block.      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 Normal glucose range: erratic recently ,        Obesity,       Psychiatric/Substance Use:     (+) psychiatric history anxiety       Infectious Disease:  - neg infectious disease ROS     Malignancy:   (+) Malignancy, History of Breast.Breast CA Remission status post Surgery.      Other:  - neg other ROS    (+)  , H/O Chronic Pain,            OUTSIDE LABS:  CBC:   Lab Results   Component Value Date    WBC 8.8 10/21/2023    WBC 10.9 10/20/2023    HGB 12.9 10/21/2023    HGB 13.3 10/20/2023    HCT 38.7 10/21/2023    HCT 40.1 10/20/2023     10/21/2023     10/20/2023     BMP:   Lab Results   Component Value Date     10/21/2023     10/20/2023    POTASSIUM 3.9 10/21/2023    POTASSIUM 4.3 10/20/2023    CHLORIDE 105 10/21/2023    CHLORIDE 99 10/20/2023    CO2 24 10/21/2023    CO2 22 10/20/2023    BUN 7.7 (L) 10/21/2023    BUN 8.2 10/20/2023    CR 0.47 (L) 10/21/2023    CR 0.50 (L) 10/21/2023     (H) 11/13/2023     (H) 11/13/2023     COAGS:   Lab Results   Component Value Date    PTT 29 09/12/2019    INR 0.96 09/12/2019     POC:   Lab Results   Component Value Date    BGM 99 12/26/2019    HCG Negative 11/09/2012     HEPATIC:   Lab Results   Component Value Date    ALBUMIN 4.0 10/20/2023    PROTTOTAL 6.4 10/20/2023    ALT 14 10/20/2023    AST 21 10/20/2023    ALKPHOS 65 10/20/2023    BILITOTAL 0.4 10/20/2023     OTHER:   Lab Results   Component  "Value Date    LACT 1.6 10/20/2023    A1C 7.7 (H) 09/21/2023    MARLON 9.1 10/21/2023    MAG 1.8 01/13/2022    LIPASE 43 12/21/2022    AMYLASE 54 04/26/2010    TSH 1.98 11/07/2022    SED 8 08/14/2023       Anesthesia Plan    ASA Status:  3       Anesthesia Type: General.     - Airway: LMA   Induction: Intravenous.   Maintenance: Balanced.        Consents    Anesthesia Plan(s) and associated risks, benefits, and realistic alternatives discussed. Questions answered and patient/representative(s) expressed understanding.     - Discussed: Risks, Benefits and Alternatives for BOTH SEDATION and the PROCEDURE were discussed     - Discussed with:  Patient            Postoperative Care    Pain management: IV analgesics, Oral pain medications, Peripheral nerve block (Single Shot), Multi-modal analgesia.   PONV prophylaxis: Ondansetron (or other 5HT-3), Dexamethasone or Solumedrol, Background Propofol Infusion     Comments:               Farrah Rutherford MD    I have reviewed the pertinent notes and labs in the chart from the past 30 days and (re)examined the patient.  Any updates or changes from those notes are reflected in this note.             # Drug Induced Platelet Defect: home medication list includes an antiplatelet medication  # DMII: A1C = 7.7 % (Ref range: 0.0 - 5.6 %) within past 6 months  # Severe Obesity: Estimated body mass index is 43.75 kg/m  as calculated from the following:    Height as of this encounter: 1.6 m (5' 3\").    Weight as of this encounter: 112 kg (247 lb).      "

## 2023-12-07 NOTE — DISCHARGE INSTRUCTIONS
BREAST REDUCTION POST-OPERATIVE INSTRUCTIONS    Instructions      Have someone drive you home after surgery and help you at home for 1-2      days.     Get plenty of rest.     Follow balanced diet.     Decreased activity may promote constipation, so you may want to add      more raw fruit to your diet, and be sure to increase fluid intake. Your doctor       may also order a stool softener.     Do not drink alcohol when taking pain medications.     If you are taking vitamins with iron, resume these as tolerated.     Do not smoke, as smoking delays healing and increases the risk of      Complications.    Medications      Please take stool softeners while taking narcotic medications to prevent constipation       You may take tylenol or ibuprofen (eg other NSAIDS) after surgery instead of or in addition to the prescribed narcotic pain medications.     Activities     Do not drive while taking narcotic pain medications and while experiencing any pain.      Do not drive until you have full range of motion with your arms and can stop the car       or swerve in an emergency.     Start walking the evening of surgery, this helps to reduce swelling and       lowers the chance of blood clots.     Refrain from vigorous activities for 2-6 weeks.  Increase activity gradually as tolerated.      After 2 weeks, you may perform lower body exercise, but must wait the full 6 weeks       prior to performing upper body exercise     Avoid lifting anything over 5 pounds for 2 weeks.     Resume social and employment activities in about 2 weeks (if not too strenuous).    Incision Care     You may shower the next day after surgery. The ACE wrap (if used) may be rewrapped as needed (if too tight or loose). Use it for support and may subtitute with a sports bra if preferred.      Avoid exposing scars to sun for at least 12 months.     Always use a strong sunblock, if sun exposure is unavoidable (SPF 50 or      greater).     Keep the tape on  "incisions until it starts to curl up on the ends, and then gently remove them.        You may use moisturizing cream (eg Aquaphor or Vaseline) at 10 days to help the tape come off. By two weeks,      you may pull off the tape off the incisions if still in place.     Wear your surgical bra/wrap 24/7 as directed for 4 weeks.     Avoid bras with stays and underwires for 4-6 weeks.     You may pad the incisions with gauze for comfort (panty liners also work well as they        are absorbent and inexpensive).     Inspect daily for signs of infection.     No tub soaking, bathing, or swimming until wounds are healed.     If your breast skin is dry after surgery, you can apply a moisturizer several times a       day.     What to Expect     Despite layered closing of your incisions, there will be some oozing       of tissue fluid from incision sites. This is expected, especially for the first 2 days or so.  This will soak up on the gauze and the bra       to look like it is more than it really is. If the draining continues past 2 days, with pain, discomfort, and large volumes, please call the clinic.      Most of the higher discomfort will subside after the first few days.     You may experience temporary soreness, bruising, swelling and tightness in the       breasts as well as discomfort in the incision area.     You may have have normal sensation in the nipples. This may be more or less than usual, and usually returns over a couple of months.     Your first menstruation following surgery may cause your breasts to swell and hurt.     You may have random shooting pains, tingling, or other strange sensations in the skin for a few months. These will subside.    Appearance     Most of the discoloration and swelling will subside in 2-4 weeks.     Your breasts will feel firm to the touch initially, but will soften with time.     A more natural shape will occur as the breasts \"settle\" in a slightly lower position over the first " few months.     Scars may be red and thick for 6-12 months (longer in lighter-skinned patients).  In time, these usually soften and fade.    Follow-Up Care     Typically, you will have a post op check at 1-2 weeks, and again with your surgeon in another month.    When to Call     If you have increased swelling or bruising, particular one side greater than the other.     If swelling and redness persist after a few days.     If you have increased redness along the incision.     If you have severe or increased pain not relieved by medication.     If you have any side effects to medications; such as, rash, nausea,      headache, vomiting, or constipation.     If you have an oral temperature over 100.4 degrees.     If you have any yellowish or greenish drainage from the incisions or      notice a foul odor.     If you have bleeding from the incisions that is difficult to control with      light pressure.     If you have loss of feeling or motion.     Any unanswered concern.    For Medical Questions, Please Call:     [phone], Monday - Friday, 8 a.m. - 4:30 p.m.     After hours and on weekends, call Hospital Paging at [phone] and      ask for the Plastic Surgeon on call.    [facility]  Home Care Following Anesthesia  For 24 hours after surgery:  Get plenty of rest.  A responsible adult must stay with you for at least 24 hours after you leave the surgery center.  Do not drive or use heavy equipment.  If you have weakness or tingling, don't drive or use heavy equipment until this feeling goes away.   Do not drink alcohol.   Avoid strenuous or risky activities.  Ask for help when climbing stairs.  You may feel lightheaded.  IF so, sit for a few minutes before standing.  Have someone help you get up.   If you have nausea (feel sick to your stomach): Drink only clear liquids such as apple juice, ginger ale, broth or 7-Up.  Rest may also help.  Be sure to drink enough fluids.  Move  to a regular diet as you feel able.   You may have a slight fever.  Call the doctor if your fever is over 100 F (37.7 C) (taken under the tongue) or lasts longer than 24 hours.  You may have a dry mouth, a sore throat, muscle aches or trouble sleeping. These should go away after 24 hours.  Do not make important or legal decisions.   It is recommended to avoid smoking.               Tips for taking pain medications  To get the best pain relief possible, remember these points:  Take pain medications as directed, before pain becomes severe.  Pain medication can upset your stomach: taking it with food may help.  Constipation is a common side effect of pain medication. Drink plenty of  fluids.  Eat foods high in fiber. Take a stool softener if recommended by your doctor or pharmacist.  Do not drink alcohol, drive or operate machinery while taking pain medications.  Ask about other ways to control pain, such as with heat, ice or relaxation.    Tylenol/Acetaminophen Consumption    If you feel your pain relief is insufficient, you may take Tylenol/Acetaminophen in addition to your narcotic pain medication.   Be careful not to exceed 4,000 mg of Tylenol/Acetaminophen in a 24 hour period from all sources.  If you are taking extra strength Tylenol/acetaminophen (500 mg), the maximum dose is 8 tablets in 24 hours.  If you are taking regular strength acetaminophen (325 mg), the maximum dose is 12 tablets in 24 hours.    Call a doctor for any of the following:  Signs of infection (fever, growing tenderness at the surgery site, a large amount of drainage or bleeding, severe pain, foul-smelling drainage, redness, swelling).  It has been over 8 to 10 hours since surgery and you are still not able to urinate (pass water).  Headache for over 24 hours.  Signs of Covid-19 infection (temperature over 100 degrees, shortness of breath, cough, loss of taste/smell, generalized body aches, persistent headache, chills, sore throat,  nausea/vomiting/diarrhea)  Your doctor is:  Dr. Jules Rodriguez, Plastic Surgery: [phone]                    Or dial [phone] and ask for the resident on call for:  Plastics  For emergency care, call the:  [place] [facility]:  [phone] (TTY for hearing impaired: [phone])

## 2023-12-07 NOTE — ANESTHESIA PROCEDURE NOTES
Airway       Patient location during procedure: OR  Staff -        Performed By: CRNA  Consent for Airway        Urgency: elective  Indications and Patient Condition       Indications for airway management: sherie-procedural       Induction type:intravenous       Mask difficulty assessment: 1 - vent by mask    Final Airway Details       Final airway type: supraglottic airway    Supraglottic Airway Details        Type: LMA       Brand: I-Gel       LMA size: 4    Post intubation assessment        Placement verified by: capnometry        Number of attempts at approach: 1       Number of other approaches attempted: 0       Secured with: tape       Ease of procedure: easy       Dentition: Intact and Unchanged

## 2023-12-07 NOTE — ANESTHESIA CARE TRANSFER NOTE
Patient: Bria Davis    Procedure: Procedure(s):  Left breast reconstruction with local flaps and right breast reduction for symmetry       Diagnosis: Ductal carcinoma in situ (DCIS) of left breast [D05.12]  Breast abscess [N61.1]  Diagnosis Additional Information: No value filed.    Anesthesia Type:   General     Note:    Oropharynx: spontaneously breathing  Level of Consciousness: awake  Oxygen Supplementation: face mask  Level of Supplemental Oxygen (L/min / FiO2): 8  Independent Airway: airway patency satisfactory and stable  Dentition: dentition unchanged  Vital Signs Stable: post-procedure vital signs reviewed and stable  Report to RN Given: handoff report given  Patient transferred to: PACU    Handoff Report: Identifed the Patient, Identified the Reponsible Provider, Reviewed the pertinent medical history, Discussed the surgical course, Reviewed Intra-OP anesthesia mangement and issues during anesthesia, Set expectations for post-procedure period and Allowed opportunity for questions and acknowledgement of understanding  Vitals:  Vitals Value Taken Time   BP     Temp     Pulse     Resp     SpO2         Electronically Signed By: ALYSON Chatman CRNA  December 7, 2023  12:04 PM

## 2023-12-07 NOTE — OP NOTE
PREOPERATIVE DIAGNOSIS: S/p left-sided lumpectomy for breast cancer and significant wound healing issues of the 9 o'clock position of the left breast with a nonhealing wound and breast hypertrophy.    POSTOPERATIVE DIAGNOSIS: As above    PROCEDURES:   1.  Left breast reconstruction with excision of the underlying skin and breast tissue chronic nonhealing wound, local tissue rearrangement of the underlying breast tissue to fill the dead space in the medial aspect of the breast measuring approximately 10 x 10 cm, local tissue rearrangement of the inferior pole of the breast with skin and breast tissue to reconstruct the medial portion of the breast measuring approximately 10 x 10 cm, inferior pedicle inverted T skin closure mastopexy closure technique breast reduction.    2.  Right breast symmetry enhancement breast reduction with superior medial pedicle inverted T skin closure breast reduction technique.     SURGEON: Jules Rodriguez MD      RESIDENT: Julia Barba MD     ANESTHESIA: General anesthesia with endotracheal intubation.      COMPLICATIONS: Nil.      DRAINS: None    SPECIMENS: 1.  Right-sided breast tissue for permanent measuring 1203 g; 2.  Left breast tissue for permanent measuring 526 g; 3.  Left breast original lumpectomy site sent separately for permanent measuring 121 g.     BLOOD LOSS: 300 mL        DESCRIPTION OF PROCEDURE: After informed consent was taken from the patient, the proper site and procedure was ascertained with them and they were appropriately marked, they were taken to the operating room. They were placed in a supine position with their knees comfortably flexed with pillows underneath them, and pneumoboots placed and running prior to induction of anesthesia. Preoperative antibiotics were given in the OR and appropriately redosed during the case. General anesthesia was administered without any complications.      The patient had a nonhealing wound in the 9 o'clock position of the  left breast.  This measured approximately 10 x 6 cm.  The wound went down towards the chest wall.  There was no infection.  The patient also was large breasted and had symptomatic breast hypertrophy.  Patient ideally wanted the wound healed and the breasts smaller around a C to D cup.  To accomplish this my plan was to do an inverted T skin closure breast reduction and used the internal breast tissue to fill the dead space in the medial aspect of the breast that was missing from the lumpectomy, and to use the inferior pole skin that would normally be excised during a breast reduction and use that as a local tissue rearrangement for the skin deficit in the medial aspect of the reconstructed breast.  I decided to use an inferior pedicle for the nipple given the fact that medially the breast tissue had been excised from the lumpectomy and I could not use a superior medial pedicle.  For symmetry purposes a right-sided reduction will be done to give her symmetric result to the left side.    I began on the left side.  I began by first remarking the inverted T paten on the breast.  I cut out the wound completely.  I then cut the inverted T pattern but did not remove the skin this included the medial aspect of the inferior pole, the central portion including the nipple and its pedicle, and the inferior lateral portion.  The medial superior and lateral aspects of the breast were then elevated off the chest wall.  I then turned my attention to tailor tacking the closure and estimated the breast tissue that needed to remove laterally inferior laterally and superiorly.  I used the breast tissue in the superior pole to the nipple released it keeping it attached to the chest wall and about a 10 x 10 cm tissue volume.  And this was rotated into the medial aspect of the breast and sewn down with 0 PDS suture to fill the medial aspect.  This filled the deficit area that had been created from the previous lumpectomy.  I then closed  the left breast in an inverted T fashion with the remaining tissue.  The inferior medial aspect of the breast that normally would have been excised was used as a skin flap to fill in the medial aspect of the breast that had been excised to remove the wound.  This was approximately 10 x 10 cm.  An areolar opening was made for the inferior pedicle nipple to come through.  It was pink and viable.  Once this breast had been reconstructed I turned my attention to the right side.  A superior medial pedicle was marked out de-epithelialized and the pedicle dissected to release it.  The inferior medial inferior and inferior lateral and lateral aspects of the breast were excised according to a vertical pattern reduction to give symmetry to the left breast.  Hemostasis was ensured.  I tailor tacked closure of the right breast was carried out.  Overall symmetry was good.  Areolar opening was made in the areola retrieved.  I then closed the right side with 2-0 Monocryl suture in a deep dermal layer and 3-0 Stratafix suture for the skin.  The left breast was similarly also sewn with the same sutures to accomplish closure.  At the end of the case both nipples were pink overall symmetry was good.  Surgical tape and glue was placed over all the incisions.  She was wrapped with an Ace wrap.  The patient tolerated the procedure well.  All counts were correct at the end of the case. The patient was extubated and sent to recovery room in stable condition.

## 2023-12-07 NOTE — OR NURSING
Patient received left side Pectoralis nerve block with Exparel. Fentanyl 50mcg given. Tolerated procedure well.

## 2023-12-07 NOTE — ANESTHESIA PROCEDURE NOTES
Pectoralis Procedure Note    Pre-Procedure   Staff -        Anesthesiologist:  Eriberto Cunningham MD       Resident/Fellow: Farrah Rutherford MD       Performed By: resident       Location: pre-op       Pre-Anesthestic Checklist: patient identified, IV checked, site marked, risks and benefits discussed, informed consent, monitors and equipment checked, pre-op evaluation, at physician/surgeon's request and post-op pain management  Timeout:       Correct Patient: Yes        Correct Procedure: Yes        Correct Site: Yes        Correct Position: Yes        Correct Laterality: Yes        Site Marked: Yes  Procedure Documentation  Procedure: Pectoralis             Pectoralis I and Pectoralis II       Diagnosis: POST OPERATIVE PAIN       Laterality: left       Patient Position: supine       Patient Prep/Sterile Barriers: sterile gloves, mask       Skin prep: Chloraprep       Needle Type: short bevel       Needle Gauge: 21.        Needle Length (millimeters): 110        Ultrasound guided       1. Ultrasound was used to identify targeted nerve, plexus, vascular marker, or fascial plane and place a needle adjacent to it in real-time.       2. Ultrasound was used to visualize the spread of anesthetic in close proximity to the above referenced structure.       3. A permanent image is entered into the patient's record.    Assessment/Narrative         The placement was negative for: blood aspirated, painful injection and site bleeding       Paresthesias: No.       Bolus given via needle. no blood aspirated via catheter.        Secured via.        Insertion/Infusion Method: Single Shot       Complications: none       Injection made incrementally with aspirations every 5 mL.    Medication(s) Administered   Bupivacaine 0.25% PF (Infiltration) - Infiltration   20 mL - 12/7/2023 8:35:00 AM  Bupivacaine liposome (Exparel) 1.3% LA inj susp (Infiltration) - Infiltration   10 mL - 12/7/2023 8:35:00 AM   Comments:  Exparel 133 mg      FOR  "[facility] ONLY:   Pain Team Contact information: please page the Pain Team Via [employer]. Search \"Pain\". During daytime hours, please page the attending first. At night please page the resident first.      "

## 2023-12-07 NOTE — BRIEF OP NOTE
[facility]    Brief Operative Note    Pre-operative diagnosis: Ductal carcinoma in situ (DCIS) of left breast [D05.12]  Breast abscess [N61.1]  Post-operative diagnosis same    Procedure: Left breast reconstruction with local flaps and right breast reduction for symmetry, Bilateral - Breast    Surgeon: Surgeon(s) and Role:     * DELORIS Rodriguez MD - Primary     * Julia Barba MD - Fellow - Assisting  Anesthesia: General with Block   Estimated Blood Loss: 300 mL from 12/7/2023  8:56 AM to 12/7/2023 12:05 PM      Drains: None  Specimens:   ID Type Source Tests Collected by Time Destination   1 : Original Cancer Lumpectomy Site Left Breast Tissue Breast, Left SURGICAL PATHOLOGY EXAM DELORIS Rodriguez MD 12/7/2023 10:20 AM    2 : Right Breast Tissue; weight: 1203g Tissue Breast, Right SURGICAL PATHOLOGY EXAM DELORIS Rodriguez MD 12/7/2023 11:16 AM    3 : Left Breast Tissue; weight: 526g Tissue Breast, Left SURGICAL PATHOLOGY EXAM DELORIS Rodriguez MD 12/7/2023 11:16 AM      Findings: left breast wound excised, left inferior pedicle, right superiomedial pedicle   Complications: None.  Implants: * No implants in log *

## 2023-12-07 NOTE — ANESTHESIA POSTPROCEDURE EVALUATION
Patient: Bria Davis    Procedure: Procedure(s):  Left breast reconstruction with local flaps and right breast reduction for symmetry       Anesthesia Type:  General    Note:  Disposition: Outpatient   Postop Pain Control: Uneventful            Sign Out: Well controlled pain   PONV: No   Neuro/Psych: Uneventful            Sign Out: Acceptable/Baseline neuro status   Airway/Respiratory: Uneventful            Sign Out: Acceptable/Baseline resp. status   CV/Hemodynamics: Uneventful            Sign Out: Acceptable CV status; No obvious hypovolemia; No obvious fluid overload   Other NRE: NONE   DID A NON-ROUTINE EVENT OCCUR? No       Last vitals:  Vitals Value Taken Time   /71 12/07/23 1245   Temp 36.1  C (97  F) 12/07/23 1245   Pulse 71 12/07/23 1252   Resp 11 12/07/23 1254   SpO2 99 % 12/07/23 1254   Vitals shown include unfiled device data.    Electronically Signed By: Eriberto Cunningham MD  December 7, 2023  1:36 PM

## 2023-12-08 ENCOUNTER — PATIENT OUTREACH (OUTPATIENT)
Dept: PLASTIC SURGERY | Facility: CLINIC | Age: 63
End: 2023-12-08
Payer: COMMERCIAL

## 2023-12-08 DIAGNOSIS — D05.12 DUCTAL CARCINOMA IN SITU (DCIS) OF LEFT BREAST: Primary | ICD-10-CM

## 2023-12-08 RX ORDER — TRAMADOL HYDROCHLORIDE 50 MG/1
50 TABLET ORAL EVERY 6 HOURS PRN
Qty: 20 TABLET | Refills: 0 | Status: SHIPPED | OUTPATIENT
Start: 2023-12-08 | End: 2023-12-15

## 2023-12-08 RX ORDER — FLUCONAZOLE 150 MG/1
150 TABLET ORAL ONCE
Qty: 1 TABLET | Refills: 0 | Status: SHIPPED | OUTPATIENT
Start: 2023-12-08 | End: 2023-12-08

## 2023-12-08 NOTE — TELEPHONE ENCOUNTER
Spoke with pt, she had surgery yesterday and is having itching on her face that is quite severe. She thinks it is from the oxycodone medication. She has been taking one tab every 3 hours since surgery (estimates she has had about 7 doses), her face, nose, scalp, entire head are very itchy. Denies swelling or shortness of breath, denies rash on her face. She can take tylenol and ibuprofen at baseline, feels she does need something more than OTC pain med at this point. Discussed stopping the oxy, she can take benadryl and use cold therapy to her face for the itching.     She is also feeling like she has a vaginal yeast infection, states she gets this from abx and she received IV abx during surgery. She would like Rx for Diflucan to have on hand over the weekend to self start if needed.     Pharmacy updated, [facility]. I will follow up with her via MyChart to clarify instructions for OTC pain med and Rx pain med for breakthrough pain.

## 2023-12-13 ENCOUNTER — OFFICE VISIT (OUTPATIENT)
Dept: PLASTIC SURGERY | Facility: CLINIC | Age: 63
End: 2023-12-13
Payer: COMMERCIAL

## 2023-12-13 VITALS
BODY MASS INDEX: 43.5 KG/M2 | SYSTOLIC BLOOD PRESSURE: 137 MMHG | DIASTOLIC BLOOD PRESSURE: 78 MMHG | OXYGEN SATURATION: 96 % | WEIGHT: 245.5 LBS | HEIGHT: 63 IN | HEART RATE: 80 BPM | TEMPERATURE: 98.2 F

## 2023-12-13 DIAGNOSIS — Z98.890 S/P BREAST RECONSTRUCTION, LEFT: Primary | ICD-10-CM

## 2023-12-13 PROCEDURE — 99024 POSTOP FOLLOW-UP VISIT: CPT | Performed by: PLASTIC SURGERY

## 2023-12-13 RX ORDER — TRAMADOL HYDROCHLORIDE 50 MG/1
50 TABLET ORAL EVERY 6 HOURS PRN
Qty: 20 TABLET | Refills: 0 | Status: SHIPPED | OUTPATIENT
Start: 2023-12-13 | End: 2023-12-20

## 2023-12-13 ASSESSMENT — PAIN SCALES - GENERAL: PAINLEVEL: MODERATE PAIN (5)

## 2023-12-13 NOTE — LETTER
12/13/2023       RE: Bria Davis  [address] [facility] [id number]     Dear Colleague,    Thank you for referring your patient, Bria Davis, to the [facility] [place] at [facility]. Please see a copy of my visit note below.    PRESENTING COMPLAINT:  Post-operative visit s/p left breast reconstruction after lumpectomy and right breast reduction done 12/7/2023     HISTORY OF PRESENTING COMPLAINT: The patient is here for post-operative visit.  The patient is being seen in the presence of my nurse.     Overall doing well.  Pain is still a factor.  Pathology not back yet.    On exam.  Vital signs stable afebrile.  Both breasts are aesthetic symmetric healing and well.  No evidence for infection seroma or hematoma.  Both nipples are pink and viable.  Overall contours are excellent.     ASSESSMENT AND PLAN:  Based upon the above findings, the patient is here for post-operative visit.     Healing well.  Reassured patient.  Refilled her tramadol prescription.  See us back in 2 to 3 weeks.  Is seeing radiation oncology in the coming days.  If radiation is deemed necessary to the left breast, my advice would be to wait a total of 6 weeks from surgery and then begin radiation therapy.    All questions were answered.  The patient was happy with the visit.      Again, thank you for allowing me to participate in the care of your patient.      Sincerely,    DELORIS Rodriguez MD

## 2023-12-13 NOTE — NURSING NOTE
"Chief Complaint   Patient presents with    Surgical Followup     1 week post-op, DOS 12/7/23.       Vitals:    12/13/23 1054   BP: 137/78   BP Location: Left arm   Patient Position: Sitting   Cuff Size: Adult Large   Pulse: 80   Temp: 98.2  F (36.8  C)   TempSrc: Oral   SpO2: 96%   Weight: 111.4 kg (245 lb 8 oz)   Height: 1.6 m (5' 3\")       Body mass index is 43.49 kg/m .    Patient reports moderate L breast pain (5/10).    Angel Arango, EMT    "

## 2023-12-13 NOTE — PROGRESS NOTES
PRESENTING COMPLAINT:  Post-operative visit s/p left breast reconstruction after lumpectomy and right breast reduction done 12/7/2023     HISTORY OF PRESENTING COMPLAINT: The patient is here for post-operative visit.  The patient is being seen in the presence of my nurse.     Overall doing well.  Pain is still a factor.  Pathology not back yet.    On exam.  Vital signs stable afebrile.  Both breasts are aesthetic symmetric healing and well.  No evidence for infection seroma or hematoma.  Both nipples are pink and viable.  Overall contours are excellent.     ASSESSMENT AND PLAN:  Based upon the above findings, the patient is here for post-operative visit.     Healing well.  Reassured patient.  Refilled her tramadol prescription.  See us back in 2 to 3 weeks.  Is seeing radiation oncology in the coming days.  If radiation is deemed necessary to the left breast, my advice would be to wait a total of 6 weeks from surgery and then begin radiation therapy.    All questions were answered.  The patient was happy with the visit.

## 2023-12-18 ENCOUNTER — PRE VISIT (OUTPATIENT)
Dept: RADIATION THERAPY | Facility: OUTPATIENT CENTER | Age: 63
End: 2023-12-18

## 2023-12-18 ENCOUNTER — PATIENT OUTREACH (OUTPATIENT)
Dept: ONCOLOGY | Facility: CLINIC | Age: 63
End: 2023-12-18
Payer: COMMERCIAL

## 2023-12-18 ENCOUNTER — OFFICE VISIT (OUTPATIENT)
Dept: RADIATION THERAPY | Facility: OUTPATIENT CENTER | Age: 63
End: 2023-12-18
Attending: SURGERY
Payer: COMMERCIAL

## 2023-12-18 VITALS
WEIGHT: 242.4 LBS | OXYGEN SATURATION: 98 % | SYSTOLIC BLOOD PRESSURE: 156 MMHG | DIASTOLIC BLOOD PRESSURE: 89 MMHG | BODY MASS INDEX: 42.94 KG/M2 | HEART RATE: 83 BPM | RESPIRATION RATE: 16 BRPM

## 2023-12-18 DIAGNOSIS — D05.12 DUCTAL CARCINOMA IN SITU (DCIS) OF LEFT BREAST: ICD-10-CM

## 2023-12-18 DIAGNOSIS — J45.20 MILD INTERMITTENT ASTHMA, UNCOMPLICATED: ICD-10-CM

## 2023-12-18 RX ORDER — MONTELUKAST SODIUM 10 MG/1
10 TABLET ORAL AT BEDTIME
Qty: 90 TABLET | Refills: 3 | Status: SHIPPED | OUTPATIENT
Start: 2023-12-18 | End: 2024-09-18

## 2023-12-18 NOTE — PROGRESS NOTES
Department of Radiation Oncology  Radiation Therapy Center  [facility]  [address], [address]  [place] [address]  [phone]       Consultation Note    Name: Bria Davis MRN: [id number]   : 1960   Date of Service: 2023  Referring: Dr. 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  Final pathology demonstrated DCIS, 37 mm, grade 1, negative margin after reexcision, ER positive, pathologic Tis Nx.  Evaluate potential role for radiation therapy.    History of Present Illness   Ms. Davis is a 63 year old female with a diagnosis of left breast DCIS.    The patient was noted to have a mammographic abnormality in the left breast prompting further evaluation.  On 2023 left breast biopsy at the 10 o'clock position, 6 cm from the nipple was obtained.  Pathology demonstrated atypical ductal hyperplasia in 2 foci, no invasive disease noted.  On 10/6/2023 the patient underwent left breast lumpectomy.  Final pathology demonstrated DCIS, 37 mm, grade 1, positive DCIS margin (medial) ER positive, pathologic Tis Nx.  The patient underwent reexcision on 2023 by Dr. Olson.  Final pathology demonstrated residual DCIS, grade 1, 15 mm in size, negative final margin (final new medial margin was 1.5 mm).  Patient has had wound healing issues postoperatively.  He is required left breast excision in 10/21/2023 for infection and necrotic change.  On 2023 the patient underwent reconstruction after lumpectomy by Dr. Rodriguez.  She continues to heal.  Patient presents today to discuss next steps in management.    Patient reports ongoing healing process from her recent surgery.  Has noted slight increase in pain and redness more recently and will see plastic surgery team tomorrow.  No prior radiation.  No pacemaker.    Past Medical History:   Past Medical History:   Diagnosis Date    Arthritis     Asthma     Chronic  kidney disease     Diabetes mellitus (H)     Ductal carcinoma in situ (DCIS) of left breast     Esophageal reflux     Fibromyalgia     Hypertension     Obese     Other chronic sinusitis     PONV (postoperative nausea and vomiting)     Restless legs syndrome (RLS)        Past Surgical History:   Past Surgical History:   Procedure Laterality Date    BIOPSY  9/7/2023    BIOPSY BREAST NEEDLE LOCALIZATION Left 10/6/2023    Procedure: BIOPSY, LEFT BREAST, WITH NEEDLE LOCALIZATION;  Surgeon: Bg Bryant MD;  Location: [facility]    COLONOSCOPY  01/31/2002    COLONOSCOPY  10/26/2012    Procedure: COLONOSCOPY;  Colonoscopy;  Surgeon: Margret Sales MD;  Location: [facility]    COLONOSCOPY N/A 11/08/2019    Procedure: COLONOSCOPY, WITH POLYPECTOMY AND BIOPSY;  Surgeon: Ariel Heck MD;  Location: [facility]    CV LEFT HEART CATH N/A 09/12/2019    Procedure: Left Heart Cath;  Surgeon: Mike Sanon MD;  Location:  HEART CARDIAC CATH LAB    CV LEFT VENTRICULOGRAM N/A 09/12/2019    Procedure: Left Ventriculogram;  Surgeon: Mike Sanon MD;  Location:  HEART CARDIAC CATH LAB    ENT SURGERY      ESOPHAGOSCOPY, GASTROSCOPY, DUODENOSCOPY (EGD), COMBINED N/A 11/08/2019    Procedure: ESOPHAGOGASTRODUODENOSCOPY, WITH BIOPSY;  Surgeon: Ariel Heck MD;  Location: [facility]    EYE SURGERY  2022    GYN SURGERY      Hydroablation 2007, polyp removal 2018    INJECT EPIDURAL LUMBAR  12/07/2010    INJECT EPIDURAL LUMBAR performed by GENERIC ANESTHESIA PROVIDER at [facility] OR    INJECT EPIDURAL LUMBAR  01/17/2011    INJECT EPIDURAL LUMBAR performed by GENERIC ANESTHESIA PROVIDER at [facility]    IRRIGATION AND DEBRIDEMENT BREAST Left 10/21/2023    Procedure: Irrigation and debridement breast;  Surgeon: Nemesio Arreola MD;  Location: [facility] OR    left long finger pulley release Left 07/2020    LUMPECTOMY BREAST Left 11/13/2023    Procedure: Re-excision of left lumpectomy site;  Surgeon: Arron Olson MD;  [place] [facility] [place]    MAMMOPLASTY  REDUCTION BILATERAL Bilateral 12/7/2023    Procedure: Left breast reconstruction with local flaps and right breast reduction for symmetry;  Surgeon: DELORIS Rodriguez MD;  Location: [facility] [place] [facility]    RELEASE CARPAL TUNNEL  06/19/2012    Procedure: RELEASE CARPAL TUNNEL;  Right Carpal Tunnel Release;  Surgeon: Mike Sparrow MD;  Location: [facility]    RELEASE CARPAL TUNNEL  11/09/2012    Procedure: RELEASE CARPAL TUNNEL;  Left Carpal Tunnel Release;  Surgeon: Mike Sparrow MD;  Location: [facility]    REPAIR TENDON ACHILLES Left 12/26/2019    Procedure: Left Foot: Achilles Tendon Repair/Remodel/Reattachment; calcaneal prominence removal;  Surgeon: Felix Watkins DPM;  Location: [facility]    SURGICAL HISTORY OF -   04/10/2000    bilateral total ethmoidectomies, bilateral maxillary antrostomies, bilateral SMR of inferior turbinates, reduction of lt turbinate porter bullosa       Chemotherapy History:  None    Radiation History:  None    Pregnant: Not Applicable  Implanted Cardiac Devices: No    Medications:  Current Outpatient Medications   Medication    acetaminophen (TYLENOL) 325 MG tablet    Ascorbic Acid (VITAMIN C) 500 MG CAPS    aspirin (ASA) 81 MG EC tablet    blood glucose (NO BRAND SPECIFIED) lancets standard    blood glucose (NO BRAND SPECIFIED) test strip    blood glucose monitoring (NO BRAND SPECIFIED) meter device kit    Calcium Carb-Cholecalciferol (CALCIUM-VITAMIN D) 600-400 MG-UNIT TABS    esomeprazole (NEXIUM) 40 MG DR capsule    ferrous sulfate 140 (45 Fe) MG TBCR CR tablet    fluticasone-salmeterol (ADVAIR DISKUS) 100-50 MCG/ACT inhaler    gabapentin (NEURONTIN) 300 MG capsule    hydrochlorothiazide (HYDRODIURIL) 12.5 MG tablet    insulin aspart (NOVOLOG FLEXPEN) 100 UNIT/ML pen    insulin glargine U-300 (TOUJEO MAX SOLOSTAR) 300 UNIT/ML (2 units dial) pen    insulin pen needle (BD PEDRO LUIS U/F) 32G X 4 MM miscellaneous    losartan (COZAAR) 100 MG tablet    metFORMIN (GLUCOPHAGE XR) 500 MG 24 hr  tablet    metoclopramide (REGLAN) 10 MG tablet    metoprolol succinate ER (TOPROL XL) 25 MG 24 hr tablet    Microlet Lancets MISC    montelukast (SINGULAIR) 10 MG tablet    Multiple Vitamins-Minerals (MULTIVITAMIN ADULTS 50+) TABS    ondansetron (ZOFRAN ODT) 4 MG ODT tab    pseudoePHEDrine (SUDAFED) 30 MG tablet    rosuvastatin (CRESTOR) 10 MG tablet    Semaglutide, 2 MG/DOSE, (OZEMPIC, 2 MG/DOSE,) 8 MG/3ML pen    senna-docusate (SENOKOT-S/PERICOLACE) 8.6-50 MG tablet    sodium chloride, PF, 0.9% PF flush    traMADol (ULTRAM) 50 MG tablet    triamcinolone (KENALOG) 0.1 % external cream     Current Facility-Administered Medications   Medication    lidocaine (PF) (XYLOCAINE) 1 % injection 1 mL    triamcinolone (KENALOG-40) injection 40 mg    triamcinolone (KENALOG-40) injection 40 mg         Allergies:     Allergies   Allergen Reactions    Oxycodone Itching     Facial itching     Lisinopril Cough         Family History:  Family History   Problem Relation Age of Onset    Hypertension Mother     Diabetes Mother     Respiratory Mother         asthma-COPD    Hypertension Father     Heart Disease Father     Cerebrovascular Disease Father     Cardiovascular Father     Depression Sister     Respiratory Sister         copd    Chronic Obstructive Pulmonary Disease Sister     Depression Sister     Chronic Obstructive Pulmonary Disease Sister     Anxiety Disorder Sister     Depression Sister     Cancer Brother     Diabetes Brother     Respiratory Brother         copd    Chronic Obstructive Pulmonary Disease Brother     Chronic Obstructive Pulmonary Disease Brother     Kidney failure Brother     Depression Brother     Asthma Brother     Breast Cancer Maternal Grandmother     Diabetes Son     Anxiety Disorder Other     Obesity Other     Obesity Other     Anesthesia Reaction No family hx of     Clotting Disorder No family hx of        Review of Systems   A 10-point review of systems was performed. Pertinent findings are noted in  the HPI.    Physical Exam   ECOG Status: 1    Vitals:  BP (!) 156/89 (BP Location: Left arm, Patient Position: Sitting, Cuff Size: Adult Large)   Pulse 83   Resp 16   Wt 110 kg (242 lb 6.4 oz)   LMP 01/14/2016 (Approximate)   SpO2 98%   BMI 42.94 kg/m      Gen: Alert, in NAD  Neck: Full ROM, supple, no palpable adenopathy  Pulm: No wheezing, stridor or respiratory distress  CV: Extremities are warm and well-perfused, no cyanosis, no pedal edema  Abdominal: Normal bowel sounds, soft, nontender, no masses  Musculoskeletal: No issues with ROM or lymphedema   Skin: Normal color and turgor  Neuro: A/Ox3, CN II-XII intact, normal gait    Imaging/Path/Labs   Imaging:   Per HPI    Path:   9/7/23  Left breast, 4.0 cm mass, 10:00, 6.0 cm from nipple, ultrasound guided 14 gauge needle core biopsies:  - Two foci of atypical ductal hyperplasia, 1.5 mm and 2.0 mm.    - Fibrocystic change, with apocrine metaplasia, florid usual ductal hyperplasia, columnar cell change.  - No evidence of invasive malignancy.  - No evidence of lymphovascular invasion.    10/6/23  A.  Left breast excisional lumpectomy, oriented breast mass at 10:00, 6 cm from the nipple:    - Negative for invasive carcinoma     - Ductal carcinoma in situ, papillary, cribriform and solid    - Grade:  Grade 1    - Tumor size: 37 x 32 x 20 mm    - Margins of resection status for carcinoma in-situ: POSITIVE MEDIAL MARGIN     Margin Status DCIS present at margin    Margin(s) Involved by DCIS Medial: Present at cauterized medial yellow inked margin in blocks A15, A16 and A17    Distance from DCIS to Anterior Margin 1.0 mm   Distance from DCIS to Posterior Margin 4.2 mm   Distance from DCIS to Superior Margin Greater than: 5 mm   Distance from DCIS to Inferior Margin Greater than: 5 mm   Distance from DCIS to Lateral Margin 1.9 mm        - Staging: pTis pN not assigned (no nodes submitted)    - Estrogen receptor:  POSITIVE (%, strong to weak nuclear staining)     "- Progesterone receptor:  Pending     - Additional histopathologic findings: Microcalcifications associated with malignant and benign breast tissue, florid usual type ductal hyperplasia, intraductal papillomas, and columnar cell change.    10/21/23  A. Skin, LEFT breast, \"necrotic skin\", excision:  - Coagulative necrosis of epidermis and dermis, with associated bacterial colonies  - Acute and chronic inflammation (patchy), extending from epidermis to subcutaneous adipose tissue  - Fibrin and acute inflammation at deep edge of specimen  - No breast ducts or acini identified  - Negative for malignancy     B. LEFT breast, tissue, excision:  - Adipose tissue with scattered acute inflammation, admixed with fibrin  - No breast ducts or acini identified  - Negative for malignancy    11/13/23  LEFT breast, new medial margin, excision:  -Ductal carcinoma in situ (DCIS), nuclear grade 1, cribriform, micropapillary, and papillary type(s), residual after prior lumpectomy  -DCIS spans an estimated 15 mm  -No invasive carcinoma identified  -New medial margin is uninvolved by DCIS  -DCIS is 1.5 mm from the new medial margin  -Post-operative changes, including granulation tissue  -See commen        Assessment    Ms. Davis is a 63 year old female with a diagnosis of ductal carcinoma in situ of the left breast status post lumpectomy.  Final pathology demonstrated DCIS, 37 mm, grade 1, negative margin after reexcision, ER positive, pathologic Tis Nx.  Evaluate potential role for radiation therapy.    Plan   Today, we discussed that in general, adjuvant whole breast radiotherapy is recommended as it has been demonstrated to reduce tumor recurrence by 50%, which has been demonstrated in a  meta-analysis of DCIS patients treated with radiation over lumpectomy alone, despite no survival benefit  detected (EBCTCG, JNCI, 2010).  However, we also discussed that there is clinical evidence suggesting that highly selected patients with small, " low to intermediate grade DCIS lesions can be offered observation after lumpectomy rather than radiation. In a prospective, randomized study by the ECOG, patients with low/intermediate grade DCIS, median tumor size 6mm, resected with margins >3mm had ipsilateral breast tumor recurrence rates of 6% at 5 years (Radha, JCO, 2009). This is approximately a 1% recurrence rate per year. With regard to the the patient's relatively young age of 46, local recurrence wa estimated to be about 8%.  Another study RTOG 9804, also showed recurrence rates of approximately 1% per year without radiation in patients who had low risk DCIS (Bree et al, JCO, 2015, MIRANDA 2018).  Though radiation did significantly reduce recurrence rates (greater than 50% relative reduction), the absolute benefit was small, decreasing from 11.4% to 2.8% at 12 years. However, In Mrs. Davis's case, we discussed that at she would not meet eligibility criteria for the RTOG 9804 given her larger DCIS size as well as closer negative margin (3mm per RTOG 9804).  As such, I would recommend proceeding with adjuvant radiation therapy whole breast.    Today we also discussed the logistics of treatment planning and delivery in detail with the patient. We also discussed side effects, including short term risks of fatigue and skin reaction, and long term risks of pneumonitis, lung fibrosis, soft tissue fibrosis, skin changes, breast contour changes, rib fractures, cardiac damage, secondary cancers, lymphedema, and thyroid dysfunction. We described the use of 3D-conformal radiotherapy to minimize dose to the normal tissues, while adequately covering the target tissues, and the ability of this technique to decrease potential for toxicity.    With regards to sparing of the heart, we discussed the use of deep inspiration breath hold for treatment planning and delivery. This method has been shown to significantly reduce dose to the lung and heart as compared to  treatment with free breathing (Nimesh et al, AJCO, 2012). We discussed that this will involve 3D-conformal treatment planning, with contouring of the heart, and that limiting heart dose will be a high priority for treatment planning, as will dose to the contralateral breast and lung.     The patient is currently dealing with postoperative wound complication requiring management.  Will tentatively plan to see the patient in about 4 weeks to reassess and and consider CT simulation at that time.      Vidal Haddad MD  Department of Radiation Oncology  [facility]

## 2023-12-18 NOTE — NURSING NOTE
"REASON FOR APPOINTMENT   Newly diagnoses left breast DCIS, s/p lumpectomy and re-excision  Working with Dr. Olson and Dr. Rodriguez  Here to discuss radiation therapy    PERSONAL HISTORY OF CANCER   Previous Cancer ? no   Prior Radiation ? no   Prior Chemotherapy ? no   Prior Hormonal Therapy ? no     REFERRALS NEEDED  Medical oncology, order was placed on 10/16 by Dr. Olson, no appointment scheduled  Will re-refer for med onc today    VITALS  BP (!) 156/89 (BP Location: Left arm, Patient Position: Sitting, Cuff Size: Adult Large)   Pulse 83   Resp 16   Wt 110 kg (242 lb 6.4 oz)   LMP 01/14/2016 (Approximate)   SpO2 98%   BMI 42.94 kg/m      PACEMAKER/IMPLANTED CARDIAC DEVICE : no    PAIN  Improving, has not used tylenol or IBU recently  No edema, good rom    PSYCHOSOCIAL  Marital Status:   Patient lives in [place] with spouse Tam.  Number of children: 4  Working status: full time para for [employer]  Do you feel safe in your home? Yes    REVIEW OF SYSTEMS  Skin: bilateral breast incisions healing, left has some bruising and redness, will follow up with plastic surgeon tomorrow  Eyes: glasses  Ears/Nose/Throat: negative  Respiratory: asthma, controlled with meds, causes cough  Cardiovascular: negative  Gastrointestinal: negative  Genitourinary: frequency - \"due to the diabetes\"  Musculoskeletal: fibromyalgia  Neurologic: headaches - sees neurologist/due to fibromyalgia  Psychiatric: feeling anxious  Hematologic/Lymphatic/Immunologic: negative  Endocrine: diabetes    WOMEN ONLY  Any chance you may be pregnant: No    Radiation Oncology Patient Teaching    Current Concern: will need to meet with med onc and heal from surgery    Person involved with teaching: Patient and   Patient asked Questions: Yes  Patient was cooperative: Yes  Patient was receptive (willing to accept information given): Yes    Education Assessment  Comprehension ability: High  Knowledge level: Medium  Factors " affecting teaching: None    Response To Teaching  Verbalizes understanding    Do you have an advanced directive or living will? No  Are you DNR/DNI? No

## 2023-12-18 NOTE — PROGRESS NOTES
New Patient Oncology Nurse Navigator Note     Referring provider: Dr. Vidal Haddad      Referring Clinic/Organization: [facility] Radiation Therapy [facility] RAD Therapy      Referred to (specialty:) Medical Oncology     Requested provider (if applicable): NA     Date Referral Received: December 18, 2023     Evaluation for:  Breast cancer  D05.12 (ICD-10-CM) - Ductal carcinoma in situ (DCIS) of left breast     Clinical History (per Nurse review of records provided):      12/18/23 Seen by Dr. Haddad in consultation for radiation treatment.   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  Final pathology demonstrated DCIS, 37 mm, grade 1, negative margin after reexcision, ER positive, pathologic Tis Nx. The patient was noted to have a mammographic abnormality in the left breast prompting further evaluation.  On 9/7/2023 left breast biopsy at the 10 o'clock position, 6 cm from the nipple was obtained.  Pathology demonstrated atypical ductal hyperplasia in 2 foci, no invasive disease noted.  On 10/6/2023 the patient underwent left breast lumpectomy.  Final pathology demonstrated DCIS, 37 mm, grade 1, positive DCIS margin (medial) ER positive, pathologic Tis Nx.  The patient underwent reexcision on 11/13/2023 by Dr. Olson.  Final pathology demonstrated residual DCIS, grade 1, 15 mm in size, negative final margin (final new medial margin was 1.5 mm).  Patient has had wound healing issues postoperatively.  He is required left breast excision in 10/21/2023 for infection and necrotic change.  On 12/7/2023 the patient underwent reconstruction after lumpectomy by Dr. Rodriguez.  She continues to heal.  Patient presents today to discuss next steps in management. Patient reports ongoing healing process from her recent surgery.  Has noted slight increase in pain and redness more recently and will see plastic surgery team tomorrow.        Records Location: See Bookmarked material     Records Needed: NA      Additional testing needed prior to consult: NA    Payor: BCBS / Plan: [employer]BS [place] / Product Type: Indemnity /     Called patient to introduced myself and role as nurse navigator with [facility] Hematology/Oncology department and to inform them that we have received the referral for a diagnosis of DCIS from Dr. Haddad. Patient confirms they are aware of the referral and ready to schedule.  She only wants to be see at [facility]. Provided them with contact information for NPS and encourage them to call with any questions. Patient verbalized understanding of plan. Transferred patient to NPS to schedule.     Wendy Gutierrez, RN, BSN  [facility] Hematology/Oncology Nurse Navigator  [phone]

## 2023-12-18 NOTE — LETTER
2023         RE: Bria Davis  [address] [facility] [id number]        Dear Colleague,    Thank you for referring your patient, Bria Davis, to the [facility] RADIATION THERAPY CLINIC. Please see a copy of my visit note below.       Department of Radiation Oncology  Radiation Therapy Center  [facility]  [address], [address]  [place] [address]  [phone]       Consultation Note    Name: Bria Davis MRN: [id number]   : 1960   Date of Service: 2023  Referring: Dr. 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  Final pathology demonstrated DCIS, 37 mm, grade 1, negative margin after reexcision, ER positive, pathologic Tis Nx.  Evaluate potential role for radiation therapy.    History of Present Illness   Ms. Davis is a 63 year old female with a diagnosis of left breast DCIS.    The patient was noted to have a mammographic abnormality in the left breast prompting further evaluation.  On 2023 left breast biopsy at the 10 o'clock position, 6 cm from the nipple was obtained.  Pathology demonstrated atypical ductal hyperplasia in 2 foci, no invasive disease noted.  On 10/6/2023 the patient underwent left breast lumpectomy.  Final pathology demonstrated DCIS, 37 mm, grade 1, positive DCIS margin (medial) ER positive, pathologic Tis Nx.  The patient underwent reexcision on 2023 by Dr. Olson.  Final pathology demonstrated residual DCIS, grade 1, 15 mm in size, negative final margin (final new medial margin was 1.5 mm).  Patient has had wound healing issues postoperatively.  He is required left breast excision in 10/21/2023 for infection and necrotic change.  On 2023 the patient underwent reconstruction after lumpectomy by Dr. Rodriguez.  She continues to heal.  Patient presents today to discuss next steps in management.    Patient reports ongoing healing process from  her recent surgery.  Has noted slight increase in pain and redness more recently and will see plastic surgery team tomorrow.  No prior radiation.  No pacemaker.    Past Medical History:   Past Medical History:   Diagnosis Date     Arthritis      Asthma      Chronic kidney disease      Diabetes mellitus (H)      Ductal carcinoma in situ (DCIS) of left breast      Esophageal reflux      Fibromyalgia      Hypertension      Obese      Other chronic sinusitis      PONV (postoperative nausea and vomiting)      Restless legs syndrome (RLS)        Past Surgical History:   Past Surgical History:   Procedure Laterality Date     BIOPSY  9/7/2023     BIOPSY BREAST NEEDLE LOCALIZATION Left 10/6/2023    Procedure: BIOPSY, LEFT BREAST, WITH NEEDLE LOCALIZATION;  Surgeon: Bg Bryant MD;  Location: [facility]     COLONOSCOPY  01/31/2002     COLONOSCOPY  10/26/2012    Procedure: COLONOSCOPY;  Colonoscopy;  Surgeon: Margret Sales MD;  Location: [facility]     COLONOSCOPY N/A 11/08/2019    Procedure: COLONOSCOPY, WITH POLYPECTOMY AND BIOPSY;  Surgeon: Ariel Heck MD;  Location: [facility]     CV LEFT HEART CATH N/A 09/12/2019    Procedure: Left Heart Cath;  Surgeon: Mike Sanon MD;  Location:  HEART CARDIAC CATH LAB     CV LEFT VENTRICULOGRAM N/A 09/12/2019    Procedure: Left Ventriculogram;  Surgeon: Mike Sanon MD;  Location:  HEART CARDIAC CATH LAB     ENT SURGERY       ESOPHAGOSCOPY, GASTROSCOPY, DUODENOSCOPY (EGD), COMBINED N/A 11/08/2019    Procedure: ESOPHAGOGASTRODUODENOSCOPY, WITH BIOPSY;  Surgeon: Ariel Heck MD;  Location: [facility]     EYE SURGERY  2022     GYN SURGERY      Hydroablation 2007, polyp removal 2018     INJECT EPIDURAL LUMBAR  12/07/2010    INJECT EPIDURAL LUMBAR performed by GENERIC ANESTHESIA PROVIDER at [facility] OR     INJECT EPIDURAL LUMBAR  01/17/2011    INJECT EPIDURAL LUMBAR performed by GENERIC ANESTHESIA PROVIDER at [facility]     IRRIGATION AND DEBRIDEMENT BREAST Left 10/21/2023     Procedure: Irrigation and debridement breast;  Surgeon: Nemesio Arreola MD;  Location: [facility] OR     left long finger pulley release Left 07/2020     LUMPECTOMY BREAST Left 11/13/2023    Procedure: Re-excision of left lumpectomy site;  Surgeon: Arron Olson MD;  Location: [facility] [place] [place]     MAMMOPLASTY REDUCTION BILATERAL Bilateral 12/7/2023    Procedure: Left breast reconstruction with local flaps and right breast reduction for symmetry;  Surgeon: DELORIS Rodriguez MD;  Location: [facility] [place] [place]     RELEASE CARPAL TUNNEL  06/19/2012    Procedure: RELEASE CARPAL TUNNEL;  Right Carpal Tunnel Release;  Surgeon: Mike Sparrow MD;  Location: [facility]     RELEASE CARPAL TUNNEL  11/09/2012    Procedure: RELEASE CARPAL TUNNEL;  Left Carpal Tunnel Release;  Surgeon: Mike Sparrow MD;  Location: [facility]     REPAIR TENDON ACHILLES Left 12/26/2019    Procedure: Left Foot: Achilles Tendon Repair/Remodel/Reattachment; calcaneal prominence removal;  Surgeon: Felix Watkins DPM;  Location: [facility]     SURGICAL HISTORY OF -   04/10/2000    bilateral total ethmoidectomies, bilateral maxillary antrostomies, bilateral SMR of inferior turbinates, reduction of lt turbinate porter bullosa       Chemotherapy History:  None    Radiation History:  None    Pregnant: Not Applicable  Implanted Cardiac Devices: No    Medications:  Current Outpatient Medications   Medication     acetaminophen (TYLENOL) 325 MG tablet     Ascorbic Acid (VITAMIN C) 500 MG CAPS     aspirin (ASA) 81 MG EC tablet     blood glucose (NO BRAND SPECIFIED) lancets standard     blood glucose (NO BRAND SPECIFIED) test strip     blood glucose monitoring (NO BRAND SPECIFIED) meter device kit     Calcium Carb-Cholecalciferol (CALCIUM-VITAMIN D) 600-400 MG-UNIT TABS     esomeprazole (NEXIUM) 40 MG DR capsule     ferrous sulfate 140 (45 Fe) MG TBCR CR tablet     fluticasone-salmeterol (ADVAIR DISKUS) 100-50 MCG/ACT inhaler     gabapentin (NEURONTIN) 300 MG capsule      hydrochlorothiazide (HYDRODIURIL) 12.5 MG tablet     insulin aspart (NOVOLOG FLEXPEN) 100 UNIT/ML pen     insulin glargine U-300 (TOUJEO MAX SOLOSTAR) 300 UNIT/ML (2 units dial) pen     insulin pen needle (BD PEDRO LUIS U/F) 32G X 4 MM miscellaneous     losartan (COZAAR) 100 MG tablet     metFORMIN (GLUCOPHAGE XR) 500 MG 24 hr tablet     metoclopramide (REGLAN) 10 MG tablet     metoprolol succinate ER (TOPROL XL) 25 MG 24 hr tablet     Microlet Lancets MISC     montelukast (SINGULAIR) 10 MG tablet     Multiple Vitamins-Minerals (MULTIVITAMIN ADULTS 50+) TABS     ondansetron (ZOFRAN ODT) 4 MG ODT tab     pseudoePHEDrine (SUDAFED) 30 MG tablet     rosuvastatin (CRESTOR) 10 MG tablet     Semaglutide, 2 MG/DOSE, (OZEMPIC, 2 MG/DOSE,) 8 MG/3ML pen     senna-docusate (SENOKOT-S/PERICOLACE) 8.6-50 MG tablet     sodium chloride, PF, 0.9% PF flush     traMADol (ULTRAM) 50 MG tablet     triamcinolone (KENALOG) 0.1 % external cream     Current Facility-Administered Medications   Medication     lidocaine (PF) (XYLOCAINE) 1 % injection 1 mL     triamcinolone (KENALOG-40) injection 40 mg     triamcinolone (KENALOG-40) injection 40 mg         Allergies:     Allergies   Allergen Reactions     Oxycodone Itching     Facial itching      Lisinopril Cough         Family History:  Family History   Problem Relation Age of Onset     Hypertension Mother      Diabetes Mother      Respiratory Mother         asthma-COPD     Hypertension Father      Heart Disease Father      Cerebrovascular Disease Father      Cardiovascular Father      Depression Sister      Respiratory Sister         copd     Chronic Obstructive Pulmonary Disease Sister      Depression Sister      Chronic Obstructive Pulmonary Disease Sister      Anxiety Disorder Sister      Depression Sister      Cancer Brother      Diabetes Brother      Respiratory Brother         copd     Chronic Obstructive Pulmonary Disease Brother      Chronic Obstructive Pulmonary Disease Brother      Kidney  failure Brother      Depression Brother      Asthma Brother      Breast Cancer Maternal Grandmother      Diabetes Son      Anxiety Disorder Other      Obesity Other      Obesity Other      Anesthesia Reaction No family hx of      Clotting Disorder No family hx of        Review of Systems   A 10-point review of systems was performed. Pertinent findings are noted in the HPI.    Physical Exam   ECOG Status: 1    Vitals:  BP (!) 156/89 (BP Location: Left arm, Patient Position: Sitting, Cuff Size: Adult Large)   Pulse 83   Resp 16   Wt 110 kg (242 lb 6.4 oz)   LMP 01/14/2016 (Approximate)   SpO2 98%   BMI 42.94 kg/m      Gen: Alert, in NAD  Neck: Full ROM, supple, no palpable adenopathy  Pulm: No wheezing, stridor or respiratory distress  CV: Extremities are warm and well-perfused, no cyanosis, no pedal edema  Abdominal: Normal bowel sounds, soft, nontender, no masses  Musculoskeletal: No issues with ROM or lymphedema   Skin: Normal color and turgor  Neuro: A/Ox3, CN II-XII intact, normal gait    Imaging/Path/Labs   Imaging:   Per HPI    Path:   9/7/23  Left breast, 4.0 cm mass, 10:00, 6.0 cm from nipple, ultrasound guided 14 gauge needle core biopsies:  - Two foci of atypical ductal hyperplasia, 1.5 mm and 2.0 mm.    - Fibrocystic change, with apocrine metaplasia, florid usual ductal hyperplasia, columnar cell change.  - No evidence of invasive malignancy.  - No evidence of lymphovascular invasion.    10/6/23  A.  Left breast excisional lumpectomy, oriented breast mass at 10:00, 6 cm from the nipple:    - Negative for invasive carcinoma     - Ductal carcinoma in situ, papillary, cribriform and solid    - Grade:  Grade 1    - Tumor size: 37 x 32 x 20 mm    - Margins of resection status for carcinoma in-situ: POSITIVE MEDIAL MARGIN     Margin Status DCIS present at margin    Margin(s) Involved by DCIS Medial: Present at cauterized medial yellow inked margin in blocks A15, A16 and A17    Distance from DCIS to  "Anterior Margin 1.0 mm   Distance from DCIS to Posterior Margin 4.2 mm   Distance from DCIS to Superior Margin Greater than: 5 mm   Distance from DCIS to Inferior Margin Greater than: 5 mm   Distance from DCIS to Lateral Margin 1.9 mm        - Staging: pTis pN not assigned (no nodes submitted)    - Estrogen receptor:  POSITIVE (%, strong to weak nuclear staining)    - Progesterone receptor:  Pending     - Additional histopathologic findings: Microcalcifications associated with malignant and benign breast tissue, florid usual type ductal hyperplasia, intraductal papillomas, and columnar cell change.    10/21/23  A. Skin, LEFT breast, \"necrotic skin\", excision:  - Coagulative necrosis of epidermis and dermis, with associated bacterial colonies  - Acute and chronic inflammation (patchy), extending from epidermis to subcutaneous adipose tissue  - Fibrin and acute inflammation at deep edge of specimen  - No breast ducts or acini identified  - Negative for malignancy     B. LEFT breast, tissue, excision:  - Adipose tissue with scattered acute inflammation, admixed with fibrin  - No breast ducts or acini identified  - Negative for malignancy    11/13/23  LEFT breast, new medial margin, excision:  -Ductal carcinoma in situ (DCIS), nuclear grade 1, cribriform, micropapillary, and papillary type(s), residual after prior lumpectomy  -DCIS spans an estimated 15 mm  -No invasive carcinoma identified  -New medial margin is uninvolved by DCIS  -DCIS is 1.5 mm from the new medial margin  -Post-operative changes, including granulation tissue  -See commen        Assessment    Ms. Davis is a 63 year old female with a diagnosis of ductal carcinoma in situ of the left breast status post lumpectomy.  Final pathology demonstrated DCIS, 37 mm, grade 1, negative margin after reexcision, ER positive, pathologic Tis Nx.  Evaluate potential role for radiation therapy.    Plan   Today, we discussed that in general, adjuvant whole breast " radiotherapy is recommended as it has been demonstrated to reduce tumor recurrence by 50%, which has been demonstrated in a  meta-analysis of DCIS patients treated with radiation over lumpectomy alone, despite no survival benefit  detected (EBCTCG, JNCI, 2010).  However, we also discussed that there is clinical evidence suggesting that highly selected patients with small, low to intermediate grade DCIS lesions can be offered observation after lumpectomy rather than radiation. In a prospective, randomized study by the ECOG, patients with low/intermediate grade DCIS, median tumor size 6mm, resected with margins >3mm had ipsilateral breast tumor recurrence rates of 6% at 5 years (Radha, JCO, 2009). This is approximately a 1% recurrence rate per year. With regard to the the patient's relatively young age of 46, local recurrence wa estimated to be about 8%.  Another study RTOG 9804, also showed recurrence rates of approximately 1% per year without radiation in patients who had low risk DCIS (Bree et al, JCO, 2015, MIRANDA 2018).  Though radiation did significantly reduce recurrence rates (greater than 50% relative reduction), the absolute benefit was small, decreasing from 11.4% to 2.8% at 12 years. However, In Mrs. Davis's case, we discussed that at she would not meet eligibility criteria for the RTOG 9804 given her larger DCIS size as well as closer negative margin (3mm per RTOG 9804).  As such, I would recommend proceeding with adjuvant radiation therapy whole breast.    Today we also discussed the logistics of treatment planning and delivery in detail with the patient. We also discussed side effects, including short term risks of fatigue and skin reaction, and long term risks of pneumonitis, lung fibrosis, soft tissue fibrosis, skin changes, breast contour changes, rib fractures, cardiac damage, secondary cancers, lymphedema, and thyroid dysfunction. We described the use of 3D-conformal radiotherapy to minimize  dose to the normal tissues, while adequately covering the target tissues, and the ability of this technique to decrease potential for toxicity.    With regards to sparing of the heart, we discussed the use of deep inspiration breath hold for treatment planning and delivery. This method has been shown to significantly reduce dose to the lung and heart as compared to treatment with free breathing (Nimesh et al, AJCO, 2012). We discussed that this will involve 3D-conformal treatment planning, with contouring of the heart, and that limiting heart dose will be a high priority for treatment planning, as will dose to the contralateral breast and lung.     The patient is currently dealing with postoperative wound complication requiring management.  Will tentatively plan to see the patient in about 4 weeks to reassess and and consider CT simulation at that time.      Vidal Haddad MD  Department of Radiation Oncology  [facility]       Again, thank you for allowing me to participate in the care of your patient.        Sincerely,        Vidal Haddad MD

## 2023-12-19 ENCOUNTER — OFFICE VISIT (OUTPATIENT)
Dept: PLASTIC SURGERY | Facility: CLINIC | Age: 63
End: 2023-12-19
Attending: PHYSICIAN ASSISTANT
Payer: COMMERCIAL

## 2023-12-19 VITALS
SYSTOLIC BLOOD PRESSURE: 142 MMHG | OXYGEN SATURATION: 97 % | BODY MASS INDEX: 42.96 KG/M2 | RESPIRATION RATE: 16 BRPM | WEIGHT: 242.51 LBS | DIASTOLIC BLOOD PRESSURE: 81 MMHG | HEART RATE: 80 BPM | TEMPERATURE: 97.9 F

## 2023-12-19 DIAGNOSIS — Z98.890 S/P BREAST RECONSTRUCTION, LEFT: Primary | ICD-10-CM

## 2023-12-19 DIAGNOSIS — D05.12 DUCTAL CARCINOMA IN SITU (DCIS) OF LEFT BREAST: ICD-10-CM

## 2023-12-19 DIAGNOSIS — N61.1 BREAST ABSCESS: ICD-10-CM

## 2023-12-19 PROCEDURE — 99213 OFFICE O/P EST LOW 20 MIN: CPT | Performed by: PLASTIC SURGERY

## 2023-12-19 PROCEDURE — 99024 POSTOP FOLLOW-UP VISIT: CPT | Performed by: PLASTIC SURGERY

## 2023-12-19 RX ORDER — SULFAMETHOXAZOLE/TRIMETHOPRIM 800-160 MG
1 TABLET ORAL 2 TIMES DAILY
Qty: 20 TABLET | Refills: 0 | Status: SHIPPED | OUTPATIENT
Start: 2023-12-19 | End: 2023-12-29

## 2023-12-19 ASSESSMENT — PAIN SCALES - GENERAL: PAINLEVEL: MILD PAIN (2)

## 2023-12-19 NOTE — NURSING NOTE
"Oncology Rooming Note    December 19, 2023 9:43 AM   Bria Davis is a 63 year old female who presents for:    Chief Complaint   Patient presents with    Oncology Clinic Visit     POST-OP: assess new redness on L breast     Initial Vitals: BP (!) 142/81 (BP Location: Right arm, Patient Position: Sitting, Cuff Size: Adult Large)   Pulse 80   Temp 97.9  F (36.6  C) (Oral)   Resp 16   Wt 110 kg (242 lb 8.1 oz)   LMP 01/14/2016 (Approximate)   SpO2 97%   BMI 42.96 kg/m   Estimated body mass index is 42.96 kg/m  as calculated from the following:    Height as of 12/13/23: 1.6 m (5' 3\").    Weight as of this encounter: 110 kg (242 lb 8.1 oz). Body surface area is 2.21 meters squared.  Mild Pain (2) Comment: constant; sharp pain   Patient's last menstrual period was 01/14/2016 (approximate).  Allergies reviewed: Yes  Medications reviewed: Yes    Medications: Medication refills not needed today.  Pharmacy name entered into [facility]:    [facility] - [place], [place] - [address] [facility] [place], [place] - [address] [facility] - [place], [place] - [address] [place] [address] DRUG STORE #[id number] - [place], [place] - [address] AT [facility] OF [address] [place] [address]    Clinical concerns: Pt is experiencing a mild dull ache in their left breast. They have pain in their incision site. Sometimes they get a sharp pain that lasts for a second.        Vivien Mancia              "

## 2023-12-19 NOTE — PROGRESS NOTES
PRESENTING COMPLAINT:  Post-operative visit s/p left breast reconstruction after lumpectomy and right breast reduction done 12/7/2023     HISTORY OF PRESENTING COMPLAINT: The patient is here for post-operative visit.  The patient is being seen in the presence of my nurse.      Overall doing well.  Pain has improved.  Noticed some redness in the inframammary fold on the left side.  No drainage.  Pathology still awaited.     On exam.  Vital signs stable afebrile.  Both breasts are aesthetic symmetric healing and well.  No evidence for infection seroma or hematoma on the right side.  Both nipples are pink and viable.  Overall contours are excellent.  On the left side there is some erythema in the infra mammary/lower pole.     ASSESSMENT AND PLAN:  Based upon the above findings, the patient is here for post-operative visit.      Healing well.  As a precaution started her on Bactrim.  Will see her back in a week's time.  Continue with moisturization.     All questions were answered.  The patient was happy with the visit.

## 2023-12-19 NOTE — LETTER
12/19/2023         RE: Bria Davis  [address] [facility] [id number]        Dear Colleague,    Thank you for referring your patient, Bria Davis, to the [facility] BREAST [facility]. Please see a copy of my visit note below.    PRESENTING COMPLAINT:  Post-operative visit s/p left breast reconstruction after lumpectomy and right breast reduction done 12/7/2023     HISTORY OF PRESENTING COMPLAINT: The patient is here for post-operative visit.  The patient is being seen in the presence of my nurse.      Overall doing well.  Pain has improved.  Noticed some redness in the inframammary fold on the left side.  No drainage.  Pathology still awaited.     On exam.  Vital signs stable afebrile.  Both breasts are aesthetic symmetric healing and well.  No evidence for infection seroma or hematoma on the right side.  Both nipples are pink and viable.  Overall contours are excellent.  On the left side there is some erythema in the infra mammary/lower pole.     ASSESSMENT AND PLAN:  Based upon the above findings, the patient is here for post-operative visit.      Healing well.  As a precaution started her on Bactrim.  Will see her back in a week's time.  Continue with moisturization.     All questions were answered.  The patient was happy with the visit.           DELORIS Rodriguez MD

## 2023-12-20 NOTE — TELEPHONE ENCOUNTER
Imaging Received  December 21, 2023 10:34 AM ABT   Action: Breast Images from [facility] received and email sent to  Imaging team to resolve breast images to PACS.     RECORDS STATUS - BREAST    RECORDS REQUESTED FROM: [facility]   DATE REQUESTED:    NOTES DETAILS STATUS   OFFICE NOTE from referring provider Epic 12/18/23: Dr. Vidal Haddad   OFFICE NOTE from surgeon [facility] Plastic surgery:  12/19/23: Dr. AL Rodriguez    Sugery:  11/27/23: Dr. Arron Olson   OFFICE NOTE from radiation oncologist Epic 12/18/23: Dr. Vidal Haddad   DISCHARGE SUMMARY from hospital [facility] 10/20/23: [facility]   OPERATIVE REPORT Epic 12/07/23: Left breast reconstruction with local flaps and right breast reduction for symmetry     11/13/23: Re-excision of left lumpectomy site     10/21/23: Irrigation and debridement breast     10/06/23: BIOPSY, LEFT BREAST, WITH NEEDLE LOCALIZATION    MEDICATION LIST [facility]    LABS     PATHOLOGY REPORTS  (Tissue diagnosis, Stage, ER/HI percentage positive and intensity of staining, HER2 IHC, FISH, and all biopsies from breast and any distant metastasis)                 Reports in EPIC 12/07/23: [id number]  11/13/23: [id number]  10/21/23: [id number]  10/06/23: [id number]  09/07/23: [id number]   IMAGING (NEED IMAGES & REPORT)     MAMMO PACS PACS:  10/06/2-12/28/12    [facility]:  02/05/21-04/05/17   ULTRASOUND PACS 10/06/23-09/01/23: US breast

## 2023-12-22 DIAGNOSIS — E11.22 TYPE 2 DIABETES MELLITUS WITH CHRONIC KIDNEY DISEASE, WITH LONG-TERM CURRENT USE OF INSULIN, UNSPECIFIED CKD STAGE (H): ICD-10-CM

## 2023-12-22 DIAGNOSIS — Z79.4 TYPE 2 DIABETES MELLITUS WITH CHRONIC KIDNEY DISEASE, WITH LONG-TERM CURRENT USE OF INSULIN, UNSPECIFIED CKD STAGE (H): ICD-10-CM

## 2023-12-22 RX ORDER — LANCETS
EACH MISCELLANEOUS
Qty: 200 EACH | Refills: 1 | Status: SHIPPED | OUTPATIENT
Start: 2023-12-22 | End: 2024-06-05

## 2023-12-26 ENCOUNTER — OFFICE VISIT (OUTPATIENT)
Dept: PLASTIC SURGERY | Facility: CLINIC | Age: 63
End: 2023-12-26
Attending: PLASTIC SURGERY
Payer: COMMERCIAL

## 2023-12-26 ENCOUNTER — TELEPHONE (OUTPATIENT)
Dept: PLASTIC SURGERY | Facility: CLINIC | Age: 63
End: 2023-12-26

## 2023-12-26 VITALS
DIASTOLIC BLOOD PRESSURE: 77 MMHG | TEMPERATURE: 98.5 F | SYSTOLIC BLOOD PRESSURE: 137 MMHG | RESPIRATION RATE: 20 BRPM | BODY MASS INDEX: 43.98 KG/M2 | OXYGEN SATURATION: 98 % | HEART RATE: 74 BPM | WEIGHT: 248.3 LBS

## 2023-12-26 DIAGNOSIS — Z98.890 S/P BREAST RECONSTRUCTION, LEFT: ICD-10-CM

## 2023-12-26 DIAGNOSIS — N61.1 BREAST ABSCESS: Primary | ICD-10-CM

## 2023-12-26 PROCEDURE — 99024 POSTOP FOLLOW-UP VISIT: CPT | Performed by: PLASTIC SURGERY

## 2023-12-26 PROCEDURE — 99213 OFFICE O/P EST LOW 20 MIN: CPT | Performed by: PLASTIC SURGERY

## 2023-12-26 RX ORDER — DIPHENHYDRAMINE HCL 25 MG
25 TABLET ORAL EVERY 6 HOURS PRN
Qty: 28 TABLET | Refills: 0 | Status: SHIPPED | OUTPATIENT
Start: 2023-12-26 | End: 2024-01-02

## 2023-12-26 RX ORDER — FLUCONAZOLE 150 MG/1
150 TABLET ORAL ONCE
Qty: 1 TABLET | Refills: 0 | Status: SHIPPED | OUTPATIENT
Start: 2023-12-26 | End: 2023-12-26

## 2023-12-26 RX ORDER — METHYLPREDNISOLONE 4 MG
TABLET, DOSE PACK ORAL
Qty: 21 TABLET | Refills: 0 | Status: SHIPPED | OUTPATIENT
Start: 2023-12-26 | End: 2024-01-16

## 2023-12-26 RX ORDER — DOXYCYCLINE HYCLATE 100 MG
100 TABLET ORAL 2 TIMES DAILY
Qty: 14 TABLET | Refills: 0 | Status: SHIPPED | OUTPATIENT
Start: 2023-12-26 | End: 2024-01-02

## 2023-12-26 ASSESSMENT — PAIN SCALES - GENERAL: PAINLEVEL: NO PAIN (1)

## 2023-12-26 NOTE — TELEPHONE ENCOUNTER
Called and spoke to patient requesting consent to send [facility] notes from office visits and current medication list for FMLA paperwork regarding 12/07/2023 procedure with Dr. Rodriguez. Patient consented and I informed them that I will fax/email the paperwork later today.  Angel Arango, EMT

## 2023-12-26 NOTE — PROGRESS NOTES
PRESENTING COMPLAINT:  Post-operative visit s/p left breast reconstruction after lumpectomy and right breast reduction done 12/7/2023     HISTORY OF PRESENTING COMPLAINT: The patient is here for post-operative visit.  The patient is being seen in the presence of my nurse.      Overall doing well.  Pain has improved.  However she developed a drug reaction to the Bactrim.  She stopped it yesterday.  No fevers and overall feels well.  Itching.      On exam.  Vital signs stable afebrile.  Both breasts are aesthetic symmetric healing and well.  There is a significant drug reaction throughout her torso.  The erythema in the left lower aspect of the left breast is still there.  Again does not seem cellulitic but rather inflammatory or vascular.  No evidence for infection seroma or hematoma on the right side.  Both nipples are pink and viable.  Overall contours are excellent.      ASSESSMENT AND PLAN:  Based upon the above findings, the patient is here for post-operative visit.      Plan is to obviously stay off Bactrim, put her on a Medrol Dosepak, start her on Benadryl as needed, switch her to doxycycline, give her a dose of Diflucan as requested by the patient, and see her back in a week's time.     All questions were answered.  The patient was happy with the visit.

## 2023-12-26 NOTE — NURSING NOTE
"Oncology Rooming Note    December 26, 2023 8:39 AM   Bria Davis is a 63 year old female who presents for:    Chief Complaint   Patient presents with    Oncology Clinic Visit     Ductal carcinoma in situ (DCIS) of left breast      Initial Vitals: /77   Pulse 74   Temp 98.5  F (36.9  C)   Resp 20   Wt 112.6 kg (248 lb 4.8 oz)   LMP 01/14/2016 (Approximate)   SpO2 98%   BMI 43.98 kg/m   Estimated body mass index is 43.98 kg/m  as calculated from the following:    Height as of 12/13/23: 1.6 m (5' 3\").    Weight as of this encounter: 112.6 kg (248 lb 4.8 oz). Body surface area is 2.24 meters squared.  No Pain (1) Comment: Data Unavailable   Patient's last menstrual period was 01/14/2016 (approximate).  Allergies reviewed: Yes  Medications reviewed: No Pt denied to review medications today.    Medications: Medication refills not needed today.  Pharmacy name entered into [facility]:    [facility] - [place], [place] - [address] [facility] [place], [place] - [address] [facility] - [place], [place] - [address] [place] [address] DRUG STORE #[id number] - [place], [place] - [address] AT [facility] OF [address] [place] [address]    Frailty Screening:   Is the patient here for a new oncology consult visit in cancer care? 2. No      Clinical concerns: Pt had allergic reaction to dose of antibiotics she recently took and wants her incision examined.       Angel Yip              "

## 2023-12-26 NOTE — LETTER
12/26/2023         RE: Bria Davis  [address] [facility] [address]        Dear Colleague,    Thank you for referring your patient, Bria Davis, to the [facility] BREAST [facility]. Please see a copy of my visit note below.      PRESENTING COMPLAINT:  Post-operative visit s/p left breast reconstruction after lumpectomy and right breast reduction done 12/7/2023     HISTORY OF PRESENTING COMPLAINT: The patient is here for post-operative visit.  The patient is being seen in the presence of my nurse.      Overall doing well.  Pain has improved.  However she developed a drug reaction to the Bactrim.  She stopped it yesterday.  No fevers and overall feels well.  Itching.      On exam.  Vital signs stable afebrile.  Both breasts are aesthetic symmetric healing and well.  There is a significant drug reaction throughout her torso.  The erythema in the left lower aspect of the left breast is still there.  Again does not seem cellulitic but rather inflammatory or vascular.  No evidence for infection seroma or hematoma on the right side.  Both nipples are pink and viable.  Overall contours are excellent.      ASSESSMENT AND PLAN:  Based upon the above findings, the patient is here for post-operative visit.      Plan is to obviously stay off Bactrim, put her on a Medrol Dosepak, start her on Benadryl as needed, switch her to doxycycline, give her a dose of Diflucan as requested by the patient, and see her back in a week's time.     All questions were answered.  The patient was happy with the visit.           DELORIS Rodriguez MD

## 2023-12-27 ENCOUNTER — TELEPHONE (OUTPATIENT)
Dept: PLASTIC SURGERY | Facility: CLINIC | Age: 63
End: 2023-12-27

## 2023-12-27 NOTE — TELEPHONE ENCOUNTER
PRIOR AUTHORIZATION DENIED    Medication: DIPHENHYDRAMINE HCL 25 MG PO TABS  Insurance Company: [employer] [place] - Phone [phone] Fax [phone]  Denial Date: 12/27/2023  Denial Reason(s):     Appeal Information: N/A  Patient Notified: No

## 2024-01-02 ENCOUNTER — OFFICE VISIT (OUTPATIENT)
Dept: PLASTIC SURGERY | Facility: CLINIC | Age: 64
End: 2024-01-02
Attending: PLASTIC SURGERY
Payer: COMMERCIAL

## 2024-01-02 VITALS
OXYGEN SATURATION: 96 % | BODY MASS INDEX: 43.36 KG/M2 | SYSTOLIC BLOOD PRESSURE: 114 MMHG | RESPIRATION RATE: 16 BRPM | HEART RATE: 81 BPM | WEIGHT: 244.8 LBS | TEMPERATURE: 97.9 F | DIASTOLIC BLOOD PRESSURE: 74 MMHG

## 2024-01-02 DIAGNOSIS — Z98.890 S/P BREAST RECONSTRUCTION, LEFT: Primary | ICD-10-CM

## 2024-01-02 PROCEDURE — 99213 OFFICE O/P EST LOW 20 MIN: CPT | Performed by: PLASTIC SURGERY

## 2024-01-02 PROCEDURE — 99024 POSTOP FOLLOW-UP VISIT: CPT | Performed by: PLASTIC SURGERY

## 2024-01-02 ASSESSMENT — PAIN SCALES - GENERAL: PAINLEVEL: NO PAIN (0)

## 2024-01-02 NOTE — LETTER
1/2/2024         RE: Bria Davis  [address] [facility] [id number]        Dear Colleague,    Thank you for referring your patient, Bria Davis, to the [facility] BREAST [facility]. Please see a copy of my visit note below.        PRESENTING COMPLAINT:  Post-operative visit s/p left breast reconstruction after lumpectomy and right breast reduction done 12/7/2023     HISTORY OF PRESENTING COMPLAINT: The patient is here for post-operative visit.  The patient is being seen in the presence of my nurse.      Overall doing well.  No more pain.  Her drug reaction has settled.  The steroid Dosepak helped.  On Doxy for the last day today.  Feels well.  No fevers.    Pathology showed no evidence for residual carcinoma or cancer.     On exam.  Vital signs stable afebrile.  Both breasts are aesthetic symmetric healing and well.  The drug reaction has improved.  The left breast dependent erythema is better but still present.  No evidence for infection.     ASSESSMENT AND PLAN:  Based upon the above findings, the patient is here for post-operative visit.      Plan is to continue with aggressive moisturization and allow everything to settle in the next 2 to 3 weeks.  She can then plan radiation in 2 to 3 weeks.  I will see her back in 2 weeks.    All questions were answered.  The patient was happy with the visit.            DELORIS Rodriguez MD

## 2024-01-02 NOTE — PROGRESS NOTES
PRESENTING COMPLAINT:  Post-operative visit s/p left breast reconstruction after lumpectomy and right breast reduction done 12/7/2023     HISTORY OF PRESENTING COMPLAINT: The patient is here for post-operative visit.  The patient is being seen in the presence of my nurse.      Overall doing well.  No more pain.  Her drug reaction has settled.  The steroid Dosepak helped.  On Doxy for the last day today.  Feels well.  No fevers.    Pathology showed no evidence for residual carcinoma or cancer.     On exam.  Vital signs stable afebrile.  Both breasts are aesthetic symmetric healing and well.  The drug reaction has improved.  The left breast dependent erythema is better but still present.  No evidence for infection.     ASSESSMENT AND PLAN:  Based upon the above findings, the patient is here for post-operative visit.      Plan is to continue with aggressive moisturization and allow everything to settle in the next 2 to 3 weeks.  She can then plan radiation in 2 to 3 weeks.  I will see her back in 2 weeks.    All questions were answered.  The patient was happy with the visit.

## 2024-01-02 NOTE — NURSING NOTE
"Oncology Rooming Note    January 2, 2024 8:34 AM   Bria Davis is a 63 year old female who presents for:    Chief Complaint   Patient presents with    Oncology Clinic Visit     Post op     Initial Vitals: /74 (BP Location: Right arm, Patient Position: Sitting, Cuff Size: Adult Large)   Pulse 81   Temp 97.9  F (36.6  C) (Oral)   Resp 16   Wt 111 kg (244 lb 12.8 oz)   LMP 01/14/2016 (Approximate)   SpO2 96%   BMI 43.36 kg/m   Estimated body mass index is 43.36 kg/m  as calculated from the following:    Height as of 12/13/23: 1.6 m (5' 3\").    Weight as of this encounter: 111 kg (244 lb 12.8 oz). Body surface area is 2.22 meters squared.  No Pain (0) Comment: Data Unavailable   Patient's last menstrual period was 01/14/2016 (approximate).  Allergies reviewed: Yes  Medications reviewed: Yes    Medications: Medication refills not needed today.  Pharmacy name entered into [facility]:    [facility] [place] - [place], [place] - [address] [facility] [place] - [address] [facility] - [place], [place] - [address] [place] [address] DRUG STORE #[id number] - [place], [place] - [address] AT [facility] OF [address] [place] [address]    Frailty Screening:   Is the patient here for a new oncology consult visit in cancer care? 2. No      Clinical concerns: Having pain at night in surgical sites, has been having aching throughout the day. Also wondering if should be continuing aquaphor and where. Is wondering if stitches are dissolvable or need to come out.      Debbie Butt              "

## 2024-01-04 NOTE — PROGRESS NOTES
Hematology/Medical Oncology Consultation Note    ADDENDUM: I called patient regarding results of normal DEXA scan.  Her management plan as outlined in my 1/12/2024 note including initiation of anastrozole after completion of radiation therapy and [place] [facility] [place].  She will need follow-up DEXA scan in about 2 years.    Gerald White MD (1/17/2024)    January 12, 2024    Referring provider:  Vidal Haddad MD    Reason for visit:  Bria Davis is a 63 year old  from [place] accompanied by her  Tam who is referred for oncologic evaluation and consultation regarding recent lumpectomy for left breast ductal carcinoma in situ with clear margins.    Impression:  Left breast ductal carcinoma in situ, s/p lumpectomy and subsequent bilateral breast reduction mammoplasty with clear margins, ER/ME positive    Recommendation, plan, instructions:  Discussed at length and detail with the patient and her  the natural history, management and general prognosis of ductal carcinoma in situ and its distinction from invasive carcinoma  DEXA scan to check for osteoporosis; I will call with results.  Discussed indications, benefits, risks, alternatives and side effects of anastrozole adjuvant hormonal therapy for the treatment of DCIS following breast conservation surgery and radiation.  However, I also indicated that hormone therapy should not begin until after completion of radiation.  I would anticipate 5 years of hormone therapy.  She understands and agrees  Follow-up with Olivia Gaffney NP in 8 months which would be probably 6 months after she completes her course of radiation.  Discussed current NCCN guidelines recurrent ductal carcinoma in situ, hormone therapy and long-term cancer surveillance.    Time with patient including review, documentation, history, examination, coordination of care and counseling was 45  minutes.    History of present illness:  On 8/9/2023, this patient underwent bilateral screening mammography revealing left breast asymmetry at the 10 o'clock position mid depth.  Follow-up diagnostic mammography and ultrasound on 9/1/2023 revealed BI-RADS Category 4 suspicious left breast lesion measuring 4.0 x 1.5 x 1.7 cm    9/7/2023 biopsy revealed two foci of atypical ductal hyperplasia measuring 1.5 and 2.0 mm without invasive malignancy.  Subsequent 10/6/2023 left breast excisional lumpectomy revealed a 37 x 32 x 20 mm grade 1 ductal carcinoma in situ, papillary, cribriform and solid without evidence of invasive carcinoma with positive medial margin.  Estrogen and progesterone receptors were positive at %.  11/13/2023 reexcision demonstrated uninvolved new medial margins without invasive carcinoma.      1/3/2023 BRCA1/BRCA2 next generation sequencing) and actionable hereditary breast cancer NGS revealed no detectable alterations or variations.  Finally, 12/7/2023 left breast lumpectomy and tissue from bilateral breast reduction mammoplasty revealed benign breast tissue without atypia or malignancy.  Her operative course has been complicated by surgical complications from injury to the nearby skin from a surgical electrocautery scalpel.  She has not started radiation but will be seeing Dr. Haddad around 1/25/2024.    Family history is remarkable for paternal grandmother with ovarian cancer and a paternal great aunt who had breast cancer.  Has had no previous personal history of breast disease.    Past medical history:  Past Medical History:   Diagnosis Date    Arthritis     Asthma     Chronic kidney disease     Diabetes mellitus (H)     Ductal carcinoma in situ (DCIS) of left breast     Esophageal reflux     Fibromyalgia     Hypertension     Obese     Other chronic sinusitis     PONV (postoperative nausea and vomiting)     Restless legs syndrome (RLS)          Family history:  I have reviewed this  patient's family history and updated it with pertinent information if needed.  Family History   Problem Relation Age of Onset    Hypertension Mother     Diabetes Mother     Respiratory Mother         asthma-COPD    Hypertension Father     Heart Disease Father     Cerebrovascular Disease Father     Cardiovascular Father     Depression Sister     Respiratory Sister         copd    Chronic Obstructive Pulmonary Disease Sister     Depression Sister     Chronic Obstructive Pulmonary Disease Sister     Anxiety Disorder Sister     Depression Sister     Cancer Brother     Diabetes Brother     Respiratory Brother         copd    Chronic Obstructive Pulmonary Disease Brother     Chronic Obstructive Pulmonary Disease Brother     Kidney failure Brother     Depression Brother     Asthma Brother     Breast Cancer Maternal Grandmother     Diabetes Son     Anxiety Disorder Other     Obesity Other     Obesity Other     Anesthesia Reaction No family hx of     Clotting Disorder No family hx of          Medications:  Current Outpatient Medications   Medication    acetaminophen (TYLENOL) 325 MG tablet    Ascorbic Acid (VITAMIN C) 500 MG CAPS    aspirin (ASA) 81 MG EC tablet    blood glucose (NO BRAND SPECIFIED) lancets standard    blood glucose (NO BRAND SPECIFIED) test strip    blood glucose monitoring (NO BRAND SPECIFIED) meter device kit    Calcium Carb-Cholecalciferol (CALCIUM-VITAMIN D) 600-400 MG-UNIT TABS    esomeprazole (NEXIUM) 40 MG DR capsule    ferrous sulfate 140 (45 Fe) MG TBCR CR tablet    fluticasone-salmeterol (ADVAIR DISKUS) 100-50 MCG/ACT inhaler    gabapentin (NEURONTIN) 300 MG capsule    hydrochlorothiazide (HYDRODIURIL) 12.5 MG tablet    insulin aspart (NOVOLOG FLEXPEN) 100 UNIT/ML pen    insulin glargine U-300 (TOUJEO MAX SOLOSTAR) 300 UNIT/ML (2 units dial) pen    insulin pen needle (BD PEDRO LUIS U/F) 32G X 4 MM miscellaneous    losartan (COZAAR) 100 MG tablet    metFORMIN (GLUCOPHAGE XR) 500 MG 24 hr tablet     methylPREDNISolone (MEDROL DOSEPAK) 4 MG tablet therapy pack    metoclopramide (REGLAN) 10 MG tablet    metoprolol succinate ER (TOPROL XL) 25 MG 24 hr tablet    Microlet Lancets MISC    montelukast (SINGULAIR) 10 MG tablet    Multiple Vitamins-Minerals (MULTIVITAMIN ADULTS 50+) TABS    ondansetron (ZOFRAN ODT) 4 MG ODT tab    pseudoePHEDrine (SUDAFED) 30 MG tablet    rosuvastatin (CRESTOR) 10 MG tablet    Semaglutide, 2 MG/DOSE, (OZEMPIC, 2 MG/DOSE,) 8 MG/3ML pen    senna-docusate (SENOKOT-S/PERICOLACE) 8.6-50 MG tablet    sodium chloride, PF, 0.9% PF flush    triamcinolone (KENALOG) 0.1 % external cream     Current Facility-Administered Medications   Medication    lidocaine (PF) (XYLOCAINE) 1 % injection 1 mL    triamcinolone (KENALOG-40) injection 40 mg    triamcinolone (KENALOG-40) injection 40 mg       Allergies:  Allergies   Allergen Reactions    Bactrim [Sulfamethoxazole-Trimethoprim] Rash     Full body rash    Oxycodone Itching     Facial itching     Lisinopril Cough       Review of systems:      Physical examination:  LMP 01/14/2016 (Approximate)       Alfredo White MD

## 2024-01-12 ENCOUNTER — ONCOLOGY VISIT (OUTPATIENT)
Dept: ONCOLOGY | Facility: CLINIC | Age: 64
End: 2024-01-12
Attending: RADIOLOGY
Payer: COMMERCIAL

## 2024-01-12 ENCOUNTER — PRE VISIT (OUTPATIENT)
Dept: ONCOLOGY | Facility: CLINIC | Age: 64
End: 2024-01-12
Payer: COMMERCIAL

## 2024-01-12 VITALS
TEMPERATURE: 97.9 F | OXYGEN SATURATION: 95 % | WEIGHT: 242.7 LBS | BODY MASS INDEX: 43 KG/M2 | RESPIRATION RATE: 18 BRPM | SYSTOLIC BLOOD PRESSURE: 134 MMHG | DIASTOLIC BLOOD PRESSURE: 70 MMHG | HEIGHT: 63 IN | HEART RATE: 84 BPM

## 2024-01-12 DIAGNOSIS — D05.12 DUCTAL CARCINOMA IN SITU (DCIS) OF LEFT BREAST: ICD-10-CM

## 2024-01-12 DIAGNOSIS — T38.6X5A OSTEOPOROSIS DUE TO AROMATASE INHIBITOR: Primary | ICD-10-CM

## 2024-01-12 DIAGNOSIS — M81.8 OSTEOPOROSIS DUE TO AROMATASE INHIBITOR: Primary | ICD-10-CM

## 2024-01-12 PROCEDURE — 99204 OFFICE O/P NEW MOD 45 MIN: CPT | Performed by: INTERNAL MEDICINE

## 2024-01-12 PROCEDURE — 99213 OFFICE O/P EST LOW 20 MIN: CPT | Performed by: INTERNAL MEDICINE

## 2024-01-12 RX ORDER — ANASTROZOLE 1 MG/1
1 TABLET ORAL DAILY
Qty: 90 TABLET | Refills: 3 | Status: SHIPPED | OUTPATIENT
Start: 2024-01-12

## 2024-01-12 ASSESSMENT — PAIN SCALES - GENERAL: PAINLEVEL: NO PAIN (0)

## 2024-01-12 NOTE — LETTER
1/12/2024         RE: Bria Davis  [address] [facility] [address]        Dear Colleague,    Thank you for referring your patient, Bria Davis, to the [facility]. Please see a copy of my visit note below.    Hematology/Medical Oncology Consultation Note      January 12, 2024    Referring provider:  Vidal Haddad MD    Reason for visit:  Bria Davis is a 63 year old  from [place] accompanied by her  Tam who is referred for oncologic evaluation and consultation regarding recent lumpectomy for left breast ductal carcinoma in situ with clear margins.    Impression:  Left breast ductal carcinoma in situ, s/p lumpectomy and subsequent bilateral breast reduction mammoplasty with clear margins, ER/SC positive    Recommendation, plan, instructions:  Discussed at length and detail with the patient and her  the natural history, management and general prognosis of ductal carcinoma in situ and its distinction from invasive carcinoma  DEXA scan to check for osteoporosis; I will call with results.  Discussed indications, benefits, risks, alternatives and side effects of anastrozole adjuvant hormonal therapy for the treatment of DCIS following breast conservation surgery and radiation.  However, I also indicated that hormone therapy should not begin until after completion of radiation.  I would anticipate 5 years of hormone therapy.  She understands and agrees  Follow-up with Olivia Gaffney NP in 8 months which would be probably 6 months after she completes her course of radiation.  Discussed current NCCN guidelines recurrent ductal carcinoma in situ, hormone therapy and long-term cancer surveillance.    Time with patient including review, documentation, history, examination, coordination of care and counseling was 45 minutes.    History of present illness:  On 8/9/2023, this patient underwent bilateral screening  mammography revealing left breast asymmetry at the 10 o'clock position mid depth.  Follow-up diagnostic mammography and ultrasound on 9/1/2023 revealed BI-RADS Category 4 suspicious left breast lesion measuring 4.0 x 1.5 x 1.7 cm    9/7/2023 biopsy revealed two foci of atypical ductal hyperplasia measuring 1.5 and 2.0 mm without invasive malignancy.  Subsequent 10/6/2023 left breast excisional lumpectomy revealed a 37 x 32 x 20 mm grade 1 ductal carcinoma in situ, papillary, cribriform and solid without evidence of invasive carcinoma with positive medial margin.  Estrogen and progesterone receptors were positive at %.  11/13/2023 reexcision demonstrated uninvolved new medial margins without invasive carcinoma.      1/3/2023 BRCA1/BRCA2 next generation sequencing) and actionable hereditary breast cancer NGS revealed no detectable alterations or variations.  Finally, 12/7/2023 left breast lumpectomy and tissue from bilateral breast reduction mammoplasty revealed benign breast tissue without atypia or malignancy.  Her operative course has been complicated by surgical complications from injury to the nearby skin from a surgical electrocautery scalpel.  She has not started radiation but will be seeing Dr. Haddad around 1/25/2024.    Family history is remarkable for paternal grandmother with ovarian cancer and a paternal great aunt who had breast cancer.  Has had no previous personal history of breast disease.    Past medical history:  Past Medical History:   Diagnosis Date     Arthritis      Asthma      Chronic kidney disease      Diabetes mellitus (H)      Ductal carcinoma in situ (DCIS) of left breast      Esophageal reflux      Fibromyalgia      Hypertension      Obese      Other chronic sinusitis      PONV (postoperative nausea and vomiting)      Restless legs syndrome (RLS)          Family history:  I have reviewed this patient's family history and updated it with pertinent information if needed.  Family History    Problem Relation Age of Onset     Hypertension Mother      Diabetes Mother      Respiratory Mother         asthma-COPD     Hypertension Father      Heart Disease Father      Cerebrovascular Disease Father      Cardiovascular Father      Depression Sister      Respiratory Sister         copd     Chronic Obstructive Pulmonary Disease Sister      Depression Sister      Chronic Obstructive Pulmonary Disease Sister      Anxiety Disorder Sister      Depression Sister      Cancer Brother      Diabetes Brother      Respiratory Brother         copd     Chronic Obstructive Pulmonary Disease Brother      Chronic Obstructive Pulmonary Disease Brother      Kidney failure Brother      Depression Brother      Asthma Brother      Breast Cancer Maternal Grandmother      Diabetes Son      Anxiety Disorder Other      Obesity Other      Obesity Other      Anesthesia Reaction No family hx of      Clotting Disorder No family hx of          Medications:  Current Outpatient Medications   Medication     acetaminophen (TYLENOL) 325 MG tablet     Ascorbic Acid (VITAMIN C) 500 MG CAPS     aspirin (ASA) 81 MG EC tablet     blood glucose (NO BRAND SPECIFIED) lancets standard     blood glucose (NO BRAND SPECIFIED) test strip     blood glucose monitoring (NO BRAND SPECIFIED) meter device kit     Calcium Carb-Cholecalciferol (CALCIUM-VITAMIN D) 600-400 MG-UNIT TABS     esomeprazole (NEXIUM) 40 MG DR capsule     ferrous sulfate 140 (45 Fe) MG TBCR CR tablet     fluticasone-salmeterol (ADVAIR DISKUS) 100-50 MCG/ACT inhaler     gabapentin (NEURONTIN) 300 MG capsule     hydrochlorothiazide (HYDRODIURIL) 12.5 MG tablet     insulin aspart (NOVOLOG FLEXPEN) 100 UNIT/ML pen     insulin glargine U-300 (TOUJEO MAX SOLOSTAR) 300 UNIT/ML (2 units dial) pen     insulin pen needle (BD PEDRO LUIS U/F) 32G X 4 MM miscellaneous     losartan (COZAAR) 100 MG tablet     metFORMIN (GLUCOPHAGE XR) 500 MG 24 hr tablet     methylPREDNISolone (MEDROL DOSEPAK) 4 MG tablet  "therapy pack     metoclopramide (REGLAN) 10 MG tablet     metoprolol succinate ER (TOPROL XL) 25 MG 24 hr tablet     Microlet Lancets MISC     montelukast (SINGULAIR) 10 MG tablet     Multiple Vitamins-Minerals (MULTIVITAMIN ADULTS 50+) TABS     ondansetron (ZOFRAN ODT) 4 MG ODT tab     pseudoePHEDrine (SUDAFED) 30 MG tablet     rosuvastatin (CRESTOR) 10 MG tablet     Semaglutide, 2 MG/DOSE, (OZEMPIC, 2 MG/DOSE,) 8 MG/3ML pen     senna-docusate (SENOKOT-S/PERICOLACE) 8.6-50 MG tablet     sodium chloride, PF, 0.9% PF flush     triamcinolone (KENALOG) 0.1 % external cream     Current Facility-Administered Medications   Medication     lidocaine (PF) (XYLOCAINE) 1 % injection 1 mL     triamcinolone (KENALOG-40) injection 40 mg     triamcinolone (KENALOG-40) injection 40 mg       Allergies:  Allergies   Allergen Reactions     Bactrim [Sulfamethoxazole-Trimethoprim] Rash     Full body rash     Oxycodone Itching     Facial itching      Lisinopril Cough       Review of systems:      Physical examination:  LMP 01/14/2016 (Approximate)       Alfredo White MD              Oncology Rooming Note    January 12, 2024 2:01 PM   Bria Davis is a 63 year old female who presents for:    Chief Complaint   Patient presents with     Oncology Clinic Visit     Ductal carcinoma in situ (DCIS) of left breast- provider only visit     Initial Vitals: /70 (BP Location: Right arm, Patient Position: Sitting, Cuff Size: Adult Large)   Pulse 84   Temp 97.9  F (36.6  C) (Tympanic)   Resp 18   Ht 1.6 m (5' 2.99\")   Wt 110.1 kg (242 lb 11.2 oz)   LMP 01/14/2016 (Approximate)   SpO2 95%   BMI 43.00 kg/m   Estimated body mass index is 43 kg/m  as calculated from the following:    Height as of this encounter: 1.6 m (5' 2.99\").    Weight as of this encounter: 110.1 kg (242 lb 11.2 oz). Body surface area is 2.21 meters squared.  No Pain (0) Comment: Data Unavailable   Patient's last menstrual period was 01/14/2016 " (approximate).  Allergies reviewed: Yes  Medications reviewed: Yes    Medications: Medication refills not needed today.  Pharmacy name entered into [facility]:    [facility] [place] - [address] [facility] [place] - [place], [place] - [address]  [facility] - [place], [place] - [address] [place] [address] DRUG STORE #[id number] - [place], [place] - [address] [facility] AT [address]    Frailty Screening:   Is the patient here for a new oncology consult visit in cancer care? 1. Yes. Over the past month, have you experienced difficulty or required a caregiver to assist with:   1. Balance, walking or general mobility (including any falls)? NO  2. Completion of self-care tasks such as bathing, dressing, toileting, grooming/hygiene?  NO  3. Concentration or memory that affects your daily life?  NO       Clinical concerns:  None      Jonas Perdomo                Again, thank you for allowing me to participate in the care of your patient.        Sincerely,        Alfredo White MD

## 2024-01-12 NOTE — PROGRESS NOTES
"Oncology Rooming Note    January 12, 2024 2:01 PM   Bria Davis is a 63 year old female who presents for:    Chief Complaint   Patient presents with    Oncology Clinic Visit     Ductal carcinoma in situ (DCIS) of left breast- provider only visit     Initial Vitals: /70 (BP Location: Right arm, Patient Position: Sitting, Cuff Size: Adult Large)   Pulse 84   Temp 97.9  F (36.6  C) (Tympanic)   Resp 18   Ht 1.6 m (5' 2.99\")   Wt 110.1 kg (242 lb 11.2 oz)   LMP 01/14/2016 (Approximate)   SpO2 95%   BMI 43.00 kg/m   Estimated body mass index is 43 kg/m  as calculated from the following:    Height as of this encounter: 1.6 m (5' 2.99\").    Weight as of this encounter: 110.1 kg (242 lb 11.2 oz). Body surface area is 2.21 meters squared.  No Pain (0) Comment: Data Unavailable   Patient's last menstrual period was 01/14/2016 (approximate).  Allergies reviewed: Yes  Medications reviewed: Yes    Medications: Medication refills not needed today.  Pharmacy name entered into [employer]:    [facility] [place] - [address] [facility] [place] - [address] [facility] - [place], [place] - [address] [place] [address] DRUG STORE #[id number] - [place], [place] - [address] [facility] AT [address]    Frailty Screening:   Is the patient here for a new oncology consult visit in cancer care? 1. Yes. Over the past month, have you experienced difficulty or required a caregiver to assist with:   1. Balance, walking or general mobility (including any falls)? NO  2. Completion of self-care tasks such as bathing, dressing, toileting, grooming/hygiene?  NO  3. Concentration or memory that affects your daily life?  NO       Clinical concerns:  None      Jonas Perdomo"

## 2024-01-12 NOTE — PATIENT INSTRUCTIONS
1. DEXA scan to check for osteoporosis  2. Arimidex (anastrozole) 1 mg daily for 5 years to be started AFTER radiation is completed  3. Follow-up Olivia Gaffney NP in 8 months

## 2024-01-16 ENCOUNTER — OFFICE VISIT (OUTPATIENT)
Dept: PLASTIC SURGERY | Facility: CLINIC | Age: 64
End: 2024-01-16
Attending: PLASTIC SURGERY
Payer: COMMERCIAL

## 2024-01-16 VITALS
OXYGEN SATURATION: 96 % | WEIGHT: 245.5 LBS | HEART RATE: 79 BPM | BODY MASS INDEX: 43.5 KG/M2 | RESPIRATION RATE: 16 BRPM | SYSTOLIC BLOOD PRESSURE: 165 MMHG | TEMPERATURE: 98.3 F | DIASTOLIC BLOOD PRESSURE: 82 MMHG

## 2024-01-16 DIAGNOSIS — Z98.890 S/P BREAST RECONSTRUCTION, LEFT: Primary | ICD-10-CM

## 2024-01-16 PROCEDURE — 99024 POSTOP FOLLOW-UP VISIT: CPT | Performed by: PLASTIC SURGERY

## 2024-01-16 PROCEDURE — 99213 OFFICE O/P EST LOW 20 MIN: CPT | Performed by: PLASTIC SURGERY

## 2024-01-16 ASSESSMENT — PAIN SCALES - GENERAL: PAINLEVEL: NO PAIN (0)

## 2024-01-16 NOTE — LETTER
1/16/2024       RE: Bria Davis  [address] [facility] [address]    Dear Colleague,    Thank you for referring your patient, Bria Davis, to the [facility] BREAST [facility]. Please see a copy of my visit note below.             PRESENTING COMPLAINT:  Post-operative visit s/p left breast reconstruction after lumpectomy and right breast reduction done 12/7/2023     HISTORY OF PRESENTING COMPLAINT: The patient is here for post-operative visit.  The patient is being seen in the presence of my nurse.      Overall doing well.  No more pain.  She is healed.     On exam.  Vital signs stable afebrile.  Breasts are relatively symmetric.  All wounds healed.     ASSESSMENT AND PLAN:  Based upon the above findings, the patient is here for post-operative visit.      Continue aggressive moisturization.  May start radiation therapy whenever indicated.  See us back after radiation therapy completed.     All questions were answered.  The patient was happy with the visit.                 Sincerely,      DELORIS Rodriguez MD

## 2024-01-16 NOTE — NURSING NOTE
"Oncology Rooming Note    January 16, 2024 8:45 AM   Bria Davis is a 63 year old female who presents for:    Chief Complaint   Patient presents with    Oncology Clinic Visit     Ductal Carcinoma In situ of Left Breast     Initial Vitals: BP (!) 165/82 (BP Location: Right arm, Patient Position: Sitting, Cuff Size: Adult Large)   Pulse 79   Temp 98.3  F (36.8  C) (Oral)   Resp 16   Wt 111.4 kg (245 lb 8 oz)   LMP 01/14/2016 (Approximate)   SpO2 96%   BMI 43.50 kg/m   Estimated body mass index is 43.5 kg/m  as calculated from the following:    Height as of 1/12/24: 1.6 m (5' 2.99\").    Weight as of this encounter: 111.4 kg (245 lb 8 oz). Body surface area is 2.23 meters squared.  No Pain (0) Comment: Data Unavailable   Patient's last menstrual period was 01/14/2016 (approximate).  Allergies reviewed: Yes  Medications reviewed: Yes    Medications: Medication refills not needed today.  Pharmacy name entered into [facility]:    [facility] - [place], [place] - [address] [facility] [place] - [place] - [address] [facility] - [place], [place] - [address] [place] [address] [id number] - [place], [place] - [address] AT [address]    Frailty Screening:   Is the patient here for a new oncology consult visit in cancer care? 2. No      Clinical concerns: None       Kathleen Monahan LPN  1/16/2024              "

## 2024-01-16 NOTE — PROGRESS NOTES
PRESENTING COMPLAINT:  Post-operative visit s/p left breast reconstruction after lumpectomy and right breast reduction done 12/7/2023     HISTORY OF PRESENTING COMPLAINT: The patient is here for post-operative visit.  The patient is being seen in the presence of my nurse.      Overall doing well.  No more pain.  She is healed.     On exam.  Vital signs stable afebrile.  Breasts are relatively symmetric.  All wounds healed.     ASSESSMENT AND PLAN:  Based upon the above findings, the patient is here for post-operative visit.      Continue aggressive moisturization.  May start radiation therapy whenever indicated.  See us back after radiation therapy completed.     All questions were answered.  The patient was happy with the visit.

## 2024-01-17 ENCOUNTER — HOSPITAL ENCOUNTER (OUTPATIENT)
Dept: BONE DENSITY | Facility: CLINIC | Age: 64
Discharge: HOME | End: 2024-01-17
Attending: INTERNAL MEDICINE | Admitting: INTERNAL MEDICINE
Payer: COMMERCIAL

## 2024-01-17 DIAGNOSIS — T38.6X5A OSTEOPOROSIS DUE TO AROMATASE INHIBITOR: ICD-10-CM

## 2024-01-17 DIAGNOSIS — D05.12 DUCTAL CARCINOMA IN SITU (DCIS) OF LEFT BREAST: ICD-10-CM

## 2024-01-17 DIAGNOSIS — M81.8 OSTEOPOROSIS DUE TO AROMATASE INHIBITOR: ICD-10-CM

## 2024-01-17 PROCEDURE — 77080 DXA BONE DENSITY AXIAL: CPT

## 2024-01-25 ENCOUNTER — OFFICE VISIT (OUTPATIENT)
Dept: RADIATION THERAPY | Facility: OUTPATIENT CENTER | Age: 64
End: 2024-01-25
Payer: COMMERCIAL

## 2024-01-25 DIAGNOSIS — D05.12 DUCTAL CARCINOMA IN SITU (DCIS) OF LEFT BREAST: Primary | ICD-10-CM

## 2024-01-25 NOTE — PROGRESS NOTES
The patient presents for CT simulation.    In the interval, the patient has healed well post operative wounds.  She is cleared for proceeding with adjuvant radiation.    Consent obtained.     Vidal Haddad M.D.  Department of Radiation Oncology  [facility]

## 2024-01-25 NOTE — LETTER
1/25/2024         RE: Bria Davis  [address] [facility] [address]        Dear Colleague,    Thank you for referring your patient, Bria Davis, to the [facility]. Please see a copy of my visit note below.    The patient presents for CT simulation.    In the interval, the patient has healed well post operative wounds.  She is cleared for proceeding with adjuvant radiation.    Consent obtained.     Vidal Haddad M.D.  Department of Radiation Oncology  [facility]       Again, thank you for allowing me to participate in the care of your patient.        Sincerely,        Vidal Haddad MD

## 2024-02-01 ENCOUNTER — APPOINTMENT (OUTPATIENT)
Dept: RADIATION THERAPY | Facility: OUTPATIENT CENTER | Age: 64
End: 2024-02-01
Payer: COMMERCIAL

## 2024-02-02 ENCOUNTER — APPOINTMENT (OUTPATIENT)
Dept: RADIATION THERAPY | Facility: OUTPATIENT CENTER | Age: 64
End: 2024-02-02
Payer: COMMERCIAL

## 2024-02-05 ENCOUNTER — APPOINTMENT (OUTPATIENT)
Dept: RADIATION THERAPY | Facility: OUTPATIENT CENTER | Age: 64
End: 2024-02-05
Payer: COMMERCIAL

## 2024-02-06 ENCOUNTER — APPOINTMENT (OUTPATIENT)
Dept: RADIATION THERAPY | Facility: OUTPATIENT CENTER | Age: 64
End: 2024-02-06
Payer: COMMERCIAL

## 2024-02-07 ENCOUNTER — OFFICE VISIT (OUTPATIENT)
Dept: RADIATION THERAPY | Facility: OUTPATIENT CENTER | Age: 64
End: 2024-02-07
Payer: COMMERCIAL

## 2024-02-07 ENCOUNTER — APPOINTMENT (OUTPATIENT)
Dept: RADIATION THERAPY | Facility: OUTPATIENT CENTER | Age: 64
End: 2024-02-07
Payer: COMMERCIAL

## 2024-02-07 VITALS
BODY MASS INDEX: 43.06 KG/M2 | DIASTOLIC BLOOD PRESSURE: 77 MMHG | SYSTOLIC BLOOD PRESSURE: 118 MMHG | WEIGHT: 243 LBS | HEART RATE: 72 BPM

## 2024-02-07 DIAGNOSIS — D05.12 DUCTAL CARCINOMA IN SITU (DCIS) OF LEFT BREAST: Primary | ICD-10-CM

## 2024-02-07 NOTE — LETTER
2024         RE: Bria Davis  [address] [facility] [address]        Dear Colleague,    Thank you for referring your patient, Bria Davis, to the  [facility]. Please see a copy of my visit note below.    [facility]  SPECIALIZING IN BREAKTHROUGHS  Radiation Oncology    On Treatment Visit Note      Bria Davis      Date: 2024   MRN: [id number]   : 1960  Diagnosis: left breast DCIS      Reason for Visit:  On Radiation Treatment Visit     Treatment Summary to Date  Treatment Site: left breast Current Dose: 1335/4272 cGy Fractions:       Chemotherapy  Chemo concurrent with radx?: No    Subjective:   Doing well no acute complaints.  Energy is good.  Applying skin care.  No pruritus.  Has intermittent nerve pain, present after surgery.  Currently tolerable.    Nursing ROS:   Nutrition Alteration  Diet Type: Patient's Preference  Skin  Skin Reaction: 0 - No changes  Skin Note: using aveeno cream bid, did not like aquaphor        Cardiovascular  Respiratory effort: 1 - Normal - without distress  Gastrointestinal  GI Note: no gi issues        Pain Assessment  Pain Note: no pain issues, good ROM, no edema      Objective:   /77   Pulse 72   Wt 110.2 kg (243 lb)   LMP 2016 (Approximate)   BMI 43.06 kg/m    Skin with mild erythema without desquamation    Labs:  CBC RESULTS:   Recent Labs   Lab Test 10/21/23  0547   WBC 8.8   RBC 4.37   HGB 12.9   HCT 38.7   MCV 89   MCH 29.5   MCHC 33.3   RDW 13.2        ELECTROLYTES:  Recent Labs   Lab Test 23  0759 10/21/23  0654 10/21/23  0547   NA  --   --  141   POTASSIUM  --   --  3.9   CHLORIDE  --   --  105   MARLON  --   --  9.1   CO2  --   --  24   BUN  --   --  7.7*   CR  --   --  0.47*   *   < > 190*    < > = values in this interval not displayed.       Assessment:  Mrs. Davis is a 63M with a diagnosis of ductal carcinoma in situ of the left breast status post  lumpectomy.  Final pathology demonstrated DCIS, 37 mm, grade 1, negative margin after reexcision, ER positive, pathologic Tis Nx.  Evaluate potential role for radiation therapy. Tolerating radiation therapy well.  All questions and concerns addressed.    Plan:   Continue current therapy.    Continue skin care.      Mosaiq chart and setup information reviewed  Ports checked    Medication Review  Med list reviewed with patient?: Yes           Vidal Haddad MD

## 2024-02-07 NOTE — PROGRESS NOTES
[facility]  SPECIALIZING IN BREAKTHROUGHS  Radiation Oncology    On Treatment Visit Note      Bria Davis      Date: 2024   MRN: [id number]   : 1960  Diagnosis: left breast DCIS      Reason for Visit:  On Radiation Treatment Visit     Treatment Summary to Date  Treatment Site: left breast Current Dose: 1335/4272 cGy Fractions: 16      Chemotherapy  Chemo concurrent with radx?: No    Subjective:   Doing well no acute complaints.  Energy is good.  Applying skin care.  No pruritus.  Has intermittent nerve pain, present after surgery.  Currently tolerable.    Nursing ROS:   Nutrition Alteration  Diet Type: Patient's Preference  Skin  Skin Reaction: 0 - No changes  Skin Note: using aveeno cream bid, did not like aquaphor        Cardiovascular  Respiratory effort: 1 - Normal - without distress  Gastrointestinal  GI Note: no gi issues        Pain Assessment  Pain Note: no pain issues, good ROM, no edema      Objective:   /77   Pulse 72   Wt 110.2 kg (243 lb)   LMP 2016 (Approximate)   BMI 43.06 kg/m    Skin with mild erythema without desquamation    Labs:  CBC RESULTS:   Recent Labs   Lab Test 10/21/23  0547   WBC 8.8   RBC 4.37   HGB 12.9   HCT 38.7   MCV 89   MCH 29.5   MCHC 33.3   RDW 13.2        ELECTROLYTES:  Recent Labs   Lab Test 23  0759 10/21/23  0654 10/21/23  0547   NA  --   --  141   POTASSIUM  --   --  3.9   CHLORIDE  --   --  105   MARLON  --   --  9.1   CO2  --   --  24   BUN  --   --  7.7*   CR  --   --  0.47*   *   < > 190*    < > = values in this interval not displayed.       Assessment:  Mrs. Davis is a 63M with a diagnosis of ductal carcinoma in situ of the left breast status post lumpectomy.  Final pathology demonstrated DCIS, 37 mm, grade 1, negative margin after reexcision, ER positive, pathologic Tis Nx.  Evaluate potential role for radiation therapy. Tolerating radiation therapy well.  All questions and concerns addressed.    Plan:    Continue current therapy.    Continue skin care.      Mosaiq chart and setup information reviewed  Ports checked    Medication Review  Med list reviewed with patient?: Yes           Vidal Haddad MD

## 2024-02-08 ENCOUNTER — APPOINTMENT (OUTPATIENT)
Dept: RADIATION THERAPY | Facility: OUTPATIENT CENTER | Age: 64
End: 2024-02-08
Payer: COMMERCIAL

## 2024-02-09 ENCOUNTER — APPOINTMENT (OUTPATIENT)
Dept: RADIATION THERAPY | Facility: OUTPATIENT CENTER | Age: 64
End: 2024-02-09
Payer: COMMERCIAL

## 2024-02-12 ENCOUNTER — APPOINTMENT (OUTPATIENT)
Dept: RADIATION THERAPY | Facility: OUTPATIENT CENTER | Age: 64
End: 2024-02-12
Payer: COMMERCIAL

## 2024-02-13 ENCOUNTER — TELEPHONE (OUTPATIENT)
Dept: FAMILY MEDICINE | Facility: CLINIC | Age: 64
End: 2024-02-13

## 2024-02-13 ENCOUNTER — LAB (OUTPATIENT)
Dept: LAB | Facility: CLINIC | Age: 64
End: 2024-02-13
Payer: COMMERCIAL

## 2024-02-13 ENCOUNTER — E-VISIT (OUTPATIENT)
Dept: FAMILY MEDICINE | Facility: CLINIC | Age: 64
End: 2024-02-13
Payer: COMMERCIAL

## 2024-02-13 ENCOUNTER — APPOINTMENT (OUTPATIENT)
Dept: RADIATION THERAPY | Facility: OUTPATIENT CENTER | Age: 64
End: 2024-02-13
Payer: COMMERCIAL

## 2024-02-13 DIAGNOSIS — J02.9 SORE THROAT: Primary | ICD-10-CM

## 2024-02-13 DIAGNOSIS — Z20.822 SUSPECTED 2019 NOVEL CORONAVIRUS INFECTION: ICD-10-CM

## 2024-02-13 DIAGNOSIS — J02.9 SORE THROAT: ICD-10-CM

## 2024-02-13 LAB
DEPRECATED S PYO AG THROAT QL EIA: NEGATIVE
FLUAV AG SPEC QL IA: NEGATIVE
FLUBV AG SPEC QL IA: NEGATIVE
GROUP A STREP BY PCR: NOT DETECTED

## 2024-02-13 PROCEDURE — 87804 INFLUENZA ASSAY W/OPTIC: CPT | Performed by: FAMILY MEDICINE

## 2024-02-13 PROCEDURE — 87651 STREP A DNA AMP PROBE: CPT

## 2024-02-13 PROCEDURE — 87635 SARS-COV-2 COVID-19 AMP PRB: CPT

## 2024-02-13 PROCEDURE — 99421 OL DIG E/M SVC 5-10 MIN: CPT | Mod: 24 | Performed by: FAMILY MEDICINE

## 2024-02-13 NOTE — PATIENT INSTRUCTIONS
Dear Melyssa,    After reviewing your responses, I would like you to come in for a swab to make sure we treat you correctly. This swab is to evaluate you for possible Strep Throat and influenza, and should be scheduled for today or tomorrow. Please use the Schedule Now button in [facility] to schedule your swab. Otherwise, click this link to schedule a lab only appointment.    Lab appointments are not available at most locations on the weekends. If no Lab Only appointment is available, you should be seen in any of our convenient Urgent Care Centers for an in person visit, which can be found on our website here.    You will receive instructions with your results in [facility] once they are available.     If your symptoms worsen, you develop difficulty breathing, difficulty with drinking enough to stay hydrated, difficulty swallowing your saliva or have fevers for more than 5 days, please contact your primary care provider for an appointment or visit an Urgent Care Center to be seen.      Thanks again for choosing us as your health care partner.   Kaia Elena MD

## 2024-02-13 NOTE — TELEPHONE ENCOUNTER
Called patient and informed her all 3 orders are in for labs today.       Merced Charles RN on 2/13/2024 at 12:00 PM

## 2024-02-13 NOTE — TELEPHONE ENCOUNTER
Patient had E Visit with Dr. Eelna today, she would like a Covid lab ordered, already has labs for Strep and Influenza A/B ordered. Ginny Merrill on 2/13/2024 at 10:12 AM

## 2024-02-14 ENCOUNTER — APPOINTMENT (OUTPATIENT)
Dept: RADIATION THERAPY | Facility: OUTPATIENT CENTER | Age: 64
End: 2024-02-14
Payer: COMMERCIAL

## 2024-02-14 ENCOUNTER — OFFICE VISIT (OUTPATIENT)
Dept: RADIATION THERAPY | Facility: OUTPATIENT CENTER | Age: 64
End: 2024-02-14
Payer: COMMERCIAL

## 2024-02-14 VITALS
HEART RATE: 73 BPM | SYSTOLIC BLOOD PRESSURE: 128 MMHG | DIASTOLIC BLOOD PRESSURE: 83 MMHG | BODY MASS INDEX: 43.06 KG/M2 | WEIGHT: 243 LBS

## 2024-02-14 DIAGNOSIS — D05.12 DUCTAL CARCINOMA IN SITU (DCIS) OF LEFT BREAST: Primary | ICD-10-CM

## 2024-02-14 LAB — SARS-COV-2 RNA RESP QL NAA+PROBE: NEGATIVE

## 2024-02-14 NOTE — LETTER
2024         RE: Bria Davis  [address] [facility] [address]        Dear Colleague,    Thank you for referring your patient, Bria Davis, to the  [facility]. Please see a copy of my visit note below.    [facility]  SPECIALIZING IN BREAKTHROUGHS  Radiation Oncology    On Treatment Visit Note      Bria Davis      Date: 2024   MRN: [id number]   : 1960  Diagnosis: left breast DCIS      Reason for Visit:  On Radiation Treatment Visit     Treatment Summary to Date  Treatment Site: left breast Current Dose: 2670/4272 cGy Fractions: 10/16      Chemotherapy  Chemo concurrent with radx?: No    Subjective:   Doing well no acute complaints.  Energy is good.  Applying skin care.  No pruritus.  Has intermittent nerve pain, present after surgery.  Currently tolerable.    Nursing ROS:   Nutrition Alteration  Diet Type: Patient's Preference  Skin  Skin Reaction: 0 - No changes  Skin Note: using aveeno cream bid, did not like aquaphor        Cardiovascular  Respiratory effort: 1 - Normal - without distress  Gastrointestinal  GI Note: no gi issues        Pain Assessment  Pain Note: no pain issues, good ROM, no edema      Objective:   /83   Pulse 73   Wt 110.2 kg (243 lb)   LMP 2016 (Approximate)   BMI 43.06 kg/m    Skin with moderate erythema without desquamation    Labs:  CBC RESULTS:   Recent Labs   Lab Test 10/21/23  0547   WBC 8.8   RBC 4.37   HGB 12.9   HCT 38.7   MCV 89   MCH 29.5   MCHC 33.3   RDW 13.2        ELECTROLYTES:  Recent Labs   Lab Test 23  0759 10/21/23  0654 10/21/23  0547   NA  --   --  141   POTASSIUM  --   --  3.9   CHLORIDE  --   --  105   MARLON  --   --  9.1   CO2  --   --  24   BUN  --   --  7.7*   CR  --   --  0.47*   *   < > 190*    < > = values in this interval not displayed.       Assessment:  Mrs. Davis is a 63M with a diagnosis of ductal carcinoma in situ of the left breast status post  lumpectomy.  Final pathology demonstrated DCIS, 37 mm, grade 1, negative margin after reexcision, ER positive, pathologic Tis Nx.  Evaluate potential role for radiation therapy. Tolerating radiation therapy well.  All questions and concerns addressed.    Plan:   Continue current therapy.    Continue skin care.      Mosaiq chart and setup information reviewed  Ports checked    Medication Review  Med list reviewed with patient?: Yes           Vidal Haddad MD

## 2024-02-14 NOTE — PROGRESS NOTES
[facility]  SPECIALIZING IN BREAKTHROUGHS  Radiation Oncology    On Treatment Visit Note      Bria Davis      Date: 2024   MRN: [id number]   : 1960  Diagnosis: left breast DCIS      Reason for Visit:  On Radiation Treatment Visit     Treatment Summary to Date  Treatment Site: left breast Current Dose: 2670/4272 cGy Fractions: 10/16      Chemotherapy  Chemo concurrent with radx?: No    Subjective:   Doing well no acute complaints.  Energy is good.  Applying skin care.  No pruritus.  Has intermittent nerve pain, present after surgery.  Currently tolerable.    Nursing ROS:   Nutrition Alteration  Diet Type: Patient's Preference  Skin  Skin Reaction: 0 - No changes  Skin Note: using aveeno cream bid, did not like aquaphor        Cardiovascular  Respiratory effort: 1 - Normal - without distress  Gastrointestinal  GI Note: no gi issues        Pain Assessment  Pain Note: no pain issues, good ROM, no edema      Objective:   /83   Pulse 73   Wt 110.2 kg (243 lb)   LMP 2016 (Approximate)   BMI 43.06 kg/m    Skin with moderate erythema without desquamation    Labs:  CBC RESULTS:   Recent Labs   Lab Test 10/21/23  0547   WBC 8.8   RBC 4.37   HGB 12.9   HCT 38.7   MCV 89   MCH 29.5   MCHC 33.3   RDW 13.2        ELECTROLYTES:  Recent Labs   Lab Test 23  0759 10/21/23  0654 10/21/23  0547   NA  --   --  141   POTASSIUM  --   --  3.9   CHLORIDE  --   --  105   MARLON  --   --  9.1   CO2  --   --  24   BUN  --   --  7.7*   CR  --   --  0.47*   *   < > 190*    < > = values in this interval not displayed.       Assessment:  Mrs. Davis is a 63M with a diagnosis of ductal carcinoma in situ of the left breast status post lumpectomy.  Final pathology demonstrated DCIS, 37 mm, grade 1, negative margin after reexcision, ER positive, pathologic Tis Nx.  Evaluate potential role for radiation therapy. Tolerating radiation therapy well.  All questions and concerns addressed.    Plan:    Continue current therapy.    Continue skin care.      Mosaiq chart and setup information reviewed  Ports checked    Medication Review  Med list reviewed with patient?: Yes           Vidal Haddad MD

## 2024-02-15 ENCOUNTER — APPOINTMENT (OUTPATIENT)
Dept: RADIATION THERAPY | Facility: OUTPATIENT CENTER | Age: 64
End: 2024-02-15
Payer: COMMERCIAL

## 2024-02-16 ENCOUNTER — APPOINTMENT (OUTPATIENT)
Dept: RADIATION THERAPY | Facility: OUTPATIENT CENTER | Age: 64
End: 2024-02-16
Payer: COMMERCIAL

## 2024-02-19 ENCOUNTER — APPOINTMENT (OUTPATIENT)
Dept: RADIATION THERAPY | Facility: OUTPATIENT CENTER | Age: 64
End: 2024-02-19
Payer: COMMERCIAL

## 2024-02-20 ENCOUNTER — APPOINTMENT (OUTPATIENT)
Dept: RADIATION THERAPY | Facility: OUTPATIENT CENTER | Age: 64
End: 2024-02-20
Payer: COMMERCIAL

## 2024-02-20 ASSESSMENT — ASTHMA QUESTIONNAIRES
QUESTION_1 LAST FOUR WEEKS HOW MUCH OF THE TIME DID YOUR ASTHMA KEEP YOU FROM GETTING AS MUCH DONE AT WORK, SCHOOL OR AT HOME: NONE OF THE TIME
QUESTION_5 LAST FOUR WEEKS HOW WOULD YOU RATE YOUR ASTHMA CONTROL: WELL CONTROLLED
QUESTION_3 LAST FOUR WEEKS HOW OFTEN DID YOUR ASTHMA SYMPTOMS (WHEEZING, COUGHING, SHORTNESS OF BREATH, CHEST TIGHTNESS OR PAIN) WAKE YOU UP AT NIGHT OR EARLIER THAN USUAL IN THE MORNING: NOT AT ALL
QUESTION_2 LAST FOUR WEEKS HOW OFTEN HAVE YOU HAD SHORTNESS OF BREATH: ONCE OR TWICE A WEEK
ACT_TOTALSCORE: 23
ACT_TOTALSCORE: 23
QUESTION_4 LAST FOUR WEEKS HOW OFTEN HAVE YOU USED YOUR RESCUE INHALER OR NEBULIZER MEDICATION (SUCH AS ALBUTEROL): NOT AT ALL

## 2024-02-21 ENCOUNTER — APPOINTMENT (OUTPATIENT)
Dept: RADIATION THERAPY | Facility: OUTPATIENT CENTER | Age: 64
End: 2024-02-21
Payer: COMMERCIAL

## 2024-02-21 ENCOUNTER — OFFICE VISIT (OUTPATIENT)
Dept: RADIATION THERAPY | Facility: OUTPATIENT CENTER | Age: 64
End: 2024-02-21
Payer: COMMERCIAL

## 2024-02-21 VITALS
HEART RATE: 72 BPM | SYSTOLIC BLOOD PRESSURE: 111 MMHG | WEIGHT: 242 LBS | DIASTOLIC BLOOD PRESSURE: 72 MMHG | OXYGEN SATURATION: 97 % | BODY MASS INDEX: 42.88 KG/M2 | RESPIRATION RATE: 18 BRPM

## 2024-02-21 DIAGNOSIS — D05.12 DUCTAL CARCINOMA IN SITU (DCIS) OF LEFT BREAST: Primary | ICD-10-CM

## 2024-02-21 NOTE — PROGRESS NOTES
[facility]  SPECIALIZING IN BREAKTHROUGHS  Radiation Oncology    On Treatment Visit Note      Bria Davis      Date: 2024   MRN: [id number]   : 1960  Diagnosis: left breast DCIS      Reason for Visit:  On Radiation Treatment Visit     Treatment Summary to Date  Treatment Site: left breast Current Dose: 4005/4272 cGy Fractions: 15/16      Chemotherapy  Chemo concurrent with radx?: No    Subjective:   Doing well no acute complaints.  Energy is good.  Applying skin care.  No pruritus.  Has intermittent nerve pain/sensation change, present after surgery.  Currently tolerable.    Nursing ROS:   Nutrition Alteration  Diet Type: Patient's Preference  Skin  Skin Reaction: 0 - No changes  Skin Note: using aveeno cream bid, did not like aquaphor        Cardiovascular  Respiratory effort: 1 - Normal - without distress  Gastrointestinal  GI Note: no gi issues        Pain Assessment  Pain Note: no pain issues, good ROM, no edema      Objective:   /72   Pulse 72   Resp 18   Wt 109.8 kg (242 lb)   LMP 2016 (Approximate)   SpO2 97%   BMI 42.88 kg/m    Skin with moderate erythema without desquamation    Labs:  CBC RESULTS:   Recent Labs   Lab Test 10/21/23  0547   WBC 8.8   RBC 4.37   HGB 12.9   HCT 38.7   MCV 89   MCH 29.5   MCHC 33.3   RDW 13.2        ELECTROLYTES:  Recent Labs   Lab Test 23  0759 10/21/23  0654 10/21/23  0547   NA  --   --  141   POTASSIUM  --   --  3.9   CHLORIDE  --   --  105   MARLON  --   --  9.1   CO2  --   --  24   BUN  --   --  7.7*   CR  --   --  0.47*   *   < > 190*    < > = values in this interval not displayed.       Assessment:  Mrs. Davis is a 63M with a diagnosis of ductal carcinoma in situ of the left breast status post lumpectomy.  Final pathology demonstrated DCIS, 37 mm, grade 1, negative margin after reexcision, ER positive, pathologic Tis Nx.  Evaluate potential role for radiation therapy. Tolerating radiation therapy well.  All  questions and concerns addressed.    Plan:   Continue current therapy.  EOT tomorrow, RTC in in 1 month, will see PA.  Continue skin care.      Mosaiq chart and setup information reviewed  Ports checked    Medication Review  Med list reviewed with patient?: Yes           Vidal Haddad MD

## 2024-02-21 NOTE — LETTER
2024         RE: Bria Davis  [address] [facility] [address]        Dear Colleague,    Thank you for referring your patient, Bria Davis, to the  [facility]. Please see a copy of my visit note below.    [facility]  SPECIALIZING IN BREAKTHROUGHS  Radiation Oncology    On Treatment Visit Note      Bria Davis      Date: 2024   MRN: [id number]   : 1960  Diagnosis: left breast DCIS      Reason for Visit:  On Radiation Treatment Visit     Treatment Summary to Date  Treatment Site: left breast Current Dose: 4005/4272 cGy Fractions: 15/16      Chemotherapy  Chemo concurrent with radx?: No    Subjective:   Doing well no acute complaints.  Energy is good.  Applying skin care.  No pruritus.  Has intermittent nerve pain/sensation change, present after surgery.  Currently tolerable.    Nursing ROS:   Nutrition Alteration  Diet Type: Patient's Preference  Skin  Skin Reaction: 0 - No changes  Skin Note: using aveeno cream bid, did not like aquaphor        Cardiovascular  Respiratory effort: 1 - Normal - without distress  Gastrointestinal  GI Note: no gi issues        Pain Assessment  Pain Note: no pain issues, good ROM, no edema      Objective:   /72   Pulse 72   Resp 18   Wt 109.8 kg (242 lb)   LMP 2016 (Approximate)   SpO2 97%   BMI 42.88 kg/m    Skin with moderate erythema without desquamation    Labs:  CBC RESULTS:   Recent Labs   Lab Test 10/21/23  0547   WBC 8.8   RBC 4.37   HGB 12.9   HCT 38.7   MCV 89   MCH 29.5   MCHC 33.3   RDW 13.2        ELECTROLYTES:  Recent Labs   Lab Test 23  0759 10/21/23  0654 10/21/23  0547   NA  --   --  141   POTASSIUM  --   --  3.9   CHLORIDE  --   --  105   MARLON  --   --  9.1   CO2  --   --  24   BUN  --   --  7.7*   CR  --   --  0.47*   *   < > 190*    < > = values in this interval not displayed.       Assessment:  Mrs. Davis is a 63M with a diagnosis of ductal carcinoma  in situ of the left breast status post lumpectomy.  Final pathology demonstrated DCIS, 37 mm, grade 1, negative margin after reexcision, ER positive, pathologic Tis Nx.  Evaluate potential role for radiation therapy. Tolerating radiation therapy well.  All questions and concerns addressed.    Plan:   Continue current therapy.  EOT tomorrow, RTC in in 1 month, will see PA.  Continue skin care.      Mosaiq chart and setup information reviewed  Ports checked    Medication Review  Med list reviewed with patient?: Yes           Vidal Haddad MD

## 2024-02-22 ENCOUNTER — APPOINTMENT (OUTPATIENT)
Dept: RADIATION THERAPY | Facility: OUTPATIENT CENTER | Age: 64
End: 2024-02-22
Payer: COMMERCIAL

## 2024-02-26 ENCOUNTER — OFFICE VISIT (OUTPATIENT)
Dept: FAMILY MEDICINE | Facility: CLINIC | Age: 64
End: 2024-02-26
Payer: COMMERCIAL

## 2024-02-26 VITALS
HEART RATE: 81 BPM | DIASTOLIC BLOOD PRESSURE: 70 MMHG | WEIGHT: 243.25 LBS | TEMPERATURE: 97.2 F | OXYGEN SATURATION: 98 % | SYSTOLIC BLOOD PRESSURE: 132 MMHG | RESPIRATION RATE: 20 BRPM | HEIGHT: 63 IN | BODY MASS INDEX: 43.1 KG/M2

## 2024-02-26 DIAGNOSIS — I10 ESSENTIAL HYPERTENSION WITH GOAL BLOOD PRESSURE LESS THAN 140/90: ICD-10-CM

## 2024-02-26 DIAGNOSIS — Z79.4 TYPE 2 DIABETES MELLITUS WITH CHRONIC KIDNEY DISEASE, WITH LONG-TERM CURRENT USE OF INSULIN, UNSPECIFIED CKD STAGE (H): ICD-10-CM

## 2024-02-26 DIAGNOSIS — I20.89 OTHER FORMS OF ANGINA PECTORIS (H): ICD-10-CM

## 2024-02-26 DIAGNOSIS — K21.9 GASTROESOPHAGEAL REFLUX DISEASE WITHOUT ESOPHAGITIS: ICD-10-CM

## 2024-02-26 DIAGNOSIS — E11.22 TYPE 2 DIABETES MELLITUS WITH CHRONIC KIDNEY DISEASE, WITH LONG-TERM CURRENT USE OF INSULIN, UNSPECIFIED CKD STAGE (H): ICD-10-CM

## 2024-02-26 DIAGNOSIS — E66.01 MORBID OBESITY (H): Primary | ICD-10-CM

## 2024-02-26 LAB
ANION GAP SERPL CALCULATED.3IONS-SCNC: 15 MMOL/L (ref 7–15)
BUN SERPL-MCNC: 13.2 MG/DL (ref 8–23)
CALCIUM SERPL-MCNC: 9.3 MG/DL (ref 8.8–10.2)
CHLORIDE SERPL-SCNC: 100 MMOL/L (ref 98–107)
CHOLEST SERPL-MCNC: 134 MG/DL
CREAT SERPL-MCNC: 0.69 MG/DL (ref 0.51–0.95)
CREAT UR-MCNC: 41.2 MG/DL
DEPRECATED HCO3 PLAS-SCNC: 21 MMOL/L (ref 22–29)
EGFRCR SERPLBLD CKD-EPI 2021: >90 ML/MIN/1.73M2
FASTING STATUS PATIENT QL REPORTED: NO
GLUCOSE SERPL-MCNC: 196 MG/DL (ref 70–99)
HBA1C MFR BLD: 7.5 % (ref 0–5.6)
HDLC SERPL-MCNC: 40 MG/DL
LDLC SERPL CALC-MCNC: 43 MG/DL
MICROALBUMIN UR-MCNC: 14.9 MG/L
MICROALBUMIN/CREAT UR: 36.17 MG/G CR (ref 0–25)
NONHDLC SERPL-MCNC: 94 MG/DL
POTASSIUM SERPL-SCNC: 4.5 MMOL/L (ref 3.4–5.3)
SODIUM SERPL-SCNC: 136 MMOL/L (ref 135–145)
TRIGL SERPL-MCNC: 253 MG/DL

## 2024-02-26 PROCEDURE — 82043 UR ALBUMIN QUANTITATIVE: CPT | Performed by: FAMILY MEDICINE

## 2024-02-26 PROCEDURE — 36415 COLL VENOUS BLD VENIPUNCTURE: CPT | Performed by: FAMILY MEDICINE

## 2024-02-26 PROCEDURE — 82570 ASSAY OF URINE CREATININE: CPT | Performed by: FAMILY MEDICINE

## 2024-02-26 PROCEDURE — 80061 LIPID PANEL: CPT | Performed by: FAMILY MEDICINE

## 2024-02-26 PROCEDURE — 80048 BASIC METABOLIC PNL TOTAL CA: CPT | Performed by: FAMILY MEDICINE

## 2024-02-26 PROCEDURE — 99214 OFFICE O/P EST MOD 30 MIN: CPT | Mod: 24 | Performed by: FAMILY MEDICINE

## 2024-02-26 PROCEDURE — 83036 HEMOGLOBIN GLYCOSYLATED A1C: CPT | Performed by: FAMILY MEDICINE

## 2024-02-26 RX ORDER — PEN NEEDLE, DIABETIC 32GX 5/32"
NEEDLE, DISPOSABLE MISCELLANEOUS
Qty: 400 EACH | Refills: 1 | Status: SHIPPED | OUTPATIENT
Start: 2024-02-26 | End: 2024-07-22

## 2024-02-26 RX ORDER — ESOMEPRAZOLE MAGNESIUM 40 MG/1
40 CAPSULE, DELAYED RELEASE ORAL
Qty: 90 CAPSULE | Refills: 3 | Status: SHIPPED | OUTPATIENT
Start: 2024-02-26

## 2024-02-26 RX ORDER — METOPROLOL SUCCINATE 25 MG/1
25 TABLET, EXTENDED RELEASE ORAL DAILY
Qty: 90 TABLET | Refills: 3 | Status: SHIPPED | OUTPATIENT
Start: 2024-02-26

## 2024-02-26 RX ORDER — METFORMIN HCL 500 MG
1000 TABLET, EXTENDED RELEASE 24 HR ORAL 2 TIMES DAILY WITH MEALS
Qty: 360 TABLET | Refills: 1 | Status: SHIPPED | OUTPATIENT
Start: 2024-02-26 | End: 2024-08-13

## 2024-02-26 RX ORDER — LOSARTAN POTASSIUM 100 MG/1
100 TABLET ORAL EVERY MORNING
Qty: 90 TABLET | Refills: 3 | Status: SHIPPED | OUTPATIENT
Start: 2024-02-26

## 2024-02-26 RX ORDER — HYDROCHLOROTHIAZIDE 12.5 MG/1
12.5 TABLET ORAL DAILY
Qty: 90 TABLET | Refills: 1 | Status: SHIPPED | OUTPATIENT
Start: 2024-02-26 | End: 2024-07-09

## 2024-02-26 ASSESSMENT — PAIN SCALES - GENERAL: PAINLEVEL: MILD PAIN (2)

## 2024-02-26 NOTE — PROGRESS NOTES
Assessment & Plan     Morbid obesity (H)   Contributes to risk with hypertension, hyperlipidemia, diabetes    Essential hypertension with goal blood pressure less than 140/90  Well controlled  - losartan (COZAAR) 100 MG tablet; Take 1 tablet (100 mg) by mouth every morning  - hydroCHLOROthiazide 12.5 MG tablet; Take 1 tablet (12.5 mg) by mouth daily    Type 2 diabetes mellitus with chronic kidney disease, with long-term current use of insulin, unspecified CKD stage (H)  Fair control  Continue current medications  - metFORMIN (GLUCOPHAGE XR) 500 MG 24 hr tablet; Take 2 tablets (1,000 mg) by mouth 2 times daily (with meals)  - insulin pen needle (BD PEDRO LUIS U/F) 32G X 4 MM miscellaneous; USE 4 DAILY OR AS DIRECTED  - Basic metabolic panel  (Ca, Cl, CO2, Creat, Gluc, K, Na, BUN); Future  - Basic metabolic panel  (Ca, Cl, CO2, Creat, Gluc, K, Na, BUN)    Other forms of angina pectoris  Stable, asymptomatic  - metoprolol succinate ER (TOPROL XL) 25 MG 24 hr tablet; Take 1 tablet (25 mg) by mouth daily    Gastroesophageal reflux disease without esophagitis  Continue PPI  - esomeprazole (NEXIUM) 40 MG DR capsule; Take 1 capsule (40 mg) by mouth every morning (before breakfast)                  Susannah Ragsdale is a 63 year old, presenting for the following health issues:  Diabetes        2/26/2024     1:37 PM   Additional Questions   Roomed by Shirley SALINAS CMA   Accompanied by Self       Diabetes Follow-up  Insulin - Novolog and Toujeo, metformin 1,000mg bid, ozempic 2mg SubQ weekly    - She has finished radiation 2/22 and cancer docotr told her to start Arimidex 1mg every day, she would like to know if she can start this now    History of Present Illness       Diabetes:   She presents for follow up of diabetes.  She is checking home blood glucose three times daily.   She checks blood glucose before meals.  Blood glucose is sometimes over 200 and sometimes under 70. She is aware of hypoglycemia symptoms including shakiness,  dizziness, lethargy, blurred vision and confusion.    She has no concerns regarding her diabetes at this time.   She is not experiencing numbness or burning in feet, excessive thirst, blurry vision, weight changes or redness, sores or blisters on feet.           She eats 0-1 servings of fruits and vegetables daily.She consumes 1 sweetened beverage(s) daily.She exercises with enough effort to increase her heart rate 9 or less minutes per day.  She exercises with enough effort to increase her heart rate 3 or less days per week.   She is taking medications regularly.           Hyperlipidemia Follow-Up  Rosuvastatin 10mg qd  Are you regularly taking any medication or supplement to lower your cholesterol?   Yes- statin  Are you having muscle aches or other side effects that you think could be caused by your cholesterol lowering medication?  No    Hypertension Follow-up  Hydrochlorothiazide 12.5mg every day, losartan 100mg every day, netoprolol 25mg qd  Do you check your blood pressure regularly outside of the clinic? No   Are you following a low salt diet? No  Are your blood pressures ever more than 140 on the top number (systolic) OR more   than 90 on the bottom number (diastolic), for example 140/90? N/A    BP Readings from Last 2 Encounters:   02/26/24 132/70   02/21/24 111/72     Hemoglobin A1C (%)   Date Value   02/26/2024 7.5 (H)   09/21/2023 7.7 (H)   04/14/2021 7.3 (H)   12/09/2020 7.9 (H)     LDL Cholesterol Calculated (mg/dL)   Date Value   03/28/2023 46   03/30/2022 53   04/14/2021 63   06/15/2020 53         Asthma Follow-Up  Advair 100-50mg bid  Was ACT completed today?  Yes        2/20/2024     9:26 AM   ACT Total Scores   ACT TOTAL SCORE (Goal Greater than or Equal to 20) 23   In the past 12 months, how many times did you visit the emergency room for your asthma without being admitted to the hospital? 0   In the past 12 months, how many times were you hospitalized overnight because of your asthma? 0       "  How many days per week do you miss taking your asthma controller medication?  0  Please describe any recent triggers for your asthma: exercise or sports  Have you had any Emergency Room Visits, Urgent Care Visits, or Hospital Admissions since your last office visit?  No    Chronic Kidney Disease Follow-up    Do you take any over the counter pain medicine?: No        Review of Systems  Constitutional, HEENT, cardiovascular, pulmonary, gi and gu systems are negative, except as otherwise noted.      Objective    /70   Pulse 81   Temp 97.2  F (36.2  C) (Tympanic)   Resp 20   Ht 1.6 m (5' 2.99\")   Wt 110.3 kg (243 lb 4 oz)   LMP 01/14/2016 (Approximate)   SpO2 98%   BMI 43.10 kg/m    Body mass index is 43.1 kg/m .  Physical Exam   GENERAL: alert and no distress  NECK: no adenopathy, no asymmetry, masses, or scars  RESP: lungs clear to auscultation - no rales, rhonchi or wheezes  CV: regular rate and rhythm, normal S1 S2, no S3 or S4, no murmur, click or rub, no peripheral edema  ABDOMEN: soft, nontender, no hepatosplenomegaly, no masses and bowel sounds normal  MS: no gross musculoskeletal defects noted, no edema    Results for orders placed or performed in visit on 02/26/24   Hemoglobin A1c     Status: Abnormal   Result Value Ref Range    Hemoglobin A1C 7.5 (H) 0.0 - 5.6 %             Signed Electronically by: Kaia Elena MD    "

## 2024-02-28 ENCOUNTER — VIRTUAL VISIT (OUTPATIENT)
Dept: ONCOLOGY | Facility: CLINIC | Age: 64
End: 2024-02-28
Attending: SURGERY
Payer: COMMERCIAL

## 2024-02-28 DIAGNOSIS — Z80.41 FAMILY HISTORY OF OVARIAN CANCER: ICD-10-CM

## 2024-02-28 DIAGNOSIS — D05.12 DUCTAL CARCINOMA IN SITU (DCIS) OF LEFT BREAST: Primary | ICD-10-CM

## 2024-02-28 DIAGNOSIS — Z80.3 FAMILY HISTORY OF MALIGNANT NEOPLASM OF BREAST: ICD-10-CM

## 2024-02-28 DIAGNOSIS — Z83.719 FAMILY HISTORY OF COLONIC POLYPS: ICD-10-CM

## 2024-02-28 PROCEDURE — 96040 HC GENETIC COUNSELING, EACH 30 MINUTES: CPT | Mod: GT,95 | Performed by: GENETIC COUNSELOR, MS

## 2024-02-28 NOTE — PROGRESS NOTES
"2/28/2024    Virtual Visit Details  Type of service:  Video Visit   Originating Location (pt. Location): Home  Distant Location (provider location):  Off-site  Platform used for Video Visit: Al  Length of video visit: 32 minutes    Referring Provider: Arron Olson MD    Presenting Information:   Today Melyssa elected for a virtual genetic counseling visit through the Cancer Risk Management Program to discuss her personal and family history of cancer. We reviewed this history, cancer screening recommendations, and available genetic testing options.    Personal History:  Bria is a 63 year old female. She had a screening mammogram and biopsy in August 2023, which identified atypical ductal hyperplasia (ADH) in her left breast. The subsequent lumpectomy identified DCIS (ER/AK+) at age 63; additional treatment included re-excisions, radiation, and now endocrine therapy.    Bria has her ovaries, fallopian tubes, and uterus in place. She had a normal transvaginal ultrasound in October 2023 after a CT scan identified a possible thickened endometrium.      Her most recent colonoscopy and upper endoscopy in November 2019 removed one tubular adenoma and one hyperplastic polyp; follow-up was recommended in five years. She does not regularly do any other cancer screening at this time.    Family History: (Please see scanned pedigree for detailed family history information)  One son is 38 and has not had a cancer, but had a \"grapefruit-sized\" colon polyp removed in his early 30's; additional details are unknown.  One brother was diagnosed with brain and lung cancer at age 48 (unknown if primary versus metastasis) and passed away at age 49; Melyssa shared that it is theorized the cancer was due to Agent Orange exposure.  One sister is 56 and has not had a cancer, but her most recent colonoscopy identified approximately 20 colon polyps.  One maternal aunt was diagnosed with liver cancer (unknown if primary versus metastasis) " "at age 60 and passed away at age 64; she did not have a history of smoking, significant alcohol use, or hepatitis infection.  Melyssa's maternal grandmother was diagnosed with ovarian cancer at age 64 and passed away at age 65.  Her sister was diagnosed with breast cancer in her 60's and passed away.  Melyssa also shared there is a chance these cancer diagnoses are flipped (I.e. her grandmother had breast cancer, etc.).  Melyssa reports that she has no known paternal family history of cancer.  Her maternal ethnicity is [place]. Her paternal ethnicity is [place]. There is no known Ashkenazi Shinto ancestry on either side of her family.    Genetic Testing Result: NEGATIVE  In order to inform treatment decisions, Dr. Olson ordered testing of the BRCA1 and BRCA2 genes, with reflex to the Breast Actionable Panel, through the [facility]. Melyssa's blood sample was collected on 10/23/2023.    Bria is negative for mutations in the JOSE, BARD1, BRCA1, BRCA2, CDH1, CHEK2, NF1, PALB2, PTEN, STK11, and TP53 genes by sequencing and deletion/duplication analysis. No mutations were found in any of the 11 genes analyzed. She does not have an identifiable mutation associated with Hereditary Breast and Ovarian Cancer syndrome (BRCA1, BRCA2), Hereditary Diffuse Gastric Cancer (CDH1), Peutz-Jeghers syndrome (STK11), Neurofibromatosis type 1 (NF1), Cowden syndrome (PTEN), or Li Fraumeni syndrome (TP53).      A copy of the test report can be found in the Laboratory tab, dated 11/3/2023, and named \"Next generation sequencing\".     Interpretation:  We discussed several different interpretations of this negative test result.    One explanation may be that there is a different gene or combination of genes and environment that are associated with the cancers in this family that are not identifiable using the Breast Actionable Panel.  Another explanation may be that her other relatives with cancer did have a " mutation in one of these 11 genes and Melyssa did not inherit it. If that is the case, Melyssa's breast cancer may have been sporadic and caused by random cellular changes.  There is also a small possibility that there is a mutation in one of these genes, and the testing laboratory could not find it with their current testing methods.       Inheritance:  We reviewed the autosomal dominant inheritance of mutations in these 11 genes. We discussed that Bria did not pass on an identifiable mutation in these genes to her children based on this test result. Mutations in these genes do not skip generations.      Additional Testing Considerations:  We then reviewed that it is still possible Bria does carry a currently identifiable gene mutation that increases her risk for certain cancers. For example, mutations in the BRIP1, RAD51C, and RAD51D genes are associated with increased risk for breast and/or ovarian cancer. As many of these and other genes present with overlapping features in a family and accurate cancer risk cannot always be established based upon the pedigree analysis alone, it would be reasonable for Bria to consider expanded genetic testing to analyze additional genes.  We reviewed genetic testing options for hereditary breast, gynecologic, and related cancers: targeted breast/gynecologic cancers panel and expanded pan-cancer panels (Comprehensive Hereditary Cancer Panel and Expanded Panel). She opted for the Comprehensive Hereditary Cancer Panel.  Genetic testing is available for 43 (32 additional) genes associated with hereditary cancer: APC, JOSE, AXIN2, BAP1, BARD1, BMPR1A, BRCA1, BRCA2, BRIP1, CDH1, CDK4, CDKN2A, CHEK2, DICER1, EPCAM, GREM1, HOXB13, MBD4, MITF, MLH1, MLH3, MRE11, MSH2, MSH3, MSH6, MUTYH, NBN, NF1, NTHL1, PALB2, PMS2, POLD1, POLE, POT1, PTEN, RAD50, RAD51C, RAD51D, SMAD4, SMARCA4, STK11, TERT, TP53.  Consent was obtained over the video. Genetic testing using the Comprehensive  Hereditary Cancer Panel will be performed by the [facility]; no additional blood sample is needed to complete this testing. Turn around time: 3-4 weeks.  Medical Management: For Bria, we reviewed that the information from genetic testing may determine:  additional cancer screening for which Bria may qualify (i.e. mammogram and breast MRI, more frequent colonoscopies, more frequent dermatologic exams, etc.),  options for risk reducing surgeries Bria could consider (i.e. bilateral mastectomy, surgery to remove her ovaries and/or uterus, etc.),    and targeted chemotherapies if she were to develop certain cancers in the future (i.e. immunotherapy for individuals with Rm syndrome, PARP inhibitors, etc.).   These recommendations and possible targeted chemotherapies will be discussed in detail once genetic testing is completed.     Plan:  1) Today we discussed Melyssa's negative Breast Actionable Panel results.  2) Bria then elected to proceed with additional genetic testing using the Comprehensive Hereditary Cancer Panel; no additional blood sample is needed to complete this testing.  3) The results should be available in 3-4 weeks.  4) Bria will be contacted to schedule a virtual return visit with me to discuss the results.    Jaymie Schwartz MS, [facility] [place]  Licensed, Certified Genetic Counselor  Office: [phone]  Pager: [phone]

## 2024-02-28 NOTE — NURSING NOTE
Is the patient currently in the state of [place]? YES    Visit mode:VIDEO    If the visit is dropped, the patient can be reconnected by: VIDEO VISIT: Text to cell phone:   Telephone Information:   Mobile [phone]       Will anyone else be joining the visit? , Tam, may join  (If patient encounters technical issues they should call [phone]    How would you like to obtain your AVS? MyChart    Are changes needed to the allergy or medication list? N/A    Reason for visit: Consult  \    Tangela BURGERF

## 2024-02-28 NOTE — PATIENT INSTRUCTIONS
Conditions discussed today:  BRIP1, RAD51C, RAD51D gene mutations    Assessing Cancer Risk  Only about 5-10% of cancers are thought to be due to an inherited cancer susceptibility gene.    These families often have:  Several people with the same or related types of cancer  Cancers diagnosed at a young age (before age 50)  Individuals with more than one primary cancer  Multiple generations of the family affected with cancer    Genetic Testing  Genetic testing involves a blood or saliva test and will look at the genetic information in select genes for any harmful mutations that are associated with increased cancer risk.  If possible, it is recommended that the person(s) who has had cancer be tested before other family members.  That person will give us the most useful information about whether or not a specific gene is associated with the cancer in the family.     Results  There are three possible results of genetic testing:  Positive--a harmful mutation was identified  Negative--no mutation was identified  Variant of unknown significance--a variation in one of the genes was identified, but it is unclear how this impacts cancer risk in the family    Advantages and Disadvantages  There are advantages and disadvantages to genetic testing.    Advantages  May clarify your cancer risk  Can help you make medical decisions  May explain the cancers in your family  May give useful information to your family members (if you share your results)    Disadvantages  Possible negative emotional impact of learning about inherited cancer risk  Uncertainty in interpreting a negative test result in some situations  Possible genetic discrimination concerns (see below)    Inheritance   Mutations in most cancer risk genes are inherited in an autosomal dominant pattern.  This means that if a parent has a mutation, each of their children will have a 50% chance of inheriting that same mutation.  Therefore, each child would have a 50% chance  of being at increased risk for developing cancer.                                              Image obtained from Genetics Home Reference, 2013     Genetic Information Nondiscrimination Act (LAURENT)  LAURENT is a federal law that protects individuals from health insurance or employment discrimination based on a genetic test result alone.  Although rare, there are currently no legal protections in terms of life insurance, long term care, or disability insurances.  Visit the National [employer] Woodruff at Genome.gov/14675433 to learn more.    Reducing Cancer Risk  If a harmful mutation is found in a cancer risk gene, there may be certain screening tests or preventative surgeries that can be offered. This information will be discussed after genetic testing is completed. If no mutations are found on genetic testing, screening is then recommended based on personal and/or family history of cancer.     Questions to Think About Regarding Genetic Testing  What effect will the test result have on me and my relationship with my family members if I have an inherited gene mutation?  If I don t have a gene mutation?  Should I share my test results, and how will my family react to this news, which may also affect them?  Are my children ready to learn new information that may one day affect their own health?    Resources  American Cancer Society (ACS) cancer.org   National Cancer Woodruff (NCI) cancer.gov     Please call us if you have any questions or concerns.   Cancer Risk Management Program [phone]ER ([phone])  Carl Ardon, MS [facility] [place]  [phone]  Nellie Ingram, MS, [facility] [place] [phone]  Fadumo Beaver, MS, [facility] [place]  [phone]  Naomie Conway, MS, [facility] [place]  [phone]  Candace Rodriges, MS, [facility] [place]  [phone]  Jaymie Schwartz, MS, [facility] [place] [phone]  Leola Cosme, MS, [facility] [place] [phone]

## 2024-02-28 NOTE — LETTER
"    2/28/2024         RE: Bria Davis  [id number] [facility] [address]        Dear Colleague,    Thank you for referring your patient, Bria Davis, to the [facility]. Please see a copy of my visit note below.    2/28/2024    Virtual Visit Details  Type of service:  Video Visit   Originating Location (pt. Location): Home  Distant Location (provider location):  Off-site  Platform used for Video Visit: PasswordBox  Length of video visit: 32 minutes    Referring Provider: Arron Olson MD    Presenting Information:   Today Melyssa elected for a virtual genetic counseling visit through the Cancer Risk Management Program to discuss her personal and family history of cancer. We reviewed this history, cancer screening recommendations, and available genetic testing options.    Personal History:  Bria is a 63 year old female. She had a screening mammogram and biopsy in August 2023, which identified atypical ductal hyperplasia (ADH) in her left breast. The subsequent lumpectomy identified DCIS (ER/GA+) at age 63; additional treatment included re-excisions, radiation, and now endocrine therapy.    Bria has her ovaries, fallopian tubes, and uterus in place. She had a normal transvaginal ultrasound in October 2023 after a CT scan identified a possible thickened endometrium.      Her most recent colonoscopy and upper endoscopy in November 2019 removed one tubular adenoma and one hyperplastic polyp; follow-up was recommended in five years. She does not regularly do any other cancer screening at this time.    Family History: (Please see scanned pedigree for detailed family history information)  One son is 38 and has not had a cancer, but had a \"grapefruit-sized\" colon polyp removed in his early 30's; additional details are unknown.  One brother was diagnosed with brain and lung cancer at age 48 (unknown if primary versus metastasis) and passed away at age 49; Melyssa shared that it " "is theorized the cancer was due to Agent Orange exposure.  One sister is 56 and has not had a cancer, but her most recent colonoscopy identified approximately 20 colon polyps.  One maternal aunt was diagnosed with liver cancer (unknown if primary versus metastasis) at age 60 and passed away at age 64; she did not have a history of smoking, significant alcohol use, or hepatitis infection.  Melyssa's maternal grandmother was diagnosed with ovarian cancer at age 64 and passed away at age 65.  Her sister was diagnosed with breast cancer in her 60's and passed away.  Melyssa also shared there is a chance these cancer diagnoses are flipped (I.e. her grandmother had breast cancer, etc.).  Melyssa reports that she has no known paternal family history of cancer.  Her maternal ethnicity is [place]. Her paternal ethnicity is American. There is no known Ashkenazi Yazidism ancestry on either side of her family.    Genetic Testing Result: NEGATIVE  In order to inform treatment decisions, Dr. Olson ordered testing of the BRCA1 and BRCA2 genes, with reflex to the Breast Actionable Panel, through the [facility]. Melyssa's blood sample was collected on 10/23/2023.    Bria is negative for mutations in the JOSE, BARD1, BRCA1, BRCA2, CDH1, CHEK2, NF1, PALB2, PTEN, STK11, and TP53 genes by sequencing and deletion/duplication analysis. No mutations were found in any of the 11 genes analyzed. She does not have an identifiable mutation associated with Hereditary Breast and Ovarian Cancer syndrome (BRCA1, BRCA2), Hereditary Diffuse Gastric Cancer (CDH1), Peutz-Jeghers syndrome (STK11), Neurofibromatosis type 1 (NF1), Cowden syndrome (PTEN), or Li Fraumeni syndrome (TP53).      A copy of the test report can be found in the Laboratory tab, dated 11/3/2023, and named \"Next generation sequencing\".     Interpretation:  We discussed several different interpretations of this negative test result.    One explanation " may be that there is a different gene or combination of genes and environment that are associated with the cancers in this family that are not identifiable using the Breast Actionable Panel.  Another explanation may be that her other relatives with cancer did have a mutation in one of these 11 genes and Melyssa did not inherit it. If that is the case, Melyssa's breast cancer may have been sporadic and caused by random cellular changes.  There is also a small possibility that there is a mutation in one of these genes, and the testing laboratory could not find it with their current testing methods.       Inheritance:  We reviewed the autosomal dominant inheritance of mutations in these 11 genes. We discussed that Bria did not pass on an identifiable mutation in these genes to her children based on this test result. Mutations in these genes do not skip generations.      Additional Testing Considerations:  We then reviewed that it is still possible Bria does carry a currently identifiable gene mutation that increases her risk for certain cancers. For example, mutations in the BRIP1, RAD51C, and RAD51D genes are associated with increased risk for breast and/or ovarian cancer. As many of these and other genes present with overlapping features in a family and accurate cancer risk cannot always be established based upon the pedigree analysis alone, it would be reasonable for Bria to consider expanded genetic testing to analyze additional genes.  We reviewed genetic testing options for hereditary breast, gynecologic, and related cancers: targeted breast/gynecologic cancers panel and expanded pan-cancer panels (Comprehensive Hereditary Cancer Panel and Expanded Panel). She opted for the Comprehensive Hereditary Cancer Panel.  Genetic testing is available for 43 (32 additional) genes associated with hereditary cancer: APC, JOSE, AXIN2, BAP1, BARD1, BMPR1A, BRCA1, BRCA2, BRIP1, CDH1, CDK4, CDKN2A, CHEK2, DICER1, EPCAM,  GREM1, HOXB13, MBD4, MITF, MLH1, MLH3, MRE11, MSH2, MSH3, MSH6, MUTYH, NBN, NF1, NTHL1, PALB2, PMS2, POLD1, POLE, POT1, PTEN, RAD50, RAD51C, RAD51D, SMAD4, SMARCA4, STK11, TERT, TP53.  Consent was obtained over the video. Genetic testing using the Comprehensive Hereditary Cancer Panel will be performed by the [facility]; no additional blood sample is needed to complete this testing. Turn around time: 3-4 weeks.  Medical Management: For Bria, we reviewed that the information from genetic testing may determine:  additional cancer screening for which Bria may qualify (i.e. mammogram and breast MRI, more frequent colonoscopies, more frequent dermatologic exams, etc.),  options for risk reducing surgeries Bria could consider (i.e. bilateral mastectomy, surgery to remove her ovaries and/or uterus, etc.),    and targeted chemotherapies if she were to develop certain cancers in the future (i.e. immunotherapy for individuals with Rm syndrome, PARP inhibitors, etc.).   These recommendations and possible targeted chemotherapies will be discussed in detail once genetic testing is completed.     Plan:  1) Today we discussed Melyssa's negative Breast Actionable Panel results.  2) Bria then elected to proceed with additional genetic testing using the Comprehensive Hereditary Cancer Panel; no additional blood sample is needed to complete this testing.  3) The results should be available in 3-4 weeks.  4) Bria will be contacted to schedule a virtual return visit with me to discuss the results.  Jaymie Schwartz MS, [facility]  Licensed, Certified Genetic Counselor  Office: [phone]  Pager: [phone]

## 2024-03-01 DIAGNOSIS — D05.12 DUCTAL CARCINOMA IN SITU (DCIS) OF LEFT BREAST: ICD-10-CM

## 2024-03-01 DIAGNOSIS — Z83.719 FAMILY HISTORY OF COLONIC POLYPS: ICD-10-CM

## 2024-03-01 DIAGNOSIS — Z80.41 FAMILY HISTORY OF OVARIAN CANCER: ICD-10-CM

## 2024-03-01 DIAGNOSIS — Z80.3 FAMILY HISTORY OF MALIGNANT NEOPLASM OF BREAST: ICD-10-CM

## 2024-03-01 PROCEDURE — 36415 COLL VENOUS BLD VENIPUNCTURE: CPT

## 2024-03-01 PROCEDURE — G0452 MOLECULAR PATHOLOGY INTERPR: HCPCS | Mod: 26 | Performed by: PATHOLOGY

## 2024-03-06 DIAGNOSIS — Z80.41 FAMILY HISTORY OF OVARIAN CANCER: ICD-10-CM

## 2024-03-06 DIAGNOSIS — Z80.3 FAMILY HISTORY OF MALIGNANT NEOPLASM OF BREAST: ICD-10-CM

## 2024-03-06 DIAGNOSIS — Z83.719 FAMILY HISTORY OF COLONIC POLYPS: ICD-10-CM

## 2024-03-06 DIAGNOSIS — D05.12 DUCTAL CARCINOMA IN SITU (DCIS) OF LEFT BREAST: Primary | ICD-10-CM

## 2024-03-12 ENCOUNTER — DOCUMENTATION ONLY (OUTPATIENT)
Dept: RADIATION THERAPY | Facility: OUTPATIENT CENTER | Age: 64
End: 2024-03-12

## 2024-03-12 NOTE — PROGRESS NOTES
"Radiotherapy Treatment Summary              PATIENT: Bria Davis  MEDICAL RECORD NO: [id number]   : 1960    DIAGNOSIS:  Ductal carcinoma in situ of the left breast ER + 91 to 100%  INTENT OF RADIOTHERAPY: Adjuvant  PATHOLOGY:      23  Left breast, 4.0 cm mass, 10:00, 6.0 cm from nipple, ultrasound guided 14 gauge needle core biopsies:  - Two foci of atypical ductal hyperplasia, 1.5 mm and 2.0 mm.    - Fibrocystic change, with apocrine metaplasia, florid usual ductal hyperplasia, columnar cell change.  - No evidence of invasive malignancy.  - No evidence of lymphovascular invasion.     10/6/23  A.  Left breast excisional lumpectomy, oriented breast mass at 10:00, 6 cm from the nipple:    - Negative for invasive carcinoma     - Ductal carcinoma in situ, papillary, cribriform and solid    - Grade:  Grade 1    - Tumor size: 37 x 32 x 20 mm    - Margins of resection status for carcinoma in-situ: POSITIVE MEDIAL MARGIN     Margin Status DCIS present at margin    Margin(s) Involved by DCIS Medial: Present at cauterized medial yellow inked margin in blocks A15, A16 and A17    Distance from DCIS to Anterior Margin 1.0 mm   Distance from DCIS to Posterior Margin 4.2 mm   Distance from DCIS to Superior Margin Greater than: 5 mm   Distance from DCIS to Inferior Margin Greater than: 5 mm   Distance from DCIS to Lateral Margin 1.9 mm        - Staging: pTis pN not assigned (no nodes submitted)    - Estrogen receptor:  POSITIVE (%, strong to weak nuclear staining)    - Progesterone receptor:  Pending     - Additional histopathologic findings: Microcalcifications associated with malignant and benign breast tissue, florid usual type ductal hyperplasia, intraductal papillomas, and columnar cell change.     10/21/23  A. Skin, LEFT breast, \"necrotic skin\", excision:  - Coagulative necrosis of epidermis and dermis, with associated bacterial colonies  - Acute and chronic inflammation (patchy), extending from " epidermis to subcutaneous adipose tissue  - Fibrin and acute inflammation at deep edge of specimen  - No breast ducts or acini identified  - Negative for malignancy     B. LEFT breast, tissue, excision:  - Adipose tissue with scattered acute inflammation, admixed with fibrin  - No breast ducts or acini identified  - Negative for malignancy     11/13/23  LEFT breast, new medial margin, excision:  -Ductal carcinoma in situ (DCIS), nuclear grade 1, cribriform, micropapillary, and papillary type(s), residual after prior lumpectomy  -DCIS spans an estimated 15 mm  -No invasive carcinoma identified  -New medial margin is uninvolved by DCIS  -DCIS is 1.5 mm from the new medial margin  -Post-operative changes, including granulation tissue                              STAGE: 0, Tis    CONCURRENT SYSTEMIC THERAPY:  No       ONCOLOGIC HISTORY:          The patient was noted to have a mammographic abnormality in the left breast prompting further evaluation.  On 9/7/2023 left breast biopsy at the 10 o'clock position, 6 cm from the nipple was obtained.  Pathology demonstrated atypical ductal hyperplasia in 2 foci, no invasive disease noted.  On 10/6/2023 the patient underwent left breast lumpectomy.  Final pathology demonstrated DCIS, 37 mm, grade 1, positive DCIS margin (medial) ER positive, pathologic Tis Nx.  The patient underwent reexcision on 11/13/2023 by Dr. Olson.  Final pathology demonstrated residual DCIS, grade 1, 15 mm in size, negative final margin (final new medial margin was 1.5 mm). Had wound healing issues postoperatively. Required left breast excision in 10/21/2023 for infection and necrotic change.  On 12/7/2023 the patient underwent reconstruction after lumpectomy by Dr. Rodriguez. She was seen for consult in our clinic, we discussed that in general, adjuvant whole breast radiotherapy is recommended as it has been demonstrated to reduce tumor recurrence by 50%, which has been demonstrated in a  meta-analysis of  DCIS patients treated with radiation over lumpectomy alone, despite no survival benefit detected (EBCTCG, [employer], 2010).      SITE OF TREATMENT: Left breast    DATES  OF TREATMENT: 2/1/24 to 2/22/24     TOTAL DOSE OF TREATMENT: 4,272 cGy     DOSE PER FRACTION OF TREATMENT: 267 cGy x 16 fractions       COMMENT/TOXICITY:    None                PAIN MANAGEMENT:     None                        FOLLOW UP PLAN: One month    CC  Patient Care Team:  Kaia Elena MD as PCP - General (Family Practice)  Maylin Navarrete, RN as Diabetes Educator (Diabetes Education)  Kaia Elena MD as Assigned PCP  Snow Sandra MD as Assigned Musculoskeletal Provider  Jabari Santos MD as Assigned Neuroscience Provider  Ofelia Becerra RN as Specialty Care Coordinator (Hematology & Oncology)  Jaymie Schwartz GC as Genetic Counselor (Genetic Counselor, MS)  DELORIS Rodriguez MD as MD (Plastic Surgery)  Vidal Haddad MD as MD (Radiation Oncology)  Alfredo White MD as MD (Hematology & Oncology)  Vidal Haddad MD as Assigned Cancer Care Provider  DELORIS Rodriguez MD as Assigned Surgical Provider       VIKASH Nicole  Department of Radiation Oncology  [facility]

## 2024-03-22 ENCOUNTER — OFFICE VISIT (OUTPATIENT)
Dept: RADIATION THERAPY | Facility: OUTPATIENT CENTER | Age: 64
End: 2024-03-22
Payer: COMMERCIAL

## 2024-03-22 DIAGNOSIS — D05.12 DUCTAL CARCINOMA IN SITU (DCIS) OF LEFT BREAST: Primary | ICD-10-CM

## 2024-03-22 NOTE — PROGRESS NOTES
"   Department of Radiation Oncology  Radiation Therapy Center  [facility] [place] [address]  [phone]       Radiation Oncology Follow-up Visit  2024    Bria Davis  MRN: [id number]   : 1960     DIAGNOSIS:  Ductal carcinoma in situ of the left breast ER + 91 to 100%  INTENT OF RADIOTHERAPY: Adjuvant  PATHOLOGY:      23  Left breast, 4.0 cm mass, 10:00, 6.0 cm from nipple, ultrasound guided 14 gauge needle core biopsies:  - Two foci of atypical ductal hyperplasia, 1.5 mm and 2.0 mm.    - Fibrocystic change, with apocrine metaplasia, florid usual ductal hyperplasia, columnar cell change.  - No evidence of invasive malignancy.  - No evidence of lymphovascular invasion.     10/6/23  A.  Left breast excisional lumpectomy, oriented breast mass at 10:00, 6 cm from the nipple:    - Negative for invasive carcinoma     - Ductal carcinoma in situ, papillary, cribriform and solid    - Grade:  Grade 1    - Tumor size: 37 x 32 x 20 mm    - Margins of resection status for carcinoma in-situ: POSITIVE MEDIAL MARGIN     Margin Status DCIS present at margin    Margin(s) Involved by DCIS Medial: Present at cauterized medial yellow inked margin in blocks A15, A16 and A17    Distance from DCIS to Anterior Margin 1.0 mm   Distance from DCIS to Posterior Margin 4.2 mm   Distance from DCIS to Superior Margin Greater than: 5 mm   Distance from DCIS to Inferior Margin Greater than: 5 mm   Distance from DCIS to Lateral Margin 1.9 mm        - Staging: pTis pN not assigned (no nodes submitted)    - Estrogen receptor:  POSITIVE (%, strong to weak nuclear staining)    - Progesterone receptor:  Pending     - Additional histopathologic findings: Microcalcifications associated with malignant and benign breast tissue, florid usual type ductal hyperplasia, intraductal papillomas, and columnar cell change.     10/21/23  A. Skin, LEFT breast, \"necrotic skin\", excision:  - Coagulative necrosis " of epidermis and dermis, with associated bacterial colonies  - Acute and chronic inflammation (patchy), extending from epidermis to subcutaneous adipose tissue  - Fibrin and acute inflammation at deep edge of specimen  - No breast ducts or acini identified  - Negative for malignancy     B. LEFT breast, tissue, excision:  - Adipose tissue with scattered acute inflammation, admixed with fibrin  - No breast ducts or acini identified  - Negative for malignancy     11/13/23  LEFT breast, new medial margin, excision:  -Ductal carcinoma in situ (DCIS), nuclear grade 1, cribriform, micropapillary, and papillary type(s), residual after prior lumpectomy  -DCIS spans an estimated 15 mm  -No invasive carcinoma identified  -New medial margin is uninvolved by DCIS  -DCIS is 1.5 mm from the new medial margin  -Post-operative changes, including granulation tissue                              STAGE: 0, Tis     CONCURRENT SYSTEMIC THERAPY:  No        ONCOLOGIC HISTORY:          The patient was noted to have a mammographic abnormality in the left breast prompting further evaluation.  On 9/7/2023 left breast biopsy at the 10 o'clock position, 6 cm from the nipple was obtained.  Pathology demonstrated atypical ductal hyperplasia in 2 foci, no invasive disease noted.  On 10/6/2023 the patient underwent left breast lumpectomy.  Final pathology demonstrated DCIS, 37 mm, grade 1, positive DCIS margin (medial) ER positive, pathologic Tis Nx.  The patient underwent reexcision on 11/13/2023 by Dr. Olson.  Final pathology demonstrated residual DCIS, grade 1, 15 mm in size, negative final margin (final new medial margin was 1.5 mm). Had wound healing issues postoperatively. Required left breast excision in 10/21/2023 for infection and necrotic change.  On 12/7/2023 the patient underwent reduction during lumpectomy by Dr. Rodriguez. she has poor wound healing the left. She was seen for consult in our clinic, we discussed that in general, adjuvant  whole breast radiotherapy is recommended as it has been demonstrated to reduce tumor recurrence by 50%, which has been demonstrated in a  meta-analysis of DCIS patients treated with radiation over lumpectomy alone, despite no survival benefit detected (EBCTCG, [employer], 2010).      1/3/2023 BRCA1/BRCA2 next generation sequencing) and actionable hereditary breast cancer NGS revealed no detectable alterations or variations. Family history is remarkable for paternal grandmother with ovarian cancer and a paternal great aunt who had breast cancer.     She completed radiation therapy and is on anastrozole.      SITE OF TREATMENT: Left breast     DATES  OF TREATMENT: 2/1/24 to 2/22/24      TOTAL DOSE OF TREATMENT: 4,272 cGy      DOSE PER FRACTION OF TREATMENT: 267 cGy x 16 fractions    INTERVAL SINCE COMPLETION OF RADIATION THERAPY:   One month    SUBJECTIVE:   Melyssa is doing well. She has no complaints. Skin is healed. No open skin. A little itchy on the right side along the incision of the areola. She is tolerating pill therapy. No hot flashes night sweats or vaginal dryness.     PHYSICAL EXAM:  LMP 01/14/2016 (Approximate)   Gen: Alert, in NAD  Eyes: PERRL, EOMI, sclera anicteric  HENT     Head: NC/AT     Ears: No external auricular lesions     Nose/sinus: No rhinorrhea or epistaxis     Oral Cavity/Oropharynx: MMM  Neck: Supple, full ROM, no LAD  Pulm: No wheezing, stridor or respiratory distress  CV: Well-perfused, no cyanosis, no pedal edema  Abdominal: Soft, nontender, nondistended, no hepatomegaly  Back: No step-offs or pain to palpation along the thoracolumbar spine, no CVA tenderness  Musculoskeletal: Normal bulk and tone   Skin: Normal color and turgor  Neurologic: A/Ox3, CN II-XII intact  Psychiatric: Appropriate mood and affect    LABS AND IMAGING:  Reviewed.    IMPRESSION:   Ms. Davis is a 63 year old female with a ductal carcinoma in situ of the left breast status post lumpectomy.  Final pathology demonstrated  DCIS, 37 mm, grade 1, negative margin after reexcision, ER positive, pathologic Tis Nx.   She completed RT and is on pill therapy. Tolerated radiation well. Skin is healed.     PLAN: RTO 6 months, has appt with medical oncology in September      VIKASH Nicole  Department of Radiation Oncology  [facility]

## 2024-03-22 NOTE — LETTER
3/22/2024         RE: Bria Davis  [address] [facility] [address]        Dear Colleague,    Thank you for referring your patient, Bria Davis, to the [facility] RADIATION THERAPY CLINIC. Please see a copy of my visit note below.       Department of Radiation Oncology  Radiation Therapy Center  [facility] [place] [address]  [phone]       Radiation Oncology Follow-up Visit  2024    Bria Davis  MRN: [id number]   : 1960     DIAGNOSIS:  Ductal carcinoma in situ of the left breast ER + 91 to 100%  INTENT OF RADIOTHERAPY: Adjuvant  PATHOLOGY:      23  Left breast, 4.0 cm mass, 10:00, 6.0 cm from nipple, ultrasound guided 14 gauge needle core biopsies:  - Two foci of atypical ductal hyperplasia, 1.5 mm and 2.0 mm.    - Fibrocystic change, with apocrine metaplasia, florid usual ductal hyperplasia, columnar cell change.  - No evidence of invasive malignancy.  - No evidence of lymphovascular invasion.     10/6/23  A.  Left breast excisional lumpectomy, oriented breast mass at 10:00, 6 cm from the nipple:    - Negative for invasive carcinoma     - Ductal carcinoma in situ, papillary, cribriform and solid    - Grade:  Grade 1    - Tumor size: 37 x 32 x 20 mm    - Margins of resection status for carcinoma in-situ: POSITIVE MEDIAL MARGIN     Margin Status DCIS present at margin    Margin(s) Involved by DCIS Medial: Present at cauterized medial yellow inked margin in blocks A15, A16 and A17    Distance from DCIS to Anterior Margin 1.0 mm   Distance from DCIS to Posterior Margin 4.2 mm   Distance from DCIS to Superior Margin Greater than: 5 mm   Distance from DCIS to Inferior Margin Greater than: 5 mm   Distance from DCIS to Lateral Margin 1.9 mm        - Staging: pTis pN not assigned (no nodes submitted)    - Estrogen receptor:  POSITIVE (%, strong to weak nuclear staining)    - Progesterone receptor:  Pending     - Additional  "histopathologic findings: Microcalcifications associated with malignant and benign breast tissue, florid usual type ductal hyperplasia, intraductal papillomas, and columnar cell change.     10/21/23  A. Skin, LEFT breast, \"necrotic skin\", excision:  - Coagulative necrosis of epidermis and dermis, with associated bacterial colonies  - Acute and chronic inflammation (patchy), extending from epidermis to subcutaneous adipose tissue  - Fibrin and acute inflammation at deep edge of specimen  - No breast ducts or acini identified  - Negative for malignancy     B. LEFT breast, tissue, excision:  - Adipose tissue with scattered acute inflammation, admixed with fibrin  - No breast ducts or acini identified  - Negative for malignancy     11/13/23  LEFT breast, new medial margin, excision:  -Ductal carcinoma in situ (DCIS), nuclear grade 1, cribriform, micropapillary, and papillary type(s), residual after prior lumpectomy  -DCIS spans an estimated 15 mm  -No invasive carcinoma identified  -New medial margin is uninvolved by DCIS  -DCIS is 1.5 mm from the new medial margin  -Post-operative changes, including granulation tissue                              STAGE: 0, Tis     CONCURRENT SYSTEMIC THERAPY:  No        ONCOLOGIC HISTORY:          The patient was noted to have a mammographic abnormality in the left breast prompting further evaluation.  On 9/7/2023 left breast biopsy at the 10 o'clock position, 6 cm from the nipple was obtained.  Pathology demonstrated atypical ductal hyperplasia in 2 foci, no invasive disease noted.  On 10/6/2023 the patient underwent left breast lumpectomy.  Final pathology demonstrated DCIS, 37 mm, grade 1, positive DCIS margin (medial) ER positive, pathologic Tis Nx.  The patient underwent reexcision on 11/13/2023 by Dr. Olson.  Final pathology demonstrated residual DCIS, grade 1, 15 mm in size, negative final margin (final new medial margin was 1.5 mm). Had wound healing issues postoperatively. " Required left breast excision in 10/21/2023 for infection and necrotic change.  On 12/7/2023 the patient underwent reduction during lumpectomy by Dr. Rodriguez. she has poor wound healing the left. She was seen for consult in our clinic, we discussed that in general, adjuvant whole breast radiotherapy is recommended as it has been demonstrated to reduce tumor recurrence by 50%, which has been demonstrated in a  meta-analysis of DCIS patients treated with radiation over lumpectomy alone, despite no survival benefit detected (EBCTCG, [employer], 2010).      1/3/2023 BRCA1/BRCA2 next generation sequencing) and actionable hereditary breast cancer NGS revealed no detectable alterations or variations. Family history is remarkable for paternal grandmother with ovarian cancer and a paternal great aunt who had breast cancer.     She completed radiation therapy and is on anastrozole.      SITE OF TREATMENT: Left breast     DATES  OF TREATMENT: 2/1/24 to 2/22/24      TOTAL DOSE OF TREATMENT: 4,272 cGy      DOSE PER FRACTION OF TREATMENT: 267 cGy x 16 fractions    INTERVAL SINCE COMPLETION OF RADIATION THERAPY:   One month    SUBJECTIVE:   Melyssa is doing well. She has no complaints. Skin is healed. No open skin. A little itchy on the right side along the incision of the areola. She is tolerating pill therapy. No hot flashes night sweats or vaginal dryness.     PHYSICAL EXAM:  LMP 01/14/2016 (Approximate)   Gen: Alert, in NAD  Eyes: PERRL, EOMI, sclera anicteric  HENT     Head: NC/AT     Ears: No external auricular lesions     Nose/sinus: No rhinorrhea or epistaxis     Oral Cavity/Oropharynx: MMM  Neck: Supple, full ROM, no LAD  Pulm: No wheezing, stridor or respiratory distress  CV: Well-perfused, no cyanosis, no pedal edema  Abdominal: Soft, nontender, nondistended, no hepatomegaly  Back: No step-offs or pain to palpation along the thoracolumbar spine, no CVA tenderness  Musculoskeletal: Normal bulk and tone   Skin: Normal color and  lyndongor  Neurologic: A/Ox3, CN II-XII intact  Psychiatric: Appropriate mood and affect    LABS AND IMAGING:  Reviewed.    IMPRESSION:   Ms. Davis is a 63 year old female with a ductal carcinoma in situ of the left breast status post lumpectomy.  Final pathology demonstrated DCIS, 37 mm, grade 1, negative margin after reexcision, ER positive, pathologic Tis Nx.   She completed RT and is on pill therapy. Tolerated radiation well. Skin is healed.     PLAN: RTO 6 months, has appt with medical oncology in September      VIKASH Nicole  Department of Radiation Oncology  [facility]

## 2024-03-23 DIAGNOSIS — J31.0 CHRONIC RHINITIS: ICD-10-CM

## 2024-03-25 RX ORDER — PSEUDOEPHEDRINE HCL 30 MG
60 TABLET ORAL 2 TIMES DAILY
Qty: 120 TABLET | Refills: 3 | Status: SHIPPED | OUTPATIENT
Start: 2024-03-25 | End: 2024-07-22

## 2024-03-27 ENCOUNTER — VIRTUAL VISIT (OUTPATIENT)
Dept: ONCOLOGY | Facility: CLINIC | Age: 64
End: 2024-03-27
Attending: GENETIC COUNSELOR, MS
Payer: COMMERCIAL

## 2024-03-27 DIAGNOSIS — Z83.719 FAMILY HISTORY OF COLONIC POLYPS: ICD-10-CM

## 2024-03-27 DIAGNOSIS — Z80.41 FAMILY HISTORY OF OVARIAN CANCER: ICD-10-CM

## 2024-03-27 DIAGNOSIS — D05.12 DUCTAL CARCINOMA IN SITU (DCIS) OF LEFT BREAST: ICD-10-CM

## 2024-03-27 DIAGNOSIS — Z80.3 FAMILY HISTORY OF MALIGNANT NEOPLASM OF BREAST: ICD-10-CM

## 2024-03-27 PROCEDURE — 999N000069 HC STATISTIC GENETIC COUNSELING, < 16 MIN: Mod: GT,95 | Performed by: GENETIC COUNSELOR, MS

## 2024-03-27 NOTE — LETTER
"    3/27/2024         RE: Bria Davis  [address] [address] [facility] [address]        Dear Colleague,    Thank you for referring your patient, Bria Davis, to the [facility] CANCER CLINIC. Please see a copy of my visit note below.    3/27/2024    Virtual Visit Details  Type of service:  Video Visit   Originating Location (pt. Location): Home  Distant Location (provider location):  Off-site  Platform used for Video Visit: [employer]  Length of video visit: 9 minutes    Referring Provider: Arron Olson MD    Presenting Information:  I spoke to Bria by phone today to discuss her new genetic testing results. When we met on 2/28/2024, Melyssa elected to pursue expanded genetic testing using the Comprehensive Hereditary Cancer Panel offered by the [facility]. This testing was done because of Bria's personal and family history of cancer.    Genetic Testing Result: NEGATIVE  Bria is negative for mutations in the APC, JOSE, AXIN2, BAP1, BARD1, BMPR1A, BRCA1, BRCA2, BRIP1, CDH1, CDK4, CDKN2A, CHEK2, DICER1, EPCAM, GREM1, HOXB13, MBD4, MITF, MLH1, MLH3, MRE11, MSH2, MSH3, MSH6, MUTYH, NBN, NF1, NTHL1, PALB2, PMS2, POLD1, POLE, POT1, PTEN, RAD50, RAD51C, RAD51D, SMAD4, SMARCA4, STK11, TERT, and TP53 genes.      No mutations were found in any of the 43 genes analyzed. This test involved sequencing and deletion/duplication analysis of all genes with the exceptions of EPCAM and GREM1 (deletions/duplications only).      A copy of the test report can be found in the Laboratory tab, dated 3/1/2024, and named \"Next generation sequencing\".     Interpretation:  We discussed several different interpretations of this negative test result.    One explanation may be that there is a different gene or combination of genes and environment that are associated with the cancers in this family that are not identifiable using this genetic test. As such, Melyssa is encouraged to contact me " regularly to review any new genetic testing options that may be appropriate for her.  Another explanation may be that her other relatives with cancer, such as her maternal grandmother with ovarian cancer, did have a mutation in one of these 43 genes and Melyssa did not inherit it. If that is the case, Melyssa's breast cancer may have been sporadic and caused by random cellular changes.  There is also a small possibility that there is a mutation in one of these genes, and the testing laboratory could not find it with their current testing methods.       Cancer Screening:  Based on this negative test result, it is important for Bria and her relatives to refer back to the family history for appropriate cancer screening.    Melyssa should continue to follow all breast cancer treatment and follow-up recommendations as made by her medical providers.  Bria's close female relatives remain at increased risk for breast cancer given their family history. Breast cancer screening is generally recommended to begin approximately 10 years younger than the earliest age of breast cancer diagnosis in the family, or at age 40, whichever comes first. In this family, screening may begin at age 40. Breast screening options should be discussed with an individual's primary care provider and a genetic counselor, to determine at what age to begin screening, what screening is appropriate, and if additional screening (such as breast MRI) is necessary based on personal/family history factors.  Per current National Comprehensive Cancer Network (NCCN) guidelines, individuals with a family history of  adenomatous polyps in a first degree relative should consider high-quality colonoscopy and polypectomy every 2 years (interval may be lengthened further based on clinical judgement). This would apply to Melyssa and her siblings, if it can be confirmed that her youngest sister had at least 20 adenomatous polyps removed during her last  "colonoscopy. Melyssa is encouraged to share this family history with her primary care provider and the gastroenterologist performing her colonoscopy later this year, as they may have individualized recommendations based on both her sister's polyp history and her only polyp history.  Per current NCCN guidelines, individuals with a first-degree relative with a confirmed advanced adenoma should begin colonoscopy at age 40, or at age of onset of the adenoma in their relative, whichever comes first. This would apply to Melyssa's sons, if it can be confirmed that her other son's large colon polyp removed several years ago was an \"advanced adenoma\". If this can be confirmed, colonoscopy should begin at age 30 and be repeated every 5-10 years, or per colonoscopy findings. These recommendations may change based on personal and family history as well as personal preference, and should be discussed with an individual's physician.  Other population cancer screening options, such as those recommended by the American Cancer Society and NCCN, are also appropriate for Bria and her family. These screening recommendations may change if there are changes to Bria's personal and/or family history of cancer. Final screening recommendations should be made by each individual's primary care provider.      Inheritance:  We reviewed the autosomal dominant inheritance of mutations in these 43 genes. We discussed that Bria did not pass on an identifiable mutation in these genes to her children based on this test result. Mutations in these genes do not skip generations.      Additional Testing Considerations:  Although Bria's genetic testing result was negative, other relatives may still carry a gene mutation associated with hereditary cancer. Genetic counseling would be recommended for Melyssa's youngest sister with colon polyps, if it can be confirmed that she has had at least 10 adenomatous polyps. Melyssa's other siblings and " extended maternal relatives could consider meeting with a genetic counselor, as well. If any of these relatives do pursue genetic testing, Bria is encouraged to contact me so that we may review the impact of their test results on her.    Summary:  We do not have an explanation for Bria's personal or family history of cancer. While no genetic changes were identified, Bria may still be at risk for certain cancers due to family history, environmental factors, or other genetic causes not identified by this test. Because of that, it is important that she continue with cancer screening based on her personal and family history as discussed above.    Genetic testing is rapidly advancing, and new cancer susceptibility genes will most likely be identified in the future. Therefore, I encouraged Bria to contact me regularly or if there are changes in her personal or family history. This may change how we assess her cancer risk, screening, and the testing we would offer.    Plan:  1. Bria was provided a copy of her new test results via [employer].  2. She plans to follow-up with her medical provider, as needed.  3. She should contact me periodically, or if her personal or family history of cancer changes.    If Bria has any further questions, I encouraged her to contact me at [phone].    Jaymie Schwartz MS, [facility]  Licensed, Certified Genetic Counselor  Office: [phone]  [email or web address]

## 2024-03-27 NOTE — PATIENT INSTRUCTIONS
Negative Genetic Test Result    Genetic Testing  Genetic testing involved a blood or saliva test which looked at the genetic information in select genes for variants associated with cancer risk.  This testing may have included analysis of a single gene due to a known variant in the family, multiple genes most associated with the cancers in a family, or an expanded panel of genes related to many types of cancers.     Results  There are several possible genetic test results, including:   Positive--a harmful mutation (also known as a  pathogenic  or  likely pathogenic  variant) was identified in a gene associated with increased cancer risk.  These risks, as well as medical management options, depend on the specific genetic variant identified.    Negative--no variants were identified in the genes analyzed   Variant of unknown significance--a variant was identified in one or more genes, though it is currently unclear how this impacts cancer risk in the family.  Genetic testing labs are working to collect evidence about these uncertain variants and may provide updates in the future.    What Does a Negative Genetic Test Result Mean?  A negative genetic test results means that no genetic changes (variants) were detected in the genes tested. While no inherited risk factors for cancer were identified, you and your family may be at risk for certain cancers due to family history, environmental factors, or other genetic causes not identified by this test.  It is also possible that your family may still be at risk of carrying a genetic risk factor which you did not inherit. Your genetic counselor can help interpret the result for you and your relatives.      It is important to note which genes were included in your test. A list of these genes can be found on your test result.    Screening Recommendations  A combination of personal and family history factors may inform cancer risk and medical management recommendations.   Population cancer screening options, such as those recommended by the American Cancer Society and the National Comprehensive Cancer Network (NCCN) are appropriate for many families at average risk for cancer.  However, earlier and/or more frequent screening may be recommended based on personal factors (lifestyle, exposures, medications, screening results), family history of cancer, and sometimes genetic factors.  These cancer risk management options should be discussed in more detail with an individual's medical providers.      Please call us if you have any questions or concerns.   Cancer Risk Management Program (Appointments: [phone])  Carl Ardon, MS [facility]  [phone]  Nellie Ingram, MS, [facility] [phone]  Fadumo Beaver, MS, [facility]  [phone]  Naomie Conway, MS, [facility]  [phone]  Candace Rodriges, MS, [facility]  [phone]  Jaymie Schwartz, MS, [facility] [phone]  Leola Cosme, MS, [facility] [phone]

## 2024-03-27 NOTE — PROGRESS NOTES
"3/27/2024    Virtual Visit Details  Type of service:  Video Visit   Originating Location (pt. Location): Home  Distant Location (provider location):  Off-site  Platform used for Video Visit: [employer]  Length of video visit: 9 minutes    Referring Provider: Arron Olson MD    Presenting Information:  I spoke to Bria by phone today to discuss her new genetic testing results. When we met on 2/28/2024, Melyssa elected to pursue expanded genetic testing using the Comprehensive Hereditary Cancer Panel offered by the [facility]. This testing was done because of Bria's personal and family history of cancer.    Genetic Testing Result: NEGATIVE  Bria is negative for mutations in the APC, JOSE, AXIN2, BAP1, BARD1, BMPR1A, BRCA1, BRCA2, BRIP1, CDH1, CDK4, CDKN2A, CHEK2, DICER1, EPCAM, GREM1, HOXB13, MBD4, MITF, MLH1, MLH3, MRE11, MSH2, MSH3, MSH6, MUTYH, NBN, NF1, NTHL1, PALB2, PMS2, POLD1, POLE, POT1, PTEN, RAD50, RAD51C, RAD51D, SMAD4, SMARCA4, STK11, TERT, and TP53 genes.      No mutations were found in any of the 43 genes analyzed. This test involved sequencing and deletion/duplication analysis of all genes with the exceptions of EPCAM and GREM1 (deletions/duplications only).      A copy of the test report can be found in the Laboratory tab, dated 3/1/2024, and named \"Next generation sequencing\".     Interpretation:  We discussed several different interpretations of this negative test result.    One explanation may be that there is a different gene or combination of genes and environment that are associated with the cancers in this family that are not identifiable using this genetic test. As such, Melyssa is encouraged to contact me regularly to review any new genetic testing options that may be appropriate for her.  Another explanation may be that her other relatives with cancer, such as her maternal grandmother with ovarian cancer, did have a mutation in one of these 43 genes and Melyssa did not " inherit it. If that is the case, Melyssa's breast cancer may have been sporadic and caused by random cellular changes.  There is also a small possibility that there is a mutation in one of these genes, and the testing laboratory could not find it with their current testing methods.       Cancer Screening:  Based on this negative test result, it is important for Bria and her relatives to refer back to the family history for appropriate cancer screening.    Melyssa should continue to follow all breast cancer treatment and follow-up recommendations as made by her medical providers.  Bria's close female relatives remain at increased risk for breast cancer given their family history. Breast cancer screening is generally recommended to begin approximately 10 years younger than the earliest age of breast cancer diagnosis in the family, or at age 40, whichever comes first. In this family, screening may begin at age 40. Breast screening options should be discussed with an individual's primary care provider and a genetic counselor, to determine at what age to begin screening, what screening is appropriate, and if additional screening (such as breast MRI) is necessary based on personal/family history factors.  Per current National Comprehensive Cancer Network (NCCN) guidelines, individuals with a family history of  adenomatous polyps in a first degree relative should consider high-quality colonoscopy and polypectomy every 2 years (interval may be lengthened further based on clinical judgement). This would apply to Melyssa and her siblings, if it can be confirmed that her youngest sister had at least 20 adenomatous polyps removed during her last colonoscopy. Melyssa is encouraged to share this family history with her primary care provider and the gastroenterologist performing her colonoscopy later this year, as they may have individualized recommendations based on both her sister's polyp history and her only polyp  "history.  Per current NCCN guidelines, individuals with a first-degree relative with a confirmed advanced adenoma should begin colonoscopy at age 40, or at age of onset of the adenoma in their relative, whichever comes first. This would apply to Melyssa's sons, if it can be confirmed that her other son's large colon polyp removed several years ago was an \"advanced adenoma\". If this can be confirmed, colonoscopy should begin at age 30 and be repeated every 5-10 years, or per colonoscopy findings. These recommendations may change based on personal and family history as well as personal preference, and should be discussed with an individual's physician.  Other population cancer screening options, such as those recommended by the American Cancer Society and NCCN, are also appropriate for Bria and her family. These screening recommendations may change if there are changes to Bria's personal and/or family history of cancer. Final screening recommendations should be made by each individual's primary care provider.      Inheritance:  We reviewed the autosomal dominant inheritance of mutations in these 43 genes. We discussed that Bria did not pass on an identifiable mutation in these genes to her children based on this test result. Mutations in these genes do not skip generations.      Additional Testing Considerations:  Although Bria's genetic testing result was negative, other relatives may still carry a gene mutation associated with hereditary cancer. Genetic counseling would be recommended for Melyssa's youngest sister with colon polyps, if it can be confirmed that she has had at least 10 adenomatous polyps. Melyssa's other siblings and extended maternal relatives could consider meeting with a genetic counselor, as well. If any of these relatives do pursue genetic testing, Bria is encouraged to contact me so that we may review the impact of their test results on her.    Summary:  We do not have an " explanation for Bria's personal or family history of cancer. While no genetic changes were identified, Bria may still be at risk for certain cancers due to family history, environmental factors, or other genetic causes not identified by this test. Because of that, it is important that she continue with cancer screening based on her personal and family history as discussed above.    Genetic testing is rapidly advancing, and new cancer susceptibility genes will most likely be identified in the future. Therefore, I encouraged Bria to contact me regularly or if there are changes in her personal or family history. This may change how we assess her cancer risk, screening, and the testing we would offer.    Plan:  1. Bria was provided a copy of her new test results via [employer].  2. She plans to follow-up with her medical provider, as needed.  3. She should contact me periodically, or if her personal or family history of cancer changes.    If Bria has any further questions, I encouraged her to contact me at [phone].    Jaymie Schwartz MS, [facility] [place]  Licensed, Certified Genetic Counselor  Office: [phone]  [email or web address]

## 2024-03-27 NOTE — NURSING NOTE
Is the patient currently in the state of [place]? YES    Visit mode:VIDEO    If the visit is dropped, the patient can be reconnected by: VIDEO VISIT: Send to e-mail at: [email or web address]    Will anyone else be joining the visit? NO  (If patient encounters technical issues they should call [phone]    How would you like to obtain your AVS? MyChart    Are changes needed to the allergy or medication list? N/A    Reason for visit: RECHECK    Brien BURGERF

## 2024-04-10 DIAGNOSIS — E11.22 TYPE 2 DIABETES MELLITUS WITH CHRONIC KIDNEY DISEASE, WITH LONG-TERM CURRENT USE OF INSULIN, UNSPECIFIED CKD STAGE (H): ICD-10-CM

## 2024-04-10 DIAGNOSIS — Z79.4 TYPE 2 DIABETES MELLITUS WITH CHRONIC KIDNEY DISEASE, WITH LONG-TERM CURRENT USE OF INSULIN, UNSPECIFIED CKD STAGE (H): ICD-10-CM

## 2024-04-11 RX ORDER — SEMAGLUTIDE 2.68 MG/ML
INJECTION, SOLUTION SUBCUTANEOUS
Qty: 9 ML | Refills: 1 | Status: SHIPPED | OUTPATIENT
Start: 2024-04-11 | End: 2024-09-18

## 2024-04-22 ENCOUNTER — MYC MEDICAL ADVICE (OUTPATIENT)
Dept: FAMILY MEDICINE | Facility: CLINIC | Age: 64
End: 2024-04-22
Payer: COMMERCIAL

## 2024-04-22 DIAGNOSIS — E11.22 TYPE 2 DIABETES MELLITUS WITH CHRONIC KIDNEY DISEASE, WITH LONG-TERM CURRENT USE OF INSULIN, UNSPECIFIED CKD STAGE (H): ICD-10-CM

## 2024-04-22 DIAGNOSIS — Z79.4 TYPE 2 DIABETES MELLITUS WITH CHRONIC KIDNEY DISEASE, WITH LONG-TERM CURRENT USE OF INSULIN, UNSPECIFIED CKD STAGE (H): ICD-10-CM

## 2024-04-22 RX ORDER — INSULIN GLARGINE 300 U/ML
INJECTION, SOLUTION SUBCUTANEOUS
Qty: 40 ML | Refills: 1 | Status: SHIPPED | OUTPATIENT
Start: 2024-04-22 | End: 2024-09-18

## 2024-04-22 NOTE — TELEPHONE ENCOUNTER
Requested Prescriptions   Pending Prescriptions Disp Refills    ILENE INFANTE SOLOSTAR 300 UNIT/ML (2 units dial) pen [Pharmacy Med Name: TOUJEO MAX SOLOSTAR 300 SOPN] 40 mL 1     Sig: INJECT 100 UNITS UNDER THE SKIN DAILY       Insulin Protocol Failed - 4/22/2024  9:14 AM        Failed - Chart Review Required     Review Chart.    Do not approve if insulin is used in a pump.  Instead, direct refill request to the patient's endocrinologist.  If the patient doesn't have an endocrinologist, then send the refill to the patient's PCP for review            Passed - Medication is active on med list        Passed - Has GFR on file in past 12 months and most recent value is normal        Passed - Recent (6 mo) or future (90 days) visit within the authorizing provider's specialty     The patient must have completed an in-person or virtual visit within the past 6 months or has a future visit scheduled within the next 90 days with the authorizing provider s specialty.  Urgent care and e-visits do not quality as an office visit for this protocol.          Passed - Medication indicated for associated diagnosis     Medication is associated with one or more of the following diagnoses:   - Type 1 diabetes mellitus  - Type 2 diabetes mellitus  - Diabetic nephropathy; Prophylaxis  - Neuropathy due to diabetes mellitus; Prophylaxis  - Retinopathy due to diabetes mellitus; Prophylaxis  - Diabetes mellitus associated with cystic fibrosis  - Disorder of cardiovascular system; Prophylaxis - Type 1 diabetes mellitus   - Disorder of cardiovascular system; Prophylaxis - Type 2 diabetes mellitus            Passed - Patient is 18 years of age or older

## 2024-04-22 NOTE — TELEPHONE ENCOUNTER
Please see my chart message.     Patient reporting back pain wondering if it could be from Arimidex?    Copied from [employer]:    Musculoskeletal: Arthralgia (2% to 15% ), Arthritis (17% ), Arthropathy, Backache (10% to 12% ), Bone pain (6% to 11% ), Osteoporosis (11% )    Chante Fuller RN on 4/22/2024 at 11:40 AM

## 2024-04-23 ENCOUNTER — OFFICE VISIT (OUTPATIENT)
Dept: FAMILY MEDICINE | Facility: CLINIC | Age: 64
End: 2024-04-23
Payer: COMMERCIAL

## 2024-04-23 VITALS
SYSTOLIC BLOOD PRESSURE: 110 MMHG | BODY MASS INDEX: 42.7 KG/M2 | DIASTOLIC BLOOD PRESSURE: 70 MMHG | TEMPERATURE: 97.6 F | WEIGHT: 241 LBS | RESPIRATION RATE: 20 BRPM | HEART RATE: 75 BPM | HEIGHT: 63 IN | OXYGEN SATURATION: 97 %

## 2024-04-23 DIAGNOSIS — M89.9 SCAPULAR DYSFUNCTION: Primary | ICD-10-CM

## 2024-04-23 PROCEDURE — 99213 OFFICE O/P EST LOW 20 MIN: CPT | Performed by: FAMILY MEDICINE

## 2024-04-23 ASSESSMENT — PAIN SCALES - GENERAL: PAINLEVEL: MILD PAIN (2)

## 2024-04-23 ASSESSMENT — ENCOUNTER SYMPTOMS: BACK PAIN: 1

## 2024-04-23 NOTE — PROGRESS NOTES
"  Assessment & Plan     Scapular dysfunction  Seems very musculoskeletal in nature and is responding to chiropractic care.  Doubt this is from the Aromasin.   -Heating pad, massage/theracane, etc  -continue chiro but if not helping may want to consider PT. No indication for xray at this time as it does seem to be more muscular in nature    Discussed stretching, general cares    Consider methocarbamol prn- offered and patient declined at this point, she can message me if she changes her mind.           BMI  Estimated body mass index is 42.7 kg/m  as calculated from the following:    Height as of this encounter: 1.6 m (5' 2.99\").    Weight as of this encounter: 109.3 kg (241 lb).             Susannah Ragsdale is a 63 year old, presenting for the following health issues:  Back Pain        4/23/2024    10:58 AM   Additional Questions   Roomed by Shirley gaona CMA   Accompanied by Self     History of Present Illness       Back Pain:  She presents for follow up of back pain. Patient's back pain is a new problem.    Original cause of back pain: not sure  First noticed back pain: 1-4 weeks ago  Patient feels back pain: constantlyLocation of back pain:  Right middle of back  Description of back pain: gnawing  Back pain spreads: nowhere    Since patient first noticed back pain, pain is: always present, but gets better and worse  Does back pain interfere with her job:  Yes  On a scale of 1-10 (10 being the worst), patient describes pain as:  7  What makes back pain worse: coughing, certain positions, lying down, stress and twisting   Acupuncture: not tried  Acetaminophen: not tried  Activity or exercise: not helpful  Chiropractor:  Helpful  Cold: not tried  Heat: not tried  Massage: helpful  Muscle relaxants: not tried  NSAIDS: not tried  Opioids: not tried  Physical Therapy: not tried  Rest: helpful  Steroid Injection: not tried  Stretching: not helpful  Surgery: not tried  TENS unit: not tried  Topical pain relievers: not " "tried  Other healthcare providers patient is seeing for back pain: Chiropractor    She eats 0-1 servings of fruits and vegetables daily.She consumes 0 sweetened beverage(s) daily.She exercises with enough effort to increase her heart rate 9 or less minutes per day.  She exercises with enough effort to increase her heart rate 3 or less days per week.   She is taking medications regularly.     - Hx of breast cancer. She is taking Arimidex currently but she is wondering if pain is caused by this medication.     Pain History:  When did you first notice your pain? 2 weeks   Have you seen anyone else for your pain? No  How has your pain affected your ability to work? She is currently working full time, she was not at work Thursday and Friday due to pain but is now back to work  Where in your body do you have pain? Back Pain  Onset/Duration: 2 weeks  Description:   Location of pain: middle of back right  Character of pain: gnawing  Pain radiation: none  New numbness or weakness in legs, not attributed to pain: no   Intensity: Currently 2/10  Progression of Symptoms: waxing and waning  History:   Specific cause: none  Pain interferes with job: No  History of back problems: no prior back problems  Any previous MRI or X-rays: None  Sees a specialist for back pain: No  Alleviating factors:   Improved by: chiropractor, massage, and rest    Precipitating factors:  Worsened by: Coughing, certain positions, stress, laying down and twisting  Therapies tried and outcome: chiropractor, massage, and rest    Accompanying Signs & Symptoms:  Risk of Fracture: None  Risk of Cauda Equina: None  Risk of Infection: None  Risk of Cancer: History of cancer  Risk of Ankylosing Spondylitis: Onset at age <35, male, AND morning back stiffness No                    Objective    /70   Pulse 75   Temp 97.6  F (36.4  C) (Tympanic)   Resp 20   Ht 1.6 m (5' 2.99\")   Wt 109.3 kg (241 lb)   LMP 01/14/2016 (Approximate)   SpO2 97%   BMI 42.70 " kg/m    Body mass index is 42.7 kg/m .  Physical Exam   GENERAL: alert and no distress  MS: tender to palpation right medial scapular border.  No trigger points identified.              Signed Electronically by: Kaia Elena MD

## 2024-05-13 ENCOUNTER — MYC MEDICAL ADVICE (OUTPATIENT)
Dept: FAMILY MEDICINE | Facility: CLINIC | Age: 64
End: 2024-05-13
Payer: COMMERCIAL

## 2024-05-14 ENCOUNTER — OFFICE VISIT (OUTPATIENT)
Dept: FAMILY MEDICINE | Facility: CLINIC | Age: 64
End: 2024-05-14
Payer: COMMERCIAL

## 2024-05-14 ENCOUNTER — LAB (OUTPATIENT)
Dept: LAB | Facility: CLINIC | Age: 64
End: 2024-05-14
Payer: COMMERCIAL

## 2024-05-14 VITALS
WEIGHT: 243 LBS | RESPIRATION RATE: 20 BRPM | DIASTOLIC BLOOD PRESSURE: 74 MMHG | HEIGHT: 63 IN | BODY MASS INDEX: 43.05 KG/M2 | SYSTOLIC BLOOD PRESSURE: 124 MMHG | HEART RATE: 73 BPM | OXYGEN SATURATION: 96 % | TEMPERATURE: 97.1 F

## 2024-05-14 DIAGNOSIS — Z79.4 TYPE 2 DIABETES MELLITUS WITH DIABETIC MICROALBUMINURIA, WITH LONG-TERM CURRENT USE OF INSULIN (H): ICD-10-CM

## 2024-05-14 DIAGNOSIS — R80.9 TYPE 2 DIABETES MELLITUS WITH DIABETIC MICROALBUMINURIA, WITH LONG-TERM CURRENT USE OF INSULIN (H): ICD-10-CM

## 2024-05-14 DIAGNOSIS — M25.50 ARTHRALGIA, UNSPECIFIED JOINT: Primary | ICD-10-CM

## 2024-05-14 DIAGNOSIS — E11.29 TYPE 2 DIABETES MELLITUS WITH DIABETIC MICROALBUMINURIA, WITH LONG-TERM CURRENT USE OF INSULIN (H): ICD-10-CM

## 2024-05-14 DIAGNOSIS — M25.50 ARTHRALGIA, UNSPECIFIED JOINT: ICD-10-CM

## 2024-05-14 LAB
CRP SERPL-MCNC: 13.63 MG/L
ERYTHROCYTE [SEDIMENTATION RATE] IN BLOOD BY WESTERGREN METHOD: 19 MM/HR (ref 0–30)
URATE SERPL-MCNC: 5.5 MG/DL (ref 2.4–5.7)

## 2024-05-14 PROCEDURE — 86140 C-REACTIVE PROTEIN: CPT

## 2024-05-14 PROCEDURE — 85652 RBC SED RATE AUTOMATED: CPT

## 2024-05-14 PROCEDURE — 99214 OFFICE O/P EST MOD 30 MIN: CPT | Performed by: FAMILY MEDICINE

## 2024-05-14 PROCEDURE — 36415 COLL VENOUS BLD VENIPUNCTURE: CPT

## 2024-05-14 PROCEDURE — 84550 ASSAY OF BLOOD/URIC ACID: CPT

## 2024-05-14 ASSESSMENT — PAIN SCALES - GENERAL: PAINLEVEL: NO PAIN (0)

## 2024-05-14 NOTE — PROGRESS NOTES
"  Assessment & Plan     Arthralgia, unspecified joint   Could be post-viral after GI bug earlier this month.  Could also be due to the Arimidex as this is a known side effect  She'll try NSAIDS for a week or so and follow up   If not improving, will talk to oncology regarding the Arimidex    R/o inflammatory causes  R/o gout  - ESR: Erythrocyte sedimentation rate; Future  - CRP, inflammation; Future  - Uric acid; Future    Type 2 diabetes mellitus with diabetic microalbuminuria, with long-term current use of insulin (H)  Fairly well controlled            BMI  Estimated body mass index is 43.06 kg/m  as calculated from the following:    Height as of this encounter: 1.6 m (5' 2.99\").    Weight as of this encounter: 110.2 kg (243 lb).             Susannah Ragsdale is a 63 year old, presenting for the following health issues:  Musculoskeletal Problem        5/14/2024     8:06 AM   Additional Questions   Roomed by Shirley gaona CMA   Accompanied by Self     History of Present Illness       Reason for visit:  Joint pain  Symptom onset:  1-2 weeks ago    She eats 0-1 servings of fruits and vegetables daily.She consumes 0 sweetened beverage(s) daily.She exercises with enough effort to increase her heart rate 9 or less minutes per day.  She exercises with enough effort to increase her heart rate 3 or less days per week.   She is taking medications regularly.       - Pain in hips, ankles and wrists x2 weeks. She notes that this began after having a diarrhea and nausea. No injuries. She notes some initial swelling in left wrist that is now resolved. She is wonrdering if this is a side effect from Arimidex. She notes hip pain that is worse after sitting for longer periods and ankle pain with standing.        Review of Systems  Constitutional, HEENT, cardiovascular, pulmonary, gi and gu systems are negative, except as otherwise noted.      Objective    /74   Pulse 73   Temp 97.1  F (36.2  C) (Tympanic)   Resp 20   Ht 1.6 m " "(5' 2.99\")   Wt 110.2 kg (243 lb)   LMP 01/14/2016 (Approximate)   SpO2 96%   BMI 43.06 kg/m    Body mass index is 43.06 kg/m .  Physical Exam   GENERAL: alert and no distress  MS: no gross musculoskeletal defects noted, no edema            Signed Electronically by: Kaia Elena MD    "

## 2024-05-14 NOTE — LETTER
May 21, 2024      Melyssa MINH [place]  [address] [address] [address]        Dear ,    We are writing to inform you of your test results. Uric acid is normal, likely not gout.     There is moderate inflammation as seen on the CRP which is very sensitive for picking up inflammation but not very specific as to what is causing it.     Let me know if the pain isn't improving with the Aleve/Naproxen after a week or so.    Resulted Orders   CRP, inflammation   Result Value Ref Range    CRP Inflammation 13.63 (H) <5.00 mg/L   Uric acid   Result Value Ref Range    Uric Acid 5.5 2.4 - 5.7 mg/dL       If you have any questions or concerns, please call the clinic at the number listed above.       Sincerely,      Kaia Elena MD

## 2024-05-20 DIAGNOSIS — E78.5 HYPERLIPIDEMIA LDL GOAL <70: ICD-10-CM

## 2024-05-21 RX ORDER — ROSUVASTATIN CALCIUM 10 MG/1
10 TABLET, COATED ORAL DAILY
Qty: 90 TABLET | Refills: 3 | Status: ON HOLD | OUTPATIENT
Start: 2024-05-21 | End: 2024-10-02

## 2024-06-05 DIAGNOSIS — Z79.4 TYPE 2 DIABETES MELLITUS WITH CHRONIC KIDNEY DISEASE, WITH LONG-TERM CURRENT USE OF INSULIN, UNSPECIFIED CKD STAGE (H): ICD-10-CM

## 2024-06-05 DIAGNOSIS — E11.22 TYPE 2 DIABETES MELLITUS WITH CHRONIC KIDNEY DISEASE, WITH LONG-TERM CURRENT USE OF INSULIN, UNSPECIFIED CKD STAGE (H): ICD-10-CM

## 2024-06-05 RX ORDER — LANCETS
EACH MISCELLANEOUS
Qty: 400 EACH | Refills: 1 | Status: SHIPPED | OUTPATIENT
Start: 2024-06-05 | End: 2024-09-18

## 2024-06-26 ENCOUNTER — TRANSFERRED RECORDS (OUTPATIENT)
Dept: HEALTH INFORMATION MANAGEMENT | Facility: CLINIC | Age: 64
End: 2024-06-26
Payer: COMMERCIAL

## 2024-06-26 LAB — RETINOPATHY: POSITIVE

## 2024-07-09 DIAGNOSIS — I10 ESSENTIAL HYPERTENSION WITH GOAL BLOOD PRESSURE LESS THAN 140/90: ICD-10-CM

## 2024-07-09 RX ORDER — HYDROCHLOROTHIAZIDE 12.5 MG/1
12.5 TABLET ORAL DAILY
Qty: 90 TABLET | Refills: 2 | Status: SHIPPED | OUTPATIENT
Start: 2024-07-09

## 2024-07-16 ENCOUNTER — OFFICE VISIT (OUTPATIENT)
Dept: RADIATION THERAPY | Facility: OUTPATIENT CENTER | Age: 64
End: 2024-07-16
Payer: COMMERCIAL

## 2024-07-16 DIAGNOSIS — D05.12 DUCTAL CARCINOMA IN SITU (DCIS) OF LEFT BREAST: Primary | ICD-10-CM

## 2024-07-16 NOTE — LETTER
2024      Bria Davis  [address] [address] [facility] [address]      Dear Colleague,    Thank you for referring your patient, Bria Davis, to the [facility]. Please see a copy of my visit note below.       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    - Fibrocystic change, with apocrine metaplasia, florid usual ductal hyperplasia, columnar cell change.  - No evidence of invasive malignancy.  - No evidence of lymphovascular invasion.     10/6/23  A.  Left breast excisional lumpectomy, oriented breast mass at 10:00, 6 cm from the nipple:    - Negative for invasive carcinoma     - Ductal carcinoma in situ, papillary, cribriform and solid    - Grade:  Grade 1    - Tumor size: 37 x 32 x 20 mm    - Margins of resection status for carcinoma in-situ: POSITIVE MEDIAL MARGIN     Margin Status DCIS present at margin    Margin(s) Involved by DCIS Medial: Present at cauterized medial yellow inked margin in blocks A15, A16 and A17    Distance from DCIS to Anterior Margin 1.0 mm   Distance from DCIS to Posterior Margin 4.2 mm   Distance from DCIS to Superior Margin Greater than: 5 mm   Distance from DCIS to Inferior Margin Greater than: 5 mm   Distance from DCIS to Lateral Margin 1.9 mm        - Staging: pTis pN not assigned (no nodes submitted)    - Estrogen receptor:  POSITIVE (%, strong to weak nuclear staining)    - Progesterone receptor:  Pending     - Additional histopathologic  "findings: Microcalcifications associated with malignant and benign breast tissue, florid usual type ductal hyperplasia, intraductal papillomas, and columnar cell change.     10/21/23  A. Skin, LEFT breast, \"necrotic skin\", excision:  - Coagulative necrosis of epidermis and dermis, with associated bacterial colonies  - Acute and chronic inflammation (patchy), extending from epidermis to subcutaneous adipose tissue  - Fibrin and acute inflammation at deep edge of specimen  - No breast ducts or acini identified  - Negative for malignancy     B. LEFT breast, tissue, excision:  - Adipose tissue with scattered acute inflammation, admixed with fibrin  - No breast ducts or acini identified  - Negative for malignancy     11/13/23  LEFT breast, new medial margin, excision:  -Ductal carcinoma in situ (DCIS), nuclear grade 1, cribriform, micropapillary, and papillary type(s), residual after prior lumpectomy  -DCIS spans an estimated 15 mm  -No invasive carcinoma identified  -New medial margin is uninvolved by DCIS  -DCIS is 1.5 mm from the new medial margin  -Post-operative changes, including granulation tissue                              STAGE: 0, Tis     CONCURRENT SYSTEMIC THERAPY:  No        ONCOLOGIC HISTORY:          The patient was noted to have a mammographic abnormality in the left breast prompting further evaluation.  On 9/7/2023 left breast biopsy at the 10 o'clock position, 6 cm from the nipple was obtained.  Pathology demonstrated atypical ductal hyperplasia in 2 foci, no invasive disease noted.  On 10/6/2023 the patient underwent left breast lumpectomy.  Final pathology demonstrated DCIS, 37 mm, grade 1, positive DCIS margin (medial) ER positive, pathologic Tis Nx.  The patient underwent reexcision on 11/13/2023 by Dr. Olson.  Final pathology demonstrated residual DCIS, grade 1, 15 mm in size, negative final margin (final new medial margin was 1.5 mm). Had wound healing issues postoperatively. Required left " breast excision in 10/21/2023 for infection and necrotic change.  On 12/7/2023 the patient underwent reduction during lumpectomy by Dr. Rodriguez. she has poor wound healing the left. She was seen for consult in our clinic, we discussed that in general, adjuvant whole breast radiotherapy is recommended as it has been demonstrated to reduce tumor recurrence by 50%, which has been demonstrated in a  meta-analysis of DCIS patients treated with radiation over lumpectomy alone, despite no survival benefit detected (EBCTCG, [employer], 2010).      1/3/2023 BRCA1/BRCA2 next generation sequencing) and actionable hereditary breast cancer NGS revealed no detectable alterations or variations. Family history is remarkable for paternal grandmother with ovarian cancer and a paternal great aunt who had breast cancer.     She completed radiation therapy and is on anastrozole.      SITE OF TREATMENT: Left breast     DATES  OF TREATMENT: 2/1/24 to 2/22/24      TOTAL DOSE OF TREATMENT: 4,272 cGy      DOSE PER FRACTION OF TREATMENT: 267 cGy x 16 fractions    INTERVAL SINCE COMPLETION OF RADIATION THERAPY:   5 months    SUBJECTIVE:   Melyssa was seen today as an add on for complaint of pain along her right incision and left breast pain. These have been going on for one month. The incision along the right areola is very itchy.     PHYSICAL EXAM:  LMP 01/14/2016 (Approximate)   Gen: Alert, in NAD  Eyes: PERRL, EOMI, sclera anicteric  HENT     Head: NC/AT     Ears: No external auricular lesions     Nose/sinus: No rhinorrhea or epistaxis     Oral Cavity/Oropharynx: MMM  Neck: Supple, full ROM, no LAD  Pulm: No wheezing, stridor or respiratory distress  CV: Well-perfused, no cyanosis, no pedal edema  Left sherie areolar scar with radial extension is well healed, left reduction scar well healed. No masses, nodules, skin changes or nipple discharge. TTP on deep palpation of chest wall in the UIQ. Right reduction scar keloid more pronounced along the IMF.  No right masses, nodules, or nipple discharge.   Abdominal: Soft, nontender, nondistended, no hepatomegaly  Back: No step-offs or pain to palpation along the thoracolumbar spine, no CVA tenderness  Musculoskeletal: Normal bulk and tone   Skin: Normal color and turgor  Neurologic: A/Ox3, CN II-XII intact  Psychiatric: Appropriate mood and affect          LABS AND IMAGING:  Reviewed.    IMPRESSION:   Ms. Davis is a 63 year old female with a ductal carcinoma in situ of the left breast status post lumpectomy.  Final pathology demonstrated DCIS, 37 mm, grade 1, negative margin after reexcision, ER positive, pathologic Tis Nx.   She completed RT and is on pill therapy. Tolerated radiation well. Skin is healed.   On exam she has keloid/hypertrophic scar on the right. On the left side in the UIQ she has costochondritis. Exam is otherwise normal.     PLAN: RTO in February 2025 for routine follow up.       VIKASH Nicole  Department of Radiation Oncology  [facility]          Again, thank you for allowing me to participate in the care of your patient.        Sincerely,        Litzy Barcenas PA-C

## 2024-07-16 NOTE — PROGRESS NOTES
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    - Fibrocystic change, with apocrine metaplasia, florid usual ductal hyperplasia, columnar cell change.  - No evidence of invasive malignancy.  - No evidence of lymphovascular invasion.     10/6/23  A.  Left breast excisional lumpectomy, oriented breast mass at 10:00, 6 cm from the nipple:    - Negative for invasive carcinoma     - Ductal carcinoma in situ, papillary, cribriform and solid    - Grade:  Grade 1    - Tumor size: 37 x 32 x 20 mm    - Margins of resection status for carcinoma in-situ: POSITIVE MEDIAL MARGIN     Margin Status DCIS present at margin    Margin(s) Involved by DCIS Medial: Present at cauterized medial yellow inked margin in blocks A15, A16 and A17    Distance from DCIS to Anterior Margin 1.0 mm   Distance from DCIS to Posterior Margin 4.2 mm   Distance from DCIS to Superior Margin Greater than: 5 mm   Distance from DCIS to Inferior Margin Greater than: 5 mm   Distance from DCIS to Lateral Margin 1.9 mm        - Staging: pTis pN not assigned (no nodes submitted)    - Estrogen receptor:  POSITIVE (%, strong to weak nuclear staining)    - Progesterone receptor:  Pending     - Additional histopathologic findings: Microcalcifications associated with malignant and benign breast tissue, florid usual type ductal hyperplasia, intraductal papillomas, and columnar cell change.     10/21/23  A. Skin, LEFT breast, \"necrotic skin\", excision:  - Coagulative necrosis " of epidermis and dermis, with associated bacterial colonies  - Acute and chronic inflammation (patchy), extending from epidermis to subcutaneous adipose tissue  - Fibrin and acute inflammation at deep edge of specimen  - No breast ducts or acini identified  - Negative for malignancy     B. LEFT breast, tissue, excision:  - Adipose tissue with scattered acute inflammation, admixed with fibrin  - No breast ducts or acini identified  - Negative for malignancy     11/13/23  LEFT breast, new medial margin, excision:  -Ductal carcinoma in situ (DCIS), nuclear grade 1, cribriform, micropapillary, and papillary type(s), residual after prior lumpectomy  -DCIS spans an estimated 15 mm  -No invasive carcinoma identified  -New medial margin is uninvolved by DCIS  -DCIS is 1.5 mm from the new medial margin  -Post-operative changes, including granulation tissue                              STAGE: 0, Tis     CONCURRENT SYSTEMIC THERAPY:  No        ONCOLOGIC HISTORY:          The patient was noted to have a mammographic abnormality in the left breast prompting further evaluation.  On 9/7/2023 left breast biopsy at the 10 o'clock position, 6 cm from the nipple was obtained.  Pathology demonstrated atypical ductal hyperplasia in 2 foci, no invasive disease noted.  On 10/6/2023 the patient underwent left breast lumpectomy.  Final pathology demonstrated DCIS, 37 mm, grade 1, positive DCIS margin (medial) ER positive, pathologic Tis Nx.  The patient underwent reexcision on 11/13/2023 by Dr. Olson.  Final pathology demonstrated residual DCIS, grade 1, 15 mm in size, negative final margin (final new medial margin was 1.5 mm). Had wound healing issues postoperatively. Required left breast excision in 10/21/2023 for infection and necrotic change.  On 12/7/2023 the patient underwent reduction during lumpectomy by Dr. Rodriguez. she has poor wound healing the left. She was seen for consult in our clinic, we discussed that in general, adjuvant  whole breast radiotherapy is recommended as it has been demonstrated to reduce tumor recurrence by 50%, which has been demonstrated in a  meta-analysis of DCIS patients treated with radiation over lumpectomy alone, despite no survival benefit detected (EBCTCG, [employer], 2010).      1/3/2023 BRCA1/BRCA2 next generation sequencing) and actionable hereditary breast cancer NGS revealed no detectable alterations or variations. Family history is remarkable for paternal grandmother with ovarian cancer and a paternal great aunt who had breast cancer.     She completed radiation therapy and is on anastrozole.      SITE OF TREATMENT: Left breast     DATES  OF TREATMENT: 2/1/24 to 2/22/24      TOTAL DOSE OF TREATMENT: 4,272 cGy      DOSE PER FRACTION OF TREATMENT: 267 cGy x 16 fractions    INTERVAL SINCE COMPLETION OF RADIATION THERAPY:   5 months    SUBJECTIVE:   Melyssa was seen today as an add on for complaint of pain along her right incision and left breast pain. These have been going on for one month. The incision along the right areola is very itchy.     PHYSICAL EXAM:  LMP 01/14/2016 (Approximate)   Gen: Alert, in NAD  Eyes: PERRL, EOMI, sclera anicteric  HENT     Head: NC/AT     Ears: No external auricular lesions     Nose/sinus: No rhinorrhea or epistaxis     Oral Cavity/Oropharynx: MMM  Neck: Supple, full ROM, no LAD  Pulm: No wheezing, stridor or respiratory distress  CV: Well-perfused, no cyanosis, no pedal edema  Left sherie areolar scar with radial extension is well healed, left reduction scar well healed. No masses, nodules, skin changes or nipple discharge. TTP on deep palpation of chest wall in the UIQ. Right reduction scar keloid more pronounced along the IMF. No right masses, nodules, or nipple discharge.   Abdominal: Soft, nontender, nondistended, no hepatomegaly  Back: No step-offs or pain to palpation along the thoracolumbar spine, no CVA tenderness  Musculoskeletal: Normal bulk and tone   Skin: Normal color and  turgor  Neurologic: A/Ox3, CN II-XII intact  Psychiatric: Appropriate mood and affect          LABS AND IMAGING:  Reviewed.    IMPRESSION:   Ms. Davis is a 63 year old female with a ductal carcinoma in situ of the left breast status post lumpectomy.  Final pathology demonstrated DCIS, 37 mm, grade 1, negative margin after reexcision, ER positive, pathologic Tis Nx.   She completed RT and is on pill therapy. Tolerated radiation well. Skin is healed.   On exam she has keloid/hypertrophic scar on the right. On the left side in the UIQ she has costochondritis. Exam is otherwise normal.     PLAN: RTO in February 2025 for routine follow up.       VIKASH Nicole  Department of Radiation Oncology  [facility]

## 2024-07-21 DIAGNOSIS — Z79.4 TYPE 2 DIABETES MELLITUS WITH CHRONIC KIDNEY DISEASE, WITH LONG-TERM CURRENT USE OF INSULIN, UNSPECIFIED CKD STAGE (H): ICD-10-CM

## 2024-07-21 DIAGNOSIS — E11.22 TYPE 2 DIABETES MELLITUS WITH CHRONIC KIDNEY DISEASE, WITH LONG-TERM CURRENT USE OF INSULIN, UNSPECIFIED CKD STAGE (H): ICD-10-CM

## 2024-07-21 DIAGNOSIS — J31.0 CHRONIC RHINITIS: ICD-10-CM

## 2024-07-22 RX ORDER — PEN NEEDLE, DIABETIC 32GX 5/32"
NEEDLE, DISPOSABLE MISCELLANEOUS
Qty: 400 EACH | Refills: 1 | Status: ON HOLD | OUTPATIENT
Start: 2024-07-22 | End: 2024-10-02

## 2024-07-22 RX ORDER — PSEUDOEPHEDRINE HCL 30 MG/1
60 TABLET, FILM COATED ORAL 2 TIMES DAILY
Qty: 120 TABLET | Refills: 3 | Status: SHIPPED | OUTPATIENT
Start: 2024-07-22

## 2024-08-02 ENCOUNTER — PATIENT OUTREACH (OUTPATIENT)
Dept: CARE COORDINATION | Facility: CLINIC | Age: 64
End: 2024-08-02
Payer: COMMERCIAL

## 2024-08-06 NOTE — TELEPHONE ENCOUNTER
Pt states not urgent symptoms and scheduled appt with  next week.[email or web address]  
Reason for call:  Patient reporting a symptom    Symptom or request: headache and swallowing issues    Duration (how long have symptoms been present): couple of days    Have you been treated for this before? No    Additional comments: pt calling stating she has had a headache since yesterday and she's been having problems with swallowing. The swallowing has been going on for about a month. She is wondering if this is a side effect from taking Trulicity? She states she's done some reading and is wondering about the side effects and wondering if now that she has a headache, she said she right away wonders about having a tumor.    Phone Number patient can be reached at:  Work number on file:  [phone] (work)    Best Time:  day    Can we leave a detailed message on this number:  YES    Call taken on 4/24/2017 at 12:00 PM by America Howard    
no

## 2024-08-13 DIAGNOSIS — E11.22 TYPE 2 DIABETES MELLITUS WITH CHRONIC KIDNEY DISEASE, WITH LONG-TERM CURRENT USE OF INSULIN, UNSPECIFIED CKD STAGE (H): ICD-10-CM

## 2024-08-13 DIAGNOSIS — Z79.4 TYPE 2 DIABETES MELLITUS WITH CHRONIC KIDNEY DISEASE, WITH LONG-TERM CURRENT USE OF INSULIN, UNSPECIFIED CKD STAGE (H): ICD-10-CM

## 2024-08-13 RX ORDER — METFORMIN HCL 500 MG
1000 TABLET, EXTENDED RELEASE 24 HR ORAL 2 TIMES DAILY WITH MEALS
Qty: 360 TABLET | Refills: 0 | Status: SHIPPED | OUTPATIENT
Start: 2024-08-13 | End: 2024-09-18

## 2024-08-16 ENCOUNTER — MYC MEDICAL ADVICE (OUTPATIENT)
Dept: FAMILY MEDICINE | Facility: CLINIC | Age: 64
End: 2024-08-16
Payer: COMMERCIAL

## 2024-08-16 DIAGNOSIS — E11.22 TYPE 2 DIABETES MELLITUS WITH CHRONIC KIDNEY DISEASE, WITH LONG-TERM CURRENT USE OF INSULIN, UNSPECIFIED CKD STAGE (H): ICD-10-CM

## 2024-08-16 DIAGNOSIS — Z79.4 TYPE 2 DIABETES MELLITUS WITH CHRONIC KIDNEY DISEASE, WITH LONG-TERM CURRENT USE OF INSULIN, UNSPECIFIED CKD STAGE (H): ICD-10-CM

## 2024-08-16 RX ORDER — BLOOD-GLUCOSE METER
1 EACH MISCELLANEOUS SEE ADMIN INSTRUCTIONS
Qty: 1 EACH | Refills: 0 | Status: ON HOLD | OUTPATIENT
Start: 2024-08-16 | End: 2024-10-02

## 2024-08-17 DIAGNOSIS — E11.43 GASTROPARESIS DUE TO DM (H): ICD-10-CM

## 2024-08-17 DIAGNOSIS — K31.84 GASTROPARESIS DUE TO DM (H): ICD-10-CM

## 2024-08-18 ENCOUNTER — HEALTH MAINTENANCE LETTER (OUTPATIENT)
Age: 64
End: 2024-08-18

## 2024-08-20 RX ORDER — METOCLOPRAMIDE 10 MG/1
10 TABLET ORAL 2 TIMES DAILY
Qty: 180 TABLET | Refills: 1 | Status: SHIPPED | OUTPATIENT
Start: 2024-08-20

## 2024-08-30 ENCOUNTER — PATIENT OUTREACH (OUTPATIENT)
Dept: CARE COORDINATION | Facility: CLINIC | Age: 64
End: 2024-08-30
Payer: COMMERCIAL

## 2024-09-11 ENCOUNTER — ONCOLOGY VISIT (OUTPATIENT)
Dept: ONCOLOGY | Facility: CLINIC | Age: 64
End: 2024-09-11
Attending: NURSE PRACTITIONER
Payer: COMMERCIAL

## 2024-09-11 ENCOUNTER — TRANSFERRED RECORDS (OUTPATIENT)
Dept: HEALTH INFORMATION MANAGEMENT | Facility: CLINIC | Age: 64
End: 2024-09-11

## 2024-09-11 VITALS
HEIGHT: 63 IN | OXYGEN SATURATION: 96 % | DIASTOLIC BLOOD PRESSURE: 84 MMHG | TEMPERATURE: 98.4 F | BODY MASS INDEX: 43.23 KG/M2 | HEART RATE: 73 BPM | SYSTOLIC BLOOD PRESSURE: 142 MMHG | RESPIRATION RATE: 16 BRPM | WEIGHT: 244 LBS

## 2024-09-11 DIAGNOSIS — M79.7 FIBROMYALGIA: ICD-10-CM

## 2024-09-11 DIAGNOSIS — Z12.31 ENCOUNTER FOR SCREENING MAMMOGRAM FOR BREAST CANCER: Primary | ICD-10-CM

## 2024-09-11 DIAGNOSIS — D05.12 DUCTAL CARCINOMA IN SITU (DCIS) OF LEFT BREAST: ICD-10-CM

## 2024-09-11 DIAGNOSIS — M25.50 ARTHRALGIA, UNSPECIFIED JOINT: ICD-10-CM

## 2024-09-11 LAB — RETINOPATHY: POSITIVE

## 2024-09-11 PROCEDURE — 99214 OFFICE O/P EST MOD 30 MIN: CPT | Performed by: NURSE PRACTITIONER

## 2024-09-11 PROCEDURE — G2211 COMPLEX E/M VISIT ADD ON: HCPCS | Performed by: NURSE PRACTITIONER

## 2024-09-11 PROCEDURE — 99212 OFFICE O/P EST SF 10 MIN: CPT | Performed by: NURSE PRACTITIONER

## 2024-09-11 ASSESSMENT — PAIN SCALES - GENERAL: PAINLEVEL: MODERATE PAIN (4)

## 2024-09-11 NOTE — LETTER
9/11/2024      Bria Davis  [address] [id number]      Dear Colleague,    Thank you for referring your patient, Bria Davis, to the [facility] [place]. Please see a copy of my visit note below.    [facility] Hematology and Oncology Outpatient Progress Note    Patient: Bria Davis  MRN: [id number]  Date of Service: Sep 11, 2024          Reason for Visit    DCIS    Primary Oncologist: formerly, Dr. 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   Started prophylactic endocrine therapy with anastrozole (6 mths ago).     Tolerating anastrozole farily well, but has had exacerbation of her baseline fibromyalgia pain and arthralgias on it which is a common side effect so I believe to be contributing. With time, it's gotten more manageable so she'd like to continue on it.    No concern for recurrent breast cancer on exam.  Ongoing right breast incisional pain (site of breast reduction reconstruction).    Plan:  -Continue anastrozole. Plan 5 yrs (3/2029)  -Discussed option of taking 3-4 week break off anastrozole to see how much correlative benefit she gets off it in her joint/muscle pains and if she has considerable improvement, trying tamoxifen or a different AI to see if better tolerated. She would like to stay with anastrozole for now, but we can re-explore this if needed.   -Follows Neurology for fibromyalgia mgmt. She will discuss more with them as well.   -Follow-up with Plastic Surgery for mgmt of right breast incisional pain  -Annual bilateral mammogram due, ordered.  -Return every 6 mths for breast exam and assessment on endocrine therapy. Will follow me in surveillance since Dr. White is no longer here. Will establish with new MD as needed. Pt comfortable with this.    Bone health  1/2024 baseline DEXA  normal.     Plan:  -Ca + Vit D while on AI  -Weight bearing exercise encouraged  -Repeat DEXA every 2 yrs on AI (next due 1/2026)      ______________________________________________________________________________    History of Present Illness/ Interval History    Ms. Bria Davis  is a 64 year old with hx DCIS treated earlier this year with lumpectomy and adjuvant RT. Started prophylactic anastrozole 6 mths ago. Returns for routine exam.    Shortly after starting anastrozole in March, developed diffuse joint pain. Treated with Tylenol and Aleve x 2 weeks with some benefit. Joint pain persists but more manageable, not taking anything for it. Also has history of fibromyalgia so hard for her to distinguish if related, on gabapentin.  No hot flashes.     Left breast tenderness around surgical site/RT. Massage/stretching helps. Right lateral chest wall/breast incision (site of breast reduction surgery) is sensitive after wearing a bra. She is planning to follow up with her plastic surgeon re: this.    ECOG Performance    0          Oncology History/Treatment  Diagnosis/Stage:   9/2023: Left breast DCIS, ER/[place]+  -screening mammogram: L breast asymmetry 10:00 position  -Dx mammo + US: 4.0 cm suspicious L breast mass  -Biopsy L breast: 2 foci atypical ductal hyperplasia 1.5 + 2.0 mm without invasive cancer  -10/6/2023 surgical path L breast excision: 37 mm grade 1 DCIS (papillary, cribiform, solid) without invasion, positive medial surgical margin. ER and PA strongly positive %  -11/13/2023 re-excision surgical path: negative surgical margins  -12/7/2023 surgical path L lumpectomy + breast reduction: negative for atypia nor malignancy  -Genetic testing negative    Treatment:  -10/6/2023: L breast excisional lumpectomy   -11/13/2023: re-excision to achieve negative margins  -12/7/2023: L lumpectomy, bilateral breast reduction surgery  ---post-op complications of delayed healing     -2/2024 : completed adjuvant RT  "(4272 cGy/16)    -3/2024 - present: anastrozole      Physical Exam    GENERAL: Alert and oriented to time place and person. Seated comfortably. In no distress.  accompanies.  HEAD: Atraumatic and normocephalic. No alopecia.  EYES: ADELAIDA, EOMI. No erythema. No icterus.  LYMPH NODES: No palpable supraclavicular, cervical, axillary lymphadenopathy.  BREASTS: Mild left breast RT-fibrosis and tenderness. No masses. Bilateral breast reduction incisions, right lateral incision scarring is prominent/keloid appearing but healed.  CHEST: clear to auscultation bilaterally. Resonant to percussion throughout bilaterally. Symmetrical breath movements bilaterally.  CVS: RRR  ABDOMEN: Soft. Not tender. Not distended.   EXTREMITIES: Warm. No peripheral edema.  SKIN: no rash, or bruising or purpura.   NEURO: No gross deficit noted. Non-antalgic gait.      Lab Results    No results found for this or any previous visit (from the past 168 hour(s)).    Imaging    No results found.    Billing  Total time 30 minutes, to include face to face visit, review of EMR, ordering, documentation and coordination of care on date of service   complexity modifier for longitudinal care.       Signed by: Olivia Gaffney NP      Oncology Rooming Note    September 11, 2024 11:52 AM   Bria Davis is a 64 year old female who presents for:    Chief Complaint   Patient presents with     Oncology Clinic Visit     Ductal carcinoma in situ (DCIS) of left breast - Provider visit only     Initial Vitals: BP (!) 142/84 (BP Location: Right arm, Patient Position: Sitting, Cuff Size: Adult Large)   Pulse 73   Temp 98.4  F (36.9  C) (Tympanic)   Resp 16   Ht 1.6 m (5' 3\")   Wt 110.7 kg (244 lb)   LMP 01/14/2016 (Approximate)   SpO2 96%   BMI 43.22 kg/m   Estimated body mass index is 43.22 kg/m  as calculated from the following:    Height as of this encounter: 1.6 m (5' 3\").    Weight as of this encounter: 110.7 kg (244 lb). Body surface area is 2.22 " meters squared.  Moderate Pain (4) Comment: Data Unavailable   Patient's last menstrual period was 01/14/2016 (approximate).  Allergies reviewed: Yes  Medications reviewed: Yes    Medications: Medication refills not needed today.  Pharmacy name entered into [facility]:    [facility] [place] [address] [facility] [place] - [address] [facility] - [place], [place] - [address] [id number] - [place], [place] - [address] [facility] AT [address]    Frailty Screening:   Is the patient here for a new oncology consult visit in cancer care? 2. No      Clinical concerns:  None      Elyssa Rodriguez CMA                Again, thank you for allowing me to participate in the care of your patient.        Sincerely,        Olivia Gaffney NP

## 2024-09-11 NOTE — PROGRESS NOTES
"Oncology Rooming Note    September 11, 2024 11:52 AM   Bria Davis is a 64 year old female who presents for:    Chief Complaint   Patient presents with    Oncology Clinic Visit     Ductal carcinoma in situ (DCIS) of left breast - Provider visit only     Initial Vitals: BP (!) 142/84 (BP Location: Right arm, Patient Position: Sitting, Cuff Size: Adult Large)   Pulse 73   Temp 98.4  F (36.9  C) (Tympanic)   Resp 16   Ht 1.6 m (5' 3\")   Wt 110.7 kg (244 lb)   LMP 01/14/2016 (Approximate)   SpO2 96%   BMI 43.22 kg/m   Estimated body mass index is 43.22 kg/m  as calculated from the following:    Height as of this encounter: 1.6 m (5' 3\").    Weight as of this encounter: 110.7 kg (244 lb). Body surface area is 2.22 meters squared.  Moderate Pain (4) Comment: Data Unavailable   Patient's last menstrual period was 01/14/2016 (approximate).  Allergies reviewed: Yes  Medications reviewed: Yes    Medications: Medication refills not needed today.  Pharmacy name entered into [facility]:    [facility] [place], [place] - [address] [facility] [place] - [address] [facility] - [place], [place] - [address] [place] [address] [facility] #[id number] - [place], [place] - [address] AT [facility] OF [address] [place] [address]    Frailty Screening:   Is the patient here for a new oncology consult visit in cancer care? 2. No      Clinical concerns:  None      Elyssa Rodriguez CMA              "

## 2024-09-11 NOTE — PROGRESS NOTES
[facility] Hematology and Oncology Outpatient Progress Note    Patient: Bria Davis  MRN: [id number]  Date of Service: Sep 11, 2024          Reason for Visit    DCIS    Primary Oncologist: formerly, Dr. White      Assessment/Plan  Hx left breast DCIS, ER/[place]+ (9/2023)  Diffuse arthralgias/myalgias, AI + fibromyalgia related  Right breast reconstruction incisional pain  S/p lumpectomy and adjuvant RT.   Started prophylactic endocrine therapy with anastrozole (6 mths ago).     Tolerating anastrozole farily well, but has had exacerbation of her baseline fibromyalgia pain and arthralgias on it which is a common side effect so I believe to be contributing. With time, it's gotten more manageable so she'd like to continue on it.    No concern for recurrent breast cancer on exam.  Ongoing right breast incisional pain (site of breast reduction reconstruction).    Plan:  -Continue anastrozole. Plan 5 yrs (3/2029)  -Discussed option of taking 3-4 week break off anastrozole to see how much correlative benefit she gets off it in her joint/muscle pains and if she has considerable improvement, trying tamoxifen or a different AI to see if better tolerated. She would like to stay with anastrozole for now, but we can re-explore this if needed.   -Follows Neurology for fibromyalgia mgmt. She will discuss more with them as well.   -Follow-up with Plastic Surgery for mgmt of right breast incisional pain  -Annual bilateral mammogram due, ordered.  -Return every 6 mths for breast exam and assessment on endocrine therapy. Will follow me in surveillance since Dr. White is no longer here. Will establish with new MD as needed. Pt comfortable with this.    Bone health  1/2024 baseline DEXA normal.     Plan:  -Ca + Vit D while on AI  -Weight bearing exercise encouraged  -Repeat DEXA every 2 yrs on AI (next due 1/2026)      ______________________________________________________________________________    History of Present Illness/  Interval History    Ms. Bria Davis  is a 64 year old with hx DCIS treated earlier this year with lumpectomy and adjuvant RT. Started prophylactic anastrozole 6 mths ago. Returns for routine exam.    Shortly after starting anastrozole in March, developed diffuse joint pain. Treated with Tylenol and Aleve x 2 weeks with some benefit. Joint pain persists but more manageable, not taking anything for it. Also has history of fibromyalgia so hard for her to distinguish if related, on gabapentin.  No hot flashes.     Left breast tenderness around surgical site/RT. Massage/stretching helps. Right lateral chest wall/breast incision (site of breast reduction surgery) is sensitive after wearing a bra. She is planning to follow up with her plastic surgeon re: this.    ECOG Performance    0          Oncology History/Treatment  Diagnosis/Stage:   9/2023: Left breast DCIS, ER/IN+  -screening mammogram: L breast asymmetry 10:00 position  -Dx mammo + US: 4.0 cm suspicious L breast mass  -Biopsy L breast: 2 foci atypical ductal hyperplasia 1.5 + 2.0 mm without invasive cancer  -10/6/2023 surgical path L breast excision: 37 mm grade 1 DCIS (papillary, cribiform, solid) without invasion, positive medial surgical margin. ER and IN strongly positive %  -11/13/2023 re-excision surgical path: negative surgical margins  -12/7/2023 surgical path L lumpectomy + breast reduction: negative for atypia nor malignancy  -Genetic testing negative    Treatment:  -10/6/2023: L breast excisional lumpectomy   -11/13/2023: re-excision to achieve negative margins  -12/7/2023: L lumpectomy, bilateral breast reduction surgery  ---post-op complications of delayed healing     -2/2024 : completed adjuvant RT (4272 cGy/16)    -3/2024 - present: anastrozole      Physical Exam    GENERAL: Alert and oriented to time place and person. Seated comfortably. In no distress.  accompanies.  HEAD: Atraumatic and normocephalic. No alopecia.  EYES: ADELAIDA,  EOMI. No erythema. No icterus.  LYMPH NODES: No palpable supraclavicular, cervical, axillary lymphadenopathy.  BREASTS: Mild left breast RT-fibrosis and tenderness. No masses. Bilateral breast reduction incisions, right lateral incision scarring is prominent/keloid appearing but healed.  CHEST: clear to auscultation bilaterally. Resonant to percussion throughout bilaterally. Symmetrical breath movements bilaterally.  CVS: RRR  ABDOMEN: Soft. Not tender. Not distended.   EXTREMITIES: Warm. No peripheral edema.  SKIN: no rash, or bruising or purpura.   NEURO: No gross deficit noted. Non-antalgic gait.      Lab Results    No results found for this or any previous visit (from the past 168 hour(s)).    Imaging    No results found.    Billing  Total time 30 minutes, to include face to face visit, review of EMR, ordering, documentation and coordination of care on date of service   complexity modifier for longitudinal care.       Signed by: Olivia Gaffney NP

## 2024-09-13 ASSESSMENT — ASTHMA QUESTIONNAIRES
QUESTION_5 LAST FOUR WEEKS HOW WOULD YOU RATE YOUR ASTHMA CONTROL: WELL CONTROLLED
ACT_TOTALSCORE: 22
QUESTION_2 LAST FOUR WEEKS HOW OFTEN HAVE YOU HAD SHORTNESS OF BREATH: ONCE OR TWICE A WEEK
ACT_TOTALSCORE: 22
QUESTION_1 LAST FOUR WEEKS HOW MUCH OF THE TIME DID YOUR ASTHMA KEEP YOU FROM GETTING AS MUCH DONE AT WORK, SCHOOL OR AT HOME: A LITTLE OF THE TIME
QUESTION_3 LAST FOUR WEEKS HOW OFTEN DID YOUR ASTHMA SYMPTOMS (WHEEZING, COUGHING, SHORTNESS OF BREATH, CHEST TIGHTNESS OR PAIN) WAKE YOU UP AT NIGHT OR EARLIER THAN USUAL IN THE MORNING: NOT AT ALL
QUESTION_4 LAST FOUR WEEKS HOW OFTEN HAVE YOU USED YOUR RESCUE INHALER OR NEBULIZER MEDICATION (SUCH AS ALBUTEROL): NOT AT ALL

## 2024-09-16 ENCOUNTER — HOSPITAL ENCOUNTER (OUTPATIENT)
Dept: MAMMOGRAPHY | Facility: CLINIC | Age: 64
Discharge: HOME | End: 2024-09-16
Attending: NURSE PRACTITIONER | Admitting: NURSE PRACTITIONER
Payer: COMMERCIAL

## 2024-09-16 DIAGNOSIS — Z12.31 ENCOUNTER FOR SCREENING MAMMOGRAM FOR BREAST CANCER: ICD-10-CM

## 2024-09-16 PROCEDURE — 77063 BREAST TOMOSYNTHESIS BI: CPT

## 2024-09-18 ENCOUNTER — OFFICE VISIT (OUTPATIENT)
Dept: FAMILY MEDICINE | Facility: CLINIC | Age: 64
End: 2024-09-18
Attending: FAMILY MEDICINE
Payer: COMMERCIAL

## 2024-09-18 VITALS
BODY MASS INDEX: 43.14 KG/M2 | OXYGEN SATURATION: 95 % | HEART RATE: 73 BPM | SYSTOLIC BLOOD PRESSURE: 124 MMHG | RESPIRATION RATE: 20 BRPM | TEMPERATURE: 97.9 F | HEIGHT: 63 IN | DIASTOLIC BLOOD PRESSURE: 76 MMHG | WEIGHT: 243.5 LBS

## 2024-09-18 DIAGNOSIS — Z79.4 TYPE 2 DIABETES MELLITUS WITH STAGE 2 CHRONIC KIDNEY DISEASE, WITH LONG-TERM CURRENT USE OF INSULIN (H): ICD-10-CM

## 2024-09-18 DIAGNOSIS — N18.2 TYPE 2 DIABETES MELLITUS WITH STAGE 2 CHRONIC KIDNEY DISEASE, WITH LONG-TERM CURRENT USE OF INSULIN (H): ICD-10-CM

## 2024-09-18 DIAGNOSIS — Z00.00 ROUTINE GENERAL MEDICAL EXAMINATION AT A HEALTH CARE FACILITY: Primary | ICD-10-CM

## 2024-09-18 DIAGNOSIS — Z79.4 TYPE 2 DIABETES MELLITUS WITH CHRONIC KIDNEY DISEASE, WITH LONG-TERM CURRENT USE OF INSULIN, UNSPECIFIED CKD STAGE (H): ICD-10-CM

## 2024-09-18 DIAGNOSIS — E11.22 TYPE 2 DIABETES MELLITUS WITH STAGE 2 CHRONIC KIDNEY DISEASE, WITH LONG-TERM CURRENT USE OF INSULIN (H): ICD-10-CM

## 2024-09-18 DIAGNOSIS — R20.9 ABNORMAL SENSATION OF LEG: ICD-10-CM

## 2024-09-18 DIAGNOSIS — E11.22 TYPE 2 DIABETES MELLITUS WITH CHRONIC KIDNEY DISEASE, WITH LONG-TERM CURRENT USE OF INSULIN, UNSPECIFIED CKD STAGE (H): ICD-10-CM

## 2024-09-18 DIAGNOSIS — J45.20 MILD INTERMITTENT ASTHMA, UNCOMPLICATED: ICD-10-CM

## 2024-09-18 PROBLEM — N63.22 MASS OF UPPER INNER QUADRANT OF LEFT BREAST: Status: RESOLVED | Noted: 2023-09-18 | Resolved: 2024-09-18

## 2024-09-18 LAB
EST. AVERAGE GLUCOSE BLD GHB EST-MCNC: 192 MG/DL
HBA1C MFR BLD: 8.3 % (ref 0–5.6)
HOLD SPECIMEN: NORMAL
HOLD SPECIMEN: NORMAL

## 2024-09-18 PROCEDURE — 90673 RIV3 VACCINE NO PRESERV IM: CPT | Performed by: FAMILY MEDICINE

## 2024-09-18 PROCEDURE — 90472 IMMUNIZATION ADMIN EACH ADD: CPT | Performed by: FAMILY MEDICINE

## 2024-09-18 PROCEDURE — 99396 PREV VISIT EST AGE 40-64: CPT | Mod: 25 | Performed by: FAMILY MEDICINE

## 2024-09-18 PROCEDURE — 83036 HEMOGLOBIN GLYCOSYLATED A1C: CPT | Performed by: FAMILY MEDICINE

## 2024-09-18 PROCEDURE — 36415 COLL VENOUS BLD VENIPUNCTURE: CPT | Performed by: FAMILY MEDICINE

## 2024-09-18 PROCEDURE — 99214 OFFICE O/P EST MOD 30 MIN: CPT | Mod: 25 | Performed by: FAMILY MEDICINE

## 2024-09-18 PROCEDURE — 91320 SARSCV2 VAC 30MCG TRS-SUC IM: CPT | Performed by: FAMILY MEDICINE

## 2024-09-18 PROCEDURE — 90678 RSV VACC PREF BIVALENT IM: CPT | Performed by: FAMILY MEDICINE

## 2024-09-18 PROCEDURE — 90480 ADMN SARSCOV2 VAC 1/ONLY CMP: CPT | Performed by: FAMILY MEDICINE

## 2024-09-18 PROCEDURE — 90471 IMMUNIZATION ADMIN: CPT | Performed by: FAMILY MEDICINE

## 2024-09-18 RX ORDER — ALBUTEROL SULFATE 90 UG/1
2 AEROSOL, METERED RESPIRATORY (INHALATION) EVERY 6 HOURS PRN
Qty: 18 G | Refills: 3 | Status: SHIPPED | OUTPATIENT
Start: 2024-09-18

## 2024-09-18 RX ORDER — MONTELUKAST SODIUM 10 MG/1
10 TABLET ORAL AT BEDTIME
Qty: 90 TABLET | Refills: 3 | Status: SHIPPED | OUTPATIENT
Start: 2024-09-18

## 2024-09-18 RX ORDER — FLUTICASONE PROPIONATE AND SALMETEROL 100; 50 UG/1; UG/1
POWDER RESPIRATORY (INHALATION)
Qty: 180 EACH | Refills: 3 | Status: SHIPPED | OUTPATIENT
Start: 2024-09-18

## 2024-09-18 RX ORDER — INSULIN GLARGINE 300 U/ML
INJECTION, SOLUTION SUBCUTANEOUS
Qty: 40 ML | Refills: 1 | Status: SHIPPED | OUTPATIENT
Start: 2024-09-18

## 2024-09-18 RX ORDER — METFORMIN HCL 500 MG
1000 TABLET, EXTENDED RELEASE 24 HR ORAL 2 TIMES DAILY WITH MEALS
Qty: 360 TABLET | Refills: 1 | Status: SHIPPED | OUTPATIENT
Start: 2024-09-18

## 2024-09-18 RX ORDER — INSULIN ASPART 100 [IU]/ML
INJECTION, SOLUTION INTRAVENOUS; SUBCUTANEOUS
Qty: 90 ML | Refills: 3 | Status: SHIPPED | OUTPATIENT
Start: 2024-09-18

## 2024-09-18 RX ORDER — SEMAGLUTIDE 2.68 MG/ML
2 INJECTION, SOLUTION SUBCUTANEOUS
Qty: 9 ML | Refills: 1 | Status: SHIPPED | OUTPATIENT
Start: 2024-09-18

## 2024-09-18 ASSESSMENT — PAIN SCALES - GENERAL: PAINLEVEL: NO PAIN (0)

## 2024-09-18 NOTE — PROGRESS NOTES
Preventive Care Visit  [facility]  Kaia Elena MD, Family Medicine  Sep 18, 2024      Assessment & Plan     Routine general medical examination at a health care facility     - Extra SST Tube (LAB USE ONLY)  - Extra Green Top Tube (LAB USE ONLY)    Type 2 diabetes mellitus with stage 2 chronic kidney disease, with long-term current use of insulin (H)  Uncontrolled  Add jardiance 10 mg daily  Recheck HgbA1c in 3 months  Declined diabetic educator visit  Tried CGM (diagnostic) and she reacted to the metal in the device, also bathes so it's hard to keep applied  Seems like from what she is saying, she overcompensates at night with food to avoid overnight hypoglycemia    - HEMOGLOBIN A1C; Future  - FOOT EXAM  - Extra SST Tube (LAB USE ONLY)  - Extra Green Top Tube (LAB USE ONLY)  - HEMOGLOBIN A1C  - empagliflozin (JARDIANCE) 10 MG TABS tablet; Take 1 tablet (10 mg) by mouth daily.  - Hemoglobin A1c; Future    Type 2 diabetes mellitus with chronic kidney disease, with long-term current use of insulin, unspecified CKD stage (H)   As above  - insulin aspart (NOVOLOG FLEXPEN) 100 UNIT/ML pen; 32 units before breakfast, 32 units before lunch, 32 units before dinner  - metFORMIN (GLUCOPHAGE XR) 500 MG 24 hr tablet; Take 2 tablets (1,000 mg) by mouth 2 times daily (with meals).  - Semaglutide, 2 MG/DOSE, (OZEMPIC, 2 MG/DOSE,) 8 MG/3ML pen; Inject 2 mg subcutaneously every 7 days.  - insulin glargine U-300 (TOUJEO MAX SOLOSTAR) 300 UNIT/ML (2 units dial) pen; INJECT 100 UNITS UNDER THE SKIN DAILY  - Extra SST Tube (LAB USE ONLY)  - Extra Green Top Tube (LAB USE ONLY)    Mild intermittent asthma, uncomplicated  Stable, needs refill of albuterol    - montelukast (SINGULAIR) 10 MG tablet; Take 1 tablet (10 mg) by mouth at bedtime.  - Extra SST Tube (LAB USE ONLY)  - Extra Green Top Tube (LAB USE ONLY)  - fluticasone-salmeterol (ADVAIR DISKUS) 100-50 MCG/ACT inhaler; INHALE 1 PUFF INTO THE LUNGS 2  "TIMES DAILY  - albuterol (PROAIR HFA/PROVENTIL HFA/VENTOLIN HFA) 108 (90 Base) MCG/ACT inhaler; Inhale 2 puffs into the lungs every 6 hours as needed for shortness of breath, wheezing or cough.    Abnormal sensation of leg  Can't really describe what she's feeling.    Intermittent \"sensation\" of the legs - not painful, numb, etc.   Watch for increasing numbness/weakness/etc.     Patient has been advised of split billing requirements and indicates understanding: Yes        BMI  Estimated body mass index is 43.13 kg/m  as calculated from the following:    Height as of this encounter: 1.6 m (5' 3\").    Weight as of this encounter: 110.5 kg (243 lb 8 oz).   Weight management plan: Discussed healthy diet and exercise guidelines    Counseling  Appropriate preventive services were addressed with this patient via screening, questionnaire, or discussion as appropriate for fall prevention, nutrition, physical activity, Tobacco-use cessation, social engagement, weight loss and cognition.  Checklist reviewing preventive services available has been given to the patient.  Reviewed patient's diet, addressing concerns and/or questions.   She is at risk for psychosocial distress and has been provided with information to reduce risk.           Susannah Ragsdale is a 64 year old, presenting for the following:  Physical        9/18/2024     7:21 AM   Additional Questions   Roomed by Shirley gaona CMA   Accompanied by Self        Health Care Directive  Patient does not have a Health Care Directive or Living Will: Discussed advance care planning with patient; however, patient declined at this time.    HPI    - \"Odd\" feeling in bilateral low legs. NO numbness, tingling or burning.    Diabetes Follow-up  Insulin - Novolog, metformin 1,000mg bid, ozempic 2mg weekly, toujeo 100U SubQ qd  How often are you checking your blood sugar? Three times daily  Blood sugar testing frequency justification:  Uncontrolled diabetes  What time of day are you " checking your blood sugars (select all that apply)?  Before and after meals  Have you had any blood sugars above 200?  Yes   Have you had any blood sugars below 70?  Yes   What symptoms do you notice when your blood sugar is low?  Shaky, Dizzy, and Weak  What concerns do you have today about your diabetes? None   Do you have any of these symptoms? (Select all that apply)  No numbness or tingling in feet.  No redness, sores or blisters on feet.  No complaints of excessive thirst.  No reports of blurry vision.  No significant changes to weight.          Hyperlipidemia Follow-Up  Rosuvastatin 10mg qd  Are you regularly taking any medication or supplement to lower your cholesterol?   Yes- statin  Are you having muscle aches or other side effects that you think could be caused by your cholesterol lowering medication?  No    Hypertension Follow-up  Hydrochlorothiazide 12.5mg every day, losartan 100mg every day, metoprolol 25mg qd  Do you check your blood pressure regularly outside of the clinic? Yes   Are you following a low salt diet? Yes  Are your blood pressures ever more than 140 on the top number (systolic) OR more   than 90 on the bottom number (diastolic), for example 140/90? Yes    BP Readings from Last 2 Encounters:   09/18/24 124/76   09/11/24 (!) 142/84     Hemoglobin A1C (%)   Date Value   02/26/2024 7.5 (H)   09/21/2023 7.7 (H)   04/14/2021 7.3 (H)   12/09/2020 7.9 (H)     LDL Cholesterol Calculated (mg/dL)   Date Value   02/26/2024 43   03/28/2023 46   04/14/2021 63   06/15/2020 53           9/13/2024   General Health   How would you rate your overall physical health? (!) FAIR   Feel stress (tense, anxious, or unable to sleep) Only a little      (!) STRESS CONCERN      9/13/2024   Nutrition   Three or more servings of calcium each day? Yes   Diet: Diabetic   How many servings of fruit and vegetables per day? (!) 0-1   How many sweetened beverages each day? 0-1            9/13/2024   Exercise   Days per week  of moderate/strenous exercise 0 days   Average minutes spent exercising at this level Patient declined      (!) EXERCISE CONCERN      9/13/2024   Social Factors   Frequency of gathering with friends or relatives More than three times a week   Worry food won't last until get money to buy more No   Food not last or not have enough money for food? No   Do you have housing? (Housing is defined as stable permanent housing and does not include staying ouside in a car, in a tent, in an abandoned building, in an overnight shelter, or couch-surfing.) Yes   Are you worried about losing your housing? No   Lack of transportation? No   Unable to get utilities (heat,electricity)? No            9/18/2024   Fall Risk   Gait Speed Test (Document in seconds) 5   Gait Speed Test Interpretation Less than or equal to 5.00 seconds - PASS             9/13/2024   Dental   Dentist two times every year? Yes            9/13/2024   TB Screening   Were you born outside of the [place]? No                  9/13/2024   Substance Use   Alcohol more than 3/day or more than 7/wk No   Do you use any other substances recreationally? No        Social History     Tobacco Use    Smoking status: Never    Smokeless tobacco: Never   Vaping Use    Vaping status: Never Used   Substance Use Topics    Alcohol use: Not Currently    Drug use: No           9/16/2024   LAST FHS-7 RESULTS   1st degree relative breast or ovarian cancer Yes   Any relative bilateral breast cancer No    Unknown   Any male have breast cancer No    No   Any ONE woman have BOTH breast AND ovarian cancer No   Any woman with breast cancer before 50yrs No    No   2 or more relatives with breast AND/OR ovarian cancer No    No   2 or more relatives with breast AND/OR bowel cancer No    No       Multiple values from one day are sorted in reverse-chronological order                9/13/2024   STI Screening   New sexual partner(s) since last STI/HIV test? No        History of abnormal Pap smear: No -  "age 30-64 HPV with reflex Pap every 5 years recommended        Latest Ref Rng & Units 6/8/2022     8:56 AM   PAP / HPV   PAP  Negative for Intraepithelial Lesion or Malignancy (NILM)    HPV 16 DNA Negative Negative    HPV 18 DNA Negative Negative    Other HR HPV Negative Negative      ASCVD Risk   The 10-year ASCVD risk score (Aiden TORRES, et al., 2019) is: 9.9%    Values used to calculate the score:      Age: 64 years      Sex: Female      Is Non- : No      Diabetic: Yes      Tobacco smoker: No      Systolic Blood Pressure: 124 mmHg      Is BP treated: Yes      HDL Cholesterol: 40 mg/dL      Total Cholesterol: 134 mg/dL           Reviewed and updated as needed this visit by Provider                          Review of Systems  Constitutional, HEENT, cardiovascular, pulmonary, gi and gu systems are negative, except as otherwise noted.     Objective    Exam  /76   Pulse 73   Temp 97.9  F (36.6  C) (Tympanic)   Resp 20   Ht 1.6 m (5' 3\")   Wt 110.5 kg (243 lb 8 oz)   LMP 01/14/2016 (Approximate)   SpO2 95%   BMI 43.13 kg/m     Estimated body mass index is 43.13 kg/m  as calculated from the following:    Height as of this encounter: 1.6 m (5' 3\").    Weight as of this encounter: 110.5 kg (243 lb 8 oz).    Physical Exam  GENERAL: alert and no distress  NECK: no adenopathy, no asymmetry, masses, or scars  RESP: lungs clear to auscultation - no rales, rhonchi or wheezes  CV: regular rate and rhythm, normal S1 S2, no S3 or S4, no murmur, click or rub, no peripheral edema  MS: no gross musculoskeletal defects noted, no edema  PSYCH: mentation appears normal, affect normal/bright        Signed Electronically by: Kaia Elena MD    "

## 2024-09-18 NOTE — PATIENT INSTRUCTIONS
Patient Education   Preventive Care Advice   This is general advice given by our system to help you stay healthy. However, your care team may have specific advice just for you. Please talk to your care team about your preventive care needs.  Nutrition  Eat 5 or more servings of fruits and vegetables each day.  Try wheat bread, brown rice and whole grain pasta (instead of white bread, rice, and pasta).  Get enough calcium and vitamin D. Check the label on foods and aim for 100% of the RDA (recommended daily allowance).  Lifestyle  Exercise at least 150 minutes each week  (30 minutes a day, 5 days a week).  Do muscle strengthening activities 2 days a week. These help control your weight and prevent disease.  No smoking.  Wear sunscreen to prevent skin cancer.  Have a dental exam and cleaning every 6 months.  Yearly exams  See your health care team every year to talk about:  Any changes in your health.  Any medicines your care team has prescribed.  Preventive care, family planning, and ways to prevent chronic diseases.  Shots (vaccines)   HPV shots (up to age 26), if you've never had them before.  Hepatitis B shots (up to age 59), if you've never had them before.  COVID-19 shot: Get this shot when it's due.  Flu shot: Get a flu shot every year.  Tetanus shot: Get a tetanus shot every 10 years.  Pneumococcal, hepatitis A, and RSV shots: Ask your care team if you need these based on your risk.  Shingles shot (for age 50 and up)  General health tests  Diabetes screening:  Starting at age 35, Get screened for diabetes at least every 3 years.  If you are younger than age 35, ask your care team if you should be screened for diabetes.  Cholesterol test: At age 39, start having a cholesterol test every 5 years, or more often if advised.  Bone density scan (DEXA): At age 50, ask your care team if you should have this scan for osteoporosis (brittle bones).  Hepatitis C: Get tested at least once in your life.  STIs (sexually  transmitted infections)  Before age 24: Ask your care team if you should be screened for STIs.  After age 24: Get screened for STIs if you're at risk. You are at risk for STIs (including HIV) if:  You are sexually active with more than one person.  You don't use condoms every time.  You or a partner was diagnosed with a sexually transmitted infection.  If you are at risk for HIV, ask about PrEP medicine to prevent HIV.  Get tested for HIV at least once in your life, whether you are at risk for HIV or not.  Cancer screening tests  Cervical cancer screening: If you have a cervix, begin getting regular cervical cancer screening tests starting at age 21.  Breast cancer scan (mammogram): If you've ever had breasts, begin having regular mammograms starting at age 40. This is a scan to check for breast cancer.  Colon cancer screening: It is important to start screening for colon cancer at age 45.  Have a colonoscopy test every 10 years (or more often if you're at risk) Or, ask your provider about stool tests like a FIT test every year or Cologuard test every 3 years.  To learn more about your testing options, visit:   .  For help making a decision, visit:   https://bit.ly/rc25003.  Prostate cancer screening test: If you have a prostate, ask your care team if a prostate cancer screening test (PSA) at age 55 is right for you.  Lung cancer screening: If you are a current or former smoker ages 50 to 80, ask your care team if ongoing lung cancer screenings are right for you.  For informational purposes only. Not to replace the advice of your health care provider. Copyright   2023 [facility]. All rights reserved. Clinically reviewed by the [facility]. [employer] [id number] - REV 01/24.  Preventing Falls: Care Instructions  Injuries and health problems such as trouble walking or poor eyesight can increase your risk of falling. So can some medicines. But there are things you can do to help  "prevent falls. You can exercise to get stronger. You can also arrange your home to make it safer.    Talk to your doctor about the medicines you take. Ask if any of them increase the risk of falls and whether they can be changed or stopped.   Try to exercise regularly. It can help improve your strength and balance. This can help lower your risk of falling.     Practice fall safety and prevention.    Wear low-heeled shoes that fit well and give your feet good support. Talk to your doctor if you have foot problems that make this hard.  Carry a cellphone or wear a medical alert device that you can use to call for help.  Use stepladders instead of chairs to reach high objects. Don't climb if you're at risk for falls. Ask for help, if needed.  Wear the correct eyeglasses, if you need them.    Make your home safer.    Remove rugs, cords, clutter, and furniture from walkways.  Keep your house well lit. Use night-lights in hallways and bathrooms.  Install and use sturdy handrails on stairways.  Wear nonskid footwear, even inside. Don't walk barefoot or in socks without shoes.    Be safe outside.    Use handrails, curb cuts, and ramps whenever possible.  Keep your hands free by using a shoulder bag or backpack.  Try to walk in well-lit areas. Watch out for uneven ground, changes in pavement, and debris.  Be careful in the winter. Walk on the grass or gravel when sidewalks are slippery. Use de-icer on steps and walkways. Add non-slip devices to shoes.    Put grab bars and nonskid mats in your shower or tub and near the toilet. Try to use a shower chair or bath bench when bathing.   Get into a tub or shower by putting in your weaker leg first. Get out with your strong side first. Have a phone or medical alert device in the bathroom with you.   Where can you learn more?  Go to https://www.[employer].net/patiented  Enter G117 in the search box to learn more about \"Preventing Falls: Care Instructions.\"  Current as of: July 17, " 2023               Content Version: 14.0    9413-5293 [employer].   Care instructions adapted under license by your healthcare professional. If you have questions about a medical condition or this instruction, always ask your healthcare professional. [employer] disclaims any warranty or liability for your use of this information.

## 2024-09-18 NOTE — LETTER
My Asthma Action Plan    Name: Bria Davis   YOB: 1960  Date: 9/18/2024   My doctor: Kaia Elena MD   My clinic: [facility]        My Rescue Medicine:   Albuterol inhaler (Proair/Ventolin/Proventil HFA)  2-4 puffs EVERY 4 HOURS as needed. Use a spacer if recommended by your provider.   My Asthma Severity:   Intermittent / Exercise Induced  Know your asthma triggers:   exercise or sports          GREEN ZONE   Good Control  I feel good  No cough or wheeze  Can work, sleep and play without asthma symptoms       Take your asthma control medicine every day.     If exercise triggers your asthma, take your rescue medication  15 minutes before exercise or sports, and  During exercise if you have asthma symptoms  Spacer to use with inhaler: If you have a spacer, make sure to use it with your inhaler             YELLOW ZONE Getting Worse  I have ANY of these:  I do not feel good  Cough or wheeze  Chest feels tight  Wake up at night   Keep taking your Green Zone medications  Start taking your rescue medicine:  every 20 minutes for up to 1 hour. Then every 4 hours for 24-48 hours.  If you stay in the Yellow Zone for more than 12-24 hours, contact your doctor.  If you do not return to the Green Zone in 12-24 hours or you get worse, start taking your oral steroid medicine if prescribed by your provider.           RED ZONE Medical Alert - Get Help  I have ANY of these:  I feel awful  Medicine is not helping  Breathing getting harder  Trouble walking or talking  Nose opens wide to breathe       Take your rescue medicine NOW  If your provider has prescribed an oral steroid medicine, start taking it NOW  Call your doctor NOW  If you are still in the Red Zone after 20 minutes and you have not reached your doctor:  Take your rescue medicine again and  Call 911 or go to the emergency room right away    See your regular doctor within 2 weeks of an Emergency Room or Urgent Care visit for  follow-up treatment.          Annual Reminders:  Meet with Asthma Educator,  Flu Shot in the Fall, consider Pneumonia Vaccination for patients with asthma (aged 19 and older).    Pharmacy:    [facility] [place] [address] [facility] [place] [address] [facility] - [place], [place] - [address] [place] [address] [id number] - [place], [place] - [address]    Electronically signed by Kaia Elena MD   Date: 09/18/24                    Asthma Triggers  How To Control Things That Make Your Asthma Worse    Triggers are things that make your asthma worse.  Look at the list below to help you find your triggers and   what you can do about them. You can help prevent asthma flare-ups by staying away from your triggers.      Trigger                                                          What you can do   Cigarette Smoke  Tobacco smoke can make asthma worse. Do not allow smoking in your home, car or around you.  Be sure no one smokes at a child s day care or school.  If you smoke, ask your health care provider for ways to help you quit.  Ask family members to quit too.  Ask your health care provider for a referral to Quit Plan to help you quit smoking, or call 9-298-465-PLAN.     Colds, Flu, Bronchitis  These are common triggers of asthma. Wash your hands often.  Don t touch your eyes, nose or mouth.  Get a flu shot every year.     Dust Mites  These are tiny bugs that live in cloth or carpet. They are too small to see. Wash sheets and blankets in hot water every week.   Encase pillows and mattress in dust mite proof covers.  Avoid having carpet if you can. If you have carpet, vacuum weekly.   Use a dust mask and HEPA vacuum.   Pollen and Outdoor Mold  Some people are allergic to trees, grass, or weed pollen, or molds. Try to keep your windows closed.  Limit time out doors when pollen count is high.    Ask you health care provider about taking medicine during allergy season.     Animal Dander  Some people are allergic to skin flakes, urine or saliva from pets with fur or feathers. Keep pets with fur or feathers out of your home.    If you can t keep the pet outdoors, then keep the pet out of your bedroom.  Keep the bedroom door closed.  Keep pets off cloth furniture and away from stuffed toys.     Mice, Rats, and Cockroaches  Some people are allergic to the waste from these pests.   Cover food and garbage.  Clean up spills and food crumbs.  Store grease in the refrigerator.   Keep food out of the bedroom.   Indoor Mold  This can be a trigger if your home has high moisture. Fix leaking faucets, pipes, or other sources of water.   Clean moldy surfaces.  Dehumidify basement if it is damp and smelly.   Smoke, Strong Odors, and Sprays  These can reduce air quality. Stay away from strong odors and sprays, such as perfume, powder, hair spray, paints, smoke incense, paint, cleaning products, candles and new carpet.   Exercise or Sports  Some people with asthma have this trigger. Be active!  Ask your doctor about taking medicine before sports or exercise to prevent symptoms.    Warm up for 5-10 minutes before and after sports or exercise.     Other Triggers of Asthma  Cold air:  Cover your nose and mouth with a scarf.  Sometimes laughing or crying can be a trigger.  Some medicines and food can trigger asthma.

## 2024-10-01 ENCOUNTER — HOSPITAL ENCOUNTER (EMERGENCY)
Facility: CLINIC | Age: 64
Discharge: ANOTHER HOSPITAL | End: 2024-10-02
Attending: EMERGENCY MEDICINE | Admitting: EMERGENCY MEDICINE
Payer: COMMERCIAL

## 2024-10-01 ENCOUNTER — TELEPHONE (OUTPATIENT)
Dept: FAMILY MEDICINE | Facility: CLINIC | Age: 64
End: 2024-10-01

## 2024-10-01 ENCOUNTER — APPOINTMENT (OUTPATIENT)
Dept: CT IMAGING | Facility: CLINIC | Age: 64
End: 2024-10-01
Attending: EMERGENCY MEDICINE
Payer: COMMERCIAL

## 2024-10-01 VITALS
HEART RATE: 39 BPM | SYSTOLIC BLOOD PRESSURE: 116 MMHG | RESPIRATION RATE: 13 BRPM | TEMPERATURE: 97.5 F | DIASTOLIC BLOOD PRESSURE: 65 MMHG | OXYGEN SATURATION: 98 %

## 2024-10-01 DIAGNOSIS — R00.1 SYMPTOMATIC BRADYCARDIA: ICD-10-CM

## 2024-10-01 LAB
ALBUMIN SERPL BCG-MCNC: 4 G/DL (ref 3.5–5.2)
ALP SERPL-CCNC: 85 U/L (ref 40–150)
ALT SERPL W P-5'-P-CCNC: 31 U/L (ref 0–50)
ANION GAP SERPL CALCULATED.3IONS-SCNC: 12 MMOL/L (ref 7–15)
AST SERPL W P-5'-P-CCNC: 21 U/L (ref 0–45)
BASE EXCESS BLDV CALC-SCNC: 2.4 MMOL/L (ref -3–3)
BASOPHILS # BLD AUTO: 0.1 10E3/UL (ref 0–0.2)
BASOPHILS NFR BLD AUTO: 1 %
BILIRUB SERPL-MCNC: 0.4 MG/DL
BUN SERPL-MCNC: 13.1 MG/DL (ref 8–23)
CALCIUM SERPL-MCNC: 9.7 MG/DL (ref 8.8–10.4)
CHLORIDE SERPL-SCNC: 100 MMOL/L (ref 98–107)
CREAT SERPL-MCNC: 0.79 MG/DL (ref 0.51–0.95)
D DIMER PPP FEU-MCNC: 0.6 UG/ML FEU (ref 0–0.5)
EGFRCR SERPLBLD CKD-EPI 2021: 84 ML/MIN/1.73M2
EOSINOPHIL # BLD AUTO: 0.5 10E3/UL (ref 0–0.7)
EOSINOPHIL NFR BLD AUTO: 6 %
ERYTHROCYTE [DISTWIDTH] IN BLOOD BY AUTOMATED COUNT: 13.4 % (ref 10–15)
FLUAV RNA SPEC QL NAA+PROBE: NEGATIVE
FLUBV RNA RESP QL NAA+PROBE: NEGATIVE
GLUCOSE BLDC GLUCOMTR-MCNC: 67 MG/DL (ref 70–99)
GLUCOSE SERPL-MCNC: 118 MG/DL (ref 70–99)
HCO3 BLDV-SCNC: 28 MMOL/L (ref 21–28)
HCO3 SERPL-SCNC: 25 MMOL/L (ref 22–29)
HCT VFR BLD AUTO: 39.5 % (ref 35–47)
HGB BLD-MCNC: 13.3 G/DL (ref 11.7–15.7)
HOLD SPECIMEN: NORMAL
HOLD SPECIMEN: NORMAL
IMM GRANULOCYTES # BLD: 0 10E3/UL
IMM GRANULOCYTES NFR BLD: 0 %
LYMPHOCYTES # BLD AUTO: 1.4 10E3/UL (ref 0.8–5.3)
LYMPHOCYTES NFR BLD AUTO: 18 %
MCH RBC QN AUTO: 29.8 PG (ref 26.5–33)
MCHC RBC AUTO-ENTMCNC: 33.7 G/DL (ref 31.5–36.5)
MCV RBC AUTO: 88 FL (ref 78–100)
MONOCYTES # BLD AUTO: 0.6 10E3/UL (ref 0–1.3)
MONOCYTES NFR BLD AUTO: 8 %
NEUTROPHILS # BLD AUTO: 5.3 10E3/UL (ref 1.6–8.3)
NEUTROPHILS NFR BLD AUTO: 67 %
NRBC # BLD AUTO: 0 10E3/UL
NRBC BLD AUTO-RTO: 0 /100
O2/TOTAL GAS SETTING VFR VENT: 0 %
OXYHGB MFR BLDV: 49 % (ref 70–75)
PCO2 BLDV: 44 MM HG (ref 40–50)
PH BLDV: 7.41 [PH] (ref 7.32–7.43)
PLATELET # BLD AUTO: 299 10E3/UL (ref 150–450)
PO2 BLDV: 29 MM HG (ref 25–47)
POTASSIUM SERPL-SCNC: 3.9 MMOL/L (ref 3.4–5.3)
PROT SERPL-MCNC: 7.2 G/DL (ref 6.4–8.3)
RBC # BLD AUTO: 4.47 10E6/UL (ref 3.8–5.2)
RSV RNA SPEC NAA+PROBE: NEGATIVE
SAO2 % BLDV: 50.1 % (ref 70–75)
SARS-COV-2 RNA RESP QL NAA+PROBE: NEGATIVE
SODIUM SERPL-SCNC: 137 MMOL/L (ref 135–145)
TROPONIN T SERPL HS-MCNC: 10 NG/L
TROPONIN T SERPL HS-MCNC: 10 NG/L
WBC # BLD AUTO: 7.9 10E3/UL (ref 4–11)

## 2024-10-01 PROCEDURE — 36415 COLL VENOUS BLD VENIPUNCTURE: CPT | Performed by: EMERGENCY MEDICINE

## 2024-10-01 PROCEDURE — 93005 ELECTROCARDIOGRAM TRACING: CPT | Performed by: EMERGENCY MEDICINE

## 2024-10-01 PROCEDURE — 250N000011 HC RX IP 250 OP 636: Performed by: EMERGENCY MEDICINE

## 2024-10-01 PROCEDURE — 82962 GLUCOSE BLOOD TEST: CPT

## 2024-10-01 PROCEDURE — 93010 ELECTROCARDIOGRAM REPORT: CPT | Performed by: EMERGENCY MEDICINE

## 2024-10-01 PROCEDURE — 80053 COMPREHEN METABOLIC PANEL: CPT | Performed by: EMERGENCY MEDICINE

## 2024-10-01 PROCEDURE — 250N000009 HC RX 250: Performed by: EMERGENCY MEDICINE

## 2024-10-01 PROCEDURE — 84484 ASSAY OF TROPONIN QUANT: CPT | Performed by: EMERGENCY MEDICINE

## 2024-10-01 PROCEDURE — 85004 AUTOMATED DIFF WBC COUNT: CPT | Performed by: EMERGENCY MEDICINE

## 2024-10-01 PROCEDURE — 82805 BLOOD GASES W/O2 SATURATION: CPT | Performed by: EMERGENCY MEDICINE

## 2024-10-01 PROCEDURE — 99285 EMERGENCY DEPT VISIT HI MDM: CPT | Mod: 25 | Performed by: EMERGENCY MEDICINE

## 2024-10-01 PROCEDURE — 71275 CT ANGIOGRAPHY CHEST: CPT

## 2024-10-01 PROCEDURE — 99285 EMERGENCY DEPT VISIT HI MDM: CPT | Performed by: EMERGENCY MEDICINE

## 2024-10-01 PROCEDURE — 87637 SARSCOV2&INF A&B&RSV AMP PRB: CPT | Performed by: EMERGENCY MEDICINE

## 2024-10-01 PROCEDURE — 85379 FIBRIN DEGRADATION QUANT: CPT | Performed by: EMERGENCY MEDICINE

## 2024-10-01 RX ORDER — IOPAMIDOL 755 MG/ML
91 INJECTION, SOLUTION INTRAVASCULAR ONCE
Status: COMPLETED | OUTPATIENT
Start: 2024-10-01 | End: 2024-10-01

## 2024-10-01 RX ADMIN — IOPAMIDOL 91 ML: 755 INJECTION, SOLUTION INTRAVENOUS at 19:48

## 2024-10-01 RX ADMIN — SODIUM CHLORIDE 100 ML: 9 INJECTION, SOLUTION INTRAVENOUS at 19:48

## 2024-10-01 ASSESSMENT — ACTIVITIES OF DAILY LIVING (ADL)
ADLS_ACUITY_SCORE: 38

## 2024-10-01 ASSESSMENT — COLUMBIA-SUICIDE SEVERITY RATING SCALE - C-SSRS
1. IN THE PAST MONTH, HAVE YOU WISHED YOU WERE DEAD OR WISHED YOU COULD GO TO SLEEP AND NOT WAKE UP?: NO
6. HAVE YOU EVER DONE ANYTHING, STARTED TO DO ANYTHING, OR PREPARED TO DO ANYTHING TO END YOUR LIFE?: NO
2. HAVE YOU ACTUALLY HAD ANY THOUGHTS OF KILLING YOURSELF IN THE PAST MONTH?: NO

## 2024-10-01 NOTE — ED TRIAGE NOTES
Shortness of breath and non-productive cough for the past few weeks, chest pain for the past week, and chest pain that radiates for right shoulder that started today.     Triage Assessment (Adult)       Row Name 10/01/24 6815          Triage Assessment    Airway WDL WDL        Respiratory WDL    Respiratory WDL X;rhythm/pattern;cough     Rhythm/Pattern, Respiratory shortness of breath     Cough Frequency frequent     Cough Type dry;nonproductive        Skin Circulation/Temperature WDL    Skin Circulation/Temperature WDL WDL        Cardiac WDL    Cardiac WDL X;chest pain        Chest Pain Assessment    Chest Pain Location midsternal     Chest Pain Radiation shoulder  right

## 2024-10-02 ENCOUNTER — HOSPITAL ENCOUNTER (INPATIENT)
Facility: CLINIC | Age: 64
LOS: 2 days | Discharge: HOME | End: 2024-10-04
Attending: HOSPITALIST | Admitting: HOSPITALIST
Payer: COMMERCIAL

## 2024-10-02 DIAGNOSIS — E11.22 TYPE 2 DIABETES MELLITUS WITH CHRONIC KIDNEY DISEASE, WITH LONG-TERM CURRENT USE OF INSULIN, UNSPECIFIED CKD STAGE (H): Primary | ICD-10-CM

## 2024-10-02 DIAGNOSIS — Z79.4 TYPE 2 DIABETES MELLITUS WITH CHRONIC KIDNEY DISEASE, WITH LONG-TERM CURRENT USE OF INSULIN, UNSPECIFIED CKD STAGE (H): Primary | ICD-10-CM

## 2024-10-02 DIAGNOSIS — J98.4 PNEUMONITIS: ICD-10-CM

## 2024-10-02 DIAGNOSIS — I44.30 AV HEART BLOCK: ICD-10-CM

## 2024-10-02 DIAGNOSIS — Z95.0 CARDIAC PACEMAKER IN SITU: ICD-10-CM

## 2024-10-02 LAB
ANION GAP SERPL CALCULATED.3IONS-SCNC: 11 MMOL/L (ref 7–15)
BASOPHILS # BLD AUTO: 0.1 10E3/UL (ref 0–0.2)
BASOPHILS NFR BLD AUTO: 1 %
BUN SERPL-MCNC: 14.3 MG/DL (ref 8–23)
CALCIUM SERPL-MCNC: 9.7 MG/DL (ref 8.8–10.4)
CHLORIDE SERPL-SCNC: 105 MMOL/L (ref 98–107)
CREAT SERPL-MCNC: 0.72 MG/DL (ref 0.51–0.95)
EGFRCR SERPLBLD CKD-EPI 2021: >90 ML/MIN/1.73M2
EOSINOPHIL # BLD AUTO: 0.4 10E3/UL (ref 0–0.7)
EOSINOPHIL NFR BLD AUTO: 5 %
ERYTHROCYTE [DISTWIDTH] IN BLOOD BY AUTOMATED COUNT: 13.6 % (ref 10–15)
GLUCOSE BLDC GLUCOMTR-MCNC: 153 MG/DL (ref 70–99)
GLUCOSE BLDC GLUCOMTR-MCNC: 168 MG/DL (ref 70–99)
GLUCOSE BLDC GLUCOMTR-MCNC: 187 MG/DL (ref 70–99)
GLUCOSE BLDC GLUCOMTR-MCNC: 196 MG/DL (ref 70–99)
GLUCOSE BLDC GLUCOMTR-MCNC: 290 MG/DL (ref 70–99)
GLUCOSE BLDC GLUCOMTR-MCNC: 342 MG/DL (ref 70–99)
GLUCOSE BLDC GLUCOMTR-MCNC: 347 MG/DL (ref 70–99)
GLUCOSE BLDC GLUCOMTR-MCNC: 358 MG/DL (ref 70–99)
GLUCOSE SERPL-MCNC: 184 MG/DL (ref 70–99)
HCO3 SERPL-SCNC: 26 MMOL/L (ref 22–29)
HCT VFR BLD AUTO: 38.5 % (ref 35–47)
HGB BLD-MCNC: 12.4 G/DL (ref 11.7–15.7)
IMM GRANULOCYTES # BLD: 0 10E3/UL
IMM GRANULOCYTES NFR BLD: 0 %
LYMPHOCYTES # BLD AUTO: 1.1 10E3/UL (ref 0.8–5.3)
LYMPHOCYTES NFR BLD AUTO: 13 %
MAGNESIUM SERPL-MCNC: 2.1 MG/DL (ref 1.7–2.3)
MCH RBC QN AUTO: 28.5 PG (ref 26.5–33)
MCHC RBC AUTO-ENTMCNC: 32.2 G/DL (ref 31.5–36.5)
MCV RBC AUTO: 89 FL (ref 78–100)
MONOCYTES # BLD AUTO: 0.6 10E3/UL (ref 0–1.3)
MONOCYTES NFR BLD AUTO: 7 %
NEUTROPHILS # BLD AUTO: 6.2 10E3/UL (ref 1.6–8.3)
NEUTROPHILS NFR BLD AUTO: 75 %
NRBC # BLD AUTO: 0 10E3/UL
NRBC BLD AUTO-RTO: 0 /100
PLATELET # BLD AUTO: 278 10E3/UL (ref 150–450)
POTASSIUM SERPL-SCNC: 4.4 MMOL/L (ref 3.4–5.3)
RBC # BLD AUTO: 4.35 10E6/UL (ref 3.8–5.2)
SODIUM SERPL-SCNC: 142 MMOL/L (ref 135–145)
TSH SERPL DL<=0.005 MIU/L-ACNC: 1.96 UIU/ML (ref 0.3–4.2)
WBC # BLD AUTO: 8.2 10E3/UL (ref 4–11)

## 2024-10-02 PROCEDURE — 210N000001 HC R&B IMCU HEART CARE

## 2024-10-02 PROCEDURE — 250N000013 HC RX MED GY IP 250 OP 250 PS 637: Performed by: NURSE PRACTITIONER

## 2024-10-02 PROCEDURE — 84443 ASSAY THYROID STIM HORMONE: CPT | Performed by: HOSPITALIST

## 2024-10-02 PROCEDURE — 80048 BASIC METABOLIC PNL TOTAL CA: CPT | Performed by: HOSPITALIST

## 2024-10-02 PROCEDURE — 36415 COLL VENOUS BLD VENIPUNCTURE: CPT | Performed by: HOSPITALIST

## 2024-10-02 PROCEDURE — 82962 GLUCOSE BLOOD TEST: CPT

## 2024-10-02 PROCEDURE — 94640 AIRWAY INHALATION TREATMENT: CPT

## 2024-10-02 PROCEDURE — 99255 IP/OBS CONSLTJ NEW/EST HI 80: CPT | Mod: FS | Performed by: NURSE PRACTITIONER

## 2024-10-02 PROCEDURE — 99223 1ST HOSP IP/OBS HIGH 75: CPT | Performed by: HOSPITALIST

## 2024-10-02 PROCEDURE — 93005 ELECTROCARDIOGRAM TRACING: CPT

## 2024-10-02 PROCEDURE — 85004 AUTOMATED DIFF WBC COUNT: CPT | Performed by: HOSPITALIST

## 2024-10-02 PROCEDURE — 250N000009 HC RX 250: Performed by: HOSPITALIST

## 2024-10-02 PROCEDURE — 250N000012 HC RX MED GY IP 250 OP 636 PS 637: Performed by: HOSPITALIST

## 2024-10-02 PROCEDURE — 250N000011 HC RX IP 250 OP 636: Performed by: INTERNAL MEDICINE

## 2024-10-02 PROCEDURE — 94640 AIRWAY INHALATION TREATMENT: CPT | Mod: 76

## 2024-10-02 PROCEDURE — 83735 ASSAY OF MAGNESIUM: CPT | Performed by: HOSPITALIST

## 2024-10-02 PROCEDURE — 93010 ELECTROCARDIOGRAM REPORT: CPT | Performed by: INTERNAL MEDICINE

## 2024-10-02 PROCEDURE — 999N000157 HC STATISTIC RCP TIME EA 10 MIN

## 2024-10-02 PROCEDURE — 250N000011 HC RX IP 250 OP 636: Performed by: HOSPITALIST

## 2024-10-02 RX ORDER — ROSUVASTATIN CALCIUM 10 MG/1
10 TABLET, COATED ORAL EVERY EVENING
COMMUNITY

## 2024-10-02 RX ORDER — GUAIFENESIN/DEXTROMETHORPHAN 100-10MG/5
10 SYRUP ORAL EVERY 4 HOURS PRN
Status: DISCONTINUED | OUTPATIENT
Start: 2024-10-02 | End: 2024-10-04 | Stop reason: HOSPADM

## 2024-10-02 RX ORDER — DOXYCYCLINE 100 MG/10ML
100 INJECTION, POWDER, LYOPHILIZED, FOR SOLUTION INTRAVENOUS EVERY 12 HOURS
Status: DISCONTINUED | OUTPATIENT
Start: 2024-10-02 | End: 2024-10-04 | Stop reason: HOSPADM

## 2024-10-02 RX ORDER — DEXTROSE MONOHYDRATE 25 G/50ML
25-50 INJECTION, SOLUTION INTRAVENOUS
Status: DISCONTINUED | OUTPATIENT
Start: 2024-10-02 | End: 2024-10-02

## 2024-10-02 RX ORDER — NICOTINE POLACRILEX 4 MG
15-30 LOZENGE BUCCAL
Status: DISCONTINUED | OUTPATIENT
Start: 2024-10-02 | End: 2024-10-02

## 2024-10-02 RX ORDER — PROCHLORPERAZINE MALEATE 5 MG/1
5 TABLET ORAL EVERY 6 HOURS PRN
Status: DISCONTINUED | OUTPATIENT
Start: 2024-10-02 | End: 2024-10-04 | Stop reason: HOSPADM

## 2024-10-02 RX ORDER — LIDOCAINE 40 MG/G
CREAM TOPICAL
Status: DISCONTINUED | OUTPATIENT
Start: 2024-10-02 | End: 2024-10-04 | Stop reason: HOSPADM

## 2024-10-02 RX ORDER — PROCHLORPERAZINE 25 MG
25 SUPPOSITORY, RECTAL RECTAL EVERY 12 HOURS PRN
Status: DISCONTINUED | OUTPATIENT
Start: 2024-10-02 | End: 2024-10-04 | Stop reason: HOSPADM

## 2024-10-02 RX ORDER — ACETAMINOPHEN 325 MG/1
650 TABLET ORAL EVERY 4 HOURS PRN
Status: DISCONTINUED | OUTPATIENT
Start: 2024-10-02 | End: 2024-10-04 | Stop reason: HOSPADM

## 2024-10-02 RX ORDER — GUAIFENESIN/DEXTROMETHORPHAN 100-10MG/5
10 SYRUP ORAL EVERY 4 HOURS PRN
Status: DISCONTINUED | OUTPATIENT
Start: 2024-10-02 | End: 2024-10-02

## 2024-10-02 RX ORDER — CEFTRIAXONE 2 G/1
2 INJECTION, POWDER, FOR SOLUTION INTRAMUSCULAR; INTRAVENOUS EVERY 24 HOURS
Status: DISCONTINUED | OUTPATIENT
Start: 2024-10-02 | End: 2024-10-04 | Stop reason: HOSPADM

## 2024-10-02 RX ORDER — ACETAMINOPHEN 650 MG/1
650 SUPPOSITORY RECTAL EVERY 4 HOURS PRN
Status: DISCONTINUED | OUTPATIENT
Start: 2024-10-02 | End: 2024-10-04 | Stop reason: HOSPADM

## 2024-10-02 RX ORDER — CALCIUM CARBONATE 500 MG/1
1000 TABLET, CHEWABLE ORAL 4 TIMES DAILY PRN
Status: DISCONTINUED | OUTPATIENT
Start: 2024-10-02 | End: 2024-10-04 | Stop reason: HOSPADM

## 2024-10-02 RX ORDER — AMOXICILLIN 250 MG
2 CAPSULE ORAL 2 TIMES DAILY PRN
Status: DISCONTINUED | OUTPATIENT
Start: 2024-10-02 | End: 2024-10-04 | Stop reason: HOSPADM

## 2024-10-02 RX ORDER — ONDANSETRON 2 MG/ML
4 INJECTION INTRAMUSCULAR; INTRAVENOUS EVERY 6 HOURS PRN
Status: DISCONTINUED | OUTPATIENT
Start: 2024-10-02 | End: 2024-10-04 | Stop reason: HOSPADM

## 2024-10-02 RX ORDER — LOSARTAN POTASSIUM 100 MG/1
100 TABLET ORAL EVERY MORNING
Status: DISCONTINUED | OUTPATIENT
Start: 2024-10-02 | End: 2024-10-04 | Stop reason: HOSPADM

## 2024-10-02 RX ORDER — NICOTINE POLACRILEX 4 MG
15-30 LOZENGE BUCCAL
Status: DISCONTINUED | OUTPATIENT
Start: 2024-10-02 | End: 2024-10-04 | Stop reason: HOSPADM

## 2024-10-02 RX ORDER — ONDANSETRON 4 MG/1
4 TABLET, ORALLY DISINTEGRATING ORAL EVERY 6 HOURS PRN
Status: DISCONTINUED | OUTPATIENT
Start: 2024-10-02 | End: 2024-10-04 | Stop reason: HOSPADM

## 2024-10-02 RX ORDER — LIDOCAINE 40 MG/G
CREAM TOPICAL
Status: DISCONTINUED | OUTPATIENT
Start: 2024-10-02 | End: 2024-10-02

## 2024-10-02 RX ORDER — HYDRALAZINE HYDROCHLORIDE 20 MG/ML
10 INJECTION INTRAMUSCULAR; INTRAVENOUS EVERY 4 HOURS PRN
Status: DISCONTINUED | OUTPATIENT
Start: 2024-10-02 | End: 2024-10-04 | Stop reason: HOSPADM

## 2024-10-02 RX ORDER — IPRATROPIUM BROMIDE AND ALBUTEROL SULFATE 2.5; .5 MG/3ML; MG/3ML
3 SOLUTION RESPIRATORY (INHALATION)
Status: DISCONTINUED | OUTPATIENT
Start: 2024-10-02 | End: 2024-10-02

## 2024-10-02 RX ORDER — DEXTROSE MONOHYDRATE 25 G/50ML
25-50 INJECTION, SOLUTION INTRAVENOUS
Status: DISCONTINUED | OUTPATIENT
Start: 2024-10-02 | End: 2024-10-04 | Stop reason: HOSPADM

## 2024-10-02 RX ORDER — ALBUTEROL SULFATE 0.83 MG/ML
3 SOLUTION RESPIRATORY (INHALATION)
Status: DISCONTINUED | OUTPATIENT
Start: 2024-10-02 | End: 2024-10-03

## 2024-10-02 RX ORDER — AMOXICILLIN 250 MG
1 CAPSULE ORAL 2 TIMES DAILY PRN
Status: DISCONTINUED | OUTPATIENT
Start: 2024-10-02 | End: 2024-10-04 | Stop reason: HOSPADM

## 2024-10-02 RX ADMIN — INSULIN ASPART 5 UNITS: 100 INJECTION, SOLUTION INTRAVENOUS; SUBCUTANEOUS at 18:11

## 2024-10-02 RX ADMIN — HYDRALAZINE HYDROCHLORIDE 10 MG: 20 INJECTION INTRAMUSCULAR; INTRAVENOUS at 18:18

## 2024-10-02 RX ADMIN — DOXYCYCLINE 100 MG: 100 INJECTION, POWDER, LYOPHILIZED, FOR SOLUTION INTRAVENOUS at 14:54

## 2024-10-02 RX ADMIN — INSULIN ASPART 4 UNITS: 100 INJECTION, SOLUTION INTRAVENOUS; SUBCUTANEOUS at 21:32

## 2024-10-02 RX ADMIN — DOXYCYCLINE 100 MG: 100 INJECTION, POWDER, LYOPHILIZED, FOR SOLUTION INTRAVENOUS at 03:05

## 2024-10-02 RX ADMIN — INSULIN ASPART 1 UNITS: 100 INJECTION, SOLUTION INTRAVENOUS; SUBCUTANEOUS at 11:33

## 2024-10-02 RX ADMIN — IPRATROPIUM BROMIDE AND ALBUTEROL SULFATE 3 ML: .5; 3 SOLUTION RESPIRATORY (INHALATION) at 07:34

## 2024-10-02 RX ADMIN — PROCHLORPERAZINE EDISYLATE 5 MG: 5 INJECTION INTRAMUSCULAR; INTRAVENOUS at 21:32

## 2024-10-02 RX ADMIN — CEFTRIAXONE SODIUM 2 G: 2 INJECTION, POWDER, FOR SOLUTION INTRAMUSCULAR; INTRAVENOUS at 02:34

## 2024-10-02 RX ADMIN — IPRATROPIUM BROMIDE AND ALBUTEROL SULFATE 3 ML: .5; 3 SOLUTION RESPIRATORY (INHALATION) at 11:18

## 2024-10-02 RX ADMIN — LOSARTAN POTASSIUM 100 MG: 100 TABLET, FILM COATED ORAL at 11:57

## 2024-10-02 ASSESSMENT — ACTIVITIES OF DAILY LIVING (ADL)
CHANGE_IN_FUNCTIONAL_STATUS_SINCE_ONSET_OF_CURRENT_ILLNESS/INJURY: NO
WEAR_GLASSES_OR_BLIND: YES
ADLS_ACUITY_SCORE: 31
FALL_HISTORY_WITHIN_LAST_SIX_MONTHS: NO
ADLS_ACUITY_SCORE: 29
ADLS_ACUITY_SCORE: 29
CONCENTRATING,_REMEMBERING_OR_MAKING_DECISIONS_DIFFICULTY: YES
WALKING_OR_CLIMBING_STAIRS_DIFFICULTY: NO
ADLS_ACUITY_SCORE: 29
DEPENDENT_IADLS:: INDEPENDENT
ADLS_ACUITY_SCORE: 31
DRESSING/BATHING: DRESSING DIFFICULTY, DEPENDENT
ADLS_ACUITY_SCORE: 31
ADLS_ACUITY_SCORE: 30
ADLS_ACUITY_SCORE: 29
ADLS_ACUITY_SCORE: 30
ADLS_ACUITY_SCORE: 30
TOILETING_ISSUES: NO
ADLS_ACUITY_SCORE: 38
ADLS_ACUITY_SCORE: 29
ADLS_ACUITY_SCORE: 31
ADLS_ACUITY_SCORE: 28
DOING_ERRANDS_INDEPENDENTLY_DIFFICULTY: NO
ADLS_ACUITY_SCORE: 30
ADLS_ACUITY_SCORE: 30
DRESSING/BATHING_DIFFICULTY: NO
ADLS_ACUITY_SCORE: 31
ADLS_ACUITY_SCORE: 30
ADLS_ACUITY_SCORE: 30
ADLS_ACUITY_SCORE: 29
ADLS_ACUITY_SCORE: 31
DIFFICULTY_EATING/SWALLOWING: NO
DIFFICULTY_COMMUNICATING: NO
ADLS_ACUITY_SCORE: 31
HEARING_DIFFICULTY_OR_DEAF: NO

## 2024-10-02 NOTE — PROGRESS NOTES
[facility] RCAT Note    Date:10/2/2024  Admission Diagnosis: Bradycardia, pneumonitis   Pulmonary History: Asthma  Home Nebulizer/ MDI Use: Advair  Home Oxygen Use: None  Acuity Level (from RT Assessment flow sheet): 5    Aerosol Therapy Initiated: Duoneb Q4 WA discontinued per RCAT protocol      Current Oxygen Requirement: RA  Current SpO2: 95%      See 'RT Assessments' flow sheet for patient assessment scoring and Acuity Level Details.

## 2024-10-02 NOTE — PROVIDER NOTIFICATION
MD Notification    Notified Person: MD    Notified Person Name:Dr. Dee    Notification Date/Time:10/2/24 0416    Notification Interaction:vocera    Purpose of Notification:: Pt is hypertensive, no PRN available. Thanks    Orders Received:    Comments:

## 2024-10-02 NOTE — H&P
[facility]    History and Physical  Hospitalist       Date of Admission:  (Not on file)  Date of Service (when I saw the patient): 10/02/24    ASSESSMENT  Bria Davis is a markedly pleasant 64 year old woman with past medical history that is most notable for severe morbid obesity, Type 2 Diabetes mellitus, hypertension, breast cancer, and asthma; who presents with progressive exertional dyspnea and acute cough, and is found to have symptomatic bradycardia and suspected acute right lower lobe pneumonia.    PLAN     Symptomatic bradycardia: Of note, Ms. Davis has never smoked though she reports a history of second hand exposure as a child. She has hypertension, and takes Toprol among other medications for that. She has a history of asthma. She was seen by [facility] in 2019 after undergoing a stress test that reportedly showed a rate related LBBB. Cardiac catheterization was done revealing EF 55% and minimal non-obstructive CAD.    Now, she presents for evaluation of progressive exertional dyspnea and acute cough. In the ED, she has bradycardia, without hypoxia, fever, or hypotension. CBC and CMP are normal and Troponin T levels are not elevated. EKG shows bradycardia with rate 45, RBBB, LAFB, and possible AV dissociation. COVID, Influenza, and RSV tests are negative. D-Dimer is mildly elevated. CTPA shows no PE: it does reveal mild to moderate infiltrate seen in the basilar aspect of the right lower lobe, worrisome for pneumonitis. There is also some thickening involving numerous right lower lobe bronchi with some intermittent opacification. The central airways are patent. Mild mosaic perfusion of both lungs consistent with air trapping is also described.    Overall, her symptoms seem consistent with possible high grade heart block, or other symptomatic arrhythmia such as new onset afib, perhaps causing acute diastolic or systolic CHF exacerbation. It is found in the context of  acute pneumonitis, as discussed below, though her symptoms of dyspnea and light headedness precede onset of her cough.       -- IMC. NPO. Telemetry. TTE ordered. Toprol held. Cardiology consulted.     -- TSH ordered. Maintain K above 4 and mag above 2.    -- Repeat CBC and BMP in AM. SW consulted for disposition planning.    Acute right lower lobe pneumonia vs pneumonitis: Without wheezing to suggest acute asthma exacerbation though she could be at risk of developing such an exacerbation.    -- Empiric antibiotics ordered: Ceftriaxone and Doxycycline. Follow up sputum cultures.     -- Duonebs scheduled q4 hours, Albuterol nebs every 2 hours as needed. Resume home Advair and other inhalers when verified. Monitor oxygenation. Aggressive IS. Robitussin ordered as needed for cough.     Type 2 Diabetes mellitus:   Recent Labs   Lab Test 09/18/24  0731   A1C 8.3*   . On Toujeo, Aspart, Jardiance as an outpatient.    -- Noting that while in the ED, she had an episode of hypoglycemia to FS 67.    -- ISS insulin while hospitalized. Hold oral anti-diabetic medications. Resume home insulin when verified, perhaps at lower doses to prevent recurrent hypoglycemia.    Hypertension: Resume home Cozaar, hydrochlorothiazide when verified. Hold Toprol as above for bradycardia    Hyperlipidemia: Resume home Rosuvastatin when verified    History of breast cancer: This is left breast DCIS, diagnosed in 2023 and treated with lumpectomy, bilateral breast reduction surgery, and XRT. She is now on Anastrazole.Resume when verified/at discharge    Chronic iron deficiency anemia: Monitor while hospitalized. Resume oral iron when verified  Recent Labs   Lab Test 10/01/24  1911 10/21/23  0547 10/20/23  1308   HGB 13.3 12.9 13.3     Fibromyalgia: Followed by Neurology, on gabapentin; resume when verified    Severe morbid obesity: Now on Ozempic. Noted  Estimated body mass index is 42.96 kg/m  as calculated from the following:    Height as of this  "encounter: 1.6 m (5' 3\").    Weight as of this encounter: 110 kg (242 lb 8 oz).    I have spent 75 minutes on the date of service doing chart review, history, examination, documentation, and further activities per the note.    Chief Complaint   Dyspnea    History is obtained from the patient and the ED electronic medical record    History of Present Illness   Bria Davis is a markedly pleasant 64 year old woman who presents with dyspnea. 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 She initially attributed it to having switched her asthma controlling inhaler for insurance coverage reasons, but the dyspnea has progressed since onset and improves, but never really resolves, with rest. It will also be exacerbated by laying on either side. She has noticed waking up at times gasping for air. She has begun to feel dizzy and light headed with such exertion as well. More recently, she has developed about a week of non-productive cough associated with diffuse chest heaviness. She notes at least two family members who have recently had pneumonia. She has recently started Jardiance for control of Diabetes. She came to the [facility] ED this evening for further evaluation. She otherwise denies palpitations, leg swelling, nausea, abdominal pain, or any other acute complaints.    In the ED,   BP (!) 151/78  Pulse (!) 47  Temp 97.5  F (36.4  C) (Tympanic)  Resp 16  LMP 01/14/2016 (Approximate)  SpO2 98%     CBC and CMP were within the normal reference range. WBC was 7.9. Glucose was 118. VBG showed 7.41/44. Troponin T levels were 10 and then 10. EKG showed bradycardia with rate 45, RBBB, LAFB, and possible AV dissociation. D-Dimer was 0.60. COVID, Influenza, and RSV tests were negative.    She was transferred here for further evaluation.    Recent Results (from the past 24 hour(s))   CT Chest Pulmonary Embolism w " "Contrast    Narrative    EXAM: CT CHEST PULMONARY EMBOLISM W CONTRAST  LOCATION: [facility]  DATE: 10/1/2024    INDICATION: Cancer, not on blood thinners, right-sided chest pain and shortness of breath.  COMPARISON: CT 1/23/2023  TECHNIQUE: CT chest pulmonary angiogram during arterial phase injection of IV contrast. Multiplanar reformats and MIP reconstructions were performed. Dose reduction techniques were used.   CONTRAST: 91 mL Isovue 370    FINDINGS:  ANGIOGRAM CHEST: Pulmonary arteries are normal caliber and negative for pulmonary emboli. Thoracic aorta is negative for dissection. No CT evidence of right heart strain.    LUNGS AND PLEURA: There is mild to moderate infiltrate seen in the basilar aspect of the right lower lobe worrisome for pneumonitis. Changes of aspiration pneumonitis could have this distribution. There is some thickening involving numerous right lower   lobe bronchi with some intermittent opacification. The central airways are patent. Mild mosaic perfusion of both lungs most typical for air trapping commonly seen with small airway inflammation.    MEDIASTINUM/AXILLAE: Benign calcified subcarinal lymph nodes.    CORONARY ARTERY CALCIFICATION: Mild.    UPPER ABDOMEN: Benign splenic granulomas. Benign cysts in both kidneys.    MUSCULOSKELETAL: Advanced scattered hypertrophic changes in the spine.      Impression    IMPRESSION:  1.  Images worrisome for right lower lobe pneumonitis with some bronchiolitis.    2.  No PE or dissection.       PHYSICAL EXAM  Blood pressure (!) 153/68, pulse (!) 43, temperature 98.7  F (37.1  C), temperature source Oral, resp. rate 19, height 1.6 m (5' 3\"), weight 110 kg (242 lb 8 oz), last menstrual period 01/14/2016, SpO2 93%, not currently breastfeeding.  Constitutional: Alert and oriented to person, place and time; no apparent distress  Respiratory: lungs have bi-basilar crackles to auscultation bilaterally without " wheezes  Cardiovascular: Irregular and bradycardic S1 S2  GI: abdomen soft non tender non distended bowel sounds positive  Musculoskeletal: moderate to severe bilateral LE edema  Neurologic: extra-ocular muscles intact; moves all four extremities  Psychiatric: appropriate affect, insight and judgment     DVT Prophylaxis: Pneumatic Compression Devices  Code Status: Full Code    Medically Ready for Discharge: Anticipated in 2-4 Days       Dalton Dee MD, MD    Past Medical History    I have reviewed this patient's medical history and updated it with pertinent information if needed.   Past Medical History:   Diagnosis Date    Arthritis     Asthma     CKD (chronic kidney disease) stage 2, GFR 60-89 ml/min     Ductal carcinoma in situ (DCIS) of left breast     Esophageal reflux     Fibromyalgia     Hypertension     LBBB (left bundle branch block) 2019    rate related LBBB seen on stress test    Other chronic sinusitis     PONV (postoperative nausea and vomiting)     Restless legs syndrome (RLS)     Type 2 diabetes mellitus (H)        Past Surgical History   I have reviewed this patient's surgical history and updated it with pertinent information if needed.  Past Surgical History:   Procedure Laterality Date    BIOPSY  9/7/2023    BIOPSY BREAST NEEDLE LOCALIZATION Left 10/6/2023    Procedure: BIOPSY, LEFT BREAST, WITH NEEDLE LOCALIZATION;  Surgeon: Bg Bryant MD;  Location: [facility]    COLONOSCOPY  01/31/2002    COLONOSCOPY  10/26/2012    Procedure: COLONOSCOPY;  Colonoscopy;  Surgeon: Margret Sales MD;  Location: [facility]    COLONOSCOPY N/A 11/08/2019    Procedure: COLONOSCOPY, WITH POLYPECTOMY AND BIOPSY;  Surgeon: Ariel Heck MD;  Location: [facility]    CV LEFT HEART CATH N/A 09/12/2019    Procedure: Left Heart Cath;  Surgeon: Mike Sanon MD;  Location: [facility] CARDIAC CATH LAB    CV LEFT VENTRICULOGRAM N/A 09/12/2019    Procedure: Left Ventriculogram;  Surgeon: Mike Sanon MD;   Location:  HEART CARDIAC CATH LAB    ENT SURGERY      ESOPHAGOSCOPY, GASTROSCOPY, DUODENOSCOPY (EGD), COMBINED N/A 11/08/2019    Procedure: ESOPHAGOGASTRODUODENOSCOPY, WITH BIOPSY;  Surgeon: Ariel Heck MD;  Location: [facility]    EYE SURGERY  2022    GYN SURGERY      Hydroablation 2007, polyp removal 2018    INJECT EPIDURAL LUMBAR  12/07/2010    INJECT EPIDURAL LUMBAR performed by GENERIC ANESTHESIA PROVIDER at [facility] OR    INJECT EPIDURAL LUMBAR  01/17/2011    INJECT EPIDURAL LUMBAR performed by GENERIC ANESTHESIA PROVIDER at [facility] OR    IRRIGATION AND DEBRIDEMENT BREAST Left 10/21/2023    Procedure: Irrigation and debridement breast;  Surgeon: Nemesio Arreola MD;  Location: [facility] OR    left long finger pulley release Left 07/2020    LUMPECTOMY BREAST Left 11/13/2023    Procedure: Re-excision of left lumpectomy site;  Surgeon: Arron Olson MD;  Location: [facility] OR    MAMMOPLASTY REDUCTION BILATERAL Bilateral 12/7/2023    Procedure: Left breast reconstruction with local flaps and right breast reduction for symmetry;  Surgeon: DELORIS Rodriguez MD;  Location: [facility] OR    RELEASE CARPAL TUNNEL  06/19/2012    Procedure: RELEASE CARPAL TUNNEL;  Right Carpal Tunnel Release;  Surgeon: Mike Sparrow MD;  Location: [facility]    RELEASE CARPAL TUNNEL  11/09/2012    Procedure: RELEASE CARPAL TUNNEL;  Left Carpal Tunnel Release;  Surgeon: Mike Sparrow MD;  Location: [facility]    REPAIR TENDON ACHILLES Left 12/26/2019    Procedure: Left Foot: Achilles Tendon Repair/Remodel/Reattachment; calcaneal prominence removal;  Surgeon: Felix Watkins DPM;  Location: [facility]    SURGICAL HISTORY OF -   04/10/2000    bilateral total ethmoidectomies, bilateral maxillary antrostomies, bilateral SMR of inferior turbinates, reduction of lt turbinate porter bullosa       Prior to Admission Medications   Cannot display prior to admission medications because the patient has not been admitted in this contact.     Allergies   Allergies    Allergen Reactions    Bactrim [Sulfamethoxazole-Trimethoprim] Rash     Full body rash    Oxycodone Itching     Facial itching     Lisinopril Cough       Social History   I have reviewed this patient's social history and updated it with pertinent information if needed. Bria Davis  reports that she has never smoked. She has never used smokeless tobacco. She reports that she does not currently use alcohol. She reports that she does not use drugs.    Family History   Family history assessed and, except as above, is non-contributory.    Family History   Problem Relation Age of Onset    Hypertension Mother     Diabetes Mother     Respiratory Mother         asthma-COPD    Hypertension Father     Heart Disease Father     Cerebrovascular Disease Father     Cardiovascular Father     Depression Sister     Respiratory Sister         copd    Chronic Obstructive Pulmonary Disease Sister     Depression Sister     Chronic Obstructive Pulmonary Disease Sister     Anxiety Disorder Sister     Depression Sister     Cancer Brother     Diabetes Brother     Respiratory Brother         copd    Chronic Obstructive Pulmonary Disease Brother     Chronic Obstructive Pulmonary Disease Brother     Kidney failure Brother     Depression Brother     Asthma Brother     Breast Cancer Maternal Grandmother     Diabetes Son     Anxiety Disorder Other     Obesity Other     Obesity Other     Anesthesia Reaction No family hx of     Clotting Disorder No family hx of        Review of Systems   The 10 point Review of Systems is negative other than noted in the HPI or here.     Primary Care Physician   Kaia Elena    Data   Labs Ordered and Resulted from Time of ED Arrival to Time of ED Departure - No data to display    Data reviewed today:  I personally reviewed the EKG tracing showing bradycardia with rate 45, RBBB, LAFB, and possible AV dissociation and the chest CT image(s) showing right lower lobe opacification .

## 2024-10-02 NOTE — Clinical Note
Hemodynamic equipment used: 5 lead ECG, ApertioK With 3 Leads, Machine BP Cuff and pulse oximeter probe.

## 2024-10-02 NOTE — PLAN OF CARE
Problem: Adult Inpatient Plan of Care  Goal: Plan of Care Review  Description: The Plan of Care Review/Shift note should be completed every shift.  The Outcome Evaluation is a brief statement about your assessment that the patient is improving, declining, or no change.  This information will be displayed automatically on your shift  note.  Flowsheets (Taken 10/2/2024 6493)  Outcome Evaluation: CC will continue to follow for discharge  Plan of Care Reviewed With:   patient   spouse   Goal Outcome Evaluation:      Plan of Care Reviewed With: patient, spouse      No further care management intervention anticipated at this time.  Please re-consult if further needs arise.  Care management signing off.

## 2024-10-02 NOTE — PROVIDER NOTIFICATION
MD Notification    Notified Person: MD    Notified Person Name: Leila    Notification Date/Time:10/2/24 0201    Notification Interaction:vocera    Purpose of Notification:I am just concerned of letting the pt being in the shower alone with the heart block. Did you want us to hold off on the shower tonight? Thanks    Orders Received:    Comments: orders received to postpone the shower

## 2024-10-02 NOTE — PHARMACY-ADMISSION MEDICATION HISTORY
Pharmacist Admission Medication History    Admission medication history is complete. The information provided in this note is only as accurate as the sources available at the time of the update.    Information Source(s): Patient and CareEverywhere/SureScripts via in-person    Pertinent Information: None    Changes made to PTA medication list:  Added: None  Deleted: None  Changed: Novolog 32 units with breakfast, lunch, dinner --> ~100 and a small meal take 100 units, If  take 26 units, If BG >200 take 32 units     Allergies reviewed with patient and updates made in EHR: yes    Medication History Completed By: Beverly Sosa [facility] 10/2/2024 8:48 AM    PTA Med List   Medication Sig Last Dose    albuterol (PROAIR HFA/PROVENTIL HFA/VENTOLIN HFA) 108 (90 Base) MCG/ACT inhaler Inhale 2 puffs into the lungs every 6 hours as needed for shortness of breath, wheezing or cough. Unknown at prn    anastrozole (ARIMIDEX) 1 MG tablet Take 1 tablet (1 mg) by mouth daily 10/1/2024    Ascorbic Acid (VITAMIN C) 500 MG CAPS Take 1 tablet by mouth every evening 10/1/2024    aspirin (ASA) 81 MG EC tablet Take 81 mg by mouth every evening 10/1/2024    Calcium Carb-Cholecalciferol (CALCIUM-VITAMIN D) 600-400 MG-UNIT TABS Take 1 tablet by mouth every evening 10/1/2024    empagliflozin (JARDIANCE) 10 MG TABS tablet Take 1 tablet (10 mg) by mouth daily. 10/1/2024    esomeprazole (NEXIUM) 40 MG DR capsule Take 1 capsule (40 mg) by mouth every morning (before breakfast) 10/1/2024    ferrous sulfate 140 (45 Fe) MG TBCR CR tablet Take 140 mg by mouth 2 times daily 10/1/2024    fluticasone-salmeterol (ADVAIR DISKUS) 100-50 MCG/ACT inhaler INHALE 1 PUFF INTO THE LUNGS 2 TIMES DAILY 10/1/2024    FT NASAL DECONGESTANT MAX STR 30 MG tablet TAKE TWO TABLETS BY MOUTH TWICE DAILY 10/1/2024    gabapentin (NEURONTIN) 300 MG capsule Take 1 capsule (300 mg) by mouth 2 times daily 10/1/2024    hydroCHLOROthiazide 12.5 MG tablet TAKE ONE TABLET BY MOUTH  ONCE DAILY 10/1/2024    insulin aspart (NOVOLOG FLEXPEN) 100 UNIT/ML pen 32 units before breakfast, 32 units before lunch, 32 units before dinner (Patient taking differently: If BG ~100 and a small meal take 100 units, If  take 26 units, If BG >200 take 32 units) 10/1/2024    insulin glargine U-300 (TOUJEO MAX SOLOSTAR) 300 UNIT/ML (2 units dial) pen INJECT 100 UNITS UNDER THE SKIN DAILY 10/1/2024    losartan (COZAAR) 100 MG tablet Take 1 tablet (100 mg) by mouth every morning 10/1/2024    metFORMIN (GLUCOPHAGE XR) 500 MG 24 hr tablet Take 2 tablets (1,000 mg) by mouth 2 times daily (with meals). 10/1/2024    metoclopramide (REGLAN) 10 MG tablet TAKE ONE TABLET BY MOUTH TWICE DAILY 10/1/2024    metoprolol succinate ER (TOPROL XL) 25 MG 24 hr tablet Take 1 tablet (25 mg) by mouth daily 10/1/2024    montelukast (SINGULAIR) 10 MG tablet Take 1 tablet (10 mg) by mouth at bedtime. 10/1/2024    Multiple Vitamins-Minerals (MULTIVITAMIN ADULTS 50+) TABS Take 1 tablet by mouth every morning 10/1/2024    rosuvastatin (CRESTOR) 10 MG tablet Take 10 mg by mouth every evening. 10/1/2024    Semaglutide, 2 MG/DOSE, (OZEMPIC, 2 MG/DOSE,) 8 MG/3ML pen Inject 2 mg subcutaneously every 7 days. (Patient taking differently: Inject 2 mg subcutaneously every 7 days. Takes on Wednesdays) Past Week    triamcinolone (KENALOG) 0.1 % external cream Apply topically 2 times daily 10/1/2024

## 2024-10-02 NOTE — ED PROVIDER NOTES
[facility]  Emergency Department Visit Note    PATIENT:  Bria Davis     64 year old     female      [id number]    Chief complaint:  Chief Complaint   Patient presents with    Shortness of Breath    Chest Pain        History of present illness:  Patient is a 64 year old female with with a medical history significant for ongoing breast cancer, type 2 diabetes, hypertension, obesity, allergies, intermittent asthma, coronary artery disease who presents to the emergency department today with 3 weeks of persistent cough chest pain and right-sided chest discomfort that radiates into her right shoulder.  She reports that her shortness of breath and difficulty breathing started about 3 and half weeks ago.  They get much worse with movement and activity.  She knows that there is a ramp at work and she used to be able to walk up the ramp with no issues however over the past 3 weeks she needs to take a break to catch her breath at the top of the ramp.  She also has a cough.  She took a COVID test at home at home a couple days ago and that was negative.  She also has a headache.  Denies any left-sided chest pain but is experiencing some specific chest pain on the right side of her chest that radiates into her right shoulder blade.  She notes that that has been there this entire time.  That is worse with activity as well.    Review of Systems:  As in HPI above    BP (!) 151/78   Pulse (!) 47   Temp 97.5  F (36.4  C) (Tympanic)   Resp 16   LMP 01/14/2016 (Approximate)   SpO2 98%     Physical Exam  Constitutional: laying in hospital bed, in no apparent distress, and conversant  HEENT: normocephalic, atraumatic, extraocular motions intact, moist mucous membranes, uvula midline, no tonsillar exudate or erythema, tongue not elevated, no sublingual firmness or tenderness, and no rhinorrhea  Neck: able to fully range, no midline tenderness, and no stridor  Cardiovascular: bradycardic and irregular  rhythm  Pulmonary: breathing comfortably on room air and lungs clear to auscultation bilaterally  Abdominal: soft, non-tender, non-distended  Genitourinary: deferred  Extremities/MSK: no peripheral edema  Skin: warm, dry and non-diaphoretic  Neurologic: moves all four extremities spontaneously and GCS 15  Psychiatric: calm, appropriate      MDM:  Patient is a 64 year old female with above history presenting for evaluation of shortness of breath for 3 weeks.    Vitals notable for bradycardia otherwise reassuring and within normal limits, no hypoxia. Exam notable for clear lung sounds and no reproducible chest pain.    Per chart review it would appear that this is a new bradycardia/heart block.  I am suspicious that this is the cause of patient's symptoms considering she seems to be struggling with cardiac output especially in exertional settings.  However in the setting of her right sided chest pain I am also concerned about a pulmonary embolism.  She does have cancer and is not on a blood thinner.  Plan to obtain a PE study.  In addition I am concerned about an infectious etiology for her symptoms in addition to ACS.  Plan to obtain troponin, basic labs, chest imaging.  Patient is agreeable with this plan.  See remainder of ED course below.  In the setting of patient's bradycardia, likely will require admission.    Remainder of ED course below.    ED COURSE:  ED Course as of 10/01/24 2157   Tue Oct 01, 2024   1956 ECG:  - Ventricular rate 45 bpm, irregular  - NH interval variable, QRS wide, QT intervals normal  - Axis left axis  - no ST segment or T wave changes concerning for acute ischemia  - Comparison to prior ECGs: much slower, more dropped beats  - My independent interpretation: concerning for a new block, plan to repeat, this \bradycardia is new      2108 CT Chest Pulmonary Embolism w Contrast  IMPRESSION:  1.  Images worrisome for right lower lobe pneumonitis with some bronchiolitis.     2.  No PE or  dissection.     2156 Accepted at [facility]      Troponin negative.  Ultimately no laboratory etiology for her symptoms elicited.  Encounter Diagnoses:  Final diagnoses:   Symptomatic bradycardia       Final disposition: transferred to [facility]     DO ETHEL Ledezma Physician  [facility]       Ann Marie Queen DO  10/01/24 2157

## 2024-10-02 NOTE — LETTER
October 4, 2024        Bria Davis  [address] [address] [id number]    To Whom it May Concern:    Bria Davis was discharged from [facility]  on 10/4/2024 and was given the following instructions:  Patient may return to work on 10/28/2024.    Sincerely,        Kaia Portillo NP, APRN CNP

## 2024-10-02 NOTE — PROGRESS NOTES
Pt arrived from EMS and was settled, VSS ex HR 30-40s. A&Ox4, SBA , pt denied any pain. Pt was then made IMC status and was transferred and verbal report was given to TERA Balbuena.

## 2024-10-02 NOTE — TELEPHONE ENCOUNTER
[facility]  Transfer Triage Note    Date of call: 10/01/24  Time of call: 10:09 PM    Is pandemic COVID-19 a concern? NO    Reason for transfer: Further diagnostic work up, management, and consultation for specialized care   Diagnosis: Symptomatic bradycardia    Outside Records: Available  Additional records requested to be faxed to [phone].    Stability of Patient: Patient is at risk for instability, however patient requires urgent transfer and does not meet ICU criteria   ICU: No    We received a phone call through our Physician Access line from Dr. Queen at [facility] Emergency Department.  My understanding from this phone call is that Bria Davis with  1960 is a 64 year old female with history of breast cancer currently receiving radiation therapy. She presented to the ED with 2 week history of progressive worsening right-sided chest pain radiating to shoulder, dyspnea on exertion, and dry non-productive cough. Patient has had intermittent lightheadedness with ambulation. Evaluation in the ED showed HR 40s. BP appropriate. EKG with intermittent non-conducting p waves suggestive of 2nd or intermittent 3rd degree heart block. No ST changes. Troponin 10 and patient has no symptoms while at rest. Request for transfer due to bradycardia, need for EP evaluation.    Transfer Accepted? Yes    Expected Time of Arrival for Transfer: 0-8 hours    Arrival Location:  [address] [place], [address] Phone: [phone]    Recommendations for Management and Stabilization: Not needed    Devin Siddiqui MD  Med-Peds Hospitalist

## 2024-10-02 NOTE — PLAN OF CARE
A&OX4, RA, LALA, hypertensive. Tele- 2 nd degree type 2,  HR in the 40s, up with SBA. On antibiotics.  /196  Plan: NPO for cardiology consult

## 2024-10-02 NOTE — PROGRESS NOTES
Non billing PN    Patient admitted earlier with bradycardia and possible pneumonitis.  Noted plans to monitor for 24 hours and possible placement of PPM on 10/3.  Hold BB.    Continue treatment for pneumonia with ceftriaxone and doxycycline.     Noted holding jardiance and ozempic and continuing on Losartan for blood pressure.      Gama Cook MD

## 2024-10-02 NOTE — CONSULTS
[facility]  Electrophysiology Consultation     Date of Admission:  10/2/2024  Primary Cardiologist: Previously seen by  2018  Date of Consult (When I saw the patient): 10/02/24  Reason for consult: bradycardia, second     History of Present Illness   Bria Davis is a 64 year old female who has a past medical history significant for type 2 diabetes mellitus, CKD stage II, hypertension, asthma, right bundle branch block, CKD, left breast cancer (ductal carcinoma in situ) with lumpectomy with reexcision of the lumpectomy (11/13/2023) with a total of 5 surgeries with wound healing issues, and right breast reduction surgery    Bria Davis presented to the ED yesterday evening with increased shortness of breath over the past 3 weeks.  Incidentally, she was found to be bradycardic.  CT of the chest was worrisome for right lower lobe pneumonitis with bronchiolitis.  No PE was suspected.  EKG completed showed second-degree heart block at 43 bpm.  Patient is asymptomatic.  States that she had one of her breast cancer surgeries delayed due to bradycardia last year.  Was noted to have intermittent second-degree type I heart block 10/2023.      Denies any syncope, presyncope, chest discomfort.  Symptoms are mostly shortness of breath that has been ongoing for the past 3 weeks.  She did get some dizziness when walking up the ramp at work yesterday.  When she presented to the ED last night patient states she had a feeling she had pneumonia, but was surprised to hear that her heart rate was slow.    Melyssa was seen in consultation by Dr. Kolb back in 2018.  At that time presented with some chest discomfort, right bundle branch block.  She placed her on metoprolol ER 25 mg daily and has been on the medication since that time.  Coronary angiogram 9/2019 showed trivial CAD.  Proximal RCA had a 10% stenosis, mid LAD 10% stenosis.  EF was 55%.  She was noted to be hypertensive with mildly  elevated LVEDP.    Echocardiogram 9/2019 showed EF of 60 to 65%.  Mild to moderate LVH.  Normal valve function noted.  Cardiogram is scheduled for today, but not yet completed.    TSH 1.96, BMP and CBC are WNL. Pt low risk for Lyme       Assessment:  Conduction disorder with intermittent second-degree AV block heart rate 45 to 50 bpm  Chronic right bundle branch block  Suspected acute right lower lobe pneumonia  History of breast cancer 2023 with lumpectomy on the left with multiple repeat procedures on the left side due to poor healing.  HTN    Plan:   Although patient is on metoprolol ER, dose is low at 25 mg daily.  Last dose was 10/1/2024.  Will continue to monitor and if patient continues to be bradycardic then we will proceed with pacemaker implantation 10/3/2024.  We may need to implant her device on the right side.  We can discuss further tomorrow if we proceed with the implant.  Risks and benefits were reviewed with the patient and her  today.  Patient is not a fall risk.  Has no history of syncope or presyncope.  Is asymptomatic of her A-fib.  Her dyspnea most likely is secondary to her pneumonia.  Recommended allowing patient to walk in the room without her bed alarm on.  Patient will be n.p.o. after midnight tonight.  Will reassess her rhythm in the morning.  All of her PTA meds are still on hold.  I will resume her PTA losartan 100 mg daily since blood pressure is elevated this morning.  I recommend no AV blocking agents such as her metoprolol ER.  Please hold Jardiance and Ozempic for possible pacemaker implantation tomorrow.      This assessment and plan was formulated under the guidance of Dr. Curiel.    A total of 45 minutes was spent face-to-face or coordinating care of Bria Davis. Over 50% of our time was spent counseling the patient and/or coordinating care.  --------------------------------------------------------------------------------------------  Kaia Portillo NP, APRN CNP  Pager:   [phone]      Code Status    Full Code    Reason for Consult   Reason for consult: I was asked by  to evaluate this patient for bradycardia.    Primary Care Physician   Kaia Elena    Chief Complaint   Increased shortness of breath x 3 weeks    History is obtained from the patient    Past Medical History   I have reviewed this patient's medical history and updated it with pertinent information if needed.   Past Medical History:   Diagnosis Date    Arthritis     Asthma     CKD (chronic kidney disease) stage 2, GFR 60-89 ml/min     Ductal carcinoma in situ (DCIS) of left breast     Esophageal reflux     Fibromyalgia     Hypertension     LBBB (left bundle branch block) 2019    rate related LBBB seen on stress test    Other chronic sinusitis     PONV (postoperative nausea and vomiting)     Restless legs syndrome (RLS)     Type 2 diabetes mellitus (H)        Past Surgical History   I have reviewed this patient's surgical history and updated it with pertinent information if needed.  Past Surgical History:   Procedure Laterality Date    BIOPSY  9/7/2023    BIOPSY BREAST NEEDLE LOCALIZATION Left 10/6/2023    Procedure: BIOPSY, LEFT BREAST, WITH NEEDLE LOCALIZATION;  Surgeon: Bg Bryant MD;  Location: [facility]    COLONOSCOPY  01/31/2002    COLONOSCOPY  10/26/2012    Procedure: COLONOSCOPY;  Colonoscopy;  Surgeon: Margret Sales MD;  Location: [facility]    COLONOSCOPY N/A 11/08/2019    Procedure: COLONOSCOPY, WITH POLYPECTOMY AND BIOPSY;  Surgeon: Ariel Heck MD;  Location: [facility]    CV LEFT HEART CATH N/A 09/12/2019    Procedure: Left Heart Cath;  Surgeon: Mike Sanon MD;  Location: [facility] CARDIAC CATH LAB    CV LEFT VENTRICULOGRAM N/A 09/12/2019    Procedure: Left Ventriculogram;  Surgeon: Mike Sanon MD;  Location: [facility] CARDIAC CATH LAB    ENT SURGERY      ESOPHAGOSCOPY, GASTROSCOPY, DUODENOSCOPY (EGD), COMBINED N/A 11/08/2019    Procedure: ESOPHAGOGASTRODUODENOSCOPY,  WITH BIOPSY;  Surgeon: Ariel Heck MD;  Location: [facility]    EYE SURGERY  2022    GYN SURGERY      Hydroablation 2007, polyp removal 2018    INJECT EPIDURAL LUMBAR  12/07/2010    INJECT EPIDURAL LUMBAR performed by GENERIC ANESTHESIA PROVIDER at [facility] OR    INJECT EPIDURAL LUMBAR  01/17/2011    INJECT EPIDURAL LUMBAR performed by GENERIC ANESTHESIA PROVIDER at [facility]    IRRIGATION AND DEBRIDEMENT BREAST Left 10/21/2023    Procedure: Irrigation and debridement breast;  Surgeon: Nemesio Arreola MD;  Location: [facility] OR    left long finger pulley release Left 07/2020    LUMPECTOMY BREAST Left 11/13/2023    Procedure: Re-excision of left lumpectomy site;  Surgeon: Arron Olson MD;  [place] [facility]    MAMMOPLASTY REDUCTION BILATERAL Bilateral 12/7/2023    Procedure: Left breast reconstruction with local flaps and right breast reduction for symmetry;  Surgeon: DELORIS Rodriguez MD;  [place] [facility]    RELEASE CARPAL TUNNEL  06/19/2012    Procedure: RELEASE CARPAL TUNNEL;  Right Carpal Tunnel Release;  Surgeon: Mike Sparrow MD;  Location: [facility]    RELEASE CARPAL TUNNEL  11/09/2012    Procedure: RELEASE CARPAL TUNNEL;  Left Carpal Tunnel Release;  Surgeon: Mike Sparrow MD;  Location: [facility]    REPAIR TENDON ACHILLES Left 12/26/2019    Procedure: Left Foot: Achilles Tendon Repair/Remodel/Reattachment; calcaneal prominence removal;  Surgeon: Felix Watkins DPM;  Location: [facility]    SURGICAL HISTORY OF -   04/10/2000    bilateral total ethmoidectomies, bilateral maxillary antrostomies, bilateral SMR of inferior turbinates, reduction of lt turbinate porter bullosa       Prior to Admission Medications   Prior to Admission Medications   Prescriptions Last Dose Informant Patient Reported? Taking?   Ascorbic Acid (VITAMIN C) 500 MG CAPS 10/1/2024  Yes Yes   Sig: Take 1 tablet by mouth every evening   Calcium Carb-Cholecalciferol (CALCIUM-VITAMIN D) 600-400 MG-UNIT TABS 10/1/2024  Yes Yes   Sig: Take 1 tablet  by mouth every evening   FT NASAL DECONGESTANT MAX STR 30 MG tablet 10/1/2024  No Yes   Sig: TAKE TWO TABLETS BY MOUTH TWICE DAILY   Multiple Vitamins-Minerals (MULTIVITAMIN ADULTS 50+) TABS 10/1/2024  Yes Yes   Sig: Take 1 tablet by mouth every morning   Semaglutide, 2 MG/DOSE, (OZEMPIC, 2 MG/DOSE,) 8 MG/3ML pen Past Week  No Yes   Sig: Inject 2 mg subcutaneously every 7 days.   Patient taking differently: Inject 2 mg subcutaneously every 7 days. Takes on    albuterol (PROAIR HFA/PROVENTIL HFA/VENTOLIN HFA) 108 (90 Base) MCG/ACT inhaler Unknown at prn  No Yes   Sig: Inhale 2 puffs into the lungs every 6 hours as needed for shortness of breath, wheezing or cough.   anastrozole (ARIMIDEX) 1 MG tablet 10/1/2024  No Yes   Sig: Take 1 tablet (1 mg) by mouth daily   aspirin (ASA) 81 MG EC tablet 10/1/2024  Yes Yes   Sig: Take 81 mg by mouth every evening   empagliflozin (JARDIANCE) 10 MG TABS tablet 10/1/2024  No Yes   Sig: Take 1 tablet (10 mg) by mouth daily.   esomeprazole (NEXIUM) 40 MG DR capsule 10/1/2024  No Yes   Sig: Take 1 capsule (40 mg) by mouth every morning (before breakfast)   ferrous sulfate 140 (45 Fe) MG TBCR CR tablet 10/1/2024  Yes Yes   Sig: Take 140 mg by mouth 2 times daily   fluticasone-salmeterol (ADVAIR DISKUS) 100-50 MCG/ACT inhaler 10/1/2024  No Yes   Sig: INHALE 1 PUFF INTO THE LUNGS 2 TIMES DAILY   gabapentin (NEURONTIN) 300 MG capsule 10/1/2024  No Yes   Sig: Take 1 capsule (300 mg) by mouth 2 times daily   hydroCHLOROthiazide 12.5 MG tablet 10/1/2024  No Yes   Sig: TAKE ONE TABLET BY MOUTH ONCE DAILY   insulin aspart (NOVOLOG FLEXPEN) 100 UNIT/ML pen 10/1/2024  No Yes   Si units before breakfast, 32 units before lunch, 32 units before dinner   Patient taking differently: If BG ~100 and a small meal take 100 units, If  take 26 units, If BG >200 take 32 units   insulin glargine U-300 (TOUJEO MAX SOLOSTAR) 300 UNIT/ML (2 units dial) pen 10/1/2024  No Yes   Sig: INJECT 100  UNITS UNDER THE SKIN DAILY   losartan (COZAAR) 100 MG tablet 10/1/2024  No Yes   Sig: Take 1 tablet (100 mg) by mouth every morning   metFORMIN (GLUCOPHAGE XR) 500 MG 24 hr tablet 10/1/2024  No Yes   Sig: Take 2 tablets (1,000 mg) by mouth 2 times daily (with meals).   metoclopramide (REGLAN) 10 MG tablet 10/1/2024  No Yes   Sig: TAKE ONE TABLET BY MOUTH TWICE DAILY   metoprolol succinate ER (TOPROL XL) 25 MG 24 hr tablet 10/1/2024  No Yes   Sig: Take 1 tablet (25 mg) by mouth daily   montelukast (SINGULAIR) 10 MG tablet 10/1/2024  No Yes   Sig: Take 1 tablet (10 mg) by mouth at bedtime.   rosuvastatin (CRESTOR) 10 MG tablet 10/1/2024  Yes Yes   Sig: Take 10 mg by mouth every evening.   triamcinolone (KENALOG) 0.1 % external cream 10/1/2024  No Yes   Sig: Apply topically 2 times daily      Facility-Administered Medications: None     Allergies   Allergies   Allergen Reactions    Bactrim [Sulfamethoxazole-Trimethoprim] Rash     Full body rash    Oxycodone Itching     Facial itching     Lisinopril Cough       Social History   I have reviewed this patient's social history and updated it with pertinent information if needed. Bria Davis  reports that she has never smoked. She has never used smokeless tobacco. She reports that she does not currently use alcohol. She reports that she does not use drugs.    Family History   I have reviewed this patient's family history and updated it with pertinent information if needed.   Family History   Problem Relation Age of Onset    Hypertension Mother     Diabetes Mother     Respiratory Mother         asthma-COPD    Hypertension Father     Heart Disease Father     Cerebrovascular Disease Father     Cardiovascular Father     Depression Sister     Respiratory Sister         copd    Chronic Obstructive Pulmonary Disease Sister     Depression Sister     Chronic Obstructive Pulmonary Disease Sister     Anxiety Disorder Sister     Depression Sister     Cancer Brother     Diabetes  Brother     Respiratory Brother         copd    Chronic Obstructive Pulmonary Disease Brother     Chronic Obstructive Pulmonary Disease Brother     Kidney failure Brother     Depression Brother     Asthma Brother     Breast Cancer Maternal Grandmother     Diabetes Son     Anxiety Disorder Other     Obesity Other     Obesity Other     Anesthesia Reaction No family hx of     Clotting Disorder No family hx of        Review of Systems   The 5 point Review of Systems is negative other than noted in the HPI or here.     --------------------------------------------------------------------------------------------    Temp:  [97.5  F (36.4  C)-98.7  F (37.1  C)] 98  F (36.7  C)  Pulse:  [39-47] 46  Resp:  [10-19] 16  BP: (116-183)/(65-78) 156/69  SpO2:  [93 %-98 %] 95 %  240 lbs 0 oz    Constitutional:   NAD   Skin:   Warm and dry   Head:   Nontraumatic   Neck:   no JVD   Lungs:   symmetric, right lower lobe rhonchi with intermittent wheezing   Cardiovascular:   regular rate and rhythm, normal S1 and S2, and no murmur noted   Abdomen:   Benign   Extremities and Back:   No edema   Neurological:   Grossly nonfocal   Data   Recent Labs   Lab Test 10/02/24  0533 10/01/24  1911 10/31/19  0850 09/12/19  0655   WBC 8.2 7.9   < > 7.1   HGB 12.4 13.3   < > 11.1*   MCV 89 88   < > 74*    299   < > 304   INR  --   --   --  0.96    < > = values in this interval not displayed.      Recent Labs   Lab Test 10/02/24  0533 10/01/24  1911    137   POTASSIUM 4.4 3.9   CHLORIDE 105 100   BUN 14.3 13.1   CR 0.72 0.79     Recent Labs   Lab 10/02/24  1058 10/02/24  0835 10/02/24  0533 10/02/24  0334   * 153* 184* 196*     Recent Labs   Lab Test 10/01/24  1911 10/20/23  1308   ALT 31 14   AST 21 21     Troponin I ES   Date Value Ref Range Status   04/26/2010 <0.012 0.000 - 0.034 ug/L Final       Recent Labs   Lab Test 10/02/24  0533 01/13/22  1025   MAG 2.1 1.8       Lab Results   Component Value Date    CHOL 134 02/26/2024     "CHOL 149 04/14/2021     Lab Results   Component Value Date    HDL 40 02/26/2024    HDL 46 04/14/2021     Lab Results   Component Value Date    LDL 43 02/26/2024    LDL 63 04/14/2021     Lab Results   Component Value Date    TRIG 253 02/26/2024    TRIG 200 04/14/2021     Lab Results   Component Value Date    CHOLHDLRATIO 4.0 03/13/2014          TSH   Date Value Ref Range Status   10/02/2024 1.96 0.30 - 4.20 uIU/mL Final   01/13/2022 1.80 0.40 - 4.00 mU/L Final   08/05/2019 1.41 0.40 - 4.00 mU/L Final           Clinically Significant Risk Factors Present on Admission                # Drug Induced Platelet Defect: home medication list includes an antiplatelet medication   # Hypertension: Noted on problem list        # DMII: A1C = 8.3 % (Ref range: 0.0 - 5.6 %) within past 6 months    # Severe Obesity: Estimated body mass index is 42.51 kg/m  as calculated from the following:    Height as of this encounter: 1.6 m (5' 3\").    Weight as of this encounter: 108.9 kg (240 lb).       # Asthma: noted on problem list       Native vessel CAD      Not present on admission    Not present on admission    Not present on admission    Not present on admission    Not present on admission    Pneumonia                     "

## 2024-10-02 NOTE — ED PROVIDER NOTES
BP (!) 151/78   Pulse (!) 47   Temp 97.5  F (36.4  C) (Tympanic)   Resp 16   LMP 01/14/2016 (Approximate)   SpO2 98%     Bria Davis is a 64-year-old female presenting with complaints of shortness of breath and chest pain with exertion.  Symptoms have been ongoing for the past couple weeks.  She also has associated cough.  Patient has history of asthma but states the symptoms do not feel consistent with that.  Patient is bradycardic on arrival in the 40s.  Given patient's history, I recommended she be evaluated in the emergency department.  We discussed that she will likely require further testing and/or treatment beyond the capabilities of the current urgent care setting.  Patient expresses understanding of this and agreement with the plan.       Vanna Gimenez PA-C  10/01/24 1915

## 2024-10-02 NOTE — CONSULTS
Care Management Initial Consult    General Information  Assessment completed with: Melyssa Ross  Type of CM/SW Visit: Initial Assessment    Primary Care Provider verified and updated as needed: Yes   Readmission within the last 30 days: no previous admission in last 30 days      Reason for Consult: discharge planning  Advance Care Planning: Advance Care Planning Reviewed: no concerns identified   (will complete at home)       Communication Assessment  Patient's communication style: spoken language (English or Bilingual)    Hearing Difficulty or Deaf: no   Wear Glasses or Blind: yes    Cognitive  Cognitive/Neuro/Behavioral: WDL        Orientation: oriented x 4  Mood/Behavior: calm, cooperative  Best Language: 0 - No aphasia  Speech: clear    Living Environment:   People in home: alone, spouse, grandchild(jon), child(jon), adult     Current living Arrangements: house      Able to return to prior arrangements: yes       Family/Social Support:  Care provided by: self, spouse/significant other  Provides care for:    Marital Status:   Support system: , Children          Description of Support System:           Current Resources:   Patient receiving home care services: No        Community Resources: None  Equipment currently used at home: glucometer  Supplies currently used at home: Diabetic Supplies    Employment/Financial:  Employment Status: employed full-time        Financial Concerns: none           Does the patient's insurance plan have a 3 day qualifying hospital stay waiver?  Yes     Which insurance plan 3 day waiver is available? Alternative insurance waiver    Will the waiver be used for post-acute placement? No    Lifestyle & Psychosocial Needs:  Social Determinants of Health     Food Insecurity: Low Risk  (9/13/2024)    Food Insecurity     Within the past 12 months, did you worry that your food would run out before you got money to buy more?: No     Within the past 12 months, did the food you  bought just not last and you didn t have money to get more?: No   Depression: Not at risk (2/26/2024)    PHQ-2     PHQ-2 Score: 0   Housing Stability: Low Risk  (9/13/2024)    Housing Stability     Do you have housing? : Yes     Are you worried about losing your housing?: No   Tobacco Use: Low Risk  (9/18/2024)    Patient History     Smoking Tobacco Use: Never     Smokeless Tobacco Use: Never     Passive Exposure: Not on file   Financial Resource Strain: Low Risk  (9/13/2024)    Financial Resource Strain     Within the past 12 months, have you or your family members you live with been unable to get utilities (heat, electricity) when it was really needed?: No   Alcohol Use: Not on file   Transportation Needs: Low Risk  (9/13/2024)    Transportation Needs     Within the past 12 months, has lack of transportation kept you from medical appointments, getting your medicines, non-medical meetings or appointments, work, or from getting things that you need?: No   Physical Activity: Unknown (9/13/2024)    Exercise Vital Sign     Days of Exercise per Week: 0 days     Minutes of Exercise per Session: Patient declined   Interpersonal Safety: High Risk (10/2/2024)    Interpersonal Safety     Do you feel physically and emotionally safe where you currently live?: No     Within the past 12 months, have you been hit, slapped, kicked or otherwise physically hurt by someone?: No     Within the past 12 months, have you been humiliated or emotionally abused in other ways by your partner or ex-partner?: No   Stress: No Stress Concern Present (9/13/2024)    [employer] [employer] - Occupational Stress Questionnaire     Feeling of Stress : Only a little   Social Connections: Unknown (9/13/2024)    Social Connection and Isolation Panel [NHANES]     Frequency of Communication with Friends and Family: Not on file     Frequency of Social Gatherings with Friends and Family: More than three times a week     Attends Restorationism  "Services: Not on file     Active Member of Clubs or Organizations: Not on file     Attends Club or Organization Meetings: Not on file     Marital Status: Not on file   Health Literacy: Not on file       Functional Status:  Prior to admission patient needed assistance:   Dependent ADLs:: Independent  Dependent IADLs:: Independent       Mental Health Status:  Mental Health Status: No Current Concerns       Chemical Dependency Status:                Values/Beliefs:  Spiritual, Cultural Beliefs, Sabianism Practices, Values that affect care: no               Discussed  Partnership in Safe Discharge Planning  document with patient/family: No    Additional Information:  Writer met with patient and her spouse, Eriberto, to introduce our role in discharge planning. Melyssa lives in a home with 5-6 steps to enter the home, no railing, and she is able to meet all her needs on 1 level of the home. She is independent in ADLs and mobility, and does not use an cane or a walker. Her spouse does all the laundry, in the basement, \"so she can't see my noble of bikes\". Her son and grandson also live in the home with them. She is diabetic, and does have a glucometer, she identified no financial or insurance concerns. 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   No further care management intervention anticipated at this time.  Please re-consult if further needs arise.  Care management signing off.          Lidia Lynch RN Care Coordinator  [facility]      "

## 2024-10-03 ENCOUNTER — APPOINTMENT (OUTPATIENT)
Dept: CARDIOLOGY | Facility: CLINIC | Age: 64
End: 2024-10-03
Attending: HOSPITALIST
Payer: COMMERCIAL

## 2024-10-03 LAB
ANION GAP SERPL CALCULATED.3IONS-SCNC: 13 MMOL/L (ref 7–15)
BUN SERPL-MCNC: 13.3 MG/DL (ref 8–23)
CALCIUM SERPL-MCNC: 9.5 MG/DL (ref 8.8–10.4)
CHLORIDE SERPL-SCNC: 105 MMOL/L (ref 98–107)
CREAT SERPL-MCNC: 0.53 MG/DL (ref 0.51–0.95)
EGFRCR SERPLBLD CKD-EPI 2021: >90 ML/MIN/1.73M2
ERYTHROCYTE [DISTWIDTH] IN BLOOD BY AUTOMATED COUNT: 13.6 % (ref 10–15)
GLUCOSE BLDC GLUCOMTR-MCNC: 149 MG/DL (ref 70–99)
GLUCOSE BLDC GLUCOMTR-MCNC: 165 MG/DL (ref 70–99)
GLUCOSE BLDC GLUCOMTR-MCNC: 166 MG/DL (ref 70–99)
GLUCOSE BLDC GLUCOMTR-MCNC: 168 MG/DL (ref 70–99)
GLUCOSE BLDC GLUCOMTR-MCNC: 180 MG/DL (ref 70–99)
GLUCOSE BLDC GLUCOMTR-MCNC: 224 MG/DL (ref 70–99)
GLUCOSE BLDC GLUCOMTR-MCNC: 257 MG/DL (ref 70–99)
GLUCOSE BLDC GLUCOMTR-MCNC: 294 MG/DL (ref 70–99)
GLUCOSE SERPL-MCNC: 176 MG/DL (ref 70–99)
HCG INTACT+B SERPL-ACNC: 1 MIU/ML
HCO3 SERPL-SCNC: 22 MMOL/L (ref 22–29)
HCT VFR BLD AUTO: 39 % (ref 35–47)
HGB BLD-MCNC: 12.9 G/DL (ref 11.7–15.7)
INR PPP: 1.09 (ref 0.85–1.15)
LVEF ECHO: NORMAL
MAGNESIUM SERPL-MCNC: 2.1 MG/DL (ref 1.7–2.3)
MCH RBC QN AUTO: 28.9 PG (ref 26.5–33)
MCHC RBC AUTO-ENTMCNC: 33.1 G/DL (ref 31.5–36.5)
MCV RBC AUTO: 87 FL (ref 78–100)
PLATELET # BLD AUTO: 305 10E3/UL (ref 150–450)
POTASSIUM SERPL-SCNC: 4.3 MMOL/L (ref 3.4–5.3)
RBC # BLD AUTO: 4.47 10E6/UL (ref 3.8–5.2)
SODIUM SERPL-SCNC: 140 MMOL/L (ref 135–145)
WBC # BLD AUTO: 9.4 10E3/UL (ref 4–11)

## 2024-10-03 PROCEDURE — 93010 ELECTROCARDIOGRAM REPORT: CPT | Mod: XU | Performed by: INTERNAL MEDICINE

## 2024-10-03 PROCEDURE — 85610 PROTHROMBIN TIME: CPT | Performed by: NURSE PRACTITIONER

## 2024-10-03 PROCEDURE — 93306 TTE W/DOPPLER COMPLETE: CPT | Mod: 26 | Performed by: INTERNAL MEDICINE

## 2024-10-03 PROCEDURE — 02HK3JZ INSERTION OF PACEMAKER LEAD INTO RIGHT VENTRICLE, PERCUTANEOUS APPROACH: ICD-10-PCS | Performed by: INTERNAL MEDICINE

## 2024-10-03 PROCEDURE — C1898 LEAD, PMKR, OTHER THAN TRANS: HCPCS | Performed by: INTERNAL MEDICINE

## 2024-10-03 PROCEDURE — 250N000013 HC RX MED GY IP 250 OP 250 PS 637: Performed by: INTERNAL MEDICINE

## 2024-10-03 PROCEDURE — C1887 CATHETER, GUIDING: HCPCS | Performed by: INTERNAL MEDICINE

## 2024-10-03 PROCEDURE — 99153 MOD SED SAME PHYS/QHP EA: CPT | Performed by: INTERNAL MEDICINE

## 2024-10-03 PROCEDURE — 02H63JZ INSERTION OF PACEMAKER LEAD INTO RIGHT ATRIUM, PERCUTANEOUS APPROACH: ICD-10-PCS | Performed by: INTERNAL MEDICINE

## 2024-10-03 PROCEDURE — 999N000208 ECHOCARDIOGRAM COMPLETE

## 2024-10-03 PROCEDURE — 250N000013 HC RX MED GY IP 250 OP 250 PS 637: Performed by: HOSPITALIST

## 2024-10-03 PROCEDURE — 85027 COMPLETE CBC AUTOMATED: CPT | Performed by: INTERNAL MEDICINE

## 2024-10-03 PROCEDURE — 80048 BASIC METABOLIC PNL TOTAL CA: CPT | Performed by: INTERNAL MEDICINE

## 2024-10-03 PROCEDURE — 36415 COLL VENOUS BLD VENIPUNCTURE: CPT | Performed by: INTERNAL MEDICINE

## 2024-10-03 PROCEDURE — 83735 ASSAY OF MAGNESIUM: CPT | Performed by: INTERNAL MEDICINE

## 2024-10-03 PROCEDURE — C1894 INTRO/SHEATH, NON-LASER: HCPCS | Performed by: INTERNAL MEDICINE

## 2024-10-03 PROCEDURE — 250N000009 HC RX 250: Performed by: INTERNAL MEDICINE

## 2024-10-03 PROCEDURE — 36415 COLL VENOUS BLD VENIPUNCTURE: CPT | Performed by: NURSE PRACTITIONER

## 2024-10-03 PROCEDURE — 99232 SBSQ HOSP IP/OBS MODERATE 35: CPT | Performed by: INTERNAL MEDICINE

## 2024-10-03 PROCEDURE — 99233 SBSQ HOSP IP/OBS HIGH 50: CPT | Mod: 25 | Performed by: NURSE PRACTITIONER

## 2024-10-03 PROCEDURE — 250N000013 HC RX MED GY IP 250 OP 250 PS 637: Performed by: NURSE PRACTITIONER

## 2024-10-03 PROCEDURE — 33208 INSRT HEART PM ATRIAL & VENT: CPT | Performed by: INTERNAL MEDICINE

## 2024-10-03 PROCEDURE — 84702 CHORIONIC GONADOTROPIN TEST: CPT | Performed by: NURSE PRACTITIONER

## 2024-10-03 PROCEDURE — 0JH606Z INSERTION OF PACEMAKER, DUAL CHAMBER INTO CHEST SUBCUTANEOUS TISSUE AND FASCIA, OPEN APPROACH: ICD-10-PCS | Performed by: INTERNAL MEDICINE

## 2024-10-03 PROCEDURE — 210N000001 HC R&B IMCU HEART CARE

## 2024-10-03 PROCEDURE — 250N000011 HC RX IP 250 OP 636: Performed by: HOSPITALIST

## 2024-10-03 PROCEDURE — 258N000002 HC RX IP 258 OP 250: Performed by: NURSE PRACTITIONER

## 2024-10-03 PROCEDURE — 272N000001 HC OR GENERAL SUPPLY STERILE: Performed by: INTERNAL MEDICINE

## 2024-10-03 PROCEDURE — 99152 MOD SED SAME PHYS/QHP 5/>YRS: CPT | Performed by: INTERNAL MEDICINE

## 2024-10-03 PROCEDURE — 93005 ELECTROCARDIOGRAM TRACING: CPT

## 2024-10-03 PROCEDURE — 255N000002 HC RX 255 OP 636: Performed by: HOSPITALIST

## 2024-10-03 PROCEDURE — C1785 PMKR, DUAL, RATE-RESP: HCPCS | Performed by: INTERNAL MEDICINE

## 2024-10-03 PROCEDURE — 250N000011 HC RX IP 250 OP 636: Mod: JW | Performed by: INTERNAL MEDICINE

## 2024-10-03 DEVICE — LEAD PACEMAKER SELECTSECURE 69CM MD  383069: Type: IMPLANTABLE DEVICE | Status: FUNCTIONAL

## 2024-10-03 DEVICE — IMP LEAD PACING BIPOLAR CAPSUREFIX NOVUS 52CM 5076-52: Type: IMPLANTABLE DEVICE | Status: FUNCTIONAL

## 2024-10-03 DEVICE — PACEMAKER AZURE MRI XT DR: Type: IMPLANTABLE DEVICE | Status: FUNCTIONAL

## 2024-10-03 RX ORDER — GABAPENTIN 300 MG/1
300 CAPSULE ORAL 2 TIMES DAILY
Status: DISCONTINUED | OUTPATIENT
Start: 2024-10-03 | End: 2024-10-04 | Stop reason: HOSPADM

## 2024-10-03 RX ORDER — PANTOPRAZOLE SODIUM 40 MG/1
40 TABLET, DELAYED RELEASE ORAL
Status: DISCONTINUED | OUTPATIENT
Start: 2024-10-04 | End: 2024-10-03

## 2024-10-03 RX ORDER — ASPIRIN 81 MG/1
81 TABLET ORAL EVERY EVENING
Status: DISCONTINUED | OUTPATIENT
Start: 2024-10-03 | End: 2024-10-04 | Stop reason: HOSPADM

## 2024-10-03 RX ORDER — CEFAZOLIN SODIUM 2 G/100ML
INJECTION, SOLUTION INTRAVENOUS CONTINUOUS PRN
Status: COMPLETED | OUTPATIENT
Start: 2024-10-03 | End: 2024-10-03

## 2024-10-03 RX ORDER — ROSUVASTATIN CALCIUM 10 MG/1
10 TABLET, COATED ORAL EVERY EVENING
Status: DISCONTINUED | OUTPATIENT
Start: 2024-10-03 | End: 2024-10-04 | Stop reason: HOSPADM

## 2024-10-03 RX ORDER — ACETAMINOPHEN 325 MG/1
650 TABLET ORAL 2 TIMES DAILY
Status: DISCONTINUED | OUTPATIENT
Start: 2024-10-03 | End: 2024-10-04 | Stop reason: HOSPADM

## 2024-10-03 RX ORDER — FENTANYL CITRATE 50 UG/ML
INJECTION, SOLUTION INTRAMUSCULAR; INTRAVENOUS
Status: DISCONTINUED | OUTPATIENT
Start: 2024-10-03 | End: 2024-10-03 | Stop reason: HOSPADM

## 2024-10-03 RX ORDER — MONTELUKAST SODIUM 10 MG/1
10 TABLET ORAL AT BEDTIME
Status: DISCONTINUED | OUTPATIENT
Start: 2024-10-03 | End: 2024-10-04 | Stop reason: HOSPADM

## 2024-10-03 RX ORDER — PANTOPRAZOLE SODIUM 40 MG/1
40 TABLET, DELAYED RELEASE ORAL
Status: DISCONTINUED | OUTPATIENT
Start: 2024-10-03 | End: 2024-10-04 | Stop reason: HOSPADM

## 2024-10-03 RX ORDER — CEFAZOLIN SODIUM 2 G/100ML
2 INJECTION, SOLUTION INTRAVENOUS EVERY 8 HOURS
Status: COMPLETED | OUTPATIENT
Start: 2024-10-03 | End: 2024-10-03

## 2024-10-03 RX ORDER — MULTIVIT-MIN/IRON/FOLIC ACID/K 18-600-40
1 CAPSULE ORAL EVERY EVENING
Status: DISCONTINUED | OUTPATIENT
Start: 2024-10-03 | End: 2024-10-03

## 2024-10-03 RX ORDER — FLUTICASONE FUROATE AND VILANTEROL 100; 25 UG/1; UG/1
1 POWDER RESPIRATORY (INHALATION) DAILY
Status: DISCONTINUED | OUTPATIENT
Start: 2024-10-03 | End: 2024-10-04 | Stop reason: HOSPADM

## 2024-10-03 RX ORDER — ALBUTEROL SULFATE 90 UG/1
2 INHALANT RESPIRATORY (INHALATION) EVERY 6 HOURS PRN
Status: DISCONTINUED | OUTPATIENT
Start: 2024-10-03 | End: 2024-10-04 | Stop reason: HOSPADM

## 2024-10-03 RX ORDER — DIPHENHYDRAMINE HYDROCHLORIDE 50 MG/ML
INJECTION INTRAMUSCULAR; INTRAVENOUS
Status: DISCONTINUED | OUTPATIENT
Start: 2024-10-03 | End: 2024-10-03 | Stop reason: HOSPADM

## 2024-10-03 RX ORDER — SODIUM CHLORIDE 450 MG/100ML
INJECTION, SOLUTION INTRAVENOUS CONTINUOUS
Status: DISCONTINUED | OUTPATIENT
Start: 2024-10-03 | End: 2024-10-03 | Stop reason: HOSPADM

## 2024-10-03 RX ORDER — METOCLOPRAMIDE 10 MG/1
10 TABLET ORAL 2 TIMES DAILY
Status: DISCONTINUED | OUTPATIENT
Start: 2024-10-03 | End: 2024-10-04 | Stop reason: HOSPADM

## 2024-10-03 RX ORDER — ASCORBIC ACID 500 MG
500 TABLET ORAL EVERY EVENING
Status: DISCONTINUED | OUTPATIENT
Start: 2024-10-03 | End: 2024-10-04 | Stop reason: HOSPADM

## 2024-10-03 RX ORDER — BUPIVACAINE HYDROCHLORIDE 2.5 MG/ML
INJECTION, SOLUTION EPIDURAL; INFILTRATION; INTRACAUDAL
Status: DISCONTINUED | OUTPATIENT
Start: 2024-10-03 | End: 2024-10-03 | Stop reason: HOSPADM

## 2024-10-03 RX ORDER — LIDOCAINE 40 MG/G
CREAM TOPICAL
Status: DISCONTINUED | OUTPATIENT
Start: 2024-10-03 | End: 2024-10-03

## 2024-10-03 RX ADMIN — ROSUVASTATIN CALCIUM 10 MG: 10 TABLET, FILM COATED ORAL at 20:22

## 2024-10-03 RX ADMIN — PANTOPRAZOLE SODIUM 40 MG: 40 TABLET, DELAYED RELEASE ORAL at 22:31

## 2024-10-03 RX ADMIN — CEFTRIAXONE SODIUM 2 G: 2 INJECTION, POWDER, FOR SOLUTION INTRAMUSCULAR; INTRAVENOUS at 02:22

## 2024-10-03 RX ADMIN — MONTELUKAST 10 MG: 10 TABLET, FILM COATED ORAL at 22:31

## 2024-10-03 RX ADMIN — INSULIN ASPART 1 UNITS: 100 INJECTION, SOLUTION INTRAVENOUS; SUBCUTANEOUS at 05:26

## 2024-10-03 RX ADMIN — CEFAZOLIN SODIUM 2 G: 2 INJECTION, SOLUTION INTRAVENOUS at 20:22

## 2024-10-03 RX ADMIN — ACETAMINOPHEN 650 MG: 325 TABLET ORAL at 22:31

## 2024-10-03 RX ADMIN — INSULIN ASPART 4 UNITS: 100 INJECTION, SOLUTION INTRAVENOUS; SUBCUTANEOUS at 20:23

## 2024-10-03 RX ADMIN — SODIUM CHLORIDE: 4.5 INJECTION, SOLUTION INTRAVENOUS at 10:47

## 2024-10-03 RX ADMIN — FLUTICASONE FUROATE AND VILANTEROL TRIFENATATE 1 PUFF: 100; 25 POWDER RESPIRATORY (INHALATION) at 16:35

## 2024-10-03 RX ADMIN — METOCLOPRAMIDE 10 MG: 10 TABLET ORAL at 20:26

## 2024-10-03 RX ADMIN — INSULIN ASPART 1 UNITS: 100 INJECTION, SOLUTION INTRAVENOUS; SUBCUTANEOUS at 15:16

## 2024-10-03 RX ADMIN — HYDRALAZINE HYDROCHLORIDE 10 MG: 20 INJECTION INTRAMUSCULAR; INTRAVENOUS at 18:15

## 2024-10-03 RX ADMIN — ACETAMINOPHEN 650 MG: 325 TABLET ORAL at 16:35

## 2024-10-03 RX ADMIN — GUAIFENESIN AND DEXTROMETHORPHAN 10 ML: 100; 10 SYRUP ORAL at 08:56

## 2024-10-03 RX ADMIN — ASPIRIN 81 MG: 81 TABLET, COATED ORAL at 20:22

## 2024-10-03 RX ADMIN — OXYCODONE HYDROCHLORIDE AND ACETAMINOPHEN 500 MG: 500 TABLET ORAL at 20:22

## 2024-10-03 RX ADMIN — DOXYCYCLINE 100 MG: 100 INJECTION, POWDER, LYOPHILIZED, FOR SOLUTION INTRAVENOUS at 15:11

## 2024-10-03 RX ADMIN — GABAPENTIN 300 MG: 300 CAPSULE ORAL at 20:22

## 2024-10-03 RX ADMIN — GUAIFENESIN AND DEXTROMETHORPHAN 10 ML: 100; 10 SYRUP ORAL at 18:24

## 2024-10-03 RX ADMIN — INSULIN ASPART 2 UNITS: 100 INJECTION, SOLUTION INTRAVENOUS; SUBCUTANEOUS at 01:06

## 2024-10-03 RX ADMIN — DOXYCYCLINE 100 MG: 100 INJECTION, POWDER, LYOPHILIZED, FOR SOLUTION INTRAVENOUS at 01:08

## 2024-10-03 RX ADMIN — PERFLUTREN 10 ML: 6.52 INJECTION, SUSPENSION INTRAVENOUS at 09:53

## 2024-10-03 RX ADMIN — LOSARTAN POTASSIUM 100 MG: 100 TABLET, FILM COATED ORAL at 08:52

## 2024-10-03 ASSESSMENT — ACTIVITIES OF DAILY LIVING (ADL)
ADLS_ACUITY_SCORE: 30
ADLS_ACUITY_SCORE: 30
ADLS_ACUITY_SCORE: 29
ADLS_ACUITY_SCORE: 30
ADLS_ACUITY_SCORE: 29
ADLS_ACUITY_SCORE: 30
ADLS_ACUITY_SCORE: 29
ADLS_ACUITY_SCORE: 30
ADLS_ACUITY_SCORE: 29
ADLS_ACUITY_SCORE: 29
ADLS_ACUITY_SCORE: 30
ADLS_ACUITY_SCORE: 29
ADLS_ACUITY_SCORE: 30
ADLS_ACUITY_SCORE: 29
ADLS_ACUITY_SCORE: 29
ADLS_ACUITY_SCORE: 30
ADLS_ACUITY_SCORE: 29
ADLS_ACUITY_SCORE: 30

## 2024-10-03 NOTE — PLAN OF CARE
"Goal Outcome Evaluation: Patient Name: Melanie  MRN: [id number]  Date of Admission: 10/2/2024  Reason for Admission: SOB, bradycardia and cough  Level of Care: [facility] [place]    Vitals:   BP Readings from Last 1 Encounters:   10/02/24 (!) 162/60     Pulse Readings from Last 1 Encounters:   10/02/24 50     Wt Readings from Last 1 Encounters:   10/02/24 108.9 kg (240 lb)     Ht Readings from Last 1 Encounters:   10/02/24 1.6 m (5' 3\")     Estimated body mass index is 42.51 kg/m  as calculated from the following:    Height as of this encounter: 1.6 m (5' 3\").    Weight as of this encounter: 108.9 kg (240 lb).  Temp Readings from Last 1 Encounters:   10/02/24 99  F (37.2  C) (Oral)       Pain: Pain goal -no pain Pain Rating - none Effective pain medication/regimen n/a    CV Surgery Patient: No    Assessment  Hydralazine 10 mg IV given for SBP>180 and extra one time dose of Insulin given for , also Compazine 5 mg IV - for nausea.  Resp: RA  Telemetry: SB  Neuro: A&Ox4  GI/: voiding , +BM today morning  Skin/Wounds:   Lines/Drains: PIV  Activity: Ind  Sleep:   Abnormal Labs:     Aggression Stop Light: Green          Patient Care Plan: Keep NPO for possible PPM in AM       Plan of Care Reviewed With: patient, spouse    Overall Patient Progress: no changeOverall Patient Progress: no change           "

## 2024-10-03 NOTE — PROVIDER NOTIFICATION
MD Notification    Notified Person: MD    Notified Person Name:  Randolph    Notification Date/Time: 10/2/2024 at 1016    Notification Interaction: awaiting order    Purpose of Notification: Pt reported to be very anxious, developed nausea after dinner, refused to take Zofran saying that in past it was causing her to vomit, Hydralazine 10 mg IV given for elevated SBP>180 due to anxiety. HR 45-52.    Orders Received:    Comments:  Dr. Randolph ordered Compazine 5 mg PO and IV and 25 mg suppository.

## 2024-10-03 NOTE — PLAN OF CARE
Neuro:A&O x4  Tele/cardiac: SB  Respiration: RA, LALA  Activity: ind  Pain: denies  Drips: PIV SL  Drains/tubes: none  Skin: Intact  GI/: continent  Aggression color: green  Isolation: none  Plan: has been NPO since midnight,heart rhythm to be reevaluation by EP this AM, possible PPM placement

## 2024-10-03 NOTE — PLAN OF CARE
Goal Outcome Evaluation:      Plan of Care Reviewed With: patient, spouse, child        A&O x4. VSS, except bradycardic. O2 stable on RA. Frequent cough, prn robitussin given. NPO for pacemaker placement this afternoon. BG every 4 hours while NPO. Denies pain. Voiding spontaneously without difficulty. Plan of care ongoing,  at bedside.     Pacemaker placement this shift, dressing CDI, CMS intact. Education started on restrictions post implantation. Pacemaker site tender, prn tylenol given, effective per pt report. Tolerating current diet, good appetite, oral intake encouraged. Plan for chest xray and interrogation in AM with possible discharge, family at bedside.

## 2024-10-03 NOTE — PROGRESS NOTES
[facility]  Hospitalist Progress Note  Gama Cook MD  10/03/2024    Assessment & Plan   Bria Davis is a markedly pleasant 64 year old woman with past medical history that is most notable for severe morbid obesity, Type 2 Diabetes mellitus, hypertension, breast cancer, and asthma; who presents with progressive exertional dyspnea and acute cough, and is found to have symptomatic bradycardia and suspected acute right lower lobe pneumonia.     PLAN      Symptomatic bradycardia:  Ms. Davis has hypertension, and takes Toprol, she has a history of asthma. She is followed by [facility] and in 2019 after undergoing a stress test that reportedly showed a rate related LBBB. Cardiac catheterization was done revealing EF 55% and minimal non-obstructive CAD.  She presents for evaluation of progressive exertional dyspnea and acute cough. In the ED, she has bradycardia, without hypoxia, fever, or hypotension.  EKG shows bradycardia with rate 45, RBBB, LAFB, and possible AV dissociation. COVID, Influenza, and RSV tests are negative. D-Dimer is mildly elevated. CTPA shows no PE   Overall symptoms seem consistent with 2nd degree type I AVB with symptomatic bradycardia. It is found in the context of acute pneumonitis, as discussed below, though her symptoms of dyspnea and light headedness precede onset of her cough.    -- cardiology - EP consult noted    -- plans for PPM placement    -- TSH WNL and K  and Mg are normal     Acute right lower lobe pneumonia vs pneumonitis: Without wheezing to suggest acute asthma exacerbation though she could be at risk of developing such an exacerbation.  : it does reveal mild to moderate infiltrate seen in the basilar aspect of the right lower lobe, worrisome for pneumonitis. There is also some thickening involving numerous right lower lobe bronchi with some intermittent opacification. The central airways are patent. Mild mosaic perfusion of both lungs  "consistent with air trapping is also described.    -- continue Ceftriaxone and Doxycycline.     -- Duonebs scheduled q4 hours, Albuterol nebs every 2 hours as needed. Resume home Advair         -- Robitussin ordered as needed for cough.      Type 2 Diabetes mellitus:       Recent Labs   Lab Test 09/18/24  0731   A1C 8.3*   . On Toujeo, Aspart, Jardiance as an outpatient.    -- Noting that while in the ED, she had an episode of hypoglycemia to FS 67.    -- ISS insulin while hospitalized. Hold oral anti-diabetic medications. Resume home insulin at discharge.     Hypertension:   -- continue Cozaar and hold hydrochlorothiazide   -- Hold BB     Hyperlipidemia:   -- continue  Rosuvastatin      History of breast cancer:   -- left breast DCIS, diagnosed in 2023 and treated with lumpectomy, bilateral breast reduction surgery, and XRT. She is now on Anastrazole.Resume at discharge     Chronic iron deficiency anemia: Monitor while hospitalized. Resume oral iron when verified        Recent Labs   Lab Test 10/01/24  1911 10/21/23  0547 10/20/23  1308   HGB 13.3 12.9 13.3      Fibromyalgia: Followed by Neurology, on gabapentin;       Severe morbid obesity: Now on Ozempic. Noted  Estimated body mass index is 42.96 kg/m  as calculated from the following:    Height as of this encounter: 1.6 m (5' 3\").    Weight as of this encounter: 110 kg (242 lb 8 oz).    Disposition:   -- anticipate h ome on 10/4    Interval History   -- no acute overnight issues    -Data reviewed today: I reviewed all new labs and imaging over the last 24 hours. I personally reviewed no images or EKG's today.    Physical Exam    , Blood pressure 138/63, pulse 52, temperature 98.5  F (36.9  C), resp. rate 14, height 1.6 m (5' 3\"), weight 108.9 kg (240 lb), last menstrual period 01/14/2016, SpO2 96%, not currently breastfeeding.  Vitals:    10/02/24 0141 10/02/24 0528 10/03/24 0600   Weight: 110 kg (242 lb 8 oz) 108.9 kg (240 lb) 108.9 kg (240 lb)     Vital Signs " with Ranges  Temp:  [97.6  F (36.4  C)-99  F (37.2  C)] 98.5  F (36.9  C)  Pulse:  [41-52] 52  Resp:  [14-22] 14  BP: (138-184)/(55-81) 138/63  SpO2:  [94 %-97 %] 96 %  I/O's Last 24 hours  I/O last 3 completed shifts:  In: -   Out: 2850 [Urine:2850]    Constitutional: Awake, alert, cooperative, no apparent distress  Respiratory: Clear to auscultation bilaterally, no crackles or wheezing  Cardiovascular: bradycardia  GI: Normal bowel sounds, soft, non-distended, non-tender  Skin/Integumen: No rashes, no cyanosis, no edema  Other:      Medications   All medications were reviewed.  Current Facility-Administered Medications   Medication Dose Route Frequency Provider Last Rate Last Admin    0.45% sodium chloride infusion   Intravenous Continuous Kaia Portillo APRN CNP 30 mL/hr at 10/03/24 1047 New Bag at 10/03/24 1047    No antibiotics needed for procedure.   Does not apply Continuous PRN Kaia Portillo APRN CNP         Current Facility-Administered Medications   Medication Dose Route Frequency Provider Last Rate Last Admin    cefTRIAXone (ROCEPHIN) 2 g vial to attach to  ml bag for ADULTS or NS 50 ml bag for PEDS  2 g Intravenous Q24H Dalton Dee MD   2 g at 10/03/24 0222    doxycycline (VIBRAMYCIN) 100 mg vial to attach to  mL bag  100 mg Intravenous Q12H Dalton Dee MD   100 mg at 10/03/24 0108    insulin aspart (NovoLOG) injection (RAPID ACTING)  1-7 Units Subcutaneous Q4H Dalton Dee MD   1 Units at 10/03/24 0526    losartan (COZAAR) tablet 100 mg  100 mg Oral QAM Kaia Portillo APRN CNP   100 mg at 10/03/24 0852    sodium chloride (PF) 0.9% PF flush 3 mL  3 mL Intracatheter Q8H Dalton Dee MD   3 mL at 10/03/24 0107        Data   Recent Labs   Lab 10/03/24  1046 10/03/24  0733 10/03/24  0539 10/02/24  0835 10/02/24  0533 10/01/24  2146 10/01/24  1911   WBC  --   --  9.4  --  8.2  --  7.9   HGB  --   --  12.9  --  12.4  --  13.3   MCV  --   --  87  --  89  --   88   PLT  --   --  305  --  278  --  299   NA  --   --  140  --  142  --  137   POTASSIUM  --   --  4.3  --  4.4  --  3.9   CHLORIDE  --   --  105  --  105  --  100   CO2  --   --  22  --  26  --  25   BUN  --   --  13.3  --  14.3  --  13.1   CR  --   --  0.53  --  0.72  --  0.79   ANIONGAP  --   --  13  --  11  --  12   MARLON  --   --  9.5  --  9.7  --  9.7   * 149* 176*   < > 184*   < > 118*   ALBUMIN  --   --   --   --   --   --  4.0   PROTTOTAL  --   --   --   --   --   --  7.2   BILITOTAL  --   --   --   --   --   --  0.4   ALKPHOS  --   --   --   --   --   --  85   ALT  --   --   --   --   --   --  31   AST  --   --   --   --   --   --  21    < > = values in this interval not displayed.       No results found for this or any previous visit (from the past 24 hour(s)).    Gama Cook MD  Text Page  (7am to 6pm)

## 2024-10-03 NOTE — PROGRESS NOTES
EP Progress Note          Assessment and Plan:   Bria Davis is a 64 year old female who has a past medical history significant for type 2 diabetes mellitus, CKD stage II, hypertension, asthma, right bundle branch block, CKD, left breast cancer (ductal carcinoma in situ) with lumpectomy with reexcision of the lumpectomy (11/13/2023) with a total of 5 surgeries with wound healing issues, and right breast reduction surgery. She presented with a 3 week cough and possible pneumonia. Incidentally noted to be in 2:1 AV block. EP was consulted to assist in arrhythmia management.     High degree AV block with right bundle branch block and second-degree heart block type I  NPO for pacemaker implant today  Toprol has been held for 2 days.  EKG this morning shows 2:1 AV block at 41 bpm   Explained risks and benefits. Risks including, but not limited to: bruising, bleeding, pocket hematoma, pneumothorax, pericardial effusion, infection, and rare risk of CVA, MI, death. Pt understands and agrees to accept these risks. Consent form signed.    Echocardiogram was completed this morning, results still pending.  Echocardiogram in 2019 showed EF of 60 to 65% with mild to moderate left ventricular hypertrophy.    2.  Pneumonia   WBC normal and on IV abx's (doxycycline and ceftriaxone)    3. HTN  PTA metoprolol on hold  On losartan 100 mg every day   One dose of hydralazine 10 mg IV  Will resume metoprolol after pacemaker is implanted.    Patient is hopeful to be discharged later today.  This will be up to the hospitalist service.  She will also discussed with Dr. Curiel at the time of her implant.  Restrictions were reviewed with the patient.  Outpatient follow-up will be arranged by our device RN.    Kaia Portillo NP, APRN CNP            Interval History:   Patient still coughing, but able to lay flat.  Denies chest discomfort or palpitations.  Still has wheezing and exertional shortness of breath with minimal exertion.  Blood  "pressure high yesterday, improved with PTA losartan restarted and 1 dose of IV hydralazine.  Telemetry shows 2:1 AV block 40 bpm       Medications:     Current Facility-Administered Medications   Medication Dose Route Frequency Provider Last Rate Last Admin    cefTRIAXone (ROCEPHIN) 2 g vial to attach to  ml bag for ADULTS or NS 50 ml bag for PEDS  2 g Intravenous Q24H Dalton Dee MD   2 g at 10/03/24 0222    doxycycline (VIBRAMYCIN) 100 mg vial to attach to  mL bag  100 mg Intravenous Q12H Dalton Dee MD   100 mg at 10/03/24 0108    insulin aspart (NovoLOG) injection (RAPID ACTING)  1-7 Units Subcutaneous Q4H Dalton Dee MD   1 Units at 10/03/24 0526    losartan (COZAAR) tablet 100 mg  100 mg Oral Kaia Hernandez APRN CNP   100 mg at 10/02/24 1157    sodium chloride (PF) 0.9% PF flush 3 mL  3 mL Intracatheter Q8H Dalton Dee MD   3 mL at 10/03/24 0107             Review of Systems: If done, described below  The Review of Systems is negative other than noted in the HPI             Physical Exam:   Blood pressure 138/63, pulse 52, temperature 98.8  F (37.1  C), temperature source Oral, resp. rate 14, height 1.6 m (5' 3\"), weight 108.9 kg (240 lb), last menstrual period 2016, SpO2 96%, not currently breastfeeding.      Vital Sign Ranges  Temperature Temp  Av.3  F (36.8  C)  Min: 97.6  F (36.4  C)  Max: 99  F (37.2  C)   Blood pressure Systolic (24hrs), Av , Min:138 , Max:184        Diastolic (24hrs), Av, Min:55, Max:81      Pulse Pulse  Av.6  Min: 41  Max: 52   Respirations Resp  Av.4  Min: 14  Max: 22   Pulse oximetry SpO2  Av.4 %  Min: 94 %  Max: 97 %         Intake/Output Summary (Last 24 hours) at 10/3/2024 0732  Last data filed at 10/3/2024 0104  Gross per 24 hour   Intake --   Output 2850 ml   Net -2850 ml       Constitutional:   in no apparent distress     Lungs:   symmetric, clear to auscultation     Cardiovascular:   " regular rate and rhythm, normal S1 and S2, and no murmur noted     Extremities and Back:   No edema              Data:     Lab Results   Component Value Date    WBC 9.4 10/03/2024    HGB 12.9 10/03/2024    HCT 39.0 10/03/2024     10/03/2024     10/03/2024    POTASSIUM 4.3 10/03/2024    CHLORIDE 105 10/03/2024    CO2 22 10/03/2024    BUN 13.3 10/03/2024    CR 0.53 10/03/2024     (H) 10/03/2024    SED 19 05/14/2024    DD 0.60 (H) 10/01/2024    TROPI <0.012 04/26/2010    AST 21 10/01/2024    ALT 31 10/01/2024    ALKPHOS 85 10/01/2024    BILITOTAL 0.4 10/01/2024    INR 0.96 09/12/2019

## 2024-10-04 ENCOUNTER — APPOINTMENT (OUTPATIENT)
Dept: GENERAL RADIOLOGY | Facility: CLINIC | Age: 64
End: 2024-10-04
Attending: INTERNAL MEDICINE
Payer: COMMERCIAL

## 2024-10-04 VITALS
HEART RATE: 80 BPM | TEMPERATURE: 98.9 F | HEIGHT: 63 IN | BODY MASS INDEX: 41.82 KG/M2 | OXYGEN SATURATION: 96 % | RESPIRATION RATE: 16 BRPM | DIASTOLIC BLOOD PRESSURE: 69 MMHG | SYSTOLIC BLOOD PRESSURE: 134 MMHG | WEIGHT: 236 LBS

## 2024-10-04 DIAGNOSIS — J98.4 PNEUMONITIS: ICD-10-CM

## 2024-10-04 LAB
ATRIAL RATE - MUSE: 41 BPM
ATRIAL RATE - MUSE: 76 BPM
ATRIAL RATE - MUSE: 87 BPM
DIASTOLIC BLOOD PRESSURE - MUSE: NORMAL MMHG
GLUCOSE BLDC GLUCOMTR-MCNC: 113 MG/DL (ref 70–99)
GLUCOSE BLDC GLUCOMTR-MCNC: 141 MG/DL (ref 70–99)
GLUCOSE BLDC GLUCOMTR-MCNC: 208 MG/DL (ref 70–99)
INTERPRETATION ECG - MUSE: NORMAL
MAGNESIUM SERPL-MCNC: 1.9 MG/DL (ref 1.7–2.3)
P AXIS - MUSE: 36 DEGREES
P AXIS - MUSE: 50 DEGREES
P AXIS - MUSE: 54 DEGREES
POTASSIUM SERPL-SCNC: 4.1 MMOL/L (ref 3.4–5.3)
PR INTERVAL - MUSE: 170 MS
PR INTERVAL - MUSE: 174 MS
PR INTERVAL - MUSE: 208 MS
QRS DURATION - MUSE: 120 MS
QRS DURATION - MUSE: 126 MS
QRS DURATION - MUSE: 98 MS
QT - MUSE: 410 MS
QT - MUSE: 528 MS
QT - MUSE: 538 MS
QTC - MUSE: 443 MS
QTC - MUSE: 446 MS
QTC - MUSE: 461 MS
R AXIS - MUSE: -40 DEGREES
R AXIS - MUSE: 25 DEGREES
R AXIS - MUSE: 40 DEGREES
SYSTOLIC BLOOD PRESSURE - MUSE: NORMAL MMHG
T AXIS - MUSE: 13 DEGREES
T AXIS - MUSE: 268 DEGREES
T AXIS - MUSE: 46 DEGREES
VENTRICULAR RATE- MUSE: 41 BPM
VENTRICULAR RATE- MUSE: 43 BPM
VENTRICULAR RATE- MUSE: 76 BPM

## 2024-10-04 PROCEDURE — 84132 ASSAY OF SERUM POTASSIUM: CPT | Performed by: INTERNAL MEDICINE

## 2024-10-04 PROCEDURE — 999N000065 XR CHEST 2 VIEWS

## 2024-10-04 PROCEDURE — 99232 SBSQ HOSP IP/OBS MODERATE 35: CPT | Mod: FS | Performed by: NURSE PRACTITIONER

## 2024-10-04 PROCEDURE — 250N000011 HC RX IP 250 OP 636: Performed by: HOSPITALIST

## 2024-10-04 PROCEDURE — 250N000013 HC RX MED GY IP 250 OP 250 PS 637: Performed by: NURSE PRACTITIONER

## 2024-10-04 PROCEDURE — 250N000013 HC RX MED GY IP 250 OP 250 PS 637: Performed by: HOSPITALIST

## 2024-10-04 PROCEDURE — 250N000013 HC RX MED GY IP 250 OP 250 PS 637: Performed by: INTERNAL MEDICINE

## 2024-10-04 PROCEDURE — 36415 COLL VENOUS BLD VENIPUNCTURE: CPT | Performed by: INTERNAL MEDICINE

## 2024-10-04 PROCEDURE — 99238 HOSP IP/OBS DSCHRG MGMT 30/<: CPT | Performed by: INTERNAL MEDICINE

## 2024-10-04 PROCEDURE — 83735 ASSAY OF MAGNESIUM: CPT | Performed by: INTERNAL MEDICINE

## 2024-10-04 RX ORDER — CEFUROXIME AXETIL 500 MG/1
500 TABLET ORAL 2 TIMES DAILY
Qty: 8 TABLET | Refills: 0 | Status: SHIPPED | OUTPATIENT
Start: 2024-10-04 | End: 2024-10-08

## 2024-10-04 RX ORDER — DOXYCYCLINE 100 MG/10ML
100 INJECTION, POWDER, LYOPHILIZED, FOR SOLUTION INTRAVENOUS EVERY 12 HOURS
Qty: 80 ML | Refills: 0 | Status: CANCELLED | OUTPATIENT
Start: 2024-10-04

## 2024-10-04 RX ORDER — DOXYCYCLINE 100 MG/10ML
100 INJECTION, POWDER, LYOPHILIZED, FOR SOLUTION INTRAVENOUS EVERY 12 HOURS
Qty: 80 ML | Refills: 0 | Status: SHIPPED | OUTPATIENT
Start: 2024-10-04 | End: 2024-10-04

## 2024-10-04 RX ORDER — DOXYCYCLINE 100 MG/1
100 CAPSULE ORAL 2 TIMES DAILY
Qty: 8 CAPSULE | Refills: 0 | Status: SHIPPED | OUTPATIENT
Start: 2024-10-04 | End: 2024-10-08

## 2024-10-04 RX ORDER — METOPROLOL SUCCINATE 25 MG/1
25 TABLET, EXTENDED RELEASE ORAL DAILY
Status: DISCONTINUED | OUTPATIENT
Start: 2024-10-04 | End: 2024-10-04 | Stop reason: HOSPADM

## 2024-10-04 RX ADMIN — LOSARTAN POTASSIUM 100 MG: 100 TABLET, FILM COATED ORAL at 08:11

## 2024-10-04 RX ADMIN — GABAPENTIN 300 MG: 300 CAPSULE ORAL at 08:11

## 2024-10-04 RX ADMIN — ACETAMINOPHEN 650 MG: 325 TABLET ORAL at 02:48

## 2024-10-04 RX ADMIN — METOPROLOL SUCCINATE 25 MG: 25 TABLET, EXTENDED RELEASE ORAL at 08:16

## 2024-10-04 RX ADMIN — DOXYCYCLINE 100 MG: 100 INJECTION, POWDER, LYOPHILIZED, FOR SOLUTION INTRAVENOUS at 02:29

## 2024-10-04 RX ADMIN — FLUTICASONE FUROATE AND VILANTEROL TRIFENATATE 1 PUFF: 100; 25 POWDER RESPIRATORY (INHALATION) at 08:11

## 2024-10-04 RX ADMIN — ACETAMINOPHEN 650 MG: 325 TABLET ORAL at 08:10

## 2024-10-04 RX ADMIN — CEFTRIAXONE SODIUM 2 G: 2 INJECTION, POWDER, FOR SOLUTION INTRAMUSCULAR; INTRAVENOUS at 05:39

## 2024-10-04 RX ADMIN — PANTOPRAZOLE SODIUM 40 MG: 40 TABLET, DELAYED RELEASE ORAL at 05:39

## 2024-10-04 RX ADMIN — INSULIN ASPART 2 UNITS: 100 INJECTION, SOLUTION INTRAVENOUS; SUBCUTANEOUS at 11:11

## 2024-10-04 RX ADMIN — METOCLOPRAMIDE 10 MG: 10 TABLET ORAL at 08:11

## 2024-10-04 ASSESSMENT — ACTIVITIES OF DAILY LIVING (ADL)
ADLS_ACUITY_SCORE: 30
ADLS_ACUITY_SCORE: 29

## 2024-10-04 NOTE — PROGRESS NOTES
SW:   Call received from Shelby with [facility]  (ph: [phone]). She explained she can help with any discharge needs if they arise.    OLIVERIO Clark, [facility]  Social Work- Inpatient Care Coordination  [facility]

## 2024-10-04 NOTE — PROGRESS NOTES
"MD Notification    Notified Person: MD Randolph  Notification Date/Time: 10-3-24 2004  Notification Interaction: Page  Purpose of Notification:     Pt here for SOB PNA, PPM placed 10/3. Blood sugars 400 on 10/2 so insulin orders were changed to her home dose 26 units Novolog with meals (takes 26-32 units with meals). Given 26 units for . Pt feeling shaky, \"crummy\", BG rechecked = 294. Temp 99.4' Oral. States she feels similar to when he blood sugar was high yesterday, just not as bad. Pt is on both 26 units/meals order and Q4 hour orders from when she was NPO. Would you recommend I give the 2200 Q4 hour insulin now, or orders to give additional dose? Thank you, Agustin [phone].    Orders Received: Give 2200 order now.  Comments:      "

## 2024-10-04 NOTE — TELEPHONE ENCOUNTER
Medication Question or Refill        What medication are you calling about (include dose and sig)?: Pending Prescriptions:                       Disp   Refills    doxycycline (VIBRAMYCIN) 100 MG vial      80 mL  0            Sig: Inject 100 mg over 1-2 hours into the vein every           12 hours.      Preferred Pharmacy:       [facility] [place] - [address]  [address]  [facility] [address]  Phone: [phone] Fax: [phone] Alternate Fax: [phone]      Controlled Substance Agreement on file:   CSA -- Patient Level:    CSA: None found at the patient level.       Who prescribed the medication?: Dr. Cook    Do you need a refill? No      Do you have any questions or concerns?  Yes: Patient was under the impression that the medication was an oral medication however when she went to pick it up, they said it was injectable. She would like clarification.      Could we send this information to you in [facility] or would you prefer to receive a phone call?:   Patient would prefer a phone call   Okay to leave a detailed message?: Yes at Cell number on file:    Telephone Information:   Mobile [phone]

## 2024-10-04 NOTE — TELEPHONE ENCOUNTER
Writer contacted the pharmacy, and is told that this has already been addressed.    Mary Glaser RN  [facility]

## 2024-10-04 NOTE — DISCHARGE SUMMARY
"[facility]  Hospitalist Discharge Summary      Date of Admission:  10/2/2024  Date of Discharge:  10/4/2024 12:10 PM  Discharging Provider: Gama Cook MD  Discharge Service: Hospitalist Service    Discharge Diagnoses        Symptomatic bradycardia:       Acute right lower lobe pneumonia vs pneumonitis:      Type 2 Diabetes mellitus:        Hypertension:        Hyperlipidemia:        History of breast cancer:        Chronic iron deficiency anemia:      Fibromyalgia:     Severe morbid obesity: .       Clinically Significant Risk Factors     # DMII: A1C = 8.3 % (Ref range: 0.0 - 5.6 %) within past 6 months  # Severe Obesity: Estimated body mass index is 41.81 kg/m  as calculated from the following:    Height as of this encounter: 1.6 m (5' 3\").    Weight as of this encounter: 107 kg (236 lb).       Follow-ups Needed After Discharge   Follow-up Appointments     Follow-up and recommended labs and tests       Follow up with device clinic as directed        {Additional follow-up instructions/to-do's for PCP and cardiology    Unresulted Labs Ordered in the Past 30 Days of this Admission       No orders found from 9/2/2024 to 10/3/2024.        These results will be followed up by cardiology    Discharge Disposition   Discharged to home  Condition at discharge: Stable    Hospital Course   Bria Davis is a markedly pleasant 64 year old woman with past medical history that is most notable for severe morbid obesity, Type 2 Diabetes mellitus, hypertension, breast cancer, and asthma; who presents with progressive exertional dyspnea and acute cough, and is found to have symptomatic bradycardia and suspected acute right lower lobe pneumonia.     PLAN      Symptomatic bradycardia  Status post pacemaker placement:  Ms. Davis has hypertension, and takes Toprol, she has a history of asthma. She is followed by [facility] and in 2019 after undergoing a stress test that reportedly showed a rate " related LBBB. Cardiac catheterization was done revealing EF 55% and minimal non-obstructive CAD.  She presents for evaluation of progressive exertional dyspnea and acute cough. In the ED, she has bradycardia, without hypoxia, fever, or hypotension.  EKG shows bradycardia with rate 45, RBBB, LAFB, and possible AV dissociation. COVID, Influenza, and RSV tests are negative. D-Dimer is mildly elevated. CTPA shows no PE   Overall symptoms seem consistent with 2nd degree type I AVB with symptomatic bradycardia. It is found in the context of acute pneumonitis, as discussed below, though her symptoms of dyspnea and light headedness precede onset of her cough.    -- cardiology - EP consult noted    -- TSH WNL and K  and Mg are normal    -- status post  PPM placement, follow up with device clininc      Acute right lower lobe pneumonia vs pneumonitis: Without wheezing to suggest acute asthma exacerbation though she could be at risk of developing such an exacerbation.  : it does reveal mild to moderate infiltrate seen in the basilar aspect of the right lower lobe, worrisome for pneumonitis. There is also some thickening involving numerous right lower lobe bronchi with some intermittent opacification. The central airways are patent. Mild mosaic perfusion of both lungs consistent with air trapping is also described.    -- continue Ceftin and Doxycycline for total of 7 days.     -- Duonebs scheduled q4 hours, Albuterol nebs every 2 hours as needed. Resume home Advair         -- Robitussin ordered as needed for cough.      Type 2 Diabetes mellitus:       Recent Labs   Lab Test 09/18/24  0731   A1C 8.3*   . On Toujeo, Aspart, Jardiance as an outpatient.    -- Noting that while in the ED, she had an episode of hypoglycemia to FS 67.    -- ISS insulin while hospitalized. Hold oral anti-diabetic medications. Resume home insulin at discharge.     Hypertension:   -- continue Cozaar and hydrochlorothiazide   -- resume BB    "  Hyperlipidemia:   -- continue  Rosuvastatin      History of breast cancer:   -- left breast DCIS, diagnosed in 2023 and treated with lumpectomy, bilateral breast reduction surgery, and XRT. She is now on Anastrazole.Resume at discharge     Chronic iron deficiency anemia: Monitor while hospitalized. Resume oral iron when verified        Recent Labs   Lab Test 10/01/24  1911 10/21/23  0547 10/20/23  1308   HGB 13.3 12.9 13.3      Fibromyalgia: Followed by Neurology, on gabapentin;       Severe morbid obesity: Now on Ozempic. Noted  Estimated body mass index is 42.96 kg/m  as calculated from the following:    Height as of this encounter: 1.6 m (5' 3\").    Weight as of this encounter: 110 kg (242 lb 8 oz).    Disposition:   -- anticipate home on 10/4    Consultations This Hospital Stay   CARE MANAGEMENT / SOCIAL WORK IP CONSULT  CARDIOLOGY IP CONSULT    Code Status   Full Code    Time Spent on this Encounter   I, Gama Cook MD, personally saw the patient today and spent less than or equal to 30 minutes discharging this patient.       Gama Cook MD  [facility] CORONARY CARE UNIT  [address], [address]  [facility] [address]  Phone: [phone]  ______________________________________________________________________    Physical Exam   Vital Signs: Temp: 98.9  F (37.2  C) Temp src: Oral BP: 134/69 Pulse: 80   Resp: 16 SpO2: 96 % O2 Device: None (Room air)    Weight: 236 lbs 0 oz         Primary Care Physician   Kaia Elena    Discharge Orders      Primary Care - Care Coordination Referral      Reason for your hospital stay    Admit with bradycardia and had pacemaker placed     Follow-up and recommended labs and tests     Follow up with device clinic as directed     Activity    Your activity upon discharge: activity as tolerated     Diet    Follow this diet upon discharge: Current Diet:Orders Placed This Encounter      Moderate Consistent Carb (60 g CHO per Meal) Diet     Cardiac " Device Check - In Clinic       Significant Results and Procedures   Most Recent 3 CBC's:  Recent Labs   Lab Test 10/03/24  0539 10/02/24  0533 10/01/24  1911   WBC 9.4 8.2 7.9   HGB 12.9 12.4 13.3   MCV 87 89 88    278 299     Most Recent 3 BMP's:  Recent Labs   Lab Test 10/04/24  1102 10/04/24  0646 10/04/24  0602 10/04/24  0206 10/03/24  0733 10/03/24  0539 10/02/24  0835 10/02/24  0533 10/01/24  2146 10/01/24  1911   NA  --   --   --   --   --  140  --  142  --  137   POTASSIUM  --  4.1  --   --   --  4.3  --  4.4  --  3.9   CHLORIDE  --   --   --   --   --  105  --  105  --  100   CO2  --   --   --   --   --  22  --  26  --  25   BUN  --   --   --   --   --  13.3  --  14.3  --  13.1   CR  --   --   --   --   --  0.53  --  0.72  --  0.79   ANIONGAP  --   --   --   --   --  13  --  11  --  12   MARLON  --   --   --   --   --  9.5  --  9.7  --  9.7   *  --  141* 113*   < > 176*   < > 184*   < > 118*    < > = values in this interval not displayed.   ,   Results for orders placed or performed during the hospital encounter of 10/02/24   X-ray Chest 2 vws*    Narrative    XR CHEST 2 VIEWS 10/4/2024 7:40 AM    HISTORY: Status post pacer/ICD    COMPARISON: 10/1/2024 chest CT      Impression    IMPRESSION: Left subclavian transvenous pacemaker has been placed with  leads in the right atrium and right ventricle. No pneumothorax. No  focal airspace opacities or pleural effusions. Normal heart size.  Stable calcified granuloma in the left upper lobe.    GIOVANY LU MD         SYSTEM ID:  [id number]   EP Device    Narrative    Table formatting from the original result was not included.  Images from the original result were not included.  PROCEDURES PERFORMED:  - Implantation of dual-chamber permanent pacemaker with left bundle pacing  - Cardiac fluoroscopy  - Conscious sedation, mild-moderate level    INDICATIONS:  High grade AV block, 2:1 AV block      PROCEDURE:  The benefits and risks of the procedure were  discussed with the patient in   detail; the patient expressed understanding.  Informed consent was   obtained and placed in the chart.  I determined this patient to be an   appropriate candidate for the planned sedation and procedure and have   reassessed the patient immediately prior to sedation and procedure. The   patient was brought to the EP lab in the fasting, non-sedated state.  We   continuously monitored EKG, vital signs, oxygenation and level of   sedation.  I was present during the entire time that conscious sedation   was provided.  Antibiotic prophylaxis was administered.  The chest was   prepped and draped in the usual sterile fashion.      Local anesthetic was administered.  Access to the axillary vein was   obtained with ultrasound guidance using the modified Seldinger technique.    Two wires were advanced to the IVC.  An incision was made in the left   pectoral area.  Dissection was carried down to the myofascial plane and   then continued caudally to form the pocket.  Adequate hemostasis was   obtained.      Two 7.0 Fr sheaths were introduced for atrial and ventricular leads.  A   His sheath was advanced over a long wire to the mid right ventricular   septum.  Pacemaker lead was advanced through this sheath to the left   bundle region and screwed into the septum under fluoroscopy.  There was   good pacing morphology with LVAD measured at 60 ms.  The right atrial lead   was advanced to the appendage and screwed into position under fluoroscopy.    The leads were tested and found to have adequate sensing, impedance, and   threshold.  No diaphragmatic stimulation was noted at 10 V output.  The   sheathes were peeled away and both leads were secured to the pectoral   muscle using Ethibond sutures.    Normal saline was used to irrigate the pocket. The generator was securely   attached to the leads and then placed in the pocket. The device was   sutured in the pocket with an Ethibond  suture. The pocket  was closed in   layers.  The deep layers were closed using 2.0 and 3.0 Vicryl and the   subcuticular layer was closed with 4.0 Vicryl suture. Dermabond was   applied to the skin. The incision was covered with a sterile dressing.    There was minimal blood loss (<30 mL) and no immediate complications.  The   patient tolerated the procedure well.    Implanted Hardware and Parameters:  Implant Name Type Inv. Item Serial No.  Lot No. LRB No. Used   Action   LEAD PACEMAKER SELECTSECURE 69CM MD  [id number] - [id number] Leads LEAD   PACEMAKER SELECTSECURE 69CM MD  [id number] [employer] [id number]    1 Implanted   IMP LEAD PACING BIPOLAR CAPSUREFIX NOVUS 52CM 5076-52 - [id number] Leads   IMP LEAD PACING BIPOLAR CAPSUREFIX NOVUS 52CM 5076-52 [id number] [employer]   INC [id number]  1 Implanted   PACEMAKER GEORGE MRI XT DR - [id number] Pacemaker PACEMAKER GEORGE MRI XT    [id number] [employer] [id number]  1 Implanted             CONCLUSIONS:  - Successful implantation of a dual-chamber permanent pacemaker with left   bundle pacing  - Routine follow up after discharge      Salvador Curiel MD   Echocardiogram Complete     Value    LVEF  60-65%    Narrative    [id number]  [id number]  [id number]  [id number]^ADI^TASIA^EVELYN     [facility]  Echocardiography Laboratory  [address] [place] [address]     Name: ALYCIA ENCINAS  MRN: [id number]  : 1960  Study Date: 10/03/2024 09:05 AM  Age: 64 yrs  Gender: Female  Patient Location: [facility]  Reason For Study: CHF  Ordering Physician: TASIA JOSHI  Referring Physician: REGINO JEAN  Performed By: Shay oD     BSA: 2.1 m2  Height: 63 in  Weight: 242 lb  HR: 44  BP: 138/63 mmHg  ______________________________________________________________________________  Procedure  Complete Portable Echo Adult. Definity (NDC #12338-189) given  intravenously.  ______________________________________________________________________________  Interpretation Summary     Mild (35-45mmHg) pulmonary hypertension is present.  Left ventricular systolic function is normal.  The right ventricle is normal in structure, function and size.  Contrast was used without apparent complications.  ______________________________________________________________________________  Left Ventricle  The left ventricle is normal in size. A sigmoid septum is present. There is  borderline concentric left ventricular hypertrophy. Left ventricular systolic  function is normal. The visual ejection fraction is 60-65%. Left ventricular  diastolic function is normal. Normal left ventricular wall motion. No evidence  of left ventricular mass or tumors.     Right Ventricle  The right ventricle is normal in structure, function and size.     Atria  The left atrium is borderline dilated. Right atrial size is normal.     Mitral Valve  The mitral valve leaflets appear normal. There is no evidence of stenosis,  fluttering, or prolapse. There is trace mitral regurgitation.     Tricuspid Valve  Normal tricuspid valve. There is trace tricuspid regurgitation. Mild (35-  45mmHg) pulmonary hypertension is present.     Aortic Valve  Normal tricuspid aortic valve.     Pulmonic Valve  Normal pulmonic valve.     Vessels  The aortic root is normal size. Normal size ascending aorta. Dilation of the  inferior vena cava is present with normal respiratory variation in diameter.     Pericardium  The pericardium appears normal.     Rhythm  Sinus rhythm was noted.  ______________________________________________________________________________  MMode/2D Measurements & Calculations  IVSd: 1.2 cm     LVIDd: 4.7 cm  LVIDs: 3.2 cm  LVPWd: 1.1 cm  FS: 32.4 %  LV mass(C)d: 200.1 grams  LV mass(C)dI: 95.4 grams/m2  Ao root diam: 3.1 cm  LA dimension: 4.2 cm  asc Aorta Diam: 3.3 cm  LA/Ao: 1.3  LVOT diam: 2.2 cm  LVOT area: 3.6  cm2  Ao root diam index Ht(cm/m): 2.0  Ao root diam index BSA (cm/m2): 1.5  Asc Ao diam index BSA (cm/m2): 1.6  Asc Ao diam index Ht(cm/m): 2.0  LA Volume (BP): 73.6 ml     LA Volume Index (BP): 35.0 ml/m2  RWT: 0.46  TAPSE: 3.2 cm     Doppler Measurements & Calculations  MV E max wilmer: 114.0 cm/sec  MV A max wilmer: 118.7 cm/sec  MV E/A: 0.96  MV dec slope: 507.9 cm/sec2  MV dec time: 0.23 sec  PA acc time: 0.12 sec  TR max wilmer: 276.9 cm/sec  TR max P.7 mmHg  E/E' av.8  Lateral E/e': 10.9  Medial E/e': 8.6  RV S Wilmer: 16.0 cm/sec     ______________________________________________________________________________  Report approved by: Makenna Brand 10/03/2024 11:33 AM             Discharge Medications   Discharge Medication List as of 10/4/2024 11:54 AM        START taking these medications    Details   cefuroxime (CEFTIN) 500 MG tablet Take 1 tablet (500 mg) by mouth 2 times daily for 4 days., Disp-8 tablet, R-0, E-Prescribe      doxycycline (VIBRAMYCIN) 100 MG vial Inject 100 mg over 1-2 hours into the vein every 12 hours for 4 days., Disp-80 mL, R-0, E-Prescribe           CONTINUE these medications which have NOT CHANGED    Details   albuterol (PROAIR HFA/PROVENTIL HFA/VENTOLIN HFA) 108 (90 Base) MCG/ACT inhaler Inhale 2 puffs into the lungs every 6 hours as needed for shortness of breath, wheezing or cough., Disp-18 g, R-3, E-PrescribePharmacy may dispense brand covered by insurance (Proair, or proventil or ventolin or generic albuterol inhaler)      anastrozole (ARIMIDEX) 1 MG tablet Take 1 tablet (1 mg) by mouth daily, Disp-90 tablet, R-3, E-Prescribe      Ascorbic Acid (VITAMIN C) 500 MG CAPS Take 1 tablet by mouth every evening, Historical      aspirin (ASA) 81 MG EC tablet Take 81 mg by mouth every evening, Disp-1 tablet, R-0, Historical      Calcium Carb-Cholecalciferol (CALCIUM-VITAMIN D) 600-400 MG-UNIT TABS Take 1 tablet by mouth every evening, Historical      empagliflozin (JARDIANCE) 10 MG TABS  tablet Take 1 tablet (10 mg) by mouth daily., Disp-90 tablet, R-1, E-Prescribe      esomeprazole (NEXIUM) 40 MG DR capsule Take 1 capsule (40 mg) by mouth every morning (before breakfast), Disp-90 capsule, R-3, E-Prescribe      ferrous sulfate 140 (45 Fe) MG TBCR CR tablet Take 140 mg by mouth 2 times daily, Historical      fluticasone-salmeterol (ADVAIR DISKUS) 100-50 MCG/ACT inhaler INHALE 1 PUFF INTO THE LUNGS 2 TIMES DAILY, Disp-180 each, R-3, E-Prescribe      FT NASAL DECONGESTANT MAX STR 30 MG tablet TAKE TWO TABLETS BY MOUTH TWICE DAILY, Disp-120 tablet, R-3, E-Prescribe      gabapentin (NEURONTIN) 300 MG capsule Take 1 capsule (300 mg) by mouth 2 times daily, Disp-180 capsule, R-3, E-Prescribe      hydroCHLOROthiazide 12.5 MG tablet TAKE ONE TABLET BY MOUTH ONCE DAILY, Disp-90 tablet, R-2, E-Prescribe      insulin aspart (NOVOLOG FLEXPEN) 100 UNIT/ML pen 32 units before breakfast, 32 units before lunch, 32 units before dinner, Disp-90 mL, R-3, E-Prescribe      insulin glargine U-300 (TOUJEO MAX SOLOSTAR) 300 UNIT/ML (2 units dial) pen INJECT 100 UNITS UNDER THE SKIN DAILY, Disp-40 mL, R-1, E-Prescribe      losartan (COZAAR) 100 MG tablet Take 1 tablet (100 mg) by mouth every morning, Disp-90 tablet, R-3, E-Prescribe      metFORMIN (GLUCOPHAGE XR) 500 MG 24 hr tablet Take 2 tablets (1,000 mg) by mouth 2 times daily (with meals)., Disp-360 tablet, R-1, E-Prescribe      metoclopramide (REGLAN) 10 MG tablet TAKE ONE TABLET BY MOUTH TWICE DAILY, Disp-180 tablet, R-1, E-Prescribe      metoprolol succinate ER (TOPROL XL) 25 MG 24 hr tablet Take 1 tablet (25 mg) by mouth daily, Disp-90 tablet, R-3, E-Prescribe      montelukast (SINGULAIR) 10 MG tablet Take 1 tablet (10 mg) by mouth at bedtime., Disp-90 tablet, R-3, E-Prescribe      Multiple Vitamins-Minerals (MULTIVITAMIN ADULTS 50+) TABS Take 1 tablet by mouth every morning, Historical      rosuvastatin (CRESTOR) 10 MG tablet Take 10 mg by mouth every evening.,  Historical      Semaglutide, 2 MG/DOSE, (OZEMPIC, 2 MG/DOSE,) 8 MG/3ML pen Inject 2 mg subcutaneously every 7 days., Disp-9 mL, R-1, E-Prescribe      triamcinolone (KENALOG) 0.1 % external cream Apply topically 2 times dailyDisp-80 g, [id number]           Allergies   Allergies   Allergen Reactions    Bactrim [Sulfamethoxazole-Trimethoprim] Rash     Full body rash    Oxycodone Itching     Facial itching     Lisinopril Cough

## 2024-10-04 NOTE — PROGRESS NOTES
EP Progress Note          Assessment and Plan:   Bria Davis is a 64 year old female who has a past medical history significant for type 2 diabetes mellitus, CKD stage II, hypertension, asthma, right bundle branch block, CKD, left breast cancer (ductal carcinoma in situ) with lumpectomy with reexcision of the lumpectomy (11/13/2023) with a total of 5 surgeries with wound healing issues, and right breast reduction surgery. She presented with a 3 week cough and possible pneumonia. Incidentally noted to be in 2:1 AV block. EP was consulted to assist in arrhythmia management.      High degree AV block with right bundle branch block and second-degree heart block type I  Dual chamber Medtronic pacemaker implantation   Threshold stable this morning   CXR no pneumothorax with good lead placement  Device RN education to be completed this morning    Echocardiogram EF 60-65%. Valves good. Mild elevation of RVSP     2.  Pneumonia   On IV abx's (doxycycline and ceftriaxone) will be transitioned to oral by hospitalist service     3. HTN  PTA metoprolol resumed this am, and on losartan 100 mg every day   Pressures have been slightly elevated, but improved this morning.    Back to work letter given to patient, returning 10/28. 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 Pt understands restrictions RE her new pacemaker.   EP signing off.         Kaia Portillo NP, APRN CNP  Beeper [phone]          Interval History:   Pt feeling well and offers no concerns. Energy level is better this morning. Cough slowly improving.  VSSA. Tele sinus, AS/       Medications:     Medications Prior to Admission   Medication Sig Dispense Refill Last Dose    albuterol (PROAIR HFA/PROVENTIL HFA/VENTOLIN HFA) 108 (90 Base) MCG/ACT inhaler Inhale 2 puffs into the lungs every 6 hours as needed for shortness of breath, wheezing or cough. 18 g 3 Unknown at prn    anastrozole (ARIMIDEX) 1 MG tablet Take 1 tablet (1  mg) by mouth daily 90 tablet 3 10/1/2024    Ascorbic Acid (VITAMIN C) 500 MG CAPS Take 1 tablet by mouth every evening   10/1/2024    aspirin (ASA) 81 MG EC tablet Take 81 mg by mouth every evening 1 tablet 0 10/1/2024    Calcium Carb-Cholecalciferol (CALCIUM-VITAMIN D) 600-400 MG-UNIT TABS Take 1 tablet by mouth every evening   10/1/2024    empagliflozin (JARDIANCE) 10 MG TABS tablet Take 1 tablet (10 mg) by mouth daily. 90 tablet 1 10/1/2024    esomeprazole (NEXIUM) 40 MG DR capsule Take 1 capsule (40 mg) by mouth every morning (before breakfast) 90 capsule 3 10/1/2024    ferrous sulfate 140 (45 Fe) MG TBCR CR tablet Take 140 mg by mouth 2 times daily   10/1/2024    fluticasone-salmeterol (ADVAIR DISKUS) 100-50 MCG/ACT inhaler INHALE 1 PUFF INTO THE LUNGS 2 TIMES DAILY 180 each 3 10/1/2024    FT NASAL DECONGESTANT MAX STR 30 MG tablet TAKE TWO TABLETS BY MOUTH TWICE DAILY 120 tablet 3 10/1/2024    gabapentin (NEURONTIN) 300 MG capsule Take 1 capsule (300 mg) by mouth 2 times daily 180 capsule 3 10/1/2024    hydroCHLOROthiazide 12.5 MG tablet TAKE ONE TABLET BY MOUTH ONCE DAILY 90 tablet 2 10/1/2024    insulin aspart (NOVOLOG FLEXPEN) 100 UNIT/ML pen 32 units before breakfast, 32 units before lunch, 32 units before dinner (Patient taking differently: If BG ~100 and a small meal take 100 units, If  take 26 units, If BG >200 take 32 units) 90 mL 3 10/1/2024    insulin glargine U-300 (TOUJEO MAX SOLOSTAR) 300 UNIT/ML (2 units dial) pen INJECT 100 UNITS UNDER THE SKIN DAILY 40 mL 1 10/1/2024    losartan (COZAAR) 100 MG tablet Take 1 tablet (100 mg) by mouth every morning 90 tablet 3 10/1/2024    metFORMIN (GLUCOPHAGE XR) 500 MG 24 hr tablet Take 2 tablets (1,000 mg) by mouth 2 times daily (with meals). 360 tablet 1 10/1/2024    metoclopramide (REGLAN) 10 MG tablet TAKE ONE TABLET BY MOUTH TWICE DAILY 180 tablet 1 10/1/2024    metoprolol succinate ER (TOPROL XL) 25 MG 24 hr tablet Take 1 tablet (25 mg) by mouth  "daily 90 tablet 3 10/1/2024    montelukast (SINGULAIR) 10 MG tablet Take 1 tablet (10 mg) by mouth at bedtime. 90 tablet 3 10/1/2024    Multiple Vitamins-Minerals (MULTIVITAMIN ADULTS 50+) TABS Take 1 tablet by mouth every morning   10/1/2024    rosuvastatin (CRESTOR) 10 MG tablet Take 10 mg by mouth every evening.   10/1/2024    Semaglutide, 2 MG/DOSE, (OZEMPIC, 2 MG/DOSE,) 8 MG/3ML pen Inject 2 mg subcutaneously every 7 days. (Patient taking differently: Inject 2 mg subcutaneously every 7 days. Takes on ) 9 mL 1 Past Week    triamcinolone (KENALOG) 0.1 % external cream Apply topically 2 times daily 80 g 1 10/1/2024             Review of Systems: If done, described below  The Review of Systems is negative other than noted in the HPI             Physical Exam:   Blood pressure (!) 175/92, pulse 80, temperature 98.6  F (37  C), temperature source Oral, resp. rate 16, height 1.6 m (5' 3\"), weight 107 kg (236 lb), last menstrual period 2016, SpO2 94%, not currently breastfeeding.      Vital Sign Ranges  Temperature Temp  Av.9  F (37.2  C)  Min: 98.4  F (36.9  C)  Max: 99.5  F (37.5  C)   Blood pressure Systolic (24hrs), Av , Min:154 , Max:187        Diastolic (24hrs), Av, Min:82, Max:97      Pulse Pulse  Av  Min: 77  Max: 92   Respirations Resp  Av.9  Min: 16  Max: 20   Pulse oximetry SpO2  Av.5 %  Min: 94 %  Max: 100 %         Intake/Output Summary (Last 24 hours) at 10/4/2024 0746  Last data filed at 10/4/2024 0700  Gross per 24 hour   Intake 240 ml   Output 2775 ml   Net -2535 ml       Constitutional:   in no apparent distress     Chest:   Left pectoral pocket dressing without hematoma or erythema. Dressing dry and intact     Lungs:   symmetric, right lung crackles improving otherwise clear to auscultation     Cardiovascular:   regular rate and rhythm, normal S1 and S2, and no murmur noted     Extremities and Back:   No edema              Data:     Lab Results   Component " Value Date    WBC 9.4 10/03/2024    HGB 12.9 10/03/2024    HCT 39.0 10/03/2024     10/03/2024     10/03/2024    POTASSIUM 4.1 10/04/2024    CHLORIDE 105 10/03/2024    CO2 22 10/03/2024    BUN 13.3 10/03/2024    CR 0.53 10/03/2024     (H) 10/04/2024    SED 19 05/14/2024    DD 0.60 (H) 10/01/2024    TROPI <0.012 04/26/2010    AST 21 10/01/2024    ALT 31 10/01/2024    ALKPHOS 85 10/01/2024    BILITOTAL 0.4 10/01/2024    INR 1.09 10/03/2024

## 2024-10-04 NOTE — DISCHARGE INSTRUCTIONS
Discharge Instructions for Pacemaker Implantation    You have had a procedure to insert a pacemaker. Once inside your body, this small electronic device helps keep your heart from beating too slowly. A pacemaker can t fix existing heart problems. But it can help you feel better and have more energy. As you recover, follow all of the instructions you are given, including those below.    Activity  Follow the instructions you are given about limiting your activity.  Do not raise your affected arm above shoulder level for 2 weeks (until 10/17/24). This gives the device lead wires time to attach securely inside your heart.  No lifting greater than 10 pounds for the first week.  A gallon of milk weighs about 8 pounds.    Refrain from strenuous sports such as tennis, pickle ball, basketball, golf, or weight lifting for 4 weeks to ensure stable lead healing.   Ask your doctor when you can expect to return to work.  You can still exercise. It s good for your body and your heart. Talk with your doctor about an exercise plan.    Other Precautions  Follow your doctor's directions carefully for wound care:   If you have a pressure dressing in place (large amount of gauze with a stretchy bandage over it that goes across your whole chest), this can be removed 24 hours after your procedure.  Do not remove the Aquacel dressing (tan, rectangular, rubbery looking bandage) that is underneath it.   Leave the Aquacel dressing in place until your 1 week device check.  This will be removed by the device nurse at that time, as appropriate.  The Aquacel dressing is water proof, you may shower with it on starting the morning after your procedure. If the edges of the dressing are peeling off, do NOT shower and contact your clinic for further instruction at [phone].    No soaking in bath tub, swimming pool or hot tub until you are cleared at your 6-8 week check  Never put any creams, lotions, powders, or products like peroxide on your  incision unless your doctor tells you to.    Before you receive any treatment, tell all health care providers (including your dentist) that you have a pacemaker.  You will be given an ID card that contains information about your pacemaker. Always carry this card with you. You can show this card if your pacemaker sets off a metal detector. You should also show it to avoid screening with a hand-held security wand.  Keep your cell phone away from your pacemaker. Don t carry the phone in your shirt pocket, even when it s turned off.  Avoid strong magnets. Examples are those used in MRIs or in hand-held security wands.  Avoid strong electrical fields. Examples are those made by radio transmitting towers,  ham  radios, and heavy-duty electrical equipment.  Avoid leaning over the open garza of a running car. A running engine creates an electrical field. Most household and yard appliances will not cause any problems. If you use any large power tools, such as an industrial , talk with your doctor.     Follow-Up  Follow up in the device clinic as scheduled.   You have an appointment scheduled on 10/15/24 @ 3:40pm at [facility] in [facility], Floor [address], [address] [place] [address]  And 11/19/24 @ 8:10am at [facility] in [facility], [address], [address] [place] [address]  Make regular follow-up appointments with your device clinic. They will check the pacemaker to make sure it s working properly.    When to Call Your Device Clinic [phone]  Call your Device Clinic if you have any of the following:  Dizziness  Chest pain  Lack of energy  Fainting spells  Rapid pulse or pounding heartbeat  Shortness of breath  Increased pain around your pacemaker  Fever above 100.4 F (38 C) or other signs of infection (redness, swelling, drainage, or warmth at the incision site)     8769-5808 [employer] [address]  [place] [address]. All rights reserved. This information is not intended as a substitute for professional medical care. Always follow your healthcare professional's instructions.    [facility] in [place]: [phone]  Device Clinic (Monday to Friday, 8am-4pm): [phone]  *The device clinic is closed on weekends and holidays.  Any calls received during this time will be answered on the next business  day. For any urgent questions after hours, please call the main clinic number and you will be put in contact with the cardiologist on call.

## 2024-10-04 NOTE — PROGRESS NOTES
Patient discharged home with . Discharge instructions and medications reviewed with patient and spouse. Answered all concerns/questions.   Upon discharge she had no CP, dizziness, nausea. Stated a slight cough when up walking.   BP this morning was 130/90's after metoprolol.

## 2024-10-04 NOTE — PROGRESS NOTES
Post Device Implant Instructions    Dressing:  Clean, dry, and intact  Chest XR reviewed:  Yes  Pneumo present?:  No  Lead Measurements per Device Rep:  WNL    Education Provided:  - Avoid raising left arm above the level of the shoulder for 2 weeks.  -Follow your doctor's directions carefully for wound care. Leave the Aquacel outer dressing place; this will be removed at your 1 week follow-up, as appropriate.    -The Aquacel dressing is water proof, you may shower with it on. If the edges of the dressing are peeling off, do NOT shower and contact your clinic for further instruction. No soaking in bath tub, swimming pool or hot tub until you are cleared at your 6-8 week check  - Never put any creams, lotions, or products like peroxide on an incision unless your doctor tells you to.  - Call Device Clinic with increased swelling, drainage, or fever > 101 degrees.   - Follow-up with the Device Clinic as scheduled.     Pt verbalized understanding of instructions and AVS updated.

## 2024-10-04 NOTE — PROGRESS NOTES
A&O pleasant and cooperative. Blood sugars elevated at HS, Q4 hour insulin given early with improvement. Up independent in room. HTN 160s-170s. Lt PPM side stable, unchanged. Abx as ordered. Pt hopeful for possible discharge today.  bedside overnight.

## 2024-10-07 ENCOUNTER — TELEPHONE (OUTPATIENT)
Dept: FAMILY MEDICINE | Facility: CLINIC | Age: 64
End: 2024-10-07
Payer: COMMERCIAL

## 2024-10-07 ENCOUNTER — PATIENT OUTREACH (OUTPATIENT)
Dept: CARE COORDINATION | Facility: CLINIC | Age: 64
End: 2024-10-07
Payer: COMMERCIAL

## 2024-10-07 ASSESSMENT — ACTIVITIES OF DAILY LIVING (ADL): DEPENDENT_IADLS:: INDEPENDENT

## 2024-10-07 NOTE — TELEPHONE ENCOUNTER
Reason for Call:  Appointment Request    Patient requesting this type of appt:  Hospital/ED Follow-Up     Requested provider: Kaia Elena    Reason patient unable to be scheduled: Not within requested timeframe    When does patient want to be seen/preferred time: 3-7 days    Comments: Pt was seen at [facility] and the sent to [facility] for pneumonia and a pacemaker. Pt was told to be seen by 10/11    Could we send this information to you in [facility] or would you prefer to receive a phone call?:   Patient would prefer a phone call   Okay to leave a detailed message?: Yes at Home number on file [phone] (home)    Call taken on 10/7/2024 at 4:04 PM by Nata Alston

## 2024-10-07 NOTE — PROGRESS NOTES
Clinic Care Coordination Contact  Clinic Care Coordination Contact  OUTREACH    Referral Information:  Referral Source: IP Handoff    Primary Diagnosis: Pneumonia       Universal Utilization:   [facility]  Hospitalist Discharge Summary       Date of Admission:  10/2/2024  Date of Discharge:  10/4/2024 12:10 PM  Discharging Provider: Gama Cook MD  Discharge Service: Hospitalist Service        Discharge Diagnoses     Symptomatic bradycardia:       Acute right lower lobe pneumonia vs pneumonitis:      Type 2 Diabetes mellitus:         Hypertension:         Hyperlipidemia:         History of breast cancer:         Chronic iron deficiency anemia:      Fibromyalgia:     Severe morbid obesity: .              Clinic Utilization  Difficulty keeping appointments:: No  Compliance Concerns: No  No-Show Concerns: No  No PCP office visit in Past Year: No  Utilization      No Show Count (past year)  0             ED Visits  2             Hospital Admissions  2                    Current as of: 10/7/2024  9:15 AM                Clinical Concerns:  Current Medical Concerns:    Patient reports pacemaker dressing is coming off on one corner.   states the incision is looking good.  Patient plans to call the Cardiology office and see if she needs a dressing change   Patient ,continues to have a loose productive cough.  Patient will finish the course of both antibiotics  Patient reminded to make a hospital follow up with PCP  Patient agrees with this plan     Patient has a Cardiac device check appointment 10/15/2024  Blood sugar this morning 190   Current Behavioral Concerns: No    Education Provided to patient: CC role introduced  Patient encouraged to cough deep breath and drink plenty of water    Pain  Pain (GOAL):: No  Health Maintenance Reviewed: Not assessed  Clinical Pathway: None    Medication Management:  Medication review status: Medications reviewed.  Changes noted per patient  report.       Functional Status:  Dependent ADLs:: Independent  Dependent IADLs:: Independent  Bed or wheelchair confined:: No  Mobility Status: Independent    Living Situation:  Current living arrangement:: I live in a private home with spouse    Lifestyle & Psychosocial Needs:    Social Determinants of Health     Food Insecurity: Low Risk  (10/3/2024)    Food Insecurity     Within the past 12 months, did you worry that your food would run out before you got money to buy more?: No     Within the past 12 months, did the food you bought just not last and you didn t have money to get more?: No   Depression: Not at risk (2/26/2024)    PHQ-2     PHQ-2 Score: 0   Housing Stability: Low Risk  (10/3/2024)    Housing Stability     Do you have housing? : Yes     Are you worried about losing your housing?: No   Tobacco Use: Low Risk  (9/18/2024)    Patient History     Smoking Tobacco Use: Never     Smokeless Tobacco Use: Never     Passive Exposure: Not on file   Financial Resource Strain: Low Risk  (10/3/2024)    Financial Resource Strain     Within the past 12 months, have you or your family members you live with been unable to get utilities (heat, electricity) when it was really needed?: No   Alcohol Use: Not on file   Transportation Needs: Low Risk  (10/3/2024)    Transportation Needs     Within the past 12 months, has lack of transportation kept you from medical appointments, getting your medicines, non-medical meetings or appointments, work, or from getting things that you need?: No   Physical Activity: Unknown (9/13/2024)    Exercise Vital Sign     Days of Exercise per Week: 0 days     Minutes of Exercise per Session: Patient declined   Interpersonal Safety: High Risk (10/2/2024)    Interpersonal Safety     Do you feel physically and emotionally safe where you currently live?: No     Within the past 12 months, have you been hit, slapped, kicked or otherwise physically hurt by someone?: No     Within the past 12 months,  have you been humiliated or emotionally abused in other ways by your partner or ex-partner?: No   Stress: No Stress Concern Present (9/13/2024)    [place] [employer] - Occupational Stress Questionnaire     Feeling of Stress : Only a little   Social Connections: Unknown (9/13/2024)    Social Connection and Isolation Panel [NHANES]     Frequency of Communication with Friends and Family: Not on file     Frequency of Social Gatherings with Friends and Family: More than three times a week     Attends Mosque Services: Not on file     Active Member of Clubs or Organizations: Not on file     Attends Club or Organization Meetings: Not on file     Marital Status: Not on file   Health Literacy: Not on file     Diet:: Diabetic diet  Inadequate nutrition (GOAL):: No  Tube Feeding: No  Inadequate activity/exercise (GOAL):: No  Significant changes in sleep pattern (GOAL): No  Transportation means:: Accessible car     Mosque or spiritual beliefs that impact treatment:: No  Mental health DX:: No  Mental health management concern (GOAL):: No  Chemical Dependency Status: No Current Concerns  Informal Support system:: Spouse             Resources and Interventions:  Current Resources:      Community Resources: None  Supplies Currently Used at Home: Nebulizer tubing  Equipment Currently Used at Home: none  Employment Status: employed full-time              Referrals Placed: None    Patient/Caregiver understanding: Patient expresses good understanding of discharge instructions     Outreach Frequency: weekly, more frequently as needed  Future Appointments                In 1 week [place] DEVICE TER[facility]    In 1 month Jabari Santos MD [facility]    In 1 month [place] DEVICE [facility]    In 2 months Sarahi Agosto MD [facility]     In 2 months CL LAB [facility], [facility]    In 4 months Litzy Barcenas PA-C [facility]    In 5 months Olivia Gaffney NP [facility]    In 11 months Kaia Elena MD [facility], [facility]            Plan:   Patient will call the clinic and make a hospital follow up with PCP   Patient agrees to a CC RN follow up in 3-5 business days      [facility]   Tabatha Beckford RN, Care Coordinator   [facility]   E-mail [email or web address]   [phone]

## 2024-10-08 ENCOUNTER — OFFICE VISIT (OUTPATIENT)
Dept: FAMILY MEDICINE | Facility: CLINIC | Age: 64
End: 2024-10-08
Payer: COMMERCIAL

## 2024-10-08 VITALS
HEART RATE: 89 BPM | SYSTOLIC BLOOD PRESSURE: 120 MMHG | OXYGEN SATURATION: 98 % | HEIGHT: 63 IN | DIASTOLIC BLOOD PRESSURE: 70 MMHG | TEMPERATURE: 97.1 F | BODY MASS INDEX: 42.17 KG/M2 | WEIGHT: 238 LBS

## 2024-10-08 DIAGNOSIS — Z79.4 TYPE 2 DIABETES MELLITUS WITH CHRONIC KIDNEY DISEASE, WITH LONG-TERM CURRENT USE OF INSULIN, UNSPECIFIED CKD STAGE (H): ICD-10-CM

## 2024-10-08 DIAGNOSIS — J98.4 PNEUMONITIS: Primary | ICD-10-CM

## 2024-10-08 DIAGNOSIS — Z95.0 S/P PLACEMENT OF CARDIAC PACEMAKER: ICD-10-CM

## 2024-10-08 DIAGNOSIS — E11.22 TYPE 2 DIABETES MELLITUS WITH CHRONIC KIDNEY DISEASE, WITH LONG-TERM CURRENT USE OF INSULIN, UNSPECIFIED CKD STAGE (H): ICD-10-CM

## 2024-10-08 PROCEDURE — 99495 TRANSJ CARE MGMT MOD F2F 14D: CPT | Performed by: FAMILY MEDICINE

## 2024-10-08 ASSESSMENT — PAIN SCALES - GENERAL: PAINLEVEL: NO PAIN (0)

## 2024-10-08 NOTE — PROGRESS NOTES
Assessment & Plan     Pneumonitis  RLL - I'm still hearing rales in the RLL.    Will continue to follow this.   She does feel she's doing better. Has finished antibiotics.  Could be viral.   Last PFTs done 2022 - reviewed.      S/P placement of cardiac pacemaker  Doing well.  Has cardiology follow up next week    Type 2 diabetes mellitus with chronic kidney disease, with long-term current use of insulin, unspecified CKD stage (H)  Blood sugars much better since starting Jardiance.         MED REC REQUIRED  Post Medication Reconciliation Status: discharge medications reconciled, continue medications without change        Subjective   Melyssa is a 64 year old, presenting for the following health issues:  Bradycardia        10/8/2024    10:46 AM   Additional Questions   Roomed by scott gaona CMA   Accompanied by Self     HPI       Hospital Follow-up Visit:    Hospital/Nursing Home/IP Rehab Facility: [facility]  Date of Admission: 10/2/2024  Date of Discharge: 10/4/2024  Reason(s) for Admission: symptomatic bradycardia  Was the patient in the ICU or did the patient experience delirium during hospitalization?  No  Do you have any other stressors you would like to discuss with your provider? No    Problems taking medications regularly:  None  Medication changes since discharge: None  Problems adhering to non-medication therapy:  None    Summary of hospitalization:  [facility] discharge summary reviewed  Diagnostic Tests/Treatments reviewed.  Follow up needed: cardiology  Other Healthcare Providers Involved in Patient s Care:         None  Update since discharge: improved.         Plan of care communicated with patient                  Current Status: He has finished course of Ceftin and doxycycline with some improvement. No chest pain, sweating, vision changes, headaches, left sided numbness/tingling. She notes still having a productive clear cough with some SOB and wheezing. No other  cold sx or fevers. Blood sugars are averaging 140.    Symptomatic bradycardia  Status post pacemaker placement:  Ms. Davis has hypertension, and takes Toprol, she has a history of asthma. She is followed by [facility] and in 2019 after undergoing a stress test that reportedly showed a rate related LBBB. Cardiac catheterization was done revealing EF 55% and minimal non-obstructive CAD.  She presents for evaluation of progressive exertional dyspnea and acute cough. In the ED, she has bradycardia, without hypoxia, fever, or hypotension.  EKG shows bradycardia with rate 45, RBBB, LAFB, and possible AV dissociation. COVID, Influenza, and RSV tests are negative. D-Dimer is mildly elevated. CTPA shows no PE   Overall symptoms seem consistent with 2nd degree type I AVB with symptomatic bradycardia. It is found in the context of acute pneumonitis, as discussed below, though her symptoms of dyspnea and light headedness precede onset of her cough.    -- cardiology - EP consult noted    -- TSH WNL and K  and Mg are normal    -- status post  PPM placement, follow up with device clininc        Acute right lower lobe pneumonia vs pneumonitis: Without wheezing to suggest acute asthma exacerbation though she could be at risk of developing such an exacerbation.  : it does reveal mild to moderate infiltrate seen in the basilar aspect of the right lower lobe, worrisome for pneumonitis. There is also some thickening involving numerous right lower lobe bronchi with some intermittent opacification. The central airways are patent. Mild mosaic perfusion of both lungs consistent with air trapping is also described.    -- continue Ceftin and Doxycycline for total of 7 days.     -- Duonebs scheduled q4 hours, Albuterol nebs every 2 hours as needed. Resume home Advair         -- Robitussin ordered as needed for cough.      Type 2 Diabetes mellitus:         Recent Labs   Lab Test 09/18/24  0731   A1C 8.3*   . On Toujeo, Aspart, Jardiance as  "an outpatient.    -- Noting that while in the ED, she had an episode of hypoglycemia to FS 67.    -- ISS insulin while hospitalized. Hold oral anti-diabetic medications. Resume home insulin at discharge.     Hypertension:   -- continue Cozaar and hydrochlorothiazide   -- resume BB     Hyperlipidemia:   -- continue  Rosuvastatin      History of breast cancer:   -- left breast DCIS, diagnosed in 2023 and treated with lumpectomy, bilateral breast reduction surgery, and XRT. She is now on Anastrazole.Resume at discharge     Chronic iron deficiency anemia: Monitor while hospitalized. Resume oral iron when verified            Recent Labs   Lab Test 10/01/24  1911 10/21/23  0547 10/20/23  1308   HGB 13.3 12.9 13.3      Fibromyalgia: Followed by Neurology, on gabapentin;         Review of Systems  Constitutional, HEENT, cardiovascular, pulmonary, gi and gu systems are negative, except as otherwise noted.    Breathing still feels a bit labored        Objective    /70   Pulse 89   Temp 97.1  F (36.2  C) (Tympanic)   Ht 1.6 m (5' 3\")   Wt 108 kg (238 lb)   LMP 01/14/2016 (Approximate)   SpO2 98%   BMI 42.16 kg/m    Body mass index is 42.16 kg/m .  Physical Exam   GENERAL: alert and no distress  NECK: no adenopathy, no asymmetry, masses, or scars  RESP: lungs clear to auscultation - no rales, rhonchi or wheezes  CV: regular rate and rhythm, normal S1 S2, no S3 or S4, no murmur, click or rub, no peripheral edema  ABDOMEN: soft, nontender, no hepatosplenomegaly, no masses and bowel sounds normal  MS: no gross musculoskeletal defects noted, no edema    Labs/imaging reviewed with patient.         Signed Electronically by: Kaia Elena MD    "

## 2024-10-09 ENCOUNTER — PATIENT OUTREACH (OUTPATIENT)
Dept: CARE COORDINATION | Facility: CLINIC | Age: 64
End: 2024-10-09
Payer: COMMERCIAL

## 2024-10-09 ENCOUNTER — PATIENT OUTREACH (OUTPATIENT)
Dept: GASTROENTEROLOGY | Facility: CLINIC | Age: 64
End: 2024-10-09
Payer: COMMERCIAL

## 2024-10-09 ENCOUNTER — MYC MEDICAL ADVICE (OUTPATIENT)
Dept: FAMILY MEDICINE | Facility: CLINIC | Age: 64
End: 2024-10-09
Payer: COMMERCIAL

## 2024-10-09 DIAGNOSIS — E11.22 TYPE 2 DIABETES MELLITUS WITH CHRONIC KIDNEY DISEASE, WITH LONG-TERM CURRENT USE OF INSULIN, UNSPECIFIED CKD STAGE (H): ICD-10-CM

## 2024-10-09 DIAGNOSIS — B37.31 YEAST INFECTION OF THE VAGINA: Primary | ICD-10-CM

## 2024-10-09 DIAGNOSIS — Z79.4 TYPE 2 DIABETES MELLITUS WITH CHRONIC KIDNEY DISEASE, WITH LONG-TERM CURRENT USE OF INSULIN, UNSPECIFIED CKD STAGE (H): ICD-10-CM

## 2024-10-09 DIAGNOSIS — Z12.11 SPECIAL SCREENING FOR MALIGNANT NEOPLASMS, COLON: Primary | ICD-10-CM

## 2024-10-09 RX ORDER — FLUCONAZOLE 150 MG/1
150 TABLET ORAL ONCE
Qty: 1 TABLET | Refills: 0 | Status: SHIPPED | OUTPATIENT
Start: 2024-10-09 | End: 2024-10-09

## 2024-10-09 NOTE — TELEPHONE ENCOUNTER
Please see my chart message.     Patient requesting Diflucan, pended    Recent Ceftin and Doxy    Test strips refilled on other encounter.     Chante Fuller RN on 10/9/2024 at 2:42 PM

## 2024-10-09 NOTE — PROGRESS NOTES
"CRC Screening Colonoscopy Referral Review    Patient meets the inclusion criteria for screening colonoscopy standing order.    Ordering/Referring Provider:  Kaia Elena      BMI: Estimated body mass index is 42.16 kg/m  as calculated from the following:    Height as of 10/8/24: 1.6 m (5' 3\").    Weight as of 10/8/24: 108 kg (238 lb).     Sedation:  Does patient have any of the following conditions affecting sedation?  No medical conditions affecting sedation.    Previous Scopes:  Any previous recommendations or follow up needs based on previous scope?  na / No recommendations.    Medical Concerns to Postpone Order:  Does patient have any of the following medical concerns that should postpone/delay colonoscopy referral?  No medical conditions affecting colonoscopy referral.    Final Referral Details:  Based on patient's medical history patient is appropriate for referral order with moderate sedation. If patient's BMI > 50 do not schedule in ASC.  "

## 2024-10-09 NOTE — TELEPHONE ENCOUNTER
Prescription approved per [facility] Refill Protocol.    Signed Prescriptions:                        Disp   Refills    blood glucose (CONTOUR NEXT TEST) test str*400 st*1        Sig: USE TO TEST BLOOD SUGAR 4 TIMES DAILY OR AS DIRECTED.  Authorizing Provider: QUENTIN TRIMBLE  Ordering User: JOB CHU RN on 10/9/2024 at 2:43 PM

## 2024-10-10 ENCOUNTER — TELEPHONE (OUTPATIENT)
Dept: CARDIOLOGY | Facility: CLINIC | Age: 64
End: 2024-10-10
Payer: COMMERCIAL

## 2024-10-10 NOTE — TELEPHONE ENCOUNTER
"Pt called stating that Aquacel dressing was starting to peel back a little bit.  It has been a full week since implant.  Instructed Pt to \"stretch\" dressing off and slowly take off.      Stated that under the dressing there should be a glue like substance keeping incision together, but if she had noted that her incision was open slightly, she could go to any drug store and buy some steri-strips to hold the incision together.     Told her that it was ok to shower and use soap and water, but not to scrub hard.      Pt will call with any further issues.   "

## 2024-10-11 ENCOUNTER — TELEPHONE (OUTPATIENT)
Dept: CARDIOLOGY | Facility: CLINIC | Age: 64
End: 2024-10-11
Payer: COMMERCIAL

## 2024-10-11 ENCOUNTER — DOCUMENTATION ONLY (OUTPATIENT)
Dept: CARDIOLOGY | Facility: CLINIC | Age: 64
End: 2024-10-11
Payer: COMMERCIAL

## 2024-10-11 RX ORDER — FLUCONAZOLE 150 MG/1
150 TABLET ORAL
Qty: 3 TABLET | Refills: 0 | Status: SHIPPED | OUTPATIENT
Start: 2024-10-11 | End: 2024-10-18

## 2024-10-11 NOTE — TELEPHONE ENCOUNTER
Patient continues to have yeast infection symptoms.   Pended 3 day diflucan.     Chante Fuller RN on 10/11/2024 at 12:01 PM

## 2024-10-11 NOTE — TELEPHONE ENCOUNTER
Pt called requesting our fax number for her Human resources department as she has paperwork for Kaia to fill out.  Call pt back and gave her our fax number.  Emma HALEY

## 2024-10-11 NOTE — PROGRESS NOTES
[place] paperwork received from [facility]. Paperwork completed, reviewed and signed by Kaia Portillo CNP, and faxed to [phone].  Original sent to [facility] to be scanned in.      TERA Garza

## 2024-10-14 ENCOUNTER — PATIENT OUTREACH (OUTPATIENT)
Dept: CARE COORDINATION | Facility: CLINIC | Age: 64
End: 2024-10-14

## 2024-10-14 ENCOUNTER — OFFICE VISIT (OUTPATIENT)
Dept: FAMILY MEDICINE | Facility: CLINIC | Age: 64
End: 2024-10-14
Payer: COMMERCIAL

## 2024-10-14 VITALS
BODY MASS INDEX: 41.99 KG/M2 | SYSTOLIC BLOOD PRESSURE: 120 MMHG | HEART RATE: 80 BPM | HEIGHT: 63 IN | OXYGEN SATURATION: 96 % | DIASTOLIC BLOOD PRESSURE: 68 MMHG | TEMPERATURE: 97.4 F | WEIGHT: 237 LBS

## 2024-10-14 DIAGNOSIS — L30.9 VULVAR DERMATITIS: Primary | ICD-10-CM

## 2024-10-14 PROCEDURE — 99213 OFFICE O/P EST LOW 20 MIN: CPT | Performed by: FAMILY MEDICINE

## 2024-10-14 RX ORDER — CLOBETASOL PROPIONATE 0.5 MG/G
OINTMENT TOPICAL 2 TIMES DAILY
Qty: 60 G | Refills: 1 | Status: SHIPPED | OUTPATIENT
Start: 2024-10-14

## 2024-10-14 ASSESSMENT — ACTIVITIES OF DAILY LIVING (ADL): DEPENDENT_IADLS:: INDEPENDENT

## 2024-10-14 ASSESSMENT — PAIN SCALES - GENERAL: PAINLEVEL: NO PAIN (0)

## 2024-10-14 NOTE — PROGRESS NOTES
"  Assessment & Plan     Vulvar dermatitis  Treated vaginal  yeast infection recently. That seemed to have resolved.  Now has more vulvar itching/labial itching. Exam consistent with dermatitis  Will have her treat with clobetasol and moisturize with organic coconut oil.     - clobetasol (TEMOVATE) 0.05 % external ointment; Apply topically 2 times daily.        MED REC REQUIRED  Post Medication Reconciliation Status: discharge medications reconciled, continue medications without change        Subjective   Melyssa is a 64 year old, presenting for the following health issues:  Vaginal Problem        10/8/2024    10:46 AM   Additional Questions   Roomed by scott gaona CMA   Accompanied by Self     History of Present Illness       Reason for visit:  Itching    She eats 0-1 servings of fruits and vegetables daily.She consumes 0 sweetened beverage(s) daily.She exercises with enough effort to increase her heart rate 9 or less minutes per day.  She exercises with enough effort to increase her heart rate 3 or less days per week.   She is taking medications regularly.     Treated last week for yeast infection with diflucan.     Now has more vulvar/labial itching.                 Objective    /68   Pulse 80   Temp 97.4  F (36.3  C) (Tympanic)   Ht 1.6 m (5' 3\")   Wt 107.5 kg (237 lb)   LMP 01/14/2016 (Approximate)   SpO2 96%   BMI 41.98 kg/m    Body mass index is 41.98 kg/m .  Physical Exam   GENERAL: alert and no distress   (female): vulvar erythema and lichenification.             Signed Electronically by: Kaia Elena MD    "

## 2024-10-14 NOTE — PROGRESS NOTES
Clinic Care Coordination Contact  Follow Up Progress Note     [facility]  Hospitalist Discharge Summary       Date of Admission:  10/2/2024  Date of Discharge:  10/4/2024 12:10 PM  Discharging Provider: Gama Cook MD  Discharge Service: Hospitalist Service        Discharge Diagnoses     Symptomatic bradycardia:       Acute right lower lobe pneumonia vs pneumonitis:      Type 2 Diabetes mellitus:         Hypertension:       Hyperlipidemia:       History of breast cancer:       Chronic iron deficiency anemia:      Fibromyalgia:     Severe morbid obesity: .        Assessment: Patient reports pacemaker site is healing well.  Finished course of antibiotic . Patient continues to have a cough and plans to discuss at her PCP visit this afternoon     Care Gaps:    Health Maintenance Due   Topic Date Due    DIABETIC FOOT EXAM  09/21/2024    COLORECTAL CANCER SCREENING  11/08/2024       Will be reviewed at PCP visit today       Intervention/Education provided during outreach: Not discussed               Plan:   Patient has a follow up appointment this afternoon with PCP   No unmet needs, no further care coordination is needed at this time   [facility]   Tabatha Beckford RN, Care Coordinator   [facility]   E-mail [email or web address]   [phone]

## 2024-10-14 NOTE — PATIENT INSTRUCTIONS
"Topical steroid ointment - clobetasol    Also use organic coconut oil as a moisturizer twice daily as well        When you are out of refills or the refills say \"zero\", it is time to schedule your next appointment in clinic!    Labs are released to you almost immediately and sometimes before I have had a chance to review them.  I review labs regularly and once they are all in, you will either be sent a letter with your results or if you are signed up for on-line services, you will be notified that results are available to you on [employer]. If there are serious findings, you typically will be called.    If you have any questions about your visit, your symptoms, your medication, your test results or it is not clear what your diagnosis or treatment plan is please contact me (via [facility]) or call the care team at [phone] and say \"Care Team\"          "

## 2024-10-15 ENCOUNTER — HOSPITAL ENCOUNTER (OUTPATIENT)
Dept: CARDIOLOGY | Facility: CLINIC | Age: 64
Discharge: HOME | End: 2024-10-15
Attending: INTERNAL MEDICINE | Admitting: INTERNAL MEDICINE
Payer: COMMERCIAL

## 2024-10-15 DIAGNOSIS — I44.30 AV HEART BLOCK: ICD-10-CM

## 2024-10-15 DIAGNOSIS — Z95.0 CARDIAC PACEMAKER IN SITU: ICD-10-CM

## 2024-10-15 PROCEDURE — 93280 PM DEVICE PROGR EVAL DUAL: CPT | Mod: 26 | Performed by: INTERNAL MEDICINE

## 2024-10-15 PROCEDURE — 93280 PM DEVICE PROGR EVAL DUAL: CPT

## 2024-10-16 LAB
MDC_IDC_LEAD_CONNECTION_STATUS: NORMAL
MDC_IDC_LEAD_CONNECTION_STATUS: NORMAL
MDC_IDC_LEAD_IMPLANT_DT: NORMAL
MDC_IDC_LEAD_IMPLANT_DT: NORMAL
MDC_IDC_LEAD_LOCATION: NORMAL
MDC_IDC_LEAD_LOCATION: NORMAL
MDC_IDC_LEAD_LOCATION_DETAIL_1: NORMAL
MDC_IDC_LEAD_LOCATION_DETAIL_1: NORMAL
MDC_IDC_LEAD_MFG: NORMAL
MDC_IDC_LEAD_MFG: NORMAL
MDC_IDC_LEAD_MODEL: NORMAL
MDC_IDC_LEAD_MODEL: NORMAL
MDC_IDC_LEAD_POLARITY_TYPE: NORMAL
MDC_IDC_LEAD_POLARITY_TYPE: NORMAL
MDC_IDC_LEAD_SERIAL: NORMAL
MDC_IDC_LEAD_SERIAL: NORMAL
MDC_IDC_MSMT_BATTERY_DTM: NORMAL
MDC_IDC_MSMT_BATTERY_REMAINING_LONGEVITY: 157 MO
MDC_IDC_MSMT_BATTERY_RRT_TRIGGER: 2.62
MDC_IDC_MSMT_BATTERY_STATUS: NORMAL
MDC_IDC_MSMT_BATTERY_VOLTAGE: 3.22 V
MDC_IDC_MSMT_LEADCHNL_RA_IMPEDANCE_VALUE: 304 OHM
MDC_IDC_MSMT_LEADCHNL_RA_IMPEDANCE_VALUE: 551 OHM
MDC_IDC_MSMT_LEADCHNL_RA_PACING_THRESHOLD_AMPLITUDE: 1 V
MDC_IDC_MSMT_LEADCHNL_RA_PACING_THRESHOLD_PULSEWIDTH: 0.4 MS
MDC_IDC_MSMT_LEADCHNL_RA_SENSING_INTR_AMPL: 2.75 MV
MDC_IDC_MSMT_LEADCHNL_RA_SENSING_INTR_AMPL: 3 MV
MDC_IDC_MSMT_LEADCHNL_RV_IMPEDANCE_VALUE: 437 OHM
MDC_IDC_MSMT_LEADCHNL_RV_IMPEDANCE_VALUE: 608 OHM
MDC_IDC_MSMT_LEADCHNL_RV_PACING_THRESHOLD_AMPLITUDE: 0.5 V
MDC_IDC_MSMT_LEADCHNL_RV_PACING_THRESHOLD_PULSEWIDTH: 0.4 MS
MDC_IDC_MSMT_LEADCHNL_RV_SENSING_INTR_AMPL: 23.38 MV
MDC_IDC_MSMT_LEADCHNL_RV_SENSING_INTR_AMPL: 30.12 MV
MDC_IDC_PG_IMPLANT_DTM: NORMAL
MDC_IDC_PG_MFG: NORMAL
MDC_IDC_PG_MODEL: NORMAL
MDC_IDC_PG_SERIAL: NORMAL
MDC_IDC_PG_TYPE: NORMAL
MDC_IDC_SESS_CLINIC_NAME: NORMAL
MDC_IDC_SESS_DTM: NORMAL
MDC_IDC_SESS_TYPE: NORMAL
MDC_IDC_SET_BRADY_AT_MODE_SWITCH_RATE: 171 {BEATS}/MIN
MDC_IDC_SET_BRADY_HYSTRATE: NORMAL
MDC_IDC_SET_BRADY_LOWRATE: 60 {BEATS}/MIN
MDC_IDC_SET_BRADY_MAX_SENSOR_RATE: 130 {BEATS}/MIN
MDC_IDC_SET_BRADY_MAX_TRACKING_RATE: 130 {BEATS}/MIN
MDC_IDC_SET_BRADY_MODE: NORMAL
MDC_IDC_SET_BRADY_PAV_DELAY_LOW: 180 MS
MDC_IDC_SET_BRADY_SAV_DELAY_LOW: 150 MS
MDC_IDC_SET_LEADCHNL_RA_PACING_AMPLITUDE: 2 V
MDC_IDC_SET_LEADCHNL_RA_PACING_ANODE_ELECTRODE_1: NORMAL
MDC_IDC_SET_LEADCHNL_RA_PACING_ANODE_LOCATION_1: NORMAL
MDC_IDC_SET_LEADCHNL_RA_PACING_CAPTURE_MODE: NORMAL
MDC_IDC_SET_LEADCHNL_RA_PACING_CATHODE_ELECTRODE_1: NORMAL
MDC_IDC_SET_LEADCHNL_RA_PACING_CATHODE_LOCATION_1: NORMAL
MDC_IDC_SET_LEADCHNL_RA_PACING_POLARITY: NORMAL
MDC_IDC_SET_LEADCHNL_RA_PACING_PULSEWIDTH: 0.4 MS
MDC_IDC_SET_LEADCHNL_RA_SENSING_ANODE_ELECTRODE_1: NORMAL
MDC_IDC_SET_LEADCHNL_RA_SENSING_ANODE_LOCATION_1: NORMAL
MDC_IDC_SET_LEADCHNL_RA_SENSING_CATHODE_ELECTRODE_1: NORMAL
MDC_IDC_SET_LEADCHNL_RA_SENSING_CATHODE_LOCATION_1: NORMAL
MDC_IDC_SET_LEADCHNL_RA_SENSING_POLARITY: NORMAL
MDC_IDC_SET_LEADCHNL_RA_SENSING_SENSITIVITY: 0.3 MV
MDC_IDC_SET_LEADCHNL_RV_PACING_AMPLITUDE: 2 V
MDC_IDC_SET_LEADCHNL_RV_PACING_ANODE_ELECTRODE_1: NORMAL
MDC_IDC_SET_LEADCHNL_RV_PACING_ANODE_LOCATION_1: NORMAL
MDC_IDC_SET_LEADCHNL_RV_PACING_CAPTURE_MODE: NORMAL
MDC_IDC_SET_LEADCHNL_RV_PACING_CATHODE_ELECTRODE_1: NORMAL
MDC_IDC_SET_LEADCHNL_RV_PACING_CATHODE_LOCATION_1: NORMAL
MDC_IDC_SET_LEADCHNL_RV_PACING_POLARITY: NORMAL
MDC_IDC_SET_LEADCHNL_RV_PACING_PULSEWIDTH: 0.4 MS
MDC_IDC_SET_LEADCHNL_RV_SENSING_ANODE_ELECTRODE_1: NORMAL
MDC_IDC_SET_LEADCHNL_RV_SENSING_ANODE_LOCATION_1: NORMAL
MDC_IDC_SET_LEADCHNL_RV_SENSING_CATHODE_ELECTRODE_1: NORMAL
MDC_IDC_SET_LEADCHNL_RV_SENSING_CATHODE_LOCATION_1: NORMAL
MDC_IDC_SET_LEADCHNL_RV_SENSING_POLARITY: NORMAL
MDC_IDC_SET_LEADCHNL_RV_SENSING_SENSITIVITY: 0.9 MV
MDC_IDC_SET_ZONE_DETECTION_INTERVAL: 350 MS
MDC_IDC_SET_ZONE_DETECTION_INTERVAL: 400 MS
MDC_IDC_SET_ZONE_STATUS: NORMAL
MDC_IDC_SET_ZONE_STATUS: NORMAL
MDC_IDC_SET_ZONE_TYPE: NORMAL
MDC_IDC_SET_ZONE_VENDOR_TYPE: NORMAL
MDC_IDC_STAT_AT_BURDEN_PERCENT: 0 %
MDC_IDC_STAT_AT_DTM_END: NORMAL
MDC_IDC_STAT_AT_DTM_START: NORMAL
MDC_IDC_STAT_BRADY_AP_VP_PERCENT: 1.46 %
MDC_IDC_STAT_BRADY_AP_VS_PERCENT: 0.01 %
MDC_IDC_STAT_BRADY_AS_VP_PERCENT: 98.37 %
MDC_IDC_STAT_BRADY_AS_VS_PERCENT: 0.16 %
MDC_IDC_STAT_BRADY_DTM_END: NORMAL
MDC_IDC_STAT_BRADY_DTM_START: NORMAL
MDC_IDC_STAT_BRADY_RA_PERCENT_PACED: 1.46 %
MDC_IDC_STAT_BRADY_RV_PERCENT_PACED: 99.83 %
MDC_IDC_STAT_EPISODE_RECENT_COUNT: 0
MDC_IDC_STAT_EPISODE_RECENT_COUNT_DTM_END: NORMAL
MDC_IDC_STAT_EPISODE_RECENT_COUNT_DTM_START: NORMAL
MDC_IDC_STAT_EPISODE_TOTAL_COUNT: 0
MDC_IDC_STAT_EPISODE_TOTAL_COUNT_DTM_END: NORMAL
MDC_IDC_STAT_EPISODE_TOTAL_COUNT_DTM_START: NORMAL
MDC_IDC_STAT_EPISODE_TYPE: NORMAL
MDC_IDC_STAT_TACHYTHERAPY_RECENT_DTM_END: NORMAL
MDC_IDC_STAT_TACHYTHERAPY_RECENT_DTM_START: NORMAL
MDC_IDC_STAT_TACHYTHERAPY_TOTAL_DTM_END: NORMAL
MDC_IDC_STAT_TACHYTHERAPY_TOTAL_DTM_START: NORMAL

## 2024-10-29 ENCOUNTER — MYC MEDICAL ADVICE (OUTPATIENT)
Dept: FAMILY MEDICINE | Facility: CLINIC | Age: 64
End: 2024-10-29
Payer: COMMERCIAL

## 2024-10-29 DIAGNOSIS — J01.90 ACUTE SINUSITIS WITH SYMPTOMS > 10 DAYS: Primary | ICD-10-CM

## 2024-10-29 NOTE — TELEPHONE ENCOUNTER
See my chart message    Advised to schedule follow up appointment.     Chante Fuller RN on 10/29/2024 at 12:54 PM

## 2024-10-30 ENCOUNTER — OFFICE VISIT (OUTPATIENT)
Dept: FAMILY MEDICINE | Facility: CLINIC | Age: 64
End: 2024-10-30
Payer: COMMERCIAL

## 2024-10-30 ENCOUNTER — ANCILLARY PROCEDURE (OUTPATIENT)
Dept: GENERAL RADIOLOGY | Facility: CLINIC | Age: 64
End: 2024-10-30
Attending: FAMILY MEDICINE
Payer: COMMERCIAL

## 2024-10-30 VITALS
WEIGHT: 238.25 LBS | TEMPERATURE: 97.1 F | HEIGHT: 63 IN | BODY MASS INDEX: 42.21 KG/M2 | HEART RATE: 75 BPM | SYSTOLIC BLOOD PRESSURE: 144 MMHG | DIASTOLIC BLOOD PRESSURE: 78 MMHG | OXYGEN SATURATION: 94 %

## 2024-10-30 DIAGNOSIS — B37.31 YEAST INFECTION INVOLVING THE VAGINA AND SURROUNDING AREA: Primary | ICD-10-CM

## 2024-10-30 DIAGNOSIS — R05.2 SUBACUTE COUGH: ICD-10-CM

## 2024-10-30 PROCEDURE — 71046 X-RAY EXAM CHEST 2 VIEWS: CPT | Mod: TC | Performed by: RADIOLOGY

## 2024-10-30 PROCEDURE — 99214 OFFICE O/P EST MOD 30 MIN: CPT | Performed by: FAMILY MEDICINE

## 2024-10-30 RX ORDER — FLUCONAZOLE 150 MG/1
TABLET ORAL
Qty: 28 TABLET | Refills: 0 | Status: SHIPPED | OUTPATIENT
Start: 2024-10-30 | End: 2025-05-07

## 2024-10-30 ASSESSMENT — PAIN SCALES - GENERAL: PAINLEVEL_OUTOF10: NO PAIN (1)

## 2024-10-30 NOTE — PROGRESS NOTES
"  Assessment & Plan     Yeast infection involving the vagina and surrounding area  Recurrent, at higher risk with the diabetes, SGLT2, etc, will do a prolonged course of diflucan.   - fluconazole (DIFLUCAN) 150 MG tablet; Take 1 tablet (150 mg) by mouth every 3 days for 9 days, THEN 1 tablet (150 mg) once a week.    Subacute cough/URI symptoms  Xray looks improved, no longer blurring of the right lateral diaphragm.  No indication for antibiotic or steroids at this time, continue Advair, prn albuterol  Patient in agreement with plan  - XR Chest 2 Views; Future                Subjective   Melyssa is a 64 year old, presenting for the following health issues:  Vaginal Problem        10/30/2024     8:14 AM   Additional Questions   Roomed by Shirley SALINAS CMA   Accompanied by Self     History of Present Illness       Reason for visit:  Pneumonia    She eats 0-1 servings of fruits and vegetables daily.She consumes 0 sweetened beverage(s) daily.She exercises with enough effort to increase her heart rate 9 or less minutes per day.  She exercises with enough effort to increase her heart rate 3 or less days per week.   She is taking medications regularly.       - Follow up after 10/14/224 visit due to vulvar dermatitis. She is using clobetasol ointment and coconut oil with some improvement. She notes that itching has now flared up again starting yesterday. No discharge or odor. No urinary sx..  feels like she's getting a yeast infection again - gets some swelling of the vulva just before the yeast infection is \"full blown\".    Acute Illness   Acute illness concerns: Productive cough  Onset: Dx pneumonia/pneumonitis 10/5 - did a round of antibiotics- cephalosporin and doxycycline    Unsure if symptoms ever really fully resolved or if she was just \"distracted\" by other things.   Has a grandson who lives with her who has URI symptoms as well.    Cough is productive of yellow sputum.   Fever: no  Chills/Sweats: no  Headache (location?): " "YES  Sinus Pressure:no  Conjunctivitis:  no  Ear Pain: no  Rhinorrhea: no  Congestion: YES  Sore Throat: no   Cough: YES-productive of green/yellow sputum  Wheeze: YES  Decreased Appetite: no  Nausea: no  Vomiting: no  Diarrhea:  no  Dysuria/Freq.: no  Fatigue/Achiness: no  Sick/Strep Exposure: no     Therapies Tried and outcome: Tylenol      Review of Systems  Constitutional, HEENT, cardiovascular, pulmonary, gi and gu systems are negative, except as otherwise noted.      Objective    BP (!) 144/78   Pulse 75   Temp 97.1  F (36.2  C) (Tympanic)   Ht 1.6 m (5' 3\")   Wt 108.1 kg (238 lb 4 oz)   LMP 01/14/2016 (Approximate)   SpO2 94%   BMI 42.20 kg/m    Body mass index is 42.2 kg/m .  Physical Exam   GENERAL: alert and no distress  NECK: no adenopathy, no asymmetry, masses, or scars  RESP: rales R lower posterior  CV: regular rate and rhythm, normal S1 S2, no S3 or S4, no murmur, click or rub, no peripheral edema   (female): vaginal skin findings: left vulvar swelling and mild erythema.   MS: no gross musculoskeletal defects noted, no edema    CXR - Reviewed and interpreted by me Normal- no infiltrates, effusions, pneumothoraces, cardiomegaly or masses        Signed Electronically by: Kaia Elena MD    "

## 2024-11-06 NOTE — PROGRESS NOTES
NEUROLOGY FOLLOW UP VISIT  NOTE       [facility] NEUROLOGY [place]  [address] [place] [address]  Tel: [phone]  Fax: [phone]  [email or web address]     Bria Davis,  1960, MRN [id number]  PCP: Kaia Elena  Date: 2024      ASSESSMENT & PLAN     Visit Diagnosis  Primary fibromyalgia syndrome  Positive DIDI (antinuclear antibody)     Primary fibromyalgia syndrome  64-year-old female with HTN, HLD, DM2, morbid obesity, CKD stage II, CAD and breast cancer who was evaluated for generalized body aches and sensitivity to touch.  She had lab work that included normal B12, folate, rheumatoid factor.  Her CRP was elevated and antinuclear antibody was borderline positive at 1: 160.  Double-stranded DNA and MAYNOR panel was normal.  Previously she had an EMG that was normal and she was diagnosed with fibromyalgia and started on gabapentin.  I have refilled the prescription.    Bilateral carpal tunnel syndrome  Patient has history of carpal tunnel release surgery more than 20 years ago.  She does  and lately has been experiencing numbness tingling along with twitching in the thumb on the right.  Clinically she has finding to suggest carpal tunnel syndrome.  I have recommended:    1.  EMG bilateral upper extremity  2.  Follow-up the day she is scheduled for EMG    Thank you again for this referral, please feel free to contact me if you have any questions.    Jabari Santos MD  [facility] NEUROLOGY, [place]     HISTORY OF PRESENT ILLNESS     Patient is a 64-year-old female with HTN, HLD, DM 2, morbid obesity, CKD stage II, CAD , breast cancer last seen on 2023 for generalized bodyaches and sensitivity to touch.  Lab work showed a antinuclear antibody 1: 160 but double-stranded DNA and MAYNOR panel was normal.  She had an EMG that was normal and was diagnosed with fibromyalgia and started on gabapentin.  Since her last visit she reports her symptoms are  stable as long as she is taking her medication.  She has a new symptom of bilateral hand numbness and tingling along with twitches in her right hand.  She has history of carpal tunnel release surgery more than 20 years ago.  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     PROBLEM LIST   Patient Active Problem List   Diagnosis    Mild intermittent asthma    Esophageal reflux    Allergic rhinitis    Restless legs syndrome (RLS)    Enthesopathy    Lumbar radiculopathy    Microalbuminuria    Hyperlipidemia LDL goal <70    Type 2 diabetes mellitus with kidney complication, with long-term current use of insulin (H)    Morbid obesity (H)    Essential hypertension with goal blood pressure less than 140/90    Nonobstructive atherosclerosis of coronary artery    Chronic kidney disease, stage 2 (mild)    Atypical ductal hyperplasia of left breast    Breast abscess    Ductal carcinoma in situ (DCIS) of left breast    AV heart block         PAST MEDICAL & SURGICAL HISTORY     Past Medical History:   Patient  has a past medical history of Arthritis, Asthma, CKD (chronic kidney disease) stage 2, GFR 60-89 ml/min, Ductal carcinoma in situ (DCIS) of left breast, Esophageal reflux, Fibromyalgia, Hypertension, LBBB (left bundle branch block) (2019), Other chronic sinusitis, PONV (postoperative nausea and vomiting), Restless legs syndrome (RLS), and Type 2 diabetes mellitus (H).    Surgical History:  She  has a past surgical history that includes surgical history of -  (04/10/2000); colonoscopy (01/31/2002); Inject epidural lumbar (12/07/2010); Inject epidural lumbar (01/17/2011); Colonoscopy (10/26/2012); Release carpal tunnel (06/19/2012); Release carpal tunnel (11/09/2012); GYN surgery; Left Heart Catheterization (N/A, 09/12/2019); Left Ventriculogram (N/A, 09/12/2019); Colonoscopy (N/A, 11/08/2019); Esophagoscopy, gastroscopy, duodenoscopy (EGD), combined (N/A, 11/08/2019); Repair tendon achilles (Left,  12/26/2019); left long finger pulley release (Left, 07/2020); biopsy (9/7/2023); ENT surgery; Eye surgery (2022); Biopsy breast needle localization (Left, 10/6/2023); Irrigation and debridement breast (Left, 10/21/2023); Lumpectomy breast (Left, 11/13/2023); Mammoplasty reduction bilateral (Bilateral, 12/7/2023); and Pacemaker Device & Lead Implant - Right Atrial & Right Ventricular (Left, 10/3/2024).     SOCIAL HISTORY     Reviewed, and she  reports that she has never smoked. She has never used smokeless tobacco. She reports that she does not currently use alcohol. She reports that she does not use drugs.     FAMILY HISTORY     Reviewed, and family history includes Anxiety Disorder in her sister and another family member; Asthma in her brother; Breast Cancer in her maternal grandmother; Cancer in her brother; Cardiovascular in her father; Cerebrovascular Disease in her father; Chronic Obstructive Pulmonary Disease in her brother, brother, sister, and sister; Depression in her brother, sister, sister, and sister; Diabetes in her brother, mother, and son; Heart Disease in her father; Hypertension in her father and mother; Kidney failure in her brother; Obesity in some other family members; Respiratory in her brother, mother, and sister.     ALLERGIES     Allergies   Allergen Reactions    Bactrim [Sulfamethoxazole-Trimethoprim] Rash     Full body rash    Oxycodone Itching     Facial itching     Lisinopril Cough         REVIEW OF SYSTEMS     A 12 point review of system was performed and was negative except as outlined in the history of present illness.     HOME MEDICATIONS     Current Outpatient Rx   Medication Sig Dispense Refill    albuterol (PROAIR HFA/PROVENTIL HFA/VENTOLIN HFA) 108 (90 Base) MCG/ACT inhaler Inhale 2 puffs into the lungs every 6 hours as needed for shortness of breath, wheezing or cough. 18 g 3    amoxicillin-clavulanate (AUGMENTIN) 875-125 MG tablet Take 1 tablet by mouth 2 times daily. 14 tablet  0    anastrozole (ARIMIDEX) 1 MG tablet Take 1 tablet (1 mg) by mouth daily 90 tablet 3    Ascorbic Acid (VITAMIN C) 500 MG CAPS Take 1 tablet by mouth every evening      aspirin (ASA) 81 MG EC tablet Take 81 mg by mouth every evening 1 tablet 0    blood glucose (CONTOUR NEXT TEST) test strip USE TO TEST BLOOD SUGAR 4 TIMES DAILY OR AS DIRECTED. 400 strip 1    Calcium Carb-Cholecalciferol (CALCIUM-VITAMIN D) 600-400 MG-UNIT TABS Take 1 tablet by mouth every evening      clobetasol (TEMOVATE) 0.05 % external ointment Apply topically 2 times daily. 60 g 1    empagliflozin (JARDIANCE) 10 MG TABS tablet Take 1 tablet (10 mg) by mouth daily. 90 tablet 1    esomeprazole (NEXIUM) 40 MG DR capsule Take 1 capsule (40 mg) by mouth every morning (before breakfast) 90 capsule 3    ferrous sulfate 140 (45 Fe) MG TBCR CR tablet Take 140 mg by mouth 2 times daily      fluconazole (DIFLUCAN) 150 MG tablet Take 1 tablet (150 mg) by mouth every 3 days for 9 days, THEN 1 tablet (150 mg) once a week. 28 tablet 0    fluticasone-salmeterol (ADVAIR DISKUS) 100-50 MCG/ACT inhaler INHALE 1 PUFF INTO THE LUNGS 2 TIMES DAILY 180 each 3    FT NASAL DECONGESTANT MAX STR 30 MG tablet TAKE TWO TABLETS BY MOUTH TWICE DAILY 120 tablet 3    gabapentin (NEURONTIN) 300 MG capsule Take 1 capsule (300 mg) by mouth 2 times daily 180 capsule 3    hydroCHLOROthiazide 12.5 MG tablet TAKE ONE TABLET BY MOUTH ONCE DAILY 90 tablet 2    insulin aspart (NOVOLOG FLEXPEN) 100 UNIT/ML pen 32 units before breakfast, 32 units before lunch, 32 units before dinner (Patient taking differently: If BG ~100 and a small meal take 100 units, If  take 26 units, If BG >200 take 32 units) 90 mL 3    insulin glargine U-300 (TOUJEO MAX SOLOSTAR) 300 UNIT/ML (2 units dial) pen INJECT 100 UNITS UNDER THE SKIN DAILY 40 mL 1    losartan (COZAAR) 100 MG tablet Take 1 tablet (100 mg) by mouth every morning 90 tablet 3    metFORMIN (GLUCOPHAGE XR) 500 MG 24 hr tablet Take 2  "tablets (1,000 mg) by mouth 2 times daily (with meals). 360 tablet 1    metoclopramide (REGLAN) 10 MG tablet TAKE ONE TABLET BY MOUTH TWICE DAILY 180 tablet 1    metoprolol succinate ER (TOPROL XL) 25 MG 24 hr tablet Take 1 tablet (25 mg) by mouth daily 90 tablet 3    montelukast (SINGULAIR) 10 MG tablet Take 1 tablet (10 mg) by mouth at bedtime. 90 tablet 3    Multiple Vitamins-Minerals (MULTIVITAMIN ADULTS 50+) TABS Take 1 tablet by mouth every morning      rosuvastatin (CRESTOR) 10 MG tablet Take 10 mg by mouth every evening.      Semaglutide, 2 MG/DOSE, (OZEMPIC, 2 MG/DOSE,) 8 MG/3ML pen Inject 2 mg subcutaneously every 7 days. (Patient taking differently: Inject 2 mg subcutaneously every 7 days. Takes on Wednesdays) 9 mL 1    triamcinolone (KENALOG) 0.1 % external cream Apply topically 2 times daily 80 g 1         PHYSICAL EXAM     Vital signs  /74 (BP Location: Left arm, Patient Position: Sitting)   Pulse 73   Ht 1.6 m (5' 3\")   Wt 107 kg (236 lb)   LMP 01/14/2016 (Approximate)   BMI 41.81 kg/m      Weight:   236 lbs 0 oz    Morbidly obese female who is alert and oriented vital signs were reviewed and documented in electronic medical record.  Neck supple.  Neurologically speech was normal cranial nerves II through XII are intact.  Motor strength 5/5 reflexes symmetrical sensation decreased to light touch pinprick below knees gait normal Romberg negative      PERTINENT DIAGNOSTIC STUDIES     Following studies were reviewed:     ECHOCARDIOGRAM 9/17/2019  The visual ejection fraction is estimated at 60-65%.  There is mild to moderate concentric left ventricular hypertrophy.  There is no pericardial effusion.  Normal left ventricular wall motion.  Technically difficult study     EMG 11/9/2023  This is a normal nerve conduction and EMG study of bilateral lower extremities.     PERTINENT LABS  Following labs were reviewed:  Hospital Outpatient Visit on 10/15/2024   Component Date Value Ref Range Status    " Date Time Interrogation Session 10/15/2024 [id number]   Final    Implantable Pulse Generator Manufa* 10/15/2024 Medtronic   Final    Implantable Pulse Generator Model 10/15/2024 [id number] Alyx XT  MRI   Final    Implantable Pulse Generator Serial* 10/15/2024 [id number]   Final    Type Interrogation Session 10/15/2024 In Clinic   Final    Clinic Name 10/15/2024 [facility]   Final    Implantable Pulse Generator Type 10/15/2024 Pacemaker   Final    Implantable Pulse Generator Implan* 10/15/2024 [id number]   Final    Implantable Lead  10/15/2024 Medtronic   Final    Implantable Lead Model 10/15/2024 3830 SelectSecure MRI SureScan   Final    Implantable Lead Serial Number 10/15/2024 [id number]   Final    Implantable Lead Implant Date 10/15/2024 [id number]   Final    Implantable Lead Polarity Type 10/15/2024 Bipolar Lead   Final    Implantable Lead Location Detail 1 10/15/2024 LBB   Final    Implantable Lead Location 10/15/2024 Right Ventricle   Final    Implantable Lead Connection Status 10/15/2024 Connected   Final    Implantable Lead  10/15/2024 Medtronic   Final    Implantable Lead Model 10/15/2024 5076 CapSureFix Novus MRI SureScan   Final    Implantable Lead Serial Number 10/15/2024 [id number]   Final    Implantable Lead Implant Date 10/15/2024 [id number]   Final    Implantable Lead Polarity Type 10/15/2024 Bipolar Lead   Final    Implantable Lead Location Detail 1 10/15/2024 UNKNOWN   Final    Implantable Lead Location 10/15/2024 Right Atrium   Final    Implantable Lead Connection Status 10/15/2024 Connected   Final    Junior Setting Mode (NBG Code) 10/15/2024 DDD   Final    Junior Setting Lower Rate Limit 10/15/2024 60  [beats]/min Final    Junior Setting Maximum Tracking Rate 10/15/2024 130  [beats]/min Final    Junior Setting Maximum Sensor Rate 10/15/2024 130  [beats]/min Final    Junior Setting Hysterisis Rate 10/15/2024 DISABLED   Final    Junior Setting JOSE R Delay Low 10/15/2024 150  ms  Final    Junior Setting PAV Delay Low 10/15/2024 180  ms Final    Junior Setting AT Mode Switch Rate 10/15/2024 171  [beats]/min Final    Lead Channel Setting Sensing Polar* 10/15/2024 Bipolar   Final    Lead Channel Setting Sensing Anode* 10/15/2024 Right Atrium   Final    Lead Channel Setting Sensing Anode* 10/15/2024 Ring   Final    Lead Channel Setting Sensing Catho* 10/15/2024 Right Atrium   Final    Lead Channel Setting Sensing Catho* 10/15/2024 Tip   Final    Lead Channel Setting Sensing Sensi* 10/15/2024 0.3  mV Final    Lead Channel Setting Sensing Polar* 10/15/2024 Bipolar   Final    Lead Channel Setting Sensing Anode* 10/15/2024 Right Ventricle   Final    Lead Channel Setting Sensing Anode* 10/15/2024 Ring   Final    Lead Channel Setting Sensing Catho* 10/15/2024 Right Ventricle   Final    Lead Channel Setting Sensing Catho* 10/15/2024 Tip   Final    Lead Channel Setting Sensing Sensi* 10/15/2024 0.9  mV Final    Lead Channel Setting Pacing Polari* 10/15/2024 Bipolar   Final    Lead Channel Setting Pacing Anode * 10/15/2024 Right Atrium   Final    Lead Channel Setting Pacing Anode * 10/15/2024 Ring   Final    Lead Channel Setting Sensing Catho* 10/15/2024 Right Atrium   Final    Lead Channel Setting Sensing Catho* 10/15/2024 Tip   Final    Lead Channel Setting Pacing Pulse * 10/15/2024 0.4  ms Final    Lead Channel Setting Pacing Amplit* 10/15/2024 2  V Final    Lead Channel Setting Pacing Captur* 10/15/2024 Adaptive   Final    Lead Channel Setting Pacing Polari* 10/15/2024 Bipolar   Final    Lead Channel Setting Pacing Anode * 10/15/2024 Right Ventricle   Final    Lead Channel Setting Pacing Anode * 10/15/2024 Ring   Final    Lead Channel Setting Sensing Catho* 10/15/2024 Right Ventricle   Final    Lead Channel Setting Sensing Catho* 10/15/2024 Tip   Final    Lead Channel Setting Pacing Pulse * 10/15/2024 0.4  ms Final    Lead Channel Setting Pacing Amplit* 10/15/2024 2  V Final    Lead Channel Setting  Pacing Captur* 10/15/2024 Adaptive   Final    Zone Setting Type Category 10/15/2024 VF   Final    Zone Setting Vendor Type Category 10/15/2024 V High Rate   Final    Zone Setting Type Category 10/15/2024 VT   Final    Zone Setting Vendor Type Category 10/15/2024 FastVT   Final    Zone Setting Type Category 10/15/2024 VT   Final    Zone Setting Vendor Type Category 10/15/2024 VT   Final    Zone Setting Type Category 10/15/2024 VT   Final    Zone Setting Vendor Type Category 10/15/2024 MonVT   Final    Zone Setting Status 10/15/2024 Monitor   Final    Zone Setting Detection Interval 10/15/2024 400  ms Final    Zone Setting Type Category 10/15/2024 ATRIAL_FIBRILLATION   Final    Zone Setting Vendor Type Category 10/15/2024 FastATAF   Final    Zone Setting Type Category 10/15/2024 AT/AF   Final    Zone Setting Status 10/15/2024 Monitor   Final    Zone Setting Detection Interval 10/15/2024 350  ms Final    Lead Channel Impedance Value 10/15/2024 551  ohm Final    Lead Channel Impedance Value 10/15/2024 304  ohm Final    Lead Channel Sensing Intrinsic Amp* 10/15/2024 2.75  mV Final    Lead Channel Sensing Intrinsic Amp* 10/15/2024 3  mV Final    Lead Channel Pacing Threshold Ampl* 10/15/2024 1  V Final    Lead Channel Pacing Threshold Puls* 10/15/2024 0.4  ms Final    Lead Channel Impedance Value 10/15/2024 608  ohm Final    Lead Channel Impedance Value 10/15/2024 437  ohm Final    Lead Channel Sensing Intrinsic Amp* 10/15/2024 30.125  mV Final    Lead Channel Sensing Intrinsic Amp* 10/15/2024 23.375  mV Final    Lead Channel Pacing Threshold Ampl* 10/15/2024 0.5  V Final    Lead Channel Pacing Threshold Puls* 10/15/2024 0.4  ms Final    Battery Date Time of Measurements 10/15/2024 13403797680120   Final    Battery Status 10/15/2024 OK   Final    Battery RRT Trigger 10/15/2024 2.625   Final    Battery Remaining Longevity 10/15/2024 157  mo Final    Battery Voltage 10/15/2024 3.22  V Final    Junior Statistic Date Time  Start 10/15/2024 [id number]   Final    Junior Statistic Date Time End 10/15/2024 [id number]   Final    Junior Statistic RA Percent Paced 10/15/2024 1.46  % Final    Junior Statistic RV Percent Paced 10/15/2024 99.83  % Final    Junior Statistic AP  Percent 10/15/2024 1.46  % Final    Junior Statistic AS  Percent 10/15/2024 98.37  % Final    Junior Statistic AP VS Percent 10/15/2024 0.01  % Final    Junior Statistic AS VS Percent 10/15/2024 0.16  % Final    Atrial Tachy Statistic Date Time S* 10/15/2024 [id number]   Final    Atrial Tachy Statistic Date Time E* 10/15/2024 74421085640639   Final    Atrial Tachy Statistic AT/AF Burde* 10/15/2024 0  % Final    Therapy Statistic Recent Date Time* 10/15/2024 68908584547728   Final    Therapy Statistic Recent Date Time* 10/15/2024 01551466570555   Final    Therapy Statistic Total  Date Time* 10/15/2024 68534577238651   Final    Therapy Statistic Total  Date Time* 10/15/2024 52093959158162   Final    Episode Statistic Recent Count 10/15/2024 0   Final    Episode Statistic Type Category 10/15/2024 AT/AF   Final    Episode Statistic Recent Count 10/15/2024 0   Final    Episode Statistic Type Category 10/15/2024 Patient Activated   Final    Episode Statistic Recent Count 10/15/2024 0   Final    Episode Statistic Type Category 10/15/2024 SVT   Final    Episode Statistic Recent Count 10/15/2024 0   Final    Episode Statistic Type Category 10/15/2024 VT   Final    Episode Statistic Recent Count 10/15/2024 0   Final    Episode Statistic Type Category 10/15/2024 VT   Final    Episode Statistic Recent Date Time* 10/15/2024 69809070542009   Final    Episode Statistic Recent Date Time* 10/15/2024 96724086116884   Final    Episode Statistic Recent Date Time* 10/15/2024 51725302420510   Final    Episode Statistic Recent Date Time* 10/15/2024 22107729313575   Final    Episode Statistic Recent Date Time* 10/15/2024 81968501431426   Final    Episode Statistic Recent Date Time*  10/15/2024 08639495447162   Final    Episode Statistic Recent Date Time* 10/15/2024 49268788078297   Final    Episode Statistic Recent Date Time* 10/15/2024 36261632680064   Final    Episode Statistic Recent Date Time* 10/15/2024 43360000835347   Final    Episode Statistic Recent Date Time* 10/15/2024 26999079332706   Final    Episode Statistic Total Count 10/15/2024 0   Final    Episode Statistic Type Category 10/15/2024 AT/AF   Final    Episode Statistic Total Count 10/15/2024 0   Final    Episode Statistic Type Category 10/15/2024 Patient Activated   Final    Episode Statistic Total Count 10/15/2024 0   Final    Episode Statistic Type Category 10/15/2024 SVT   Final    Episode Statistic Total Count 10/15/2024 0   Final    Episode Statistic Type Category 10/15/2024 VT   Final    Episode Statistic Total Count 10/15/2024 0   Final    Episode Statistic Type Category 10/15/2024 VT   Final    Episode Statistic Total Date Time * 10/15/2024 16692539659851   Final    Episode Statistic Total Date Time * 10/15/2024 49070919299850   Final    Episode Statistic Total Date Time * 10/15/2024 46499574572323   Final    Episode Statistic Total Date Time * 10/15/2024 34289243037308   Final    Episode Statistic Total Date Time * 10/15/2024 55515226204124   Final    Episode Statistic Total Date Time * 10/15/2024 38548987162520   Final    Episode Statistic Total Date Time * 10/15/2024 37534486073922   Final    Episode Statistic Total Date Time * 10/15/2024 32110759971468   Final    Episode Statistic Total Date Time * 10/15/2024 40996311887099   Final    Episode Statistic Total Date Time * 10/15/2024 82175360983202   Final   Admission on 10/02/2024, Discharged on 10/04/2024   Component Date Value Ref Range Status    GLUCOSE BY METER POCT 10/02/2024 168 (H)  70 - 99 mg/dL Final    LVEF  10/03/2024 60-65%   Final    TSH 10/02/2024 1.96  0.30 - 4.20 uIU/mL Final    Sodium 10/02/2024 142  135 - 145 mmol/L Final    Potassium 10/02/2024 4.4   3.4 - 5.3 mmol/L Final    Chloride 10/02/2024 105  98 - 107 mmol/L Final    Carbon Dioxide (CO2) 10/02/2024 26  22 - 29 mmol/L Final    Anion Gap 10/02/2024 11  7 - 15 mmol/L Final    Urea Nitrogen 10/02/2024 14.3  8.0 - 23.0 mg/dL Final    Creatinine 10/02/2024 0.72  0.51 - 0.95 mg/dL Final    GFR Estimate 10/02/2024 >90  >60 mL/min/1.73m2 Final    Calcium 10/02/2024 9.7  8.8 - 10.4 mg/dL Final    Glucose 10/02/2024 184 (H)  70 - 99 mg/dL Final    Magnesium 10/02/2024 2.1  1.7 - 2.3 mg/dL Final    GLUCOSE BY METER POCT 10/02/2024 196 (H)  70 - 99 mg/dL Final    WBC Count 10/02/2024 8.2  4.0 - 11.0 10e3/uL Final    RBC Count 10/02/2024 4.35  3.80 - 5.20 10e6/uL Final    Hemoglobin 10/02/2024 12.4  11.7 - 15.7 g/dL Final    Hematocrit 10/02/2024 38.5  35.0 - 47.0 % Final    MCV 10/02/2024 89  78 - 100 fL Final    MCH 10/02/2024 28.5  26.5 - 33.0 pg Final    MCHC 10/02/2024 32.2  31.5 - 36.5 g/dL Final    RDW 10/02/2024 13.6  10.0 - 15.0 % Final    Platelet Count 10/02/2024 278  150 - 450 10e3/uL Final    % Neutrophils 10/02/2024 75  % Final    % Lymphocytes 10/02/2024 13  % Final    % Monocytes 10/02/2024 7  % Final    % Eosinophils 10/02/2024 5  % Final    % Basophils 10/02/2024 1  % Final    % Immature Granulocytes 10/02/2024 0  % Final    NRBCs per 100 WBC 10/02/2024 0  <1 /100 Final    Absolute Neutrophils 10/02/2024 6.2  1.6 - 8.3 10e3/uL Final    Absolute Lymphocytes 10/02/2024 1.1  0.8 - 5.3 10e3/uL Final    Absolute Monocytes 10/02/2024 0.6  0.0 - 1.3 10e3/uL Final    Absolute Eosinophils 10/02/2024 0.4  0.0 - 0.7 10e3/uL Final    Absolute Basophils 10/02/2024 0.1  0.0 - 0.2 10e3/uL Final    Absolute Immature Granulocytes 10/02/2024 0.0  <=0.4 10e3/uL Final    Absolute NRBCs 10/02/2024 0.0  10e3/uL Final    Ventricular Rate 10/02/2024 43  BPM Final    Atrial Rate 10/02/2024 87  BPM Final    OR Interval 10/02/2024 174  ms Final    QRS Duration 10/02/2024 126  ms Final    QT 10/02/2024 528  ms Final    QTc  10/02/2024 446  ms Final    P Axis 10/02/2024 50  degrees Final    R AXIS 10/02/2024 -40  degrees Final    T Axis 10/02/2024 13  degrees Final    Interpretation ECG 10/02/2024    Final                    Value:Sinus rhythm with 2nd degree A-V block with 2:1 A-V conduction  Left axis deviation  Right bundle branch block  Abnormal ECG  When compared with ECG of 07-Dec-2023 12:36,  High-grade (2:1) AV block has replaced Mobitz I 2nd degree AV block  HR has decreased 20 bpm  Confirmed by Yash Colin ([id number]) on 10/4/2024 2:28:11 PM      GLUCOSE BY METER POCT 10/02/2024 153 (H)  70 - 99 mg/dL Final    GLUCOSE BY METER POCT 10/02/2024 187 (H)  70 - 99 mg/dL Final    GLUCOSE BY METER POCT 10/02/2024 342 (H)  70 - 99 mg/dL Final    GLUCOSE BY METER POCT 10/02/2024 347 (H)  70 - 99 mg/dL Final    GLUCOSE BY METER POCT 10/02/2024 358 (H)  70 - 99 mg/dL Final    GLUCOSE BY METER POCT 10/02/2024 290 (H)  70 - 99 mg/dL Final    WBC Count 10/03/2024 9.4  4.0 - 11.0 10e3/uL Final    RBC Count 10/03/2024 4.47  3.80 - 5.20 10e6/uL Final    Hemoglobin 10/03/2024 12.9  11.7 - 15.7 g/dL Final    Hematocrit 10/03/2024 39.0  35.0 - 47.0 % Final    MCV 10/03/2024 87  78 - 100 fL Final    MCH 10/03/2024 28.9  26.5 - 33.0 pg Final    MCHC 10/03/2024 33.1  31.5 - 36.5 g/dL Final    RDW 10/03/2024 13.6  10.0 - 15.0 % Final    Platelet Count 10/03/2024 305  150 - 450 10e3/uL Final    Sodium 10/03/2024 140  135 - 145 mmol/L Final    Potassium 10/03/2024 4.3  3.4 - 5.3 mmol/L Final    Chloride 10/03/2024 105  98 - 107 mmol/L Final    Carbon Dioxide (CO2) 10/03/2024 22  22 - 29 mmol/L Final    Anion Gap 10/03/2024 13  7 - 15 mmol/L Final    Urea Nitrogen 10/03/2024 13.3  8.0 - 23.0 mg/dL Final    Creatinine 10/03/2024 0.53  0.51 - 0.95 mg/dL Final    GFR Estimate 10/03/2024 >90  >60 mL/min/1.73m2 Final    Calcium 10/03/2024 9.5  8.8 - 10.4 mg/dL Final    Glucose 10/03/2024 176 (H)  70 - 99 mg/dL Final    GLUCOSE BY METER POCT 10/03/2024 224  (H)  70 - 99 mg/dL Final    GLUCOSE BY METER POCT 10/03/2024 180 (H)  70 - 99 mg/dL Final    Magnesium 10/03/2024 2.1  1.7 - 2.3 mg/dL Final    Ventricular Rate 10/03/2024 41  BPM Final    Atrial Rate 10/03/2024 41  BPM Final    ND Interval 10/03/2024 170  ms Final    QRS Duration 10/03/2024 120  ms Final    QT 10/03/2024 538  ms Final    QTc 10/03/2024 443  ms Final    P Axis 10/03/2024 36  degrees Final    R AXIS 10/03/2024 25  degrees Final    T Axis 10/03/2024 46  degrees Final    Interpretation ECG 10/03/2024    Final                    Value:Sinus bradycardia  Right bundle branch block  Abnormal ECG  When compared with ECG of 02-Oct-2024 02:12, (unconfirmed)  Sinus rhythm is no longer with 2nd degree A-V block  QRS axis Shifted right  T wave inversion no longer evident in Inferior leads  Confirmed by MD POND QUAN ([id number]) on 10/4/2024 3:58:18 PM      GLUCOSE BY METER POCT 10/03/2024 149 (H)  70 - 99 mg/dL Final    hCG Quantitative 10/03/2024 1  <5 mIU/mL Final    INR 10/03/2024 1.09  0.85 - 1.15 Final    GLUCOSE BY METER POCT 10/03/2024 165 (H)  70 - 99 mg/dL Final    Ventricular Rate 10/03/2024 76  BPM Final    Atrial Rate 10/03/2024 76  BPM Final    ND Interval 10/03/2024 208  ms Final    QRS Duration 10/03/2024 98  ms Final    QT 10/03/2024 410  ms Final    QTc 10/03/2024 461  ms Final    P Axis 10/03/2024 54  degrees Final    R AXIS 10/03/2024 40  degrees Final    T Axis 10/03/2024 268  degrees Final    Interpretation ECG 10/03/2024    Final                    Value:Atrial-sensed ventricular-paced rhythm  Abnormal ECG  When compared with ECG of 03-Oct-2024 07:45, (unconfirmed)  Electronic ventricular pacemaker has replaced Sinus rhythm  Vent. rate has increased by  35 bpm  Confirmed by MD DUENAS MICHAEL ([id number]) on 10/4/2024 3:48:49 PM      GLUCOSE BY METER POCT 10/03/2024 168 (H)  70 - 99 mg/dL Final    GLUCOSE BY METER POCT 10/03/2024 257 (H)  70 - 99 mg/dL Final    GLUCOSE BY METER POCT 10/03/2024 294  (H)  70 - 99 mg/dL Final    GLUCOSE BY METER POCT 10/03/2024 166 (H)  70 - 99 mg/dL Final    Potassium 10/04/2024 4.1  3.4 - 5.3 mmol/L Final    Magnesium 10/04/2024 1.9  1.7 - 2.3 mg/dL Final    GLUCOSE BY METER POCT 10/04/2024 113 (H)  70 - 99 mg/dL Final    GLUCOSE BY METER POCT 10/04/2024 141 (H)  70 - 99 mg/dL Final    GLUCOSE BY METER POCT 10/04/2024 208 (H)  70 - 99 mg/dL Final   Admission on 10/01/2024, Discharged on 10/02/2024   Component Date Value Ref Range Status    Sodium 10/01/2024 137  135 - 145 mmol/L Final    Potassium 10/01/2024 3.9  3.4 - 5.3 mmol/L Final    Carbon Dioxide (CO2) 10/01/2024 25  22 - 29 mmol/L Final    Anion Gap 10/01/2024 12  7 - 15 mmol/L Final    Urea Nitrogen 10/01/2024 13.1  8.0 - 23.0 mg/dL Final    Creatinine 10/01/2024 0.79  0.51 - 0.95 mg/dL Final    GFR Estimate 10/01/2024 84  >60 mL/min/1.73m2 Final    Calcium 10/01/2024 9.7  8.8 - 10.4 mg/dL Final    Chloride 10/01/2024 100  98 - 107 mmol/L Final    Glucose 10/01/2024 118 (H)  70 - 99 mg/dL Final    Alkaline Phosphatase 10/01/2024 85  40 - 150 U/L Final    AST 10/01/2024 21  0 - 45 U/L Final    ALT 10/01/2024 31  0 - 50 U/L Final    Protein Total 10/01/2024 7.2  6.4 - 8.3 g/dL Final    Albumin 10/01/2024 4.0  3.5 - 5.2 g/dL Final    Bilirubin Total 10/01/2024 0.4  <=1.2 mg/dL Final    Troponin T, High Sensitivity 10/01/2024 10  <=14 ng/L Final    pH Venous 10/01/2024 7.41  7.32 - 7.43 Final    pCO2 Venous 10/01/2024 44  40 - 50 mm Hg Final    pO2 Venous 10/01/2024 29  25 - 47 mm Hg Final    Bicarbonate Venous 10/01/2024 28  21 - 28 mmol/L Final    Base Excess/Deficit Venous 10/01/2024 2.4  -3.0 - 3.0 mmol/L Final    FIO2 10/01/2024 0   Final    Oxyhemoglobin Venous 10/01/2024 49 (L)  70 - 75 % Final    O2 Sat, Venous 10/01/2024 50.1 (L)  70.0 - 75.0 % Final    WBC Count 10/01/2024 7.9  4.0 - 11.0 10e3/uL Final    RBC Count 10/01/2024 4.47  3.80 - 5.20 10e6/uL Final    Hemoglobin 10/01/2024 13.3  11.7 - 15.7 g/dL Final     Hematocrit 10/01/2024 39.5  35.0 - 47.0 % Final    MCV 10/01/2024 88  78 - 100 fL Final    MCH 10/01/2024 29.8  26.5 - 33.0 pg Final    MCHC 10/01/2024 33.7  31.5 - 36.5 g/dL Final    RDW 10/01/2024 13.4  10.0 - 15.0 % Final    Platelet Count 10/01/2024 299  150 - 450 10e3/uL Final    % Neutrophils 10/01/2024 67  % Final    % Lymphocytes 10/01/2024 18  % Final    % Monocytes 10/01/2024 8  % Final    % Eosinophils 10/01/2024 6  % Final    % Basophils 10/01/2024 1  % Final    % Immature Granulocytes 10/01/2024 0  % Final    NRBCs per 100 WBC 10/01/2024 0  <1 /100 Final    Absolute Neutrophils 10/01/2024 5.3  1.6 - 8.3 10e3/uL Final    Absolute Lymphocytes 10/01/2024 1.4  0.8 - 5.3 10e3/uL Final    Absolute Monocytes 10/01/2024 0.6  0.0 - 1.3 10e3/uL Final    Absolute Eosinophils 10/01/2024 0.5  0.0 - 0.7 10e3/uL Final    Absolute Basophils 10/01/2024 0.1  0.0 - 0.2 10e3/uL Final    Absolute Immature Granulocytes 10/01/2024 0.0  <=0.4 10e3/uL Final    Absolute NRBCs 10/01/2024 0.0  10e3/uL Final    Hold Specimen 10/01/2024 [facility]   Final    Hold Specimen 10/01/2024 [facility]   Final    D-Dimer Quantitative 10/01/2024 0.60 (H)  0.00 - 0.50 ug/mL FEU Final    Troponin T, High Sensitivity 10/01/2024 10  <=14 ng/L Final    Influenza A PCR 10/01/2024 Negative  Negative Final    Influenza B PCR 10/01/2024 Negative  Negative Final    RSV PCR 10/01/2024 Negative  Negative Final    SARS CoV2 PCR 10/01/2024 Negative  Negative Final    GLUCOSE BY METER POCT 10/01/2024 67 (L)  70 - 99 mg/dL Final   Office Visit on 09/18/2024   Component Date Value Ref Range Status    Hold Specimen 09/18/2024 [facility]   Final    Hold Specimen 09/18/2024 [facility]   Final    Estimated Average Glucose 09/18/2024 192 (H)  <117 mg/dL Final    Hemoglobin A1C 09/18/2024 8.3 (H)  0.0 - 5.6 % Final   Transferred Records on 09/11/2024   Component Date Value Ref Range Status    RETINOPATHY 09/11/2024 POSITIVE (A)   Final         Total time spent for face to face visit,  reviewing labs/imaging studies, counseling and coordination of care was: 30 Minutes spent on the date of the encounter doing chart review, review of outside records, review of test results, interpretation of tests, patient visit, and documentation     The longitudinal plan of care for the diagnosis(es)/condition(s) as documented were addressed during this visit. Due to the added complexity in care, I will continue to support Melyssa in the subsequent management and with ongoing continuity of care.    This note was dictated using voice recognition software.  Any grammatical or context distortions are unintentional and inherent to the software.    No orders of the defined types were placed in this encounter.     New Prescriptions    No medications on file     Modified Medications    No medications on file

## 2024-11-08 ENCOUNTER — OFFICE VISIT (OUTPATIENT)
Dept: NEUROLOGY | Facility: CLINIC | Age: 64
End: 2024-11-08
Payer: COMMERCIAL

## 2024-11-08 VITALS
HEIGHT: 63 IN | DIASTOLIC BLOOD PRESSURE: 74 MMHG | HEART RATE: 73 BPM | WEIGHT: 236 LBS | SYSTOLIC BLOOD PRESSURE: 134 MMHG | BODY MASS INDEX: 41.82 KG/M2

## 2024-11-08 DIAGNOSIS — G56.03 BILATERAL CARPAL TUNNEL SYNDROME: ICD-10-CM

## 2024-11-08 DIAGNOSIS — R76.8 POSITIVE ANA (ANTINUCLEAR ANTIBODY): ICD-10-CM

## 2024-11-08 DIAGNOSIS — M79.7 PRIMARY FIBROMYALGIA SYNDROME: Primary | ICD-10-CM

## 2024-11-08 PROCEDURE — G2211 COMPLEX E/M VISIT ADD ON: HCPCS | Performed by: PSYCHIATRY & NEUROLOGY

## 2024-11-08 PROCEDURE — 99214 OFFICE O/P EST MOD 30 MIN: CPT | Performed by: PSYCHIATRY & NEUROLOGY

## 2024-11-08 RX ORDER — GABAPENTIN 300 MG/1
300 CAPSULE ORAL 2 TIMES DAILY
Qty: 180 CAPSULE | Refills: 3 | Status: SHIPPED | OUTPATIENT
Start: 2024-11-08

## 2024-11-08 NOTE — LETTER
2024      Bria Davis  [address] [facility] [id number]      Dear Colleague,    Thank you for referring your patient, Bria Davis, to the [facility] [place]. Please see a copy of my visit note below.    NEUROLOGY FOLLOW UP VISIT  NOTE       [facility] [place]  [address] [place] [address]  Tel: [phone]  Fax: [phone]  [email or web address]     Bria Davis,  1960, MRN [id number]  PCP: Kaia Elena  Date: 2024      ASSESSMENT & PLAN     Visit Diagnosis  Primary fibromyalgia syndrome  Positive DIDI (antinuclear antibody)     Primary fibromyalgia syndrome  64-year-old female with HTN, HLD, DM2, morbid obesity, CKD stage II, CAD and breast cancer who was evaluated for generalized body aches and sensitivity to touch.  She had lab work that included normal B12, folate, rheumatoid factor.  Her CRP was elevated and antinuclear antibody was borderline positive at 1: 160.  Double-stranded DNA and MAYNOR panel was normal.  Previously she had an EMG that was normal and she was diagnosed with fibromyalgia and started on gabapentin.  I have refilled the prescription.    Bilateral carpal tunnel syndrome  Patient has history of carpal tunnel release surgery more than 20 years ago.  She does  and lately has been experiencing numbness tingling along with twitching in the thumb on the right.  Clinically she has finding to suggest carpal tunnel syndrome.  I have recommended:    1.  EMG bilateral upper extremity  2.  Follow-up the day she is scheduled for EMG    Thank you again for this referral, please feel free to contact me if you have any questions.    Jabari Santos MD  [facility], [place]     HISTORY OF PRESENT ILLNESS     Patient is a 64-year-old female with HTN, HLD, DM 2, morbid obesity, CKD stage II, CAD , breast cancer last seen on 2023 for generalized bodyaches and sensitivity  to touch.  Lab work showed a antinuclear antibody 1: 160 but double-stranded DNA and MAYNOR panel was normal.  She had an EMG that was normal and was diagnosed with fibromyalgia and started on gabapentin.  Since her last visit she reports her symptoms are stable as long as she is taking her medication.  She has a new symptom of bilateral hand numbness and tingling along with twitches in her right hand.  She has history of carpal tunnel release surgery more than 20 years ago.  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     PROBLEM LIST   Patient Active Problem List   Diagnosis     Mild intermittent asthma     Esophageal reflux     Allergic rhinitis     Restless legs syndrome (RLS)     Enthesopathy     Lumbar radiculopathy     Microalbuminuria     Hyperlipidemia LDL goal <70     Type 2 diabetes mellitus with kidney complication, with long-term current use of insulin (H)     Morbid obesity (H)     Essential hypertension with goal blood pressure less than 140/90     Nonobstructive atherosclerosis of coronary artery     Chronic kidney disease, stage 2 (mild)     Atypical ductal hyperplasia of left breast     Breast abscess     Ductal carcinoma in situ (DCIS) of left breast     AV heart block         PAST MEDICAL & SURGICAL HISTORY     Past Medical History:   Patient  has a past medical history of Arthritis, Asthma, CKD (chronic kidney disease) stage 2, GFR 60-89 ml/min, Ductal carcinoma in situ (DCIS) of left breast, Esophageal reflux, Fibromyalgia, Hypertension, LBBB (left bundle branch block) (2019), Other chronic sinusitis, PONV (postoperative nausea and vomiting), Restless legs syndrome (RLS), and Type 2 diabetes mellitus (H).    Surgical History:  She  has a past surgical history that includes surgical history of -  (04/10/2000); colonoscopy (01/31/2002); Inject epidural lumbar (12/07/2010); Inject epidural lumbar (01/17/2011); Colonoscopy (10/26/2012); Release carpal tunnel (06/19/2012); Release  carpal tunnel (11/09/2012); GYN surgery; Left Heart Catheterization (N/A, 09/12/2019); Left Ventriculogram (N/A, 09/12/2019); Colonoscopy (N/A, 11/08/2019); Esophagoscopy, gastroscopy, duodenoscopy (EGD), combined (N/A, 11/08/2019); Repair tendon achilles (Left, 12/26/2019); left long finger pulley release (Left, 07/2020); biopsy (9/7/2023); ENT surgery; Eye surgery (2022); Biopsy breast needle localization (Left, 10/6/2023); Irrigation and debridement breast (Left, 10/21/2023); Lumpectomy breast (Left, 11/13/2023); Mammoplasty reduction bilateral (Bilateral, 12/7/2023); and Pacemaker Device & Lead Implant - Right Atrial & Right Ventricular (Left, 10/3/2024).     SOCIAL HISTORY     Reviewed, and she  reports that she has never smoked. She has never used smokeless tobacco. She reports that she does not currently use alcohol. She reports that she does not use drugs.     FAMILY HISTORY     Reviewed, and family history includes Anxiety Disorder in her sister and another family member; Asthma in her brother; Breast Cancer in her maternal grandmother; Cancer in her brother; Cardiovascular in her father; Cerebrovascular Disease in her father; Chronic Obstructive Pulmonary Disease in her brother, brother, sister, and sister; Depression in her brother, sister, sister, and sister; Diabetes in her brother, mother, and son; Heart Disease in her father; Hypertension in her father and mother; Kidney failure in her brother; Obesity in some other family members; Respiratory in her brother, mother, and sister.     ALLERGIES     Allergies   Allergen Reactions     Bactrim [Sulfamethoxazole-Trimethoprim] Rash     Full body rash     Oxycodone Itching     Facial itching      Lisinopril Cough         REVIEW OF SYSTEMS     A 12 point review of system was performed and was negative except as outlined in the history of present illness.     HOME MEDICATIONS     Current Outpatient Rx   Medication Sig Dispense Refill     albuterol (PROAIR  HFA/PROVENTIL HFA/VENTOLIN HFA) 108 (90 Base) MCG/ACT inhaler Inhale 2 puffs into the lungs every 6 hours as needed for shortness of breath, wheezing or cough. 18 g 3     amoxicillin-clavulanate (AUGMENTIN) 875-125 MG tablet Take 1 tablet by mouth 2 times daily. 14 tablet 0     anastrozole (ARIMIDEX) 1 MG tablet Take 1 tablet (1 mg) by mouth daily 90 tablet 3     Ascorbic Acid (VITAMIN C) 500 MG CAPS Take 1 tablet by mouth every evening       aspirin (ASA) 81 MG EC tablet Take 81 mg by mouth every evening 1 tablet 0     blood glucose (CONTOUR NEXT TEST) test strip USE TO TEST BLOOD SUGAR 4 TIMES DAILY OR AS DIRECTED. 400 strip 1     Calcium Carb-Cholecalciferol (CALCIUM-VITAMIN D) 600-400 MG-UNIT TABS Take 1 tablet by mouth every evening       clobetasol (TEMOVATE) 0.05 % external ointment Apply topically 2 times daily. 60 g 1     empagliflozin (JARDIANCE) 10 MG TABS tablet Take 1 tablet (10 mg) by mouth daily. 90 tablet 1     esomeprazole (NEXIUM) 40 MG DR capsule Take 1 capsule (40 mg) by mouth every morning (before breakfast) 90 capsule 3     ferrous sulfate 140 (45 Fe) MG TBCR CR tablet Take 140 mg by mouth 2 times daily       fluconazole (DIFLUCAN) 150 MG tablet Take 1 tablet (150 mg) by mouth every 3 days for 9 days, THEN 1 tablet (150 mg) once a week. 28 tablet 0     fluticasone-salmeterol (ADVAIR DISKUS) 100-50 MCG/ACT inhaler INHALE 1 PUFF INTO THE LUNGS 2 TIMES DAILY 180 each 3     FT NASAL DECONGESTANT MAX STR 30 MG tablet TAKE TWO TABLETS BY MOUTH TWICE DAILY 120 tablet 3     gabapentin (NEURONTIN) 300 MG capsule Take 1 capsule (300 mg) by mouth 2 times daily 180 capsule 3     hydroCHLOROthiazide 12.5 MG tablet TAKE ONE TABLET BY MOUTH ONCE DAILY 90 tablet 2     insulin aspart (NOVOLOG FLEXPEN) 100 UNIT/ML pen 32 units before breakfast, 32 units before lunch, 32 units before dinner (Patient taking differently: If BG ~100 and a small meal take 100 units, If  take 26 units, If BG >200 take 32 units)  "90 mL 3     insulin glargine U-300 (TOUJEO MAX SOLOSTAR) 300 UNIT/ML (2 units dial) pen INJECT 100 UNITS UNDER THE SKIN DAILY 40 mL 1     losartan (COZAAR) 100 MG tablet Take 1 tablet (100 mg) by mouth every morning 90 tablet 3     metFORMIN (GLUCOPHAGE XR) 500 MG 24 hr tablet Take 2 tablets (1,000 mg) by mouth 2 times daily (with meals). 360 tablet 1     metoclopramide (REGLAN) 10 MG tablet TAKE ONE TABLET BY MOUTH TWICE DAILY 180 tablet 1     metoprolol succinate ER (TOPROL XL) 25 MG 24 hr tablet Take 1 tablet (25 mg) by mouth daily 90 tablet 3     montelukast (SINGULAIR) 10 MG tablet Take 1 tablet (10 mg) by mouth at bedtime. 90 tablet 3     Multiple Vitamins-Minerals (MULTIVITAMIN ADULTS 50+) TABS Take 1 tablet by mouth every morning       rosuvastatin (CRESTOR) 10 MG tablet Take 10 mg by mouth every evening.       Semaglutide, 2 MG/DOSE, (OZEMPIC, 2 MG/DOSE,) 8 MG/3ML pen Inject 2 mg subcutaneously every 7 days. (Patient taking differently: Inject 2 mg subcutaneously every 7 days. Takes on Wednesdays) 9 mL 1     triamcinolone (KENALOG) 0.1 % external cream Apply topically 2 times daily 80 g 1         PHYSICAL EXAM     Vital signs  /74 (BP Location: Left arm, Patient Position: Sitting)   Pulse 73   Ht 1.6 m (5' 3\")   Wt 107 kg (236 lb)   LMP 01/14/2016 (Approximate)   BMI 41.81 kg/m      Weight:   236 lbs 0 oz    Morbidly obese female who is alert and oriented vital signs were reviewed and documented in electronic medical record.  Neck supple.  Neurologically speech was normal cranial nerves II through XII are intact.  Motor strength 5/5 reflexes symmetrical sensation decreased to light touch pinprick below knees gait normal Romberg negative      PERTINENT DIAGNOSTIC STUDIES     Following studies were reviewed:     ECHOCARDIOGRAM 9/17/2019  The visual ejection fraction is estimated at 60-65%.  There is mild to moderate concentric left ventricular hypertrophy.  There is no pericardial effusion.  Normal " left ventricular wall motion.  Technically difficult study     EMG 11/9/2023  This is a normal nerve conduction and EMG study of bilateral lower extremities.     PERTINENT LABS  Following labs were reviewed:  Hospital Outpatient Visit on 10/15/2024   Component Date Value Ref Range Status     Date Time Interrogation Session 10/15/2024 [id number]   Final     Implantable Pulse Generator Manufa* 10/15/2024 Medtronic   Final     Implantable Pulse Generator Model 10/15/2024 [id number] Alyx XT  MRI   Final     Implantable Pulse Generator Serial* 10/15/2024 [id number]   Final     Type Interrogation Session 10/15/2024 In Clinic   Final     Clinic Name 10/15/2024 [facility]   Final     Implantable Pulse Generator Type 10/15/2024 Pacemaker   Final     Implantable Pulse Generator Implan* 10/15/2024 [id number]   Final     Implantable Lead  10/15/2024 Medtronic   Final     Implantable Lead Model 10/15/2024 3830 SelectSecure MRI SureScan   Final     Implantable Lead Serial Number 10/15/2024 [id number]   Final     Implantable Lead Implant Date 10/15/2024 [id number]   Final     Implantable Lead Polarity Type 10/15/2024 Bipolar Lead   Final     Implantable Lead Location Detail 1 10/15/2024 LBB   Final     Implantable Lead Location 10/15/2024 Right Ventricle   Final     Implantable Lead Connection Status 10/15/2024 Connected   Final     Implantable Lead  10/15/2024 Medtronic   Final     Implantable Lead Model 10/15/2024 5076 CapSureFix Novus MRI SureScan   Final     Implantable Lead Serial Number 10/15/2024 [id number]   Final     Implantable Lead Implant Date 10/15/2024 [id number]   Final     Implantable Lead Polarity Type 10/15/2024 Bipolar Lead   Final     Implantable Lead Location Detail 1 10/15/2024 UNKNOWN   Final     Implantable Lead Location 10/15/2024 Right Atrium   Final     Implantable Lead Connection Status 10/15/2024 Connected   Final     Junior Setting Mode (NBG Code) 10/15/2024 DDD   Final      Junior Setting Lower Rate Limit 10/15/2024 60  [beats]/min Final     Junior Setting Maximum Tracking Rate 10/15/2024 130  [beats]/min Final     Junior Setting Maximum Sensor Rate 10/15/2024 130  [beats]/min Final     Junior Setting Hysterisis Rate 10/15/2024 DISABLED   Final     Junior Setting JOSE R Delay Low 10/15/2024 150  ms Final     Junior Setting PAV Delay Low 10/15/2024 180  ms Final     Junior Setting AT Mode Switch Rate 10/15/2024 171  [beats]/min Final     Lead Channel Setting Sensing Polar* 10/15/2024 Bipolar   Final     Lead Channel Setting Sensing Anode* 10/15/2024 Right Atrium   Final     Lead Channel Setting Sensing Anode* 10/15/2024 Ring   Final     Lead Channel Setting Sensing Catho* 10/15/2024 Right Atrium   Final     Lead Channel Setting Sensing Catho* 10/15/2024 Tip   Final     Lead Channel Setting Sensing Sensi* 10/15/2024 0.3  mV Final     Lead Channel Setting Sensing Polar* 10/15/2024 Bipolar   Final     Lead Channel Setting Sensing Anode* 10/15/2024 Right Ventricle   Final     Lead Channel Setting Sensing Anode* 10/15/2024 Ring   Final     Lead Channel Setting Sensing Catho* 10/15/2024 Right Ventricle   Final     Lead Channel Setting Sensing Catho* 10/15/2024 Tip   Final     Lead Channel Setting Sensing Sensi* 10/15/2024 0.9  mV Final     Lead Channel Setting Pacing Polari* 10/15/2024 Bipolar   Final     Lead Channel Setting Pacing Anode * 10/15/2024 Right Atrium   Final     Lead Channel Setting Pacing Anode * 10/15/2024 Ring   Final     Lead Channel Setting Sensing Catho* 10/15/2024 Right Atrium   Final     Lead Channel Setting Sensing Catho* 10/15/2024 Tip   Final     Lead Channel Setting Pacing Pulse * 10/15/2024 0.4  ms Final     Lead Channel Setting Pacing Amplit* 10/15/2024 2  V Final     Lead Channel Setting Pacing Captur* 10/15/2024 Adaptive   Final     Lead Channel Setting Pacing Polari* 10/15/2024 Bipolar   Final     Lead Channel Setting Pacing Anode * 10/15/2024 Right Ventricle   Final      Lead Channel Setting Pacing Anode * 10/15/2024 Ring   Final     Lead Channel Setting Sensing Catho* 10/15/2024 Right Ventricle   Final     Lead Channel Setting Sensing Catho* 10/15/2024 Tip   Final     Lead Channel Setting Pacing Pulse * 10/15/2024 0.4  ms Final     Lead Channel Setting Pacing Amplit* 10/15/2024 2  V Final     Lead Channel Setting Pacing Captur* 10/15/2024 Adaptive   Final     Zone Setting Type Category 10/15/2024 VF   Final     Zone Setting Vendor Type Category 10/15/2024 V High Rate   Final     Zone Setting Type Category 10/15/2024 VT   Final     Zone Setting Vendor Type Category 10/15/2024 FastVT   Final     Zone Setting Type Category 10/15/2024 VT   Final     Zone Setting Vendor Type Category 10/15/2024 VT   Final     Zone Setting Type Category 10/15/2024 VT   Final     Zone Setting Vendor Type Category 10/15/2024 MonVT   Final     Zone Setting Status 10/15/2024 Monitor   Final     Zone Setting Detection Interval 10/15/2024 400  ms Final     Zone Setting Type Category 10/15/2024 ATRIAL_FIBRILLATION   Final     Zone Setting Vendor Type Category 10/15/2024 FastATAF   Final     Zone Setting Type Category 10/15/2024 AT/AF   Final     Zone Setting Status 10/15/2024 Monitor   Final     Zone Setting Detection Interval 10/15/2024 350  ms Final     Lead Channel Impedance Value 10/15/2024 551  ohm Final     Lead Channel Impedance Value 10/15/2024 304  ohm Final     Lead Channel Sensing Intrinsic Amp* 10/15/2024 2.75  mV Final     Lead Channel Sensing Intrinsic Amp* 10/15/2024 3  mV Final     Lead Channel Pacing Threshold Ampl* 10/15/2024 1  V Final     Lead Channel Pacing Threshold Puls* 10/15/2024 0.4  ms Final     Lead Channel Impedance Value 10/15/2024 608  ohm Final     Lead Channel Impedance Value 10/15/2024 437  ohm Final     Lead Channel Sensing Intrinsic Amp* 10/15/2024 30.125  mV Final     Lead Channel Sensing Intrinsic Amp* 10/15/2024 23.375  mV Final     Lead Channel Pacing Threshold Ampl*  10/15/2024 0.5  V Final     Lead Channel Pacing Threshold Puls* 10/15/2024 0.4  ms Final     Battery Date Time of Measurements 10/15/2024 [id number]   Final     Battery Status 10/15/2024 OK   Final     Battery RRT Trigger 10/15/2024 2.625   Final     Battery Remaining Longevity 10/15/2024 157  mo Final     Battery Voltage 10/15/2024 3.22  V Final     Junior Statistic Date Time Start 10/15/2024 [id number]   Final     Junior Statistic Date Time End 10/15/2024 85849395298016   Final     Junior Statistic RA Percent Paced 10/15/2024 1.46  % Final     Junior Statistic RV Percent Paced 10/15/2024 99.83  % Final     Junior Statistic AP  Percent 10/15/2024 1.46  % Final     Junior Statistic AS  Percent 10/15/2024 98.37  % Final     Junior Statistic AP VS Percent 10/15/2024 0.01  % Final     Junior Statistic AS VS Percent 10/15/2024 0.16  % Final     Atrial Tachy Statistic Date Time S* 10/15/2024 87110136251327   Final     Atrial Tachy Statistic Date Time E* 10/15/2024 85984505731575   Final     Atrial Tachy Statistic AT/AF Burde* 10/15/2024 0  % Final     Therapy Statistic Recent Date Time* 10/15/2024 35515312651217   Final     Therapy Statistic Recent Date Time* 10/15/2024 83505238166021   Final     Therapy Statistic Total  Date Time* 10/15/2024 88453329830608   Final     Therapy Statistic Total  Date Time* 10/15/2024 01545100978256   Final     Episode Statistic Recent Count 10/15/2024 0   Final     Episode Statistic Type Category 10/15/2024 AT/AF   Final     Episode Statistic Recent Count 10/15/2024 0   Final     Episode Statistic Type Category 10/15/2024 Patient Activated   Final     Episode Statistic Recent Count 10/15/2024 0   Final     Episode Statistic Type Category 10/15/2024 SVT   Final     Episode Statistic Recent Count 10/15/2024 0   Final     Episode Statistic Type Category 10/15/2024 VT   Final     Episode Statistic Recent Count 10/15/2024 0   Final     Episode Statistic Type Category 10/15/2024 VT   Final      Episode Statistic Recent Date Time* 10/15/2024 28948651605674   Final     Episode Statistic Recent Date Time* 10/15/2024 31340211032704   Final     Episode Statistic Recent Date Time* 10/15/2024 62797736349034   Final     Episode Statistic Recent Date Time* 10/15/2024 71879894866162   Final     Episode Statistic Recent Date Time* 10/15/2024 50168909586871   Final     Episode Statistic Recent Date Time* 10/15/2024 84779409514588   Final     Episode Statistic Recent Date Time* 10/15/2024 32368547191334   Final     Episode Statistic Recent Date Time* 10/15/2024 23683313403744   Final     Episode Statistic Recent Date Time* 10/15/2024 89952063637390   Final     Episode Statistic Recent Date Time* 10/15/2024 46512574204358   Final     Episode Statistic Total Count 10/15/2024 0   Final     Episode Statistic Type Category 10/15/2024 AT/AF   Final     Episode Statistic Total Count 10/15/2024 0   Final     Episode Statistic Type Category 10/15/2024 Patient Activated   Final     Episode Statistic Total Count 10/15/2024 0   Final     Episode Statistic Type Category 10/15/2024 SVT   Final     Episode Statistic Total Count 10/15/2024 0   Final     Episode Statistic Type Category 10/15/2024 VT   Final     Episode Statistic Total Count 10/15/2024 0   Final     Episode Statistic Type Category 10/15/2024 VT   Final     Episode Statistic Total Date Time * 10/15/2024 90650865435306   Final     Episode Statistic Total Date Time * 10/15/2024 89977229122969   Final     Episode Statistic Total Date Time * 10/15/2024 46776578343789   Final     Episode Statistic Total Date Time * 10/15/2024 64790138724831   Final     Episode Statistic Total Date Time * 10/15/2024 00876879165868   Final     Episode Statistic Total Date Time * 10/15/2024 89649293000880   Final     Episode Statistic Total Date Time * 10/15/2024 69411152141044   Final     Episode Statistic Total Date Time * 10/15/2024 42020416676787   Final     Episode Statistic Total Date  Time * 10/15/2024 90614530901437   Final     Episode Statistic Total Date Time * 10/15/2024 14832294329980   Final   Admission on 10/02/2024, Discharged on 10/04/2024   Component Date Value Ref Range Status     GLUCOSE BY METER POCT 10/02/2024 168 (H)  70 - 99 mg/dL Final     LVEF  10/03/2024 60-65%   Final     TSH 10/02/2024 1.96  0.30 - 4.20 uIU/mL Final     Sodium 10/02/2024 142  135 - 145 mmol/L Final     Potassium 10/02/2024 4.4  3.4 - 5.3 mmol/L Final     Chloride 10/02/2024 105  98 - 107 mmol/L Final     Carbon Dioxide (CO2) 10/02/2024 26  22 - 29 mmol/L Final     Anion Gap 10/02/2024 11  7 - 15 mmol/L Final     Urea Nitrogen 10/02/2024 14.3  8.0 - 23.0 mg/dL Final     Creatinine 10/02/2024 0.72  0.51 - 0.95 mg/dL Final     GFR Estimate 10/02/2024 >90  >60 mL/min/1.73m2 Final     Calcium 10/02/2024 9.7  8.8 - 10.4 mg/dL Final     Glucose 10/02/2024 184 (H)  70 - 99 mg/dL Final     Magnesium 10/02/2024 2.1  1.7 - 2.3 mg/dL Final     GLUCOSE BY METER POCT 10/02/2024 196 (H)  70 - 99 mg/dL Final     WBC Count 10/02/2024 8.2  4.0 - 11.0 10e3/uL Final     RBC Count 10/02/2024 4.35  3.80 - 5.20 10e6/uL Final     Hemoglobin 10/02/2024 12.4  11.7 - 15.7 g/dL Final     Hematocrit 10/02/2024 38.5  35.0 - 47.0 % Final     MCV 10/02/2024 89  78 - 100 fL Final     MCH 10/02/2024 28.5  26.5 - 33.0 pg Final     MCHC 10/02/2024 32.2  31.5 - 36.5 g/dL Final     RDW 10/02/2024 13.6  10.0 - 15.0 % Final     Platelet Count 10/02/2024 278  150 - 450 10e3/uL Final     % Neutrophils 10/02/2024 75  % Final     % Lymphocytes 10/02/2024 13  % Final     % Monocytes 10/02/2024 7  % Final     % Eosinophils 10/02/2024 5  % Final     % Basophils 10/02/2024 1  % Final     % Immature Granulocytes 10/02/2024 0  % Final     NRBCs per 100 WBC 10/02/2024 0  <1 /100 Final     Absolute Neutrophils 10/02/2024 6.2  1.6 - 8.3 10e3/uL Final     Absolute Lymphocytes 10/02/2024 1.1  0.8 - 5.3 10e3/uL Final     Absolute Monocytes 10/02/2024 0.6  0.0 -  1.3 10e3/uL Final     Absolute Eosinophils 10/02/2024 0.4  0.0 - 0.7 10e3/uL Final     Absolute Basophils 10/02/2024 0.1  0.0 - 0.2 10e3/uL Final     Absolute Immature Granulocytes 10/02/2024 0.0  <=0.4 10e3/uL Final     Absolute NRBCs 10/02/2024 0.0  10e3/uL Final     Ventricular Rate 10/02/2024 43  BPM Final     Atrial Rate 10/02/2024 87  BPM Final     PA Interval 10/02/2024 174  ms Final     QRS Duration 10/02/2024 126  ms Final     QT 10/02/2024 528  ms Final     QTc 10/02/2024 446  ms Final     P Axis 10/02/2024 50  degrees Final     R AXIS 10/02/2024 -40  degrees Final     T Axis 10/02/2024 13  degrees Final     Interpretation ECG 10/02/2024    Final                    Value:Sinus rhythm with 2nd degree A-V block with 2:1 A-V conduction  Left axis deviation  Right bundle branch block  Abnormal ECG  When compared with ECG of 07-Dec-2023 12:36,  High-grade (2:1) AV block has replaced Mobitz I 2nd degree AV block  HR has decreased 20 bpm  Confirmed by Yash Colin ([id number]) on 10/4/2024 2:28:11 PM       GLUCOSE BY METER POCT 10/02/2024 153 (H)  70 - 99 mg/dL Final     GLUCOSE BY METER POCT 10/02/2024 187 (H)  70 - 99 mg/dL Final     GLUCOSE BY METER POCT 10/02/2024 342 (H)  70 - 99 mg/dL Final     GLUCOSE BY METER POCT 10/02/2024 347 (H)  70 - 99 mg/dL Final     GLUCOSE BY METER POCT 10/02/2024 358 (H)  70 - 99 mg/dL Final     GLUCOSE BY METER POCT 10/02/2024 290 (H)  70 - 99 mg/dL Final     WBC Count 10/03/2024 9.4  4.0 - 11.0 10e3/uL Final     RBC Count 10/03/2024 4.47  3.80 - 5.20 10e6/uL Final     Hemoglobin 10/03/2024 12.9  11.7 - 15.7 g/dL Final     Hematocrit 10/03/2024 39.0  35.0 - 47.0 % Final     MCV 10/03/2024 87  78 - 100 fL Final     MCH 10/03/2024 28.9  26.5 - 33.0 pg Final     MCHC 10/03/2024 33.1  31.5 - 36.5 g/dL Final     RDW 10/03/2024 13.6  10.0 - 15.0 % Final     Platelet Count 10/03/2024 305  150 - 450 10e3/uL Final     Sodium 10/03/2024 140  135 - 145 mmol/L Final     Potassium 10/03/2024  4.3  3.4 - 5.3 mmol/L Final     Chloride 10/03/2024 105  98 - 107 mmol/L Final     Carbon Dioxide (CO2) 10/03/2024 22  22 - 29 mmol/L Final     Anion Gap 10/03/2024 13  7 - 15 mmol/L Final     Urea Nitrogen 10/03/2024 13.3  8.0 - 23.0 mg/dL Final     Creatinine 10/03/2024 0.53  0.51 - 0.95 mg/dL Final     GFR Estimate 10/03/2024 >90  >60 mL/min/1.73m2 Final     Calcium 10/03/2024 9.5  8.8 - 10.4 mg/dL Final     Glucose 10/03/2024 176 (H)  70 - 99 mg/dL Final     GLUCOSE BY METER POCT 10/03/2024 224 (H)  70 - 99 mg/dL Final     GLUCOSE BY METER POCT 10/03/2024 180 (H)  70 - 99 mg/dL Final     Magnesium 10/03/2024 2.1  1.7 - 2.3 mg/dL Final     Ventricular Rate 10/03/2024 41  BPM Final     Atrial Rate 10/03/2024 41  BPM Final     CT Interval 10/03/2024 170  ms Final     QRS Duration 10/03/2024 120  ms Final     QT 10/03/2024 538  ms Final     QTc 10/03/2024 443  ms Final     P Axis 10/03/2024 36  degrees Final     R AXIS 10/03/2024 25  degrees Final     T Axis 10/03/2024 46  degrees Final     Interpretation ECG 10/03/2024    Final                    Value:Sinus bradycardia  Right bundle branch block  Abnormal ECG  When compared with ECG of 02-Oct-2024 02:12, (unconfirmed)  Sinus rhythm is no longer with 2nd degree A-V block  QRS axis Shifted right  T wave inversion no longer evident in Inferior leads  Confirmed by MD SALTY, MARK ([id number]) on 10/4/2024 3:58:18 PM       GLUCOSE BY METER POCT 10/03/2024 149 (H)  70 - 99 mg/dL Final     hCG Quantitative 10/03/2024 1  <5 mIU/mL Final     INR 10/03/2024 1.09  0.85 - 1.15 Final     GLUCOSE BY METER POCT 10/03/2024 165 (H)  70 - 99 mg/dL Final     Ventricular Rate 10/03/2024 76  BPM Final     Atrial Rate 10/03/2024 76  BPM Final     CT Interval 10/03/2024 208  ms Final     QRS Duration 10/03/2024 98  ms Final     QT 10/03/2024 410  ms Final     QTc 10/03/2024 461  ms Final     P Axis 10/03/2024 54  degrees Final     R AXIS 10/03/2024 40  degrees Final     T Axis 10/03/2024 268   degrees Final     Interpretation ECG 10/03/2024    Final                    Value:Atrial-sensed ventricular-paced rhythm  Abnormal ECG  When compared with ECG of 03-Oct-2024 07:45, (unconfirmed)  Electronic ventricular pacemaker has replaced Sinus rhythm  Vent. rate has increased by  35 bpm  Confirmed by MD DUENAS MICHAEL ([id number]) on 10/4/2024 3:48:49 PM       GLUCOSE BY METER POCT 10/03/2024 168 (H)  70 - 99 mg/dL Final     GLUCOSE BY METER POCT 10/03/2024 257 (H)  70 - 99 mg/dL Final     GLUCOSE BY METER POCT 10/03/2024 294 (H)  70 - 99 mg/dL Final     GLUCOSE BY METER POCT 10/03/2024 166 (H)  70 - 99 mg/dL Final     Potassium 10/04/2024 4.1  3.4 - 5.3 mmol/L Final     Magnesium 10/04/2024 1.9  1.7 - 2.3 mg/dL Final     GLUCOSE BY METER POCT 10/04/2024 113 (H)  70 - 99 mg/dL Final     GLUCOSE BY METER POCT 10/04/2024 141 (H)  70 - 99 mg/dL Final     GLUCOSE BY METER POCT 10/04/2024 208 (H)  70 - 99 mg/dL Final   Admission on 10/01/2024, Discharged on 10/02/2024   Component Date Value Ref Range Status     Sodium 10/01/2024 137  135 - 145 mmol/L Final     Potassium 10/01/2024 3.9  3.4 - 5.3 mmol/L Final     Carbon Dioxide (CO2) 10/01/2024 25  22 - 29 mmol/L Final     Anion Gap 10/01/2024 12  7 - 15 mmol/L Final     Urea Nitrogen 10/01/2024 13.1  8.0 - 23.0 mg/dL Final     Creatinine 10/01/2024 0.79  0.51 - 0.95 mg/dL Final     GFR Estimate 10/01/2024 84  >60 mL/min/1.73m2 Final     Calcium 10/01/2024 9.7  8.8 - 10.4 mg/dL Final     Chloride 10/01/2024 100  98 - 107 mmol/L Final     Glucose 10/01/2024 118 (H)  70 - 99 mg/dL Final     Alkaline Phosphatase 10/01/2024 85  40 - 150 U/L Final     AST 10/01/2024 21  0 - 45 U/L Final     ALT 10/01/2024 31  0 - 50 U/L Final     Protein Total 10/01/2024 7.2  6.4 - 8.3 g/dL Final     Albumin 10/01/2024 4.0  3.5 - 5.2 g/dL Final     Bilirubin Total 10/01/2024 0.4  <=1.2 mg/dL Final     Troponin T, High Sensitivity 10/01/2024 10  <=14 ng/L Final     pH Venous 10/01/2024 7.41   7.32 - 7.43 Final     pCO2 Venous 10/01/2024 44  40 - 50 mm Hg Final     pO2 Venous 10/01/2024 29  25 - 47 mm Hg Final     Bicarbonate Venous 10/01/2024 28  21 - 28 mmol/L Final     Base Excess/Deficit Venous 10/01/2024 2.4  -3.0 - 3.0 mmol/L Final     FIO2 10/01/2024 0   Final     Oxyhemoglobin Venous 10/01/2024 49 (L)  70 - 75 % Final     O2 Sat, Venous 10/01/2024 50.1 (L)  70.0 - 75.0 % Final     WBC Count 10/01/2024 7.9  4.0 - 11.0 10e3/uL Final     RBC Count 10/01/2024 4.47  3.80 - 5.20 10e6/uL Final     Hemoglobin 10/01/2024 13.3  11.7 - 15.7 g/dL Final     Hematocrit 10/01/2024 39.5  35.0 - 47.0 % Final     MCV 10/01/2024 88  78 - 100 fL Final     MCH 10/01/2024 29.8  26.5 - 33.0 pg Final     MCHC 10/01/2024 33.7  31.5 - 36.5 g/dL Final     RDW 10/01/2024 13.4  10.0 - 15.0 % Final     Platelet Count 10/01/2024 299  150 - 450 10e3/uL Final     % Neutrophils 10/01/2024 67  % Final     % Lymphocytes 10/01/2024 18  % Final     % Monocytes 10/01/2024 8  % Final     % Eosinophils 10/01/2024 6  % Final     % Basophils 10/01/2024 1  % Final     % Immature Granulocytes 10/01/2024 0  % Final     NRBCs per 100 WBC 10/01/2024 0  <1 /100 Final     Absolute Neutrophils 10/01/2024 5.3  1.6 - 8.3 10e3/uL Final     Absolute Lymphocytes 10/01/2024 1.4  0.8 - 5.3 10e3/uL Final     Absolute Monocytes 10/01/2024 0.6  0.0 - 1.3 10e3/uL Final     Absolute Eosinophils 10/01/2024 0.5  0.0 - 0.7 10e3/uL Final     Absolute Basophils 10/01/2024 0.1  0.0 - 0.2 10e3/uL Final     Absolute Immature Granulocytes 10/01/2024 0.0  <=0.4 10e3/uL Final     Absolute NRBCs 10/01/2024 0.0  10e3/uL Final     Hold Specimen 10/01/2024 JIC   Final     Hold Specimen 10/01/2024 JI   Final     D-Dimer Quantitative 10/01/2024 0.60 (H)  0.00 - 0.50 ug/mL FEU Final     Troponin T, High Sensitivity 10/01/2024 10  <=14 ng/L Final     Influenza A PCR 10/01/2024 Negative  Negative Final     Influenza B PCR 10/01/2024 Negative  Negative Final     RSV PCR  10/01/2024 Negative  Negative Final     SARS CoV2 PCR 10/01/2024 Negative  Negative Final     GLUCOSE BY METER POCT 10/01/2024 67 (L)  70 - 99 mg/dL Final   Office Visit on 09/18/2024   Component Date Value Ref Range Status     Hold Specimen 09/18/2024 JIC   Final     Hold Specimen 09/18/2024 JIC   Final     Estimated Average Glucose 09/18/2024 192 (H)  <117 mg/dL Final     Hemoglobin A1C 09/18/2024 8.3 (H)  0.0 - 5.6 % Final   Transferred Records on 09/11/2024   Component Date Value Ref Range Status     RETINOPATHY 09/11/2024 POSITIVE (A)   Final         Total time spent for face to face visit, reviewing labs/imaging studies, counseling and coordination of care was: 30 Minutes spent on the date of the encounter doing chart review, review of outside records, review of test results, interpretation of tests, patient visit, and documentation     The longitudinal plan of care for the diagnosis(es)/condition(s) as documented were addressed during this visit. Due to the added complexity in care, I will continue to support Melyssa in the subsequent management and with ongoing continuity of care.    This note was dictated using voice recognition software.  Any grammatical or context distortions are unintentional and inherent to the software.    No orders of the defined types were placed in this encounter.     New Prescriptions    No medications on file     Modified Medications    No medications on file                 Again, thank you for allowing me to participate in the care of your patient.        Sincerely,        Jabari Santos MD

## 2024-11-08 NOTE — NURSING NOTE
Chief Complaint   Patient presents with    Primary fibromyalgia syndrome     Annual follow up    Involuntary movement     Patient reports intermittent involuntary movements in RUE that began about 1 month ago      Clarita PUENTES CMA on 11/8/2024 at 10:44 AM  [facility]

## 2024-11-18 ENCOUNTER — MYC MEDICAL ADVICE (OUTPATIENT)
Dept: FAMILY MEDICINE | Facility: CLINIC | Age: 64
End: 2024-11-18
Payer: COMMERCIAL

## 2024-11-19 ENCOUNTER — OFFICE VISIT (OUTPATIENT)
Dept: FAMILY MEDICINE | Facility: CLINIC | Age: 64
End: 2024-11-19
Payer: COMMERCIAL

## 2024-11-19 ENCOUNTER — HOSPITAL ENCOUNTER (OUTPATIENT)
Dept: CARDIOLOGY | Facility: CLINIC | Age: 64
Discharge: HOME | End: 2024-11-19
Attending: INTERNAL MEDICINE
Payer: COMMERCIAL

## 2024-11-19 VITALS
SYSTOLIC BLOOD PRESSURE: 118 MMHG | TEMPERATURE: 97.8 F | WEIGHT: 238 LBS | BODY MASS INDEX: 42.16 KG/M2 | HEART RATE: 79 BPM | OXYGEN SATURATION: 94 % | DIASTOLIC BLOOD PRESSURE: 78 MMHG

## 2024-11-19 DIAGNOSIS — I44.1 SECOND DEGREE HEART BLOCK: Primary | ICD-10-CM

## 2024-11-19 DIAGNOSIS — D05.12 DUCTAL CARCINOMA IN SITU (DCIS) OF LEFT BREAST: ICD-10-CM

## 2024-11-19 DIAGNOSIS — Z95.0 CARDIAC PACEMAKER IN SITU: ICD-10-CM

## 2024-11-19 DIAGNOSIS — Z98.890 S/P REDUCTION MAMMOPLASTY: ICD-10-CM

## 2024-11-19 DIAGNOSIS — I44.30 AV HEART BLOCK: ICD-10-CM

## 2024-11-19 DIAGNOSIS — N64.4 BREAST PAIN: Primary | ICD-10-CM

## 2024-11-19 PROCEDURE — 99213 OFFICE O/P EST LOW 20 MIN: CPT | Performed by: FAMILY MEDICINE

## 2024-11-19 PROCEDURE — 93280 PM DEVICE PROGR EVAL DUAL: CPT

## 2024-11-19 ASSESSMENT — PAIN SCALES - GENERAL: PAINLEVEL_OUTOF10: MODERATE PAIN (4)

## 2024-11-19 NOTE — PROGRESS NOTES
Assessment & Plan     Breast pain  Exam is unremarkable today, no masses, no erythema     Ductal carcinoma in situ (DCIS) of left breast  S/P reduction mammoplasty  S/p lumpectomy, radiation, reduction mammoplasty/reconstruction (12/2023)    Patient has follow up visit with Dr. Rodriguez 12/11/2024 for right sided scar pain.  I've asked her to review her symptoms with him at that time if they continue.   With normal exam and normal mammogram <2 months ago, I don't feel imaging is necessary at this time.   Asked to watch for signs of infection (redness/induration, etc) and call if there are concerns       Recent URI/sinus pain and cough = sinuses are clear  Cough still remains but is gradually improving.             Susannah Ragsdale is a 64 year old, presenting for the following health issues:  Breast Pain        11/19/2024     9:02 AM   Additional Questions   Roomed by scott gaona CMA   Accompanied by Self     History of Present Illness       Reason for visit:  Breast pain  Symptom onset:  1-2 weeks ago  Symptom intensity:  Moderate  Symptom progression:  Staying the same  Had these symptoms before:  No  What makes it worse:  Touch   She is taking medications regularly.       - Left sided breast pain x1 week. No masses. She notes swelling and tenderness around areola. No drainage, fever, itching or tightness. Area is somewhat warm to the touch intermittently. PHx breast cancer.            Objective    /78   Pulse 79   Temp 97.8  F (36.6  C) (Tympanic)   Wt 108 kg (238 lb)   LMP 01/14/2016 (Approximate)   SpO2 94%   BMI 42.16 kg/m    Body mass index is 42.16 kg/m .  Physical Exam   GENERAL: alert and no distress  BREAST: bilateral breast reduction scars  Bilateral breast exam done, no discrete masses  No erythema or induration  Tender to palpation to light touch across superior aspect of the left breast between the pacemaker and nipple.             Signed Electronically by: Kaia Elena MD

## 2024-11-20 ENCOUNTER — TELEPHONE (OUTPATIENT)
Dept: GASTROENTEROLOGY | Facility: CLINIC | Age: 64
End: 2024-11-20

## 2024-11-20 LAB
MDC_IDC_LEAD_CONNECTION_STATUS: NORMAL
MDC_IDC_LEAD_CONNECTION_STATUS: NORMAL
MDC_IDC_LEAD_IMPLANT_DT: NORMAL
MDC_IDC_LEAD_IMPLANT_DT: NORMAL
MDC_IDC_LEAD_LOCATION: NORMAL
MDC_IDC_LEAD_LOCATION: NORMAL
MDC_IDC_LEAD_LOCATION_DETAIL_1: NORMAL
MDC_IDC_LEAD_LOCATION_DETAIL_1: NORMAL
MDC_IDC_LEAD_MFG: NORMAL
MDC_IDC_LEAD_MFG: NORMAL
MDC_IDC_LEAD_MODEL: NORMAL
MDC_IDC_LEAD_MODEL: NORMAL
MDC_IDC_LEAD_POLARITY_TYPE: NORMAL
MDC_IDC_LEAD_POLARITY_TYPE: NORMAL
MDC_IDC_LEAD_SERIAL: NORMAL
MDC_IDC_LEAD_SERIAL: NORMAL
MDC_IDC_MSMT_BATTERY_DTM: NORMAL
MDC_IDC_MSMT_BATTERY_REMAINING_LONGEVITY: 155 MO
MDC_IDC_MSMT_BATTERY_RRT_TRIGGER: 2.62
MDC_IDC_MSMT_BATTERY_STATUS: NORMAL
MDC_IDC_MSMT_BATTERY_VOLTAGE: 3.21 V
MDC_IDC_MSMT_LEADCHNL_RA_IMPEDANCE_VALUE: 285 OHM
MDC_IDC_MSMT_LEADCHNL_RA_IMPEDANCE_VALUE: 456 OHM
MDC_IDC_MSMT_LEADCHNL_RA_PACING_THRESHOLD_AMPLITUDE: 0.75 V
MDC_IDC_MSMT_LEADCHNL_RA_PACING_THRESHOLD_PULSEWIDTH: 0.4 MS
MDC_IDC_MSMT_LEADCHNL_RA_SENSING_INTR_AMPL: 2.62 MV
MDC_IDC_MSMT_LEADCHNL_RA_SENSING_INTR_AMPL: 3.12 MV
MDC_IDC_MSMT_LEADCHNL_RV_IMPEDANCE_VALUE: 418 OHM
MDC_IDC_MSMT_LEADCHNL_RV_IMPEDANCE_VALUE: 589 OHM
MDC_IDC_MSMT_LEADCHNL_RV_PACING_THRESHOLD_AMPLITUDE: 0.62 V
MDC_IDC_MSMT_LEADCHNL_RV_PACING_THRESHOLD_AMPLITUDE: 0.75 V
MDC_IDC_MSMT_LEADCHNL_RV_PACING_THRESHOLD_PULSEWIDTH: 0.4 MS
MDC_IDC_MSMT_LEADCHNL_RV_PACING_THRESHOLD_PULSEWIDTH: 0.4 MS
MDC_IDC_MSMT_LEADCHNL_RV_SENSING_INTR_AMPL: 23.38 MV
MDC_IDC_MSMT_LEADCHNL_RV_SENSING_INTR_AMPL: 30 MV
MDC_IDC_PG_IMPLANT_DTM: NORMAL
MDC_IDC_PG_MFG: NORMAL
MDC_IDC_PG_MODEL: NORMAL
MDC_IDC_PG_SERIAL: NORMAL
MDC_IDC_PG_TYPE: NORMAL
MDC_IDC_SESS_CLINIC_NAME: NORMAL
MDC_IDC_SESS_DTM: NORMAL
MDC_IDC_SESS_TYPE: NORMAL
MDC_IDC_SET_BRADY_AT_MODE_SWITCH_RATE: 171 {BEATS}/MIN
MDC_IDC_SET_BRADY_HYSTRATE: NORMAL
MDC_IDC_SET_BRADY_LOWRATE: 60 {BEATS}/MIN
MDC_IDC_SET_BRADY_MAX_SENSOR_RATE: 130 {BEATS}/MIN
MDC_IDC_SET_BRADY_MAX_TRACKING_RATE: 130 {BEATS}/MIN
MDC_IDC_SET_BRADY_MODE: NORMAL
MDC_IDC_SET_BRADY_PAV_DELAY_LOW: 180 MS
MDC_IDC_SET_BRADY_SAV_DELAY_LOW: 150 MS
MDC_IDC_SET_LEADCHNL_RA_PACING_AMPLITUDE: 1.5 V
MDC_IDC_SET_LEADCHNL_RA_PACING_ANODE_ELECTRODE_1: NORMAL
MDC_IDC_SET_LEADCHNL_RA_PACING_ANODE_LOCATION_1: NORMAL
MDC_IDC_SET_LEADCHNL_RA_PACING_CAPTURE_MODE: NORMAL
MDC_IDC_SET_LEADCHNL_RA_PACING_CATHODE_ELECTRODE_1: NORMAL
MDC_IDC_SET_LEADCHNL_RA_PACING_CATHODE_LOCATION_1: NORMAL
MDC_IDC_SET_LEADCHNL_RA_PACING_POLARITY: NORMAL
MDC_IDC_SET_LEADCHNL_RA_PACING_PULSEWIDTH: 0.4 MS
MDC_IDC_SET_LEADCHNL_RA_SENSING_ANODE_ELECTRODE_1: NORMAL
MDC_IDC_SET_LEADCHNL_RA_SENSING_ANODE_LOCATION_1: NORMAL
MDC_IDC_SET_LEADCHNL_RA_SENSING_CATHODE_ELECTRODE_1: NORMAL
MDC_IDC_SET_LEADCHNL_RA_SENSING_CATHODE_LOCATION_1: NORMAL
MDC_IDC_SET_LEADCHNL_RA_SENSING_POLARITY: NORMAL
MDC_IDC_SET_LEADCHNL_RA_SENSING_SENSITIVITY: 0.3 MV
MDC_IDC_SET_LEADCHNL_RV_PACING_AMPLITUDE: 2 V
MDC_IDC_SET_LEADCHNL_RV_PACING_ANODE_ELECTRODE_1: NORMAL
MDC_IDC_SET_LEADCHNL_RV_PACING_ANODE_LOCATION_1: NORMAL
MDC_IDC_SET_LEADCHNL_RV_PACING_CAPTURE_MODE: NORMAL
MDC_IDC_SET_LEADCHNL_RV_PACING_CATHODE_ELECTRODE_1: NORMAL
MDC_IDC_SET_LEADCHNL_RV_PACING_CATHODE_LOCATION_1: NORMAL
MDC_IDC_SET_LEADCHNL_RV_PACING_POLARITY: NORMAL
MDC_IDC_SET_LEADCHNL_RV_PACING_PULSEWIDTH: 0.4 MS
MDC_IDC_SET_LEADCHNL_RV_SENSING_ANODE_ELECTRODE_1: NORMAL
MDC_IDC_SET_LEADCHNL_RV_SENSING_ANODE_LOCATION_1: NORMAL
MDC_IDC_SET_LEADCHNL_RV_SENSING_CATHODE_ELECTRODE_1: NORMAL
MDC_IDC_SET_LEADCHNL_RV_SENSING_CATHODE_LOCATION_1: NORMAL
MDC_IDC_SET_LEADCHNL_RV_SENSING_POLARITY: NORMAL
MDC_IDC_SET_LEADCHNL_RV_SENSING_SENSITIVITY: 0.9 MV
MDC_IDC_SET_ZONE_DETECTION_INTERVAL: 350 MS
MDC_IDC_SET_ZONE_DETECTION_INTERVAL: 400 MS
MDC_IDC_SET_ZONE_STATUS: NORMAL
MDC_IDC_SET_ZONE_STATUS: NORMAL
MDC_IDC_SET_ZONE_TYPE: NORMAL
MDC_IDC_SET_ZONE_VENDOR_TYPE: NORMAL
MDC_IDC_STAT_AT_BURDEN_PERCENT: 0 %
MDC_IDC_STAT_AT_DTM_END: NORMAL
MDC_IDC_STAT_AT_DTM_START: NORMAL
MDC_IDC_STAT_BRADY_AP_VP_PERCENT: 1.7 %
MDC_IDC_STAT_BRADY_AP_VS_PERCENT: 0.02 %
MDC_IDC_STAT_BRADY_AS_VP_PERCENT: 98.22 %
MDC_IDC_STAT_BRADY_AS_VS_PERCENT: 0.05 %
MDC_IDC_STAT_BRADY_DTM_END: NORMAL
MDC_IDC_STAT_BRADY_DTM_START: NORMAL
MDC_IDC_STAT_BRADY_RA_PERCENT_PACED: 1.7 %
MDC_IDC_STAT_BRADY_RV_PERCENT_PACED: 99.93 %
MDC_IDC_STAT_EPISODE_RECENT_COUNT: 0
MDC_IDC_STAT_EPISODE_RECENT_COUNT_DTM_END: NORMAL
MDC_IDC_STAT_EPISODE_RECENT_COUNT_DTM_START: NORMAL
MDC_IDC_STAT_EPISODE_TOTAL_COUNT: 0
MDC_IDC_STAT_EPISODE_TOTAL_COUNT_DTM_END: NORMAL
MDC_IDC_STAT_EPISODE_TOTAL_COUNT_DTM_START: NORMAL
MDC_IDC_STAT_EPISODE_TYPE: NORMAL
MDC_IDC_STAT_TACHYTHERAPY_RECENT_DTM_END: NORMAL
MDC_IDC_STAT_TACHYTHERAPY_RECENT_DTM_START: NORMAL
MDC_IDC_STAT_TACHYTHERAPY_TOTAL_DTM_END: NORMAL
MDC_IDC_STAT_TACHYTHERAPY_TOTAL_DTM_START: NORMAL

## 2024-11-20 NOTE — TELEPHONE ENCOUNTER
Endoscopy Scheduling Screen      What insurance is in the chart?  Other:  [employer]    Ordering/Referring Provider: Kaia Elena MD    (If ordering provider performs procedure, schedule with ordering provider unless otherwise instructed. )    BMI: There is no height or weight on file to calculate BMI.     Sedation Ordered  general anesthesia.   BMI<= 45 45 < BMI <= 48 48 < BMI < = 50  BMI > 50   No Restrictions No [facility]  No [facility]  [facility] with exceptions Hospital Only OR Only       Do you have a history of malignant hyperthermia?  No    (Females) Are you currently pregnant?   No     Are you currently on dialysis?   No    Do you need assistance transferring?   No    BMI: There is no height or weight on file to calculate BMI.     Is patients BMI > 50?  No    BMI > 40?  No    Do you have a diagnosis of diabetes?  Yes (Golytely Prep)    Do you take an injectable medication for weight loss or diabetes (excluding insulin)?  YES -OZEMPIC    Do you take the medication Naltrexone?  No    Do you take blood thinners?  No     Prep   Are you currently on dialysis or do you have chronic kidney disease?  Yes (Golytely Prep)    Do you have a diagnosis of cystic fibrosis (CF)?  No    On a regular basis do you go 3 -5 days between bowel movements?  No    Preferred Pharmacy:  [facility]    Final Scheduling Details     Procedure scheduled  Colonoscopy    Surgeon:  Avni     Date of procedure:  2/17/25     Location  [place] - Per order.    What is your communication preference for Instructions and/or Bowel Prep?   Desigual    Patient Reminders:    You will receive a call from a Nurse to review instructions and health history.  This assessment must be completed prior to your procedure.  Failure to complete the Nurse assessment may result in the procedure being cancelled.       On the day of your procedure, please designate an adult(s) who can drive you home stay with you for the next 24 hours. The medicines used in the exam will  make you sleepy. You will not be able to drive.       You cannot take public transportation, ride share services, or non-medical taxi service without a responsible caregiver.  Medical transport services are allowed with the requirement that a responsible caregiver will receive you at your destination.  We require that drivers and caregivers are confirmed prior to your procedure.

## 2024-12-09 DIAGNOSIS — J31.0 CHRONIC RHINITIS: ICD-10-CM

## 2024-12-09 RX ORDER — PSEUDOEPHEDRINE HCL 30 MG/1
60 TABLET, FILM COATED ORAL 2 TIMES DAILY
Qty: 120 TABLET | Refills: 3 | Status: SHIPPED | OUTPATIENT
Start: 2024-12-09

## 2024-12-11 ENCOUNTER — OFFICE VISIT (OUTPATIENT)
Dept: PLASTIC SURGERY | Facility: CLINIC | Age: 64
End: 2024-12-11
Payer: COMMERCIAL

## 2024-12-11 VITALS
BODY MASS INDEX: 42.17 KG/M2 | HEIGHT: 63 IN | WEIGHT: 238 LBS | DIASTOLIC BLOOD PRESSURE: 85 MMHG | HEART RATE: 79 BPM | SYSTOLIC BLOOD PRESSURE: 137 MMHG | OXYGEN SATURATION: 93 %

## 2024-12-11 DIAGNOSIS — Z98.890 S/P BREAST RECONSTRUCTION, LEFT: Primary | ICD-10-CM

## 2024-12-11 DIAGNOSIS — L91.0 HYPERTROPHIC SCAR: ICD-10-CM

## 2024-12-11 RX ORDER — TRIAMCINOLONE ACETONIDE 40 MG/ML
40 INJECTION, SUSPENSION INTRA-ARTICULAR; INTRAMUSCULAR ONCE
Status: COMPLETED | OUTPATIENT
Start: 2024-12-11 | End: 2024-12-11

## 2024-12-11 RX ADMIN — TRIAMCINOLONE ACETONIDE 40 MG: 40 INJECTION, SUSPENSION INTRA-ARTICULAR; INTRAMUSCULAR at 08:45

## 2024-12-11 ASSESSMENT — PAIN SCALES - GENERAL: PAINLEVEL_OUTOF10: MILD PAIN (3)

## 2024-12-11 NOTE — PROGRESS NOTES
PRESENTING COMPLAINT:  Post-operative visit s/p left breast reconstruction after lumpectomy and right breast reduction done 12/7/2023     HISTORY OF PRESENTING COMPLAINT: The patient is here for post-operative visit.  The patient is being seen in the presence of my nurse.     The patient is a year out from her reconstruction and is very happy with results.  No major issues except for some hypertrophic scarring in the right lateral and medial inframammary fold which is causing significant itching.  This has been stable for the last 6 months.  No treatment has been undertaken.    On exam: Vital signs are stable afebrile no obvious distress.  Both breasts are healed symmetric aesthetic.  She has hypertrophic scarring of the lateral aspect of the inframammary fold for about 5 cm and medial inframammary fold for about 5 cm.     ASSESSMENT AND PLAN:  Based upon the above findings, the patient is here for post-operative visit.     Advised steroid injections to help with the itching.  Discussed with her the potential complications of hypopigmentation, scar widening and skin thinning along with fat atrophy.  I explained to the patient that 80 to 90% of patients do experience improvement in the itching but may require further treatments in the future.  She understood and wants to proceed.    PROCEDURE NOTE: After informed consent was taken with the patient the right inframammary fold area was prepped and draped in the standard surgical fashion.  1 cc of Kenalog 40 was mixed with 5 cc of 1% lidocaine and injected in both the areas intradermally.  She tolerated the procedure well.    Will see her back in the future if the itching does not improve.    All questions were answered.  The patient was happy with the visit.    Total time spent in the encounter today including chart review visit itself procedure and postvisit paperwork was 30 minutes.

## 2024-12-11 NOTE — LETTER
12/11/2024       RE: Bria Davis  [address] [address] [id number]     Dear Colleague,    Thank you for referring your patient, Bria Davis, to the [facility] [place] [facility]. Please see a copy of my visit note below.    PRESENTING COMPLAINT:  Post-operative visit s/p left breast reconstruction after lumpectomy and right breast reduction done 12/7/2023     HISTORY OF PRESENTING COMPLAINT: The patient is here for post-operative visit.  The patient is being seen in the presence of my nurse.     The patient is a year out from her reconstruction and is very happy with results.  No major issues except for some hypertrophic scarring in the right lateral and medial inframammary fold which is causing significant itching.  This has been stable for the last 6 months.  No treatment has been undertaken.    On exam: Vital signs are stable afebrile no obvious distress.  Both breasts are healed symmetric aesthetic.  She has hypertrophic scarring of the lateral aspect of the inframammary fold for about 5 cm and medial inframammary fold for about 5 cm.     ASSESSMENT AND PLAN:  Based upon the above findings, the patient is here for post-operative visit.     Advised steroid injections to help with the itching.  Discussed with her the potential complications of hypopigmentation, scar widening and skin thinning along with fat atrophy.  I explained to the patient that 80 to 90% of patients do experience improvement in the itching but may require further treatments in the future.  She understood and wants to proceed.    PROCEDURE NOTE: After informed consent was taken with the patient the right inframammary fold area was prepped and draped in the standard surgical fashion.  1 cc of Kenalog 40 was mixed with 5 cc of 1% lidocaine and injected in both the areas intradermally.  She tolerated the procedure  well.    Will see her back in the future if the itching does not improve.    All questions were answered.  The patient was happy with the visit.    Total time spent in the encounter today including chart review visit itself procedure and postvisit paperwork was 30 minutes.      Again, thank you for allowing me to participate in the care of your patient.      Sincerely,    DELORIS Rodriguez MD

## 2024-12-11 NOTE — NURSING NOTE
"Chief Complaint   Patient presents with    PHANI Ragsdale, is being seen today for a follow up scar pain and redness.       Vitals:    12/11/24 0747   BP: 137/85   BP Location: Left arm   Patient Position: Chair   Cuff Size: Adult Large   Pulse: 79   SpO2: 93%   Weight: 108 kg (238 lb)   Height: 1.6 m (5' 3\")       Body mass index is 42.16 kg/m .      Payal Willis LPN    "

## 2024-12-12 ENCOUNTER — E-VISIT (OUTPATIENT)
Dept: URGENT CARE | Facility: CLINIC | Age: 64
End: 2024-12-12
Payer: COMMERCIAL

## 2024-12-12 DIAGNOSIS — H93.93 EAR PROBLEM, BILATERAL: Primary | ICD-10-CM

## 2024-12-12 NOTE — PATIENT INSTRUCTIONS
Dear Bria Davis,    We are sorry you are not feeling well. Based on the responses you provided, it is recommended that you be seen in-person in urgent care so we can better evaluate your symptoms. Please click here to find the nearest urgent care location to you.   You will not be charged for this Visit. Thank you for trusting us with your care.    Kimberly Joy PA-C

## 2024-12-15 DIAGNOSIS — Z79.4 TYPE 2 DIABETES MELLITUS WITH CHRONIC KIDNEY DISEASE, WITH LONG-TERM CURRENT USE OF INSULIN, UNSPECIFIED CKD STAGE (H): ICD-10-CM

## 2024-12-15 DIAGNOSIS — E11.22 TYPE 2 DIABETES MELLITUS WITH CHRONIC KIDNEY DISEASE, WITH LONG-TERM CURRENT USE OF INSULIN, UNSPECIFIED CKD STAGE (H): ICD-10-CM

## 2024-12-16 RX ORDER — PEN NEEDLE, DIABETIC 32GX 5/32"
NEEDLE, DISPOSABLE MISCELLANEOUS
Qty: 400 EACH | Refills: 3 | Status: SHIPPED | OUTPATIENT
Start: 2024-12-16

## 2024-12-16 NOTE — TELEPHONE ENCOUNTER
No insulin pen needles seen on med list. Patient does have insulin pens  Beverly Carvajal RN on 12/16/2024 at 1:24 PM

## 2024-12-18 ENCOUNTER — OFFICE VISIT (OUTPATIENT)
Dept: OPHTHALMOLOGY | Facility: CLINIC | Age: 64
End: 2024-12-18
Payer: COMMERCIAL

## 2024-12-18 DIAGNOSIS — H02.135 SENILE ECTROPION OF LEFT LOWER EYELID: ICD-10-CM

## 2024-12-18 DIAGNOSIS — J34.2 NASAL SEPTAL DEVIATION: ICD-10-CM

## 2024-12-18 DIAGNOSIS — H02.132 SENILE ECTROPION OF RIGHT LOWER EYELID: ICD-10-CM

## 2024-12-18 DIAGNOSIS — H04.223 EPIPHORA DUE TO INSUFFICIENT DRAINAGE OF BOTH SIDES: Primary | ICD-10-CM

## 2024-12-18 DIAGNOSIS — H04.563 PUNCTAL STENOSIS, ACQUIRED, BILATERAL: ICD-10-CM

## 2024-12-18 ASSESSMENT — VISUAL ACUITY
CORRECTION_TYPE: GLASSES
OD_CC+: +2
METHOD: SNELLEN - LINEAR
OD_CC: 20/25
OS_CC: 20/40

## 2024-12-18 ASSESSMENT — CONF VISUAL FIELD
OS_NORMAL: 1
OD_INFERIOR_TEMPORAL_RESTRICTION: 0
OS_SUPERIOR_NASAL_RESTRICTION: 0
OS_INFERIOR_NASAL_RESTRICTION: 0
OS_INFERIOR_TEMPORAL_RESTRICTION: 0
OD_NORMAL: 1
OD_SUPERIOR_TEMPORAL_RESTRICTION: 0
OD_INFERIOR_NASAL_RESTRICTION: 0
METHOD: COUNTING FINGERS
OS_SUPERIOR_TEMPORAL_RESTRICTION: 0
OD_SUPERIOR_NASAL_RESTRICTION: 0

## 2024-12-18 ASSESSMENT — TONOMETRY
OS_IOP_MMHG: 19
OD_IOP_MMHG: 18
IOP_METHOD: ICARE

## 2024-12-18 NOTE — LETTER
" 2024         RE:  :  MRN: Bria Davis  1960  [id number]     Dear Dr. Recinos,    Thank you for asking me to see your patient, Bria Davis, for an oculoplastic   consultation.  My assessment and plan are below.  For further details, please see my attached clinic note.      Oculoplastic Clinic New Patient    Patient: Bria Davis MRN# [id number]   YOB: 1960 Age: 64 year old   Date of Visit: Dec 18, 2024    CC: Tearing    Chief Complaint(s) and History of Present Illness(es)     Tearing Evaluation            Laterality: left eye          Comments    Pt referred by Dr. Recinos for a tearing eval. Pt states that the left eye   tears x 3 months. It is worse when she has a cold. The tears overflow and   run down her cheek. Additionally when she blows her nose, fluid will   \"squirt\" from the left eye and will get her glasses dirty. These symptoms   have improved since the onset.    Pacemaker implant 10/3/2024. 81 mg ASA. YAG laser LPI each eye, no other   ocular surgeries.    Diabetic, last A1C 7.9 (prior to beginning Jardiance). Known mild NPDR   each eye.               HPI:     Bria Davis is a 64 year old female who has noted tearing from the left eye. She had a volleyball injury in her 30s with a nasal fracture and had sinus issues. She did undergo endoscopic sinus surgery. The eye does not feel dry and irritated. She does have a history of breast cancer but does not believe she was on Taxotere. The tears run down the cheeks, and obscure vision interfering with activities of daily living.     She has a pacemaker.    She is on Aspirin.          Irrigation and Nasal Endoscopy:     PROCEDURE: Dilation and irrigation both eyes  SURGEON: Sarahi Agosto MD  ANESTHESIA: Topical  COMPLICATIONS: None  EBL: Nil  FINDINGS: 0% block right eye,  0% block left eye but she has punctal stenosis on both sides.     The patient was placed supine in the examining chair. Proparacaine was " placed in the eye.  The punctum was anesthesized with 2% lidocaine on a cotton tip applicator.  The punctum was dilated with punctal dilator. The 25 gauge lacrimal cannula was placed in the proximal canaliculus and the lacrimal system irrigated with water.  The patient tolerated the procedure well.   I was present for the entire procedure - Sarahi Agosto MD  PROCEDURE: Nasal Endoscopy  SURGEON: Sarahi Agosto MD  ANESTHESIA: Topical  COMPLICATIONS: None  EBL: Nil  FINDINGS: Normal nasal exam for  lacrimal surgery, she does have synechiae between the middle turbinate and septum on the left.      Indications: Examine the nasal cavity for lacrimal surgery    The zero degree nasal endoscope was passed under endonasally. The inferior turbinates appeared normal. There was  normal middle turbinate architecture. On the left side there is a band of cicatrix between the middle turbinate and the septum. No polyps or purulence. No septal perforation. No masses or lesions were identified.     I was present for the entire procedure - Sarahi Agosto MD           Assessment and Plan:     1. Epiphora due to insufficient drainage of both sides    2. Senile ectropion of right lower eyelid    3. Senile ectropion of left lower eyelid    4. Nasal septal deviation    5. Punctal stenosis, acquired, bilateral        Patent lacrimal system but has significant punctal stenosis and ectropion left > right.     Plan:  Both lower eyelid ectropion repair + punctoplasty and mini [place] stent.    ENT referral for sinus symptoms. Can try Flonase.         Again, thank you for allowing me to participate in the care of your patient.      Sincerely,    Sarahi Agosto MD  Department of Ophthalmology and Visual Neurosciences  [facility]    CC: Josefa Recinos MD  [facility]  [address] [address] [address]  Via Fax: [phone]

## 2024-12-18 NOTE — PROGRESS NOTES
"     [facility] New Patient    Patient: Bria Davis MRN# [id number]   YOB: 1960 Age: 64 year old   Date of Visit: Dec 18, 2024    CC: Tearing    Chief Complaint(s) and History of Present Illness(es)     Tearing Evaluation            Laterality: left eye          Comments    Pt referred by Dr. Recinos for a tearing eval. Pt states that the left eye   tears x 3 months. It is worse when she has a cold. The tears overflow and   run down her cheek. Additionally when she blows her nose, fluid will   \"squirt\" from the left eye and will get her glasses dirty. These symptoms   have improved since the onset.    Pacemaker implant 10/3/2024. 81 mg ASA. YAG laser LPI each eye, no other   ocular surgeries.    Diabetic, last A1C 7.9 (prior to beginning Jardiance). Known mild NPDR   each eye.               HPI:     Bria Davis is a 64 year old female who has noted tearing from the left eye. She had a volleyball injury in her 30s with a nasal fracture and had sinus issues. She did undergo endoscopic sinus surgery. The eye does not feel dry and irritated. She does have a history of breast cancer but does not believe she was on Taxotere. The tears run down the cheeks, and obscure vision interfering with activities of daily living.     She has a pacemaker.    She is on Aspirin.          Irrigation and Nasal Endoscopy:     PROCEDURE: Dilation and irrigation both eyes  SURGEON: Sarahi Agosto MD  ANESTHESIA: Topical  COMPLICATIONS: None  EBL: Nil  FINDINGS: 0% block right eye,  0% block left eye but she has punctal stenosis on both sides.     The patient was placed supine in the examining chair. Proparacaine was placed in the eye.  The punctum was anesthesized with 2% lidocaine on a cotton tip applicator.  The punctum was dilated with punctal dilator. The 25 gauge lacrimal cannula was placed in the proximal canaliculus and the lacrimal system irrigated with water.  The patient tolerated the procedure " well.   I was present for the entire procedure - Sarahi Agosto MD  PROCEDURE: Nasal Endoscopy  SURGEON: Sarahi Agosto MD  ANESTHESIA: Topical  COMPLICATIONS: None  EBL: Nil  FINDINGS: Normal nasal exam for  lacrimal surgery, she does have synechiae between the middle turbinate and septum on the left.      Indications: Examine the nasal cavity for lacrimal surgery    The zero degree nasal endoscope was passed under endonasally. The inferior turbinates appeared normal. There was  normal middle turbinate architecture. On the left side there is a band of cicatrix between the middle turbinate and the septum. No polyps or purulence. No septal perforation. No masses or lesions were identified.     I was present for the entire procedure - Sarahi Agosto MD           Assessment and Plan:     1. Epiphora due to insufficient drainage of both sides    2. Senile ectropion of right lower eyelid    3. Senile ectropion of left lower eyelid    4. Nasal septal deviation    5. Punctal stenosis, acquired, bilateral        Patent lacrimal system but has significant punctal stenosis and ectropion left > right.     Plan:  Both lower eyelid ectropion repair + punctoplasty and mini [place] stent.    ENT referral for sinus symptoms. Can try Flonase.          Attending Physician Attestation: Complete documentation of historical and exam elements from today's encounter can be found in the full encounter summary report (not reduplicated in this progress note). I personally obtained the chief complaint(s) and history of present illness. I confirmed and edited as necessary the review of systems, past medical/surgical history, family history, social history, and examination findings as documented by others; and I examined the patient myself. I personally reviewed the relevant tests, images, and reports as documented above. I formulated and edited as necessary the assessment and plan and discussed the findings and management plan with the  "patient. Sarahi Agosto MD      Today with Bria Davis I reviewed the indications, risks, benefits, and alternatives of the proposed surgical procedure including, but not limited to, failure obtain the desired result  and need for additional surgery, bleeding, infection, loss of vision, loss of the eye, and the remote possibility of permanent damage to any organ system or death.  This may necessitate more surgery, and sometimes we cannot \"cure\" tearing. I provided multiple opportunities for the questions, answered all questions to the best of my ability, and confirmed that my answers and my discussion were understood. Sarahi Agosto MD          "

## 2024-12-18 NOTE — NURSING NOTE
"Chief Complaints and History of Present Illnesses   Patient presents with    Tearing Evaluation     Chief Complaint(s) and History of Present Illness(es)       Tearing Evaluation              Laterality: left eye              Comments    Pt referred by Dr. Recinos for a tearing eval. Pt states that the left eye tears x 3 months. It is worse when she has a cold. The tears overflow and run down her cheek. Additionally when she blows her nose, fluid will \"squirt\" from the left eye and will get her glasses dirty. These symptoms have improved since the onset.    Pacemaker implant 10/3/2024. 81 mg ASA. YAG laser LPI each eye, no other ocular surgeries.    Diabetic, last A1C 7.9 (prior to beginning Jardiance). Known mild NPDR each eye.                   "

## 2024-12-19 ENCOUNTER — LAB (OUTPATIENT)
Dept: LAB | Facility: CLINIC | Age: 64
End: 2024-12-19
Payer: COMMERCIAL

## 2024-12-19 DIAGNOSIS — N18.2 TYPE 2 DIABETES MELLITUS WITH STAGE 2 CHRONIC KIDNEY DISEASE, WITH LONG-TERM CURRENT USE OF INSULIN (H): ICD-10-CM

## 2024-12-19 DIAGNOSIS — E11.22 TYPE 2 DIABETES MELLITUS WITH STAGE 2 CHRONIC KIDNEY DISEASE, WITH LONG-TERM CURRENT USE OF INSULIN (H): ICD-10-CM

## 2024-12-19 DIAGNOSIS — Z79.4 TYPE 2 DIABETES MELLITUS WITH STAGE 2 CHRONIC KIDNEY DISEASE, WITH LONG-TERM CURRENT USE OF INSULIN (H): ICD-10-CM

## 2024-12-19 LAB
EST. AVERAGE GLUCOSE BLD GHB EST-MCNC: 143 MG/DL
HBA1C MFR BLD: 6.6 % (ref 0–5.6)

## 2025-01-07 ENCOUNTER — VIRTUAL VISIT (OUTPATIENT)
Dept: FAMILY MEDICINE | Facility: CLINIC | Age: 65
End: 2025-01-07
Payer: COMMERCIAL

## 2025-01-07 DIAGNOSIS — J40 BRONCHITIS: Primary | ICD-10-CM

## 2025-01-07 DIAGNOSIS — J45.20 MILD INTERMITTENT ASTHMA, UNSPECIFIED WHETHER COMPLICATED: ICD-10-CM

## 2025-01-07 PROCEDURE — 98005 SYNCH AUDIO-VIDEO EST LOW 20: CPT | Performed by: NURSE PRACTITIONER

## 2025-01-07 RX ORDER — AZITHROMYCIN 250 MG/1
TABLET, FILM COATED ORAL
Qty: 6 TABLET | Refills: 0 | Status: SHIPPED | OUTPATIENT
Start: 2025-01-07 | End: 2025-01-12

## 2025-01-07 RX ORDER — PREDNISONE 10 MG/1
10 TABLET ORAL DAILY
Qty: 5 TABLET | Refills: 0 | Status: SHIPPED | OUTPATIENT
Start: 2025-01-07

## 2025-01-07 NOTE — PROGRESS NOTES
"Melyssa is a 64 year old who is being evaluated via a billable video visit.    How would you like to obtain your AVS? [employer]  If the video visit is dropped, the invitation should be resent by: Text to cell phone: [phone]  Will anyone else be joining your video visit? No      Assessment & Plan     Bronchitis    - predniSONE (DELTASONE) 10 MG tablet; Take 1 tablet (10 mg) by mouth daily.  - azithromycin (ZITHROMAX) 250 MG tablet; Take 2 tablets (500 mg) by mouth daily for 1 day, THEN 1 tablet (250 mg) daily for 4 days.  Discussed how to take the medication(s), expected outcomes, potential side effects.  Will repeat antibiotics and add a lower dose of prednisone.   Monitor blood sugar.   If this cough persists or worsens, seek emergent care.    Mild intermittent asthma, unspecified whether complicated    Continue to monitor closely, use inhaler prn.            See Patient Instructions  Patient Instructions   Take antibiotic and prednisone as directed.  Seek emergent care if worsening.  Follow up with cardiology as scheduled.      When you are out of refills or the refills say \"zero\", it is time to schedule your next appointment in clinic!    Labs are released to you almost immediately and sometimes before I have had a chance to review them.  I review labs regularly and once they are all in, you will either be sent a letter with your results or if you are signed up for on-line services, you will be notified that results are available to you on [employer]. If there are serious findings, you typically will be called.    If you have any questions about your visit, your symptoms, your medication, your test results or it is not clear what your diagnosis or treatment plan is please contact me (via [employer]) or call the care team at [phone] and say \"Care Team\"            Subjective   Melyssa is a 64 year old, presenting for the following health issues:  URI      1/7/2025     2:32 PM   Additional Questions   Roomed by  " "    States in October she had similar complaints of cough. She went to ER and \"ended up with a pacemaker'  She denies any chest pain, palpitations, dizziness.  She was prescribed antibiotics and felt 100 percent better, her cough has returned and nasal congestion.            URI    History of Present Illness     Asthma:  She presents for follow up of asthma.  She has some cough, some wheezing, and some shortness of breath.  She is using a relief medication daily. She typically misses taking her controller medication 3 time(s) per week. Patient is aware of the following triggers: animal dander, exercise or sports, humidity, smoke and upper respiratory infections. The patient has not had a visit to the Emergency Room, Urgent Care or Hospital due to asthma since the last clinic visit.     Headaches:   Since the patient's last clinic visit, headaches are: no change  The patient is getting headaches:  Daily  She is not able to do normal daily activities when she has a migraine.  The patient is taking the following rescue/relief medications:  Tylenol   Patient states \"I get some relief\" from the rescue/relief medications.   The patient is taking the following medications to prevent migraines:  No medications to prevent migraines  In the past 4 weeks, the patient has gone to an Urgent Care or Emergency Room 0 times times due to headaches.    Reason for visit:  Cough, sinus drainage, headaches    She eats 2-3 servings of fruits and vegetables daily.She consumes 0 sweetened beverage(s) daily.She exercises with enough effort to increase her heart rate 9 or less minutes per day.  She exercises with enough effort to increase her heart rate 3 or less days per week.   She is taking medications regularly.               Review of Systems  Constitutional, HEENT, cardiovascular, pulmonary, gi and gu systems are negative, except as otherwise noted.      Objective           Vitals:  No vitals were obtained today due to virtual " visit.    Physical Exam   GENERAL: alert and no distress  EYES: Eyes grossly normal to inspection.  No discharge or erythema, or obvious scleral/conjunctival abnormalities.  RESP: No audible wheeze, cough, or visible cyanosis.    SKIN: Visible skin clear. No significant rash, abnormal pigmentation or lesions.  NEURO: Cranial nerves grossly intact.  Mentation and speech appropriate for age.  PSYCH: Appropriate affect, tone, and pace of words          Video-Visit Details    Type of service:  Video Visit   Originating Location (pt. Location): Home    Distant Location (provider location):  On-site  Platform used for Video Visit: [employer]  Signed Electronically by: ALYSON Pierce CNP

## 2025-01-07 NOTE — PATIENT INSTRUCTIONS
"Take antibiotic and prednisone as directed.  Seek emergent care if worsening.  Follow up with cardiology as scheduled.      When you are out of refills or the refills say \"zero\", it is time to schedule your next appointment in clinic!    Labs are released to you almost immediately and sometimes before I have had a chance to review them.  I review labs regularly and once they are all in, you will either be sent a letter with your results or if you are signed up for on-line services, you will be notified that results are available to you on [employer]. If there are serious findings, you typically will be called.    If you have any questions about your visit, your symptoms, your medication, your test results or it is not clear what your diagnosis or treatment plan is please contact me (via [employer]) or call the care team at [phone] and say \"Care Team\"          "

## 2025-01-13 NOTE — PROGRESS NOTES
"[facility]    I had the pleasure of seeing Melyssa when she came for follow up of advanced heart block.  This 64 year old saw Dr. Curiel while hospitalized and had seen Dr. Kolb for her history of:    HTN  Nonobstructive CAD  -  based on cath 2019  Sx'c bradycardia, advanced heart block - hospitalized 10/2024 and s/p dual chamber Medtronic L-bundle PPM 10/3/2024  DM  L breast cancer (ductal carcinoma in situ) - s/p lumpectomy with wound healing issues      Last Visit & Interval History:  Carmen saw Melyssa back in 2019 and PRN Cardiology follow-up was recommended after her angiogram showed minimal disease. She was hospitalized 10/2024 with increasing LALA/SOB and cough x 3 weeks. Noted to be in heart block and s/p dual chamber Medtronic L-bundle PPM.     Today's Visit:  Melyssa has still be dealing with her respiratory illness that started back before PPM was implanted. While hospitalized, dx'd with pneumonitis vs acute RLL PNA. She's been on multiple rounds of Abx and just recently finished a course of Zithromax and prednisone. She was told to see Cardiology for concerns that this was likely related to her heart.    No c/o palpitations, dizziness, lightheadedness. No problems with PPM site and healing well. No c/o fever/chills.    Melyssa notes she's remained with cough and LALA/SOB. No orthopnea, PND. Minimal LE edema. Does not weigh daily.    VITALS:  Vitals: /81 (BP Location: Right arm, Patient Position: Sitting, Cuff Size: Adult Large)   Pulse 71   Resp 18   Ht 1.6 m (5' 3\")   Wt 106.8 kg (235 lb 6.4 oz)   LMP 01/14/2016 (Approximate)   SpO2 95%   BMI 41.70 kg/m      Diagnostic Testing:  Device interrogation 11/2024, showed 1.7%AP and 99.9%  in DDD 60/130. Undelrying 2:1 AVB. 12.8 y battery  EKG 10/3/2024 AsVp 76 bpm. QRS 98 ms  Echocardiogram 10/2024 LVEF 60-65%  Angiogram 2019 - trivial CAD      Plan:  Can trial Lasix 20 mg daily x 1 week.  In 1 week (1/22/2025) give me a call " [phone] or send MyChart results  Any improvement in breathing with the fluid?  Weight change (235# today)  If no improvement, will touch base with Device Clinic to see if rate adjustment would be helpful. Note minimal AP so rate response adjustment will likely not be of benefit      Assessment/Plan:    Advanced heart block  S/p L bundle dual chamber Medtronic PPM placed 10/2024 during hospitalization  Device interrogations as above showed normal function  Echo 10/2024 with nl LVEF    PLAN:  Continue routine device interrogations  Echo 1 year with in-office device check given 100%       LALA   Came in with cough/LALA/SOB x 3 weeks in 10/2024. Incidentally noted to have heart block and s/p PPM  Since then, has tried rounds of Abx, most recently on azithromycin and prednisone. Sxs improved on these meds  Reportedly encouraged to see Cardiology as concern sxs may be related to cardiac status  Reviewed echo 10/2024 with nl LVEF and no sig valve abnls  Cath 2019 with trivial CAD. Remains without CP  Minimal LE edema on exam    PLAN:  Can trial Lasix 20 mg daily x 1 week.  In 1 week (1/22/2025) give me a call [phone] or send MyChart results  Any improvement in breathing with the fluid?  Weight change (235# today)  If no improvement, will touch base with Device Clinic to see if rate adjustment would be helpful. Note minimal AP so rate response adjustment will likely not be of benefit  Consider Pulmonology follow-up       Radha Sanchez PA-C, MSPAS      Orders Placed This Encounter   Procedures    Follow-Up with Cardiology PJ    Echocardiogram Complete     Orders Placed This Encounter   Medications    furosemide (LASIX) 20 MG tablet     Sig: Take 1 tablet (20 mg) by mouth daily.     Dispense:  10 tablet     Refill:  0     Medications Discontinued During This Encounter   Medication Reason    predniSONE (DELTASONE) 10 MG tablet Therapy completed (No AVS)         Encounter Diagnoses   Name Primary?    AV heart block      Cardiac pacemaker in situ     LALA (dyspnea on exertion) Yes       CURRENT MEDICATIONS:  Current Outpatient Medications   Medication Sig Dispense Refill    albuterol (PROAIR HFA/PROVENTIL HFA/VENTOLIN HFA) 108 (90 Base) MCG/ACT inhaler Inhale 2 puffs into the lungs every 6 hours as needed for shortness of breath, wheezing or cough. 18 g 3    anastrozole (ARIMIDEX) 1 MG tablet Take 1 tablet (1 mg) by mouth daily 90 tablet 3    Ascorbic Acid (VITAMIN C) 500 MG CAPS Take 1 tablet by mouth every evening      aspirin (ASA) 81 MG EC tablet Take 81 mg by mouth every evening 1 tablet 0    blood glucose (CONTOUR NEXT TEST) test strip USE TO TEST BLOOD SUGAR 4 TIMES DAILY OR AS DIRECTED. 400 strip 1    Calcium Carb-Cholecalciferol (CALCIUM-VITAMIN D) 600-400 MG-UNIT TABS Take 1 tablet by mouth every evening      clobetasol (TEMOVATE) 0.05 % external ointment Apply topically 2 times daily. 60 g 1    empagliflozin (JARDIANCE) 10 MG TABS tablet Take 1 tablet (10 mg) by mouth daily. 90 tablet 1    esomeprazole (NEXIUM) 40 MG DR capsule Take 1 capsule (40 mg) by mouth every morning (before breakfast) 90 capsule 3    ferrous sulfate 140 (45 Fe) MG TBCR CR tablet Take 140 mg by mouth 2 times daily      fluconazole (DIFLUCAN) 150 MG tablet Take 1 tablet (150 mg) by mouth every 3 days for 9 days, THEN 1 tablet (150 mg) once a week. 28 tablet 0    fluticasone-salmeterol (ADVAIR DISKUS) 100-50 MCG/ACT inhaler INHALE 1 PUFF INTO THE LUNGS 2 TIMES DAILY 180 each 3    FT NASAL DECONGESTANT MAX STR 30 MG tablet TAKE TWO TABLETS BY MOUTH TWICE A  tablet 3    furosemide (LASIX) 20 MG tablet Take 1 tablet (20 mg) by mouth daily. 10 tablet 0    gabapentin (NEURONTIN) 300 MG capsule Take 1 capsule (300 mg) by mouth 2 times daily. 180 capsule 3    hydroCHLOROthiazide 12.5 MG tablet TAKE ONE TABLET BY MOUTH ONCE DAILY 90 tablet 2    insulin aspart (NOVOLOG FLEXPEN) 100 UNIT/ML pen 32 units before breakfast, 32 units before lunch, 32 units  "before dinner 90 mL 3    insulin glargine U-300 (TOUJEO MAX SOLOSTAR) 300 UNIT/ML (2 units dial) pen INJECT 100 UNITS UNDER THE SKIN DAILY 40 mL 1    insulin pen needle (BD PEDRO LUIS U/F) 32G X 4 MM miscellaneous USE FOUR PEN NEEDLES DAILY OR AS DIRECTED. 400 each 3    losartan (COZAAR) 100 MG tablet Take 1 tablet (100 mg) by mouth every morning 90 tablet 3    metFORMIN (GLUCOPHAGE XR) 500 MG 24 hr tablet Take 2 tablets (1,000 mg) by mouth 2 times daily (with meals). 360 tablet 1    metoclopramide (REGLAN) 10 MG tablet TAKE ONE TABLET BY MOUTH TWICE DAILY 180 tablet 1    metoprolol succinate ER (TOPROL XL) 25 MG 24 hr tablet Take 1 tablet (25 mg) by mouth daily 90 tablet 3    montelukast (SINGULAIR) 10 MG tablet Take 1 tablet (10 mg) by mouth at bedtime. 90 tablet 3    Multiple Vitamins-Minerals (MULTIVITAMIN ADULTS 50+) TABS Take 1 tablet by mouth every morning      rosuvastatin (CRESTOR) 10 MG tablet Take 10 mg by mouth every evening.      Semaglutide, 2 MG/DOSE, (OZEMPIC, 2 MG/DOSE,) 8 MG/3ML pen Inject 2 mg subcutaneously every 7 days. 9 mL 1    triamcinolone (KENALOG) 0.1 % external cream Apply topically 2 times daily 80 g 1       ALLERGIES     Allergies   Allergen Reactions    Bactrim [Sulfamethoxazole-Trimethoprim] Rash     Full body rash    Oxycodone Itching     Facial itching     Lisinopril Cough         Review of Systems:  Skin:        Eyes:       ENT:       Respiratory:    shortness of breath  Cardiovascular:    fatigue, chest pain  Gastroenterology:      Genitourinary:       Musculoskeletal:       Neurologic:       Psychiatric:       Heme/Lymph/Imm:       Endocrine:         Physical Exam:  Vitals: /81 (BP Location: Right arm, Patient Position: Sitting, Cuff Size: Adult Large)   Pulse 71   Resp 18   Ht 1.6 m (5' 3\")   Wt 106.8 kg (235 lb 6.4 oz)   LMP 01/14/2016 (Approximate)   SpO2 95%   BMI 41.70 kg/m      Constitutional:  cooperative, alert and oriented, well developed, well nourished, in no " acute distress overweight      Skin:  warm and dry to the touch        Head:  normocephalic        Eyes:           ENT:  no pallor or cyanosis        Neck:  no stiffness        Chest:  normal symmetry diminished breath sounds right sided      Cardiac: regular rhythm, normal S1 and S2                  Abdomen:  abdomen soft        Vascular: pulses full and equal                                      Extremities and Back:           Neurological:  no gross motor deficits, affect appropriate            PAST MEDICAL HISTORY:  Past Medical History:   Diagnosis Date    Anemia 2019    Arthritis     Asthma     Cancer (H) 10/6/2023    CKD (chronic kidney disease) stage 2, GFR 60-89 ml/min     Ductal carcinoma in situ (DCIS) of left breast     Esophageal reflux     Fibromyalgia     Hypertension     LBBB (left bundle branch block) 2019    rate related LBBB seen on stress test    Other chronic sinusitis     PONV (postoperative nausea and vomiting)     Restless legs syndrome (RLS)     Type 2 diabetes mellitus (H)        PAST SURGICAL HISTORY:  Past Surgical History:   Procedure Laterality Date    BIOPSY  9/7/2023    BIOPSY BREAST NEEDLE LOCALIZATION Left 10/06/2023    Procedure: BIOPSY, LEFT BREAST, WITH NEEDLE LOCALIZATION;  Surgeon: Bg Bryant MD;  Location: [facility]    COLONOSCOPY  01/31/2002    COLONOSCOPY  10/26/2012    Procedure: COLONOSCOPY;  Colonoscopy;  Surgeon: Margret Sales MD;  Location: [facility]    COLONOSCOPY N/A 11/08/2019    Procedure: COLONOSCOPY, WITH POLYPECTOMY AND BIOPSY;  Surgeon: Ariel Heck MD;  Location: [facility]    CV LEFT HEART CATH N/A 09/12/2019    Procedure: Left Heart Cath;  Surgeon: Mike Sanon MD;  Location: [facility] CARDIAC CATH LAB    CV LEFT VENTRICULOGRAM N/A 09/12/2019    Procedure: Left Ventriculogram;  Surgeon: Mike Sanon MD;  Location:  HEART CARDIAC CATH LAB    ENT SURGERY      EP PACEMAKER DEVICE & LEAD IMPLANT- RIGHT ATRIAL & RIGHT VENTRICULAR Left  10/03/2024    Procedure: Pacemaker Device & Lead Implant - Right Atrial & Right Ventricular;  Surgeon: Salvador Curiel MD;  Location:  HEART CARDIAC CATH LAB    ESOPHAGOSCOPY, GASTROSCOPY, DUODENOSCOPY (EGD), COMBINED N/A 11/08/2019    Procedure: ESOPHAGOGASTRODUODENOSCOPY, WITH BIOPSY;  Surgeon: Ariel Heck MD;  Location: [facility]    EYE SURGERY  2022    GYN SURGERY      Hydroablation 2007, polyp removal 2018    IMPLANT PACEMAKER  10/2024    INJECT EPIDURAL LUMBAR  12/07/2010    INJECT EPIDURAL LUMBAR performed by GENERIC ANESTHESIA PROVIDER at [facility] OR    INJECT EPIDURAL LUMBAR  01/17/2011    INJECT EPIDURAL LUMBAR performed by GENERIC ANESTHESIA PROVIDER at [facility] OR    IRRIGATION AND DEBRIDEMENT BREAST Left 10/21/2023    Procedure: Irrigation and debridement breast;  Surgeon: Nemesio Arreola MD;  Location: [facility] OR    LASER YAG IRIDOTOMY Bilateral 05/2021    Dr. Josefa Recinos, [facility]    left long finger pulley release Left 07/2020    LUMPECTOMY BREAST Left 11/13/2023    Procedure: Re-excision of left lumpectomy site;  Surgeon: Arron Olson MD;  Location: [facility]    MAMMOPLASTY REDUCTION BILATERAL Bilateral 12/07/2023    Procedure: Left breast reconstruction with local flaps and right breast reduction for symmetry;  Surgeon: DELORIS Rodriguez MD;  Location: [facility]    RELEASE CARPAL TUNNEL  06/19/2012    Procedure: RELEASE CARPAL TUNNEL;  Right Carpal Tunnel Release;  Surgeon: Mike Sparrow MD;  Location: [facility]    RELEASE CARPAL TUNNEL  11/09/2012    Procedure: RELEASE CARPAL TUNNEL;  Left Carpal Tunnel Release;  Surgeon: Mike Sparrow MD;  Location: [facility]    REPAIR TENDON ACHILLES Left 12/26/2019    Procedure: Left Foot: Achilles Tendon Repair/Remodel/Reattachment; calcaneal prominence removal;  Surgeon: Felix Watkins DPM;  Location: [facility]    SURGICAL HISTORY OF -   04/10/2000    bilateral total ethmoidectomies, bilateral maxillary antrostomies, bilateral SMR of inferior turbinates,  reduction of lt turbinate porter bullosa       FAMILY HISTORY:  Family History   Problem Relation Age of Onset    Hypertension Mother     Diabetes Mother     Respiratory Mother         asthma-COPD    Hypertension Father     Heart Disease Father     Cerebrovascular Disease Father     Cardiovascular Father     Breast Cancer Maternal Grandmother     Cancer Brother     Diabetes Brother     Respiratory Brother         copd    Chronic Obstructive Pulmonary Disease Brother     Chronic Obstructive Pulmonary Disease Brother     Kidney failure Brother     Depression Brother     Asthma Brother     Depression Sister     Respiratory Sister         copd    Chronic Obstructive Pulmonary Disease Sister     Depression Sister     Chronic Obstructive Pulmonary Disease Sister     Anxiety Disorder Sister     Depression Sister     Diabetes Son     Anxiety Disorder Other     Obesity Other     Obesity Other     Anesthesia Reaction No family hx of     Clotting Disorder No family hx of     Glaucoma No family hx of     Macular Degeneration No family hx of        SOCIAL HISTORY:  Social History     Socioeconomic History    Marital status:      Spouse name: None    Number of children: None    Years of education: None    Highest education level: None   Tobacco Use    Smoking status: Never    Smokeless tobacco: Never   Vaping Use    Vaping status: Never Used   Substance and Sexual Activity    Alcohol use: Not Currently    Drug use: No    Sexual activity: Yes     Partners: Male     Birth control/protection: Male Surgical     Comment:  vasectomy   Other Topics Concern    Parent/sibling w/ CABG, MI or angioplasty before 65F 55M? Yes     Social Drivers of Health     Financial Resource Strain: Low Risk  (10/3/2024)    Financial Resource Strain     Within the past 12 months, have you or your family members you live with been unable to get utilities (heat, electricity) when it was really needed?: No   Food Insecurity: Low Risk  (10/3/2024)     Food Insecurity     Within the past 12 months, did you worry that your food would run out before you got money to buy more?: No     Within the past 12 months, did the food you bought just not last and you didn t have money to get more?: No   Transportation Needs: Low Risk  (10/3/2024)    Transportation Needs     Within the past 12 months, has lack of transportation kept you from medical appointments, getting your medicines, non-medical meetings or appointments, work, or from getting things that you need?: No   Physical Activity: Unknown (9/13/2024)    Exercise Vital Sign     Days of Exercise per Week: 0 days     Minutes of Exercise per Session: Patient declined   Stress: No Stress Concern Present (9/13/2024)    [employer] - Occupational Stress Questionnaire     Feeling of Stress : Only a little   Social Connections: Unknown (9/13/2024)    Social Connection and Isolation Panel [NHANES]     Frequency of Social Gatherings with Friends and Family: More than three times a week   Interpersonal Safety: High Risk (10/2/2024)    Interpersonal Safety     Do you feel physically and emotionally safe where you currently live?: No     Within the past 12 months, have you been hit, slapped, kicked or otherwise physically hurt by someone?: No     Within the past 12 months, have you been humiliated or emotionally abused in other ways by your partner or ex-partner?: No   Housing Stability: Low Risk  (10/3/2024)    Housing Stability     Do you have housing? : Yes     Are you worried about losing your housing?: No

## 2025-01-14 ENCOUNTER — OFFICE VISIT (OUTPATIENT)
Dept: CARDIOLOGY | Facility: CLINIC | Age: 65
End: 2025-01-14
Attending: INTERNAL MEDICINE
Payer: COMMERCIAL

## 2025-01-14 VITALS
WEIGHT: 235.4 LBS | HEIGHT: 63 IN | DIASTOLIC BLOOD PRESSURE: 81 MMHG | SYSTOLIC BLOOD PRESSURE: 132 MMHG | OXYGEN SATURATION: 95 % | BODY MASS INDEX: 41.71 KG/M2 | HEART RATE: 71 BPM | RESPIRATION RATE: 18 BRPM

## 2025-01-14 DIAGNOSIS — I44.30 AV HEART BLOCK: ICD-10-CM

## 2025-01-14 DIAGNOSIS — R06.09 DOE (DYSPNEA ON EXERTION): Primary | ICD-10-CM

## 2025-01-14 DIAGNOSIS — Z95.0 CARDIAC PACEMAKER IN SITU: ICD-10-CM

## 2025-01-14 PROCEDURE — 99214 OFFICE O/P EST MOD 30 MIN: CPT | Performed by: PHYSICIAN ASSISTANT

## 2025-01-14 RX ORDER — FUROSEMIDE 20 MG/1
20 TABLET ORAL DAILY
Qty: 10 TABLET | Refills: 0 | Status: SHIPPED | OUTPATIENT
Start: 2025-01-14

## 2025-01-14 NOTE — PATIENT INSTRUCTIONS
Melyssa - it was nice to see you and Tam today!     At your visit today we reviewed:    Still short of breath despite the antibiotics and the prednisone, etc  Pacer is working great     Medication Changes:    Start furosemide 20 mg daily as a trial    Recommendations:    In 1 week (2025) give me a call [phone] or send MyChart results  Any improvement in breathing with the fluid?  Weight parada e (235# today)  If no improvement, will touch base with Device Clinic    Follow-up:    See me with pacer check and echo in ~2025 for cardiology follow up i but CALL Cardiology nurses Tameka & Melyssa @ [phone] for any issues, questions or concerns in the interim.      To schedule a future appointment, we kindly ask that you call cardiology scheduling at [phone] three months prior to requested visit date.    Important Phone Numbers for [facility] ([place]):    [place] [facility] Tameka [place]ags[place]: [phone]  Cardiology [phone]  [place] [facility] [phone]  [facility] [facility] [phone]  [place] [facility]: [phone]

## 2025-01-14 NOTE — LETTER
"1/14/2025    Kaia Elena MD  [address]  [facility] [address]    RE: Bria Grossmanivonne       Dear Colleague,     I had the pleasure of seeing Bria Davis in the [facility].  [facility]    I had the pleasure of seeing Melyssa when she came for follow up of advanced heart block.  This 64 year old saw Dr. Curile while hospitalized and had seen Dr. Kolb for her history of:    HTN  Nonobstructive CAD  -  based on cath 2019  Sx'c bradycardia, advanced heart block - hospitalized 10/2024 and s/p dual chamber Medtronic L-bundle PPM 10/3/2024  DM  L breast cancer (ductal carcinoma in situ) - s/p lumpectomy with wound healing issues      Last Visit & Interval History:  Carmen saw Melyssa back in 2019 and PRN Cardiology follow-up was recommended after her angiogram showed minimal disease. She was hospitalized 10/2024 with increasing LALA/SOB and cough x 3 weeks. Noted to be in heart block and s/p dual chamber Medtronic L-bundle PPM.     Today's Visit:  Melyssa has still be dealing with her respiratory illness that started back before PPM was implanted. While hospitalized, dx'd with pneumonitis vs acute RLL PNA. She's been on multiple rounds of Abx and just recently finished a course of Zithromax and prednisone. She was told to see Cardiology for concerns that this was likely related to her heart.    No c/o palpitations, dizziness, lightheadedness. No problems with PPM site and healing well. No c/o fever/chills.    Melyssa notes she's remained with cough and LALA/SOB. No orthopnea, PND. Minimal LE edema. Does not weigh daily.    VITALS:  Vitals: /81 (BP Location: Right arm, Patient Position: Sitting, Cuff Size: Adult Large)   Pulse 71   Resp 18   Ht 1.6 m (5' 3\")   Wt 106.8 kg (235 lb 6.4 oz)   LMP 01/14/2016 (Approximate)   SpO2 95%   BMI 41.70 kg/m      Diagnostic Testing:  Device interrogation 11/2024, showed 1.7%AP and 99.9%  in DDD 60/130. Undelrying 2:1 AVB. " 12.8 y battery  EKG 10/3/2024 AsVp 76 bpm. QRS 98 ms  Echocardiogram 10/2024 LVEF 60-65%  Angiogram 2019 - trivial CAD      Plan:  Can trial Lasix 20 mg daily x 1 week.  In 1 week (1/22/2025) give me a call [phone] or send MyChart results  Any improvement in breathing with the fluid?  Weight change (235# today)  If no improvement, will touch base with Device Clinic to see if rate adjustment would be helpful. Note minimal AP so rate response adjustment will likely not be of benefit      Assessment/Plan:    Advanced heart block  S/p L bundle dual chamber Medtronic PPM placed 10/2024 during hospitalization  Device interrogations as above showed normal function  Echo 10/2024 with nl LVEF    PLAN:  Continue routine device interrogations  Echo 1 year with in-office device check given 100%       LALA   Came in with cough/LALA/SOB x 3 weeks in 10/2024. Incidentally noted to have heart block and s/p PPM  Since then, has tried rounds of Abx, most recently on azithromycin and prednisone. Sxs improved on these meds  Reportedly encouraged to see Cardiology as concern sxs may be related to cardiac status  Reviewed echo 10/2024 with nl LVEF and no sig valve abnls  Cath 2019 with trivial CAD. Remains without CP  Minimal LE edema on exam    PLAN:  Can trial Lasix 20 mg daily x 1 week.  In 1 week (1/22/2025) give me a call [phone] or send MyChart results  Any improvement in breathing with the fluid?  Weight change (235# today)  If no improvement, will touch base with Device Clinic to see if rate adjustment would be helpful. Note minimal AP so rate response adjustment will likely not be of benefit  Consider Pulmonology follow-up       Radha Sanchez PA-C, MSPAS      Orders Placed This Encounter   Procedures     Follow-Up with Cardiology PJ     Echocardiogram Complete     Orders Placed This Encounter   Medications     furosemide (LASIX) 20 MG tablet     Sig: Take 1 tablet (20 mg) by mouth daily.     Dispense:  10 tablet      Refill:  0     Medications Discontinued During This Encounter   Medication Reason     predniSONE (DELTASONE) 10 MG tablet Therapy completed (No AVS)         Encounter Diagnoses   Name Primary?     AV heart block      Cardiac pacemaker in situ      LALA (dyspnea on exertion) Yes       CURRENT MEDICATIONS:  Current Outpatient Medications   Medication Sig Dispense Refill     albuterol (PROAIR HFA/PROVENTIL HFA/VENTOLIN HFA) 108 (90 Base) MCG/ACT inhaler Inhale 2 puffs into the lungs every 6 hours as needed for shortness of breath, wheezing or cough. 18 g 3     anastrozole (ARIMIDEX) 1 MG tablet Take 1 tablet (1 mg) by mouth daily 90 tablet 3     Ascorbic Acid (VITAMIN C) 500 MG CAPS Take 1 tablet by mouth every evening       aspirin (ASA) 81 MG EC tablet Take 81 mg by mouth every evening 1 tablet 0     blood glucose (CONTOUR NEXT TEST) test strip USE TO TEST BLOOD SUGAR 4 TIMES DAILY OR AS DIRECTED. 400 strip 1     Calcium Carb-Cholecalciferol (CALCIUM-VITAMIN D) 600-400 MG-UNIT TABS Take 1 tablet by mouth every evening       clobetasol (TEMOVATE) 0.05 % external ointment Apply topically 2 times daily. 60 g 1     empagliflozin (JARDIANCE) 10 MG TABS tablet Take 1 tablet (10 mg) by mouth daily. 90 tablet 1     esomeprazole (NEXIUM) 40 MG DR capsule Take 1 capsule (40 mg) by mouth every morning (before breakfast) 90 capsule 3     ferrous sulfate 140 (45 Fe) MG TBCR CR tablet Take 140 mg by mouth 2 times daily       fluconazole (DIFLUCAN) 150 MG tablet Take 1 tablet (150 mg) by mouth every 3 days for 9 days, THEN 1 tablet (150 mg) once a week. 28 tablet 0     fluticasone-salmeterol (ADVAIR DISKUS) 100-50 MCG/ACT inhaler INHALE 1 PUFF INTO THE LUNGS 2 TIMES DAILY 180 each 3     FT NASAL DECONGESTANT MAX STR 30 MG tablet TAKE TWO TABLETS BY MOUTH TWICE A  tablet 3     furosemide (LASIX) 20 MG tablet Take 1 tablet (20 mg) by mouth daily. 10 tablet 0     gabapentin (NEURONTIN) 300 MG capsule Take 1 capsule (300 mg) by  mouth 2 times daily. 180 capsule 3     hydroCHLOROthiazide 12.5 MG tablet TAKE ONE TABLET BY MOUTH ONCE DAILY 90 tablet 2     insulin aspart (NOVOLOG FLEXPEN) 100 UNIT/ML pen 32 units before breakfast, 32 units before lunch, 32 units before dinner 90 mL 3     insulin glargine U-300 (TOUJEO MAX SOLOSTAR) 300 UNIT/ML (2 units dial) pen INJECT 100 UNITS UNDER THE SKIN DAILY 40 mL 1     insulin pen needle (BD PEDRO LUIS U/F) 32G X 4 MM miscellaneous USE FOUR PEN NEEDLES DAILY OR AS DIRECTED. 400 each 3     losartan (COZAAR) 100 MG tablet Take 1 tablet (100 mg) by mouth every morning 90 tablet 3     metFORMIN (GLUCOPHAGE XR) 500 MG 24 hr tablet Take 2 tablets (1,000 mg) by mouth 2 times daily (with meals). 360 tablet 1     metoclopramide (REGLAN) 10 MG tablet TAKE ONE TABLET BY MOUTH TWICE DAILY 180 tablet 1     metoprolol succinate ER (TOPROL XL) 25 MG 24 hr tablet Take 1 tablet (25 mg) by mouth daily 90 tablet 3     montelukast (SINGULAIR) 10 MG tablet Take 1 tablet (10 mg) by mouth at bedtime. 90 tablet 3     Multiple Vitamins-Minerals (MULTIVITAMIN ADULTS 50+) TABS Take 1 tablet by mouth every morning       rosuvastatin (CRESTOR) 10 MG tablet Take 10 mg by mouth every evening.       Semaglutide, 2 MG/DOSE, (OZEMPIC, 2 MG/DOSE,) 8 MG/3ML pen Inject 2 mg subcutaneously every 7 days. 9 mL 1     triamcinolone (KENALOG) 0.1 % external cream Apply topically 2 times daily 80 g 1       ALLERGIES     Allergies   Allergen Reactions     Bactrim [Sulfamethoxazole-Trimethoprim] Rash     Full body rash     Oxycodone Itching     Facial itching      Lisinopril Cough         Review of Systems:  Skin:        Eyes:       ENT:       Respiratory:    shortness of breath  Cardiovascular:    fatigue, chest pain  Gastroenterology:      Genitourinary:       Musculoskeletal:       Neurologic:       Psychiatric:       Heme/Lymph/Imm:       Endocrine:         Physical Exam:  Vitals: /81 (BP Location: Right arm, Patient Position: Sitting, Cuff  "Size: Adult Large)   Pulse 71   Resp 18   Ht 1.6 m (5' 3\")   Wt 106.8 kg (235 lb 6.4 oz)   LMP 01/14/2016 (Approximate)   SpO2 95%   BMI 41.70 kg/m      Constitutional:  cooperative, alert and oriented, well developed, well nourished, in no acute distress overweight      Skin:  warm and dry to the touch        Head:  normocephalic        Eyes:           ENT:  no pallor or cyanosis        Neck:  no stiffness        Chest:  normal symmetry diminished breath sounds right sided      Cardiac: regular rhythm, normal S1 and S2                  Abdomen:  abdomen soft        Vascular: pulses full and equal                                      Extremities and Back:           Neurological:  no gross motor deficits, affect appropriate            PAST MEDICAL HISTORY:  Past Medical History:   Diagnosis Date     Anemia 2019     Arthritis      Asthma      Cancer (H) 10/6/2023     CKD (chronic kidney disease) stage 2, GFR 60-89 ml/min      Ductal carcinoma in situ (DCIS) of left breast      Esophageal reflux      Fibromyalgia      Hypertension      LBBB (left bundle branch block) 2019    rate related LBBB seen on stress test     Other chronic sinusitis      PONV (postoperative nausea and vomiting)      Restless legs syndrome (RLS)      Type 2 diabetes mellitus (H)        PAST SURGICAL HISTORY:  Past Surgical History:   Procedure Laterality Date     BIOPSY  9/7/2023     BIOPSY BREAST NEEDLE LOCALIZATION Left 10/06/2023    Procedure: BIOPSY, LEFT BREAST, WITH NEEDLE LOCALIZATION;  Surgeon: Bg Bryant MD;  Location: [facility]     COLONOSCOPY  01/31/2002     COLONOSCOPY  10/26/2012    Procedure: COLONOSCOPY;  Colonoscopy;  Surgeon: Margret Sales MD;  Location: [facility]     COLONOSCOPY N/A 11/08/2019    Procedure: COLONOSCOPY, WITH POLYPECTOMY AND BIOPSY;  Surgeon: Ariel Heck MD;  Location: [facility]     CV LEFT HEART CATH N/A 09/12/2019    Procedure: Left Heart Cath;  Surgeon: Mike Sanon MD;  Location: [facility]" HEART CARDIAC CATH LAB     CV LEFT VENTRICULOGRAM N/A 09/12/2019    Procedure: Left Ventriculogram;  Surgeon: Mike Sanon MD;  Location:  HEART CARDIAC CATH LAB     ENT SURGERY       EP PACEMAKER DEVICE & LEAD IMPLANT- RIGHT ATRIAL & RIGHT VENTRICULAR Left 10/03/2024    Procedure: Pacemaker Device & Lead Implant - Right Atrial & Right Ventricular;  Surgeon: Salvador Curiel MD;  Location:  HEART CARDIAC CATH LAB     ESOPHAGOSCOPY, GASTROSCOPY, DUODENOSCOPY (EGD), COMBINED N/A 11/08/2019    Procedure: ESOPHAGOGASTRODUODENOSCOPY, WITH BIOPSY;  Surgeon: Ariel Heck MD;  Location: [facility]     EYE SURGERY  2022     GYN SURGERY      Hydroablation 2007, polyp removal 2018     IMPLANT PACEMAKER  10/2024     INJECT EPIDURAL LUMBAR  12/07/2010    INJECT EPIDURAL LUMBAR performed by GENERIC ANESTHESIA PROVIDER at [facility] OR     INJECT EPIDURAL LUMBAR  01/17/2011    INJECT EPIDURAL LUMBAR performed by GENERIC ANESTHESIA PROVIDER at [facility] OR     IRRIGATION AND DEBRIDEMENT BREAST Left 10/21/2023    Procedure: Irrigation and debridement breast;  Surgeon: Nemesio Arreola MD;  Location: [facility] OR     LASER YAG IRIDOTOMY Bilateral 05/2021    Dr. Josefa Recinos, [facility]     left long finger pulley release Left 07/2020     LUMPECTOMY BREAST Left 11/13/2023    Procedure: Re-excision of left lumpectomy site;  Surgeon: Arron Olson MD;  Location: [facility] [place] [facility]     MAMMOPLASTY REDUCTION BILATERAL Bilateral 12/07/2023    Procedure: Left breast reconstruction with local flaps and right breast reduction for symmetry;  Surgeon: DELORIS Rodriguez MD;  Location: [facility] [place] [facility]     RELEASE CARPAL TUNNEL  06/19/2012    Procedure: RELEASE CARPAL TUNNEL;  Right Carpal Tunnel Release;  Surgeon: Mike Sparrow MD;  Location: [facility]     RELEASE CARPAL TUNNEL  11/09/2012    Procedure: RELEASE CARPAL TUNNEL;  Left Carpal Tunnel Release;  Surgeon: Mike Sparrow MD;  Location: [facility]     REPAIR TENDON ACHILLES Left 12/26/2019    Procedure: Left Foot:  Achilles Tendon Repair/Remodel/Reattachment; calcaneal prominence removal;  Surgeon: Felix Watkins DPM;  Location: [facility]     SURGICAL HISTORY OF -   04/10/2000    bilateral total ethmoidectomies, bilateral maxillary antrostomies, bilateral SMR of inferior turbinates, reduction of lt turbinate porter bullosa       FAMILY HISTORY:  Family History   Problem Relation Age of Onset     Hypertension Mother      Diabetes Mother      Respiratory Mother         asthma-COPD     Hypertension Father      Heart Disease Father      Cerebrovascular Disease Father      Cardiovascular Father      Breast Cancer Maternal Grandmother      Cancer Brother      Diabetes Brother      Respiratory Brother         copd     Chronic Obstructive Pulmonary Disease Brother      Chronic Obstructive Pulmonary Disease Brother      Kidney failure Brother      Depression Brother      Asthma Brother      Depression Sister      Respiratory Sister         copd     Chronic Obstructive Pulmonary Disease Sister      Depression Sister      Chronic Obstructive Pulmonary Disease Sister      Anxiety Disorder Sister      Depression Sister      Diabetes Son      Anxiety Disorder Other      Obesity Other      Obesity Other      Anesthesia Reaction No family hx of      Clotting Disorder No family hx of      Glaucoma No family hx of      Macular Degeneration No family hx of        SOCIAL HISTORY:  Social History     Socioeconomic History     Marital status:      Spouse name: None     Number of children: None     Years of education: None     Highest education level: None   Tobacco Use     Smoking status: Never     Smokeless tobacco: Never   Vaping Use     Vaping status: Never Used   Substance and Sexual Activity     Alcohol use: Not Currently     Drug use: No     Sexual activity: Yes     Partners: Male     Birth control/protection: Male Surgical     Comment:  vasectomy   Other Topics Concern     Parent/sibling w/ CABG, MI or angioplasty before 65F  55M? Yes     Social Drivers of Health     Financial Resource Strain: Low Risk  (10/3/2024)    Financial Resource Strain      Within the past 12 months, have you or your family members you live with been unable to get utilities (heat, electricity) when it was really needed?: No   Food Insecurity: Low Risk  (10/3/2024)    Food Insecurity      Within the past 12 months, did you worry that your food would run out before you got money to buy more?: No      Within the past 12 months, did the food you bought just not last and you didn t have money to get more?: No   Transportation Needs: Low Risk  (10/3/2024)    Transportation Needs      Within the past 12 months, has lack of transportation kept you from medical appointments, getting your medicines, non-medical meetings or appointments, work, or from getting things that you need?: No   Physical Activity: Unknown (9/13/2024)    Exercise Vital Sign      Days of Exercise per Week: 0 days      Minutes of Exercise per Session: Patient declined   Stress: No Stress Concern Present (9/13/2024)    Citizen of [place] of Occupational Health - Occupational Stress Questionnaire      Feeling of Stress : Only a little   Social Connections: Unknown (9/13/2024)    Social Connection and Isolation Panel [NHANES]      Frequency of Social Gatherings with Friends and Family: More than three times a week   Interpersonal Safety: High Risk (10/2/2024)    Interpersonal Safety      Do you feel physically and emotionally safe where you currently live?: No      Within the past 12 months, have you been hit, slapped, kicked or otherwise physically hurt by someone?: No      Within the past 12 months, have you been humiliated or emotionally abused in other ways by your partner or ex-partner?: No   Housing Stability: Low Risk  (10/3/2024)    Housing Stability      Do you have housing? : Yes      Are you worried about losing your housing?: No               Thank you for allowing me to participate in the care  of your patient.      Sincerely,     Tran Sanchez PA-C     [facility]  cc:   lAeena Parra MD  [address]  [place] [address]

## 2025-01-15 ENCOUNTER — MYC MEDICAL ADVICE (OUTPATIENT)
Dept: FAMILY MEDICINE | Facility: CLINIC | Age: 65
End: 2025-01-15

## 2025-01-15 ENCOUNTER — HOSPITAL ENCOUNTER (EMERGENCY)
Facility: CLINIC | Age: 65
Discharge: HOME | End: 2025-01-15
Attending: FAMILY MEDICINE | Admitting: FAMILY MEDICINE
Payer: COMMERCIAL

## 2025-01-15 VITALS
RESPIRATION RATE: 18 BRPM | OXYGEN SATURATION: 95 % | TEMPERATURE: 97.8 F | SYSTOLIC BLOOD PRESSURE: 167 MMHG | DIASTOLIC BLOOD PRESSURE: 101 MMHG | WEIGHT: 233 LBS | BODY MASS INDEX: 41.29 KG/M2 | HEART RATE: 88 BPM | HEIGHT: 63 IN

## 2025-01-15 DIAGNOSIS — J45.909 ASTHMA, UNSPECIFIED ASTHMA SEVERITY, UNSPECIFIED WHETHER COMPLICATED, UNSPECIFIED WHETHER PERSISTENT: ICD-10-CM

## 2025-01-15 DIAGNOSIS — R05.3 CHRONIC COUGH: ICD-10-CM

## 2025-01-15 DIAGNOSIS — K42.9 UMBILICAL HERNIA WITHOUT OBSTRUCTION AND WITHOUT GANGRENE: ICD-10-CM

## 2025-01-15 LAB
ALBUMIN SERPL BCG-MCNC: 4.4 G/DL (ref 3.5–5.2)
ALP SERPL-CCNC: 85 U/L (ref 40–150)
ALT SERPL W P-5'-P-CCNC: 19 U/L (ref 0–50)
ANION GAP SERPL CALCULATED.3IONS-SCNC: 15 MMOL/L (ref 7–15)
AST SERPL W P-5'-P-CCNC: 20 U/L (ref 0–45)
BASOPHILS # BLD AUTO: 0.1 10E3/UL (ref 0–0.2)
BASOPHILS NFR BLD AUTO: 1 %
BILIRUB SERPL-MCNC: 0.3 MG/DL
BUN SERPL-MCNC: 13.8 MG/DL (ref 8–23)
CALCIUM SERPL-MCNC: 10 MG/DL (ref 8.8–10.4)
CHLORIDE SERPL-SCNC: 99 MMOL/L (ref 98–107)
CREAT SERPL-MCNC: 0.56 MG/DL (ref 0.51–0.95)
EGFRCR SERPLBLD CKD-EPI 2021: >90 ML/MIN/1.73M2
EOSINOPHIL # BLD AUTO: 0.2 10E3/UL (ref 0–0.7)
EOSINOPHIL NFR BLD AUTO: 2 %
ERYTHROCYTE [DISTWIDTH] IN BLOOD BY AUTOMATED COUNT: 14.4 % (ref 10–15)
GLUCOSE SERPL-MCNC: 61 MG/DL (ref 70–99)
HCO3 SERPL-SCNC: 25 MMOL/L (ref 22–29)
HCT VFR BLD AUTO: 46.4 % (ref 35–47)
HGB BLD-MCNC: 15.3 G/DL (ref 11.7–15.7)
IMM GRANULOCYTES # BLD: 0 10E3/UL
IMM GRANULOCYTES NFR BLD: 0 %
LYMPHOCYTES # BLD AUTO: 2 10E3/UL (ref 0.8–5.3)
LYMPHOCYTES NFR BLD AUTO: 16 %
MCH RBC QN AUTO: 28.2 PG (ref 26.5–33)
MCHC RBC AUTO-ENTMCNC: 33 G/DL (ref 31.5–36.5)
MCV RBC AUTO: 86 FL (ref 78–100)
MONOCYTES # BLD AUTO: 0.7 10E3/UL (ref 0–1.3)
MONOCYTES NFR BLD AUTO: 6 %
NEUTROPHILS # BLD AUTO: 9.3 10E3/UL (ref 1.6–8.3)
NEUTROPHILS NFR BLD AUTO: 75 %
NRBC # BLD AUTO: 0 10E3/UL
NRBC BLD AUTO-RTO: 0 /100
PLATELET # BLD AUTO: 458 10E3/UL (ref 150–450)
POTASSIUM SERPL-SCNC: 3.9 MMOL/L (ref 3.4–5.3)
PROT SERPL-MCNC: 7.4 G/DL (ref 6.4–8.3)
RBC # BLD AUTO: 5.42 10E6/UL (ref 3.8–5.2)
SODIUM SERPL-SCNC: 139 MMOL/L (ref 135–145)
WBC # BLD AUTO: 12.4 10E3/UL (ref 4–11)

## 2025-01-15 PROCEDURE — 85018 HEMOGLOBIN: CPT | Performed by: FAMILY MEDICINE

## 2025-01-15 PROCEDURE — 80053 COMPREHEN METABOLIC PANEL: CPT | Performed by: FAMILY MEDICINE

## 2025-01-15 PROCEDURE — 36415 COLL VENOUS BLD VENIPUNCTURE: CPT | Performed by: FAMILY MEDICINE

## 2025-01-15 PROCEDURE — 99283 EMERGENCY DEPT VISIT LOW MDM: CPT | Performed by: FAMILY MEDICINE

## 2025-01-15 PROCEDURE — 85004 AUTOMATED DIFF WBC COUNT: CPT | Performed by: FAMILY MEDICINE

## 2025-01-15 ASSESSMENT — COLUMBIA-SUICIDE SEVERITY RATING SCALE - C-SSRS
6. HAVE YOU EVER DONE ANYTHING, STARTED TO DO ANYTHING, OR PREPARED TO DO ANYTHING TO END YOUR LIFE?: NO
2. HAVE YOU ACTUALLY HAD ANY THOUGHTS OF KILLING YOURSELF IN THE PAST MONTH?: NO
1. IN THE PAST MONTH, HAVE YOU WISHED YOU WERE DEAD OR WISHED YOU COULD GO TO SLEEP AND NOT WAKE UP?: NO

## 2025-01-15 ASSESSMENT — ACTIVITIES OF DAILY LIVING (ADL)
ADLS_ACUITY_SCORE: 58
ADLS_ACUITY_SCORE: 58

## 2025-01-15 NOTE — DISCHARGE INSTRUCTIONS
I suspect your chronic cough is due to your asthma which does not appear to be adequately controlled because you are continuing to wheeze.  I placed a referral to the asthma specialist.  I placed a referral to Dr. May in general surgery for you to discuss repairing the hernia.  The more weight you lose prior to surgery the better.  You need to come to the emergency department if your hernia pops out and get stuck and will not go back in.

## 2025-01-15 NOTE — ED PROVIDER NOTES
History     Chief Complaint   Patient presents with    Abdominal Pain     HPI  Bria Davis is a 64 year old female who comes in from home with her  having had an episode of severe abdominal pain.  She has been having trouble with constipation in the last week attributed to over-the-counter cold medicines.  Today she had very severe pain in the mid abdomen that was ultimately relieved by a very large bowel movement.  It is not completely gone but mostly gone.  She has a known umbilical hernia.  It occasionally pops out.  It is now gone back in.  She has had consultation to discuss repair.    In addition she has had a chronic cough for 3-1/2 months.  She has been treated with multiple courses of antibiotics.  The cough continues.  She has known asthma.  She is on an Advair inhaler.  She does not use her albuterol inhaler.  She does not see an asthma specialist.  She was seen in the cardiology clinic yesterday for recheck after pacemaker placement for heart block and was advised to do a trial of Lasix.    Allergies:  Allergies   Allergen Reactions    Bactrim [Sulfamethoxazole-Trimethoprim] Rash     Full body rash    Oxycodone Itching     Facial itching     Lisinopril Cough       Problem List:    Patient Active Problem List    Diagnosis Date Noted    AV heart block 10/02/2024     Priority: Medium    Ductal carcinoma in situ (DCIS) of left breast 10/23/2023     Priority: Medium    Breast abscess 10/20/2023     Priority: Medium    Atypical ductal hyperplasia of left breast 09/18/2023     Priority: Medium    Chronic kidney disease, stage 2 (mild) 06/15/2023     Priority: Medium    Nonobstructive atherosclerosis of coronary artery 09/20/2019     Priority: Medium    Essential hypertension with goal blood pressure less than 140/90 08/25/2016     Priority: Medium    Type 2 diabetes mellitus with kidney complication, with long-term current use of insulin (H) 10/21/2015     Priority: Medium    Morbid obesity (H)  10/21/2015     Priority: Medium    Hyperlipidemia LDL goal <70 03/26/2011     Priority: Medium    Microalbuminuria 01/06/2011     Priority: Medium    Lumbar radiculopathy 11/30/2010     Priority: Medium    Enthesopathy 11/27/2007     Priority: Medium     Problem list name updated by automated process. Provider to review      Restless legs syndrome (RLS) 04/12/2007     Priority: Medium    Esophageal reflux 02/07/2006     Priority: Medium    Allergic rhinitis 02/07/2006     Priority: Medium     Problem list name updated by automated process. Provider to review      Mild intermittent asthma 11/10/2005     Priority: Medium        Past Medical History:    Past Medical History:   Diagnosis Date    Anemia 2019    Arthritis     Asthma     Cancer (H) 10/6/2023    CKD (chronic kidney disease) stage 2, GFR 60-89 ml/min     Ductal carcinoma in situ (DCIS) of left breast     Esophageal reflux     Fibromyalgia     Hypertension     LBBB (left bundle branch block) 2019    Other chronic sinusitis     PONV (postoperative nausea and vomiting)     Restless legs syndrome (RLS)     Type 2 diabetes mellitus (H)        Past Surgical History:    Past Surgical History:   Procedure Laterality Date    BIOPSY  9/7/2023    BIOPSY BREAST NEEDLE LOCALIZATION Left 10/06/2023    Procedure: BIOPSY, LEFT BREAST, WITH NEEDLE LOCALIZATION;  Surgeon: Bg Bryant MD;  Location: [facility]    COLONOSCOPY  01/31/2002    COLONOSCOPY  10/26/2012    Procedure: COLONOSCOPY;  Colonoscopy;  Surgeon: Margret Sales MD;  Location: [facility]    COLONOSCOPY N/A 11/08/2019    Procedure: COLONOSCOPY, WITH POLYPECTOMY AND BIOPSY;  Surgeon: Ariel Heck MD;  Location: [facility]    CV LEFT HEART CATH N/A 09/12/2019    Procedure: Left Heart Cath;  Surgeon: Mike Sanon MD;  Location:  [facility]    CV LEFT VENTRICULOGRAM N/A 09/12/2019    Procedure: Left Ventriculogram;  Surgeon: Mike Sanon MD;  Location:  [facility]     ENT SURGERY      EP PACEMAKER DEVICE & LEAD IMPLANT- RIGHT ATRIAL & RIGHT VENTRICULAR Left 10/03/2024    Procedure: Pacemaker Device & Lead Implant - Right Atrial & Right Ventricular;  Surgeon: Salvador Curiel MD;  Location:  [facility]ARD[facility] CATH [facility]    ESOPHAGOSCOPY, GASTROSCOPY, DUODENOSCOPY (EGD), COMBINED N/A 11/08/2019    Procedure: ESOPHAGOGASTRODUODENOSCOPY, WITH BIOPSY;  Surgeon: Ariel Heck MD;  Location: [facility]    EYE SURGERY  2022    GYN SURGERY      Hydroablation 2007, polyp removal 2018    IMPLANT PACEMAKER  10/2024    INJECT EPIDURAL LUMBAR  12/07/2010    INJECT EPIDURAL LUMBAR performed by GENERIC ANESTHESIA PROVIDER at [facility]    INJECT EPIDURAL LUMBAR  01/17/2011    INJECT EPIDURAL LUMBAR performed by GENERIC ANESTHESIA PROVIDER at [facility]    IRRIGATION AND DEBRIDEMENT BREAST Left 10/21/2023    Procedure: Irrigation and debridement breast;  Surgeon: Nemesio Arreola MD;  [facility]    LASER YAG IRIDOTOMY Bilateral 05/2021    Dr. Josefa Recinos, [facility]    left long finger pulley release Left 07/2020    LUMPECTOMY BREAST Left 11/13/2023    Procedure: Re-excision of left lumpectomy site;  Surgeon: Arron Olson MD;  Location: [facility]    MAMMOPLASTY REDUCTION BILATERAL Bilateral 12/07/2023    Procedure: Left breast reconstruction with local flaps and right breast reduction for symmetry;  Surgeon: DELORIS Rodriguez MD;  Location: [facility]    RELEASE CARPAL TUNNEL  06/19/2012    Procedure: RELEASE CARPAL TUNNEL;  Right Carpal Tunnel Release;  Surgeon: Mike Sparrow MD;  Location: [facility]    RELEASE CARPAL TUNNEL  11/09/2012    Procedure: RELEASE CARPAL TUNNEL;  Left Carpal Tunnel Release;  Surgeon: Mike Sparrow MD;  Location: [facility]    REPAIR TENDON ACHILLES Left 12/26/2019    Procedure: Left Foot: Achilles Tendon Repair/Remodel/Reattachment; calcaneal prominence removal;  Surgeon: Felix Watkins DPM;  Location: [facility]    SURGICAL HISTORY OF -   04/10/2000    bilateral total  ethmoidectomies, bilateral maxillary antrostomies, bilateral SMR of inferior turbinates, reduction of lt turbinate porter bullosa       Family History:    Family History   Problem Relation Age of Onset    Hypertension Mother     Diabetes Mother     Respiratory Mother         asthma-COPD    Hypertension Father     Heart Disease Father     Cerebrovascular Disease Father     Cardiovascular Father     Breast Cancer Maternal Grandmother     Cancer Brother     Diabetes Brother     Respiratory Brother         copd    Chronic Obstructive Pulmonary Disease Brother     Chronic Obstructive Pulmonary Disease Brother     Kidney failure Brother     Depression Brother     Asthma Brother     Depression Sister     Respiratory Sister         copd    Chronic Obstructive Pulmonary Disease Sister     Depression Sister     Chronic Obstructive Pulmonary Disease Sister     Anxiety Disorder Sister     Depression Sister     Diabetes Son     Anxiety Disorder Other     Obesity Other     Obesity Other     Anesthesia Reaction No family hx of     Clotting Disorder No family hx of     Glaucoma No family hx of     Macular Degeneration No family hx of        Social History:  Marital Status:   [2]  Social History     Tobacco Use    Smoking status: Never    Smokeless tobacco: Never   Vaping Use    Vaping status: Never Used   Substance Use Topics    Alcohol use: Not Currently    Drug use: No        Medications:    albuterol (PROAIR HFA/PROVENTIL HFA/VENTOLIN HFA) 108 (90 Base) MCG/ACT inhaler  anastrozole (ARIMIDEX) 1 MG tablet  Ascorbic Acid (VITAMIN C) 500 MG CAPS  aspirin (ASA) 81 MG EC tablet  blood glucose (CONTOUR NEXT TEST) test strip  Calcium Carb-Cholecalciferol (CALCIUM-VITAMIN D) 600-400 MG-UNIT TABS  clobetasol (TEMOVATE) 0.05 % external ointment  empagliflozin (JARDIANCE) 10 MG TABS tablet  esomeprazole (NEXIUM) 40 MG DR capsule  ferrous sulfate 140 (45 Fe) MG TBCR CR tablet  fluconazole (DIFLUCAN) 150 MG  "tablet  fluticasone-salmeterol (ADVAIR DISKUS) 100-50 MCG/ACT inhaler  FT NASAL DECONGESTANT MAX STR 30 MG tablet  furosemide (LASIX) 20 MG tablet  gabapentin (NEURONTIN) 300 MG capsule  hydroCHLOROthiazide 12.5 MG tablet  insulin aspart (NOVOLOG FLEXPEN) 100 UNIT/ML pen  insulin glargine U-300 (TOUJEO MAX SOLOSTAR) 300 UNIT/ML (2 units dial) pen  insulin pen needle (BD PEDRO LUIS U/F) 32G X 4 MM miscellaneous  losartan (COZAAR) 100 MG tablet  metFORMIN (GLUCOPHAGE XR) 500 MG 24 hr tablet  metoclopramide (REGLAN) 10 MG tablet  metoprolol succinate ER (TOPROL XL) 25 MG 24 hr tablet  montelukast (SINGULAIR) 10 MG tablet  Multiple Vitamins-Minerals (MULTIVITAMIN ADULTS 50+) TABS  rosuvastatin (CRESTOR) 10 MG tablet  Semaglutide, 2 MG/DOSE, (OZEMPIC, 2 MG/DOSE,) 8 MG/3ML pen  triamcinolone (KENALOG) 0.1 % external cream          Review of Systems  Further problem focused system review negative.    Physical Exam   BP: (!) 191/96  Pulse: 88  Temp: 97.8  F (36.6  C)  Resp: 18  Height: 160 cm (5' 3\")  Weight: 105.7 kg (233 lb)  SpO2: 97 %      Physical Exam    Nursing note and vitals were reviewed.  Constitutional: Awake and alert, adequately nourished and developed appearing 64-year-old in no apparent discomfort, who does not appear acutely ill, and who answers questions appropriately and cooperates with examination.  HEENT: Speech is fluent.  Voice quality is normal.  EOMI.   Neck: Freely mobile.  Cardiovascular: Cardiac examination reveals normal heart rate and regular rhythm without murmur.  Pulmonary/Chest: Breathing is unlabored.  Wheezes are present throughout the lung fields with mild prolongation of the expiratory phase with no retractions or tachypnea  Abdomen: Soft, nontender, no HSM or masses rebound or guarding.  Moderate size umbilical hernia is present without incarceration.    Neurological: Alert, oriented, thought content logical, coherent     Psychiatric: Affect broad and appropriate.    ED Course      "   Procedures              Critical Care time:  none           Results for orders placed or performed during the hospital encounter of 01/15/25 (from the past 24 hours)   [place] Draw *Canceled*    Narrative    The following orders were created for panel order [place] Draw.  Procedure                               Abnormality         Status                     ---------                               -----------         ------                       Please view results for these tests on the individual orders.   CBC with Platelets & Differential    Narrative    The following orders were created for panel order CBC with Platelets & Differential.  Procedure                               Abnormality         Status                     ---------                               -----------         ------                     CBC with platelets and d...[[id number]]  Abnormal            Final result                 Please view results for these tests on the individual orders.   Comprehensive metabolic panel   Result Value Ref Range    Sodium 139 135 - 145 mmol/L    Potassium 3.9 3.4 - 5.3 mmol/L    Carbon Dioxide (CO2) 25 22 - 29 mmol/L    Anion Gap 15 7 - 15 mmol/L    Urea Nitrogen 13.8 8.0 - 23.0 mg/dL    Creatinine 0.56 0.51 - 0.95 mg/dL    GFR Estimate >90 >60 mL/min/1.73m2    Calcium 10.0 8.8 - 10.4 mg/dL    Chloride 99 98 - 107 mmol/L    Glucose 61 (L) 70 - 99 mg/dL    Alkaline Phosphatase 85 40 - 150 U/L    AST 20 0 - 45 U/L    ALT 19 0 - 50 U/L    Protein Total 7.4 6.4 - 8.3 g/dL    Albumin 4.4 3.5 - 5.2 g/dL    Bilirubin Total 0.3 <=1.2 mg/dL   CBC with platelets and differential   Result Value Ref Range    WBC Count 12.4 (H) 4.0 - 11.0 10e3/uL    RBC Count 5.42 (H) 3.80 - 5.20 10e6/uL    Hemoglobin 15.3 11.7 - 15.7 g/dL    Hematocrit 46.4 35.0 - 47.0 %    MCV 86 78 - 100 fL    MCH 28.2 26.5 - 33.0 pg    MCHC 33.0 31.5 - 36.5 g/dL    RDW 14.4 10.0 - 15.0 %    Platelet Count 458 (H) 150 - 450 10e3/uL    % Neutrophils 75 %     % Lymphocytes 16 %    % Monocytes 6 %    % Eosinophils 2 %    % Basophils 1 %    % Immature Granulocytes 0 %    NRBCs per 100 WBC 0 <1 /100    Absolute Neutrophils 9.3 (H) 1.6 - 8.3 10e3/uL    Absolute Lymphocytes 2.0 0.8 - 5.3 10e3/uL    Absolute Monocytes 0.7 0.0 - 1.3 10e3/uL    Absolute Eosinophils 0.2 0.0 - 0.7 10e3/uL    Absolute Basophils 0.1 0.0 - 0.2 10e3/uL    Absolute Immature Granulocytes 0.0 <=0.4 10e3/uL    Absolute NRBCs 0.0 10e3/uL       Medications - No data to display    Assessments & Plan (with Medical Decision Making)     64-year-old presented with abdominal pain as described above.  This was relieved after a large bowel movement.  Its uncertain if her hernia had popped out during this time but it went back in at some point and the pain is now improved.  On physical examination there is no incarceration of the hernia and there is no abdominal tenderness worrisome for an acute process in the abdomen.  Laboratory studies are reassuring with mild elevation of the white count and platelets likely a stress response and nonspecific and not needing further follow-up.  Chemistries are normal.  No sign of hepatitis or pancreatitis.  No concern for gallbladder disease based on symptoms and exam.  We discussed that she should try to get the hernia repaired before it strangulated because emergency surgery would be more risky.  We discussed that weight loss will help ensure a successful repair.  I have referred her to general surgery to discuss repair options.  She would like to see Dr. May who her  had seen.    In addition she has had problems with a chronic cough.  She has a history of asthma.  She is wheezing on physical examination.  This is not likely due to heart failure but to her asthma.  It appears to be inadequately controlled.  I have referred her to the allergy department for further evaluation and management of her asthma to optimize her control particular before any surgery is  planned also to resolve her chronic cough.    I have reviewed the nursing notes.    I have reviewed the findings, diagnosis, plan and need for follow up with the patient.         New Prescriptions    No medications on file       Final diagnoses:   Umbilical hernia without obstruction and without gangrene   Chronic cough   Asthma, unspecified asthma severity, unspecified whether complicated, unspecified whether persistent       1/15/2025   [facility] EMERGENCY DEPT       Yash Renee MD  01/15/25 8050

## 2025-01-15 NOTE — ED TRIAGE NOTES
"Patient having abdominal pain this morning/ last night. States 10/10 at that time. Had large loose BM this morning and pain has now improved. States it had been days prior that she had a BM. Here for concerns of umbilical hernia \"feels different\" than it has before.      Triage Assessment (Adult)       Row Name 01/15/25 0958          Triage Assessment    Airway WDL WDL        Respiratory WDL    Respiratory WDL WDL        Skin Circulation/Temperature WDL    Skin Circulation/Temperature WDL WDL        Cardiac WDL    Cardiac WDL WDL        Peripheral/Neurovascular WDL    Peripheral Neurovascular WDL WDL        Cognitive/Neuro/Behavioral WDL    Cognitive/Neuro/Behavioral WDL WDL                     "

## 2025-01-21 ENCOUNTER — MYC MEDICAL ADVICE (OUTPATIENT)
Dept: CARDIOLOGY | Facility: CLINIC | Age: 65
End: 2025-01-21

## 2025-01-21 ENCOUNTER — OFFICE VISIT (OUTPATIENT)
Dept: SURGERY | Facility: CLINIC | Age: 65
End: 2025-01-21
Payer: COMMERCIAL

## 2025-01-21 ENCOUNTER — OFFICE VISIT (OUTPATIENT)
Dept: FAMILY MEDICINE | Facility: CLINIC | Age: 65
End: 2025-01-21
Payer: COMMERCIAL

## 2025-01-21 VITALS
SYSTOLIC BLOOD PRESSURE: 126 MMHG | OXYGEN SATURATION: 96 % | HEIGHT: 63 IN | HEART RATE: 72 BPM | BODY MASS INDEX: 41.11 KG/M2 | TEMPERATURE: 97.6 F | DIASTOLIC BLOOD PRESSURE: 64 MMHG | WEIGHT: 232 LBS

## 2025-01-21 VITALS
SYSTOLIC BLOOD PRESSURE: 129 MMHG | OXYGEN SATURATION: 94 % | BODY MASS INDEX: 41.29 KG/M2 | WEIGHT: 233 LBS | HEART RATE: 76 BPM | HEIGHT: 63 IN | DIASTOLIC BLOOD PRESSURE: 78 MMHG

## 2025-01-21 DIAGNOSIS — E66.01 CLASS 3 SEVERE OBESITY DUE TO EXCESS CALORIES WITH SERIOUS COMORBIDITY AND BODY MASS INDEX (BMI) OF 40.0 TO 44.9 IN ADULT (H): ICD-10-CM

## 2025-01-21 DIAGNOSIS — Z79.4 TYPE 2 DIABETES MELLITUS WITH CHRONIC KIDNEY DISEASE, WITH LONG-TERM CURRENT USE OF INSULIN, UNSPECIFIED CKD STAGE (H): ICD-10-CM

## 2025-01-21 DIAGNOSIS — R06.09 DOE (DYSPNEA ON EXERTION): ICD-10-CM

## 2025-01-21 DIAGNOSIS — K42.9 UMBILICAL HERNIA WITHOUT OBSTRUCTION AND WITHOUT GANGRENE: Primary | ICD-10-CM

## 2025-01-21 DIAGNOSIS — J45.40 MODERATE PERSISTENT ASTHMA, UNSPECIFIED WHETHER COMPLICATED: ICD-10-CM

## 2025-01-21 DIAGNOSIS — E66.813 CLASS 3 SEVERE OBESITY DUE TO EXCESS CALORIES WITH SERIOUS COMORBIDITY AND BODY MASS INDEX (BMI) OF 40.0 TO 44.9 IN ADULT (H): ICD-10-CM

## 2025-01-21 DIAGNOSIS — E11.43 GASTROPARESIS DUE TO DM (H): ICD-10-CM

## 2025-01-21 DIAGNOSIS — K31.84 GASTROPARESIS DUE TO DM (H): ICD-10-CM

## 2025-01-21 DIAGNOSIS — K59.00 CONSTIPATION, UNSPECIFIED CONSTIPATION TYPE: ICD-10-CM

## 2025-01-21 DIAGNOSIS — E11.22 TYPE 2 DIABETES MELLITUS WITH CHRONIC KIDNEY DISEASE, WITH LONG-TERM CURRENT USE OF INSULIN, UNSPECIFIED CKD STAGE (H): ICD-10-CM

## 2025-01-21 PROCEDURE — 99243 OFF/OP CNSLTJ NEW/EST LOW 30: CPT | Performed by: SURGERY

## 2025-01-21 PROCEDURE — 99214 OFFICE O/P EST MOD 30 MIN: CPT | Performed by: FAMILY MEDICINE

## 2025-01-21 RX ORDER — FLUTICASONE PROPIONATE AND SALMETEROL 250; 50 UG/1; UG/1
1 POWDER RESPIRATORY (INHALATION) EVERY 12 HOURS
Qty: 180 EACH | Refills: 3 | Status: SHIPPED | OUTPATIENT
Start: 2025-01-21

## 2025-01-21 ASSESSMENT — PAIN SCALES - GENERAL: PAINLEVEL_OUTOF10: NO PAIN (0)

## 2025-01-21 NOTE — PROGRESS NOTES
Assessment & Plan     Umbilical hernia without obstruction and without gangrene  Discussed the general nature of hernias and complications, that not all need repair, timing of repair can be elective depending on symptoms and schedules.  Referred to Surgery for consultation.  No restrictions of activities depending on symptoms.  If ever not able to reduce patient understands that this is a surgical emergency and she should present to the ER at once.    Moderate persistent asthma, unspecified whether complicated  Increase Advair to 250/50.    If not improving, consider pulmonology referral   - fluticasone-salmeterol (ADVAIR) 250-50 MCG/ACT inhaler; Inhale 1 puff into the lungs every 12 hours.    constipation  Recommend Miralax clean out - instructions provided  She is not clinically obstructed    Body mass index (BMI) 40.0-44.9, adult (H)  Contributes to risk with her diabetes, hypertension, the hernia, etc.     Gastroparesis due to DM (H)  stable    Type 2 diabetes mellitus with chronic kidney disease, with long-term current use of insulin, unspecified CKD stage (H)  HgbA1c is 6.6% - good control over the past  4 months.        MED REC REQUIRED  Post Medication Reconciliation Status: discharge medications reconciled and changed, per note/orders        Subjective   Melyssa is a 64 year old, presenting for the following health issues:  Hernia        1/21/2025     7:15 AM   Additional Questions   Roomed by Shirley SALINAS CMA   Accompanied by      HPI       ED/UC Followup:    Facility[facility]  [facility]  Date of visit: 1/15/2025  Reason for visit: Umbilical hernia  Current Status: She notes still having abdominal pain with some constipation. She has had some small amounts of stool but last normal  BM was last Wednesday. Some small stools, passing gas.  No severe pain.  The umbilical pain comes and goes.     She notes some nausea. Blood sugars have been normal. No emesis, diarrhea or fevers.       Discuss Advair,  "she notes having more wheezing and SOB since this was switched to generic.  Last PFTs 6/2022- normal with no response to bronchodilators.  Has allergy referral - not until May.   Has not seen a pulmonologist.         Review of Systems  Constitutional, HEENT, cardiovascular, pulmonary, gi and gu systems are negative, except as otherwise noted.      Objective    /64 (BP Location: Right arm, Patient Position: Sitting)   Pulse 72   Temp 97.6  F (36.4  C) (Tympanic)   Ht 1.6 m (5' 3\")   Wt 105.2 kg (232 lb)   LMP 01/14/2016 (Approximate)   SpO2 96%   BMI 41.10 kg/m    Body mass index is 41.1 kg/m .  Physical Exam   GENERAL: alert and no distress  NECK: no adenopathy, no asymmetry, masses, or scars  RESP: lungs clear to auscultation - no rales, rhonchi or wheezes  CV: regular rate and rhythm, normal S1 S2, no S3 or S4, no murmur, click or rub, no peripheral edema  ABDOMEN: soft, nontender, without hepatosplenomegaly or masses, bowel sounds normal, and +umbilical hernia  MS: no gross musculoskeletal defects noted, no edema    ER notes/labs reviewed        Signed Electronically by: Kaia Elena MD    "

## 2025-01-21 NOTE — PATIENT INSTRUCTIONS
"1) Disimpaction phase is necessary before initiation of maintenance therapy: use 3 measuring cups of Mirlax daily for 3 days (better during weekend) to achieve clean out of the colon from hard stool matter. Please assure adequate hydration during this phase. The expectation is to have significant amount of large hard stool mixed with loose stool during this phase with only loose stool toward the third day of treatment.   2) Maintenance Therapy: after disimpaction phase, start with 1 measuring cup (17 gm) of Mirlax daily. If you start having very watery/loose stools, decrease the dose slowly by 1/2 measuring cup (8.5gm) of Mirlax.      When you are out of refills or the refills say \"zero\", it is time to schedule your next appointment in clinic!    Labs are released to you almost immediately and sometimes before I have had a chance to review them.  I review labs regularly and once they are all in, you will either be sent a letter with your results or if you are signed up for on-line services, you will be notified that results are available to you on [employer]. If there are serious findings, you typically will be called.    If you have any questions about your visit, your symptoms, your medication, your test results or it is not clear what your diagnosis or treatment plan is please contact me (via [employer]) or call the care team at [phone] and say \"Care Team\"          "

## 2025-01-21 NOTE — NURSING NOTE
Chief Complaint   Patient presents with    Consult     Umbilical Hernia       There were no vitals filed for this visit.  Wt Readings from Last 1 Encounters:   01/21/25 105.2 kg (232 lb)     Lin Meyers MA

## 2025-01-21 NOTE — PROGRESS NOTES
Surgical Consultation/History and Physical  [facility] General Surgery    Bria is seen in consultation for Hernia, at the request of Kaia Elena MD.    Chief Complaint:  Hernia    History of Present Illness: Bria Davis is a 64 year old female presents with Hernia.  She has known about this for several years.  It has not bothered her much.  Recently she was in ED with abdominal pain and constipation.  This improved following a bowel movement.  She was on cough medications at the time whcih was attributed to part of her symptoms.  Denies other changes in stool.  She is having colonoscopy next month.  Denies blood in stool.  Hernia has remained rather consistent in size. She is able to reduce this.  She has lost 10 lbs of weight while on GLP-1, which she has been on for 1 year.    Patient Active Problem List   Diagnosis    Mild intermittent asthma    Esophageal reflux    Allergic rhinitis    Restless legs syndrome (RLS)    Enthesopathy    Lumbar radiculopathy    Microalbuminuria    Hyperlipidemia LDL goal <70    Type 2 diabetes mellitus with kidney complication, with long-term current use of insulin (H)    Morbid obesity (H)    Essential hypertension with goal blood pressure less than 140/90    Nonobstructive atherosclerosis of coronary artery    Chronic kidney disease, stage 2 (mild)    Atypical ductal hyperplasia of left breast    Breast abscess    Ductal carcinoma in situ (DCIS) of left breast    AV heart block       Past Medical History:   Diagnosis Date    Anemia 2019    Arthritis     Asthma     Cancer (H) 10/6/2023    CKD (chronic kidney disease) stage 2, GFR 60-89 ml/min     Ductal carcinoma in situ (DCIS) of left breast     Esophageal reflux     Fibromyalgia     Hypertension     LBBB (left bundle branch block) 2019    rate related LBBB seen on stress test    Other chronic sinusitis     PONV (postoperative nausea and vomiting)     Restless legs syndrome (RLS)     Type 2 diabetes mellitus (H)         Past Surgical History:   Procedure Laterality Date    BIOPSY  9/7/2023    BIOPSY BREAST NEEDLE LOCALIZATION Left 10/06/2023    Procedure: BIOPSY, LEFT BREAST, WITH NEEDLE LOCALIZATION;  Surgeon: Bg rByant MD;  Location: [facility]    COLONOSCOPY  01/31/2002    COLONOSCOPY  10/26/2012    Procedure: COLONOSCOPY;  Colonoscopy;  Surgeon: Margret Sales MD;  Location: [facility]    COLONOSCOPY N/A 11/08/2019    Procedure: COLONOSCOPY, WITH POLYPECTOMY AND BIOPSY;  Surgeon: Ariel Heck MD;  Location: [facility]    CV LEFT HEART CATH N/A 09/12/2019    Procedure: Left Heart Cath;  Surgeon: Mike Sanon MD;  Location:  HEART CARDIAC CATH LAB    CV LEFT VENTRICULOGRAM N/A 09/12/2019    Procedure: Left Ventriculogram;  Surgeon: Mike Sanon MD;  Location:  HEART CARDIAC CATH LAB    ENT SURGERY      EP PACEMAKER DEVICE & LEAD IMPLANT- RIGHT ATRIAL & RIGHT VENTRICULAR Left 10/03/2024    Procedure: Pacemaker Device & Lead Implant - Right Atrial & Right Ventricular;  Surgeon: Salvador Curiel MD;  Location:  HEART CARDIAC CATH LAB    ESOPHAGOSCOPY, GASTROSCOPY, DUODENOSCOPY (EGD), COMBINED N/A 11/08/2019    Procedure: ESOPHAGOGASTRODUODENOSCOPY, WITH BIOPSY;  Surgeon: Ariel Heck MD;  Location: [facility]    EYE SURGERY  2022    GYN SURGERY      Hydroablation 2007, polyp removal 2018    IMPLANT PACEMAKER  10/2024    INJECT EPIDURAL LUMBAR  12/07/2010    INJECT EPIDURAL LUMBAR performed by GENERIC ANESTHESIA PROVIDER at [facility] OR    INJECT EPIDURAL LUMBAR  01/17/2011    INJECT EPIDURAL LUMBAR performed by GENERIC ANESTHESIA PROVIDER at [facility]    IRRIGATION AND DEBRIDEMENT BREAST Left 10/21/2023    Procedure: Irrigation and debridement breast;  Surgeon: Nemesio Arreola MD;  Location: [facility] OR    LASER YAG IRIDOTOMY Bilateral 05/2021    Dr. Josefa Recinos, [facility]    left long finger pulley release Left 07/2020    LUMPECTOMY BREAST Left 11/13/2023    Procedure: Re-excision of left lumpectomy site;  Surgeon:  Arron Olson MD;  Location: [facility] OR    MAMMOPLASTY REDUCTION BILATERAL Bilateral 12/07/2023    Procedure: Left breast reconstruction with local flaps and right breast reduction for symmetry;  Surgeon: DELORIS Rodriguez MD;  Location: [facility] OR    RELEASE CARPAL TUNNEL  06/19/2012    Procedure: RELEASE CARPAL TUNNEL;  Right Carpal Tunnel Release;  Surgeon: Mike Sparrow MD;  Location: [facility]    RELEASE CARPAL TUNNEL  11/09/2012    Procedure: RELEASE CARPAL TUNNEL;  Left Carpal Tunnel Release;  Surgeon: Mike Sparrow MD;  Location: [facility]    REPAIR TENDON ACHILLES Left 12/26/2019    Procedure: Left Foot: Achilles Tendon Repair/Remodel/Reattachment; calcaneal prominence removal;  Surgeon: Felix Watkins DPM;  Location: [facility]    SURGICAL HISTORY OF -   04/10/2000    bilateral total ethmoidectomies, bilateral maxillary antrostomies, bilateral SMR of inferior turbinates, reduction of lt turbinate porter bullosa       Family History   Problem Relation Age of Onset    Hypertension Mother     Diabetes Mother     Respiratory Mother         asthma-COPD    Hypertension Father     Heart Disease Father     Cerebrovascular Disease Father     Cardiovascular Father     Breast Cancer Maternal Grandmother     Cancer Brother     Diabetes Brother     Respiratory Brother         copd    Chronic Obstructive Pulmonary Disease Brother     Chronic Obstructive Pulmonary Disease Brother     Kidney failure Brother     Depression Brother     Asthma Brother     Depression Sister     Respiratory Sister         copd    Chronic Obstructive Pulmonary Disease Sister     Depression Sister     Chronic Obstructive Pulmonary Disease Sister     Anxiety Disorder Sister     Depression Sister     Diabetes Son     Anxiety Disorder Other     Obesity Other     Obesity Other     Anesthesia Reaction No family hx of     Clotting Disorder No family hx of     Glaucoma No family hx of     Macular Degeneration No family hx of        Social History      Tobacco Use    Smoking status: Never    Smokeless tobacco: Never   Substance Use Topics    Alcohol use: Not Currently        History   Drug Use No       Current Outpatient Medications   Medication Sig Dispense Refill    albuterol (PROAIR HFA/PROVENTIL HFA/VENTOLIN HFA) 108 (90 Base) MCG/ACT inhaler Inhale 2 puffs into the lungs every 6 hours as needed for shortness of breath, wheezing or cough. 18 g 3    anastrozole (ARIMIDEX) 1 MG tablet Take 1 tablet (1 mg) by mouth daily 90 tablet 3    Ascorbic Acid (VITAMIN C) 500 MG CAPS Take 1 tablet by mouth every evening      aspirin (ASA) 81 MG EC tablet Take 81 mg by mouth every evening 1 tablet 0    blood glucose (CONTOUR NEXT TEST) test strip USE TO TEST BLOOD SUGAR 4 TIMES DAILY OR AS DIRECTED. 400 strip 1    Calcium Carb-Cholecalciferol (CALCIUM-VITAMIN D) 600-400 MG-UNIT TABS Take 1 tablet by mouth every evening      clobetasol (TEMOVATE) 0.05 % external ointment Apply topically 2 times daily. 60 g 1    empagliflozin (JARDIANCE) 10 MG TABS tablet Take 1 tablet (10 mg) by mouth daily. 90 tablet 1    esomeprazole (NEXIUM) 40 MG DR capsule Take 1 capsule (40 mg) by mouth every morning (before breakfast) 90 capsule 3    ferrous sulfate 140 (45 Fe) MG TBCR CR tablet Take 140 mg by mouth 2 times daily      fluconazole (DIFLUCAN) 150 MG tablet Take 1 tablet (150 mg) by mouth every 3 days for 9 days, THEN 1 tablet (150 mg) once a week. 28 tablet 0    fluticasone-salmeterol (ADVAIR) 250-50 MCG/ACT inhaler Inhale 1 puff into the lungs every 12 hours. 180 each 3    FT NASAL DECONGESTANT MAX STR 30 MG tablet TAKE TWO TABLETS BY MOUTH TWICE A  tablet 3    furosemide (LASIX) 20 MG tablet Take 1 tablet (20 mg) by mouth daily. 10 tablet 0    gabapentin (NEURONTIN) 300 MG capsule Take 1 capsule (300 mg) by mouth 2 times daily. 180 capsule 3    hydroCHLOROthiazide 12.5 MG tablet TAKE ONE TABLET BY MOUTH ONCE DAILY 90 tablet 2    insulin aspart (NOVOLOG FLEXPEN) 100 UNIT/ML  "pen 32 units before breakfast, 32 units before lunch, 32 units before dinner 90 mL 3    insulin glargine U-300 (TOUJEO MAX SOLOSTAR) 300 UNIT/ML (2 units dial) pen INJECT 100 UNITS UNDER THE SKIN DAILY 40 mL 1    insulin pen needle (BD PEDRO LUIS U/F) 32G X 4 MM miscellaneous USE FOUR PEN NEEDLES DAILY OR AS DIRECTED. 400 each 3    losartan (COZAAR) 100 MG tablet Take 1 tablet (100 mg) by mouth every morning 90 tablet 3    metFORMIN (GLUCOPHAGE XR) 500 MG 24 hr tablet Take 2 tablets (1,000 mg) by mouth 2 times daily (with meals). 360 tablet 1    metoclopramide (REGLAN) 10 MG tablet TAKE ONE TABLET BY MOUTH TWICE DAILY 180 tablet 1    metoprolol succinate ER (TOPROL XL) 25 MG 24 hr tablet Take 1 tablet (25 mg) by mouth daily 90 tablet 3    montelukast (SINGULAIR) 10 MG tablet Take 1 tablet (10 mg) by mouth at bedtime. 90 tablet 3    Multiple Vitamins-Minerals (MULTIVITAMIN ADULTS 50+) TABS Take 1 tablet by mouth every morning      rosuvastatin (CRESTOR) 10 MG tablet Take 10 mg by mouth every evening.      Semaglutide, 2 MG/DOSE, (OZEMPIC, 2 MG/DOSE,) 8 MG/3ML pen Inject 2 mg subcutaneously every 7 days. 9 mL 1    triamcinolone (KENALOG) 0.1 % external cream Apply topically 2 times daily 80 g 1       Allergies   Allergen Reactions    Bactrim [Sulfamethoxazole-Trimethoprim] Rash     Full body rash    Oxycodone Itching     Facial itching     Lisinopril Cough       Review of Systems:   5 point ROS otherwise negative    Physical Exam:  /78   Pulse 76   Ht 1.6 m (5' 3\")   Wt 105.7 kg (233 lb)   LMP 01/14/2016 (Approximate)   SpO2 94%   BMI 41.27 kg/m      Constitutional- No acute distress, well nourished, non-toxic  Eyes: Anicteric, no injection.  PERRL  ENT:  Normocephalic, atraumatic, Nose midline, moist mucus membranes  Neck - supple, no LAD  Respiratory- Clear to auscultation bilaterally, good inspiratory effort  Cardiovascular - Heart RRR, no lift's, thrills, murmurs, rubs, or gallop.  No peripheral edema.  No " clubbing.  Abdomen - Soft, non-tender, +BS, no hepatosplenomegaly, reducible hernia, vague defect, difficult exam.  Neuro - No focal neuro deficits, Alert and oriented x 3  Psych: Appropriate mood and affect  Musculoskeletal: Normal gait, symmetric strength.  FROM upper and lower extremities.  Skin: Warm, Dry    Assessment:  1. Umbilical hernia without obstruction and without gangrene    2. Class 3 severe obesity due to excess calories with serious comorbidity and body mass index (BMI) of 40.0 to 44.9 in adult (H)      Plan:   Bria Davis presents with  a potentially symptomatic  umbilical hernia. Symptoms  are  progressively worsening recently.  I reviewed her prior CT showing fat containing hernia.  Discussed her weight is prohibitive to proceeding with elective repair at this time and discussed modest weight loss with approximately 30 lb weight loss to achieve goal.  Recommend she proceed with colonoscopy as planned.  Signs and symptoms of incarceration/strangulation discussed.        Javed May, DO on 1/21/2025 at 8:48 AM

## 2025-01-21 NOTE — TELEPHONE ENCOUNTER
Pt sent [employer] message:  Good Afternoon.   I think the medication is helping.  It has helped me sleep longer periods at night.  I would like to stay on it.   Thank you.   Melyssa      Per Radha [facility]'s OV note on 1/14/2025:  SPIKE   Came in with cough/LALA/SOB x 3 weeks in 10/2024. Incidentally noted to have heart block and s/p PPM  Since then, has tried rounds of Abx, most recently on azithromycin and prednisone. Sxs improved on these meds  Reportedly encouraged to see Cardiology as concern sxs may be related to cardiac status  Reviewed echo 10/2024 with nl LVEF and no sig valve abnls  Cath 2019 with trivial CAD. Remains without CP  Minimal LE edema on exam   PLAN:  Can trial Lasix 20 mg daily x 1 week.  In 1 week (1/22/2025) give me a call [phone] or send [employer] results  Any improvement in breathing with the fluid?  Weight change (235# today)  If no improvement, will touch base with [facility] to see if rate adjustment would be helpful. Note minimal AP so rate response adjustment will likely not be of benefit  Consider Pulmonology follow-up       Sent message back to pt to ask about any change in weight, per [facility]'s note       ADDENDUM 4:45PM.   Pt sent message back, she said her weight is now 1 pound less. Will review with Radha [facility]

## 2025-01-23 RX ORDER — FUROSEMIDE 20 MG/1
20 TABLET ORAL DAILY
Qty: 90 TABLET | Refills: 1 | Status: SHIPPED | OUTPATIENT
Start: 2025-01-23

## 2025-01-23 NOTE — TELEPHONE ENCOUNTER
Pt called back and would like rx sent to [facility] pharmacy.   Rx sent.     Pt heading in to work and will await schedulers to call her to schedule lab work.     Tameka Howell RN

## 2025-01-23 NOTE — TELEPHONE ENCOUNTER
Pls await response and send Rx in.  I've sent message to Scheduling and ordered SLY Garcia  January 23, 2025 at 7:52 AM

## 2025-02-01 DIAGNOSIS — D05.12 DUCTAL CARCINOMA IN SITU (DCIS) OF LEFT BREAST: ICD-10-CM

## 2025-02-03 RX ORDER — ANASTROZOLE 1 MG/1
1 TABLET ORAL DAILY
Qty: 90 TABLET | Refills: 3 | OUTPATIENT
Start: 2025-02-03

## 2025-02-04 ENCOUNTER — OFFICE VISIT (OUTPATIENT)
Dept: RADIATION THERAPY | Facility: OUTPATIENT CENTER | Age: 65
End: 2025-02-04
Payer: COMMERCIAL

## 2025-02-04 DIAGNOSIS — D05.12 DUCTAL CARCINOMA IN SITU (DCIS) OF LEFT BREAST: Primary | ICD-10-CM

## 2025-02-04 NOTE — PROGRESS NOTES
Department of Radiation Oncology  Radiation Therapy Center  [facility] [place] [address]  [phone]       Radiation Oncology Follow-up Visit  25    Bria Davis  MRN: [id number]   : 1960     Radiation Oncologist: Vidal Haddad MD  Provider: VIKASH Nicole  LCV: 24      DIAGNOSIS:  Ductal carcinoma in situ of the left breast ER + 91 to 100%  INTENT OF RADIOTHERAPY: Adjuvant  PATHOLOGY:      23  Left breast, 4.0 cm mass, 10:00, 6.0 cm from nipple, ultrasound guided 14 gauge needle core biopsies:  - Two foci of atypical ductal hyperplasia, 1.5 mm and 2.0 mm.    - Fibrocystic change, with apocrine metaplasia, florid usual ductal hyperplasia, columnar cell change.  - No evidence of invasive malignancy.  - No evidence of lymphovascular invasion.     10/6/23  A.  Left breast excisional lumpectomy, oriented breast mass at 10:00, 6 cm from the nipple:    - Negative for invasive carcinoma     - Ductal carcinoma in situ, papillary, cribriform and solid    - Grade:  Grade 1    - Tumor size: 37 x 32 x 20 mm    - Margins of resection status for carcinoma in-situ: POSITIVE MEDIAL MARGIN     Margin Status DCIS present at margin    Margin(s) Involved by DCIS Medial: Present at cauterized medial yellow inked margin in blocks A15, A16 and A17    Distance from DCIS to Anterior Margin 1.0 mm   Distance from DCIS to Posterior Margin 4.2 mm   Distance from DCIS to Superior Margin Greater than: 5 mm   Distance from DCIS to Inferior Margin Greater than: 5 mm   Distance from DCIS to Lateral Margin 1.9 mm        - Staging: pTis pN not assigned (no nodes submitted)    - Estrogen receptor:  POSITIVE (%, strong to weak nuclear staining)    - Progesterone receptor:  Pending     - Additional histopathologic findings: Microcalcifications associated with malignant and benign breast tissue, florid usual type ductal hyperplasia, intraductal papillomas, and columnar cell change.    "  10/21/23  A. Skin, LEFT breast, \"necrotic skin\", excision:  - Coagulative necrosis of epidermis and dermis, with associated bacterial colonies  - Acute and chronic inflammation (patchy), extending from epidermis to subcutaneous adipose tissue  - Fibrin and acute inflammation at deep edge of specimen  - No breast ducts or acini identified  - Negative for malignancy     B. LEFT breast, tissue, excision:  - Adipose tissue with scattered acute inflammation, admixed with fibrin  - No breast ducts or acini identified  - Negative for malignancy     11/13/23  LEFT breast, new medial margin, excision:  -Ductal carcinoma in situ (DCIS), nuclear grade 1, cribriform, micropapillary, and papillary type(s), residual after prior lumpectomy  -DCIS spans an estimated 15 mm  -No invasive carcinoma identified  -New medial margin is uninvolved by DCIS  -DCIS is 1.5 mm from the new medial margin  -Post-operative changes, including granulation tissue                              STAGE: 0, Tis     CONCURRENT SYSTEMIC THERAPY:  No        ONCOLOGIC HISTORY:          The patient was noted to have a mammographic abnormality in the left breast prompting further evaluation.  On 9/7/2023 left breast biopsy at the 10 o'clock position, 6 cm from the nipple was obtained.  Pathology demonstrated atypical ductal hyperplasia in 2 foci, no invasive disease noted.  On 10/6/2023 the patient underwent left breast lumpectomy.  Final pathology demonstrated DCIS, 37 mm, grade 1, positive DCIS margin (medial) ER positive, pathologic Tis Nx.  The patient underwent reexcision on 11/13/2023 by Dr. Olson.  Final pathology demonstrated residual DCIS, grade 1, 15 mm in size, negative final margin (final new medial margin was 1.5 mm). Had wound healing issues postoperatively. Required left breast excision in 10/21/2023 for infection and necrotic change.  On 12/7/2023 the patient underwent reduction during lumpectomy by Dr. Rodriguez. she has poor wound healing the " left. She was seen for consult in our clinic, we discussed that in general, adjuvant whole breast radiotherapy is recommended as it has been demonstrated to reduce tumor recurrence by 50%, which has been demonstrated in a  meta-analysis of DCIS patients treated with radiation over lumpectomy alone, despite no survival benefit detected (EBCTCG, [employer], 2010).      1/3/2023 BRCA1/BRCA2 next generation sequencing) and actionable hereditary breast cancer NGS revealed no detectable alterations or variations. Family history is remarkable for paternal grandmother with ovarian cancer and a paternal great aunt who had breast cancer.     She completed radiation therapy and is on anastrozole.      SITE OF TREATMENT: Left breast     DATES  OF TREATMENT: 2/1/24 to 2/22/24      TOTAL DOSE OF TREATMENT: 4,272 cGy      DOSE PER FRACTION OF TREATMENT: 267 cGy x 16 fractions    INTERVAL SINCE COMPLETION OF RADIATION THERAPY:   One year    SUBJECTIVE:   Doing well today after she saw me last she went to her Plastic Surgeon for keloid pain injection. Now is much better. On AI tolerating well. No breast complaints. Mmg in Sept benign bilaterally. She is doing well. Retiring after 35 years. Had a pacemaker left chest wall for a fib in October 2024.     PHYSICAL EXAM:  LMP 01/14/2016 (Approximate)   Gen: Alert, in NAD  Eyes: PERRL, EOMI, sclera anicteric  HENT     Head: NC/AT     Ears: No external auricular lesions     Nose/sinus: No rhinorrhea or epistaxis     Oral Cavity/Oropharynx: MMM  Neck: Supple, full ROM, no LAD  Pulm: No wheezing, stridor or respiratory distress  CV: Well-perfused, no cyanosis, no pedal edema  Left sherie areolar scar with radial extension is well healed, left reduction scar well healed. No masses, nodules, skin changes or nipple discharge. TTP on deep palpation of chest wall in the UIQ. Right reduction scar keloid more pronounced along the IMF. No right masses, nodules, or nipple discharge.   Abdominal: Soft,  nontender, nondistended, no hepatomegaly  Back: No step-offs or pain to palpation along the thoracolumbar spine, no CVA tenderness  Musculoskeletal: Normal bulk and tone   Skin: Normal color and turgor  Neurologic: A/Ox3, CN II-XII intact  Psychiatric: Appropriate mood and affect    LABS AND IMAGING:  Mammogram in Sept 2024 benign    IMPRESSION:   Ms. Davis is a 64 year old female with a ductal carcinoma in situ of the left breast status post lumpectomy.  Final pathology demonstrated DCIS, 37 mm, grade 1, negative margin after reexcision, ER positive, pathologic Tis Nx.   She completed RT and is on pill therapy. Tolerated radiation well. Skin is healed.       PLAN:  RTO prn, follow with medical oncology.     VIKASH Nicole  Department of Radiation Oncology  [facility]

## 2025-02-04 NOTE — LETTER
2025      Bria Davis  [address] [facility] [id number]      Dear Colleague,    Thank you for referring your patient, Bria Davis, to the [facility] RADIATION [facility]. Please see a copy of my visit note below.       Department of Radiation Oncology  Radiation Therapy Center  [facility]  [place] [address]  [phone]       Radiation Oncology Follow-up Visit  25    Bria Davis  MRN: [id number]   : 1960     Radiation Oncologist: Vidal Haddad MD  Provider: VIKASH Nicole  LCV: 24      DIAGNOSIS:  Ductal carcinoma in situ of the left breast ER + 91 to 100%  INTENT OF RADIOTHERAPY: Adjuvant  PATHOLOGY:      23  Left breast, 4.0 cm mass, 10:00, 6.0 cm from nipple, ultrasound guided 14 gauge needle core biopsies:  - Two foci of atypical ductal hyperplasia, 1.5 mm and 2.0 mm.    - Fibrocystic change, with apocrine metaplasia, florid usual ductal hyperplasia, columnar cell change.  - No evidence of invasive malignancy.  - No evidence of lymphovascular invasion.     10/6/23  A.  Left breast excisional lumpectomy, oriented breast mass at 10:00, 6 cm from the nipple:    - Negative for invasive carcinoma     - Ductal carcinoma in situ, papillary, cribriform and solid    - Grade:  Grade 1    - Tumor size: 37 x 32 x 20 mm    - Margins of resection status for carcinoma in-situ: POSITIVE MEDIAL MARGIN     Margin Status DCIS present at margin    Margin(s) Involved by DCIS Medial: Present at cauterized medial yellow inked margin in blocks A15, A16 and A17    Distance from DCIS to Anterior Margin 1.0 mm   Distance from DCIS to Posterior Margin 4.2 mm   Distance from DCIS to Superior Margin Greater than: 5 mm   Distance from DCIS to Inferior Margin Greater than: 5 mm   Distance from DCIS to Lateral Margin 1.9 mm        - Staging: pTis pN not assigned (no nodes submitted)    - Estrogen receptor:  POSITIVE (%, strong to weak nuclear  "staining)    - Progesterone receptor:  Pending     - Additional histopathologic findings: Microcalcifications associated with malignant and benign breast tissue, florid usual type ductal hyperplasia, intraductal papillomas, and columnar cell change.     10/21/23  A. Skin, LEFT breast, \"necrotic skin\", excision:  - Coagulative necrosis of epidermis and dermis, with associated bacterial colonies  - Acute and chronic inflammation (patchy), extending from epidermis to subcutaneous adipose tissue  - Fibrin and acute inflammation at deep edge of specimen  - No breast ducts or acini identified  - Negative for malignancy     B. LEFT breast, tissue, excision:  - Adipose tissue with scattered acute inflammation, admixed with fibrin  - No breast ducts or acini identified  - Negative for malignancy     11/13/23  LEFT breast, new medial margin, excision:  -Ductal carcinoma in situ (DCIS), nuclear grade 1, cribriform, micropapillary, and papillary type(s), residual after prior lumpectomy  -DCIS spans an estimated 15 mm  -No invasive carcinoma identified  -New medial margin is uninvolved by DCIS  -DCIS is 1.5 mm from the new medial margin  -Post-operative changes, including granulation tissue                              STAGE: 0, Tis     CONCURRENT SYSTEMIC THERAPY:  No        ONCOLOGIC HISTORY:          The patient was noted to have a mammographic abnormality in the left breast prompting further evaluation.  On 9/7/2023 left breast biopsy at the 10 o'clock position, 6 cm from the nipple was obtained.  Pathology demonstrated atypical ductal hyperplasia in 2 foci, no invasive disease noted.  On 10/6/2023 the patient underwent left breast lumpectomy.  Final pathology demonstrated DCIS, 37 mm, grade 1, positive DCIS margin (medial) ER positive, pathologic Tis Nx.  The patient underwent reexcision on 11/13/2023 by Dr. Olson.  Final pathology demonstrated residual DCIS, grade 1, 15 mm in size, negative final margin (final new medial " margin was 1.5 mm). Had wound healing issues postoperatively. Required left breast excision in 10/21/2023 for infection and necrotic change.  On 12/7/2023 the patient underwent reduction during lumpectomy by Dr. Rodriguez. she has poor wound healing the left. She was seen for consult in our clinic, we discussed that in general, adjuvant whole breast radiotherapy is recommended as it has been demonstrated to reduce tumor recurrence by 50%, which has been demonstrated in a  meta-analysis of DCIS patients treated with radiation over lumpectomy alone, despite no survival benefit detected (EBCTCG, [employer], 2010).      1/3/2023 BRCA1/BRCA2 next generation sequencing) and actionable hereditary breast cancer NGS revealed no detectable alterations or variations. Family history is remarkable for paternal grandmother with ovarian cancer and a paternal great aunt who had breast cancer.     She completed radiation therapy and is on anastrozole.      SITE OF TREATMENT: Left breast     DATES  OF TREATMENT: 2/1/24 to 2/22/24      TOTAL DOSE OF TREATMENT: 4,272 cGy      DOSE PER FRACTION OF TREATMENT: 267 cGy x 16 fractions    INTERVAL SINCE COMPLETION OF RADIATION THERAPY:   One year    SUBJECTIVE:   Doing well today after she saw me last she went to her Plastic Surgeon for keloid pain injection. Now is much better. On AI tolerating well. No breast complaints. Mmg in Sept benign bilaterally. She is doing well. Retiring after 35 years. Had a pacemaker left chest wall for a fib in October 2024.     PHYSICAL EXAM:  LMP 01/14/2016 (Approximate)   Gen: Alert, in NAD  Eyes: PERRL, EOMI, sclera anicteric  HENT     Head: NC/AT     Ears: No external auricular lesions     Nose/sinus: No rhinorrhea or epistaxis     Oral Cavity/Oropharynx: MMM  Neck: Supple, full ROM, no LAD  Pulm: No wheezing, stridor or respiratory distress  CV: Well-perfused, no cyanosis, no pedal edema  Left sherie areolar scar with radial extension is well healed, left reduction  scar well healed. No masses, nodules, skin changes or nipple discharge. TTP on deep palpation of chest wall in the UIQ. Right reduction scar keloid more pronounced along the IMF. No right masses, nodules, or nipple discharge.   Abdominal: Soft, nontender, nondistended, no hepatomegaly  Back: No step-offs or pain to palpation along the thoracolumbar spine, no CVA tenderness  Musculoskeletal: Normal bulk and tone   Skin: Normal color and turgor  Neurologic: A/Ox3, CN II-XII intact  Psychiatric: Appropriate mood and affect    LABS AND IMAGING:  Mammogram in Sept 2024 benign    IMPRESSION:   Ms. Davis is a 64 year old female with a ductal carcinoma in situ of the left breast status post lumpectomy.  Final pathology demonstrated DCIS, 37 mm, grade 1, negative margin after reexcision, ER positive, pathologic Tis Nx.   She completed RT and is on pill therapy. Tolerated radiation well. Skin is healed.       PLAN:  RTO prn, follow with medical oncology.     VIKASH Nicole  Department of Radiation Oncology  [facility]          Again, thank you for allowing me to participate in the care of your patient.        Sincerely,        Litzy Barcenas PA-C    Electronically signed

## 2025-02-05 ENCOUNTER — LAB (OUTPATIENT)
Dept: LAB | Facility: CLINIC | Age: 65
End: 2025-02-05
Payer: COMMERCIAL

## 2025-02-05 DIAGNOSIS — R06.09 DOE (DYSPNEA ON EXERTION): ICD-10-CM

## 2025-02-05 DIAGNOSIS — I25.10 NONOBSTRUCTIVE ATHEROSCLEROSIS OF CORONARY ARTERY: Primary | ICD-10-CM

## 2025-02-05 DIAGNOSIS — N18.2 CHRONIC KIDNEY DISEASE, STAGE 2 (MILD): ICD-10-CM

## 2025-02-05 LAB
ANION GAP SERPL CALCULATED.3IONS-SCNC: 7 MMOL/L (ref 7–15)
BUN SERPL-MCNC: 15.2 MG/DL (ref 8–23)
CALCIUM SERPL-MCNC: 10 MG/DL (ref 8.8–10.4)
CHLORIDE SERPL-SCNC: 101 MMOL/L (ref 98–107)
CHOLEST SERPL-MCNC: 132 MG/DL
CREAT SERPL-MCNC: 0.64 MG/DL (ref 0.51–0.95)
CREAT UR-MCNC: 45.4 MG/DL
EGFRCR SERPLBLD CKD-EPI 2021: >90 ML/MIN/1.73M2
FASTING STATUS PATIENT QL REPORTED: NO
FASTING STATUS PATIENT QL REPORTED: NO
GLUCOSE SERPL-MCNC: 168 MG/DL (ref 70–99)
HCO3 SERPL-SCNC: 31 MMOL/L (ref 22–29)
HDLC SERPL-MCNC: 46 MG/DL
LDLC SERPL CALC-MCNC: 49 MG/DL
MICROALBUMIN UR-MCNC: 20.7 MG/L
MICROALBUMIN/CREAT UR: 45.59 MG/G CR (ref 0–25)
NONHDLC SERPL-MCNC: 86 MG/DL
POTASSIUM SERPL-SCNC: 4.7 MMOL/L (ref 3.4–5.3)
SODIUM SERPL-SCNC: 139 MMOL/L (ref 135–145)
TRIGL SERPL-MCNC: 187 MG/DL

## 2025-02-05 PROCEDURE — 80048 BASIC METABOLIC PNL TOTAL CA: CPT

## 2025-02-05 PROCEDURE — 80061 LIPID PANEL: CPT

## 2025-02-05 PROCEDURE — 82570 ASSAY OF URINE CREATININE: CPT

## 2025-02-05 PROCEDURE — 82043 UR ALBUMIN QUANTITATIVE: CPT

## 2025-02-09 ENCOUNTER — HOSPITAL ENCOUNTER (OUTPATIENT)
Dept: CT IMAGING | Facility: CLINIC | Age: 65
Discharge: HOME | End: 2025-02-09
Attending: SURGERY | Admitting: SURGERY
Payer: COMMERCIAL

## 2025-02-09 DIAGNOSIS — K42.9 UMBILICAL HERNIA WITHOUT OBSTRUCTION AND WITHOUT GANGRENE: ICD-10-CM

## 2025-02-09 PROCEDURE — 74177 CT ABD & PELVIS W/CONTRAST: CPT

## 2025-02-09 PROCEDURE — 250N000009 HC RX 250: Performed by: SURGERY

## 2025-02-09 PROCEDURE — 250N000011 HC RX IP 250 OP 636: Performed by: SURGERY

## 2025-02-09 RX ORDER — IOPAMIDOL 755 MG/ML
114 INJECTION, SOLUTION INTRAVASCULAR ONCE
Status: COMPLETED | OUTPATIENT
Start: 2025-02-09 | End: 2025-02-09

## 2025-02-09 RX ADMIN — SODIUM CHLORIDE 68 ML: 9 INJECTION, SOLUTION INTRAVENOUS at 14:49

## 2025-02-09 RX ADMIN — IOPAMIDOL 114 ML: 755 INJECTION, SOLUTION INTRAVENOUS at 14:49

## 2025-02-10 RX ORDER — BISACODYL 5 MG/1
TABLET, DELAYED RELEASE ORAL
Qty: 4 TABLET | Refills: 0 | Status: SHIPPED | OUTPATIENT
Start: 2025-02-10 | End: 2025-03-19

## 2025-02-11 ENCOUNTER — OFFICE VISIT (OUTPATIENT)
Dept: FAMILY MEDICINE | Facility: CLINIC | Age: 65
End: 2025-02-11
Payer: COMMERCIAL

## 2025-02-11 VITALS
DIASTOLIC BLOOD PRESSURE: 68 MMHG | TEMPERATURE: 97.4 F | OXYGEN SATURATION: 95 % | WEIGHT: 233.5 LBS | HEART RATE: 71 BPM | HEIGHT: 63 IN | BODY MASS INDEX: 41.37 KG/M2 | SYSTOLIC BLOOD PRESSURE: 126 MMHG

## 2025-02-11 DIAGNOSIS — H04.563 PUNCTAL STENOSIS, ACQUIRED, BILATERAL: ICD-10-CM

## 2025-02-11 DIAGNOSIS — H04.223 EPIPHORA DUE TO INSUFFICIENT DRAINAGE OF BOTH SIDES: ICD-10-CM

## 2025-02-11 DIAGNOSIS — N18.2 TYPE 2 DIABETES MELLITUS WITH STAGE 2 CHRONIC KIDNEY DISEASE, WITH LONG-TERM CURRENT USE OF INSULIN (H): ICD-10-CM

## 2025-02-11 DIAGNOSIS — I10 ESSENTIAL HYPERTENSION WITH GOAL BLOOD PRESSURE LESS THAN 140/90: ICD-10-CM

## 2025-02-11 DIAGNOSIS — H02.135 SENILE ECTROPION OF BOTH LOWER EYELIDS: ICD-10-CM

## 2025-02-11 DIAGNOSIS — E11.22 TYPE 2 DIABETES MELLITUS WITH STAGE 2 CHRONIC KIDNEY DISEASE, WITH LONG-TERM CURRENT USE OF INSULIN (H): ICD-10-CM

## 2025-02-11 DIAGNOSIS — E66.01 MORBID OBESITY (H): ICD-10-CM

## 2025-02-11 DIAGNOSIS — J20.9 ACUTE BRONCHITIS WITH SYMPTOMS > 10 DAYS: ICD-10-CM

## 2025-02-11 DIAGNOSIS — Z01.818 PREOP GENERAL PHYSICAL EXAM: Primary | ICD-10-CM

## 2025-02-11 DIAGNOSIS — H02.132 SENILE ECTROPION OF BOTH LOWER EYELIDS: ICD-10-CM

## 2025-02-11 DIAGNOSIS — Z79.4 TYPE 2 DIABETES MELLITUS WITH STAGE 2 CHRONIC KIDNEY DISEASE, WITH LONG-TERM CURRENT USE OF INSULIN (H): ICD-10-CM

## 2025-02-11 DIAGNOSIS — E78.5 HYPERLIPIDEMIA LDL GOAL <70: ICD-10-CM

## 2025-02-11 PROCEDURE — 99214 OFFICE O/P EST MOD 30 MIN: CPT | Performed by: FAMILY MEDICINE

## 2025-02-11 RX ORDER — AZITHROMYCIN 250 MG/1
TABLET, FILM COATED ORAL
Qty: 6 TABLET | Refills: 0 | Status: SHIPPED | OUTPATIENT
Start: 2025-02-11 | End: 2025-02-16

## 2025-02-11 ASSESSMENT — PAIN SCALES - GENERAL: PAINLEVEL_OUTOF10: NO PAIN (0)

## 2025-02-11 NOTE — PROGRESS NOTES
Preoperative Evaluation  [facility]  [address]  [facility] [address]  Phone: [phone]  Primary Provider: Kaia Elena MD  Pre-op Performing Provider: Kaia Elena MD  Feb 11, 2025 2/6/2025   Surgical Information   What procedure is being done? Eye surgery   Facility or Hospital where procedure/surgery will be performed: [facility]   Who is doing the procedure / surgery? Dr. Mcclain   Date of surgery / procedure: 3/7/2025   Time of surgery / procedure: TBD   Where do you plan to recover after surgery? at home with family     Fax number for surgical facility: Note does not need to be faxed, will be available electronically in Epic.    Assessment & Plan     The proposed surgical procedure is considered LOW risk.    Preop general physical exam      Epiphora due to insufficient drainage of both sides  Senile ectropion of both lower eyelids  Punctal stenosis, acquired, bilateral      Type 2 diabetes mellitus with stage 2 chronic kidney disease, with long-term current use of insulin (H)  Well controlled    Essential hypertension with goal blood pressure less than 140/90  Well controlled    Morbid obesity (H)  Contributes to overall risk    Hyperlipidemia LDL goal <70  Continue statin    Acute bronchitis with symptoms > 10 days  CT scan showed residual RLL bronchitis/bronchiolitis. Patient would like to see if another round of antibiotic will help this resolve completely.  - azithromycin (ZITHROMAX) 250 MG tablet; Take 2 tablets (500 mg) by mouth daily for 1 day, THEN 1 tablet (250 mg) daily for 4 days.       Implanted Device   - Type of device: Pacemaker 10/3/2024 Patient advised to bring device information on day of surgery.       - No identified additional risk factors other than previously addressed    Antiplatelet or Anticoagulation Medication Instructions   - aspirin: Discontinue aspirin 7 days prior to procedure to reduce bleeding risk. It should be  resumed postoperatively.     Additional Medication Instructions  Take all scheduled medications on the day of surgery EXCEPT for modifications listed below:   - ACE/ARB/ARNI (lisinopril, enalapril, losartan, valsartan, olmesartan, sacubritril/valsartan) : Continue without modification (e.g., MAC anesthesia, neurosurgery, spine surgery, heart failure, or labile hypertension with risk of hypertension).   - Beta Blockers (atenolol, metoprolol, propranolol) : Continue taking on the day of surgery.   - Diuretics (furosemide, hydrochlorothiazide, chlorothalidone): DO NOT TAKE on the day of surgery.   - Statins (atorvastatin, simvastatin, pravastatin) : Continue taking on the day of surgery.    - Long acting insulin (e.g. glargine, detemir): Take 80% of the usual evening or morning dose before surgery.     - short acting insulin (e.g. regular, lispro, aspart): DO NOT TAKE on the morning of surgery.    - metformin: DO NOT TAKE day of surgery.   - SGLT2 Inhibitor (canagliflozin, dapagliflozin, or empagliflozin): DO NOT TAKE 3 days before surgery.    - GLP-1 Injectable (exenitide, liraglutide, semaglutide, dulaglutide, etc.): DO NOT TAKE 7 days before surgery       Recommendation  Approval given to proceed with proposed procedure, without further diagnostic evaluation.    Susannah Ragsdale is a 64 year old, presenting for the following:  Pre-Op Exam          2/11/2025     6:54 AM   Additional Questions   Roomed by Shirley gaona CMA   Accompanied by Self     HPI related to upcoming procedure: 63 yo female with type 2 diabetes, hypertension, hyperlipidemia, breast cancer, non-obstructive CAD who has excessive tearing for the past several months.          2/6/2025   Pre-Op Questionnaire   Have you ever had a heart attack or stroke? No   Have you ever had surgery on your heart or blood vessels, such as a stent placement, a coronary artery bypass, or surgery on an artery in your head, neck, heart, or legs? No   Do you have chest pain  with activity? No   Do you have a history of heart failure? No   Do you currently have a cold, bronchitis or symptoms of other infection? No   Do you have a cough, shortness of breath, or wheezing? No   Do you or anyone in your family have previous history of blood clots? No   Do you or does anyone in your family have a serious bleeding problem such as prolonged bleeding following surgeries or cuts? No   Have you ever had problems with anemia or been told to take iron pills? (!) YES  not currently anemic   Have you had any abnormal blood loss such as black, tarry or bloody stools, or abnormal vaginal bleeding? No   Have you ever had a blood transfusion? No   Are you willing to have a blood transfusion if it is medically needed before, during, or after your surgery? Yes   Have you or any of your relatives ever had problems with anesthesia? No   Do you have sleep apnea, excessive snoring or daytime drowsiness? No   Do you have any artifical heart valves or other implanted medical devices like a pacemaker, defibrillator, or continuous glucose monitor? (!) YES   What type of device do you have? Pacemaker   Name of the clinic that manages your device [facility]   Do you have artificial joints? No   Are you allergic to latex? No     Health Care Directive  Patient does not have a Health Care Directive: Discussed advance care planning with patient; however, patient declined at this time.    Preoperative Review of    reviewed - no record of controlled substances prescribed.      Status of Chronic Conditions:  See problem list for active medical problems.  Problems all longstanding and stable, except as noted/documented.  See ROS for pertinent symptoms related to these conditions.    Patient Active Problem List    Diagnosis Date Noted    AV heart block 10/02/2024     Priority: Medium    Ductal carcinoma in situ (DCIS) of left breast 10/23/2023     Priority: Medium    Breast abscess 10/20/2023     Priority:  Medium    Atypical ductal hyperplasia of left breast 09/18/2023     Priority: Medium    Chronic kidney disease, stage 2 (mild) 06/15/2023     Priority: Medium    Nonobstructive atherosclerosis of coronary artery 09/20/2019     Priority: Medium    Essential hypertension with goal blood pressure less than 140/90 08/25/2016     Priority: Medium    Type 2 diabetes mellitus with kidney complication, with long-term current use of insulin (H) 10/21/2015     Priority: Medium    Morbid obesity (H) 10/21/2015     Priority: Medium    Hyperlipidemia LDL goal <70 03/26/2011     Priority: Medium    Microalbuminuria 01/06/2011     Priority: Medium    Lumbar radiculopathy 11/30/2010     Priority: Medium    Enthesopathy 11/27/2007     Priority: Medium     Problem list name updated by automated process. Provider to review      Restless legs syndrome (RLS) 04/12/2007     Priority: Medium    Esophageal reflux 02/07/2006     Priority: Medium    Allergic rhinitis 02/07/2006     Priority: Medium     Problem list name updated by automated process. Provider to review      Mild intermittent asthma 11/10/2005     Priority: Medium      Past Medical History:   Diagnosis Date    Anemia 2019    Arthritis     Asthma     Cancer (H) 10/6/2023    CKD (chronic kidney disease) stage 2, GFR 60-89 ml/min     Ductal carcinoma in situ (DCIS) of left breast     Esophageal reflux     Fibromyalgia     Hypertension     LBBB (left bundle branch block) 2019    rate related LBBB seen on stress test    Other chronic sinusitis     PONV (postoperative nausea and vomiting)     Restless legs syndrome (RLS)     Type 2 diabetes mellitus (H)      Past Surgical History:   Procedure Laterality Date    BIOPSY  9/7/2023    BIOPSY BREAST NEEDLE LOCALIZATION Left 10/06/2023    Procedure: BIOPSY, LEFT BREAST, WITH NEEDLE LOCALIZATION;  Surgeon: Bg Bryant MD;  Location: [facility]    COLONOSCOPY  01/31/2002    COLONOSCOPY  10/26/2012    Procedure: COLONOSCOPY;  Colonoscopy;   Surgeon: Margret Sales MD;  Location: [facility]    COLONOSCOPY N/A 11/08/2019    Procedure: COLONOSCOPY, WITH POLYPECTOMY AND BIOPSY;  Surgeon: Ariel Heck MD;  Location: [facility]    CV LEFT HEART CATH N/A 09/12/2019    Procedure: Left Heart Cath;  Surgeon: Mike Sanon MD;  Location:  [facility]    CV LEFT VENTRICULOGRAM N/A 09/12/2019    Procedure: Left Ventriculogram;  Surgeon: Mike Sanon MD;  Location:  [facility]    ENT SURGERY      EP PACEMAKER DEVICE & LEAD IMPLANT- RIGHT ATRIAL & RIGHT VENTRICULAR Left 10/03/2024    Procedure: Pacemaker Device & Lead Implant - Right Atrial & Right Ventricular;  Surgeon: Salvador Curiel MD;  Location:  [facility]    ESOPHAGOSCOPY, GASTROSCOPY, DUODENOSCOPY (EGD), COMBINED N/A 11/08/2019    Procedure: ESOPHAGOGASTRODUODENOSCOPY, WITH BIOPSY;  Surgeon: Ariel Heck MD;  Location: [facility]    EYE SURGERY  2022    GYN SURGERY      Hydroablation 2007, polyp removal 2018    IMPLANT PACEMAKER  10/2024    INJECT EPIDURAL LUMBAR  12/07/2010    INJECT EPIDURAL LUMBAR performed by GENERIC ANESTHESIA PROVIDER at [facility]    INJECT EPIDURAL LUMBAR  01/17/2011    INJECT EPIDURAL LUMBAR performed by GENERIC ANESTHESIA PROVIDER at [facility]    IRRIGATION AND DEBRIDEMENT BREAST Left 10/21/2023    Procedure: Irrigation and debridement breast;  Surgeon: Nemesio Arreola MD;  Location: [facility]    LASER YAG IRIDOTOMY Bilateral 05/2021    Dr. Josefa Recinos, [facility]    left long finger pulley release Left 07/2020    LUMPECTOMY BREAST Left 11/13/2023    Procedure: Re-excision of left lumpectomy site;  Surgeon: Arron Olson MD;  [place] [facility]    MAMMOPLASTY REDUCTION BILATERAL Bilateral 12/07/2023    Procedure: Left breast reconstruction with local flaps and right breast reduction for symmetry;  Surgeon: DELORIS Rodriguez MD;  [place] [facility]    RELEASE CARPAL TUNNEL  06/19/2012    Procedure: RELEASE CARPAL TUNNEL;  Right Carpal  Tunnel Release;  Surgeon: Mike Sparrow MD;  Location: [facility]    RELEASE CARPAL TUNNEL  11/09/2012    Procedure: RELEASE CARPAL TUNNEL;  Left Carpal Tunnel Release;  Surgeon: Mike Sparrow MD;  Location: [facility]    REPAIR TENDON ACHILLES Left 12/26/2019    Procedure: Left Foot: Achilles Tendon Repair/Remodel/Reattachment; calcaneal prominence removal;  Surgeon: Felix Watkins DPM;  Location: [facility]    SURGICAL HISTORY OF -   04/10/2000    bilateral total ethmoidectomies, bilateral maxillary antrostomies, bilateral SMR of inferior turbinates, reduction of lt turbinate porter bullosa     Current Outpatient Medications   Medication Sig Dispense Refill    anastrozole (ARIMIDEX) 1 MG tablet Take 1 tablet (1 mg) by mouth daily 90 tablet 3    Ascorbic Acid (VITAMIN C) 500 MG CAPS Take 1 tablet by mouth every evening      aspirin (ASA) 81 MG EC tablet Take 81 mg by mouth every evening 1 tablet 0    Calcium Carb-Cholecalciferol (CALCIUM-VITAMIN D) 600-400 MG-UNIT TABS Take 1 tablet by mouth every evening      empagliflozin (JARDIANCE) 10 MG TABS tablet Take 1 tablet (10 mg) by mouth daily. 90 tablet 1    esomeprazole (NEXIUM) 40 MG DR capsule Take 1 capsule (40 mg) by mouth every morning (before breakfast) 90 capsule 3    ferrous sulfate 140 (45 Fe) MG TBCR CR tablet Take 140 mg by mouth 2 times daily      fluconazole (DIFLUCAN) 150 MG tablet Take 1 tablet (150 mg) by mouth every 3 days for 9 days, THEN 1 tablet (150 mg) once a week. 28 tablet 0    fluticasone-salmeterol (ADVAIR) 250-50 MCG/ACT inhaler Inhale 1 puff into the lungs every 12 hours. 180 each 3    furosemide (LASIX) 20 MG tablet Take 1 tablet (20 mg) by mouth daily. 90 tablet 1    gabapentin (NEURONTIN) 300 MG capsule Take 1 capsule (300 mg) by mouth 2 times daily. 180 capsule 3    hydroCHLOROthiazide 12.5 MG tablet TAKE ONE TABLET BY MOUTH ONCE DAILY 90 tablet 2    insulin aspart (NOVOLOG FLEXPEN) 100 UNIT/ML pen 32 units before breakfast, 32  units before lunch, 32 units before dinner 90 mL 3    insulin glargine U-300 (TOUJEO MAX SOLOSTAR) 300 UNIT/ML (2 units dial) pen INJECT 100 UNITS UNDER THE SKIN DAILY 40 mL 1    losartan (COZAAR) 100 MG tablet Take 1 tablet (100 mg) by mouth every morning 90 tablet 3    metFORMIN (GLUCOPHAGE XR) 500 MG 24 hr tablet Take 2 tablets (1,000 mg) by mouth 2 times daily (with meals). 360 tablet 1    metoclopramide (REGLAN) 10 MG tablet TAKE ONE TABLET BY MOUTH TWICE DAILY 180 tablet 1    metoprolol succinate ER (TOPROL XL) 25 MG 24 hr tablet Take 1 tablet (25 mg) by mouth daily 90 tablet 3    montelukast (SINGULAIR) 10 MG tablet Take 1 tablet (10 mg) by mouth at bedtime. 90 tablet 3    Multiple Vitamins-Minerals (MULTIVITAMIN ADULTS 50+) TABS Take 1 tablet by mouth every morning      rosuvastatin (CRESTOR) 10 MG tablet Take 10 mg by mouth every evening.      Semaglutide, 2 MG/DOSE, (OZEMPIC, 2 MG/DOSE,) 8 MG/3ML pen Inject 2 mg subcutaneously every 7 days. 9 mL 1    albuterol (PROAIR HFA/PROVENTIL HFA/VENTOLIN HFA) 108 (90 Base) MCG/ACT inhaler Inhale 2 puffs into the lungs every 6 hours as needed for shortness of breath, wheezing or cough. 18 g 3    bisacodyl (DULCOLAX) 5 MG EC tablet 2 days prior to procedure, take 2 tablets at 4 pm. 1 day prior to procedure, take 2 tablets at 4 pm. For additional instructions refer to your colonoscopy prep instructions. 4 tablet 0    blood glucose (CONTOUR NEXT TEST) test strip USE TO TEST BLOOD SUGAR 4 TIMES DAILY OR AS DIRECTED. 400 strip 1    clobetasol (TEMOVATE) 0.05 % external ointment Apply topically 2 times daily. 60 g 1    FT NASAL DECONGESTANT MAX STR 30 MG tablet TAKE TWO TABLETS BY MOUTH TWICE A  tablet 3    insulin pen needle (BD PEDRO LUIS U/F) 32G X 4 MM miscellaneous USE FOUR PEN NEEDLES DAILY OR AS DIRECTED. 400 each 3    polyethylene glycol (GOLYTELY) 236 g suspension 2 days prior at 5pm, mix and drink half of a jug of Golytely. Drink an 8 oz. glass of Golytely every  15 minutes until half of the jug is gone. Place remainder of Golytely in the refrigerator. 1 day prior at 5 pm, drink the 2nd half of a jug of Golytely bowel prep. 6 hours before your check-in time, drink an 8 oz. glass of Golytely every 15 minutes until half of the 2nd jug of Golytely is gone. Discard remainder of second jug. 8000 mL 0    triamcinolone (KENALOG) 0.1 % external cream Apply topically 2 times daily 80 g 1       Allergies   Allergen Reactions    Bactrim [Sulfamethoxazole-Trimethoprim] Rash     Full body rash    Oxycodone Itching     Facial itching     Lisinopril Cough        Social History     Tobacco Use    Smoking status: Never    Smokeless tobacco: Never   Substance Use Topics    Alcohol use: Not Currently     Family History   Problem Relation Age of Onset    Hypertension Mother     Diabetes Mother     Respiratory Mother         asthma-COPD    Hypertension Father     Heart Disease Father     Cerebrovascular Disease Father     Cardiovascular Father     Breast Cancer Maternal Grandmother     Cancer Brother     Diabetes Brother     Respiratory Brother         copd    Chronic Obstructive Pulmonary Disease Brother     Chronic Obstructive Pulmonary Disease Brother     Kidney failure Brother     Depression Brother     Asthma Brother     Depression Sister     Respiratory Sister         copd    Chronic Obstructive Pulmonary Disease Sister     Depression Sister     Chronic Obstructive Pulmonary Disease Sister     Anxiety Disorder Sister     Depression Sister     Diabetes Son     Anxiety Disorder Other     Obesity Other     Obesity Other     Anesthesia Reaction No family hx of     Clotting Disorder No family hx of     Glaucoma No family hx of     Macular Degeneration No family hx of      History   Drug Use No             Review of Systems  Constitutional, HEENT, cardiovascular, pulmonary, gi and gu systems are negative, except as otherwise noted.    Objective    /68   Pulse 71   Temp 97.4  F (36.3  C)  "(Tympanic)   Ht 1.6 m (5' 3\")   Wt 105.9 kg (233 lb 8 oz)   LMP 01/14/2016 (Approximate)   SpO2 95%   BMI 41.36 kg/m     Estimated body mass index is 41.36 kg/m  as calculated from the following:    Height as of this encounter: 1.6 m (5' 3\").    Weight as of this encounter: 105.9 kg (233 lb 8 oz).  Physical Exam  GENERAL: alert and no distress  NECK: no adenopathy, no asymmetry, masses, or scars  RESP: lungs clear to auscultation - no rales, rhonchi or wheezes  CV: regular rate and rhythm, normal S1 S2, no S3 or S4, no murmur, click or rub, no peripheral edema  ABDOMEN: soft, nontender, no hepatosplenomegaly, no masses and bowel sounds normal  MS: no gross musculoskeletal defects noted, no edema    Recent Labs   Lab Test 02/05/25  0702 01/15/25  1027 12/19/24  0700 10/04/24  0646 10/03/24  1054 10/03/24  0539 10/01/24  1911 09/18/24  0731   HGB  --  15.3  --   --   --  12.9   < >  --    PLT  --  458*  --   --   --  305   < >  --    INR  --   --   --   --  1.09  --   --   --     139  --   --   --  140   < >  --    POTASSIUM 4.7 3.9  --    < >  --  4.3   < >  --    CR 0.64 0.56  --   --   --  0.53   < >  --    A1C  --   --  6.6*  --   --   --   --  8.3*    < > = values in this interval not displayed.        Diagnostics  No labs were ordered during this visit.   No EKG required for low risk surgery (cataract, skin procedure, breast biopsy, etc).    Revised Cardiac Risk Index (RCRI)  The patient has the following serious cardiovascular risks for perioperative complications:   - Diabetes Mellitus (on Insulin) = 1 point     RCRI Interpretation: 1 point: Class II (low risk - 0.9% complication rate)         Signed Electronically by: Kaia Elena MD  A copy of this evaluation report is provided to the requesting physician.         "

## 2025-02-11 NOTE — PATIENT INSTRUCTIONS
How to Take Your Medication Before Surgery  Preoperative Medication Instructions   Antiplatelet or Anticoagulation Medication Instructions   - aspirin: Discontinue aspirin 7 days prior to procedure to reduce bleeding risk. It should be resumed postoperatively.     Additional Medication Instructions      - ACE/ARB/ARNI (lisinopril, enalapril, losartan, valsartan, olmesartan, sacubritril/valsartan) : Continue without modification (e.g., MAC anesthesia, neurosurgery, spine surgery, heart failure, or labile hypertension with risk of hypertension).   - Beta Blockers (atenolol, metoprolol, propranolol) : Continue taking on the day of surgery.   - Diuretics (furosemide, hydrochlorothiazide, chlorothalidone): DO NOT TAKE on the day of surgery.   - Statins (atorvastatin, simvastatin, pravastatin) : Continue taking on the day of surgery.    - Long acting insulin (e.g. glargine, detemir): Take 80% of the usual evening or morning dose before surgery. Take 80 units the morning of surgery   - short acting insulin (e.g. regular, lispro, aspart): DO NOT TAKE on the morning of surgery.    - metformin: DO NOT TAKE day of surgery.   - SGLT2 Inhibitor (canagliflozin, dapagliflozin, or empagliflozin): DO NOT TAKE 3 days before surgery. Resume after surgery   - GLP-1 Injectable (exenitide, liraglutide, semaglutide, dulaglutide, etc.): DO NOT TAKE 7 days before surgery - resume on Friday     Patient Education   Preparing for Your Surgery  For Adults  Getting started  In most cases, a nurse will call to review your health history and instructions. They will give you an arrival time based on your scheduled surgery time. Please be ready to share:  Your doctor's clinic name and phone number  Your medical, surgical, and anesthesia history  A list of allergies and sensitivities  A list of medicines, including herbal treatments and over-the-counter drugs  Whether the patient has a legal guardian (ask how to send us the papers in advance)  Note:  You may not receive a call if you were seen at our PAC (Preoperative Assessment Center).  Please tell us if you're pregnant--or if there's any chance you might be pregnant. Some surgeries may injure a fetus (unborn baby), so they require a pregnancy test. Surgeries that are safe for a fetus don't always need a test, and you can choose whether to have one.   Preparing for surgery  Within 10 to 30 days of surgery: Have a pre-op exam (sometimes called an H&P, or History and Physical). This can be done at a clinic or pre-operative center.  If you're having a , you may not need this exam. Talk to your care team.  At your pre-op exam, talk to your care team about all medicines you take. (This includes CBD oil and any drugs, such as THC, marijuana, and other forms of cannabis.) If you need to stop any medicine before surgery, ask when to start taking it again.  This is for your safety. Many medicines and drugs can make you bleed too much during surgery. Some change how well surgery (anesthesia) drugs work.  Call your insurance company to let them know you're having surgery. (If you don't have insurance, call [phone].)  Call your clinic if there's any change in your health. This includes a scrape or scratch near the surgery site, or any signs of a cold (sore throat, runny nose, cough, rash, fever).  Eating and drinking guidelines  For your safety: Unless your surgeon tells you otherwise, follow the guidelines below.  Eat and drink as normal until 8 hours before you arrive for surgery. After that, no food or milk. You can spit out gum when you arrive.  Drink clear liquids until 2 hours before you arrive. These are liquids you can see through, like water, Gatorade, and Propel Water. They also include plain black coffee and tea (no cream or milk).  No alcohol for 24 hours before you arrive. The night before surgery, stop any drinks that contain THC.  If your care team tells you to take medicine on the morning of  surgery, it's okay to take it with a sip of water. No other medicines or drugs are allowed (including CBD oil)--follow your care team's instructions.  If you have questions the day of surgery, call your hospital or surgery center.   Preventing infection  Shower or bathe the night before and the morning of surgery. Follow the instructions your clinic gave you. (If no instructions, use regular soap.)  Don't shave or clip hair near your surgery site. We'll remove the hair if needed.  Don't smoke or vape the morning of surgery. No chewing tobacco for 6 hours before you arrive. A nicotine patch is okay. You may spit out nicotine gum when you arrive.  For some surgeries, the surgeon will tell you to fully quit smoking and nicotine.  We will make every effort to keep you safe from infection. We will:  Clean our hands often with soap and water (or an alcohol-based hand rub).  Clean the skin at your surgery site with a special soap that kills germs.  Give you a special gown to keep you warm. (Cold raises the risk of infection.)  Wear hair covers, masks, gowns, and gloves during surgery.  Give antibiotic medicine, if prescribed. Not all surgeries need this medicine.  What to bring on the day of surgery  Photo ID and insurance card  Copy of your health care directive, if you have one  Glasses and hearing aids (bring cases)  You can't wear contacts during surgery  Inhaler and eye drops, if you use them (tell us about these when you arrive)  CPAP machine or breathing device, if you use them  A few personal items, if spending the night  If you have . . .  A pacemaker, ICD (cardiac defibrillator), or other implant: Bring the ID card.  An implanted stimulator: Bring the remote control.  A legal guardian: Bring a copy of the certified (court-stamped) guardianship papers.  Please remove any jewelry, including body piercings. Leave jewelry and other valuables at home.  If you're going home the day of surgery  You must have a  responsible adult drive you home. They should stay with you overnight as well.  If you don't have someone to stay with you, and you aren't safe to go home alone, we may keep you overnight. Insurance often won't pay for this.  After surgery  If it's hard to control your pain or you need more pain medicine, please call your surgeon's office.  Questions?   If you have any questions for your care team, list them here:   ____________________________________________________________________________________________________________________________________________________________________________________________________________________________________________________________  For informational purposes only. Not to replace the advice of your health care provider. Copyright   2003, 2019 [place] [employer]. All rights reserved. Clinically reviewed by Michele Woodson MD. [employer] [id number] - REV 08/24.

## 2025-02-13 DIAGNOSIS — I10 ESSENTIAL HYPERTENSION WITH GOAL BLOOD PRESSURE LESS THAN 140/90: ICD-10-CM

## 2025-02-13 DIAGNOSIS — K31.84 GASTROPARESIS DUE TO DM (H): ICD-10-CM

## 2025-02-13 DIAGNOSIS — Z79.4 TYPE 2 DIABETES MELLITUS WITH STAGE 2 CHRONIC KIDNEY DISEASE, WITH LONG-TERM CURRENT USE OF INSULIN (H): ICD-10-CM

## 2025-02-13 DIAGNOSIS — E11.43 GASTROPARESIS DUE TO DM (H): ICD-10-CM

## 2025-02-13 DIAGNOSIS — B37.31 YEAST INFECTION INVOLVING THE VAGINA AND SURROUNDING AREA: ICD-10-CM

## 2025-02-13 DIAGNOSIS — D05.12 DUCTAL CARCINOMA IN SITU (DCIS) OF LEFT BREAST: ICD-10-CM

## 2025-02-13 DIAGNOSIS — K21.9 GASTROESOPHAGEAL REFLUX DISEASE WITHOUT ESOPHAGITIS: ICD-10-CM

## 2025-02-13 DIAGNOSIS — E11.22 TYPE 2 DIABETES MELLITUS WITH STAGE 2 CHRONIC KIDNEY DISEASE, WITH LONG-TERM CURRENT USE OF INSULIN (H): ICD-10-CM

## 2025-02-13 DIAGNOSIS — J45.20 MILD INTERMITTENT ASTHMA, UNCOMPLICATED: ICD-10-CM

## 2025-02-13 DIAGNOSIS — Z79.4 TYPE 2 DIABETES MELLITUS WITH CHRONIC KIDNEY DISEASE, WITH LONG-TERM CURRENT USE OF INSULIN, UNSPECIFIED CKD STAGE (H): ICD-10-CM

## 2025-02-13 DIAGNOSIS — N18.2 TYPE 2 DIABETES MELLITUS WITH STAGE 2 CHRONIC KIDNEY DISEASE, WITH LONG-TERM CURRENT USE OF INSULIN (H): ICD-10-CM

## 2025-02-13 DIAGNOSIS — E11.22 TYPE 2 DIABETES MELLITUS WITH CHRONIC KIDNEY DISEASE, WITH LONG-TERM CURRENT USE OF INSULIN, UNSPECIFIED CKD STAGE (H): ICD-10-CM

## 2025-02-13 RX ORDER — SEMAGLUTIDE 2.68 MG/ML
2 INJECTION, SOLUTION SUBCUTANEOUS
Qty: 9 ML | Refills: 1 | Status: CANCELLED | OUTPATIENT
Start: 2025-02-13

## 2025-02-13 RX ORDER — LANCING DEVICE/LANCETS
KIT MISCELLANEOUS
Qty: 1 KIT | Refills: 0 | Status: SHIPPED | OUTPATIENT
Start: 2025-02-13

## 2025-02-13 RX ORDER — ANASTROZOLE 1 MG/1
1 TABLET ORAL DAILY
Qty: 90 TABLET | Refills: 3 | Status: CANCELLED | OUTPATIENT
Start: 2025-02-13

## 2025-02-13 RX ORDER — LOSARTAN POTASSIUM 100 MG/1
100 TABLET ORAL EVERY MORNING
Qty: 90 TABLET | Refills: 3 | Status: SHIPPED | OUTPATIENT
Start: 2025-02-13

## 2025-02-13 RX ORDER — METOCLOPRAMIDE 10 MG/1
10 TABLET ORAL 2 TIMES DAILY
Qty: 180 TABLET | Refills: 1 | Status: CANCELLED | OUTPATIENT
Start: 2025-02-13

## 2025-02-13 RX ORDER — INSULIN GLARGINE 300 U/ML
INJECTION, SOLUTION SUBCUTANEOUS
Qty: 40 ML | Refills: 1 | Status: CANCELLED | OUTPATIENT
Start: 2025-02-13

## 2025-02-13 RX ORDER — LOSARTAN POTASSIUM 100 MG/1
100 TABLET ORAL EVERY MORNING
Qty: 90 TABLET | Refills: 3 | Status: CANCELLED | OUTPATIENT
Start: 2025-02-13

## 2025-02-13 RX ORDER — METOCLOPRAMIDE 10 MG/1
10 TABLET ORAL 2 TIMES DAILY
Qty: 180 TABLET | Refills: 1 | Status: SHIPPED | OUTPATIENT
Start: 2025-02-13

## 2025-02-13 RX ORDER — FLUCONAZOLE 150 MG/1
TABLET ORAL
Qty: 28 TABLET | Refills: 0 | Status: SHIPPED | OUTPATIENT
Start: 2025-02-13 | End: 2025-08-21

## 2025-02-13 RX ORDER — SEMAGLUTIDE 2.68 MG/ML
INJECTION, SOLUTION SUBCUTANEOUS
Qty: 9 ML | Refills: 1 | Status: SHIPPED | OUTPATIENT
Start: 2025-02-13

## 2025-02-13 RX ORDER — EMPAGLIFLOZIN 10 MG/1
10 TABLET, FILM COATED ORAL DAILY
Qty: 90 TABLET | Refills: 1 | Status: SHIPPED | OUTPATIENT
Start: 2025-02-13

## 2025-02-13 RX ORDER — ALBUTEROL SULFATE 90 UG/1
2 INHALANT RESPIRATORY (INHALATION) EVERY 6 HOURS PRN
Qty: 18 G | Refills: 3 | Status: CANCELLED | OUTPATIENT
Start: 2025-02-13

## 2025-02-13 RX ORDER — INSULIN GLARGINE 300 U/ML
INJECTION, SOLUTION SUBCUTANEOUS
Qty: 42 ML | Refills: 1 | Status: SHIPPED | OUTPATIENT
Start: 2025-02-13

## 2025-02-13 RX ORDER — FLUCONAZOLE 150 MG/1
TABLET ORAL
Qty: 28 TABLET | Refills: 0 | Status: CANCELLED | OUTPATIENT
Start: 2025-02-13 | End: 2025-08-21

## 2025-02-13 RX ORDER — ALBUTEROL SULFATE 90 UG/1
AEROSOL, METERED RESPIRATORY (INHALATION)
Qty: 18 G | Refills: 3 | Status: SHIPPED | OUTPATIENT
Start: 2025-02-13

## 2025-02-13 RX ORDER — LANCETS
EACH MISCELLANEOUS
Qty: 204 EACH | Refills: 1 | Status: SHIPPED | OUTPATIENT
Start: 2025-02-13

## 2025-02-13 RX ORDER — ESOMEPRAZOLE MAGNESIUM 40 MG/1
40 CAPSULE, DELAYED RELEASE ORAL
Qty: 90 CAPSULE | Refills: 3 | Status: CANCELLED | OUTPATIENT
Start: 2025-02-13

## 2025-02-13 RX ORDER — ESOMEPRAZOLE MAGNESIUM 40 MG/1
40 CAPSULE, DELAYED RELEASE ORAL
Qty: 90 CAPSULE | Refills: 3 | Status: SHIPPED | OUTPATIENT
Start: 2025-02-13

## 2025-02-13 NOTE — TELEPHONE ENCOUNTER
Has appointment scheduled for 3/19/25.      Requested Prescriptions   Pending Prescriptions Disp Refills    OZEMPIC (2 MG/DOSE) 8 MG/3ML pen [Pharmacy Med Name: OZEMPIC (2 MG/DOSE) 8MG/3ML SOPN] 9 mL 1     Sig: INJECT 2MG UNDER THE SKIN EVERY 7 DAYS       GLP-1 Agonists Protocol Failed - 2/13/2025  9:52 AM        Failed - Medication is active on med list and the sig matches. RN to manually verify dose and sig if red X/fail.     If the protocol passes (green check), you do not need to verify med dose and sig.    A prescription matches if they are the same clinical intention.    For Example: once daily and every morning are the same.    For all fails (red x), verify dose and sig.    If the refill does match what is on file, the RN can still proceed to approve the refill request.     If they do not match, route to the appropriate provider.             Passed - HgbA1C in past 3 or 6 months     If HgbA1C is 8 or greater, it needs to be on file within the past 3 months.  If less than 8, must be on file within the past 6 months.     Recent Labs   Lab Test 12/19/24  0700   A1C 6.6*             Passed - Has GFR on file in past 12 months and most recent value is normal        Passed - Recent (6 mo) or future (90 days) visit within the authorizing provider's specialty     The patient must have completed an in-person or virtual visit within the past 6 months or has a future visit scheduled within the next 90 days with the authorizing provider s specialty.  Urgent care and e-visits do not quality as an office visit for this protocol.          Passed - Medication indicated for associated diagnosis     Medication is associated with one or more of the following diagnoses:     Type 2 diabetes mellitus           Passed - Patient is age 18 or older        Passed - No active pregnancy on record        Passed - No positive pregnancy test in past 12 months          VENTOLIN  (90 Base) MCG/ACT inhaler [Pharmacy Med Name: VENTOLIN  "HFA 108MCG/ACT AERS] 18 g 3     Sig: INHALE 2 PUFFS INTO THE LUNGS EVERY 6 HOURS AS NEEDED FOR SHORTNESS OF BREATH , WHEEZING OR COUGH       Short-Acting Beta Agonist Inhalers Protocol  Passed - 2/13/2025  9:52 AM        Passed - Patient is age 12 or older        Passed - Asthma control assessment score within normal limits in last 6 months     Please review ACT score.           Passed - Medication is active on med list and the sig matches. RN to manually verify dose and sig if red X/fail.     If the protocol passes (green check), you do not need to verify med dose and sig.    A prescription matches if they are the same clinical intention.    For Example: once daily and every morning are the same.    For all fails (red x), verify dose and sig.    If the refill does match what is on file, the RN can still proceed to approve the refill request.     If they do not match, route to the appropriate provider.             Passed - Recent (6 mo) or future (90 days) visit within the authorizing provider's specialty     Patient had office visit in the last 6 months or has a visit in the next 30 days with authorizing provider or within the authorizing provider's specialty.  See \"Patient Info\" tab in inbasket, or \"Choose Columns\" in Meds & Orders section of the refill encounter.              blood glucose monitoring (ACCU-CHEK FASTCLIX) lancets [Pharmacy Med Name: ACCU-CHEK FASTCLIX LANCETS  MISC]  1     Sig: USE TO TEST BLOOD SUGAR 4 TIMES DAILY OR AS DIRECTED.       Diabetic Supplies Protocol Failed - 2/13/2025  9:52 AM        Failed - Medication is active on med list and the sig matches. RN to manually verify dose and sig if red X/fail.     If the protocol passes (green check), you do not need to verify med dose and sig.    A prescription matches if they are the same clinical intention.    For Example: once daily and every morning are the same.    For all fails (red x), verify dose and sig.    If the refill does match what is " on file, the RN can still proceed to approve the refill request.     If they do not match, route to the appropriate provider.             Passed - Recent (12 month) or future (90 days) visit with authorizing provider s specialty     The patient must have completed an in-person or virtual visit within the past 12 months or has a future visit scheduled within the next 90 days with the authorizing provider s specialty.  Urgent care and e-visits do not qualify as an office visit for this protocol.          Passed - Medication indicated for associated diagnosis        Passed - Patient is 18 years of age or older          esomeprazole (NEXIUM) 40 MG DR capsule [Pharmacy Med Name: ESOMEPRAZOLE MAGNESIUM 40MG CPDR] 90 capsule 3     Sig: TAKE ONE CAPSULE BY MOUTH EVERY MORNING BEFORE BREAKFAST       PPI Protocol Failed - 2/13/2025  9:52 AM        Failed - Medication is active on med list and the sig matches. RN to manually verify dose and sig if red X/fail.     If the protocol passes (green check), you do not need to verify med dose and sig.    A prescription matches if they are the same clinical intention.    For Example: once daily and every morning are the same.    For all fails (red x), verify dose and sig.    If the refill does match what is on file, the RN can still proceed to approve the refill request.     If they do not match, route to the appropriate provider.             Passed - Medication indicated for associated diagnosis     The medication is prescribed for one or more of the following conditions:     Erosive esophagitis    Gastritis   Gastric hypersecretion   Gastric ulcer   Gastroesophageal reflux disease   Helicobacter pylori gastrointestinal tract infection   Ulcer of duodenum   Drug-induced peptic ulcer   Esophageal stricture   Gastrointestinal hemorrhage   Indigestion   Stress ulcer   Zollinger-Colin syndrome   Peck s esophagus   Laryngopharyngeal reflux   Epigastric Pain   Morbid  Obesity   Cough   History of Peptic Ulcer   Esophageal Atresia, Stenosis and Fistula   Cystic Fibrosis  Bronchiectasis          Passed - Recent (12 mo) or future (90 days) visit within the authorizing provider's specialty     The patient must have completed an in-person or virtual visit within the past 12 months or has a future visit scheduled within the next 90 days with the authorizing provider s specialty.  Urgent care and e-visits do not qualify as an office visit for this protocol.          Passed - Patient is age 18 or older        Passed - No active pregnacy on record        Passed - No positive pregnancy test in past 12 months          metoclopramide (REGLAN) 10 MG tablet [Pharmacy Med Name: METOCLOPRAMIDE HCL 10MG TABS] 180 tablet 1     Sig: TAKE ONE TABLET BY MOUTH TWICE A DAY        Antivertigo/Antiemetic Agents Passed - 2/13/2025  9:52 AM        Passed - Medication is active on med list and the sig matches. RN to manually verify dose and sig if red X/fail.     If the protocol passes (green check), you do not need to verify med dose and sig.    A prescription matches if they are the same clinical intention.    For Example: once daily and every morning are the same.    For all fails (red x), verify dose and sig.    If the refill does match what is on file, the RN can still proceed to approve the refill request.     If they do not match, route to the appropriate provider.             Passed - Medication indicated for associated diagnosis     The medication is prescribed for one or more of the following conditions:     Motion sickness   Nausea   Vomiting   Vertigo   Chemotherapy-induced nausea and vomiting   Radiation-induced nausea and vomiting,    Nausea and vomiting prophylaxis, migraine          Passed - Recent (12 mo) or future (90 days) visit with authorizing provider's specialty     The patient must have completed an in-person or virtual visit within the past 12 months or has a future visit scheduled  within the next 90 days with the authorizing provider s specialty.  Urgent care and e-visits do not qualify as an office visit for this protocol.          Passed - Patient is 18 years of age or older          blood glucose (ACCU-CHEK FASTCLIX) lancing device [Pharmacy Med Name: ACCU-CHEK FASTCLIX LANCET  KIT] 1 kit 0     Sig: USE TO TEST BLOOD SUGAR AS DIRECTED       There is no refill protocol information for this order       losartan (COZAAR) 100 MG tablet [Pharmacy Med Name: LOSARTAN POTASSIUM 100MG TABS] 90 tablet 3     Sig: TAKE 1 TABLET (100 MG) BY MOUTH EVERY MORNING       Angiotensin-II Receptors Passed - 2/13/2025  9:52 AM        Passed - Most recent blood pressure under 140/90 in past 12 months     BP Readings from Last 3 Encounters:   02/11/25 126/68   01/21/25 129/78   01/21/25 126/64       No data recorded            Passed - Medication is active on med list and the sig matches. RN to manually verify dose and sig if red X/fail.     If the protocol passes (green check), you do not need to verify med dose and sig.    A prescription matches if they are the same clinical intention.    For Example: once daily and every morning are the same.    For all fails (red x), verify dose and sig.    If the refill does match what is on file, the RN can still proceed to approve the refill request.     If they do not match, route to the appropriate provider.             Passed - Has GFR on file in past 12 months and most recent value is normal        Passed - Medication indicated for associated diagnosis     The medication is prescribed for one or more of the following conditions:    Chronic Kidney Disease (CDK)    Heart Failure (HF)    Diabetes, Nephropathy   Hypertension    Coronary Artery Disease (CAD)   Raynaud's Disease   Ischemic cardiomyopathy   Cardiomyopathy   Pulmonary Hypertension          Passed - Recent (12 mo) or future (90days) visit within the authorizing provider's specialty     The patient must have  completed an in-person or virtual visit within the past 12 months or has a future visit scheduled within the next 90 days with the authorizing provider s specialty.  Urgent care and e-visits do not qualify as an office visit for this protocol.          Passed - Patient is age 18 or older        Passed - No active pregnancy on record        Passed - Normal serum potassium on file in past 12 months     Recent Labs   Lab Test 02/05/25  0702   POTASSIUM 4.7                    Passed - No positive pregnancy test in past 12 months          fluconazole (DIFLUCAN) 150 MG tablet [Pharmacy Med Name: FLUCONAZOLE 150MG TABS] 28 tablet 0     Sig: TAKE 1 TABLET (150 MG) BY MOUTH EVERY 3 DAYS FOR 9 DAYS, THEN 1 TABLET (150 MG) ONCE A WEEK.       Antifungal Agents Failed - 2/13/2025  9:52 AM        Failed - Medication indicated for associated diagnosis     Medication is associated with one or more of the following diagnoses:     Tinea pedis  Tinea cruris  Tinea corporis  Tinea capitis  Tinea barbae  Tinea faciei  Tinea manuum  Tinea nigra  Onychomycosis  Cutaneous candidias  Pityriasis versicolor          Passed - Medication is active on med list and the sig matches. RN to manually verify dose and sig if red X/fail.     If the protocol passes (green check), you do not need to verify med dose and sig.    A prescription matches if they are the same clinical intention.    For Example: once daily and every morning are the same.    For all fails (red x), verify dose and sig.    If the refill does match what is on file, the RN can still proceed to approve the refill request.     If they do not match, route to the appropriate provider.             Passed - Recent (12 mo) or future (90 days) visit within the authorizing provider's specialty     The patient must have completed an in-person or virtual visit within the past 12 months or has a future visit scheduled within the next 90 days with the authorizing provider s specialty.  Urgent  care and e-visits do not qualify as an office visit for this protocol.         Azoles (Oral) Failed - 2/13/2025  9:52 AM        Failed - Most recent CrCl is >50 ml/min if drug is Fluconazole        Passed - Most recent ALT is within normal limits        Passed - Most recent AST is within normal limits        Passed - Recent (3 months) or future (90 days) visit with authorizing provider s specialty        Passed - Medication is active on med list and the sig matches. RN to manually verify dose and sig if red X/fail.     If the protocol passes (green check), you do not need to verify med dose and sig.    A prescription matches if they are the same clinical intention.    For Example: once daily and every morning are the same.    For all fails (red x), verify dose and sig.    If the refill does match what is on file, the RN can still proceed to approve the refill request.     If they do not match, route to the appropriate provider.             Passed - Medication indicated for associated diagnosis     Medication is associated with one or more of the following diagnoses:    Candidiasis (non-vaginal)  Aspergillosis  Cryptococcosis  Histoplasmosis  Blastomycosis  Coccidioidomycosis          Passed - Liver Function Panel within the past 6 months          JARDIANCE 10 MG TABS tablet [Pharmacy Med Name: JARDIANCE 10MG TABS] 90 tablet 1     Sig: TAKE ONE TABLET BY MOUTH ONCE DAILY       Sodium Glucose Co-Transport Inhibitor Agents Passed - 2/13/2025  9:52 AM        Passed - Patient has documented A1c within the specified period of time.     If HgbA1C is 8 or greater, it needs to be on file within the past 3 months.  If less than 8, must be on file within the past 6 months.     Recent Labs   Lab Test 12/19/24  0700   A1C 6.6*             Passed - Medication is active on med list and the sig matches. RN to manually verify dose and sig if red X/fail.     If the protocol passes (green check), you do not need to verify med dose and  sig.    A prescription matches if they are the same clinical intention.    For Example: once daily and every morning are the same.    For all fails (red x), verify dose and sig.    If the refill does match what is on file, the RN can still proceed to approve the refill request.     If they do not match, route to the appropriate provider.             Passed - Recent (6 mo) or future (90 days) visit within the authorizing provider's specialty     The patient must have completed an in-person or virtual visit within the past 6 months or has a future visit scheduled within the next 90 days with the authorizing provider s specialty.  Urgent care and e-visits do not quality as an office visit for this protocol.          Passed - Medication indicated for associated diagnosis     Medication is associated with one or more of the following diagnoses:     Diabetic nephropathy, With Albuminuria - Type 2 diabetes mellitus     Disorder of cardiovascular system; Prophylaxis - Type 2 diabetes mellitus     Type 2 diabetes mellitus    Disorder of cardiovascular system; Prophylaxis - Heart failure   Chronic kidney disease, (At risk of progression) to reduce the risk of sustained   estimated GFR decline, end-stage kidney disease, cardiovascular death,   and hospitalization for heart failure     Heart failure, (NYHA class II to IV, reduced ejection fraction) to reduce risk of  cardiovascular death and hospitalization           Passed - Has GFR on file in past 12 months and most recent value is >30        Passed - Patient is age 18 or older        Passed - Patient is not pregnant        Passed - Patient has documented normal Potassium within the last 12 mos.     Recent Labs   Lab Test 02/05/25  0702   POTASSIUM 4.7             Passed - Patient has no positive pregnancy test within the past 12 mos.          TOUJEO MAX SOLOSTAR 300 UNIT/ML (2 units dial) pen [Pharmacy Med Name: TOUJEO MAX SOLOSTAR 300 SOPN] 42 mL 1     Sig: INJECT 100 UNITS  UNDER THE SKIN DAILY       Insulin Protocol Failed - 2/13/2025  9:52 AM        Failed - Chart review required     Review Chart.    Do not approve if insulin is used in a pump.  Instead, direct refill request to the patient's endocrinologist.  If the patient doesn't have an endocrinologist, then send the refill to the patient's PCP for review            Passed - HgbA1C in past 3 or 6 months     If HgbA1C is 8 or greater, it needs to be on file within the past 3 months.  If less than 8, must be on file within the past 6 months.     Recent Labs   Lab Test 12/19/24  0700   A1C 6.6*             Passed - Medication is active on med list and the sig matches. RN to manually verify dose and sig if red X/fail.     If the protocol passes (green check), you do not need to verify med dose and sig.    A prescription matches if they are the same clinical intention.    For Example: once daily and every morning are the same.    For all fails (red x), verify dose and sig.    If the refill does match what is on file, the RN can still proceed to approve the refill request.     If they do not match, route to the appropriate provider.             Passed - Recent (6 mo) or future (90 days) visit within the authorizing provider's specialty     The patient must have completed an in-person or virtual visit within the past 6 months or has a future visit scheduled within the next 90 days with the authorizing provider s specialty.  Urgent care and e-visits do not quality as an office visit for this protocol.          Passed - Medication indicated for associated diagnosis     Medication is associated with one or more of the following diagnoses:   - Type 1 diabetes mellitus  - Type 2 diabetes mellitus  - Diabetic nephropathy; Prophylaxis  - Neuropathy due to diabetes mellitus; Prophylaxis  - Retinopathy due to diabetes mellitus; Prophylaxis  - Diabetes mellitus associated with cystic fibrosis  - Disorder of cardiovascular system; Prophylaxis -  Type 1 diabetes mellitus   - Disorder of cardiovascular system; Prophylaxis - Type 2 diabetes mellitus            Passed - Patient is 18 years of age or older

## 2025-02-17 NOTE — PROGRESS NOTES
NEUROLOGY FOLLOW UP VISIT  NOTE       [facility] NEUROLOGY [place]  [address] [place] [address]  Tel: [phone]  Fax: [phone]  [email or web address]     Bria Davis,  1960, MRN [id number]  PCP: Kaia Elena  Date: 2025      ASSESSMENT & PLAN     Visit Diagnosis  Primary fibromyalgia syndrome  Positive DIDI (antinuclear antibody)  Bilateral carpal tunnel syndrome     Bilateral carpal tunnel syndrome  64-year-old female with HTN, HLD, DM2, morbid obesity, CKD stage II, CAD and breast cancer, primary fibromyalgia syndrome who had carpal tunnel release surgery more than 20 years ago and started experiencing recurrent symptoms.  EMG today suggests mild to moderate bilateral carpal tunnel syndrome.  I have recommended:    1.  Start wearing wrist splints bilaterally  2.  Refer to hand surgeon for carpal tunnel release surgery.  Patient prefers to see the same hand surgeon who did her initial surgery and will make an appointment herself.    Primary fibromyalgia syndrome  64-year-old female with HTN, HLD, DM2, morbid obesity, CKD stage II, CAD and breast cancer who was evaluated for generalized body aches and sensitivity to touch.  She had lab work that included normal B12, folate, rheumatoid factor.  Her CRP was elevated and antinuclear antibody was borderline positive at 1: 160.  Double-stranded DNA and MAYNOR panel was normal.  She finds gabapentin helpful and I have asked her to continue on gabapentin 300 mg 2 times daily.  Follow-up in 1 year    Thank you again for this referral, please feel free to contact me if you have any questions.    Jabari Santos MD  [facility] NEUROLOGY, [place]     HISTORY OF PRESENT ILLNESS     Patient is a 64-year-old female with HTN, HLD, DM 2, morbid obesity, CKD stage II, CAD, breast cancer, bilateral carpal tunnel syndrome last seen on 2024 for her primary fibromyalgia syndrome and bilateral carpal tunnel syndrome who  returns for follow-up.  She was initially evaluated for generalized bodyaches and sensitivity to touch.  She had lab work that included normal B12, folate, rheumatoid factor but her CRP was elevated and antinuclear antibody borderline positive at 1: 160.  Double-stranded DNA and MAYNOR panel was normal.  Previously she had an EMG that was normal and was diagnosed with primary fibromyalgia syndrome and was started on gabapentin.  She also had carpal tunnel release surgery more than 20 years ago and lately was experiencing increased numbness tingling in the hand and clinically had finding to suggest recurrent carpal tunnel syndrome and the EMG was done that showed mild to moderate bilateral carpal tunnel syndrome     PROBLEM LIST   Patient Active Problem List   Diagnosis    Mild intermittent asthma    Esophageal reflux    Allergic rhinitis    Restless legs syndrome (RLS)    Enthesopathy    Lumbar radiculopathy    Microalbuminuria    Hyperlipidemia LDL goal <70    Type 2 diabetes mellitus with kidney complication, with long-term current use of insulin (H)    Morbid obesity (H)    Essential hypertension with goal blood pressure less than 140/90    Nonobstructive atherosclerosis of coronary artery    Chronic kidney disease, stage 2 (mild)    Atypical ductal hyperplasia of left breast    Breast abscess    Ductal carcinoma in situ (DCIS) of left breast    AV heart block         PAST MEDICAL & SURGICAL HISTORY     Past Medical History:   Patient  has a past medical history of Anemia (2019), Arthritis, Asthma, Cancer (H) (10/6/2023), CKD (chronic kidney disease) stage 2, GFR 60-89 ml/min, Ductal carcinoma in situ (DCIS) of left breast, Esophageal reflux, Fibromyalgia, Hypertension, LBBB (left bundle branch block) (2019), Other chronic sinusitis, PONV (postoperative nausea and vomiting), Restless legs syndrome (RLS), and Type 2 diabetes mellitus (H).    Surgical History:  She  has a past surgical history that includes surgical  history of -  (04/10/2000); colonoscopy (01/31/2002); Inject epidural lumbar (12/07/2010); Inject epidural lumbar (01/17/2011); Colonoscopy (10/26/2012); Release carpal tunnel (06/19/2012); Release carpal tunnel (11/09/2012); GYN surgery; Left Heart Catheterization (N/A, 09/12/2019); Left Ventriculogram (N/A, 09/12/2019); Colonoscopy (N/A, 11/08/2019); Esophagoscopy, gastroscopy, duodenoscopy (EGD), combined (N/A, 11/08/2019); Repair tendon achilles (Left, 12/26/2019); left long finger pulley release (Left, 07/2020); biopsy (9/7/2023); ENT surgery; Eye surgery (2022); Biopsy breast needle localization (Left, 10/06/2023); Irrigation and debridement breast (Left, 10/21/2023); Lumpectomy breast (Left, 11/13/2023); Mammoplasty reduction bilateral (Bilateral, 12/07/2023); Pacemaker Device & Lead Implant - Right Atrial & Right Ventricular (Left, 10/03/2024); Implant pacemaker (10/2024); and Laser YAG iridotomy (Bilateral, 05/2021).     SOCIAL HISTORY     Reviewed, and she  reports that she has never smoked. She has never used smokeless tobacco. She reports that she does not currently use alcohol. She reports that she does not use drugs.     FAMILY HISTORY     Reviewed, and family history includes Anxiety Disorder in her sister and another family member; Asthma in her brother; Breast Cancer in her maternal grandmother; Cancer in her brother; Cardiovascular in her father; Cerebrovascular Disease in her father; Chronic Obstructive Pulmonary Disease in her brother, brother, sister, and sister; Depression in her brother, sister, sister, and sister; Diabetes in her brother, mother, and son; Heart Disease in her father; Hypertension in her father and mother; Kidney failure in her brother; Obesity in some other family members; Respiratory in her brother, mother, and sister.     ALLERGIES     Allergies   Allergen Reactions    Bactrim [Sulfamethoxazole-Trimethoprim] Rash     Full body rash    Oxycodone Itching     Facial itching      Lisinopril Cough         REVIEW OF SYSTEMS     A 12 point review of system was performed and was negative except as outlined in the history of present illness.     HOME MEDICATIONS     Current Outpatient Rx   Medication Sig Dispense Refill    anastrozole (ARIMIDEX) 1 MG tablet Take 1 tablet (1 mg) by mouth daily. 90 tablet 3    Ascorbic Acid (VITAMIN C) 500 MG CAPS Take 1 tablet by mouth every evening      aspirin (ASA) 81 MG EC tablet Take 81 mg by mouth every evening 1 tablet 0    bisacodyl (DULCOLAX) 5 MG EC tablet 2 days prior to procedure, take 2 tablets at 4 pm. 1 day prior to procedure, take 2 tablets at 4 pm. For additional instructions refer to your colonoscopy prep instructions. 4 tablet 0    blood glucose (ACCU-CHEK FASTCLIX) lancing device USE TO TEST BLOOD SUGAR AS DIRECTED 1 kit 0    blood glucose monitoring (ACCU-CHEK FASTCLIX) lancets USE TO TEST BLOOD SUGAR 4 TIMES DAILY OR AS DIRECTED. 204 each 1    Calcium Carb-Cholecalciferol (CALCIUM-VITAMIN D) 600-400 MG-UNIT TABS Take 1 tablet by mouth every evening      clobetasol (TEMOVATE) 0.05 % external ointment Apply topically 2 times daily. 60 g 1    CONTOUR NEXT TEST test strip USE TO TEST BLOOD SUGAR 4 TIMES DAILY OR AS DIRECTED 400 strip 1    esomeprazole (NEXIUM) 40 MG DR capsule TAKE ONE CAPSULE BY MOUTH EVERY MORNING BEFORE BREAKFAST 90 capsule 3    ferrous sulfate 140 (45 Fe) MG TBCR CR tablet Take 140 mg by mouth 2 times daily      fluconazole (DIFLUCAN) 150 MG tablet TAKE 1 TABLET (150 MG) BY MOUTH EVERY 3 DAYS FOR 9 DAYS, THEN 1 TABLET (150 MG) ONCE A WEEK. 28 tablet 0    fluticasone-salmeterol (ADVAIR) 250-50 MCG/ACT inhaler Inhale 1 puff into the lungs every 12 hours. 180 each 3    FT NASAL DECONGESTANT MAX STR 30 MG tablet TAKE TWO TABLETS BY MOUTH TWICE A  tablet 3    furosemide (LASIX) 20 MG tablet Take 1 tablet (20 mg) by mouth daily. 90 tablet 1    gabapentin (NEURONTIN) 300 MG capsule Take 1 capsule (300 mg) by mouth 2 times  daily. 180 capsule 3    insulin aspart (NOVOLOG FLEXPEN) 100 UNIT/ML pen 32 units before breakfast, 32 units before lunch, 32 units before dinner 90 mL 3    insulin pen needle (BD PEDRO LUIS U/F) 32G X 4 MM miscellaneous USE FOUR PEN NEEDLES DAILY OR AS DIRECTED. 400 each 3    JARDIANCE 10 MG TABS tablet TAKE ONE TABLET BY MOUTH ONCE DAILY 90 tablet 1    losartan (COZAAR) 100 MG tablet TAKE 1 TABLET (100 MG) BY MOUTH EVERY MORNING 90 tablet 3    metFORMIN (GLUCOPHAGE XR) 500 MG 24 hr tablet Take 2 tablets (1,000 mg) by mouth 2 times daily (with meals). 360 tablet 1    metoclopramide (REGLAN) 10 MG tablet TAKE ONE TABLET BY MOUTH TWICE A  tablet 1    metoprolol succinate ER (TOPROL XL) 25 MG 24 hr tablet Take 1 tablet (25 mg) by mouth daily 90 tablet 3    montelukast (SINGULAIR) 10 MG tablet Take 1 tablet (10 mg) by mouth at bedtime. 90 tablet 3    Multiple Vitamins-Minerals (MULTIVITAMIN ADULTS 50+) TABS Take 1 tablet by mouth every morning      OZEMPIC, 2 MG/DOSE, 8 MG/3ML pen INJECT 2MG UNDER THE SKIN EVERY 7 DAYS 9 mL 1    polyethylene glycol (GOLYTELY) 236 g suspension 2 days prior at 5pm, mix and drink half of a jug of Golytely. Drink an 8 oz. glass of Golytely every 15 minutes until half of the jug is gone. Place remainder of Golytely in the refrigerator. 1 day prior at 5 pm, drink the 2nd half of a jug of Golytely bowel prep. 6 hours before your check-in time, drink an 8 oz. glass of Golytely every 15 minutes until half of the 2nd jug of Golytely is gone. Discard remainder of second jug. 8000 mL 0    rosuvastatin (CRESTOR) 10 MG tablet Take 10 mg by mouth every evening.      TOUJEO MAX SOLOSTAR 300 UNIT/ML (2 units dial) pen INJECT 100 UNITS UNDER THE SKIN DAILY 42 mL 1    triamcinolone (KENALOG) 0.1 % external cream Apply topically 2 times daily 80 g 1    VENTOLIN  (90 Base) MCG/ACT inhaler INHALE 2 PUFFS INTO THE LUNGS EVERY 6 HOURS AS NEEDED FOR SHORTNESS OF BREATH , WHEEZING OR COUGH 18 g 3      "    PHYSICAL EXAM     Vital signs  /65 (BP Location: Left arm, Patient Position: Sitting)   Pulse 82   Ht 1.6 m (5' 3\")   Wt 105.7 kg (233 lb)   LMP 01/14/2016 (Approximate)   BMI 41.27 kg/m      Weight:   233 lbs 0 oz    Morbidly obese female who is alert and oriented vital signs were reviewed and documented in electronic medical record.  Neck supple.  Neurologically speech was normal cranial nerves II through XII are intact.  Motor strength 5/5 reflexes symmetrical sensation decreased to light touch pinprick below knees gait normal Romberg negative      PERTINENT DIAGNOSTIC STUDIES     Following studies were reviewed:     ECHOCARDIOGRAM 9/17/2019  The visual ejection fraction is estimated at 60-65%.  There is mild to moderate concentric left ventricular hypertrophy.  There is no pericardial effusion.  Normal left ventricular wall motion.  Technically difficult study     EMG 3/3/2025  This is a abnormal nerve conduction and EMG study of bilateral upper extremities that suggests median neuropathy at or distal to the wrist consistent with mild to moderate bilateral carpal tunnel syndrome     EMG 11/9/2023  This is a normal nerve conduction and EMG study of bilateral lower extremities.     PERTINENT LABS  Following labs were reviewed:  Ancillary Procedure on 02/21/2025   Component Date Value Ref Range Status    Date Time Interrogation Session 02/21/2025 [id number]   Final    Implantable Pulse Generator Manufa* 02/21/2025 Medtronic   Final    Implantable Pulse Generator Model 02/21/2025 [id number] Alyx XT DR MISHRA   Final    Implantable Pulse Generator Serial* 02/21/2025 [id number]   Final    Type Interrogation Session 02/21/2025 Remote Scheduled   Final    Clinic Name 02/21/2025 [facility]   Final    Implantable Pulse Generator Type 02/21/2025 Pacemaker   Final    Implantable Pulse Generator Implan* 02/21/2025 [id number]   Final    Implantable Lead  02/21/2025 Medtronic   Final    Implantable Lead " Model 02/21/2025 3830 SelectSecure MRI SureScan   Final    Implantable Lead Serial Number 02/21/2025 [id number]   Final    Implantable Lead Implant Date 02/21/2025 [id number]   Final    Implantable Lead Polarity Type 02/21/2025 Bipolar Lead   Final    Implantable Lead Location Detail 1 02/21/2025 LBB   Final    Implantable Lead Location 02/21/2025 Right Ventricle   Final    Implantable Lead Connection Status 02/21/2025 Connected   Final    Implantable Lead  02/21/2025 Medtronic   Final    Implantable Lead Model 02/21/2025 5076 CapSureFix Novus MRI SureScan   Final    Implantable Lead Serial Number 02/21/2025 [id number]   Final    Implantable Lead Implant Date 02/21/2025 [id number]   Final    Implantable Lead Polarity Type 02/21/2025 Bipolar Lead   Final    Implantable Lead Location Detail 1 02/21/2025 UNKNOWN   Final    Implantable Lead Location 02/21/2025 Right Atrium   Final    Implantable Lead Connection Status 02/21/2025 Connected   Final    Junior Setting Mode (NBG Code) 02/21/2025 DDD   Final    Junior Setting Lower Rate Limit 02/21/2025 60  [beats]/min Final    Junior Setting Maximum Tracking Rate 02/21/2025 130  [beats]/min Final    Junior Setting Maximum Sensor Rate 02/21/2025 130  [beats]/min Final    Junior Setting Hysterisis Rate 02/21/2025 DISABLED   Final    Junior Setting JOSE R Delay Low 02/21/2025 150  ms Final    Junior Setting PAV Delay Low 02/21/2025 180  ms Final    Junior Setting AT Mode Switch Rate 02/21/2025 171  [beats]/min Final    Lead Channel Setting Sensing Polar* 02/21/2025 Bipolar   Final    Lead Channel Setting Sensing Anode* 02/21/2025 Right Atrium   Final    Lead Channel Setting Sensing Anode* 02/21/2025 Ring   Final    Lead Channel Setting Sensing Catho* 02/21/2025 Right Atrium   Final    Lead Channel Setting Sensing Catho* 02/21/2025 Tip   Final    Lead Channel Setting Sensing Sensi* 02/21/2025 0.3  mV Final    Lead Channel Setting Sensing Polar* 02/21/2025 Bipolar   Final    Lead  Channel Setting Sensing Anode* 02/21/2025 Right Ventricle   Final    Lead Channel Setting Sensing Anode* 02/21/2025 Ring   Final    Lead Channel Setting Sensing Catho* 02/21/2025 Right Ventricle   Final    Lead Channel Setting Sensing Catho* 02/21/2025 Tip   Final    Lead Channel Setting Sensing Sensi* 02/21/2025 0.9  mV Final    Lead Channel Setting Pacing Polari* 02/21/2025 Bipolar   Final    Lead Channel Setting Pacing Anode * 02/21/2025 Right Atrium   Final    Lead Channel Setting Pacing Anode * 02/21/2025 Ring   Final    Lead Channel Setting Sensing Catho* 02/21/2025 Right Atrium   Final    Lead Channel Setting Sensing Catho* 02/21/2025 Tip   Final    Lead Channel Setting Pacing Pulse * 02/21/2025 0.4  ms Final    Lead Channel Setting Pacing Amplit* 02/21/2025 1.5  V Final    Lead Channel Setting Pacing Captur* 02/21/2025 Adaptive   Final    Lead Channel Setting Pacing Polari* 02/21/2025 Bipolar   Final    Lead Channel Setting Pacing Anode * 02/21/2025 Right Ventricle   Final    Lead Channel Setting Pacing Anode * 02/21/2025 Ring   Final    Lead Channel Setting Sensing Catho* 02/21/2025 Right Ventricle   Final    Lead Channel Setting Sensing Catho* 02/21/2025 Tip   Final    Lead Channel Setting Pacing Pulse * 02/21/2025 0.4  ms Final    Lead Channel Setting Pacing Amplit* 02/21/2025 2  V Final    Lead Channel Setting Pacing Captur* 02/21/2025 Adaptive   Final    Zone Setting Type Category 02/21/2025 VF   Final    Zone Setting Vendor Type Category 02/21/2025 V High Rate   Final    Zone Setting Type Category 02/21/2025 VT   Final    Zone Setting Vendor Type Category 02/21/2025 FastVT   Final    Zone Setting Type Category 02/21/2025 VT   Final    Zone Setting Vendor Type Category 02/21/2025 VT   Final    Zone Setting Type Category 02/21/2025 VT   Final    Zone Setting Vendor Type Category 02/21/2025 MonVT   Final    Zone Setting Status 02/21/2025 Monitor   Final    Zone Setting Detection Interval 02/21/2025 400   ms Final    Zone Setting Type Category 02/21/2025 ATRIAL_FIBRILLATION   Final    Zone Setting Vendor Type Category 02/21/2025 FastATAF   Final    Zone Setting Type Category 02/21/2025 AT/AF   Final    Zone Setting Status 02/21/2025 Monitor   Final    Zone Setting Detection Interval 02/21/2025 350  ms Final    Lead Channel Impedance Value 02/21/2025 475  ohm Final    Lead Channel Impedance Value 02/21/2025 285  ohm Final    Lead Channel Sensing Intrinsic Amp* 02/21/2025 3.25  mV Final    Lead Channel Sensing Intrinsic Amp* 02/21/2025 3.25  mV Final    Lead Channel Pacing Threshold Ampl* 02/21/2025 0.875  V Final    Lead Channel Pacing Threshold Puls* 02/21/2025 0.4  ms Final    Lead Channel Impedance Value 02/21/2025 627  ohm Final    Lead Channel Impedance Value 02/21/2025 418  ohm Final    Lead Channel Sensing Intrinsic Amp* 02/21/2025 31.625  mV Final    Lead Channel Sensing Intrinsic Amp* 02/21/2025 31.625  mV Final    Lead Channel Pacing Threshold Ampl* 02/21/2025 0.875  V Final    Lead Channel Pacing Threshold Puls* 02/21/2025 0.4  ms Final    Battery Date Time of Measurements 02/21/2025 20250221011502   Final    Battery Status 02/21/2025 OK   Final    Battery RRT Trigger 02/21/2025 2.625   Final    Battery Remaining Longevity 02/21/2025 153  mo Final    Battery Voltage 02/21/2025 3.19  V Final    Junior Statistic Date Time Start 02/21/2025 20241119081919   Final    Junior Statistic Date Time End 02/21/2025 20250221041320   Final    Junior Statistic RA Percent Paced 02/21/2025 1.9  % Final    Junior Statistic RV Percent Paced 02/21/2025 99.84  % Final    Junior Statistic AP  Percent 02/21/2025 1.89  % Final    Junior Statistic AS  Percent 02/21/2025 97.95  % Final    Junior Statistic AP VS Percent 02/21/2025 0.01  % Final    Junior Statistic AS VS Percent 02/21/2025 0.15  % Final    Atrial Tachy Statistic Date Time S* 02/21/2025 20241119081919   Final    Atrial Tachy Statistic Date Time E* 02/21/2025 20250221041320    Final    Atrial Tachy Statistic AT/AF Burde* 02/21/2025 0  % Final    Therapy Statistic Recent Date Time* 02/21/2025 10717700028091   Final    Therapy Statistic Recent Date Time* 02/21/2025 70671921713191   Final    Therapy Statistic Total  Date Time* 02/21/2025 29709262941669   Final    Therapy Statistic Total  Date Time* 02/21/2025 35169605765760   Final    Episode Statistic Recent Count 02/21/2025 0   Final    Episode Statistic Type Category 02/21/2025 AT/AF   Final    Episode Statistic Recent Count 02/21/2025 0   Final    Episode Statistic Type Category 02/21/2025 Patient Activated   Final    Episode Statistic Recent Count 02/21/2025 0   Final    Episode Statistic Type Category 02/21/2025 SVT   Final    Episode Statistic Recent Count 02/21/2025 0   Final    Episode Statistic Type Category 02/21/2025 VT   Final    Episode Statistic Recent Count 02/21/2025 0   Final    Episode Statistic Type Category 02/21/2025 VT   Final    Episode Statistic Recent Date Time* 02/21/2025 43778999258039   Final    Episode Statistic Recent Date Time* 02/21/2025 78282743643612   Final    Episode Statistic Recent Date Time* 02/21/2025 61188009055957   Final    Episode Statistic Recent Date Time* 02/21/2025 92589666532100   Final    Episode Statistic Recent Date Time* 02/21/2025 19752089856035   Final    Episode Statistic Recent Date Time* 02/21/2025 36913698825439   Final    Episode Statistic Recent Date Time* 02/21/2025 47784091094725   Final    Episode Statistic Recent Date Time* 02/21/2025 09660870930800   Final    Episode Statistic Recent Date Time* 02/21/2025 31453435268991   Final    Episode Statistic Recent Date Time* 02/21/2025 61470716943286   Final    Episode Statistic Total Count 02/21/2025 0   Final    Episode Statistic Type Category 02/21/2025 AT/AF   Final    Episode Statistic Total Count 02/21/2025 0   Final    Episode Statistic Type Category 02/21/2025 Patient Activated   Final    Episode Statistic Total Count  02/21/2025 0   Final    Episode Statistic Type Category 02/21/2025 SVT   Final    Episode Statistic Total Count 02/21/2025 0   Final    Episode Statistic Type Category 02/21/2025 VT   Final    Episode Statistic Total Count 02/21/2025 0   Final    Episode Statistic Type Category 02/21/2025 VT   Final    Episode Statistic Total Date Time * 02/21/2025 20241003132137   Final    Episode Statistic Total Date Time * 02/21/2025 29864470065401   Final    Episode Statistic Total Date Time * 02/21/2025 20241003132137   Final    Episode Statistic Total Date Time * 02/21/2025 20250221041320   Final    Episode Statistic Total Date Time * 02/21/2025 20241003132137   Final    Episode Statistic Total Date Time * 02/21/2025 20250221041320   Final    Episode Statistic Total Date Time * 02/21/2025 20241003132137   Final    Episode Statistic Total Date Time * 02/21/2025 20250221041320   Final    Episode Statistic Total Date Time * 02/21/2025 20241003132137   Final    Episode Statistic Total Date Time * 02/21/2025 20250221041320   Final   Lab on 02/05/2025   Component Date Value Ref Range Status    Sodium 02/05/2025 139  135 - 145 mmol/L Final    Potassium 02/05/2025 4.7  3.4 - 5.3 mmol/L Final    Chloride 02/05/2025 101  98 - 107 mmol/L Final    Carbon Dioxide (CO2) 02/05/2025 31 (H)  22 - 29 mmol/L Final    Anion Gap 02/05/2025 7  7 - 15 mmol/L Final    Urea Nitrogen 02/05/2025 15.2  8.0 - 23.0 mg/dL Final    Creatinine 02/05/2025 0.64  0.51 - 0.95 mg/dL Final    GFR Estimate 02/05/2025 >90  >60 mL/min/1.73m2 Final    Calcium 02/05/2025 10.0  8.8 - 10.4 mg/dL Final    Glucose 02/05/2025 168 (H)  70 - 99 mg/dL Final    Patient Fasting > 8hrs? 02/05/2025 No   Final    Cholesterol 02/05/2025 132  <200 mg/dL Final    Triglycerides 02/05/2025 187 (H)  <150 mg/dL Final    Direct Measure HDL 02/05/2025 46 (L)  >=50 mg/dL Final    LDL Cholesterol Calculated 02/05/2025 49  <100 mg/dL Final    Non HDL Cholesterol 02/05/2025 86  <130 mg/dL Final     Patient Fasting > 8hrs? 02/05/2025 No   Final    Creatinine Urine mg/dL 02/05/2025 45.4  mg/dL Final    Albumin Urine mg/L 02/05/2025 20.7  mg/L Final    Albumin Urine mg/g Cr 02/05/2025 45.59 (H)  0.00 - 25.00 mg/g Cr Final   Admission on 01/15/2025, Discharged on 01/15/2025   Component Date Value Ref Range Status    Sodium 01/15/2025 139  135 - 145 mmol/L Final    Potassium 01/15/2025 3.9  3.4 - 5.3 mmol/L Final    Carbon Dioxide (CO2) 01/15/2025 25  22 - 29 mmol/L Final    Anion Gap 01/15/2025 15  7 - 15 mmol/L Final    Urea Nitrogen 01/15/2025 13.8  8.0 - 23.0 mg/dL Final    Creatinine 01/15/2025 0.56  0.51 - 0.95 mg/dL Final    GFR Estimate 01/15/2025 >90  >60 mL/min/1.73m2 Final    Calcium 01/15/2025 10.0  8.8 - 10.4 mg/dL Final    Chloride 01/15/2025 99  98 - 107 mmol/L Final    Glucose 01/15/2025 61 (L)  70 - 99 mg/dL Final    Alkaline Phosphatase 01/15/2025 85  40 - 150 U/L Final    AST 01/15/2025 20  0 - 45 U/L Final    ALT 01/15/2025 19  0 - 50 U/L Final    Protein Total 01/15/2025 7.4  6.4 - 8.3 g/dL Final    Albumin 01/15/2025 4.4  3.5 - 5.2 g/dL Final    Bilirubin Total 01/15/2025 0.3  <=1.2 mg/dL Final    WBC Count 01/15/2025 12.4 (H)  4.0 - 11.0 10e3/uL Final    RBC Count 01/15/2025 5.42 (H)  3.80 - 5.20 10e6/uL Final    Hemoglobin 01/15/2025 15.3  11.7 - 15.7 g/dL Final    Hematocrit 01/15/2025 46.4  35.0 - 47.0 % Final    MCV 01/15/2025 86  78 - 100 fL Final    MCH 01/15/2025 28.2  26.5 - 33.0 pg Final    MCHC 01/15/2025 33.0  31.5 - 36.5 g/dL Final    RDW 01/15/2025 14.4  10.0 - 15.0 % Final    Platelet Count 01/15/2025 458 (H)  150 - 450 10e3/uL Final    % Neutrophils 01/15/2025 75  % Final    % Lymphocytes 01/15/2025 16  % Final    % Monocytes 01/15/2025 6  % Final    % Eosinophils 01/15/2025 2  % Final    % Basophils 01/15/2025 1  % Final    % Immature Granulocytes 01/15/2025 0  % Final    NRBCs per 100 WBC 01/15/2025 0  <1 /100 Final    Absolute Neutrophils 01/15/2025 9.3 (H)  1.6 - 8.3  10e3/uL Final    Absolute Lymphocytes 01/15/2025 2.0  0.8 - 5.3 10e3/uL Final    Absolute Monocytes 01/15/2025 0.7  0.0 - 1.3 10e3/uL Final    Absolute Eosinophils 01/15/2025 0.2  0.0 - 0.7 10e3/uL Final    Absolute Basophils 01/15/2025 0.1  0.0 - 0.2 10e3/uL Final    Absolute Immature Granulocytes 01/15/2025 0.0  <=0.4 10e3/uL Final    Absolute NRBCs 01/15/2025 0.0  10e3/uL Final   Lab on 12/19/2024   Component Date Value Ref Range Status    Estimated Average Glucose 12/19/2024 143 (H)  <117 mg/dL Final    Hemoglobin A1C 12/19/2024 6.6 (H)  0.0 - 5.6 % Final         Total time spent for face to face visit, reviewing labs/imaging studies, counseling and coordination of care was: 30 Minutes spent on the date of the encounter doing chart review, review of outside records, review of test results, interpretation of tests, patient visit, and documentation     The longitudinal plan of care for the diagnosis(es)/condition(s) as documented were addressed during this visit. Due to the added complexity in care, I will continue to support Melyssa in the subsequent management and with ongoing continuity of care.    This note was dictated using voice recognition software.  Any grammatical or context distortions are unintentional and inherent to the software.    No orders of the defined types were placed in this encounter.     New Prescriptions    No medications on file     Modified Medications    No medications on file

## 2025-02-17 NOTE — H&P (VIEW-ONLY)
NEUROLOGY FOLLOW UP VISIT  NOTE       [facility] [place]  [address] [place] [address]  Tel: [phone]  Fax: [phone]  [email or web address]     Bria Davis,  1960, MRN [id number]  PCP: Kaia Elena  Date: 2025      ASSESSMENT & PLAN     Visit Diagnosis  Primary fibromyalgia syndrome  Positive DIDI (antinuclear antibody)  Bilateral carpal tunnel syndrome     Bilateral carpal tunnel syndrome  64-year-old female with HTN, HLD, DM2, morbid obesity, CKD stage II, CAD and breast cancer, primary fibromyalgia syndrome who had carpal tunnel release surgery more than 20 years ago and started experiencing recurrent symptoms.  EMG today suggests mild to moderate bilateral carpal tunnel syndrome.  I have recommended:    1.  Start wearing wrist splints bilaterally  2.  Refer to hand surgeon for carpal tunnel release surgery.  Patient prefers to see the same hand surgeon who did her initial surgery and will make an appointment herself.    Primary fibromyalgia syndrome  64-year-old female with HTN, HLD, DM2, morbid obesity, CKD stage II, CAD and breast cancer who was evaluated for generalized body aches and sensitivity to touch.  She had lab work that included normal B12, folate, rheumatoid factor.  Her CRP was elevated and antinuclear antibody was borderline positive at 1: 160.  Double-stranded DNA and MAYNOR panel was normal.  She finds gabapentin helpful and I have asked her to continue on gabapentin 300 mg 2 times daily.  Follow-up in 1 year    Thank you again for this referral, please feel free to contact me if you have any questions.    Jabari Santos MD  [facility] NEUR[facility], [place]     HISTORY OF PRESENT ILLNESS     Patient is a 64-year-old female with HTN, HLD, DM 2, morbid obesity, CKD stage II, CAD, breast cancer, bilateral carpal tunnel syndrome last seen on 2024 for her primary fibromyalgia syndrome and bilateral carpal tunnel syndrome who  returns for follow-up.  She was initially evaluated for generalized bodyaches and sensitivity to touch.  She had lab work that included normal B12, folate, rheumatoid factor but her CRP was elevated and antinuclear antibody borderline positive at 1: 160.  Double-stranded DNA and MAYNOR panel was normal.  Previously she had an EMG that was normal and was diagnosed with primary fibromyalgia syndrome and was started on gabapentin.  She also had carpal tunnel release surgery more than 20 years ago and lately was experiencing increased numbness tingling in the hand and clinically had finding to suggest recurrent carpal tunnel syndrome and the EMG was done that showed mild to moderate bilateral carpal tunnel syndrome     PROBLEM LIST   Patient Active Problem List   Diagnosis    Mild intermittent asthma    Esophageal reflux    Allergic rhinitis    Restless legs syndrome (RLS)    Enthesopathy    Lumbar radiculopathy    Microalbuminuria    Hyperlipidemia LDL goal <70    Type 2 diabetes mellitus with kidney complication, with long-term current use of insulin (H)    Morbid obesity (H)    Essential hypertension with goal blood pressure less than 140/90    Nonobstructive atherosclerosis of coronary artery    Chronic kidney disease, stage 2 (mild)    Atypical ductal hyperplasia of left breast    Breast abscess    Ductal carcinoma in situ (DCIS) of left breast    AV heart block         PAST MEDICAL & SURGICAL HISTORY     Past Medical History:   Patient  has a past medical history of Anemia (2019), Arthritis, Asthma, Cancer (H) (10/6/2023), CKD (chronic kidney disease) stage 2, GFR 60-89 ml/min, Ductal carcinoma in situ (DCIS) of left breast, Esophageal reflux, Fibromyalgia, Hypertension, LBBB (left bundle branch block) (2019), Other chronic sinusitis, PONV (postoperative nausea and vomiting), Restless legs syndrome (RLS), and Type 2 diabetes mellitus (H).    Surgical History:  She  has a past surgical history that includes surgical  history of -  (04/10/2000); colonoscopy (01/31/2002); Inject epidural lumbar (12/07/2010); Inject epidural lumbar (01/17/2011); Colonoscopy (10/26/2012); Release carpal tunnel (06/19/2012); Release carpal tunnel (11/09/2012); GYN surgery; Left Heart Catheterization (N/A, 09/12/2019); Left Ventriculogram (N/A, 09/12/2019); Colonoscopy (N/A, 11/08/2019); Esophagoscopy, gastroscopy, duodenoscopy (EGD), combined (N/A, 11/08/2019); Repair tendon achilles (Left, 12/26/2019); left long finger pulley release (Left, 07/2020); biopsy (9/7/2023); ENT surgery; Eye surgery (2022); Biopsy breast needle localization (Left, 10/06/2023); Irrigation and debridement breast (Left, 10/21/2023); Lumpectomy breast (Left, 11/13/2023); Mammoplasty reduction bilateral (Bilateral, 12/07/2023); Pacemaker Device & Lead Implant - Right Atrial & Right Ventricular (Left, 10/03/2024); Implant pacemaker (10/2024); and Laser YAG iridotomy (Bilateral, 05/2021).     SOCIAL HISTORY     Reviewed, and she  reports that she has never smoked. She has never used smokeless tobacco. She reports that she does not currently use alcohol. She reports that she does not use drugs.     FAMILY HISTORY     Reviewed, and family history includes Anxiety Disorder in her sister and another family member; Asthma in her brother; Breast Cancer in her maternal grandmother; Cancer in her brother; Cardiovascular in her father; Cerebrovascular Disease in her father; Chronic Obstructive Pulmonary Disease in her brother, brother, sister, and sister; Depression in her brother, sister, sister, and sister; Diabetes in her brother, mother, and son; Heart Disease in her father; Hypertension in her father and mother; Kidney failure in her brother; Obesity in some other family members; Respiratory in her brother, mother, and sister.     ALLERGIES     Allergies   Allergen Reactions    Bactrim [Sulfamethoxazole-Trimethoprim] Rash     Full body rash    Oxycodone Itching     Facial itching      Lisinopril Cough         REVIEW OF SYSTEMS     A 12 point review of system was performed and was negative except as outlined in the history of present illness.     HOME MEDICATIONS     Current Outpatient Rx   Medication Sig Dispense Refill    anastrozole (ARIMIDEX) 1 MG tablet Take 1 tablet (1 mg) by mouth daily. 90 tablet 3    Ascorbic Acid (VITAMIN C) 500 MG CAPS Take 1 tablet by mouth every evening      aspirin (ASA) 81 MG EC tablet Take 81 mg by mouth every evening 1 tablet 0    bisacodyl (DULCOLAX) 5 MG EC tablet 2 days prior to procedure, take 2 tablets at 4 pm. 1 day prior to procedure, take 2 tablets at 4 pm. For additional instructions refer to your colonoscopy prep instructions. 4 tablet 0    blood glucose (ACCU-CHEK FASTCLIX) lancing device USE TO TEST BLOOD SUGAR AS DIRECTED 1 kit 0    blood glucose monitoring (ACCU-CHEK FASTCLIX) lancets USE TO TEST BLOOD SUGAR 4 TIMES DAILY OR AS DIRECTED. 204 each 1    Calcium Carb-Cholecalciferol (CALCIUM-VITAMIN D) 600-400 MG-UNIT TABS Take 1 tablet by mouth every evening      clobetasol (TEMOVATE) 0.05 % external ointment Apply topically 2 times daily. 60 g 1    CONTOUR NEXT TEST test strip USE TO TEST BLOOD SUGAR 4 TIMES DAILY OR AS DIRECTED 400 strip 1    esomeprazole (NEXIUM) 40 MG DR capsule TAKE ONE CAPSULE BY MOUTH EVERY MORNING BEFORE BREAKFAST 90 capsule 3    ferrous sulfate 140 (45 Fe) MG TBCR CR tablet Take 140 mg by mouth 2 times daily      fluconazole (DIFLUCAN) 150 MG tablet TAKE 1 TABLET (150 MG) BY MOUTH EVERY 3 DAYS FOR 9 DAYS, THEN 1 TABLET (150 MG) ONCE A WEEK. 28 tablet 0    fluticasone-salmeterol (ADVAIR) 250-50 MCG/ACT inhaler Inhale 1 puff into the lungs every 12 hours. 180 each 3    FT NASAL DECONGESTANT MAX STR 30 MG tablet TAKE TWO TABLETS BY MOUTH TWICE A  tablet 3    furosemide (LASIX) 20 MG tablet Take 1 tablet (20 mg) by mouth daily. 90 tablet 1    gabapentin (NEURONTIN) 300 MG capsule Take 1 capsule (300 mg) by mouth 2 times  daily. 180 capsule 3    insulin aspart (NOVOLOG FLEXPEN) 100 UNIT/ML pen 32 units before breakfast, 32 units before lunch, 32 units before dinner 90 mL 3    insulin pen needle (BD PEDRO LUIS U/F) 32G X 4 MM miscellaneous USE FOUR PEN NEEDLES DAILY OR AS DIRECTED. 400 each 3    JARDIANCE 10 MG TABS tablet TAKE ONE TABLET BY MOUTH ONCE DAILY 90 tablet 1    losartan (COZAAR) 100 MG tablet TAKE 1 TABLET (100 MG) BY MOUTH EVERY MORNING 90 tablet 3    metFORMIN (GLUCOPHAGE XR) 500 MG 24 hr tablet Take 2 tablets (1,000 mg) by mouth 2 times daily (with meals). 360 tablet 1    metoclopramide (REGLAN) 10 MG tablet TAKE ONE TABLET BY MOUTH TWICE A  tablet 1    metoprolol succinate ER (TOPROL XL) 25 MG 24 hr tablet Take 1 tablet (25 mg) by mouth daily 90 tablet 3    montelukast (SINGULAIR) 10 MG tablet Take 1 tablet (10 mg) by mouth at bedtime. 90 tablet 3    Multiple Vitamins-Minerals (MULTIVITAMIN ADULTS 50+) TABS Take 1 tablet by mouth every morning      OZEMPIC, 2 MG/DOSE, 8 MG/3ML pen INJECT 2MG UNDER THE SKIN EVERY 7 DAYS 9 mL 1    polyethylene glycol (GOLYTELY) 236 g suspension 2 days prior at 5pm, mix and drink half of a jug of Golytely. Drink an 8 oz. glass of Golytely every 15 minutes until half of the jug is gone. Place remainder of Golytely in the refrigerator. 1 day prior at 5 pm, drink the 2nd half of a jug of Golytely bowel prep. 6 hours before your check-in time, drink an 8 oz. glass of Golytely every 15 minutes until half of the 2nd jug of Golytely is gone. Discard remainder of second jug. 8000 mL 0    rosuvastatin (CRESTOR) 10 MG tablet Take 10 mg by mouth every evening.      TOUJEO MAX SOLOSTAR 300 UNIT/ML (2 units dial) pen INJECT 100 UNITS UNDER THE SKIN DAILY 42 mL 1    triamcinolone (KENALOG) 0.1 % external cream Apply topically 2 times daily 80 g 1    VENTOLIN  (90 Base) MCG/ACT inhaler INHALE 2 PUFFS INTO THE LUNGS EVERY 6 HOURS AS NEEDED FOR SHORTNESS OF BREATH , WHEEZING OR COUGH 18 g 3      "    PHYSICAL EXAM     Vital signs  /65 (BP Location: Left arm, Patient Position: Sitting)   Pulse 82   Ht 1.6 m (5' 3\")   Wt 105.7 kg (233 lb)   LMP 01/14/2016 (Approximate)   BMI 41.27 kg/m      Weight:   233 lbs 0 oz    Morbidly obese female who is alert and oriented vital signs were reviewed and documented in electronic medical record.  Neck supple.  Neurologically speech was normal cranial nerves II through XII are intact.  Motor strength 5/5 reflexes symmetrical sensation decreased to light touch pinprick below knees gait normal Romberg negative      PERTINENT DIAGNOSTIC STUDIES     Following studies were reviewed:     ECHOCARDIOGRAM 9/17/2019  The visual ejection fraction is estimated at 60-65%.  There is mild to moderate concentric left ventricular hypertrophy.  There is no pericardial effusion.  Normal left ventricular wall motion.  Technically difficult study     EMG 3/3/2025  This is a abnormal nerve conduction and EMG study of bilateral upper extremities that suggests median neuropathy at or distal to the wrist consistent with mild to moderate bilateral carpal tunnel syndrome     EMG 11/9/2023  This is a normal nerve conduction and EMG study of bilateral lower extremities.     PERTINENT LABS  Following labs were reviewed:  Ancillary Procedure on 02/21/2025   Component Date Value Ref Range Status    Date Time Interrogation Session 02/21/2025 [id number]   Final    Implantable Pulse Generator Manufa* 02/21/2025 Medtronic   Final    Implantable Pulse Generator Model 02/21/2025 [id number] Alyx XT DR MISHRA   Final    Implantable Pulse Generator Serial* 02/21/2025 [id number]   Final    Type Interrogation Session 02/21/2025 Remote Scheduled   Final    Clinic Name 02/21/2025 [facility]   Final    Implantable Pulse Generator Type 02/21/2025 Pacemaker   Final    Implantable Pulse Generator Implan* 02/21/2025 [id number]   Final    Implantable Lead  02/21/2025 Medtronic   Final    Implantable Lead " Model 02/21/2025 3830 SelectSecure MRI SureScan   Final    Implantable Lead Serial Number 02/21/2025 [id number]   Final    Implantable Lead Implant Date 02/21/2025 [id number]   Final    Implantable Lead Polarity Type 02/21/2025 Bipolar Lead   Final    Implantable Lead Location Detail 1 02/21/2025 LBB   Final    Implantable Lead Location 02/21/2025 Right Ventricle   Final    Implantable Lead Connection Status 02/21/2025 Connected   Final    Implantable Lead  02/21/2025 Medtronic   Final    Implantable Lead Model 02/21/2025 5076 CapSureFix Novus MRI SureScan   Final    Implantable Lead Serial Number 02/21/2025 [id number]   Final    Implantable Lead Implant Date 02/21/2025 [id number]   Final    Implantable Lead Polarity Type 02/21/2025 Bipolar Lead   Final    Implantable Lead Location Detail 1 02/21/2025 UNKNOWN   Final    Implantable Lead Location 02/21/2025 Right Atrium   Final    Implantable Lead Connection Status 02/21/2025 Connected   Final    Junior Setting Mode (NBG Code) 02/21/2025 DDD   Final    Junior Setting Lower Rate Limit 02/21/2025 60  [beats]/min Final    Junior Setting Maximum Tracking Rate 02/21/2025 130  [beats]/min Final    Junior Setting Maximum Sensor Rate 02/21/2025 130  [beats]/min Final    Junior Setting Hysterisis Rate 02/21/2025 DISABLED   Final    Junior Setting JOSE R Delay Low 02/21/2025 150  ms Final    Junior Setting PAV Delay Low 02/21/2025 180  ms Final    Junior Setting AT Mode Switch Rate 02/21/2025 171  [beats]/min Final    Lead Channel Setting Sensing Polar* 02/21/2025 Bipolar   Final    Lead Channel Setting Sensing Anode* 02/21/2025 Right Atrium   Final    Lead Channel Setting Sensing Anode* 02/21/2025 Ring   Final    Lead Channel Setting Sensing Catho* 02/21/2025 Right Atrium   Final    Lead Channel Setting Sensing Catho* 02/21/2025 Tip   Final    Lead Channel Setting Sensing Sensi* 02/21/2025 0.3  mV Final    Lead Channel Setting Sensing Polar* 02/21/2025 Bipolar   Final    Lead  Channel Setting Sensing Anode* 02/21/2025 Right Ventricle   Final    Lead Channel Setting Sensing Anode* 02/21/2025 Ring   Final    Lead Channel Setting Sensing Catho* 02/21/2025 Right Ventricle   Final    Lead Channel Setting Sensing Catho* 02/21/2025 Tip   Final    Lead Channel Setting Sensing Sensi* 02/21/2025 0.9  mV Final    Lead Channel Setting Pacing Polari* 02/21/2025 Bipolar   Final    Lead Channel Setting Pacing Anode * 02/21/2025 Right Atrium   Final    Lead Channel Setting Pacing Anode * 02/21/2025 Ring   Final    Lead Channel Setting Sensing Catho* 02/21/2025 Right Atrium   Final    Lead Channel Setting Sensing Catho* 02/21/2025 Tip   Final    Lead Channel Setting Pacing Pulse * 02/21/2025 0.4  ms Final    Lead Channel Setting Pacing Amplit* 02/21/2025 1.5  V Final    Lead Channel Setting Pacing Captur* 02/21/2025 Adaptive   Final    Lead Channel Setting Pacing Polari* 02/21/2025 Bipolar   Final    Lead Channel Setting Pacing Anode * 02/21/2025 Right Ventricle   Final    Lead Channel Setting Pacing Anode * 02/21/2025 Ring   Final    Lead Channel Setting Sensing Catho* 02/21/2025 Right Ventricle   Final    Lead Channel Setting Sensing Catho* 02/21/2025 Tip   Final    Lead Channel Setting Pacing Pulse * 02/21/2025 0.4  ms Final    Lead Channel Setting Pacing Amplit* 02/21/2025 2  V Final    Lead Channel Setting Pacing Captur* 02/21/2025 Adaptive   Final    Zone Setting Type Category 02/21/2025 VF   Final    Zone Setting Vendor Type Category 02/21/2025 V High Rate   Final    Zone Setting Type Category 02/21/2025 VT   Final    Zone Setting Vendor Type Category 02/21/2025 FastVT   Final    Zone Setting Type Category 02/21/2025 VT   Final    Zone Setting Vendor Type Category 02/21/2025 VT   Final    Zone Setting Type Category 02/21/2025 VT   Final    Zone Setting Vendor Type Category 02/21/2025 MonVT   Final    Zone Setting Status 02/21/2025 Monitor   Final    Zone Setting Detection Interval 02/21/2025 400   ms Final    Zone Setting Type Category 02/21/2025 ATRIAL_FIBRILLATION   Final    Zone Setting Vendor Type Category 02/21/2025 FastATAF   Final    Zone Setting Type Category 02/21/2025 AT/AF   Final    Zone Setting Status 02/21/2025 Monitor   Final    Zone Setting Detection Interval 02/21/2025 350  ms Final    Lead Channel Impedance Value 02/21/2025 475  ohm Final    Lead Channel Impedance Value 02/21/2025 285  ohm Final    Lead Channel Sensing Intrinsic Amp* 02/21/2025 3.25  mV Final    Lead Channel Sensing Intrinsic Amp* 02/21/2025 3.25  mV Final    Lead Channel Pacing Threshold Ampl* 02/21/2025 0.875  V Final    Lead Channel Pacing Threshold Puls* 02/21/2025 0.4  ms Final    Lead Channel Impedance Value 02/21/2025 627  ohm Final    Lead Channel Impedance Value 02/21/2025 418  ohm Final    Lead Channel Sensing Intrinsic Amp* 02/21/2025 31.625  mV Final    Lead Channel Sensing Intrinsic Amp* 02/21/2025 31.625  mV Final    Lead Channel Pacing Threshold Ampl* 02/21/2025 0.875  V Final    Lead Channel Pacing Threshold Puls* 02/21/2025 0.4  ms Final    Battery Date Time of Measurements 02/21/2025 20250221011502   Final    Battery Status 02/21/2025 OK   Final    Battery RRT Trigger 02/21/2025 2.625   Final    Battery Remaining Longevity 02/21/2025 153  mo Final    Battery Voltage 02/21/2025 3.19  V Final    Junior Statistic Date Time Start 02/21/2025 20241119081919   Final    Junior Statistic Date Time End 02/21/2025 20250221041320   Final    Junior Statistic RA Percent Paced 02/21/2025 1.9  % Final    Junior Statistic RV Percent Paced 02/21/2025 99.84  % Final    Junior Statistic AP  Percent 02/21/2025 1.89  % Final    Junior Statistic AS  Percent 02/21/2025 97.95  % Final    Junior Statistic AP VS Percent 02/21/2025 0.01  % Final    Junior Statistic AS VS Percent 02/21/2025 0.15  % Final    Atrial Tachy Statistic Date Time S* 02/21/2025 20241119081919   Final    Atrial Tachy Statistic Date Time E* 02/21/2025 20250221041320    Final    Atrial Tachy Statistic AT/AF Burde* 02/21/2025 0  % Final    Therapy Statistic Recent Date Time* 02/21/2025 08118106531725   Final    Therapy Statistic Recent Date Time* 02/21/2025 84501564593034   Final    Therapy Statistic Total  Date Time* 02/21/2025 34318308440087   Final    Therapy Statistic Total  Date Time* 02/21/2025 25145979764206   Final    Episode Statistic Recent Count 02/21/2025 0   Final    Episode Statistic Type Category 02/21/2025 AT/AF   Final    Episode Statistic Recent Count 02/21/2025 0   Final    Episode Statistic Type Category 02/21/2025 Patient Activated   Final    Episode Statistic Recent Count 02/21/2025 0   Final    Episode Statistic Type Category 02/21/2025 SVT   Final    Episode Statistic Recent Count 02/21/2025 0   Final    Episode Statistic Type Category 02/21/2025 VT   Final    Episode Statistic Recent Count 02/21/2025 0   Final    Episode Statistic Type Category 02/21/2025 VT   Final    Episode Statistic Recent Date Time* 02/21/2025 24845025584268   Final    Episode Statistic Recent Date Time* 02/21/2025 78751483392954   Final    Episode Statistic Recent Date Time* 02/21/2025 47995349847777   Final    Episode Statistic Recent Date Time* 02/21/2025 03900547609114   Final    Episode Statistic Recent Date Time* 02/21/2025 48404433134786   Final    Episode Statistic Recent Date Time* 02/21/2025 21351453642087   Final    Episode Statistic Recent Date Time* 02/21/2025 74863635413149   Final    Episode Statistic Recent Date Time* 02/21/2025 86142720687556   Final    Episode Statistic Recent Date Time* 02/21/2025 87590666388623   Final    Episode Statistic Recent Date Time* 02/21/2025 80226787265931   Final    Episode Statistic Total Count 02/21/2025 0   Final    Episode Statistic Type Category 02/21/2025 AT/AF   Final    Episode Statistic Total Count 02/21/2025 0   Final    Episode Statistic Type Category 02/21/2025 Patient Activated   Final    Episode Statistic Total Count  02/21/2025 0   Final    Episode Statistic Type Category 02/21/2025 SVT   Final    Episode Statistic Total Count 02/21/2025 0   Final    Episode Statistic Type Category 02/21/2025 VT   Final    Episode Statistic Total Count 02/21/2025 0   Final    Episode Statistic Type Category 02/21/2025 VT   Final    Episode Statistic Total Date Time * 02/21/2025 20241003132137   Final    Episode Statistic Total Date Time * 02/21/2025 19159920389129   Final    Episode Statistic Total Date Time * 02/21/2025 20241003132137   Final    Episode Statistic Total Date Time * 02/21/2025 20250221041320   Final    Episode Statistic Total Date Time * 02/21/2025 20241003132137   Final    Episode Statistic Total Date Time * 02/21/2025 20250221041320   Final    Episode Statistic Total Date Time * 02/21/2025 20241003132137   Final    Episode Statistic Total Date Time * 02/21/2025 20250221041320   Final    Episode Statistic Total Date Time * 02/21/2025 20241003132137   Final    Episode Statistic Total Date Time * 02/21/2025 20250221041320   Final   Lab on 02/05/2025   Component Date Value Ref Range Status    Sodium 02/05/2025 139  135 - 145 mmol/L Final    Potassium 02/05/2025 4.7  3.4 - 5.3 mmol/L Final    Chloride 02/05/2025 101  98 - 107 mmol/L Final    Carbon Dioxide (CO2) 02/05/2025 31 (H)  22 - 29 mmol/L Final    Anion Gap 02/05/2025 7  7 - 15 mmol/L Final    Urea Nitrogen 02/05/2025 15.2  8.0 - 23.0 mg/dL Final    Creatinine 02/05/2025 0.64  0.51 - 0.95 mg/dL Final    GFR Estimate 02/05/2025 >90  >60 mL/min/1.73m2 Final    Calcium 02/05/2025 10.0  8.8 - 10.4 mg/dL Final    Glucose 02/05/2025 168 (H)  70 - 99 mg/dL Final    Patient Fasting > 8hrs? 02/05/2025 No   Final    Cholesterol 02/05/2025 132  <200 mg/dL Final    Triglycerides 02/05/2025 187 (H)  <150 mg/dL Final    Direct Measure HDL 02/05/2025 46 (L)  >=50 mg/dL Final    LDL Cholesterol Calculated 02/05/2025 49  <100 mg/dL Final    Non HDL Cholesterol 02/05/2025 86  <130 mg/dL Final     Patient Fasting > 8hrs? 02/05/2025 No   Final    Creatinine Urine mg/dL 02/05/2025 45.4  mg/dL Final    Albumin Urine mg/L 02/05/2025 20.7  mg/L Final    Albumin Urine mg/g Cr 02/05/2025 45.59 (H)  0.00 - 25.00 mg/g Cr Final   Admission on 01/15/2025, Discharged on 01/15/2025   Component Date Value Ref Range Status    Sodium 01/15/2025 139  135 - 145 mmol/L Final    Potassium 01/15/2025 3.9  3.4 - 5.3 mmol/L Final    Carbon Dioxide (CO2) 01/15/2025 25  22 - 29 mmol/L Final    Anion Gap 01/15/2025 15  7 - 15 mmol/L Final    Urea Nitrogen 01/15/2025 13.8  8.0 - 23.0 mg/dL Final    Creatinine 01/15/2025 0.56  0.51 - 0.95 mg/dL Final    GFR Estimate 01/15/2025 >90  >60 mL/min/1.73m2 Final    Calcium 01/15/2025 10.0  8.8 - 10.4 mg/dL Final    Chloride 01/15/2025 99  98 - 107 mmol/L Final    Glucose 01/15/2025 61 (L)  70 - 99 mg/dL Final    Alkaline Phosphatase 01/15/2025 85  40 - 150 U/L Final    AST 01/15/2025 20  0 - 45 U/L Final    ALT 01/15/2025 19  0 - 50 U/L Final    Protein Total 01/15/2025 7.4  6.4 - 8.3 g/dL Final    Albumin 01/15/2025 4.4  3.5 - 5.2 g/dL Final    Bilirubin Total 01/15/2025 0.3  <=1.2 mg/dL Final    WBC Count 01/15/2025 12.4 (H)  4.0 - 11.0 10e3/uL Final    RBC Count 01/15/2025 5.42 (H)  3.80 - 5.20 10e6/uL Final    Hemoglobin 01/15/2025 15.3  11.7 - 15.7 g/dL Final    Hematocrit 01/15/2025 46.4  35.0 - 47.0 % Final    MCV 01/15/2025 86  78 - 100 fL Final    MCH 01/15/2025 28.2  26.5 - 33.0 pg Final    MCHC 01/15/2025 33.0  31.5 - 36.5 g/dL Final    RDW 01/15/2025 14.4  10.0 - 15.0 % Final    Platelet Count 01/15/2025 458 (H)  150 - 450 10e3/uL Final    % Neutrophils 01/15/2025 75  % Final    % Lymphocytes 01/15/2025 16  % Final    % Monocytes 01/15/2025 6  % Final    % Eosinophils 01/15/2025 2  % Final    % Basophils 01/15/2025 1  % Final    % Immature Granulocytes 01/15/2025 0  % Final    NRBCs per 100 WBC 01/15/2025 0  <1 /100 Final    Absolute Neutrophils 01/15/2025 9.3 (H)  1.6 - 8.3  10e3/uL Final    Absolute Lymphocytes 01/15/2025 2.0  0.8 - 5.3 10e3/uL Final    Absolute Monocytes 01/15/2025 0.7  0.0 - 1.3 10e3/uL Final    Absolute Eosinophils 01/15/2025 0.2  0.0 - 0.7 10e3/uL Final    Absolute Basophils 01/15/2025 0.1  0.0 - 0.2 10e3/uL Final    Absolute Immature Granulocytes 01/15/2025 0.0  <=0.4 10e3/uL Final    Absolute NRBCs 01/15/2025 0.0  10e3/uL Final   Lab on 12/19/2024   Component Date Value Ref Range Status    Estimated Average Glucose 12/19/2024 143 (H)  <117 mg/dL Final    Hemoglobin A1C 12/19/2024 6.6 (H)  0.0 - 5.6 % Final         Total time spent for face to face visit, reviewing labs/imaging studies, counseling and coordination of care was: 30 Minutes spent on the date of the encounter doing chart review, review of outside records, review of test results, interpretation of tests, patient visit, and documentation     The longitudinal plan of care for the diagnosis(es)/condition(s) as documented were addressed during this visit. Due to the added complexity in care, I will continue to support Melyssa in the subsequent management and with ongoing continuity of care.    This note was dictated using voice recognition software.  Any grammatical or context distortions are unintentional and inherent to the software.    No orders of the defined types were placed in this encounter.     New Prescriptions    No medications on file     Modified Medications    No medications on file

## 2025-02-18 ENCOUNTER — DOCUMENTATION ONLY (OUTPATIENT)
Dept: CARDIOLOGY | Facility: CLINIC | Age: 65
End: 2025-02-18
Payer: COMMERCIAL

## 2025-02-18 NOTE — PROGRESS NOTES
Called pt.   Reviewed recommendation with pt. Updated medication list with removal of hydrochlorothiazide.     Pt will check BP 1-2 hours after taking medications in AM and will call if readings are consistently over 140 systolic.    Pt offers no concerns at this time.    Maya Hodges RN

## 2025-02-18 NOTE — PROGRESS NOTES
Could you pls call Melyssa and let her know that as she's done so well after starting Lasix, she should stop hydrochlorothiazide. Don't want her on both d/t risk of electrolyte abnormalities/low BP.    Stop hydrochlorothiazide - update me list  Continue to monitor cough, BP/LALA/edema on the Lasix - call or send MyChart if gets worse again despite continued Lasix  Check BP 2-3x a week at least 2 hours after medications to ensure BPs don't climb off hydrochlorothiazide. Call if BP routinely >140mmHg.    Thx! Radha

## 2025-02-20 DIAGNOSIS — D05.12 DUCTAL CARCINOMA IN SITU (DCIS) OF LEFT BREAST: ICD-10-CM

## 2025-02-20 RX ORDER — ANASTROZOLE 1 MG/1
1 TABLET ORAL DAILY
Qty: 90 TABLET | Refills: 3 | Status: SHIPPED | OUTPATIENT
Start: 2025-02-20

## 2025-02-20 RX ORDER — ANASTROZOLE 1 MG/1
1 TABLET ORAL DAILY
Qty: 90 TABLET | Refills: 3 | OUTPATIENT
Start: 2025-02-20

## 2025-02-21 ENCOUNTER — ANCILLARY PROCEDURE (OUTPATIENT)
Dept: CARDIOLOGY | Facility: CLINIC | Age: 65
End: 2025-02-21
Attending: INTERNAL MEDICINE
Payer: COMMERCIAL

## 2025-02-21 DIAGNOSIS — I44.1 SECOND DEGREE HEART BLOCK: ICD-10-CM

## 2025-02-21 DIAGNOSIS — Z95.0 CARDIAC PACEMAKER IN SITU: ICD-10-CM

## 2025-02-21 LAB
MDC_IDC_LEAD_CONNECTION_STATUS: NORMAL
MDC_IDC_LEAD_CONNECTION_STATUS: NORMAL
MDC_IDC_LEAD_IMPLANT_DT: NORMAL
MDC_IDC_LEAD_IMPLANT_DT: NORMAL
MDC_IDC_LEAD_LOCATION: NORMAL
MDC_IDC_LEAD_LOCATION: NORMAL
MDC_IDC_LEAD_LOCATION_DETAIL_1: NORMAL
MDC_IDC_LEAD_LOCATION_DETAIL_1: NORMAL
MDC_IDC_LEAD_MFG: NORMAL
MDC_IDC_LEAD_MFG: NORMAL
MDC_IDC_LEAD_MODEL: NORMAL
MDC_IDC_LEAD_MODEL: NORMAL
MDC_IDC_LEAD_POLARITY_TYPE: NORMAL
MDC_IDC_LEAD_POLARITY_TYPE: NORMAL
MDC_IDC_LEAD_SERIAL: NORMAL
MDC_IDC_LEAD_SERIAL: NORMAL
MDC_IDC_MSMT_BATTERY_DTM: NORMAL
MDC_IDC_MSMT_BATTERY_REMAINING_LONGEVITY: 153 MO
MDC_IDC_MSMT_BATTERY_RRT_TRIGGER: 2.62
MDC_IDC_MSMT_BATTERY_STATUS: NORMAL
MDC_IDC_MSMT_BATTERY_VOLTAGE: 3.19 V
MDC_IDC_MSMT_LEADCHNL_RA_IMPEDANCE_VALUE: 285 OHM
MDC_IDC_MSMT_LEADCHNL_RA_IMPEDANCE_VALUE: 475 OHM
MDC_IDC_MSMT_LEADCHNL_RA_PACING_THRESHOLD_AMPLITUDE: 0.88 V
MDC_IDC_MSMT_LEADCHNL_RA_PACING_THRESHOLD_PULSEWIDTH: 0.4 MS
MDC_IDC_MSMT_LEADCHNL_RA_SENSING_INTR_AMPL: 3.25 MV
MDC_IDC_MSMT_LEADCHNL_RA_SENSING_INTR_AMPL: 3.25 MV
MDC_IDC_MSMT_LEADCHNL_RV_IMPEDANCE_VALUE: 418 OHM
MDC_IDC_MSMT_LEADCHNL_RV_IMPEDANCE_VALUE: 627 OHM
MDC_IDC_MSMT_LEADCHNL_RV_PACING_THRESHOLD_AMPLITUDE: 0.88 V
MDC_IDC_MSMT_LEADCHNL_RV_PACING_THRESHOLD_PULSEWIDTH: 0.4 MS
MDC_IDC_MSMT_LEADCHNL_RV_SENSING_INTR_AMPL: 31.62 MV
MDC_IDC_MSMT_LEADCHNL_RV_SENSING_INTR_AMPL: 31.62 MV
MDC_IDC_PG_IMPLANT_DTM: NORMAL
MDC_IDC_PG_MFG: NORMAL
MDC_IDC_PG_MODEL: NORMAL
MDC_IDC_PG_SERIAL: NORMAL
MDC_IDC_PG_TYPE: NORMAL
MDC_IDC_SESS_CLINIC_NAME: NORMAL
MDC_IDC_SESS_DTM: NORMAL
MDC_IDC_SESS_TYPE: NORMAL
MDC_IDC_SET_BRADY_AT_MODE_SWITCH_RATE: 171 {BEATS}/MIN
MDC_IDC_SET_BRADY_HYSTRATE: NORMAL
MDC_IDC_SET_BRADY_LOWRATE: 60 {BEATS}/MIN
MDC_IDC_SET_BRADY_MAX_SENSOR_RATE: 130 {BEATS}/MIN
MDC_IDC_SET_BRADY_MAX_TRACKING_RATE: 130 {BEATS}/MIN
MDC_IDC_SET_BRADY_MODE: NORMAL
MDC_IDC_SET_BRADY_PAV_DELAY_LOW: 180 MS
MDC_IDC_SET_BRADY_SAV_DELAY_LOW: 150 MS
MDC_IDC_SET_LEADCHNL_RA_PACING_AMPLITUDE: 1.5 V
MDC_IDC_SET_LEADCHNL_RA_PACING_ANODE_ELECTRODE_1: NORMAL
MDC_IDC_SET_LEADCHNL_RA_PACING_ANODE_LOCATION_1: NORMAL
MDC_IDC_SET_LEADCHNL_RA_PACING_CAPTURE_MODE: NORMAL
MDC_IDC_SET_LEADCHNL_RA_PACING_CATHODE_ELECTRODE_1: NORMAL
MDC_IDC_SET_LEADCHNL_RA_PACING_CATHODE_LOCATION_1: NORMAL
MDC_IDC_SET_LEADCHNL_RA_PACING_POLARITY: NORMAL
MDC_IDC_SET_LEADCHNL_RA_PACING_PULSEWIDTH: 0.4 MS
MDC_IDC_SET_LEADCHNL_RA_SENSING_ANODE_ELECTRODE_1: NORMAL
MDC_IDC_SET_LEADCHNL_RA_SENSING_ANODE_LOCATION_1: NORMAL
MDC_IDC_SET_LEADCHNL_RA_SENSING_CATHODE_ELECTRODE_1: NORMAL
MDC_IDC_SET_LEADCHNL_RA_SENSING_CATHODE_LOCATION_1: NORMAL
MDC_IDC_SET_LEADCHNL_RA_SENSING_POLARITY: NORMAL
MDC_IDC_SET_LEADCHNL_RA_SENSING_SENSITIVITY: 0.3 MV
MDC_IDC_SET_LEADCHNL_RV_PACING_AMPLITUDE: 2 V
MDC_IDC_SET_LEADCHNL_RV_PACING_ANODE_ELECTRODE_1: NORMAL
MDC_IDC_SET_LEADCHNL_RV_PACING_ANODE_LOCATION_1: NORMAL
MDC_IDC_SET_LEADCHNL_RV_PACING_CAPTURE_MODE: NORMAL
MDC_IDC_SET_LEADCHNL_RV_PACING_CATHODE_ELECTRODE_1: NORMAL
MDC_IDC_SET_LEADCHNL_RV_PACING_CATHODE_LOCATION_1: NORMAL
MDC_IDC_SET_LEADCHNL_RV_PACING_POLARITY: NORMAL
MDC_IDC_SET_LEADCHNL_RV_PACING_PULSEWIDTH: 0.4 MS
MDC_IDC_SET_LEADCHNL_RV_SENSING_ANODE_ELECTRODE_1: NORMAL
MDC_IDC_SET_LEADCHNL_RV_SENSING_ANODE_LOCATION_1: NORMAL
MDC_IDC_SET_LEADCHNL_RV_SENSING_CATHODE_ELECTRODE_1: NORMAL
MDC_IDC_SET_LEADCHNL_RV_SENSING_CATHODE_LOCATION_1: NORMAL
MDC_IDC_SET_LEADCHNL_RV_SENSING_POLARITY: NORMAL
MDC_IDC_SET_LEADCHNL_RV_SENSING_SENSITIVITY: 0.9 MV
MDC_IDC_SET_ZONE_DETECTION_INTERVAL: 350 MS
MDC_IDC_SET_ZONE_DETECTION_INTERVAL: 400 MS
MDC_IDC_SET_ZONE_STATUS: NORMAL
MDC_IDC_SET_ZONE_STATUS: NORMAL
MDC_IDC_SET_ZONE_TYPE: NORMAL
MDC_IDC_SET_ZONE_VENDOR_TYPE: NORMAL
MDC_IDC_STAT_AT_BURDEN_PERCENT: 0 %
MDC_IDC_STAT_AT_DTM_END: NORMAL
MDC_IDC_STAT_AT_DTM_START: NORMAL
MDC_IDC_STAT_BRADY_AP_VP_PERCENT: 1.89 %
MDC_IDC_STAT_BRADY_AP_VS_PERCENT: 0.01 %
MDC_IDC_STAT_BRADY_AS_VP_PERCENT: 97.95 %
MDC_IDC_STAT_BRADY_AS_VS_PERCENT: 0.15 %
MDC_IDC_STAT_BRADY_DTM_END: NORMAL
MDC_IDC_STAT_BRADY_DTM_START: NORMAL
MDC_IDC_STAT_BRADY_RA_PERCENT_PACED: 1.9 %
MDC_IDC_STAT_BRADY_RV_PERCENT_PACED: 99.84 %
MDC_IDC_STAT_EPISODE_RECENT_COUNT: 0
MDC_IDC_STAT_EPISODE_RECENT_COUNT_DTM_END: NORMAL
MDC_IDC_STAT_EPISODE_RECENT_COUNT_DTM_START: NORMAL
MDC_IDC_STAT_EPISODE_TOTAL_COUNT: 0
MDC_IDC_STAT_EPISODE_TOTAL_COUNT_DTM_END: NORMAL
MDC_IDC_STAT_EPISODE_TOTAL_COUNT_DTM_START: NORMAL
MDC_IDC_STAT_EPISODE_TYPE: NORMAL
MDC_IDC_STAT_TACHYTHERAPY_RECENT_DTM_END: NORMAL
MDC_IDC_STAT_TACHYTHERAPY_RECENT_DTM_START: NORMAL
MDC_IDC_STAT_TACHYTHERAPY_TOTAL_DTM_END: NORMAL
MDC_IDC_STAT_TACHYTHERAPY_TOTAL_DTM_START: NORMAL

## 2025-02-21 PROCEDURE — 93294 REM INTERROG EVL PM/LDLS PM: CPT | Performed by: INTERNAL MEDICINE

## 2025-02-21 PROCEDURE — 93296 REM INTERROG EVL PM/IDS: CPT | Performed by: INTERNAL MEDICINE

## 2025-02-26 NOTE — OR NURSING
Spoke with Bria Davis over the phone reminding  patient  that per anesthesia protocol, she should hold her Ozempic starting 2/27 due to her forth coming surgery on 3/7.  Patient verbalized understanding instruction.

## 2025-03-03 ENCOUNTER — OFFICE VISIT (OUTPATIENT)
Dept: NEUROLOGY | Facility: CLINIC | Age: 65
End: 2025-03-03
Attending: PSYCHIATRY & NEUROLOGY
Payer: COMMERCIAL

## 2025-03-03 ENCOUNTER — TELEPHONE (OUTPATIENT)
Dept: OTOLARYNGOLOGY | Facility: CLINIC | Age: 65
End: 2025-03-03

## 2025-03-03 VITALS
BODY MASS INDEX: 41.29 KG/M2 | SYSTOLIC BLOOD PRESSURE: 115 MMHG | DIASTOLIC BLOOD PRESSURE: 65 MMHG | HEIGHT: 63 IN | HEART RATE: 82 BPM | WEIGHT: 233 LBS

## 2025-03-03 DIAGNOSIS — M79.7 PRIMARY FIBROMYALGIA SYNDROME: Primary | ICD-10-CM

## 2025-03-03 DIAGNOSIS — G56.03 BILATERAL CARPAL TUNNEL SYNDROME: ICD-10-CM

## 2025-03-03 DIAGNOSIS — R76.8 POSITIVE ANA (ANTINUCLEAR ANTIBODY): ICD-10-CM

## 2025-03-03 PROCEDURE — 95886 MUSC TEST DONE W/N TEST COMP: CPT | Mod: RT | Performed by: PSYCHIATRY & NEUROLOGY

## 2025-03-03 PROCEDURE — 3074F SYST BP LT 130 MM HG: CPT | Performed by: PSYCHIATRY & NEUROLOGY

## 2025-03-03 PROCEDURE — 3078F DIAST BP <80 MM HG: CPT | Performed by: PSYCHIATRY & NEUROLOGY

## 2025-03-03 PROCEDURE — 95911 NRV CNDJ TEST 9-10 STUDIES: CPT | Performed by: PSYCHIATRY & NEUROLOGY

## 2025-03-03 PROCEDURE — 99213 OFFICE O/P EST LOW 20 MIN: CPT | Performed by: PSYCHIATRY & NEUROLOGY

## 2025-03-03 NOTE — PROCEDURES
ELECTROMYOGRAPHY (EMG) REPORT       [facility] NEUROLOGY [place]  [address] [place] [address]  Tel: [phone]  Fax: [phone]  [email or web address]     Bria Davis, JAS 1960, MRN [id number]  PCP: Kaia Elena  Date: 3/3/2025     Principal Diagnosis: Bilateral carpal tunnel syndrome     Height: 5 feet 3 inch  Reason for referral: Evaluate bilateral uppers. c/o twitching, pain in both hands/fingers > 6 months. Diabetic > 15 years. h/o both CTR.       Motor NCS      Nerve / Sites Lat Amp Dist Wilmer    ms mV cm m/s   R Median - APB      Wrist 5.73 11.0 7       Elbow 9.84 10.7 22 53   L Median - APB      Wrist 5.78 5.6 7       Elbow 9.79 5.4 21.5 54   R Ulnar - ADM      Wrist 2.92 14.2 7       B.Elbow 6.20 13.7 18.5 56      A.Elbow 8.39 13.5 13 59   L Ulnar - ADM      Wrist 3.18 13.7 7       B.Elbow 6.35 13.3 18 57      A.Elbow 8.59 12.6 13 58       F  Wave      Nerve Fmin    ms   R Ulnar - ADM 27.24   L Ulnar - ADM 28.18       Sensory NCS      Nerve / Sites Onset Lat Pk Lat Amp.2-3 Dist Wilmer Lat Diff    ms ms  V cm m/s ms   R Median - II (Antidr)      Wrist 3.23 4.11 25.2 13 40    L Median - II (Antidr)      Wrist 3.65 4.53 16.6 13 36    R Ulnar - V (Antidr)      Wrist 1.93 2.60 40.3 11 57    L Ulnar - V (Antidr)      Wrist 2.14 2.76 24.7 11 52    R Median, Ulnar - Transcarpal comparison      Median Palm 2.29 2.97 21.5 8 35       Ulnar Palm 1.51 1.88 17.1 8 53          1.09   L Median, Ulnar - Transcarpal comparison      Median Palm 2.76 3.33 21.8 8 29       Ulnar Palm 1.35 1.88 21.2 8 59          1.46       EMG Summary Table     Spontaneous MUAP Rcmt Note   Muscle Fib PSW Fasc IA # Amp Dur PPP Rate Type   R. Brachioradialis None None None N N N N N N N   R. Pronator teres None None None N N N N N N N   R. Biceps brachii None None None N N N N N N N   R. Deltoid None None None N N N N N N N   R. Triceps brachii None None None N N N N N N N   R. Extensor digitorum communis None  None None N N N N N N N   R. Flexor carpi ulnaris None None None N N N N N N N   R. First dorsal interosseous None None None N N N N N N N   R. Abductor pollicis brevis 1+ 1+ None N Sl Red Incr Incr N N N   L. Brachioradialis None None None N N N N N N N   L. Pronator teres None None None N N N N N N N   L. Biceps brachii None None None N N N N N N N   L. Deltoid None None None N N N N N N N   L. Triceps brachii None None None N N N N N N N   L. First dorsal interosseous None None None N N N N N N N   L. Abductor pollicis brevis Occ Occ None N Sl Red Incr Incr N N N        Summary: Nerve conduction and EMG study of upper extremities shows:  Delayed bilateral median distal motor latencies, normal amplitude and conduction velocities.  Normal bilateral ulnar distal motor latencies, amplitudes and conduction velocities.  Normal bilateral ulnar F latencies  Delayed bilateral median peak sensory latencies with slow sensory nerve conduction velocities.  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      This note was dictated using voice recognition software.  Any grammatical or context distortions are unintentional and inherent to the software.

## 2025-03-03 NOTE — NURSING NOTE
Chief Complaint   Patient presents with    Bilateral carpal tunnel syndrome     EMG results      Clarita PUENTES CMA on 3/3/2025 at 9:48 AM  [facility]

## 2025-03-03 NOTE — LETTER
3/3/2025      Bria Davis  [id number] [address] [id number]      Dear Colleague,    Thank you for referring your patient, Bria Davis, to the [facility] [place]. Please see a copy of my visit note below.    See procedure note for EMG report      Again, thank you for allowing me to participate in the care of your patient.        Sincerely,        Jabari Santos MD    Electronically signed

## 2025-03-03 NOTE — TELEPHONE ENCOUNTER
Scar tissue in the nose. Pt reports no current issues. Had a long term cold and had sinus issues where the sinus gets completely plugged. She was given and ABX and this was has helped. However, every time she gets any sort of a sniffle this sinus gets completely plugged. Questioning if everything is okay in the sinus or if scar tissue is the culprit to the intermittent blockage.    Message sent to the provider. Patient would like to keep up coming appointment.

## 2025-03-03 NOTE — LETTER
3/3/2025      Bria Davis  [address] [facility] [address]      Dear Colleague,    Thank you for referring your patient, Bria Davis, to the [facility] [place]. Please see a copy of my visit note below.    NEUROLOGY FOLLOW UP VISIT  NOTE       [facility] [place]  [address] [place] [address]  Tel: [phone]  Fax: [phone]  [email or web address]     Bria Davis,  1960, MRN [id number]  PCP: Kaia Elena  Date: 2025      ASSESSMENT & PLAN     Visit Diagnosis  Primary fibromyalgia syndrome  Positive DIDI (antinuclear antibody)  Bilateral carpal tunnel syndrome     Bilateral carpal tunnel syndrome  64-year-old female with HTN, HLD, DM2, morbid obesity, CKD stage II, CAD and breast cancer, primary fibromyalgia syndrome who had carpal tunnel release surgery more than 20 years ago and started experiencing recurrent symptoms.  EMG today suggests mild to moderate bilateral carpal tunnel syndrome.  I have recommended:    1.  Start wearing wrist splints bilaterally  2.  Refer to hand surgeon for carpal tunnel release surgery.  Patient prefers to see the same hand surgeon who did her initial surgery and will make an appointment herself.    Primary fibromyalgia syndrome  64-year-old female with HTN, HLD, DM2, morbid obesity, CKD stage II, CAD and breast cancer who was evaluated for generalized body aches and sensitivity to touch.  She had lab work that included normal B12, folate, rheumatoid factor.  Her CRP was elevated and antinuclear antibody was borderline positive at 1: 160.  Double-stranded DNA and MAYNOR panel was normal.  She finds gabapentin helpful and I have asked her to continue on gabapentin 300 mg 2 times daily.  Follow-up in 1 year    Thank you again for this referral, please feel free to contact me if you have any questions.    Jabari Santos MD  [facility]     HISTORY OF  PRESENT ILLNESS     Patient is a 64-year-old female with HTN, HLD, DM 2, morbid obesity, CKD stage II, CAD, breast cancer, bilateral carpal tunnel syndrome last seen on 11/8/2024 for her primary fibromyalgia syndrome and bilateral carpal tunnel syndrome who returns for follow-up.  She was initially evaluated for generalized bodyaches and sensitivity to touch.  She had lab work that included normal B12, folate, rheumatoid factor but her CRP was elevated and antinuclear antibody borderline positive at 1: 160.  Double-stranded DNA and MAYNOR panel was normal.  Previously she had an EMG that was normal and was diagnosed with primary fibromyalgia syndrome and was started on gabapentin.  She also had carpal tunnel release surgery more than 20 years ago and lately was experiencing increased numbness tingling in the hand and clinically had finding to suggest recurrent carpal tunnel syndrome and the EMG was done that showed mild to moderate bilateral carpal tunnel syndrome     PROBLEM LIST   Patient Active Problem List   Diagnosis     Mild intermittent asthma     Esophageal reflux     Allergic rhinitis     Restless legs syndrome (RLS)     Enthesopathy     Lumbar radiculopathy     Microalbuminuria     Hyperlipidemia LDL goal <70     Type 2 diabetes mellitus with kidney complication, with long-term current use of insulin (H)     Morbid obesity (H)     Essential hypertension with goal blood pressure less than 140/90     Nonobstructive atherosclerosis of coronary artery     Chronic kidney disease, stage 2 (mild)     Atypical ductal hyperplasia of left breast     Breast abscess     Ductal carcinoma in situ (DCIS) of left breast     AV heart block         PAST MEDICAL & SURGICAL HISTORY     Past Medical History:   Patient  has a past medical history of Anemia (2019), Arthritis, Asthma, Cancer (H) (10/6/2023), CKD (chronic kidney disease) stage 2, GFR 60-89 ml/min, Ductal carcinoma in situ (DCIS) of left breast, Esophageal reflux,  Fibromyalgia, Hypertension, LBBB (left bundle branch block) (2019), Other chronic sinusitis, PONV (postoperative nausea and vomiting), Restless legs syndrome (RLS), and Type 2 diabetes mellitus (H).    Surgical History:  She  has a past surgical history that includes surgical history of -  (04/10/2000); colonoscopy (01/31/2002); Inject epidural lumbar (12/07/2010); Inject epidural lumbar (01/17/2011); Colonoscopy (10/26/2012); Release carpal tunnel (06/19/2012); Release carpal tunnel (11/09/2012); GYN surgery; Left Heart Catheterization (N/A, 09/12/2019); Left Ventriculogram (N/A, 09/12/2019); Colonoscopy (N/A, 11/08/2019); Esophagoscopy, gastroscopy, duodenoscopy (EGD), combined (N/A, 11/08/2019); Repair tendon achilles (Left, 12/26/2019); left long finger pulley release (Left, 07/2020); biopsy (9/7/2023); ENT surgery; Eye surgery (2022); Biopsy breast needle localization (Left, 10/06/2023); Irrigation and debridement breast (Left, 10/21/2023); Lumpectomy breast (Left, 11/13/2023); Mammoplasty reduction bilateral (Bilateral, 12/07/2023); Pacemaker Device & Lead Implant - Right Atrial & Right Ventricular (Left, 10/03/2024); Implant pacemaker (10/2024); and Laser YAG iridotomy (Bilateral, 05/2021).     SOCIAL HISTORY     Reviewed, and she  reports that she has never smoked. She has never used smokeless tobacco. She reports that she does not currently use alcohol. She reports that she does not use drugs.     FAMILY HISTORY     Reviewed, and family history includes Anxiety Disorder in her sister and another family member; Asthma in her brother; Breast Cancer in her maternal grandmother; Cancer in her brother; Cardiovascular in her father; Cerebrovascular Disease in her father; Chronic Obstructive Pulmonary Disease in her brother, brother, sister, and sister; Depression in her brother, sister, sister, and sister; Diabetes in her brother, mother, and son; Heart Disease in her father; Hypertension in her father and  mother; Kidney failure in her brother; Obesity in some other family members; Respiratory in her brother, mother, and sister.     ALLERGIES     Allergies   Allergen Reactions     Bactrim [Sulfamethoxazole-Trimethoprim] Rash     Full body rash     Oxycodone Itching     Facial itching      Lisinopril Cough         REVIEW OF SYSTEMS     A 12 point review of system was performed and was negative except as outlined in the history of present illness.     HOME MEDICATIONS     Current Outpatient Rx   Medication Sig Dispense Refill     anastrozole (ARIMIDEX) 1 MG tablet Take 1 tablet (1 mg) by mouth daily. 90 tablet 3     Ascorbic Acid (VITAMIN C) 500 MG CAPS Take 1 tablet by mouth every evening       aspirin (ASA) 81 MG EC tablet Take 81 mg by mouth every evening 1 tablet 0     bisacodyl (DULCOLAX) 5 MG EC tablet 2 days prior to procedure, take 2 tablets at 4 pm. 1 day prior to procedure, take 2 tablets at 4 pm. For additional instructions refer to your colonoscopy prep instructions. 4 tablet 0     blood glucose (ACCU-CHEK FASTCLIX) lancing device USE TO TEST BLOOD SUGAR AS DIRECTED 1 kit 0     blood glucose monitoring (ACCU-CHEK FASTCLIX) lancets USE TO TEST BLOOD SUGAR 4 TIMES DAILY OR AS DIRECTED. 204 each 1     Calcium Carb-Cholecalciferol (CALCIUM-VITAMIN D) 600-400 MG-UNIT TABS Take 1 tablet by mouth every evening       clobetasol (TEMOVATE) 0.05 % external ointment Apply topically 2 times daily. 60 g 1     CONTOUR NEXT TEST test strip USE TO TEST BLOOD SUGAR 4 TIMES DAILY OR AS DIRECTED 400 strip 1     esomeprazole (NEXIUM) 40 MG DR capsule TAKE ONE CAPSULE BY MOUTH EVERY MORNING BEFORE BREAKFAST 90 capsule 3     ferrous sulfate 140 (45 Fe) MG TBCR CR tablet Take 140 mg by mouth 2 times daily       fluconazole (DIFLUCAN) 150 MG tablet TAKE 1 TABLET (150 MG) BY MOUTH EVERY 3 DAYS FOR 9 DAYS, THEN 1 TABLET (150 MG) ONCE A WEEK. 28 tablet 0     fluticasone-salmeterol (ADVAIR) 250-50 MCG/ACT inhaler Inhale 1 puff into the  lungs every 12 hours. 180 each 3     FT NASAL DECONGESTANT MAX STR 30 MG tablet TAKE TWO TABLETS BY MOUTH TWICE A  tablet 3     furosemide (LASIX) 20 MG tablet Take 1 tablet (20 mg) by mouth daily. 90 tablet 1     gabapentin (NEURONTIN) 300 MG capsule Take 1 capsule (300 mg) by mouth 2 times daily. 180 capsule 3     insulin aspart (NOVOLOG FLEXPEN) 100 UNIT/ML pen 32 units before breakfast, 32 units before lunch, 32 units before dinner 90 mL 3     insulin pen needle (BD PEDRO LUIS U/F) 32G X 4 MM miscellaneous USE FOUR PEN NEEDLES DAILY OR AS DIRECTED. 400 each 3     JARDIANCE 10 MG TABS tablet TAKE ONE TABLET BY MOUTH ONCE DAILY 90 tablet 1     losartan (COZAAR) 100 MG tablet TAKE 1 TABLET (100 MG) BY MOUTH EVERY MORNING 90 tablet 3     metFORMIN (GLUCOPHAGE XR) 500 MG 24 hr tablet Take 2 tablets (1,000 mg) by mouth 2 times daily (with meals). 360 tablet 1     metoclopramide (REGLAN) 10 MG tablet TAKE ONE TABLET BY MOUTH TWICE A  tablet 1     metoprolol succinate ER (TOPROL XL) 25 MG 24 hr tablet Take 1 tablet (25 mg) by mouth daily 90 tablet 3     montelukast (SINGULAIR) 10 MG tablet Take 1 tablet (10 mg) by mouth at bedtime. 90 tablet 3     Multiple Vitamins-Minerals (MULTIVITAMIN ADULTS 50+) TABS Take 1 tablet by mouth every morning       OZEMPIC, 2 MG/DOSE, 8 MG/3ML pen INJECT 2MG UNDER THE SKIN EVERY 7 DAYS 9 mL 1     polyethylene glycol (GOLYTELY) 236 g suspension 2 days prior at 5pm, mix and drink half of a jug of Golytely. Drink an 8 oz. glass of Golytely every 15 minutes until half of the jug is gone. Place remainder of Golytely in the refrigerator. 1 day prior at 5 pm, drink the 2nd half of a jug of Golytely bowel prep. 6 hours before your check-in time, drink an 8 oz. glass of Golytely every 15 minutes until half of the 2nd jug of Golytely is gone. Discard remainder of second jug. 8000 mL 0     rosuvastatin (CRESTOR) 10 MG tablet Take 10 mg by mouth every evening.       TOUJEO MAX SOLOSTAR 300  "UNIT/ML (2 units dial) pen INJECT 100 UNITS UNDER THE SKIN DAILY 42 mL 1     triamcinolone (KENALOG) 0.1 % external cream Apply topically 2 times daily 80 g 1     VENTOLIN  (90 Base) MCG/ACT inhaler INHALE 2 PUFFS INTO THE LUNGS EVERY 6 HOURS AS NEEDED FOR SHORTNESS OF BREATH , WHEEZING OR COUGH 18 g 3         PHYSICAL EXAM     Vital signs  /65 (BP Location: Left arm, Patient Position: Sitting)   Pulse 82   Ht 1.6 m (5' 3\")   Wt 105.7 kg (233 lb)   LMP 01/14/2016 (Approximate)   BMI 41.27 kg/m      Weight:   233 lbs 0 oz    Morbidly obese female who is alert and oriented vital signs were reviewed and documented in electronic medical record.  Neck supple.  Neurologically speech was normal cranial nerves II through XII are intact.  Motor strength 5/5 reflexes symmetrical sensation decreased to light touch pinprick below knees gait normal Romberg negative      PERTINENT DIAGNOSTIC STUDIES     Following studies were reviewed:     ECHOCARDIOGRAM 9/17/2019  The visual ejection fraction is estimated at 60-65%.  There is mild to moderate concentric left ventricular hypertrophy.  There is no pericardial effusion.  Normal left ventricular wall motion.  Technically difficult study     EMG 3/3/2025  This is a abnormal nerve conduction and EMG study of bilateral upper extremities that suggests median neuropathy at or distal to the wrist consistent with mild to moderate bilateral carpal tunnel syndrome     EMG 11/9/2023  This is a normal nerve conduction and EMG study of bilateral lower extremities.     PERTINENT LABS  Following labs were reviewed:  Ancillary Procedure on 02/21/2025   Component Date Value Ref Range Status     Date Time Interrogation Session 02/21/2025 [id number]   Final     Implantable Pulse Generator Manu* 02/21/2025 Medtronic   Final     Implantable Pulse Generator Model 02/21/2025 [id number] [place] XT DR MISHRA   Final     Implantable Pulse Generator Serial* 02/21/2025 [id number]   Final     " Type Interrogation Session 02/21/2025 Remote Scheduled   Final     Clinic Name 02/21/2025 [place]   Final     Implantable Pulse Generator Type 02/21/2025 Pacemaker   Final     Implantable Pulse Generator Implan* 02/21/2025 [id number]   Final     Implantable Lead  02/21/2025 Medtronic   Final     Implantable Lead Model 02/21/2025 3830 SelectSecure MRI SureScan   Final     Implantable Lead Serial Number 02/21/2025 [id number]   Final     Implantable Lead Implant Date 02/21/2025 [id number]   Final     Implantable Lead Polarity Type 02/21/2025 Bipolar Lead   Final     Implantable Lead Location Detail 1 02/21/2025 LBB   Final     Implantable Lead Location 02/21/2025 Right Ventricle   Final     Implantable Lead Connection Status 02/21/2025 Connected   Final     Implantable Lead  02/21/2025 Medtronic   Final     Implantable Lead Model 02/21/2025 5076 CapSureFix Novus MRI SureScan   Final     Implantable Lead Serial Number 02/21/2025 [id number]   Final     Implantable Lead Implant Date 02/21/2025 [id number]   Final     Implantable Lead Polarity Type 02/21/2025 Bipolar Lead   Final     Implantable Lead Location Detail 1 02/21/2025 UNKNOWN   Final     Implantable Lead Location 02/21/2025 Right Atrium   Final     Implantable Lead Connection Status 02/21/2025 Connected   Final     Junior Setting Mode (NBG Code) 02/21/2025 DDD   Final     Junior Setting Lower Rate Limit 02/21/2025 60  [beats]/min Final     Junior Setting Maximum Tracking Rate 02/21/2025 130  [beats]/min Final     Junior Setting Maximum Sensor Rate 02/21/2025 130  [beats]/min Final     Junior Setting Hysterisis Rate 02/21/2025 DISABLED   Final     Junior Setting JOSE R Delay Low 02/21/2025 150  ms Final     Junior Setting PAV Delay Low 02/21/2025 180  ms Final     Junior Setting AT Mode Switch Rate 02/21/2025 171  [beats]/min Final     Lead Channel Setting Sensing Polar* 02/21/2025 Bipolar   Final     Lead Channel Setting Sensing Anode* 02/21/2025 Right  Atrium   Final     Lead Channel Setting Sensing Anode* 02/21/2025 Ring   Final     Lead Channel Setting Sensing Catho* 02/21/2025 Right Atrium   Final     Lead Channel Setting Sensing Catho* 02/21/2025 Tip   Final     Lead Channel Setting Sensing Sensi* 02/21/2025 0.3  mV Final     Lead Channel Setting Sensing Polar* 02/21/2025 Bipolar   Final     Lead Channel Setting Sensing Anode* 02/21/2025 Right Ventricle   Final     Lead Channel Setting Sensing Anode* 02/21/2025 Ring   Final     Lead Channel Setting Sensing Catho* 02/21/2025 Right Ventricle   Final     Lead Channel Setting Sensing Catho* 02/21/2025 Tip   Final     Lead Channel Setting Sensing Sensi* 02/21/2025 0.9  mV Final     Lead Channel Setting Pacing Polari* 02/21/2025 Bipolar   Final     Lead Channel Setting Pacing Anode * 02/21/2025 Right Atrium   Final     Lead Channel Setting Pacing Anode * 02/21/2025 Ring   Final     Lead Channel Setting Sensing Catho* 02/21/2025 Right Atrium   Final     Lead Channel Setting Sensing Catho* 02/21/2025 Tip   Final     Lead Channel Setting Pacing Pulse * 02/21/2025 0.4  ms Final     Lead Channel Setting Pacing Amplit* 02/21/2025 1.5  V Final     Lead Channel Setting Pacing Captur* 02/21/2025 Adaptive   Final     Lead Channel Setting Pacing Polari* 02/21/2025 Bipolar   Final     Lead Channel Setting Pacing Anode * 02/21/2025 Right Ventricle   Final     Lead Channel Setting Pacing Anode * 02/21/2025 Ring   Final     Lead Channel Setting Sensing Catho* 02/21/2025 Right Ventricle   Final     Lead Channel Setting Sensing Catho* 02/21/2025 Tip   Final     Lead Channel Setting Pacing Pulse * 02/21/2025 0.4  ms Final     Lead Channel Setting Pacing Amplit* 02/21/2025 2  V Final     Lead Channel Setting Pacing Captur* 02/21/2025 Adaptive   Final     Zone Setting Type Category 02/21/2025 VF   Final     Zone Setting Vendor Type Category 02/21/2025 V High Rate   Final     Zone Setting Type Category 02/21/2025 VT   Final     Zone  Setting Vendor Type Category 02/21/2025 FastVT   Final     Zone Setting Type Category 02/21/2025 VT   Final     Zone Setting Vendor Type Category 02/21/2025 VT   Final     Zone Setting Type Category 02/21/2025 VT   Final     Zone Setting Vendor Type Category 02/21/2025 MonVT   Final     Zone Setting Status 02/21/2025 Monitor   Final     Zone Setting Detection Interval 02/21/2025 400  ms Final     Zone Setting Type Category 02/21/2025 ATRIAL_FIBRILLATION   Final     Zone Setting Vendor Type Category 02/21/2025 FastATAF   Final     Zone Setting Type Category 02/21/2025 AT/AF   Final     Zone Setting Status 02/21/2025 Monitor   Final     Zone Setting Detection Interval 02/21/2025 350  ms Final     Lead Channel Impedance Value 02/21/2025 475  ohm Final     Lead Channel Impedance Value 02/21/2025 285  ohm Final     Lead Channel Sensing Intrinsic Amp* 02/21/2025 3.25  mV Final     Lead Channel Sensing Intrinsic Amp* 02/21/2025 3.25  mV Final     Lead Channel Pacing Threshold Ampl* 02/21/2025 0.875  V Final     Lead Channel Pacing Threshold Puls* 02/21/2025 0.4  ms Final     Lead Channel Impedance Value 02/21/2025 627  ohm Final     Lead Channel Impedance Value 02/21/2025 418  ohm Final     Lead Channel Sensing Intrinsic Amp* 02/21/2025 31.625  mV Final     Lead Channel Sensing Intrinsic Amp* 02/21/2025 31.625  mV Final     Lead Channel Pacing Threshold Ampl* 02/21/2025 0.875  V Final     Lead Channel Pacing Threshold Puls* 02/21/2025 0.4  ms Final     Battery Date Time of Measurements 02/21/2025 20250221011502   Final     Battery Status 02/21/2025 OK   Final     Battery RRT Trigger 02/21/2025 2.625   Final     Battery Remaining Longevity 02/21/2025 153  mo Final     Battery Voltage 02/21/2025 3.19  V Final     Junior Statistic Date Time Start 02/21/2025 20241119081919   Final     Junior Statistic Date Time End 02/21/2025 20250221041320   Final     Junior Statistic RA Percent Paced 02/21/2025 1.9  % Final     Junior Statistic  RV Percent Paced 02/21/2025 99.84  % Final     Junior Statistic AP  Percent 02/21/2025 1.89  % Final     Junior Statistic AS  Percent 02/21/2025 97.95  % Final     Junior Statistic AP VS Percent 02/21/2025 0.01  % Final     Junior Statistic AS VS Percent 02/21/2025 0.15  % Final     Atrial Tachy Statistic Date Time S* 02/21/2025 20241119081919   Final     Atrial Tachy Statistic Date Time E* 02/21/2025 [id number]   Final     Atrial Tachy Statistic AT/AF Burde* 02/21/2025 0  % Final     Therapy Statistic Recent Date Time* 02/21/2025 95722998133960   Final     Therapy Statistic Recent Date Time* 02/21/2025 73921214568266   Final     Therapy Statistic Total  Date Time* 02/21/2025 68207817932953   Final     Therapy Statistic Total  Date Time* 02/21/2025 66266138628053   Final     Episode Statistic Recent Count 02/21/2025 0   Final     Episode Statistic Type Category 02/21/2025 AT/AF   Final     Episode Statistic Recent Count 02/21/2025 0   Final     Episode Statistic Type Category 02/21/2025 Patient Activated   Final     Episode Statistic Recent Count 02/21/2025 0   Final     Episode Statistic Type Category 02/21/2025 SVT   Final     Episode Statistic Recent Count 02/21/2025 0   Final     Episode Statistic Type Category 02/21/2025 VT   Final     Episode Statistic Recent Count 02/21/2025 0   Final     Episode Statistic Type Category 02/21/2025 VT   Final     Episode Statistic Recent Date Time* 02/21/2025 61379113485187   Final     Episode Statistic Recent Date Time* 02/21/2025 43431085129982   Final     Episode Statistic Recent Date Time* 02/21/2025 65409419262322   Final     Episode Statistic Recent Date Time* 02/21/2025 91035059257660   Final     Episode Statistic Recent Date Time* 02/21/2025 89505420065310   Final     Episode Statistic Recent Date Time* 02/21/2025 60785346665275   Final     Episode Statistic Recent Date Time* 02/21/2025 28782856240723   Final     Episode Statistic Recent Date Time* 02/21/2025  36401077562547   Final     Episode Statistic Recent Date Time* 02/21/2025 20241119081919   Final     Episode Statistic Recent Date Time* 02/21/2025 20250221041320   Final     Episode Statistic Total Count 02/21/2025 0   Final     Episode Statistic Type Category 02/21/2025 AT/AF   Final     Episode Statistic Total Count 02/21/2025 0   Final     Episode Statistic Type Category 02/21/2025 Patient Activated   Final     Episode Statistic Total Count 02/21/2025 0   Final     Episode Statistic Type Category 02/21/2025 SVT   Final     Episode Statistic Total Count 02/21/2025 0   Final     Episode Statistic Type Category 02/21/2025 VT   Final     Episode Statistic Total Count 02/21/2025 0   Final     Episode Statistic Type Category 02/21/2025 VT   Final     Episode Statistic Total Date Time * 02/21/2025 20241003132137   Final     Episode Statistic Total Date Time * 02/21/2025 20250221041320   Final     Episode Statistic Total Date Time * 02/21/2025 20241003132137   Final     Episode Statistic Total Date Time * 02/21/2025 20250221041320   Final     Episode Statistic Total Date Time * 02/21/2025 20241003132137   Final     Episode Statistic Total Date Time * 02/21/2025 20250221041320   Final     Episode Statistic Total Date Time * 02/21/2025 20241003132137   Final     Episode Statistic Total Date Time * 02/21/2025 20250221041320   Final     Episode Statistic Total Date Time * 02/21/2025 20241003132137   Final     Episode Statistic Total Date Time * 02/21/2025 20250221041320   Final   Lab on 02/05/2025   Component Date Value Ref Range Status     Sodium 02/05/2025 139  135 - 145 mmol/L Final     Potassium 02/05/2025 4.7  3.4 - 5.3 mmol/L Final     Chloride 02/05/2025 101  98 - 107 mmol/L Final     Carbon Dioxide (CO2) 02/05/2025 31 (H)  22 - 29 mmol/L Final     Anion Gap 02/05/2025 7  7 - 15 mmol/L Final     Urea Nitrogen 02/05/2025 15.2  8.0 - 23.0 mg/dL Final     Creatinine 02/05/2025 0.64  0.51 - 0.95 mg/dL Final     GFR  Estimate 02/05/2025 >90  >60 mL/min/1.73m2 Final     Calcium 02/05/2025 10.0  8.8 - 10.4 mg/dL Final     Glucose 02/05/2025 168 (H)  70 - 99 mg/dL Final     Patient Fasting > 8hrs? 02/05/2025 No   Final     Cholesterol 02/05/2025 132  <200 mg/dL Final     Triglycerides 02/05/2025 187 (H)  <150 mg/dL Final     Direct Measure HDL 02/05/2025 46 (L)  >=50 mg/dL Final     LDL Cholesterol Calculated 02/05/2025 49  <100 mg/dL Final     Non HDL Cholesterol 02/05/2025 86  <130 mg/dL Final     Patient Fasting > 8hrs? 02/05/2025 No   Final     Creatinine Urine mg/dL 02/05/2025 45.4  mg/dL Final     Albumin Urine mg/L 02/05/2025 20.7  mg/L Final     Albumin Urine mg/g Cr 02/05/2025 45.59 (H)  0.00 - 25.00 mg/g Cr Final   Admission on 01/15/2025, Discharged on 01/15/2025   Component Date Value Ref Range Status     Sodium 01/15/2025 139  135 - 145 mmol/L Final     Potassium 01/15/2025 3.9  3.4 - 5.3 mmol/L Final     Carbon Dioxide (CO2) 01/15/2025 25  22 - 29 mmol/L Final     Anion Gap 01/15/2025 15  7 - 15 mmol/L Final     Urea Nitrogen 01/15/2025 13.8  8.0 - 23.0 mg/dL Final     Creatinine 01/15/2025 0.56  0.51 - 0.95 mg/dL Final     GFR Estimate 01/15/2025 >90  >60 mL/min/1.73m2 Final     Calcium 01/15/2025 10.0  8.8 - 10.4 mg/dL Final     Chloride 01/15/2025 99  98 - 107 mmol/L Final     Glucose 01/15/2025 61 (L)  70 - 99 mg/dL Final     Alkaline Phosphatase 01/15/2025 85  40 - 150 U/L Final     AST 01/15/2025 20  0 - 45 U/L Final     ALT 01/15/2025 19  0 - 50 U/L Final     Protein Total 01/15/2025 7.4  6.4 - 8.3 g/dL Final     Albumin 01/15/2025 4.4  3.5 - 5.2 g/dL Final     Bilirubin Total 01/15/2025 0.3  <=1.2 mg/dL Final     WBC Count 01/15/2025 12.4 (H)  4.0 - 11.0 10e3/uL Final     RBC Count 01/15/2025 5.42 (H)  3.80 - 5.20 10e6/uL Final     Hemoglobin 01/15/2025 15.3  11.7 - 15.7 g/dL Final     Hematocrit 01/15/2025 46.4  35.0 - 47.0 % Final     MCV 01/15/2025 86  78 - 100 fL Final     MCH 01/15/2025 28.2  26.5 - 33.0  pg Final     MCHC 01/15/2025 33.0  31.5 - 36.5 g/dL Final     RDW 01/15/2025 14.4  10.0 - 15.0 % Final     Platelet Count 01/15/2025 458 (H)  150 - 450 10e3/uL Final     % Neutrophils 01/15/2025 75  % Final     % Lymphocytes 01/15/2025 16  % Final     % Monocytes 01/15/2025 6  % Final     % Eosinophils 01/15/2025 2  % Final     % Basophils 01/15/2025 1  % Final     % Immature Granulocytes 01/15/2025 0  % Final     NRBCs per 100 WBC 01/15/2025 0  <1 /100 Final     Absolute Neutrophils 01/15/2025 9.3 (H)  1.6 - 8.3 10e3/uL Final     Absolute Lymphocytes 01/15/2025 2.0  0.8 - 5.3 10e3/uL Final     Absolute Monocytes 01/15/2025 0.7  0.0 - 1.3 10e3/uL Final     Absolute Eosinophils 01/15/2025 0.2  0.0 - 0.7 10e3/uL Final     Absolute Basophils 01/15/2025 0.1  0.0 - 0.2 10e3/uL Final     Absolute Immature Granulocytes 01/15/2025 0.0  <=0.4 10e3/uL Final     Absolute NRBCs 01/15/2025 0.0  10e3/uL Final   Lab on 12/19/2024   Component Date Value Ref Range Status     Estimated Average Glucose 12/19/2024 143 (H)  <117 mg/dL Final     Hemoglobin A1C 12/19/2024 6.6 (H)  0.0 - 5.6 % Final         Total time spent for face to face visit, reviewing labs/imaging studies, counseling and coordination of care was: 30 Minutes spent on the date of the encounter doing chart review, review of outside records, review of test results, interpretation of tests, patient visit, and documentation     The longitudinal plan of care for the diagnosis(es)/condition(s) as documented were addressed during this visit. Due to the added complexity in care, I will continue to support Melyssa in the subsequent management and with ongoing continuity of care.    This note was dictated using voice recognition software.  Any grammatical or context distortions are unintentional and inherent to the software.    No orders of the defined types were placed in this encounter.     New Prescriptions    No medications on file     Modified Medications    No medications on  file                 Again, thank you for allowing me to participate in the care of your patient.        Sincerely,        Jabari Santos MD    Electronically signed

## 2025-03-06 ENCOUNTER — MYC MEDICAL ADVICE (OUTPATIENT)
Dept: FAMILY MEDICINE | Facility: CLINIC | Age: 65
End: 2025-03-06

## 2025-03-06 ENCOUNTER — OFFICE VISIT (OUTPATIENT)
Dept: OTOLARYNGOLOGY | Facility: CLINIC | Age: 65
End: 2025-03-06
Attending: OPHTHALMOLOGY
Payer: COMMERCIAL

## 2025-03-06 VITALS
HEART RATE: 72 BPM | TEMPERATURE: 97.6 F | SYSTOLIC BLOOD PRESSURE: 131 MMHG | WEIGHT: 233 LBS | DIASTOLIC BLOOD PRESSURE: 81 MMHG | BODY MASS INDEX: 41.27 KG/M2

## 2025-03-06 DIAGNOSIS — J34.89 INTRANASAL SYNECHIAE: Primary | ICD-10-CM

## 2025-03-06 ASSESSMENT — PAIN SCALES - GENERAL: PAINLEVEL_OUTOF10: NO PAIN (0)

## 2025-03-06 NOTE — LETTER
3/6/2025      Bria Davis  [id number] [facility] [id number]      Dear Colleague,    Thank you for referring your patient, Bria Davis, to [facility]. Please see a copy of my visit note below.    Bria Davis is a 64 year old female  Chief Complaint: Question of scarring in the left nasal passage from previous sinus surgery. Scheduled to undergo  surgery for epiphora tomorrow  History of Present Illness  Location: nasal  Quality: scarring  Severity:following FESS  Duration: 2005    Past Medical History -   Patient Active Problem List   Diagnosis     Mild intermittent asthma     Esophageal reflux     Allergic rhinitis     Restless legs syndrome (RLS)     Enthesopathy     Lumbar radiculopathy     Microalbuminuria     Hyperlipidemia LDL goal <70     Type 2 diabetes mellitus with kidney complication, with long-term current use of insulin (H)     Morbid obesity (H)     Essential hypertension with goal blood pressure less than 140/90     Nonobstructive atherosclerosis of coronary artery     Chronic kidney disease, stage 2 (mild)     Atypical ductal hyperplasia of left breast     Breast abscess     Ductal carcinoma in situ (DCIS) of left breast     AV heart block       Current Medications -   Current Outpatient Medications:      anastrozole (ARIMIDEX) 1 MG tablet, Take 1 tablet (1 mg) by mouth daily., Disp: 90 tablet, Rfl: 3     Ascorbic Acid (VITAMIN C) 500 MG CAPS, Take 1 tablet by mouth every evening, Disp: , Rfl:      aspirin (ASA) 81 MG EC tablet, Take 81 mg by mouth every evening, Disp: 1 tablet, Rfl: 0     bisacodyl (DULCOLAX) 5 MG EC tablet, 2 days prior to procedure, take 2 tablets at 4 pm. 1 day prior to procedure, take 2 tablets at 4 pm. For additional instructions refer to your colonoscopy prep instructions., Disp: 4 tablet, Rfl: 0     blood glucose (ACCU-CHEK FASTCLIX) lancing device, USE TO TEST BLOOD SUGAR AS DIRECTED, Disp: 1 kit, Rfl: 0     blood glucose  monitoring (ACCU-CHEK FASTCLIX) lancets, USE TO TEST BLOOD SUGAR 4 TIMES DAILY OR AS DIRECTED., Disp: 204 each, Rfl: 1     Calcium Carb-Cholecalciferol (CALCIUM-VITAMIN D) 600-400 MG-UNIT TABS, Take 1 tablet by mouth every evening, Disp: , Rfl:      clobetasol (TEMOVATE) 0.05 % external ointment, Apply topically 2 times daily., Disp: 60 g, Rfl: 1     CONTOUR NEXT TEST test strip, USE TO TEST BLOOD SUGAR 4 TIMES DAILY OR AS DIRECTED, Disp: 400 strip, Rfl: 1     esomeprazole (NEXIUM) 40 MG DR capsule, TAKE ONE CAPSULE BY MOUTH EVERY MORNING BEFORE BREAKFAST, Disp: 90 capsule, Rfl: 3     ferrous sulfate 140 (45 Fe) MG TBCR CR tablet, Take 140 mg by mouth 2 times daily, Disp: , Rfl:      fluconazole (DIFLUCAN) 150 MG tablet, TAKE 1 TABLET (150 MG) BY MOUTH EVERY 3 DAYS FOR 9 DAYS, THEN 1 TABLET (150 MG) ONCE A WEEK., Disp: 28 tablet, Rfl: 0     fluticasone-salmeterol (ADVAIR) 250-50 MCG/ACT inhaler, Inhale 1 puff into the lungs every 12 hours., Disp: 180 each, Rfl: 3     FT NASAL DECONGESTANT MAX STR 30 MG tablet, TAKE TWO TABLETS BY MOUTH TWICE A DAY, Disp: 120 tablet, Rfl: 3     furosemide (LASIX) 20 MG tablet, Take 1 tablet (20 mg) by mouth daily., Disp: 90 tablet, Rfl: 1     gabapentin (NEURONTIN) 300 MG capsule, Take 1 capsule (300 mg) by mouth 2 times daily., Disp: 180 capsule, Rfl: 3     insulin aspart (NOVOLOG FLEXPEN) 100 UNIT/ML pen, 32 units before breakfast, 32 units before lunch, 32 units before dinner, Disp: 90 mL, Rfl: 3     insulin pen needle (BD PEDRO LUIS U/F) 32G X 4 MM miscellaneous, USE FOUR PEN NEEDLES DAILY OR AS DIRECTED., Disp: 400 each, Rfl: 3     JARDIANCE 10 MG TABS tablet, TAKE ONE TABLET BY MOUTH ONCE DAILY, Disp: 90 tablet, Rfl: 1     losartan (COZAAR) 100 MG tablet, TAKE 1 TABLET (100 MG) BY MOUTH EVERY MORNING, Disp: 90 tablet, Rfl: 3     metFORMIN (GLUCOPHAGE XR) 500 MG 24 hr tablet, Take 2 tablets (1,000 mg) by mouth 2 times daily (with meals)., Disp: 360 tablet, Rfl: 1     metoclopramide  (REGLAN) 10 MG tablet, TAKE ONE TABLET BY MOUTH TWICE A DAY, Disp: 180 tablet, Rfl: 1     metoprolol succinate ER (TOPROL XL) 25 MG 24 hr tablet, Take 1 tablet (25 mg) by mouth daily, Disp: 90 tablet, Rfl: 3     montelukast (SINGULAIR) 10 MG tablet, Take 1 tablet (10 mg) by mouth at bedtime., Disp: 90 tablet, Rfl: 3     Multiple Vitamins-Minerals (MULTIVITAMIN ADULTS 50+) TABS, Take 1 tablet by mouth every morning, Disp: , Rfl:      OZEMPIC, 2 MG/DOSE, 8 MG/3ML pen, INJECT 2MG UNDER THE SKIN EVERY 7 DAYS, Disp: 9 mL, Rfl: 1     rosuvastatin (CRESTOR) 10 MG tablet, Take 10 mg by mouth every evening., Disp: , Rfl:      TOUJEO MAX SOLOSTAR 300 UNIT/ML (2 units dial) pen, INJECT 100 UNITS UNDER THE SKIN DAILY, Disp: 42 mL, Rfl: 1     triamcinolone (KENALOG) 0.1 % external cream, Apply topically 2 times daily, Disp: 80 g, Rfl: 1     VENTOLIN  (90 Base) MCG/ACT inhaler, INHALE 2 PUFFS INTO THE LUNGS EVERY 6 HOURS AS NEEDED FOR SHORTNESS OF BREATH , WHEEZING OR COUGH, Disp: 18 g, Rfl: 3     polyethylene glycol (GOLYTELY) 236 g suspension, 2 days prior at 5pm, mix and drink half of a jug of Golytely. Drink an 8 oz. glass of Golytely every 15 minutes until half of the jug is gone. Place remainder of Golytely in the refrigerator. 1 day prior at 5 pm, drink the 2nd half of a jug of Golytely bowel prep. 6 hours before your check-in time, drink an 8 oz. glass of Golytely every 15 minutes until half of the 2nd jug of Golytely is gone. Discard remainder of second jug. (Patient not taking: Reported on 3/6/2025), Disp: 8000 mL, Rfl: 0    Allergies -   Allergies   Allergen Reactions     Bactrim [Sulfamethoxazole-Trimethoprim] Rash     Full body rash     Oxycodone Itching     Facial itching      Lisinopril Cough       Social History -   Social History     Socioeconomic History     Marital status:      Spouse name: None     Number of children: None     Years of education: None     Highest education level: None   Tobacco  Use     Smoking status: Never     Smokeless tobacco: Never   Vaping Use     Vaping status: Never Used   Substance and Sexual Activity     Alcohol use: Not Currently     Drug use: No     Sexual activity: Yes     Partners: Male     Birth control/protection: Male Surgical     Comment:  vasectomy   Other Topics Concern     Parent/sibling w/ CABG, MI or angioplasty before 65F 55M? Yes     Social Drivers of Health     Financial Resource Strain: Low Risk  (10/3/2024)    Financial Resource Strain      Within the past 12 months, have you or your family members you live with been unable to get utilities (heat, electricity) when it was really needed?: No   Food Insecurity: Low Risk  (10/3/2024)    Food Insecurity      Within the past 12 months, did you worry that your food would run out before you got money to buy more?: No      Within the past 12 months, did the food you bought just not last and you didn t have money to get more?: No   Transportation Needs: Low Risk  (10/3/2024)    Transportation Needs      Within the past 12 months, has lack of transportation kept you from medical appointments, getting your medicines, non-medical meetings or appointments, work, or from getting things that you need?: No   Physical Activity: Unknown (9/13/2024)    Exercise Vital Sign      Days of Exercise per Week: 0 days      Minutes of Exercise per Session: Patient declined   Stress: No Stress Concern Present (9/13/2024)    [employer] [employer] - Occupational Stress Questionnaire      Feeling of Stress : Only a little   Social Connections: Unknown (9/13/2024)    Social Connection and Isolation Panel [NHANES]      Frequency of Social Gatherings with Friends and Family: More than three times a week   Interpersonal Safety: High Risk (10/2/2024)    Interpersonal Safety      Do you feel physically and emotionally safe where you currently live?: No      Within the past 12 months, have you been hit, slapped, kicked or  otherwise physically hurt by someone?: No      Within the past 12 months, have you been humiliated or emotionally abused in other ways by your partner or ex-partner?: No   Housing Stability: Low Risk  (10/3/2024)    Housing Stability      Do you have housing? : Yes      Are you worried about losing your housing?: No       Family History -   Family History   Problem Relation Age of Onset     Hypertension Mother      Diabetes Mother      Respiratory Mother         asthma-COPD     Hypertension Father      Heart Disease Father      Cerebrovascular Disease Father      Cardiovascular Father      Depression Sister      Respiratory Sister         copd     Chronic Obstructive Pulmonary Disease Sister      Depression Sister      Chronic Obstructive Pulmonary Disease Sister      Anxiety Disorder Sister      Depression Sister      Cancer Brother      Diabetes Brother      Respiratory Brother         copd     Chronic Obstructive Pulmonary Disease Brother      Chronic Obstructive Pulmonary Disease Brother      Kidney failure Brother      Depression Brother      Asthma Brother      Breast Cancer Maternal Grandmother      Diabetes Son      Anesthesia Reaction No family hx of      Clotting Disorder No family hx of      Glaucoma No family hx of      Macular Degeneration No family hx of        Review of Systems:   !.  Weight Loss: No   2. Difficulty Breathing: No   3. Difficulty Swallowing: No   4. Pain: No    Physical Exam  B/P: 131/81, T: 97.6, P: 72, R: Data Unavailable  Vitals: /81 (BP Location: Left arm, Patient Position: Chair, Cuff Size: Adult Large)   Pulse 72   Temp 97.6  F (36.4  C) (Tympanic)   Wt 105.7 kg (233 lb)   LMP 01/14/2016 (Approximate)   BMI 41.27 kg/m    BMI= Body mass index is 41.27 kg/m .    General  Appearance - Normal  Head/Face/Scalp:    Skin - Normal    Facial Palpation - Normal    Facial Strength - Normal  Ears:    Pinna - Normal    Canal - Normal   Tympanic membrane - Normal  Nose:    External  - Normal    Septum - Normal    Turbinates - scarring between left inferior turbinate and septum    Middle meatus - lwft is scarred from previous FESS  Oral Cavity:    Lips - Normal    Floor of Mouth - Normal    Gingiva - Normal    Tongue - Normal    Buccal - Normal    Palate - Normal  Nasopharynx:    Oropharynx:    Tonsils - Normal    Tongue base - Normal    Soft palate - Normal    Posterior pharyngeal wall - Normal  Hypopharynx:  Larynx:    Epiglottis -     Aryepiglottic folds -     Arytenoids -     False vocal cords -     True vocal cords -  Neck Masses - No  Neck lymphatics - no lymphadenopathy  Thyroid - Normal  Salivary glands - Normal    Audiogram - not applicable  Radiology - not applicable   Reports:   View films:  Procedures -Procedure: Flexible Endoscopy  Indication: Nasal scarring    Fiberoptic examination was performed using flexible laryngoscope.  Both sides of the nose were sprayed with a mixture of lidocaine and neosynephrine.  The flexible nasopharyngoscope scope was passed through the nasal cavity.  The nasal cavity showed synechia of the left inferior and middle turbinates      Patient Education:     A/P - Bria Davis is a 64 year old female  Medical Decision Making 1. Scarring of left inferior and middle turbinates pest FESS - nasal synechiae      Again, thank you for allowing me to participate in the care of your patient.        Sincerely,        Shamir Sears MD    Electronically signed

## 2025-03-06 NOTE — PROGRESS NOTES
Bria Davis is a 64 year old female  Chief Complaint: Question of scarring in the left nasal passage from previous sinus surgery. Scheduled to undergo  surgery for epiphora tomorrow  History of Present Illness  Location: nasal  Quality: scarring  Severity:following FESS  Duration: 2005    Past Medical History -   Patient Active Problem List   Diagnosis    Mild intermittent asthma    Esophageal reflux    Allergic rhinitis    Restless legs syndrome (RLS)    Enthesopathy    Lumbar radiculopathy    Microalbuminuria    Hyperlipidemia LDL goal <70    Type 2 diabetes mellitus with kidney complication, with long-term current use of insulin (H)    Morbid obesity (H)    Essential hypertension with goal blood pressure less than 140/90    Nonobstructive atherosclerosis of coronary artery    Chronic kidney disease, stage 2 (mild)    Atypical ductal hyperplasia of left breast    Breast abscess    Ductal carcinoma in situ (DCIS) of left breast    AV heart block       Current Medications -   Current Outpatient Medications:     anastrozole (ARIMIDEX) 1 MG tablet, Take 1 tablet (1 mg) by mouth daily., Disp: 90 tablet, Rfl: 3    Ascorbic Acid (VITAMIN C) 500 MG CAPS, Take 1 tablet by mouth every evening, Disp: , Rfl:     aspirin (ASA) 81 MG EC tablet, Take 81 mg by mouth every evening, Disp: 1 tablet, Rfl: 0    bisacodyl (DULCOLAX) 5 MG EC tablet, 2 days prior to procedure, take 2 tablets at 4 pm. 1 day prior to procedure, take 2 tablets at 4 pm. For additional instructions refer to your colonoscopy prep instructions., Disp: 4 tablet, Rfl: 0    blood glucose (ACCU-CHEK FASTCLIX) lancing device, USE TO TEST BLOOD SUGAR AS DIRECTED, Disp: 1 kit, Rfl: 0    blood glucose monitoring (ACCU-CHEK FASTCLIX) lancets, USE TO TEST BLOOD SUGAR 4 TIMES DAILY OR AS DIRECTED., Disp: 204 each, Rfl: 1    Calcium Carb-Cholecalciferol (CALCIUM-VITAMIN D) 600-400 MG-UNIT TABS, Take 1 tablet by mouth every evening, Disp: , Rfl:     clobetasol  (TEMOVATE) 0.05 % external ointment, Apply topically 2 times daily., Disp: 60 g, Rfl: 1    CONTOUR NEXT TEST test strip, USE TO TEST BLOOD SUGAR 4 TIMES DAILY OR AS DIRECTED, Disp: 400 strip, Rfl: 1    esomeprazole (NEXIUM) 40 MG DR capsule, TAKE ONE CAPSULE BY MOUTH EVERY MORNING BEFORE BREAKFAST, Disp: 90 capsule, Rfl: 3    ferrous sulfate 140 (45 Fe) MG TBCR CR tablet, Take 140 mg by mouth 2 times daily, Disp: , Rfl:     fluconazole (DIFLUCAN) 150 MG tablet, TAKE 1 TABLET (150 MG) BY MOUTH EVERY 3 DAYS FOR 9 DAYS, THEN 1 TABLET (150 MG) ONCE A WEEK., Disp: 28 tablet, Rfl: 0    fluticasone-salmeterol (ADVAIR) 250-50 MCG/ACT inhaler, Inhale 1 puff into the lungs every 12 hours., Disp: 180 each, Rfl: 3    FT NASAL DECONGESTANT MAX STR 30 MG tablet, TAKE TWO TABLETS BY MOUTH TWICE A DAY, Disp: 120 tablet, Rfl: 3    furosemide (LASIX) 20 MG tablet, Take 1 tablet (20 mg) by mouth daily., Disp: 90 tablet, Rfl: 1    gabapentin (NEURONTIN) 300 MG capsule, Take 1 capsule (300 mg) by mouth 2 times daily., Disp: 180 capsule, Rfl: 3    insulin aspart (NOVOLOG FLEXPEN) 100 UNIT/ML pen, 32 units before breakfast, 32 units before lunch, 32 units before dinner, Disp: 90 mL, Rfl: 3    insulin pen needle (BD PEDRO LUIS U/F) 32G X 4 MM miscellaneous, USE FOUR PEN NEEDLES DAILY OR AS DIRECTED., Disp: 400 each, Rfl: 3    JARDIANCE 10 MG TABS tablet, TAKE ONE TABLET BY MOUTH ONCE DAILY, Disp: 90 tablet, Rfl: 1    losartan (COZAAR) 100 MG tablet, TAKE 1 TABLET (100 MG) BY MOUTH EVERY MORNING, Disp: 90 tablet, Rfl: 3    metFORMIN (GLUCOPHAGE XR) 500 MG 24 hr tablet, Take 2 tablets (1,000 mg) by mouth 2 times daily (with meals)., Disp: 360 tablet, Rfl: 1    metoclopramide (REGLAN) 10 MG tablet, TAKE ONE TABLET BY MOUTH TWICE A DAY, Disp: 180 tablet, Rfl: 1    metoprolol succinate ER (TOPROL XL) 25 MG 24 hr tablet, Take 1 tablet (25 mg) by mouth daily, Disp: 90 tablet, Rfl: 3    montelukast (SINGULAIR) 10 MG tablet, Take 1 tablet (10 mg) by  mouth at bedtime., Disp: 90 tablet, Rfl: 3    Multiple Vitamins-Minerals (MULTIVITAMIN ADULTS 50+) TABS, Take 1 tablet by mouth every morning, Disp: , Rfl:     OZEMPIC, 2 MG/DOSE, 8 MG/3ML pen, INJECT 2MG UNDER THE SKIN EVERY 7 DAYS, Disp: 9 mL, Rfl: 1    rosuvastatin (CRESTOR) 10 MG tablet, Take 10 mg by mouth every evening., Disp: , Rfl:     TOUJEO MAX SOLOSTAR 300 UNIT/ML (2 units dial) pen, INJECT 100 UNITS UNDER THE SKIN DAILY, Disp: 42 mL, Rfl: 1    triamcinolone (KENALOG) 0.1 % external cream, Apply topically 2 times daily, Disp: 80 g, Rfl: 1    VENTOLIN  (90 Base) MCG/ACT inhaler, INHALE 2 PUFFS INTO THE LUNGS EVERY 6 HOURS AS NEEDED FOR SHORTNESS OF BREATH , WHEEZING OR COUGH, Disp: 18 g, Rfl: 3    polyethylene glycol (GOLYTELY) 236 g suspension, 2 days prior at 5pm, mix and drink half of a jug of Golytely. Drink an 8 oz. glass of Golytely every 15 minutes until half of the jug is gone. Place remainder of Golytely in the refrigerator. 1 day prior at 5 pm, drink the 2nd half of a jug of Golytely bowel prep. 6 hours before your check-in time, drink an 8 oz. glass of Golytely every 15 minutes until half of the 2nd jug of Golytely is gone. Discard remainder of second jug. (Patient not taking: Reported on 3/6/2025), Disp: 8000 mL, Rfl: 0    Allergies -   Allergies   Allergen Reactions    Bactrim [Sulfamethoxazole-Trimethoprim] Rash     Full body rash    Oxycodone Itching     Facial itching     Lisinopril Cough       Social History -   Social History     Socioeconomic History    Marital status:      Spouse name: None    Number of children: None    Years of education: None    Highest education level: None   Tobacco Use    Smoking status: Never    Smokeless tobacco: Never   Vaping Use    Vaping status: Never Used   Substance and Sexual Activity    Alcohol use: Not Currently    Drug use: No    Sexual activity: Yes     Partners: Male     Birth control/protection: Male Surgical     Comment:   vasectomy   Other Topics Concern    Parent/sibling w/ CABG, MI or angioplasty before 65F 55M? Yes     Social Drivers of Health     Financial Resource Strain: Low Risk  (10/3/2024)    Financial Resource Strain     Within the past 12 months, have you or your family members you live with been unable to get utilities (heat, electricity) when it was really needed?: No   Food Insecurity: Low Risk  (10/3/2024)    Food Insecurity     Within the past 12 months, did you worry that your food would run out before you got money to buy more?: No     Within the past 12 months, did the food you bought just not last and you didn t have money to get more?: No   Transportation Needs: Low Risk  (10/3/2024)    Transportation Needs     Within the past 12 months, has lack of transportation kept you from medical appointments, getting your medicines, non-medical meetings or appointments, work, or from getting things that you need?: No   Physical Activity: Unknown (9/13/2024)    Exercise Vital Sign     Days of Exercise per Week: 0 days     Minutes of Exercise per Session: Patient declined   Stress: No Stress Concern Present (9/13/2024)    [employer] [place] [employer] [place] [employer] - Occupational Stress Questionnaire     Feeling of Stress : Only a little   Social Connections: Unknown (9/13/2024)    Social Connection and Isolation Panel [NHANES]     Frequency of Social Gatherings with Friends and Family: More than three times a week   Interpersonal Safety: High Risk (10/2/2024)    Interpersonal Safety     Do you feel physically and emotionally safe where you currently live?: No     Within the past 12 months, have you been hit, slapped, kicked or otherwise physically hurt by someone?: No     Within the past 12 months, have you been humiliated or emotionally abused in other ways by your partner or ex-partner?: No   Housing Stability: Low Risk  (10/3/2024)    Housing Stability     Do you have housing? : Yes     Are you worried about losing your  housing?: No       Family History -   Family History   Problem Relation Age of Onset    Hypertension Mother     Diabetes Mother     Respiratory Mother         asthma-COPD    Hypertension Father     Heart Disease Father     Cerebrovascular Disease Father     Cardiovascular Father     Depression Sister     Respiratory Sister         copd    Chronic Obstructive Pulmonary Disease Sister     Depression Sister     Chronic Obstructive Pulmonary Disease Sister     Anxiety Disorder Sister     Depression Sister     Cancer Brother     Diabetes Brother     Respiratory Brother         copd    Chronic Obstructive Pulmonary Disease Brother     Chronic Obstructive Pulmonary Disease Brother     Kidney failure Brother     Depression Brother     Asthma Brother     Breast Cancer Maternal Grandmother     Diabetes Son     Anesthesia Reaction No family hx of     Clotting Disorder No family hx of     Glaucoma No family hx of     Macular Degeneration No family hx of        Review of Systems:   !.  Weight Loss: No   2. Difficulty Breathing: No   3. Difficulty Swallowing: No   4. Pain: No    Physical Exam  B/P: 131/81, T: 97.6, P: 72, R: Data Unavailable  Vitals: /81 (BP Location: Left arm, Patient Position: Chair, Cuff Size: Adult Large)   Pulse 72   Temp 97.6  F (36.4  C) (Tympanic)   Wt 105.7 kg (233 lb)   LMP 01/14/2016 (Approximate)   BMI 41.27 kg/m    BMI= Body mass index is 41.27 kg/m .    General  Appearance - Normal  Head/Face/Scalp:    Skin - Normal    Facial Palpation - Normal    Facial Strength - Normal  Ears:    Pinna - Normal    Canal - Normal   Tympanic membrane - Normal  Nose:    External - Normal    Septum - Normal    Turbinates - scarring between left inferior turbinate and septum    Middle meatus - lwft is scarred from previous FESS  Oral Cavity:    Lips - Normal    Floor of Mouth - Normal    Gingiva - Normal    Tongue - Normal    Buccal - Normal    Palate - Normal  Nasopharynx:    Oropharynx:    Tonsils -  Normal    Tongue base - Normal    Soft palate - Normal    Posterior pharyngeal wall - Normal  Hypopharynx:  Larynx:    Epiglottis -     Aryepiglottic folds -     Arytenoids -     False vocal cords -     True vocal cords -  Neck Masses - No  Neck lymphatics - no lymphadenopathy  Thyroid - Normal  Salivary glands - Normal    Audiogram - not applicable  Radiology - not applicable   Reports:   View films:  Procedures -Procedure: Flexible Endoscopy  Indication: Nasal scarring    Fiberoptic examination was performed using flexible laryngoscope.  Both sides of the nose were sprayed with a mixture of lidocaine and neosynephrine.  The flexible nasopharyngoscope scope was passed through the nasal cavity.  The nasal cavity showed synechia of the left inferior and middle turbinates      Patient Education:     A/P - Bria Davis is a 64 year old female  Medical Decision Making 1. Scarring of left inferior and middle turbinates pest FESS - nasal synechiae

## 2025-03-06 NOTE — TELEPHONE ENCOUNTER
Please advise on Ozempic and Jardiance pre colonoscopy on 3/14    Please see my chart message.       Chante Fuller RN on 3/6/2025 at 2:18 PM

## 2025-03-06 NOTE — NURSING NOTE
"Initial /81 (BP Location: Left arm, Patient Position: Chair, Cuff Size: Adult Large)   Pulse 72   Temp 97.6  F (36.4  C) (Tympanic)   Wt 105.7 kg (233 lb)   LMP 01/14/2016 (Approximate)   BMI 41.27 kg/m   Estimated body mass index is 41.27 kg/m  as calculated from the following:    Height as of 3/3/25: 1.6 m (5' 3\").    Weight as of this encounter: 105.7 kg (233 lb). .  Aixa Urbano LPN    "

## 2025-03-07 ENCOUNTER — HOSPITAL ENCOUNTER (OUTPATIENT)
Facility: CLINIC | Age: 65
Discharge: HOME | End: 2025-03-07
Attending: OPHTHALMOLOGY | Admitting: OPHTHALMOLOGY
Payer: COMMERCIAL

## 2025-03-07 VITALS
OXYGEN SATURATION: 95 % | RESPIRATION RATE: 16 BRPM | BODY MASS INDEX: 41.14 KG/M2 | WEIGHT: 232.2 LBS | HEART RATE: 68 BPM | DIASTOLIC BLOOD PRESSURE: 99 MMHG | TEMPERATURE: 96.8 F | HEIGHT: 63 IN | SYSTOLIC BLOOD PRESSURE: 165 MMHG

## 2025-03-07 DIAGNOSIS — Z98.890 POSTOPERATIVE EYE STATE: Primary | ICD-10-CM

## 2025-03-07 LAB
GLUCOSE BLDC GLUCOMTR-MCNC: 163 MG/DL (ref 70–99)
GLUCOSE BLDC GLUCOMTR-MCNC: 189 MG/DL (ref 70–99)

## 2025-03-07 PROCEDURE — 360N000076 HC SURGERY LEVEL 3, PER MIN: Performed by: OPHTHALMOLOGY

## 2025-03-07 PROCEDURE — 250N000013 HC RX MED GY IP 250 OP 250 PS 637: Performed by: ANESTHESIOLOGY

## 2025-03-07 PROCEDURE — 67917 REPAIR EYELID DEFECT: CPT | Mod: E2 | Performed by: OPHTHALMOLOGY

## 2025-03-07 PROCEDURE — L8610 OCULAR IMPLANT: HCPCS | Performed by: OPHTHALMOLOGY

## 2025-03-07 PROCEDURE — 82962 GLUCOSE BLOOD TEST: CPT

## 2025-03-07 PROCEDURE — 710N000009 HC RECOVERY PHASE 1, LEVEL 1, PER MIN: Performed by: OPHTHALMOLOGY

## 2025-03-07 PROCEDURE — 250N000009 HC RX 250: Performed by: OPHTHALMOLOGY

## 2025-03-07 PROCEDURE — 370N000017 HC ANESTHESIA TECHNICAL FEE, PER MIN: Performed by: OPHTHALMOLOGY

## 2025-03-07 PROCEDURE — 68440 SNIP INC LACRIMAL PUNCTUM: CPT | Mod: 50 | Performed by: OPHTHALMOLOGY

## 2025-03-07 PROCEDURE — 250N000011 HC RX IP 250 OP 636: Mod: JZ | Performed by: OPHTHALMOLOGY

## 2025-03-07 PROCEDURE — 68815 PROBE NASOLACRIMAL DUCT: CPT | Mod: 50 | Performed by: OPHTHALMOLOGY

## 2025-03-07 PROCEDURE — 710N000012 HC RECOVERY PHASE 2, PER MINUTE: Performed by: OPHTHALMOLOGY

## 2025-03-07 PROCEDURE — 258N000003 HC RX IP 258 OP 636: Performed by: ANESTHESIOLOGY

## 2025-03-07 PROCEDURE — 272N000001 HC OR GENERAL SUPPLY STERILE: Performed by: OPHTHALMOLOGY

## 2025-03-07 PROCEDURE — 999N000141 HC STATISTIC PRE-PROCEDURE NURSING ASSESSMENT: Performed by: OPHTHALMOLOGY

## 2025-03-07 DEVICE — EYE IMP KIT LACRIMAL INTUBATION MINI MONOKA S1.1500: Type: IMPLANTABLE DEVICE | Site: LACRIMAL DUCT | Status: FUNCTIONAL

## 2025-03-07 RX ORDER — SODIUM CHLORIDE, SODIUM LACTATE, POTASSIUM CHLORIDE, CALCIUM CHLORIDE 600; 310; 30; 20 MG/100ML; MG/100ML; MG/100ML; MG/100ML
INJECTION, SOLUTION INTRAVENOUS CONTINUOUS
Status: DISCONTINUED | OUTPATIENT
Start: 2025-03-07 | End: 2025-03-07 | Stop reason: HOSPADM

## 2025-03-07 RX ORDER — FENTANYL CITRATE 0.05 MG/ML
50 INJECTION, SOLUTION INTRAMUSCULAR; INTRAVENOUS EVERY 5 MIN PRN
Status: DISCONTINUED | OUTPATIENT
Start: 2025-03-07 | End: 2025-03-07 | Stop reason: HOSPADM

## 2025-03-07 RX ORDER — NEOMYCIN POLYMYXIN B SULFATES AND DEXAMETHASONE 3.5; 10000; 1 MG/ML; [USP'U]/ML; MG/ML
SUSPENSION/ DROPS OPHTHALMIC
Qty: 5 ML | Refills: 0 | Status: SHIPPED | OUTPATIENT
Start: 2025-03-07

## 2025-03-07 RX ORDER — ONDANSETRON 2 MG/ML
4 INJECTION INTRAMUSCULAR; INTRAVENOUS EVERY 30 MIN PRN
Status: DISCONTINUED | OUTPATIENT
Start: 2025-03-07 | End: 2025-03-07 | Stop reason: HOSPADM

## 2025-03-07 RX ORDER — HYDROMORPHONE HCL IN WATER/PF 6 MG/30 ML
0.2 PATIENT CONTROLLED ANALGESIA SYRINGE INTRAVENOUS EVERY 5 MIN PRN
Status: DISCONTINUED | OUTPATIENT
Start: 2025-03-07 | End: 2025-03-07 | Stop reason: HOSPADM

## 2025-03-07 RX ORDER — DEXAMETHASONE SODIUM PHOSPHATE 4 MG/ML
4 INJECTION, SOLUTION INTRA-ARTICULAR; INTRALESIONAL; INTRAMUSCULAR; INTRAVENOUS; SOFT TISSUE
Status: DISCONTINUED | OUTPATIENT
Start: 2025-03-07 | End: 2025-03-07 | Stop reason: HOSPADM

## 2025-03-07 RX ORDER — MAGNESIUM HYDROXIDE 1200 MG/15ML
LIQUID ORAL PRN
Status: DISCONTINUED | OUTPATIENT
Start: 2025-03-07 | End: 2025-03-07 | Stop reason: HOSPADM

## 2025-03-07 RX ORDER — NALOXONE HYDROCHLORIDE 0.4 MG/ML
0.1 INJECTION, SOLUTION INTRAMUSCULAR; INTRAVENOUS; SUBCUTANEOUS
Status: DISCONTINUED | OUTPATIENT
Start: 2025-03-07 | End: 2025-03-07 | Stop reason: HOSPADM

## 2025-03-07 RX ORDER — LIDOCAINE HYDROCHLORIDE 20 MG/ML
INJECTION, SOLUTION INFILTRATION; PERINEURAL
Status: DISCONTINUED
Start: 2025-03-07 | End: 2025-03-07 | Stop reason: HOSPADM

## 2025-03-07 RX ORDER — ERYTHROMYCIN 5 MG/G
OINTMENT OPHTHALMIC
Status: DISCONTINUED
Start: 2025-03-07 | End: 2025-03-07 | Stop reason: HOSPADM

## 2025-03-07 RX ORDER — ERYTHROMYCIN 5 MG/G
OINTMENT OPHTHALMIC PRN
Status: DISCONTINUED | OUTPATIENT
Start: 2025-03-07 | End: 2025-03-07 | Stop reason: HOSPADM

## 2025-03-07 RX ORDER — ERYTHROMYCIN 5 MG/G
OINTMENT OPHTHALMIC
Qty: 3.5 G | Refills: 0 | Status: SHIPPED | OUTPATIENT
Start: 2025-03-07

## 2025-03-07 RX ORDER — BUPIVACAINE HYDROCHLORIDE 5 MG/ML
INJECTION, SOLUTION EPIDURAL; INTRACAUDAL; PERINEURAL
Status: DISCONTINUED
Start: 2025-03-07 | End: 2025-03-07 | Stop reason: HOSPADM

## 2025-03-07 RX ORDER — TETRACAINE HYDROCHLORIDE 5 MG/ML
SOLUTION OPHTHALMIC
Status: DISCONTINUED
Start: 2025-03-07 | End: 2025-03-07 | Stop reason: HOSPADM

## 2025-03-07 RX ORDER — ONDANSETRON 4 MG/1
4 TABLET, ORALLY DISINTEGRATING ORAL EVERY 30 MIN PRN
Status: DISCONTINUED | OUTPATIENT
Start: 2025-03-07 | End: 2025-03-07 | Stop reason: HOSPADM

## 2025-03-07 RX ORDER — HYDROMORPHONE HCL IN WATER/PF 6 MG/30 ML
0.4 PATIENT CONTROLLED ANALGESIA SYRINGE INTRAVENOUS EVERY 5 MIN PRN
Status: DISCONTINUED | OUTPATIENT
Start: 2025-03-07 | End: 2025-03-07 | Stop reason: HOSPADM

## 2025-03-07 RX ORDER — TETRACAINE HYDROCHLORIDE 5 MG/ML
SOLUTION OPHTHALMIC PRN
Status: DISCONTINUED | OUTPATIENT
Start: 2025-03-07 | End: 2025-03-07 | Stop reason: HOSPADM

## 2025-03-07 RX ORDER — FENTANYL CITRATE 0.05 MG/ML
25 INJECTION, SOLUTION INTRAMUSCULAR; INTRAVENOUS EVERY 5 MIN PRN
Status: DISCONTINUED | OUTPATIENT
Start: 2025-03-07 | End: 2025-03-07 | Stop reason: HOSPADM

## 2025-03-07 RX ORDER — ACETAMINOPHEN 325 MG/1
975 TABLET ORAL EVERY 6 HOURS PRN
Status: DISCONTINUED | OUTPATIENT
Start: 2025-03-07 | End: 2025-03-07 | Stop reason: HOSPADM

## 2025-03-07 RX ADMIN — SODIUM CHLORIDE, POTASSIUM CHLORIDE, SODIUM LACTATE AND CALCIUM CHLORIDE: 600; 310; 30; 20 INJECTION, SOLUTION INTRAVENOUS at 06:31

## 2025-03-07 RX ADMIN — ACETAMINOPHEN 975 MG: 325 TABLET, FILM COATED ORAL at 09:25

## 2025-03-07 ASSESSMENT — ACTIVITIES OF DAILY LIVING (ADL)
ADLS_ACUITY_SCORE: 58

## 2025-03-07 NOTE — OR NURSING
Meets criteria for discharge.  Discharge instructions reviewed with pt and pt's designated responsible person. ABX ointment from the OR given to pt. 2 other meds to be picked up by pt in [facility] pharmacy.

## 2025-03-07 NOTE — BRIEF OP NOTE
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  Implants:   Implant Name Type Inv. Item Serial No.  Lot No. LRB No. Used Action   EYE IMP KIT LACRIMAL INTUBATION MINI MONOKA S1.1500 - [id number] Lens/Eye Implant EYE IMP KIT LACRIMAL INTUBATION MINI MONOKA S1.0255 [id number] [facility] [employer] [id number] Right 1 Implanted   EYE IMP KIT LACRIMAL INTUBATION MINI MONOKA S1.1500 - [id number] Lens/Eye Implant EYE IMP KIT LACRIMAL INTUBATION MINI MONOKA S1.7118 [id number] [facility] [employer] [id number] Left 1 Implanted

## 2025-03-07 NOTE — DISCHARGE INSTRUCTIONS
Same Day Surgery Discharge Instructions for  Sedation and General Anesthesia     It's not unusual to feel dizzy, light-headed or faint for up to 24 hours after surgery or while taking pain medication.  If you have these symptoms: sit for a few minutes before standing and have someone assist you when you get up to walk or use the bathroom.    You should rest and relax for the next 24 hours. We recommend you make arrangements to have an adult stay with you for at least 24 hours after your discharge.  Avoid hazardous and strenuous activity.    DO NOT DRIVE any vehicle or operate mechanical equipment for 24 hours following the end of your surgery.  Even though you may feel normal, your reactions may be affected by the medication you have received.    Do not drink alcoholic beverages for 24 hours following surgery.     Slowly progress to your regular diet as you feel able. It's not unusual to feel nauseated and/or vomit after receiving anesthesia.  If you develop these symptoms, drink clear liquids (apple juice, ginger ale, broth, 7-up, etc. ) until you feel better.  If your nausea and vomiting persists for 24 hours, please notify your surgeon.      All narcotic pain medications, along with inactivity and anesthesia, can cause constipation. Drinking plenty of liquids and increasing fiber intake will help.    For any questions of a medical nature, call your surgeon.    Do not make important decisions for 24 hours.    If you had general anesthesia, you may have a sore throat for a couple of days related to the breathing tube used during surgery.  You may use Cepacol lozenges to help with this discomfort.  If it worsens or if you develop a fever, contact your surgeon.     If you feel your pain is not well managed with the pain medications prescribed by your surgeon, please contact your surgeon's office to let them know so they can address your concerns.         Post-operative Instructions  Ophthalmic Plastic and  Reconstructive Surgery    Sarahi Agosto M.D.     All instructions apply to the operated eye(s) or eyelid(s).    Wound care and personal care  ? Apply ice compresses and gentle pressure 15 minutes on, 15 minutes off, for 2 days. If you are sleeping, you don't need to wake up to ice. As long as there is no further bleeding, after two days, switch to warm water compresses for five minutes, four times a day until seen by your physician.   ? You may shower or wash your hair the day after surgery. Do not go swimming for at least 2 weeks to prevent contamination of your wounds.  ? You may go for walks and light activity is ok, but no heavy (over 15 pounds) lifting, bending or excessive straining for one week.   ? Do not apply make-up to the eyes or eyelids for 2 weeks after surgery.  ? Expect some swelling, bruising, black eye (even into the lower eyelids and cheeks). Also expect serum caking, crusting and discharge from the eye and/or incisions. A small amount of surface bleeding, and depending on the type of surgery, bleeding from the inside of the eyelid, is normal for the first 48 hours.  ? Avoid straining, bending at the waist, or lifting more than 15 pounds for 1 week. Sleeping with your head elevated, such as in a recliner, for the first several days can help swelling resolve more quickly.   ? Do continue to ambulate (walk) as you normally would - being sedentary after surgery can cause blood clots.   ? Your eye(s) and eyelid(s) may be painful and tender. This is normal after surgery.      Contact information and follow-up  ? Return to the Eye Clinic for a follow-up appointment with your physician as scheduled. If no appointment has been scheduled:   - [facility] eye clinic: [phone] for an appointment with Dr. Agosto within 2 to 3 weeks from your date of surgery.    After hours or on weekends and holidays, call [phone] and ask to speak with the ophthalmologist on call.    An on call  person can be reached after hours for concerns. The on call doctor should not call in medication refill requests after hours or on weekends, so please plan accordingly. An effort has been made to provide adequate pain medications following every surgery, and refills will not be provided in most instances.     Medications  ? Restart all regular home medications and eye drops. If you take Plavix or Aspirin on a regular basis, wait for 72 hours after your surgery before restarting these in order to decrease the risk of bleeding complications.  ? Avoid aspirin and aspirin-like medications (Motrin, Aleve, Ibuprofen, [place] etc) for 72 hours to reduce the risk of bleeding. You may take Tylenol (acetaminophen) for pain.  ? In addition to your home medications, take the following post-operative medications as prescribed by your physician.    ? Apply antibiotic ointment to all sutures three times a day, and into the operated eye(s) at night.   Once you run out, you can apply Vaseline or Aquaphor (over the counter) to the incisions. Don't put the Vaseline or Aquaphor into your eyes.   ? Instill prescribed eye drops 3 times a day for 10 days.   ? If you have ocular irritation, you can use over the counter artificial tears such as Refresh, Systane, or Blink. Do not use Visine, Clear Eyes, or any other drop that gets the red out.   ? In many cases, postoperative discomfort can be managed with Tylenol alone.          **If you have questions or concerns about your procedure,   call Dr. Agosto at [phone] [facility] and [phone] [place]**

## 2025-03-07 NOTE — OP NOTE
PREOPERATIVE DIAGNOSIS:  1.  Bilateral lower eyelid ectropion  2.  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  Bilateral lower eyelid ectropion  2.  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 Bilateral lower eyelid ectropion repair by tarsal strip procedure   2.  Bilateral punctoplasty and and silicone intubation of lacrimal system    ANESTHESIA: Monitored with local infiltration of a 50/50 mixture of 2% lidocaine with epinephrine and 0.5% Marcaine.     SURGEON: Sarahi Agosto MD    ASSISTANT: Janet Dillon MD    COMPLICATIONS: None.     ESTIMATED BLOOD LOSS: Less than 5 mL.     HISTORY: Bria Davis  presented with tearing  due to lower lid ectropion and lacrimal obstruction. After the risks, benefits and alternatives to the proposed procedure were explained, informed consent was obtained.     DESCRIPTION OF PROCEDURE: Bria Davis was brought to the operating room and placed supine on the operating table. IV sedation was given. The lower lids and lateral canthal areas were infiltrated with local anesthetic.  The medial canthal area and lacrimal sac were also infiltrated with local anesthetic. The area was prepped and draped in the typical fashion.     Attention was directed to the right side. The lower canalicular systems were dilated with the punctal dilator and a punctoplasty was performed with a Marlene punch. The mini-Monoka silicone intubation set was used to intubate thelower canalicular system and threaded into the lacrimal sac. The footplate was set into the enlarged punctum. Attention directed to the left side where the same procedure was performed.    Attention was directed to the right side. A canthotomy and inferior cantholysis was performed with a Kenan scissor. A lateral tarsal strip was fashioned with the high temperature cautery and the Kenan scissors. The tarsal strip was secured to the lateral  orbital rim periosteum with a double-armed 5-0 Vicryl suture in a horizontal mattress fashion. Lateral canthal angle was closed with a gray line to gray line suture of 5-0 Vicryl tied internally. The patient tolerated the procedure well. Attention was directed to the left side where the same procedure was performed. Antibiotic ointment was applied to the incisions. Bria Davis left the operating room in stable condition.     Sarahi Agosto MD

## 2025-03-08 ENCOUNTER — TELEPHONE (OUTPATIENT)
Dept: OPHTHALMOLOGY | Facility: CLINIC | Age: 65
End: 2025-03-08
Payer: COMMERCIAL

## 2025-03-10 ENCOUNTER — NURSE TRIAGE (OUTPATIENT)
Dept: NURSING | Facility: CLINIC | Age: 65
End: 2025-03-10
Payer: COMMERCIAL

## 2025-03-10 DIAGNOSIS — H02.132 SENILE ECTROPION OF RIGHT LOWER EYELID: Primary | ICD-10-CM

## 2025-03-10 RX ORDER — CARBOXYMETHYLCELLULOSE SODIUM 5 MG/ML
1 SOLUTION/ DROPS OPHTHALMIC 3 TIMES DAILY
Status: DISCONTINUED | OUTPATIENT
Start: 2025-03-10 | End: 2025-03-10 | Stop reason: HOSPADM

## 2025-03-10 NOTE — TELEPHONE ENCOUNTER
"64 year old s/p Bilateral lower eyelid ectropion repair by tarsal strip procedure and Bilateral punctoplasty and and silicone intubation of lacrimal system on 3/7/25   She is calling with a few concerns   Her right eye is blood shot and the left Is not   She has mild discomfort in the right eye 2/10   Her main complaint is bilateral itchiness  She rates that 10/10   She denies vision changes,fever or drainage     She also wants to know when she will be given permission to blow her nose            Reason for Disposition   Patient wants to be seen    Additional Information   Negative: Followed an eye injury   Negative: Eye pain from chemical in the eye   Negative: Eye pain from foreign body in eye   Negative: Has sinus pain or pressure   Negative: SEVERE eye pain   Negative: Complete loss of vision in one or both eyes   Negative: Eyelids are very swollen (shut or almost) and fever   Negative: Eyelid (outer) is very red and fever   Negative: Foreign body sensation ('feels like something is in there') and irrigation didn't help   Negative: MILD eyelid pain is a recurrent problem and red and crusty eyelids   Negative: MILD eye pains are a recurrent problem   Negative: MODERATE eye pain or discomfort (e.g., interferes with normal activities or awakens from sleep; more than mild)   Negative: Looking at light causes MODERATE to SEVERE eye pain (i.e., photophobia)   Negative: Eyelids are very swollen (shut or almost) and no fever   Negative: Painful rash near eye and multiple small blisters grouped together   Negative: Pain followed bright light exposure from welding    Answer Assessment - Initial Assessment Questions  1. ONSET: \"When did the pain start?\" (e.g., minutes, hours, days)      After surgery    2. TIMING: \"Does the pain come and go, or has it been constant since it started?\" (e.g., constant, intermittent, fleeting)      Constant   3. SEVERITY: \"How bad is the pain?\"  (Scale 1-10; mild, moderate or severe)      " "Mild 2/10 R.L  4. LOCATION: \"Where does it hurt?\"  (e.g., eyelid, eye, cheekbone)      Right eye  5. CAUSE: \"What do you think is causing the pain?\"      surgery  6. VISION: \"Do you have blurred vision or changes in your vision?\"       no  7. EYE DISCHARGE: \"Is there any discharge (pus) from the eye(s)?\"  If Yes, ask: \"What color is it?\"       no  8. FEVER: \"Do you have a fever?\" If Yes, ask: \"What is it, how was it measured, and when did it start?\"       no  9. OTHER SYMPTOMS: \"Do you have any other symptoms?\" (e.g., headache, nasal discharge, facial rash)      Main concern is the itchiness in both eyes  rates it 10/10    Right eye is blood shot but left it not    10. PREGNANCY: \"Is there any chance you are pregnant?\" \"When was your last menstrual period?\"        no    Protocols used: Eye Pain and Other Symptoms-A-[place]    "

## 2025-03-14 ENCOUNTER — HOSPITAL ENCOUNTER (OUTPATIENT)
Facility: CLINIC | Age: 65
Discharge: HOME | End: 2025-03-14
Attending: SURGERY | Admitting: SURGERY
Payer: COMMERCIAL

## 2025-03-14 VITALS
WEIGHT: 232 LBS | HEIGHT: 63 IN | TEMPERATURE: 97.6 F | SYSTOLIC BLOOD PRESSURE: 141 MMHG | HEART RATE: 78 BPM | BODY MASS INDEX: 41.11 KG/M2 | OXYGEN SATURATION: 98 % | RESPIRATION RATE: 15 BRPM | DIASTOLIC BLOOD PRESSURE: 89 MMHG

## 2025-03-14 DIAGNOSIS — Z12.11 SPECIAL SCREENING FOR MALIGNANT NEOPLASMS, COLON: Primary | ICD-10-CM

## 2025-03-14 LAB
COLONOSCOPY: NORMAL
GLUCOSE BLDC GLUCOMTR-MCNC: 269 MG/DL (ref 70–99)

## 2025-03-14 PROCEDURE — 258N000003 HC RX IP 258 OP 636: Performed by: PHYSICIAN ASSISTANT

## 2025-03-14 PROCEDURE — 370N000017 HC ANESTHESIA TECHNICAL FEE, PER MIN: Performed by: SURGERY

## 2025-03-14 PROCEDURE — 88305 TISSUE EXAM BY PATHOLOGIST: CPT | Mod: TC | Performed by: SURGERY

## 2025-03-14 PROCEDURE — 88305 TISSUE EXAM BY PATHOLOGIST: CPT | Mod: 26 | Performed by: PATHOLOGY

## 2025-03-14 PROCEDURE — 250N000009 HC RX 250: Performed by: PHYSICIAN ASSISTANT

## 2025-03-14 PROCEDURE — 45380 COLONOSCOPY AND BIOPSY: CPT | Mod: XU,PT | Performed by: SURGERY

## 2025-03-14 PROCEDURE — 82962 GLUCOSE BLOOD TEST: CPT

## 2025-03-14 PROCEDURE — 45385 COLONOSCOPY W/LESION REMOVAL: CPT | Performed by: SURGERY

## 2025-03-14 RX ORDER — SODIUM CHLORIDE, SODIUM LACTATE, POTASSIUM CHLORIDE, CALCIUM CHLORIDE 600; 310; 30; 20 MG/100ML; MG/100ML; MG/100ML; MG/100ML
INJECTION, SOLUTION INTRAVENOUS CONTINUOUS
Status: DISCONTINUED | OUTPATIENT
Start: 2025-03-14 | End: 2025-03-14 | Stop reason: HOSPADM

## 2025-03-14 RX ORDER — LIDOCAINE 40 MG/G
CREAM TOPICAL
Status: DISCONTINUED | OUTPATIENT
Start: 2025-03-14 | End: 2025-03-14 | Stop reason: HOSPADM

## 2025-03-14 RX ORDER — ONDANSETRON 4 MG/1
4 TABLET, ORALLY DISINTEGRATING ORAL EVERY 30 MIN PRN
Status: DISCONTINUED | OUTPATIENT
Start: 2025-03-14 | End: 2025-03-14 | Stop reason: HOSPADM

## 2025-03-14 RX ORDER — DEXAMETHASONE SODIUM PHOSPHATE 4 MG/ML
4 INJECTION, SOLUTION INTRA-ARTICULAR; INTRALESIONAL; INTRAMUSCULAR; INTRAVENOUS; SOFT TISSUE
Status: DISCONTINUED | OUTPATIENT
Start: 2025-03-14 | End: 2025-03-14 | Stop reason: HOSPADM

## 2025-03-14 RX ORDER — NALOXONE HYDROCHLORIDE 0.4 MG/ML
0.1 INJECTION, SOLUTION INTRAMUSCULAR; INTRAVENOUS; SUBCUTANEOUS
Status: DISCONTINUED | OUTPATIENT
Start: 2025-03-14 | End: 2025-03-14 | Stop reason: HOSPADM

## 2025-03-14 RX ORDER — ONDANSETRON 2 MG/ML
4 INJECTION INTRAMUSCULAR; INTRAVENOUS EVERY 30 MIN PRN
Status: DISCONTINUED | OUTPATIENT
Start: 2025-03-14 | End: 2025-03-14 | Stop reason: HOSPADM

## 2025-03-14 RX ADMIN — SODIUM CHLORIDE, POTASSIUM CHLORIDE, SODIUM LACTATE AND CALCIUM CHLORIDE 1000 ML: 600; 310; 30; 20 INJECTION, SOLUTION INTRAVENOUS at 08:27

## 2025-03-14 RX ADMIN — LIDOCAINE HYDROCHLORIDE 0.1 ML: 10 INJECTION, SOLUTION EPIDURAL; INFILTRATION; INTRACAUDAL; PERINEURAL at 08:27

## 2025-03-14 ASSESSMENT — ACTIVITIES OF DAILY LIVING (ADL)
ADLS_ACUITY_SCORE: 58
ADLS_ACUITY_SCORE: 58

## 2025-03-14 NOTE — PROGRESS NOTES
[facility] DISCHARGE NOTE    Patient discharged to home at 10:00 AM via ambulation. Accompanied by spouse and staff. Discharge instructions reviewed with patient and spouse, opportunity offered to ask questions. Prescriptions - None ordered for discharge. All belongings sent with patient.    Ludy Dubose RN

## 2025-03-14 NOTE — LETTER
Bria Davis  [address] [id number]    March 19, 2025    Dear Bria,  This letter is written to inform you of the results of your recent colonoscopy.  Your examination showed polyp(s) in your descending colon and rectum. All polyps were removed in their entirety and sent for review by a pathologist. As you will see on the pathology report below, the tissue(s) were tubular adenomatous polyps and inflammatory polyps. Your examination was otherwise without abnormality.    Final Diagnosis   A.  Colon, descending, polypectomy:  -- Tubular adenoma.     B.  Rectum, polypectomy:  -- Inflammatory-type polyp.       Adenomatous polyps are entirely benign (non-cancerous); however, patients who have developed these polyps are at an increased risk for developing additional polyps in the future. If these are not eventually removed, there is a risk of developing colon cancer. We will advise more frequent examinations with you because of the risk associated with this type of polyp.    Inflammatory-type polyps are polyps that test negative for any pathologic (abnormal) changes.    Given these findings, your personal history of tubular adenomas, I recommend that you undergo a repeat colonoscopy in 5 year(s) for surveillance. We will enter you into a recall system so you receive a reminder closer to the time that you are due for repeat examination.     Please remember that this recommendation is made with the understanding that you are not experiencing persistent changes in bowel function, bleeding per rectum, and/or significant abdominal pain. If you experience these symptoms, please contact your primary care provider for a further evaluation.     If you have any questions or concerns about the results of your colonoscopy or the appropriate follow-up, please contact the general surgery access center at [phone].    Sincerely,        [facility] DO [place] FACOS [facility]

## 2025-03-14 NOTE — INTERVAL H&P NOTE
"I have reviewed the surgical (or preoperative) H&P that is linked to this encounter, and examined the patient. There are no significant changes    Colon 2019 (TAs); no famhx of colon ca; no blood thinner     Clinical Conditions Present on Arrival:  Clinically Significant Risk Factors Present on Admission                  # Drug Induced Platelet Defect: home medication list includes an antiplatelet medication      # DMII: A1C = 6.6 % (Ref range: 0.0 - 5.6 %) within past 6 months  # Severe Obesity: Estimated body mass index is 41.1 kg/m  as calculated from the following:    Height as of this encounter: 1.6 m (5' 3\").    Weight as of this encounter: 105.2 kg (232 lb).       "

## 2025-03-17 ASSESSMENT — ASTHMA QUESTIONNAIRES
QUESTION_4 LAST FOUR WEEKS HOW OFTEN HAVE YOU USED YOUR RESCUE INHALER OR NEBULIZER MEDICATION (SUCH AS ALBUTEROL): NOT AT ALL
QUESTION_5 LAST FOUR WEEKS HOW WOULD YOU RATE YOUR ASTHMA CONTROL: WELL CONTROLLED
QUESTION_2 LAST FOUR WEEKS HOW OFTEN HAVE YOU HAD SHORTNESS OF BREATH: NOT AT ALL
ACT_TOTALSCORE: 23
QUESTION_3 LAST FOUR WEEKS HOW OFTEN DID YOUR ASTHMA SYMPTOMS (WHEEZING, COUGHING, SHORTNESS OF BREATH, CHEST TIGHTNESS OR PAIN) WAKE YOU UP AT NIGHT OR EARLIER THAN USUAL IN THE MORNING: ONCE OR TWICE
ACT_TOTALSCORE: 23
QUESTION_1 LAST FOUR WEEKS HOW MUCH OF THE TIME DID YOUR ASTHMA KEEP YOU FROM GETTING AS MUCH DONE AT WORK, SCHOOL OR AT HOME: NONE OF THE TIME

## 2025-03-19 ENCOUNTER — OFFICE VISIT (OUTPATIENT)
Dept: FAMILY MEDICINE | Facility: CLINIC | Age: 65
End: 2025-03-19
Payer: COMMERCIAL

## 2025-03-19 VITALS
HEIGHT: 63 IN | HEART RATE: 74 BPM | DIASTOLIC BLOOD PRESSURE: 82 MMHG | SYSTOLIC BLOOD PRESSURE: 139 MMHG | BODY MASS INDEX: 41.2 KG/M2 | WEIGHT: 232.5 LBS | OXYGEN SATURATION: 95 % | TEMPERATURE: 97.7 F

## 2025-03-19 DIAGNOSIS — I20.89 OTHER FORMS OF ANGINA PECTORIS: ICD-10-CM

## 2025-03-19 DIAGNOSIS — Z79.4 TYPE 2 DIABETES MELLITUS WITH CHRONIC KIDNEY DISEASE, WITH LONG-TERM CURRENT USE OF INSULIN, UNSPECIFIED CKD STAGE (H): ICD-10-CM

## 2025-03-19 DIAGNOSIS — N18.2 TYPE 2 DIABETES MELLITUS WITH STAGE 2 CHRONIC KIDNEY DISEASE, WITH LONG-TERM CURRENT USE OF INSULIN (H): Primary | ICD-10-CM

## 2025-03-19 DIAGNOSIS — Z79.4 TYPE 2 DIABETES MELLITUS WITH STAGE 2 CHRONIC KIDNEY DISEASE, WITH LONG-TERM CURRENT USE OF INSULIN (H): Primary | ICD-10-CM

## 2025-03-19 DIAGNOSIS — E11.22 TYPE 2 DIABETES MELLITUS WITH CHRONIC KIDNEY DISEASE, WITH LONG-TERM CURRENT USE OF INSULIN, UNSPECIFIED CKD STAGE (H): ICD-10-CM

## 2025-03-19 DIAGNOSIS — E11.22 TYPE 2 DIABETES MELLITUS WITH STAGE 2 CHRONIC KIDNEY DISEASE, WITH LONG-TERM CURRENT USE OF INSULIN (H): Primary | ICD-10-CM

## 2025-03-19 LAB
EST. AVERAGE GLUCOSE BLD GHB EST-MCNC: 148 MG/DL
HBA1C MFR BLD: 6.8 % (ref 0–5.6)
HOLD SPECIMEN: NORMAL
HOLD SPECIMEN: NORMAL

## 2025-03-19 PROCEDURE — 3079F DIAST BP 80-89 MM HG: CPT | Performed by: FAMILY MEDICINE

## 2025-03-19 PROCEDURE — 1126F AMNT PAIN NOTED NONE PRSNT: CPT | Performed by: FAMILY MEDICINE

## 2025-03-19 PROCEDURE — 3075F SYST BP GE 130 - 139MM HG: CPT | Performed by: FAMILY MEDICINE

## 2025-03-19 PROCEDURE — 36415 COLL VENOUS BLD VENIPUNCTURE: CPT | Performed by: FAMILY MEDICINE

## 2025-03-19 PROCEDURE — 83036 HEMOGLOBIN GLYCOSYLATED A1C: CPT | Performed by: FAMILY MEDICINE

## 2025-03-19 PROCEDURE — G2211 COMPLEX E/M VISIT ADD ON: HCPCS | Performed by: FAMILY MEDICINE

## 2025-03-19 PROCEDURE — 99214 OFFICE O/P EST MOD 30 MIN: CPT | Performed by: FAMILY MEDICINE

## 2025-03-19 RX ORDER — METOPROLOL SUCCINATE 25 MG/1
25 TABLET, EXTENDED RELEASE ORAL DAILY
Qty: 90 TABLET | Refills: 3 | Status: SHIPPED | OUTPATIENT
Start: 2025-03-19

## 2025-03-19 RX ORDER — METFORMIN HYDROCHLORIDE 500 MG/1
1000 TABLET, EXTENDED RELEASE ORAL 2 TIMES DAILY WITH MEALS
Qty: 360 TABLET | Refills: 1 | Status: SHIPPED | OUTPATIENT
Start: 2025-03-19

## 2025-03-19 ASSESSMENT — PAIN SCALES - GENERAL: PAINLEVEL_OUTOF10: NO PAIN (0)

## 2025-03-19 NOTE — PROGRESS NOTES
"  Assessment & Plan   Type 2 diabetes mellitus with chronic kidney disease, with long-term current use of insulin, unspecified CKD stage (H)  Type 2 diabetes mellitus with stage 2 chronic kidney disease, with long-term current use of insulin (H)   Well controlled, was off ozempic for 2 weeks with surgery  - FOOT EXAM  - Hemoglobin A1c; Future  - Extra Green Top Tube (LAB USE ONLY)  - Extra SST Tube (LAB USE ONLY)  - Hemoglobin A1c    - metFORMIN (GLUCOPHAGE XR) 500 MG 24 hr tablet; Take 2 tablets (1,000 mg) by mouth 2 times daily (with meals).    Other forms of angina pectoris    - metoprolol succinate ER (TOPROL XL) 25 MG 24 hr tablet; Take 1 tablet (25 mg) by mouth daily.    The longitudinal plan of care for the diagnosis(es)/condition(s) as documented were addressed during this visit. Due to the added complexity in care, I will continue to support Melyssa in the subsequent management and with ongoing continuity of care.      BMI  Estimated body mass index is 41.19 kg/m  as calculated from the following:    Height as of this encounter: 1.6 m (5' 3\").    Weight as of this encounter: 105.5 kg (232 lb 8 oz).             Subjective   Melyssa is a 64 year old, presenting for the following health issues:  Diabetes        3/19/2025     6:54 AM   Additional Questions   Roomed by Shirley gaona CMA   Accompanied by self       - Follow up after 3/7/2025 bilateral lower eyelid ectropion repair punctoplasty and silicone strent    Diabetes Follow-up  Insulin - Novolog, jardiance 10mg every day, metformin 1,000mg bid, ozempic 2mg SubQ weekly, toujeo 100U SubQ every day. Discuss how long she should be using drops and ointment. She notes some itching of bilateral eyes. She is also using Artificial Tears.    History of Present Illness       Diabetes:   She presents for follow up of diabetes.  She is checking home blood glucose three times daily.   She checks blood glucose before meals.  Blood glucose is sometimes over 200 and sometimes under " 70. She is aware of hypoglycemia symptoms including shakiness, weakness, lethargy, blurred vision and confusion.   She is concerned about frequent infections.    She is not experiencing numbness or burning in feet, excessive thirst, blurry vision, weight changes or redness, sores or blisters on feet.           She eats 2-3 servings of fruits and vegetables daily.She consumes 0 sweetened beverage(s) daily.She exercises with enough effort to increase her heart rate 9 or less minutes per day.  She exercises with enough effort to increase her heart rate 3 or less days per week.   She is taking medications regularly.              Hyperlipidemia Follow-Up  Rosuvastatin 10mg qd  Are you regularly taking any medication or supplement to lower your cholesterol?   Yes- statin  Are you having muscle aches or other side effects that you think could be caused by your cholesterol lowering medication?  No    Hypertension Follow-up  Metoprolol 25mg qd  Do you check your blood pressure regularly outside of the clinic? Yes   Are you following a low salt diet? Yes  Are your blood pressures ever more than 140 on the top number (systolic) OR more   than 90 on the bottom number (diastolic), for example 140/90? No    BP Readings from Last 2 Encounters:   03/14/25 (!) 141/89   03/07/25 (!) 165/99     Hemoglobin A1C (%)   Date Value   12/19/2024 6.6 (H)   09/18/2024 8.3 (H)   04/14/2021 7.3 (H)   12/09/2020 7.9 (H)     LDL Cholesterol Calculated (mg/dL)   Date Value   02/05/2025 49   02/26/2024 43   04/14/2021 63   06/15/2020 53         Asthma Follow-Up  Advair 250-50mcg/ACT bid, ventolin HFA 108mcg/ACT 2 puffs q6h prn  Was ACT completed today?  Yes        3/17/2025     9:29 AM   ACT Total Scores   ACT TOTAL SCORE (Goal Greater than or Equal to 20) 23    In the past 12 months, how many times did you visit the emergency room for your asthma without being admitted to the hospital? 0   In the past 12 months, how many times were you hospitalized  overnight because of your asthma? 0       Patient-reported        How many days per week do you miss taking your asthma controller medication?  0  Please describe any recent triggers for your asthma: pollens, animal dander, strong odors and fumes, and cold air  Have you had any Emergency Room Visits, Urgent Care Visits, or Hospital Admissions since your last office visit?  No    Chronic Kidney Disease Follow-up    Do you take any over the counter pain medicine?: No        Review of Systems  Constitutional, HEENT, cardiovascular, pulmonary, gi and gu systems are negative, except as otherwise noted.      Objective    LMP 01/14/2016 (Approximate)   There is no height or weight on file to calculate BMI.  Physical Exam   GENERAL: alert and no distress  NECK: no adenopathy, no asymmetry, masses, or scars  RESP: lungs clear to auscultation - no rales, rhonchi or wheezes  CV: regular rate and rhythm, normal S1 S2, no S3 or S4, no murmur, click or rub, no peripheral edema  ABDOMEN: soft, nontender, no hepatosplenomegaly, no masses and bowel sounds normal  MS: no gross musculoskeletal defects noted, no edema    Results for orders placed or performed in visit on 03/19/25   Hemoglobin A1c     Status: Abnormal   Result Value Ref Range    Estimated Average Glucose 148 (H) <117 mg/dL    Hemoglobin A1C 6.8 (H) 0.0 - 5.6 %     Diabetic Foot Screen:  Any complaints of increased pain or numbness ? No  Is there a foot ulcer now or a history of foot ulcer? No  Does the foot have an abnormal shape? No  Are the nails thick, too long or ingrown? No  Are there any redness or open areas? No         Sensation Testing done at all points on the diagram with monofilament     Right Foot: Sensation Normal at all points  Left Foot: Sensation Normal at all points     Risk Category: 0- No loss of protective sensation  Performed by Kaia Elena MD              Signed Electronically by: Kaia Elena MD

## 2025-03-26 ENCOUNTER — OFFICE VISIT (OUTPATIENT)
Dept: OPHTHALMOLOGY | Facility: CLINIC | Age: 65
End: 2025-03-26
Payer: COMMERCIAL

## 2025-03-26 DIAGNOSIS — Z98.890 POSTOPERATIVE EYE STATE: Primary | ICD-10-CM

## 2025-03-26 ASSESSMENT — VISUAL ACUITY
CORRECTION_TYPE: GLASSES
OD_CC: 20/25
OS_CC+: -1
OD_CC+: +1
METHOD: SNELLEN - LINEAR
OS_CC: 20/30

## 2025-03-26 ASSESSMENT — TONOMETRY
OS_IOP_MMHG: 21
IOP_METHOD: APPLANATION
OD_IOP_MMHG: 16

## 2025-03-26 NOTE — PROGRESS NOTES
"Chief Complaint(s) and History of Present Illness(es)    S/p bilateral lower eyelid ectropion repair by tarsal strip procedure,   bilateral punctoplasty and and silicone intubation of lacrimal system   03/07/2025. Patient is doing well but does have a lot of \"itching\". She   has no pain or discomfort otherwise and vision is \"better but still a   little blurry\".  She has no other ocular concerns today.   Ocular Medications: PF dextran 70-hypromellose both eyes twice per day     Lab Results       Component                Value               Date                       A1C                      6.8                 03/19/2025                 A1C                      6.6                 12/19/2024                 A1C                      8.3                 09/18/2024                 A1C                      7.5                 02/26/2024                 A1C                      7.7                 09/21/2023                 A1C                      7.3                 04/14/2021                 A1C                      7.9                 12/09/2020                 A1C                      7.3                 10/02/2020                 A1C                      8.2                 06/15/2020                 A1C                      7.3                 12/19/2019                  S/p surgery (3/7/25)  1. Bilateral lower eyelid ectropion repair by tarsal strip procedure   2.  Bilateral punctoplasty and and silicone intubation of lacrimal system     Doing well. Happy with outcome.  Mini monoka stent removed today left eye. Fell out already right eye.     Per patient, experiencing intermittent itching of eyelids and hands, undergoing allergy testing for this. Discussed with her that there are anti-allergy drops that we can recommend if the itching is persistent despite AT and WC use.    Patient Instructions   - Apply warm compresses for 1 minute two to three times a day until your bruising has resolved. You can continue this " "for one more month.   - Cool compresses can help with swelling and itching, you can alternate cool compresses with warm compresses if you find it helpful.  - Apply Aquaphor or Vaseline to the incision at bedtime until it is smooth.    - The neomycin/polymixin/dexamethasone drop should be stopped at day 10.   - If you have symptoms of eye irritation, it is good to use over the counter artificial tears. Good brands include Refresh, Blink, and Systane. Do NOT get drops that are for \"red eyes.\"   - It is normal for the incision to appear raised, red, itch and have small lumps. You can gently massage any small bumps along the incision line. These can take up to six months to resolve.    Return in about 2 months (around 5/26/2025).      Attending Physician Attestation: Complete documentation of historical and exam elements from today's encounter can be found in the full encounter summary report (not reduplicated in this progress note). I personally obtained the chief complaint(s) and history of present illness. I confirmed and edited as necessary the review of systems, past medical/surgical history, family history, social history, and examination findings as documented by others; and I examined the patient myself. I personally reviewed the relevant tests, images, and reports as documented above. I formulated and edited as necessary the assessment and plan and discussed the findings and management plan with the patient and family. I personally reviewed the ophthalmic test(s) associated with this encounter, agree with the interpretation(s) as documented by the resident/fellow, and have edited the corresponding report(s) as necessary. Sarahi Agosto MD      "

## 2025-03-26 NOTE — NURSING NOTE
"No chief complaint on file.      Chief Complaint(s) and History of Present Illness(es)    S/p bilateral lower eyelid ectropion repair by tarsal strip procedure, bilateral punctoplasty and and silicone intubation of lacrimal system 03/07/2025. Patient is doing well but does have a lot of \"itching\". She has no pain or discomfort otherwise and vision is \"better but still a little blurry\".  She has no other ocular concerns today.   Ocular Medications: PF dextran 70-hypromellose both eyes twice per day     Lab Results       Component                Value               Date                       A1C                      6.8                 03/19/2025                 A1C                      6.6                 12/19/2024                 A1C                      8.3                 09/18/2024                 A1C                      7.5                 02/26/2024                 A1C                      7.7                 09/21/2023                 A1C                      7.3                 04/14/2021                 A1C                      7.9                 12/09/2020                 A1C                      7.3                 10/02/2020                 A1C                      8.2                 06/15/2020                 A1C                      7.3                 12/19/2019                      Lin Vázquez, COA    "

## 2025-03-28 PROBLEM — D12.6 ADENOMATOUS POLYP OF COLON: Status: ACTIVE | Noted: 2025-03-28

## 2025-03-31 ENCOUNTER — ONCOLOGY VISIT (OUTPATIENT)
Dept: ONCOLOGY | Facility: CLINIC | Age: 65
End: 2025-03-31
Attending: NURSE PRACTITIONER
Payer: COMMERCIAL

## 2025-03-31 VITALS
WEIGHT: 236 LBS | TEMPERATURE: 97.2 F | RESPIRATION RATE: 16 BRPM | SYSTOLIC BLOOD PRESSURE: 140 MMHG | BODY MASS INDEX: 41.82 KG/M2 | DIASTOLIC BLOOD PRESSURE: 70 MMHG | HEIGHT: 63 IN | OXYGEN SATURATION: 96 % | HEART RATE: 72 BPM

## 2025-03-31 DIAGNOSIS — Z12.31 ENCOUNTER FOR SCREENING MAMMOGRAM FOR BREAST CANCER: ICD-10-CM

## 2025-03-31 DIAGNOSIS — D05.12 DUCTAL CARCINOMA IN SITU (DCIS) OF LEFT BREAST: Primary | ICD-10-CM

## 2025-03-31 PROCEDURE — G2211 COMPLEX E/M VISIT ADD ON: HCPCS | Performed by: NURSE PRACTITIONER

## 2025-03-31 PROCEDURE — 99213 OFFICE O/P EST LOW 20 MIN: CPT | Performed by: NURSE PRACTITIONER

## 2025-03-31 PROCEDURE — 99214 OFFICE O/P EST MOD 30 MIN: CPT | Performed by: NURSE PRACTITIONER

## 2025-03-31 ASSESSMENT — PAIN SCALES - GENERAL: PAINLEVEL_OUTOF10: NO PAIN (0)

## 2025-03-31 NOTE — PROGRESS NOTES
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   Started prophylactic endocrine therapy with anastrozole (6 mths ago).     Tolerating anastrozole farily well, with manageable joint pain in setting of baseline fibromyalgia history. She's also developed carpal tunnel syndrome, there is a 3% incidence on anastrozole so a possible contributor. Symptoms are minimal and managed with overnight braces, so she's not planning to undergo surgery unless it progresses.     9/2024 mammogram benign.     No concern for recurrent breast cancer on exam.    1/2025 LFTs WNL    Plan:  -Continue anastrozole. Plan 5 yrs (3/2029)  -If she has progressive carpal tunnel symptoms, I recommended she take 4 weeks of anastrozole to see if there's correlative improvement before undergoing surgery. If there was improvement, we can change to tamoxifen or a different AI.   -Annual bilateral mammogram (September)  -Return every 6 mths for breast exam and assessment on endocrine therapy. Will follow me in surveillance since Dr. White is no longer here. Will establish with new MD as needed.     Bone health  1/2024 baseline DEXA normal.     Plan:  -Ca + Vit D while on AI  -Weight bearing exercise encouraged  -Repeat DEXA every 2 yrs on AI (next due 1/2026)      ______________________________________________________________________________    History of Present Illness/ Interval History    Ms. Bria Davis  is a 64 year old with hx DCIS treated 1.5 yr ago with lumpectomy and adjuvant RT. Started prophylactic anastrozole 1 yr ago. Returns for routine 6-mth exam.    Tolerating anastrozole fairly. Has  joint pain that is manageable, not requiring pain medications. Also has history of fibromyalgia so hard for her to distinguish if related, on gabapentin chronically.  She was also diagnosed with bilateral carpal tunnel, wearing braces and has minimal symptoms so is deferring release surgery. Rare/mild hot flashes.     Left breast tenderness around surgical site/RT. Massage/stretching helps.   Right lateral chest wall/breast incision pain/sensitivity, treated with steroid injections with benefit.     ECOG Performance    0      Oncology History/Treatment  Diagnosis/Stage:   9/2023: Left breast DCIS, ER/FL+  -screening mammogram: L breast asymmetry 10:00 position  -Dx mammo + US: 4.0 cm suspicious L breast mass  -Biopsy L breast: 2 foci atypical ductal hyperplasia 1.5 + 2.0 mm without invasive cancer  -10/6/2023 surgical path L breast excision: 37 mm grade 1 DCIS (papillary, cribiform, solid) without invasion, positive medial surgical margin. ER and FL strongly positive %  -11/13/2023 re-excision surgical path: negative surgical margins  -12/7/2023 surgical path L lumpectomy + breast reduction: negative for atypia nor malignancy  -Genetic testing negative    Treatment:  -10/6/2023: L breast excisional lumpectomy   -11/13/2023: re-excision to achieve negative margins  -12/7/2023: L lumpectomy, bilateral breast reduction surgery  ---post-op complications of delayed healing     -2/2024 : completed adjuvant RT (4272 cGy/16)    -3/2024 - present: anastrozole      Physical Exam    GENERAL: Alert and oriented to time place and person. Seated comfortably. In no distress.  accompanies.  HEAD: Atraumatic and normocephalic. No alopecia.  EYES: ADELAIDA, EOMI. No erythema. No icterus.  LYMPH NODES: No palpable supraclavicular, cervical, axillary lymphadenopathy.  BREASTS: Mild left breast RT-fibrosis and tenderness. No masses. Pacemaker/scar upper left chest. Bilateral breast reduction incisions, right lateral incision  scarring is prominent/keloid appearing but healed and non-tender.  CHEST: clear to auscultation bilaterally. Resonant to percussion throughout bilaterally. Symmetrical breath movements bilaterally.  CVS: RRR  ABDOMEN: Soft. Not tender. Not distended.   EXTREMITIES: Warm. No peripheral edema.  SKIN: no rash, or bruising or purpura.   NEURO: No gross deficit noted. Non-antalgic gait.      Lab Results    No results found for this or any previous visit (from the past week).    Imaging    No results found.    Billing  Total time 35 minutes, to include face to face visit, review of EMR, ordering, documentation and coordination of care on date of service   complexity modifier for longitudinal care.       Signed by: Olivia Gaffney NP

## 2025-03-31 NOTE — LETTER
3/31/2025      Bria Davis  [address] [address] [id number]      Dear Colleague,    Thank you for referring your patient, Bria Davis, to the [facility] [place]. Please see a copy of my visit note below.    [facility] Hematology and Oncology Outpatient Progress Note    Patient: Bira Davis  MRN: [id number]  Date of Service: Mar 31, 2025          Reason for Visit    DCIS      Assessment/Plan  Hx left breast DCIS, ER/[place]+ (9/2023)  Diffuse arthralgias/myalgias, AI + fibromyalgia related  Right breast reconstruction incisional pain  Carpal tunnel, possibly AI-related  S/p lumpectomy and adjuvant RT.   Started prophylactic endocrine therapy with anastrozole (6 mths ago).     Tolerating anastrozole farily well, with manageable joint pain in setting of baseline fibromyalgia history. She's also developed carpal tunnel syndrome, there is a 3% incidence on anastrozole so a possible contributor. Symptoms are minimal and managed with overnight braces, so she's not planning to undergo surgery unless it progresses.     9/2024 mammogram benign.     No concern for recurrent breast cancer on exam.    1/2025 LFTs WNL    Plan:  -Continue anastrozole. Plan 5 yrs (3/2029)  -If she has progressive carpal tunnel symptoms, I recommended she take 4 weeks of anastrozole to see if there's correlative improvement before undergoing surgery. If there was improvement, we can change to tamoxifen or a different AI.   -Annual bilateral mammogram (September)  -Return every 6 mths for breast exam and assessment on endocrine therapy. Will follow me in surveillance since Dr. White is no longer here. Will establish with new MD as needed.     Bone health  1/2024 baseline DEXA normal.     Plan:  -Ca + Vit D while on AI  -Weight bearing exercise encouraged  -Repeat DEXA every 2 yrs on AI (next due 1/2026)      ______________________________________________________________________________    History of  Present Illness/ Interval History    Ms. Bria Davis  is a 64 year old with hx DCIS treated 1.5 yr ago with lumpectomy and adjuvant RT. Started prophylactic anastrozole 1 yr ago. Returns for routine 6-mth exam.    Tolerating anastrozole fairly. Has joint pain that is manageable, not requiring pain medications. Also has history of fibromyalgia so hard for her to distinguish if related, on gabapentin chronically.  She was also diagnosed with bilateral carpal tunnel, wearing braces and has minimal symptoms so is deferring release surgery. Rare/mild hot flashes.     Left breast tenderness around surgical site/RT. Massage/stretching helps.   Right lateral chest wall/breast incision pain/sensitivity, treated with steroid injections with benefit.     ECOG Performance    0      Oncology History/Treatment  Diagnosis/Stage:   9/2023: Left breast DCIS, ER/CO+  -screening mammogram: L breast asymmetry 10:00 position  -Dx mammo + US: 4.0 cm suspicious L breast mass  -Biopsy L breast: 2 foci atypical ductal hyperplasia 1.5 + 2.0 mm without invasive cancer  -10/6/2023 surgical path L breast excision: 37 mm grade 1 DCIS (papillary, cribiform, solid) without invasion, positive medial surgical margin. ER and CO strongly positive %  -11/13/2023 re-excision surgical path: negative surgical margins  -12/7/2023 surgical path L lumpectomy + breast reduction: negative for atypia nor malignancy  -Genetic testing negative    Treatment:  -10/6/2023: L breast excisional lumpectomy   -11/13/2023: re-excision to achieve negative margins  -12/7/2023: L lumpectomy, bilateral breast reduction surgery  ---post-op complications of delayed healing     -2/2024 : completed adjuvant RT (4272 cGy/16)    -3/2024 - present: anastrozole      Physical Exam    GENERAL: Alert and oriented to time place and person. Seated comfortably. In no distress.  accompanies.  HEAD: Atraumatic and normocephalic. No alopecia.  EYES: ADELAIDA, EOMI. No  "erythema. No icterus.  LYMPH NODES: No palpable supraclavicular, cervical, axillary lymphadenopathy.  BREASTS: Mild left breast RT-fibrosis and tenderness. No masses. Pacemaker/scar upper left chest. Bilateral breast reduction incisions, right lateral incision scarring is prominent/keloid appearing but healed and non-tender.  CHEST: clear to auscultation bilaterally. Resonant to percussion throughout bilaterally. Symmetrical breath movements bilaterally.  CVS: RRR  ABDOMEN: Soft. Not tender. Not distended.   EXTREMITIES: Warm. No peripheral edema.  SKIN: no rash, or bruising or purpura.   NEURO: No gross deficit noted. Non-antalgic gait.      Lab Results    No results found for this or any previous visit (from the past week).    Imaging    No results found.    Billing  Total time 35 minutes, to include face to face visit, review of EMR, ordering, documentation and coordination of care on date of service   complexity modifier for longitudinal care.       Signed by: Olivia Gaffney NP      Oncology Rooming Note    March 31, 2025 9:22 AM   Bria Davis is a 64 year old female who presents for:    Chief Complaint   Patient presents with     Oncology Clinic Visit     Ductal carcinoma in situ (DCIS) of left breast - Provider visit only     Initial Vitals: BP (!) 140/70 (BP Location: Right arm, Patient Position: Sitting, Cuff Size: Adult Large)   Pulse 72   Temp 97.2  F (36.2  C) (Tympanic)   Resp 16   Ht 1.6 m (5' 3\")   Wt 107 kg (236 lb)   LMP 01/14/2016 (Approximate)   SpO2 96%   BMI 41.81 kg/m   Estimated body mass index is 41.81 kg/m  as calculated from the following:    Height as of this encounter: 1.6 m (5' 3\").    Weight as of this encounter: 107 kg (236 lb). Body surface area is 2.18 meters squared.  No Pain (0) Comment: Data Unavailable   Patient's last menstrual period was 01/14/2016 (approximate).  Allergies reviewed: Yes  Medications reviewed: Yes    Medications: Medication refills not needed " today.  Pharmacy name entered into [facility]:    [facility] - [address]  [facility] [place] [address]  [facility] - [place], [place] - [address] [place] [address] [facility]UG STORE #[id number] - [place], [place] - [address] AT [facility] OF [address] [place] [address]    Frailty Screening:   Is the patient here for a new oncology consult visit in cancer care? 2. No    PHQ9:  Did this patient require a PHQ9?: No      Clinical concerns:  None today      Elyssa Rodriguez CMA                Again, thank you for allowing me to participate in the care of your patient.        Sincerely,        Olivia Gaffney NP    Electronically signed

## 2025-03-31 NOTE — PROGRESS NOTES
"Oncology Rooming Note    March 31, 2025 9:22 AM   Bria Davis is a 64 year old female who presents for:    Chief Complaint   Patient presents with    Oncology Clinic Visit     Ductal carcinoma in situ (DCIS) of left breast - Provider visit only     Initial Vitals: BP (!) 140/70 (BP Location: Right arm, Patient Position: Sitting, Cuff Size: Adult Large)   Pulse 72   Temp 97.2  F (36.2  C) (Tympanic)   Resp 16   Ht 1.6 m (5' 3\")   Wt 107 kg (236 lb)   LMP 01/14/2016 (Approximate)   SpO2 96%   BMI 41.81 kg/m   Estimated body mass index is 41.81 kg/m  as calculated from the following:    Height as of this encounter: 1.6 m (5' 3\").    Weight as of this encounter: 107 kg (236 lb). Body surface area is 2.18 meters squared.  No Pain (0) Comment: Data Unavailable   Patient's last menstrual period was 01/14/2016 (approximate).  Allergies reviewed: Yes  Medications reviewed: Yes    Medications: Medication refills not needed today.  Pharmacy name entered into [facility]:    [facility] [place] - [place], [place] - [address] [facility] [place] [facility] [place] [address] [id number] - [place] - [address] [place] [address] DRUG STORE #[id number] - [place], [place] - [address] AT [facility] OF [address] [place] [address]    Frailty Screening:   Is the patient here for a new oncology consult visit in cancer care? 2. No    PHQ9:  Did this patient require a PHQ9?: No      Clinical concerns:  None today      Elyssa Rodriguez CMA              " Sheath #1: Sheath: inserted. Sheath inserted/placed in the right femoral  artery.

## 2025-04-14 DIAGNOSIS — J31.0 CHRONIC RHINITIS: ICD-10-CM

## 2025-04-14 RX ORDER — PSEUDOEPHEDRINE HCL 30 MG/1
60 TABLET, FILM COATED ORAL 2 TIMES DAILY
Qty: 120 TABLET | Refills: 3 | Status: SHIPPED | OUTPATIENT
Start: 2025-04-14

## 2025-05-06 ENCOUNTER — VIRTUAL VISIT (OUTPATIENT)
Dept: SURGERY | Facility: CLINIC | Age: 65
End: 2025-05-06
Payer: COMMERCIAL

## 2025-05-06 VITALS — BODY MASS INDEX: 41.11 KG/M2 | HEIGHT: 63 IN | WEIGHT: 232 LBS

## 2025-05-06 DIAGNOSIS — K42.9 UMBILICAL HERNIA WITHOUT OBSTRUCTION AND WITHOUT GANGRENE: Primary | ICD-10-CM

## 2025-05-06 PROCEDURE — 98016 BRIEF COMUNICAJ TECH-BSD SVC: CPT | Performed by: SURGERY

## 2025-05-06 NOTE — LETTER
"5/6/2025      Bria Davis  [address] [address] [address]      Dear Colleague,    Thank you for referring your patient, Bria Davis, to the [facility]. Please see a copy of my visit note below.    Melyssa is a 64 year old who is being evaluated via a billable telephone visit.    What phone number would you like to be contacted at? Mobile  How would you like to obtain your AVS? MyChart  Originating Location (pt. Location): Home    Distant Location (provider location):  On-site  Telephone visit completed due to the patient did not have access to video, while the distant provider did.      She states hernia is unchanged.  No obstipation.  Discussed weight loss, she is currently \"stuck\".  She is on Ozempic but has not lost any weight.  Offered comprehensive weight loss clinic referral, she declines.  Advised of signs symptoms of incarceration/strangulation.  She will call if needed.    Phone call duration: 6 minutes  Signed Electronically by: Javed May DO         Again, thank you for allowing me to participate in the care of your patient.        Sincerely,        Javed May DO    Electronically signed"

## 2025-05-06 NOTE — NURSING NOTE
"Initial Ht 1.6 m (5' 3\")   Wt 105.2 kg (232 lb)   LMP 01/14/2016 (Approximate)   BMI 41.10 kg/m   Estimated body mass index is 41.1 kg/m  as calculated from the following:    Height as of this encounter: 1.6 m (5' 3\").    Weight as of this encounter: 105.2 kg (232 lb). .  Kaia Leahy MA    "

## 2025-05-06 NOTE — PROGRESS NOTES
"Melyssa is a 64 year old who is being evaluated via a billable telephone visit.    What phone number would you like to be contacted at? Mobile  How would you like to obtain your AVS? Kaelahart  Originating Location (pt. Location): Home    Distant Location (provider location):  On-site  Telephone visit completed due to the patient did not have access to video, while the distant provider did.      She states hernia is unchanged.  No obstipation.  Discussed weight loss, she is currently \"stuck\".  She is on Ozempic but has not lost any weight.  Offered comprehensive weight loss clinic referral, she declines.  Advised of signs symptoms of incarceration/strangulation.  She will call if needed.    Phone call duration: 6 minutes  Signed Electronically by: Javed May DO     "

## 2025-05-12 DIAGNOSIS — E78.5 HYPERLIPIDEMIA LDL GOAL <70: Primary | ICD-10-CM

## 2025-05-12 RX ORDER — ROSUVASTATIN CALCIUM 10 MG/1
10 TABLET, COATED ORAL DAILY
Qty: 90 TABLET | Refills: 2 | Status: SHIPPED | OUTPATIENT
Start: 2025-05-12

## 2025-05-27 DIAGNOSIS — L30.9 VULVAR DERMATITIS: ICD-10-CM

## 2025-05-27 RX ORDER — CLOBETASOL PROPIONATE 0.5 MG/G
OINTMENT TOPICAL
Qty: 60 G | Refills: 1 | Status: SHIPPED | OUTPATIENT
Start: 2025-05-27

## 2025-05-30 ENCOUNTER — ANCILLARY PROCEDURE (OUTPATIENT)
Dept: CARDIOLOGY | Facility: CLINIC | Age: 65
End: 2025-05-30
Attending: INTERNAL MEDICINE
Payer: COMMERCIAL

## 2025-05-30 DIAGNOSIS — Z95.0 CARDIAC PACEMAKER IN SITU: ICD-10-CM

## 2025-05-30 DIAGNOSIS — I44.1 SECOND DEGREE HEART BLOCK: ICD-10-CM

## 2025-05-30 PROCEDURE — 93296 REM INTERROG EVL PM/IDS: CPT | Performed by: INTERNAL MEDICINE

## 2025-05-30 PROCEDURE — 93294 REM INTERROG EVL PM/LDLS PM: CPT | Performed by: INTERNAL MEDICINE

## 2025-06-02 LAB
MDC_IDC_LEAD_CONNECTION_STATUS: NORMAL
MDC_IDC_LEAD_CONNECTION_STATUS: NORMAL
MDC_IDC_LEAD_IMPLANT_DT: NORMAL
MDC_IDC_LEAD_IMPLANT_DT: NORMAL
MDC_IDC_LEAD_LOCATION: NORMAL
MDC_IDC_LEAD_LOCATION: NORMAL
MDC_IDC_LEAD_LOCATION_DETAIL_1: NORMAL
MDC_IDC_LEAD_LOCATION_DETAIL_1: NORMAL
MDC_IDC_LEAD_MFG: NORMAL
MDC_IDC_LEAD_MFG: NORMAL
MDC_IDC_LEAD_MODEL: NORMAL
MDC_IDC_LEAD_MODEL: NORMAL
MDC_IDC_LEAD_POLARITY_TYPE: NORMAL
MDC_IDC_LEAD_POLARITY_TYPE: NORMAL
MDC_IDC_LEAD_SERIAL: NORMAL
MDC_IDC_LEAD_SERIAL: NORMAL
MDC_IDC_MSMT_BATTERY_DTM: NORMAL
MDC_IDC_MSMT_BATTERY_REMAINING_LONGEVITY: 148 MO
MDC_IDC_MSMT_BATTERY_RRT_TRIGGER: 2.62
MDC_IDC_MSMT_BATTERY_STATUS: NORMAL
MDC_IDC_MSMT_BATTERY_VOLTAGE: 3.15 V
MDC_IDC_MSMT_LEADCHNL_RA_IMPEDANCE_VALUE: 285 OHM
MDC_IDC_MSMT_LEADCHNL_RA_IMPEDANCE_VALUE: 456 OHM
MDC_IDC_MSMT_LEADCHNL_RA_PACING_THRESHOLD_AMPLITUDE: 0.75 V
MDC_IDC_MSMT_LEADCHNL_RA_PACING_THRESHOLD_PULSEWIDTH: 0.4 MS
MDC_IDC_MSMT_LEADCHNL_RA_SENSING_INTR_AMPL: 3.5 MV
MDC_IDC_MSMT_LEADCHNL_RA_SENSING_INTR_AMPL: 3.5 MV
MDC_IDC_MSMT_LEADCHNL_RV_IMPEDANCE_VALUE: 418 OHM
MDC_IDC_MSMT_LEADCHNL_RV_IMPEDANCE_VALUE: 551 OHM
MDC_IDC_MSMT_LEADCHNL_RV_PACING_THRESHOLD_AMPLITUDE: 0.88 V
MDC_IDC_MSMT_LEADCHNL_RV_PACING_THRESHOLD_PULSEWIDTH: 0.4 MS
MDC_IDC_MSMT_LEADCHNL_RV_SENSING_INTR_AMPL: 31.62 MV
MDC_IDC_MSMT_LEADCHNL_RV_SENSING_INTR_AMPL: 31.62 MV
MDC_IDC_PG_IMPLANT_DTM: NORMAL
MDC_IDC_PG_MFG: NORMAL
MDC_IDC_PG_MODEL: NORMAL
MDC_IDC_PG_SERIAL: NORMAL
MDC_IDC_PG_TYPE: NORMAL
MDC_IDC_SESS_CLINIC_NAME: NORMAL
MDC_IDC_SESS_DTM: NORMAL
MDC_IDC_SESS_TYPE: NORMAL
MDC_IDC_SET_BRADY_AT_MODE_SWITCH_RATE: 171 {BEATS}/MIN
MDC_IDC_SET_BRADY_HYSTRATE: NORMAL
MDC_IDC_SET_BRADY_LOWRATE: 60 {BEATS}/MIN
MDC_IDC_SET_BRADY_MAX_SENSOR_RATE: 130 {BEATS}/MIN
MDC_IDC_SET_BRADY_MAX_TRACKING_RATE: 130 {BEATS}/MIN
MDC_IDC_SET_BRADY_MODE: NORMAL
MDC_IDC_SET_BRADY_PAV_DELAY_LOW: 180 MS
MDC_IDC_SET_BRADY_SAV_DELAY_LOW: 150 MS
MDC_IDC_SET_LEADCHNL_RA_PACING_AMPLITUDE: 1.5 V
MDC_IDC_SET_LEADCHNL_RA_PACING_ANODE_ELECTRODE_1: NORMAL
MDC_IDC_SET_LEADCHNL_RA_PACING_ANODE_LOCATION_1: NORMAL
MDC_IDC_SET_LEADCHNL_RA_PACING_CAPTURE_MODE: NORMAL
MDC_IDC_SET_LEADCHNL_RA_PACING_CATHODE_ELECTRODE_1: NORMAL
MDC_IDC_SET_LEADCHNL_RA_PACING_CATHODE_LOCATION_1: NORMAL
MDC_IDC_SET_LEADCHNL_RA_PACING_POLARITY: NORMAL
MDC_IDC_SET_LEADCHNL_RA_PACING_PULSEWIDTH: 0.4 MS
MDC_IDC_SET_LEADCHNL_RA_SENSING_ANODE_ELECTRODE_1: NORMAL
MDC_IDC_SET_LEADCHNL_RA_SENSING_ANODE_LOCATION_1: NORMAL
MDC_IDC_SET_LEADCHNL_RA_SENSING_CATHODE_ELECTRODE_1: NORMAL
MDC_IDC_SET_LEADCHNL_RA_SENSING_CATHODE_LOCATION_1: NORMAL
MDC_IDC_SET_LEADCHNL_RA_SENSING_POLARITY: NORMAL
MDC_IDC_SET_LEADCHNL_RA_SENSING_SENSITIVITY: 0.3 MV
MDC_IDC_SET_LEADCHNL_RV_PACING_AMPLITUDE: 2 V
MDC_IDC_SET_LEADCHNL_RV_PACING_ANODE_ELECTRODE_1: NORMAL
MDC_IDC_SET_LEADCHNL_RV_PACING_ANODE_LOCATION_1: NORMAL
MDC_IDC_SET_LEADCHNL_RV_PACING_CAPTURE_MODE: NORMAL
MDC_IDC_SET_LEADCHNL_RV_PACING_CATHODE_ELECTRODE_1: NORMAL
MDC_IDC_SET_LEADCHNL_RV_PACING_CATHODE_LOCATION_1: NORMAL
MDC_IDC_SET_LEADCHNL_RV_PACING_POLARITY: NORMAL
MDC_IDC_SET_LEADCHNL_RV_PACING_PULSEWIDTH: 0.4 MS
MDC_IDC_SET_LEADCHNL_RV_SENSING_ANODE_ELECTRODE_1: NORMAL
MDC_IDC_SET_LEADCHNL_RV_SENSING_ANODE_LOCATION_1: NORMAL
MDC_IDC_SET_LEADCHNL_RV_SENSING_CATHODE_ELECTRODE_1: NORMAL
MDC_IDC_SET_LEADCHNL_RV_SENSING_CATHODE_LOCATION_1: NORMAL
MDC_IDC_SET_LEADCHNL_RV_SENSING_POLARITY: NORMAL
MDC_IDC_SET_LEADCHNL_RV_SENSING_SENSITIVITY: 0.9 MV
MDC_IDC_SET_ZONE_DETECTION_INTERVAL: 350 MS
MDC_IDC_SET_ZONE_DETECTION_INTERVAL: 400 MS
MDC_IDC_SET_ZONE_STATUS: NORMAL
MDC_IDC_SET_ZONE_STATUS: NORMAL
MDC_IDC_SET_ZONE_TYPE: NORMAL
MDC_IDC_SET_ZONE_VENDOR_TYPE: NORMAL
MDC_IDC_STAT_AT_BURDEN_PERCENT: 0 %
MDC_IDC_STAT_AT_DTM_END: NORMAL
MDC_IDC_STAT_AT_DTM_START: NORMAL
MDC_IDC_STAT_BRADY_AP_VP_PERCENT: 1.57 %
MDC_IDC_STAT_BRADY_AP_VS_PERCENT: 0 %
MDC_IDC_STAT_BRADY_AS_VP_PERCENT: 98.33 %
MDC_IDC_STAT_BRADY_AS_VS_PERCENT: 0.11 %
MDC_IDC_STAT_BRADY_DTM_END: NORMAL
MDC_IDC_STAT_BRADY_DTM_START: NORMAL
MDC_IDC_STAT_BRADY_RA_PERCENT_PACED: 1.6 %
MDC_IDC_STAT_BRADY_RV_PERCENT_PACED: 99.89 %
MDC_IDC_STAT_EPISODE_RECENT_COUNT: 0
MDC_IDC_STAT_EPISODE_RECENT_COUNT_DTM_END: NORMAL
MDC_IDC_STAT_EPISODE_RECENT_COUNT_DTM_START: NORMAL
MDC_IDC_STAT_EPISODE_TOTAL_COUNT: 0
MDC_IDC_STAT_EPISODE_TOTAL_COUNT_DTM_END: NORMAL
MDC_IDC_STAT_EPISODE_TOTAL_COUNT_DTM_START: NORMAL
MDC_IDC_STAT_EPISODE_TYPE: NORMAL
MDC_IDC_STAT_TACHYTHERAPY_RECENT_DTM_END: NORMAL
MDC_IDC_STAT_TACHYTHERAPY_RECENT_DTM_START: NORMAL
MDC_IDC_STAT_TACHYTHERAPY_TOTAL_DTM_END: NORMAL
MDC_IDC_STAT_TACHYTHERAPY_TOTAL_DTM_START: NORMAL

## 2025-06-03 ENCOUNTER — MYC MEDICAL ADVICE (OUTPATIENT)
Dept: FAMILY MEDICINE | Facility: CLINIC | Age: 65
End: 2025-06-03
Payer: COMMERCIAL

## 2025-06-03 DIAGNOSIS — B37.31 YEAST INFECTION INVOLVING THE VAGINA AND SURROUNDING AREA: ICD-10-CM

## 2025-06-03 RX ORDER — FLUCONAZOLE 150 MG/1
150 TABLET ORAL WEEKLY
Qty: 12 TABLET | Refills: 3 | Status: SHIPPED | OUTPATIENT
Start: 2025-06-03

## 2025-07-09 ENCOUNTER — TELEPHONE (OUTPATIENT)
Dept: FAMILY MEDICINE | Facility: CLINIC | Age: 65
End: 2025-07-09

## 2025-07-09 ENCOUNTER — ANCILLARY PROCEDURE (OUTPATIENT)
Dept: GENERAL RADIOLOGY | Facility: CLINIC | Age: 65
End: 2025-07-09
Attending: FAMILY MEDICINE
Payer: COMMERCIAL

## 2025-07-09 ENCOUNTER — OFFICE VISIT (OUTPATIENT)
Dept: FAMILY MEDICINE | Facility: CLINIC | Age: 65
End: 2025-07-09
Payer: COMMERCIAL

## 2025-07-09 ENCOUNTER — OFFICE VISIT (OUTPATIENT)
Dept: OPHTHALMOLOGY | Facility: CLINIC | Age: 65
End: 2025-07-09
Payer: COMMERCIAL

## 2025-07-09 VITALS
OXYGEN SATURATION: 98 % | TEMPERATURE: 97 F | BODY MASS INDEX: 41.18 KG/M2 | SYSTOLIC BLOOD PRESSURE: 120 MMHG | HEART RATE: 69 BPM | WEIGHT: 232.38 LBS | RESPIRATION RATE: 16 BRPM | HEIGHT: 63 IN | DIASTOLIC BLOOD PRESSURE: 74 MMHG

## 2025-07-09 DIAGNOSIS — M25.561 CHRONIC PAIN OF RIGHT KNEE: ICD-10-CM

## 2025-07-09 DIAGNOSIS — R06.09 DOE (DYSPNEA ON EXERTION): ICD-10-CM

## 2025-07-09 DIAGNOSIS — K31.84 GASTROPARESIS DUE TO DM (H): ICD-10-CM

## 2025-07-09 DIAGNOSIS — J45.20 MILD INTERMITTENT ASTHMA, UNCOMPLICATED: ICD-10-CM

## 2025-07-09 DIAGNOSIS — E11.22 TYPE 2 DIABETES MELLITUS WITH STAGE 2 CHRONIC KIDNEY DISEASE, WITH LONG-TERM CURRENT USE OF INSULIN (H): Primary | ICD-10-CM

## 2025-07-09 DIAGNOSIS — E11.43 GASTROPARESIS DUE TO DM (H): ICD-10-CM

## 2025-07-09 DIAGNOSIS — H02.132 SENILE ECTROPION OF RIGHT LOWER EYELID: Primary | ICD-10-CM

## 2025-07-09 DIAGNOSIS — H04.563 PUNCTAL STENOSIS, ACQUIRED, BILATERAL: ICD-10-CM

## 2025-07-09 DIAGNOSIS — G89.29 CHRONIC PAIN OF RIGHT KNEE: ICD-10-CM

## 2025-07-09 DIAGNOSIS — Z79.4 TYPE 2 DIABETES MELLITUS WITH STAGE 2 CHRONIC KIDNEY DISEASE, WITH LONG-TERM CURRENT USE OF INSULIN (H): Primary | ICD-10-CM

## 2025-07-09 DIAGNOSIS — N18.2 TYPE 2 DIABETES MELLITUS WITH STAGE 2 CHRONIC KIDNEY DISEASE, WITH LONG-TERM CURRENT USE OF INSULIN (H): Primary | ICD-10-CM

## 2025-07-09 DIAGNOSIS — H02.135 SENILE ECTROPION OF LEFT LOWER EYELID: ICD-10-CM

## 2025-07-09 LAB
EST. AVERAGE GLUCOSE BLD GHB EST-MCNC: 140 MG/DL
HBA1C MFR BLD: 6.5 % (ref 0–5.6)
HOLD SPECIMEN: NORMAL
HOLD SPECIMEN: NORMAL

## 2025-07-09 PROCEDURE — 99214 OFFICE O/P EST MOD 30 MIN: CPT | Performed by: FAMILY MEDICINE

## 2025-07-09 PROCEDURE — 36415 COLL VENOUS BLD VENIPUNCTURE: CPT | Performed by: FAMILY MEDICINE

## 2025-07-09 PROCEDURE — 3078F DIAST BP <80 MM HG: CPT | Performed by: FAMILY MEDICINE

## 2025-07-09 PROCEDURE — 73562 X-RAY EXAM OF KNEE 3: CPT | Mod: TC | Performed by: RADIOLOGY

## 2025-07-09 PROCEDURE — 3044F HG A1C LEVEL LT 7.0%: CPT | Performed by: FAMILY MEDICINE

## 2025-07-09 PROCEDURE — 3074F SYST BP LT 130 MM HG: CPT | Performed by: FAMILY MEDICINE

## 2025-07-09 PROCEDURE — 83036 HEMOGLOBIN GLYCOSYLATED A1C: CPT | Performed by: FAMILY MEDICINE

## 2025-07-09 PROCEDURE — 1126F AMNT PAIN NOTED NONE PRSNT: CPT | Performed by: FAMILY MEDICINE

## 2025-07-09 RX ORDER — METFORMIN HYDROCHLORIDE 500 MG/1
1000 TABLET, EXTENDED RELEASE ORAL 2 TIMES DAILY WITH MEALS
Qty: 360 TABLET | Refills: 1 | Status: SHIPPED | OUTPATIENT
Start: 2025-07-09

## 2025-07-09 RX ORDER — INSULIN ASPART 100 [IU]/ML
26 INJECTION, SOLUTION INTRAVENOUS; SUBCUTANEOUS
Qty: 90 ML | Refills: 3 | Status: SHIPPED | OUTPATIENT
Start: 2025-07-09

## 2025-07-09 RX ORDER — METOCLOPRAMIDE 10 MG/1
10 TABLET ORAL 2 TIMES DAILY
Qty: 180 TABLET | Refills: 1 | Status: SHIPPED | OUTPATIENT
Start: 2025-07-09

## 2025-07-09 RX ORDER — INSULIN GLARGINE 300 U/ML
INJECTION, SOLUTION SUBCUTANEOUS
Qty: 27 ML | Refills: 3 | Status: SHIPPED | OUTPATIENT
Start: 2025-07-09

## 2025-07-09 RX ORDER — FUROSEMIDE 20 MG/1
20 TABLET ORAL DAILY
Qty: 90 TABLET | Refills: 1 | Status: SHIPPED | OUTPATIENT
Start: 2025-07-09

## 2025-07-09 RX ORDER — INSULIN GLARGINE 300 U/ML
INJECTION, SOLUTION SUBCUTANEOUS
Qty: 27 ML | Refills: 3 | Status: SHIPPED | OUTPATIENT
Start: 2025-07-09 | End: 2025-07-09

## 2025-07-09 RX ORDER — MONTELUKAST SODIUM 10 MG/1
10 TABLET ORAL AT BEDTIME
Qty: 90 TABLET | Refills: 3 | Status: SHIPPED | OUTPATIENT
Start: 2025-07-09

## 2025-07-09 RX ORDER — SEMAGLUTIDE 2.68 MG/ML
2 INJECTION, SOLUTION SUBCUTANEOUS
Qty: 9 ML | Refills: 1 | Status: SHIPPED | OUTPATIENT
Start: 2025-07-09

## 2025-07-09 ASSESSMENT — VISUAL ACUITY
METHOD: SNELLEN - LINEAR
CORRECTION_TYPE: GLASSES
OS_CC: 20/60
OD_CC: 20/50

## 2025-07-09 ASSESSMENT — TONOMETRY
OS_IOP_MMHG: 16
OD_IOP_MMHG: 16
IOP_METHOD: ICARE

## 2025-07-09 ASSESSMENT — PAIN SCALES - GENERAL: PAINLEVEL_OUTOF10: NO PAIN (0)

## 2025-07-09 NOTE — PROGRESS NOTES
Chief Complaint(s) and History of Present Illness(es)     Post Op (Ophthalmology) Both Eyes            Laterality: both eyes          Comments    S/p bilateral lower eyelid ectropion repair by tarsal strip procedure,   bilateral punctoplasty and and silicone intubation of lacrimal system   03/07/2025.  Doing well, happy with surgical results. No eye drops          Assessment & Plan     Bria Davis is a 65 year old female with the following diagnoses:   Encounter Diagnoses   Name Primary?    Senile ectropion of right lower eyelid Yes    Senile ectropion of left lower eyelid     Punctal stenosis, acquired, bilateral      Doing well. No tearing!   Follow up with me as needed.        Attending Physician Attestation: Complete documentation of historical and exam elements from today's encounter can be found in the full encounter summary report (not reduplicated in this progress note). I personally obtained the chief complaint(s) and history of present illness. I confirmed and edited as necessary the review of systems, past medical/surgical history, family history, social history, and examination findings as documented by others; and I examined the patient myself. I personally reviewed the relevant tests, images, and reports as documented above. I formulated and edited as necessary the assessment and plan and discussed the findings and management plan with the patient.  -Sarahi Agosto MD

## 2025-07-09 NOTE — PROGRESS NOTES
"  Assessment & Plan     Type 2 diabetes mellitus with stage 2 chronic kidney disease, with long-term current use of insulin (H)  Well controlled  Hypoglycemia at times, will have her drop candice Toujeo to 90 units daily  She adjusts her Novolog based on what her pre-meal blood sugars are.  Only taking the full 26 units if her pre-meal blood sugar is >150, this is reasonable.   Continue ozempic, jardiance and metformin    - Hemoglobin A1c; Future  - Extra Green Top Tube (LAB USE ONLY)  - Extra SST Tube (LAB USE ONLY)  - Hemoglobin A1c  - empagliflozin (JARDIANCE) 10 MG TABS tablet; Take 1 tablet (10 mg) by mouth daily.    Gastroparesis due to DM (H)   Stable, continue reglan  - metoclopramide (REGLAN) 10 MG tablet; Take 1 tablet (10 mg) by mouth 2 times daily.    LALA (dyspnea on exertion)  stable  - furosemide (LASIX) 20 MG tablet; Take 1 tablet (20 mg) by mouth daily.    Mild intermittent asthma, uncomplicated  Asthma currently well managed  Continue Advair  - montelukast (SINGULAIR) 10 MG tablet; Take 1 tablet (10 mg) by mouth at bedtime.    Chronic pain of right knee  Will have her see ortho to determine if further imaging is needed vs trial of CSI?  - XR Knee Right 3 Views; Future  - Orthopedic  Referral; Future    BMI  Estimated body mass index is 41.16 kg/m  as calculated from the following:    Height as of this encounter: 1.6 m (5' 3\").    Weight as of this encounter: 105.4 kg (232 lb 6 oz).           Susannah Ragsdale is a 65 year old, presenting for the following health issues:  Diabetes        7/9/2025     7:14 AM   Additional Questions   Roomed by Shirley SALINAS CMA   Accompanied by Self     - She retired!    Taking a trip with siblings (10 (8 sibs 2 cousins) to [place] soon)    Diabetes Follow-up  Insulin - Novolog and Toujeo, jardiance 10mg every day, metformin 1,000mg bid, ozempic 2mg SubQ weekly    History of Present Illness       Diabetes:   She presents for follow up of diabetes.  She is " "checking home blood glucose three times daily.   She checks blood glucose before meals.  Blood glucose is never over 200 and sometimes under 70. She is aware of hypoglycemia symptoms including shakiness, blurred vision and confusion.   She is concerned about frequent infections and low blood sugar, several less than 70 in the past few weeks.   She is having blurry vision.            She eats 2-3 servings of fruits and vegetables daily.She consumes 0 sweetened beverage(s) daily.She exercises with enough effort to increase her heart rate 9 or less minutes per day.  She exercises with enough effort to increase her heart rate 3 or less days per week.   She is taking medications regularly.            Hyperlipidemia Follow-Up  Rosuvastatin 10mg qd  Are you regularly taking any medication or supplement to lower your cholesterol?   Yes- statin  Are you having muscle aches or other side effects that you think could be caused by your cholesterol lowering medication?  No    Hypertension Follow-up  Losartan 100mg every day, metoprolol 25mg qd  Do you check your blood pressure regularly outside of the clinic? No   Are you following a low salt diet? No  Are your blood pressures ever more than 140 on the top number (systolic) OR more   than 90 on the bottom number (diastolic), for example 140/90? N/A    BP Readings from Last 2 Encounters:   07/09/25 120/74   03/31/25 (!) 140/70     Hemoglobin A1C (%)   Date Value   03/19/2025 6.8 (H)   12/19/2024 6.6 (H)   04/14/2021 7.3 (H)   12/09/2020 7.9 (H)     LDL Cholesterol Calculated (mg/dL)   Date Value   02/05/2025 49   02/26/2024 43   04/14/2021 63   06/15/2020 53       Right knee and right hip are bothering her.        Review of Systems  Constitutional, HEENT, cardiovascular, pulmonary, gi and gu systems are negative, except as otherwise noted.      Objective    /74   Pulse 69   Temp 97  F (36.1  C) (Tympanic)   Resp 16   Ht 1.6 m (5' 3\")   Wt 105.4 kg (232 lb 6 oz)   LMP " 01/14/2016 (Approximate)   SpO2 98%   BMI 41.16 kg/m    Body mass index is 41.16 kg/m .  Physical Exam   GENERAL: alert and no distress  NECK: no adenopathy, no asymmetry, masses, or scars  RESP: lungs clear to auscultation - no rales, rhonchi or wheezes  CV: regular rate and rhythm, normal S1 S2, no S3 or S4, no murmur, click or rub, no peripheral edema  ABDOMEN: soft, nontender, no hepatosplenomegaly, no masses and bowel sounds normal  MS: crepitus of the right knee.  Full range of motion.  Negative McMurrays  PSYCH: mentation appears normal, affect normal/bright    Xray - Reviewed and interpreted by me.  Degenerative changes of the patellofemoral area.  Ossicle of the lateral patella  Pending review by radiologist        Signed Electronically by: Kaia Elena MD

## 2025-07-09 NOTE — LETTER
2025         RE:  :  MRN: Bria Davis  1960  [id number]     Dear Dr. Recinos and Dr. Laureano,    Our mutual patient, Bria Davis, returned for oculoplastic follow up. She was referred to me for epiphora and ectropion. She underwent both lower eyelid punctoplasty and ectropion repair on 3/7/2025. She reports she is doing very well, and has had resolution of tearing bilaterally. I will sign off on her care, but am happy to see her back at any ponit.     Many thanks for trusting me with the care of your patients.    Sincerely,    Sarahi Agosto MD    Oculoplastic and Orbital Surgery   Department of Ophthalmology and Visual Neurosciences  [facility]            CC: Josefa Recinos MD  [facility]  [address] [address]  Via Fax: [phone]    Arron Laureano  [facility]  [address] [address]  Via Fax: [phone]

## 2025-07-09 NOTE — TELEPHONE ENCOUNTER
Central Prior Authorization Team   Phone: [phone]    PA Initiation    Medication: Ozempic (2 MG/DOSE) 8MG/3ML pen-injectors  Insurance Company: [employer] [place] - Phone [phone] Fax [phone]  Pharmacy Filling the Rx: [facility] [place], [place] - [address]  Filling Pharmacy Phone: [phone]  Filling Pharmacy Fax:    Start Date: 7/9/2025    [facility] KEY: [id number]

## 2025-07-09 NOTE — TELEPHONE ENCOUNTER
Pa for ozempic   Please make this a high priority if possible, pt is established on ozempic and is in need of their next dose   Thank you    Prior Authorization Retail Medication Request    Medication/Dose:   Diagnosis and ICD code (if different than what is on RX):  na  New/renewal/insurance change PA/secondary ins. PA:  Previously Tried and Failed:  na  Rationale:  na    Insurance   [place] [place]  Insurance ID:  [id number]    Secondary (if applicable):jesenia  Insurance ID:  jesenia    Pharmacy Information (if different than what is on RX)  Name:  [facility]  Phone:  [phone]  Fax:[phone]     Clinic Information  Preferred routing pool for dept communication: jesenia

## 2025-07-09 NOTE — TELEPHONE ENCOUNTER
Prior Authorization Approval    Authorization Effective Date: 6/9/2025  Authorization Expiration Date: 7/9/2026  Medication: Ozempic (2 MG/DOSE) 8MG/3ML pen-injectors  Reference #:     Insurance Company: [facility] - Phone [phone] Fax [phone]  Which Pharmacy is filling the prescription (Not needed for infusion/clinic administered): [facility] [place], [place] - [address]  Pharmacy Notified: Yes  Patient Notified: Instructed pharmacy to notify patient when script is ready to /ship.

## 2025-07-10 ENCOUNTER — PATIENT OUTREACH (OUTPATIENT)
Dept: CARE COORDINATION | Facility: CLINIC | Age: 65
End: 2025-07-10
Payer: COMMERCIAL

## 2025-07-21 DIAGNOSIS — Z79.4 TYPE 2 DIABETES MELLITUS WITH CHRONIC KIDNEY DISEASE, WITH LONG-TERM CURRENT USE OF INSULIN, UNSPECIFIED CKD STAGE (H): ICD-10-CM

## 2025-07-21 DIAGNOSIS — E11.22 TYPE 2 DIABETES MELLITUS WITH CHRONIC KIDNEY DISEASE, WITH LONG-TERM CURRENT USE OF INSULIN, UNSPECIFIED CKD STAGE (H): ICD-10-CM

## 2025-07-28 SDOH — HEALTH STABILITY: PHYSICAL HEALTH: ON AVERAGE, HOW MANY MINUTES DO YOU ENGAGE IN EXERCISE AT THIS LEVEL?: 0 MIN

## 2025-07-28 SDOH — HEALTH STABILITY: PHYSICAL HEALTH: ON AVERAGE, HOW MANY DAYS PER WEEK DO YOU ENGAGE IN MODERATE TO STRENUOUS EXERCISE (LIKE A BRISK WALK)?: 0 DAYS

## 2025-07-28 NOTE — PROGRESS NOTES
ASSESSMENT & PLAN    Melyssa was seen today for pain and pain.    Diagnoses and all orders for this visit:    Greater trochanteric bursitis of right hip  -     POC US GUIDANCE NEEDLE PLACEMENT; Future  -     Large Joint Injection/Arthocentesis: R greater trochanteric bursa    Chronic pain of right knee  -     Orthopedic  Referral    Primary osteoarthritis of right knee    Right hip pain  -     XR Pelvis w Hip RT 1 View; Future      This issue is acute on chronic and Worsening.    # Right hip > right knee pain: Bria Davis  was seen today for right hip > right  knee pain. Symptoms had been going on for several months. On examination there are positive findings of greater troch tenderness, pain with resisted hip ER. Imaging findings showed mild-moderate bilateral hip OA, cortical irregularity of bilateral greater trochanters as, mild- moderate arthritis of the knee, primarily in the medial and patellofemoral compartments.     Likely cause of patient's condition due to greater troch bursitis and IT band syndrome of the hip and osteoarthritis of the knee.  Counseled patient on nature of condition and treatment options.    - Activity: activity as tolerated and home exercises given  - Ice, heat, massage as needed  - Medications:      - diclofenac (voltaren) gel three times daily as needed     - tylenol 1000mg three times daily as needed  - Injections: tolerated right greater trochanter bursa cortisone injection    Follow-up:   - Could repeat injection after 3 months if injection was helpful but pain returns     Dixon Barber MD  [facility] [place]    -----  Chief Complaint   Patient presents with    Right Knee - Pain    Right Hip - Pain       SUBJECTIVE  Bria Davis is a/an 65 year old female who is seen in consultation at the request of  Kaia Elena M.D. for evaluation of right knee.     The patient is seen with their , Tam.    Onset: chronic; worse last  2-3 months. Reports insidious onset without acute precipitating event.  Location of Pain: right knee, unable to localize  Worsened by: ascending stairs  Better with: unsure  Treatments tried: corticosteroid injection (most recent date: 9/28/2015) that provided relief  Associated symptoms: patient reports right lateral thigh shooting pain, mostly with ascending stairs or rolling over in bed; feeling of instability    The patient also has concerns of right lateral hip pain, that is almost constant over the past 6 months. She localizes her pain to the right greater trochanter. She reports pain when laying on her right side. She reports that her hip is more bothersome than her knee pain currently. She had bilateral greater trochanteric bursa steroid injections in the spring of 2023, which she reports were helpful.    Orthopedic/Surgical history: YES - chronic right knee pain, saw Dr. 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  CV: distal perfusion intact   Resp: normal respiratory effort without conversational dyspnea   Psych: normal mood and affect  Gait: NORMAL  Neuro: Normal light sensory exam of right lower extremity    RIGHT HIP  Inspection:    No swelling, bruising, discoloration, or obvious deformity or asymmetry  Palpation:    Tender about the greater trochanteric region and gluteus medius insertion. Otherwise all other landmarks are nontender.    Crepitus is Absent  Active Range of Motion:     Flexion full / IR full / ER  full  Strength:    Flexion 5-/5 / adduction 5/5 / abduction 5-/5, painful  Special Tests:    Positive: SHAWN --lateral hip pain    Negative: Logroll, anterior impingement (FADIR)      RIGHT KNEE  Inspection:    Normal alignment; no edema, erythema, or  ecchymosis present  Palpation:    Tender about the distal IT band. Remainder of bony and ligamentous landmarks are nontender.    No effusion is present    Patellofemoral crepitus is Absent  Range of Motion:     00 extension to 1200 flexion  Strength:    5/5 flexion/ extension    Extensor mechanism intact  Special Tests:    Negative: MCL/valgus stress (0 & 30 deg), LCL/varus stress (0 & 30 deg), Lachman's, anterior drawer, posterior drawer       RADIOLOGY:  Final results and radiologist's interpretation, available in the [facility] health record.  Images were reviewed with the patient in the office today.  My personal interpretation of the performed imaging:   - Hip xrays from 7/29/25:mild-moderate bilateral hip OA, cortical irregularity of bilateral greater trochanters   - Knee xray from 7/9/25: mild- moderate arthritis of the knee, primarily in the medial and patellofemoral compartments.     EXAM: XR KNEE RIGHT 3 VIEWS  LOCATION: [facility]  DATE: 7/9/2025     INDICATION:  Chronic pain of right knee, Chronic pain of right knee  COMPARISON: 11/9/2022                                                                       IMPRESSION: Mild narrowing of the medial and patellofemoral compartments. No acute fracture or joint effusion.     Large Joint Injection/Arthocentesis: R greater trochanteric bursa    Date/Time: 7/29/2025 10:40 AM    Performed by: Dixon Barber MD  Authorized by: Dixon Barber MD    Indications:  Pain  Needle Size:  22 G  Guidance: ultrasound    Approach:  Lateral  Location:  Hip      Site:  R greater trochanteric bursa  Medications:  6 mg betamethasone acet & sod phos 6 (3-3) MG/ML; 2 mL ROPivacaine 5 MG/ML  Outcome:  Tolerated well, no immediate complications  Procedure discussed: discussed risks, benefits, and alternatives    Consent Given by:  Patient  Timeout: timeout called immediately prior to procedure    Prep: patient was prepped and draped in usual sterile  fashion     Patient tolerated right greater troch cortisone injection. Ultrasound guidance was required to ensure correct needle placement for injection and avoid vital surrounding structures. Patient had moderate- severe glute med tendinopathy with thickening moderate partial tearing  as well as bursitis. Ultrasound guided images were permanently stored. Aftercare instructions given to patient. Plan to follow-up as above.     Dixon Barber MD        Patient was independently assessed by me and was deemed appropriate for this procedure. Risks and benefits were discussed with good understanding including, but not limited to, the risks of bleeding, reaction to the medication, infection, potential delay of any surgical intervention by 3 months, temporary elevation of blood sugars, weakening of the tendon, and the possibility of the treatment being ineffective. The patient tolerated the procedure well with no complications.    After visit care instructions were discussed in person and provided in AVS.

## 2025-07-29 ENCOUNTER — ANCILLARY PROCEDURE (OUTPATIENT)
Dept: GENERAL RADIOLOGY | Facility: CLINIC | Age: 65
End: 2025-07-29
Attending: STUDENT IN AN ORGANIZED HEALTH CARE EDUCATION/TRAINING PROGRAM
Payer: COMMERCIAL

## 2025-07-29 ENCOUNTER — OFFICE VISIT (OUTPATIENT)
Dept: ORTHOPEDICS | Facility: CLINIC | Age: 65
End: 2025-07-29
Payer: COMMERCIAL

## 2025-07-29 DIAGNOSIS — M25.561 CHRONIC PAIN OF RIGHT KNEE: ICD-10-CM

## 2025-07-29 DIAGNOSIS — M17.11 PRIMARY OSTEOARTHRITIS OF RIGHT KNEE: ICD-10-CM

## 2025-07-29 DIAGNOSIS — G89.29 CHRONIC PAIN OF RIGHT KNEE: ICD-10-CM

## 2025-07-29 DIAGNOSIS — M25.551 RIGHT HIP PAIN: ICD-10-CM

## 2025-07-29 DIAGNOSIS — M70.61 GREATER TROCHANTERIC BURSITIS OF RIGHT HIP: Primary | ICD-10-CM

## 2025-07-29 PROCEDURE — 20611 DRAIN/INJ JOINT/BURSA W/US: CPT | Mod: RT | Performed by: STUDENT IN AN ORGANIZED HEALTH CARE EDUCATION/TRAINING PROGRAM

## 2025-07-29 PROCEDURE — 73502 X-RAY EXAM HIP UNI 2-3 VIEWS: CPT | Mod: TC | Performed by: RADIOLOGY

## 2025-07-29 PROCEDURE — 99214 OFFICE O/P EST MOD 30 MIN: CPT | Mod: 25 | Performed by: STUDENT IN AN ORGANIZED HEALTH CARE EDUCATION/TRAINING PROGRAM

## 2025-07-29 RX ORDER — ROPIVACAINE HYDROCHLORIDE 5 MG/ML
2 INJECTION, SOLUTION EPIDURAL; INFILTRATION; PERINEURAL
Status: COMPLETED | OUTPATIENT
Start: 2025-07-29 | End: 2025-07-29

## 2025-07-29 RX ORDER — BETAMETHASONE SODIUM PHOSPHATE AND BETAMETHASONE ACETATE 3; 3 MG/ML; MG/ML
6 INJECTION, SUSPENSION INTRA-ARTICULAR; INTRALESIONAL; INTRAMUSCULAR; SOFT TISSUE
Status: COMPLETED | OUTPATIENT
Start: 2025-07-29 | End: 2025-07-29

## 2025-07-29 RX ADMIN — ROPIVACAINE HYDROCHLORIDE 2 ML: 5 INJECTION, SOLUTION EPIDURAL; INFILTRATION; PERINEURAL at 10:40

## 2025-07-29 RX ADMIN — BETAMETHASONE SODIUM PHOSPHATE AND BETAMETHASONE ACETATE 6 MG: 3; 3 INJECTION, SUSPENSION INTRA-ARTICULAR; INTRALESIONAL; INTRAMUSCULAR; SOFT TISSUE at 10:40

## 2025-07-29 NOTE — LETTER
7/29/2025      Bria Davis  [address] [address]      Dear Colleague,    Thank you for referring your patient, Bria Davis, to the [facility]. Please see a copy of my visit note below.    ASSESSMENT & PLAN    Melyssa was seen today for pain and pain.    Diagnoses and all orders for this visit:    Greater trochanteric bursitis of right hip  -     POC US GUIDANCE NEEDLE PLACEMENT; Future  -     Large Joint Injection/Arthocentesis: R greater trochanteric bursa    Chronic pain of right knee  -     Orthopedic  Referral    Primary osteoarthritis of right knee    Right hip pain  -     XR Pelvis w Hip RT 1 View; Future      This issue is acute on chronic and Worsening.    # Right hip > right knee pain: Bria Davis  was seen today for right hip > right  knee pain. Symptoms had been going on for several months. On examination there are positive findings of greater troch tenderness, pain with resisted hip ER. Imaging findings showed mild-moderate bilateral hip OA, cortical irregularity of bilateral greater trochanters as, mild- moderate arthritis of the knee, primarily in the medial and patellofemoral compartments.     Likely cause of patient's condition due to greater troch bursitis and IT band syndrome of the hip and osteoarthritis of the knee.  Counseled patient on nature of condition and treatment options.    - Activity: activity as tolerated and home exercises given  - Ice, heat, massage as needed  - Medications:      - diclofenac (voltaren) gel three times daily as needed     - tylenol 1000mg three times daily as needed  - Injections: tolerated right greater trochanter bursa cortisone injection    Follow-up:   - Could repeat injection after 3 months if injection was helpful but pain returns     Dixon Barber MD  [facility]    -----  Chief Complaint   Patient presents with     Right Knee - Pain      Right Hip - Pain       SUBJECTIVE  Bria Davis is a/an 65 year old female who is seen in consultation at the request of  Kaia Elena M.D. for evaluation of right knee.     The patient is seen with their , Tam.    Onset: chronic; worse last 2-3 months. Reports insidious onset without acute precipitating event.  Location of Pain: right knee, unable to localize  Worsened by: ascending stairs  Better with: unsure  Treatments tried: corticosteroid injection (most recent date: 9/28/2015) that provided relief  Associated symptoms: patient reports right lateral thigh shooting pain, mostly with ascending stairs or rolling over in bed; feeling of instability    The patient also has concerns of right lateral hip pain, that is almost constant over the past 6 months. She localizes her pain to the right greater trochanter. She reports pain when laying on her right side. She reports that her hip is more bothersome than her knee pain currently. She had bilateral greater trochanteric bursa steroid injections in the spring of 2023, which she reports were helpful.    Orthopedic/Surgical history: YES - chronic right knee pain, saw Dr. 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  CV: distal perfusion intact   Resp: normal respiratory effort without conversational dyspnea   Psych: normal mood and affect  Gait: NORMAL  Neuro: Normal light sensory exam of right lower extremity    RIGHT HIP  Inspection:    No swelling, bruising, discoloration, or obvious deformity or asymmetry  Palpation:    Tender about the greater trochanteric region and gluteus medius insertion. Otherwise all other landmarks are nontender.    Crepitus is Absent  Active Range of Motion:     Flexion full / IR  full / ER  full  Strength:    Flexion 5-/5 / adduction 5/5 / abduction 5-/5, painful  Special Tests:    Positive: SHAWN --lateral hip pain    Negative: Logroll, anterior impingement (FADIR)      RIGHT KNEE  Inspection:    Normal alignment; no edema, erythema, or ecchymosis present  Palpation:    Tender about the distal IT band. Remainder of bony and ligamentous landmarks are nontender.    No effusion is present    Patellofemoral crepitus is Absent  Range of Motion:     00 extension to 1200 flexion  Strength:    5/5 flexion/ extension    Extensor mechanism intact  Special Tests:    Negative: MCL/valgus stress (0 & 30 deg), LCL/varus stress (0 & 30 deg), Lachman's, anterior drawer, posterior drawer       RADIOLOGY:  Final results and radiologist's interpretation, available in the [facility] health record.  Images were reviewed with the patient in the office today.  My personal interpretation of the performed imaging:   - Hip xrays from 7/29/25:mild-moderate bilateral hip OA, cortical irregularity of bilateral greater trochanters   - Knee xray from 7/9/25: mild- moderate arthritis of the knee, primarily in the medial and patellofemoral compartments.     EXAM: XR KNEE RIGHT 3 VIEWS  LOCATION: [facility]  DATE: 7/9/2025     INDICATION:  Chronic pain of right knee, Chronic pain of right knee  COMPARISON: 11/9/2022                                                                       IMPRESSION: Mild narrowing of the medial and patellofemoral compartments. No acute fracture or joint effusion.     Large Joint Injection/Arthocentesis: R greater trochanteric bursa    Date/Time: 7/29/2025 10:40 AM    Performed by: Dixon Barber MD  Authorized by: Dixon Barber MD    Indications:  Pain  Needle Size:  22 G  Guidance: ultrasound    Approach:  Lateral  Location:  Hip      Site:  R greater trochanteric bursa  Medications:  6 mg betamethasone acet & sod phos 6 (3-3) MG/ML; 2 mL ROPivacaine 5  MG/ML  Outcome:  Tolerated well, no immediate complications  Procedure discussed: discussed risks, benefits, and alternatives    Consent Given by:  Patient  Timeout: timeout called immediately prior to procedure    Prep: patient was prepped and draped in usual sterile fashion     Patient tolerated right greater troch cortisone injection. Ultrasound guidance was required to ensure correct needle placement for injection and avoid vital surrounding structures. Patient had moderate- severe glute med tendinopathy with thickening moderate partial tearing  as well as bursitis. Ultrasound guided images were permanently stored. Aftercare instructions given to patient. Plan to follow-up as above.     Dixon Barber MD        Patient was independently assessed by me and was deemed appropriate for this procedure. Risks and benefits were discussed with good understanding including, but not limited to, the risks of bleeding, reaction to the medication, infection, potential delay of any surgical intervention by 3 months, temporary elevation of blood sugars, weakening of the tendon, and the possibility of the treatment being ineffective. The patient tolerated the procedure well with no complications.    After visit care instructions were discussed in person and provided in AVS.         Again, thank you for allowing me to participate in the care of your patient.        Sincerely,        Dixon Barber MD    Electronically signed

## 2025-07-29 NOTE — PATIENT INSTRUCTIONS
[facility] [place]    Procedure: right greater trochanter cortisone injection    You may shower, however avoid swimming, tub baths or hot tubs for 24 hours following your procedure  You may have a mild to moderate increase in pain for several days following the injection.  It may take up to 14 days for the steroid medication to start working although you may feel the effect as early as a few days after the procedure.  You may use ice packs for 10-15 minutes, 3 to 4 times a day at the injection site for comfort  You may use anti-inflammatory medications (such as Ibuprofen or Aleve or Advil) or Tylenol for pain control if necessary and allowed to by your regular doctor  If you were fasting, you may resume your normal diet and medications after the procedure  If you have diabetes, check your blood sugar more frequently than usual as your blood sugar may be higher than normal for 10-14 days following a steroid injection. Contact your doctor who manages your diabetes if your blood sugar is higher than usual    If you experience any of the following, call [facility] [place] @ [phone] or [phone]  - Fever over 100 degree F  - Swelling, bleeding, redness, drainage, warmth at the injection site  - New or worsening pain    Follow-up:   - Could repeat injection after 3 months if injection was helpful but pain returns

## 2025-08-25 ENCOUNTER — MYC MEDICAL ADVICE (OUTPATIENT)
Dept: FAMILY MEDICINE | Facility: CLINIC | Age: 65
End: 2025-08-25
Payer: COMMERCIAL

## 2025-08-25 DIAGNOSIS — J31.0 CHRONIC RHINITIS: ICD-10-CM

## 2025-08-25 DIAGNOSIS — Z79.4 TYPE 2 DIABETES MELLITUS WITH CHRONIC KIDNEY DISEASE, WITH LONG-TERM CURRENT USE OF INSULIN, UNSPECIFIED CKD STAGE (H): ICD-10-CM

## 2025-08-25 DIAGNOSIS — E11.22 TYPE 2 DIABETES MELLITUS WITH CHRONIC KIDNEY DISEASE, WITH LONG-TERM CURRENT USE OF INSULIN, UNSPECIFIED CKD STAGE (H): ICD-10-CM

## 2025-08-25 DIAGNOSIS — L30.4 INTERTRIGO: ICD-10-CM

## 2025-08-26 RX ORDER — PSEUDOEPHEDRINE HCL 30 MG/1
60 TABLET, FILM COATED ORAL 2 TIMES DAILY
Qty: 120 TABLET | Refills: 3 | Status: SHIPPED | OUTPATIENT
Start: 2025-08-26

## 2025-08-26 RX ORDER — LANCETS
EACH MISCELLANEOUS
Qty: 204 EACH | Refills: 11 | Status: SHIPPED | OUTPATIENT
Start: 2025-08-26

## 2025-08-26 RX ORDER — TRIAMCINOLONE ACETONIDE 1 MG/G
CREAM TOPICAL 2 TIMES DAILY
Qty: 80 G | Refills: 1 | Status: SHIPPED | OUTPATIENT
Start: 2025-08-26

## 2025-08-27 ENCOUNTER — TRANSFERRED RECORDS (OUTPATIENT)
Dept: HEALTH INFORMATION MANAGEMENT | Facility: CLINIC | Age: 65
End: 2025-08-27
Payer: COMMERCIAL

## (undated) DEVICE — PAD CHUX UNDERPAD 30X36" P3036C

## (undated) DEVICE — DECANTER VIAL 2006S

## (undated) DEVICE — NDL PERC ENTRY THINWALL 18GA 7.0" G00166

## (undated) DEVICE — GLOVE BIOGEL PI MICRO SZ 7.0 48570

## (undated) DEVICE — DRAPE SHEET MED 44X70" 9355

## (undated) DEVICE — INTRODUCER SAFESHEATH II LONG 7FR 23CM SSL7

## (undated) DEVICE — Device

## (undated) DEVICE — BASIN SET SINGLE STERILE 13752-624

## (undated) DEVICE — LINEN TOWEL PACK X5 5464

## (undated) DEVICE — GOWN XLG DISP 9545

## (undated) DEVICE — SLITTER ADJSTBL 6232ADJ

## (undated) DEVICE — ESU GROUND PAD ADULT W/CORD E7507

## (undated) DEVICE — SYR 10ML FINGER CONTROL W/O NDL 309695

## (undated) DEVICE — SOL WATER IRRIG 1000ML BOTTLE 07139-09

## (undated) DEVICE — CABLE PACING ALLIGATOR CLIP 12FT 5833SL

## (undated) DEVICE — SU STRATAFIX MONOCRYL 3-0 SPIRAL PS-2 45CM SXMP1B107

## (undated) DEVICE — DRSG XEROFORM 1X8"

## (undated) DEVICE — GLOVE BIOGEL PI MICRO INDICATOR UNDERGLOVE SZ 7.5 48975

## (undated) DEVICE — PREP CHLORAPREP 26ML TINTED ORANGE  260815

## (undated) DEVICE — SYR BULB IRRIG DOVER 60 ML LATEX FREE 67000

## (undated) DEVICE — GUIDEWIRE VASC 0.035INX150CM INQWIRE J TIP IQ35F150J3F/A

## (undated) DEVICE — BNDG ELASTIC 6" DBL LENGTH UNSTERILE 6611-16

## (undated) DEVICE — GLOVE BIOGEL PI MICRO SZ 7.5 48575

## (undated) DEVICE — SU VICRYL 3-0 SH 27" UND J416H

## (undated) DEVICE — RAD INTRODUCER KIT MICRO 5FRX10CM .018 NITINOL G/W

## (undated) DEVICE — SU DERMABOND PRINEO 42CM CLR422US

## (undated) DEVICE — CATH ANGIO INFINITI 3DRC 4FRX100CM 538476

## (undated) DEVICE — PROBE COVER INTRAOPERATIVE 5"X96" PC1308

## (undated) DEVICE — SU ETHILON 3-0 FS-1 18" 669H

## (undated) DEVICE — SYR ANGIOGRAPHY MULTIUSE KIT ACIST 014612

## (undated) DEVICE — DRAPE IOBAN INCISE 23X17" 6650EZ

## (undated) DEVICE — SOL WATER IRRIG 500ML BOTTLE 2F7113

## (undated) DEVICE — SU PDS II 4-0 FS-2 27" Z422H

## (undated) DEVICE — GLOVE PROTEXIS W/NEU-THERA 8.0  2D73TE80

## (undated) DEVICE — SU VICRYL 2-0 TIE 54" J615H

## (undated) DEVICE — CLIP HORIZON SM RED WIDE SLOT 001201

## (undated) DEVICE — CLIP HORIZON MED BLUE 002200

## (undated) DEVICE — SU SILK 3-0 SH 30" K832H

## (undated) DEVICE — CATH DIAG 4FR ANG PIG 538453S

## (undated) DEVICE — CAST PADDING 6" UNSTERILE 9046

## (undated) DEVICE — DRSG GAUZE FLUFTEX RADIOPAQUE 4.5"X4.1YD 11-020

## (undated) DEVICE — SOL NACL 0.9% IRRIG 500ML BOTTLE 2F7123

## (undated) DEVICE — SOL NACL 0.9% IRRIG 1000ML BOTTLE 2F7124

## (undated) DEVICE — TUBING SUCTION 12"X1/4" N612

## (undated) DEVICE — SU VICRYL 2-0 CT-2 27" UND J269H

## (undated) DEVICE — DRSG GAUZE 4X4" TRAY

## (undated) DEVICE — PACK EXTREMITY LATEX FREE SOP32HFFCS

## (undated) DEVICE — DRAPE LAP TRANSVERSE 29421

## (undated) DEVICE — PACK MINOR CUSTOM ASC

## (undated) DEVICE — CLIP APPLIER 11" MED LIGACLIP MCM20

## (undated) DEVICE — GLOVE PROTEXIS POWDER FREE SMT 8.0  2D72PT80X

## (undated) DEVICE — SU DERMABOND ADVANCED .7ML DNX12

## (undated) DEVICE — SPLINT FIBERGLASS 4X30" PRE-CUT RESIN 76430

## (undated) DEVICE — GLOVE BIOGEL PI MICRO INDICATOR UNDERGLOVE SZ 8.0 48980

## (undated) DEVICE — SUCTION MANIFOLD NEPTUNE 2 SYS 1 PORT 702-025-000

## (undated) DEVICE — ESU HIGH TEMP LOOP TIP AA03

## (undated) DEVICE — BLADE KNIFE SURG 10 371110

## (undated) DEVICE — SET BREAST BIOPSY LOCALIZER 20 PROBE 8MM PENCIL 09-0006

## (undated) DEVICE — SU PDS II 0 CTX 36" Z370T

## (undated) DEVICE — MARKER MARGIN MARKER STD 6 COLOR SGL USE MMS6

## (undated) DEVICE — GLOVE PROTEXIS POWDER FREE 7.5 ORTHOPEDIC 2D73ET75

## (undated) DEVICE — NDL 27GA 1.25" 305136

## (undated) DEVICE — SU VICRYL 5-0 S-24DA 8" J579G

## (undated) DEVICE — STPL SKIN 35W 059037

## (undated) DEVICE — PEN MARKING SKIN W/PAPER RULER 31145785

## (undated) DEVICE — BLADE SAW OSCILLATING STRYK MED 9.0X25X0.38MM 2296-003-111

## (undated) DEVICE — SPONGE LAP 18X18" X8435

## (undated) DEVICE — PACK LAPAROTOMY

## (undated) DEVICE — BLADE KNIFE SURG 15 371115

## (undated) DEVICE — DRSG ABDOMINAL 07 1/2X8" 7197D

## (undated) DEVICE — KIT HAND CONTROL ANGIOTOUCH ACIST 65CM AT-P65

## (undated) DEVICE — MANIFOLD KIT ANGIO AUTOMATED 014613

## (undated) DEVICE — LINEN GOWN XLG 5407

## (undated) DEVICE — PREP SKIN SCRUB TRAY 4461A

## (undated) DEVICE — PAD ARMBOARD FOAM EGGCRATE COVIDEN 3114367

## (undated) DEVICE — ENDO FORCEP ENDOJAW BIOPSY 3.7MMX230CM FB-222U

## (undated) DEVICE — SU SILK 2-0 FS-1 18" 685G

## (undated) DEVICE — SOL NACL 0.9% IRRIG 1000ML BOTTLE 07138-09

## (undated) DEVICE — DRAPE BREAST/CHEST 29420

## (undated) DEVICE — DRSG KERLIX 4 1/2"X4YDS ROLL 6730

## (undated) DEVICE — SYSTEM PANNUS RETENTION 4 PAD 2 STRAP CZ-PRS-04

## (undated) DEVICE — ENDO SNARE EXACTO COLD 9MM LOOP 2.4MMX230CM 00711115

## (undated) DEVICE — CATH GD 5.4FR ID / 7FR OD X 43

## (undated) DEVICE — ESU ELEC BLADE 2.75" COATED/INSULATED E1455

## (undated) DEVICE — INTRO SHEATH 4FRX10CM PINNACLE RSS402

## (undated) DEVICE — PEN MARKING SKIN W/LABELS 31145884

## (undated) DEVICE — SU MONOCRYL 4-0 PS-2 18" UND Y496G

## (undated) DEVICE — DRAPE EXTREMITY W/ARMBOARD 29405

## (undated) DEVICE — SU MONOCRYL 2-0 SH 27" UND Y417H

## (undated) DEVICE — SHEATH PRELUDE SNAP 7FRX13CM PLS-1007

## (undated) DEVICE — EYE PREP BETADINE 5% SOLUTION 30ML 0065-0411-30

## (undated) DEVICE — SU VICRYL 2-0 SH 27" UND J417H

## (undated) DEVICE — DEFIB PRO-PADZ LVP LQD GEL ADULT 8900-2105-01

## (undated) DEVICE — PREP PAD ALCOHOL 6818

## (undated) DEVICE — PACK OCULOPLATIC SEN15OCFSD

## (undated) DEVICE — ESU PENCIL SMOKE EVAC W/ROCKER SWITCH 0703-047-000

## (undated) DEVICE — PACK PCMKR PERM SRG PROC LF SAN32PC573

## (undated) DEVICE — LINEN TOWEL PACK X6 WHITE 5487

## (undated) DEVICE — SUCTION TIP YANKAUER W/O VENT K86

## (undated) DEVICE — GLOVE PROTEXIS POWDER FREE 8.0 ORTHOPEDIC 2D73ET80

## (undated) DEVICE — BNDG COHESIVE 2"X5YDS UNSTERILE ASSORTED COLORS LF 8962

## (undated) DEVICE — CAST PADDING 6" STERILE 9046S

## (undated) DEVICE — DRAPE SHEET REV FOLD 3/4 9349

## (undated) DEVICE — CATH DIAG 4FR JL 4.5 538417

## (undated) DEVICE — TOTE ANGIO CORP PC15AT SAN32CC83O

## (undated) DEVICE — SOL WATER IRRIG 1000ML BOTTLE 2F7114

## (undated) DEVICE — SUCTION TIP YANKAUER STR K87

## (undated) DEVICE — STPL SKIN 35W ROTATING HEAD PRW35

## (undated) DEVICE — 9CM X 15CM, AQUACEL AG ADVANTAGE SURGICAL COVER DRESSING

## (undated) DEVICE — DRAPE U SPLIT 74X120" 29440

## (undated) DEVICE — ESU ELEC BLADE HEX-LOCKING 2.5" E1450X

## (undated) RX ORDER — TRIAMCINOLONE ACETONIDE 40 MG/ML
INJECTION, SUSPENSION INTRA-ARTICULAR; INTRAMUSCULAR
Status: DISPENSED
Start: 2024-12-11

## (undated) RX ORDER — FENTANYL CITRATE-0.9 % NACL/PF 10 MCG/ML
PLASTIC BAG, INJECTION (ML) INTRAVENOUS
Status: DISPENSED
Start: 2023-12-07

## (undated) RX ORDER — PROPOFOL 10 MG/ML
INJECTION, EMULSION INTRAVENOUS
Status: DISPENSED
Start: 2019-11-08

## (undated) RX ORDER — DIPHENHYDRAMINE HYDROCHLORIDE 50 MG/ML
INJECTION INTRAMUSCULAR; INTRAVENOUS
Status: DISPENSED
Start: 2024-10-03

## (undated) RX ORDER — ROPIVACAINE HYDROCHLORIDE 5 MG/ML
INJECTION, SOLUTION EPIDURAL; INFILTRATION; PERINEURAL
Status: DISPENSED
Start: 2019-12-26

## (undated) RX ORDER — LIDOCAINE HYDROCHLORIDE 10 MG/ML
INJECTION, SOLUTION EPIDURAL; INFILTRATION; INTRACAUDAL; PERINEURAL
Status: DISPENSED
Start: 2024-10-03

## (undated) RX ORDER — CEFAZOLIN SODIUM 2 G/50ML
SOLUTION INTRAVENOUS
Status: DISPENSED
Start: 2023-11-13

## (undated) RX ORDER — SCOLOPAMINE TRANSDERMAL SYSTEM 1 MG/1
PATCH, EXTENDED RELEASE TRANSDERMAL
Status: DISPENSED
Start: 2019-12-26

## (undated) RX ORDER — ONDANSETRON 2 MG/ML
INJECTION INTRAMUSCULAR; INTRAVENOUS
Status: DISPENSED
Start: 2023-12-07

## (undated) RX ORDER — LIDOCAINE HYDROCHLORIDE 20 MG/ML
JELLY TOPICAL
Status: DISPENSED
Start: 2023-10-06

## (undated) RX ORDER — BUPIVACAINE HYDROCHLORIDE 2.5 MG/ML
INJECTION, SOLUTION EPIDURAL; INFILTRATION; INTRACAUDAL
Status: DISPENSED
Start: 2023-10-21

## (undated) RX ORDER — LIDOCAINE HYDROCHLORIDE AND EPINEPHRINE 10; 10 MG/ML; UG/ML
INJECTION, SOLUTION INFILTRATION; PERINEURAL
Status: DISPENSED
Start: 2023-11-13

## (undated) RX ORDER — FENTANYL CITRATE 50 UG/ML
INJECTION, SOLUTION INTRAMUSCULAR; INTRAVENOUS
Status: DISPENSED
Start: 2024-10-03

## (undated) RX ORDER — BUPIVACAINE HYDROCHLORIDE 2.5 MG/ML
INJECTION, SOLUTION EPIDURAL; INFILTRATION; INTRACAUDAL
Status: DISPENSED
Start: 2024-10-03

## (undated) RX ORDER — ACETAMINOPHEN 325 MG/1
TABLET ORAL
Status: DISPENSED
Start: 2025-03-07

## (undated) RX ORDER — FENTANYL CITRATE 50 UG/ML
INJECTION, SOLUTION INTRAMUSCULAR; INTRAVENOUS
Status: DISPENSED
Start: 2023-11-13

## (undated) RX ORDER — PROPOFOL 10 MG/ML
INJECTION, EMULSION INTRAVENOUS
Status: DISPENSED
Start: 2025-03-14

## (undated) RX ORDER — LABETALOL HYDROCHLORIDE 5 MG/ML
INJECTION, SOLUTION INTRAVENOUS
Status: DISPENSED
Start: 2023-10-21

## (undated) RX ORDER — FENTANYL CITRATE 50 UG/ML
INJECTION, SOLUTION INTRAMUSCULAR; INTRAVENOUS
Status: DISPENSED
Start: 2019-12-26

## (undated) RX ORDER — FENTANYL CITRATE 50 UG/ML
INJECTION, SOLUTION INTRAMUSCULAR; INTRAVENOUS
Status: DISPENSED
Start: 2023-12-07

## (undated) RX ORDER — BUPIVACAINE HYDROCHLORIDE 5 MG/ML
INJECTION, SOLUTION EPIDURAL; INTRACAUDAL
Status: DISPENSED
Start: 2023-10-06

## (undated) RX ORDER — HEPARIN SODIUM 200 [USP'U]/100ML
INJECTION, SOLUTION INTRAVENOUS
Status: DISPENSED
Start: 2019-09-12

## (undated) RX ORDER — PROPOFOL 10 MG/ML
INJECTION, EMULSION INTRAVENOUS
Status: DISPENSED
Start: 2023-10-06

## (undated) RX ORDER — EPHEDRINE SULFATE 50 MG/ML
INJECTION, SOLUTION INTRAMUSCULAR; INTRAVENOUS; SUBCUTANEOUS
Status: DISPENSED
Start: 2023-10-06

## (undated) RX ORDER — PROPOFOL 10 MG/ML
INJECTION, EMULSION INTRAVENOUS
Status: DISPENSED
Start: 2019-12-26

## (undated) RX ORDER — DEXAMETHASONE SODIUM PHOSPHATE 4 MG/ML
INJECTION, SOLUTION INTRA-ARTICULAR; INTRALESIONAL; INTRAMUSCULAR; INTRAVENOUS; SOFT TISSUE
Status: DISPENSED
Start: 2023-12-07

## (undated) RX ORDER — HEPARIN SODIUM 1000 [USP'U]/ML
INJECTION, SOLUTION INTRAVENOUS; SUBCUTANEOUS
Status: DISPENSED
Start: 2019-09-12

## (undated) RX ORDER — ONDANSETRON 2 MG/ML
INJECTION INTRAMUSCULAR; INTRAVENOUS
Status: DISPENSED
Start: 2023-10-06

## (undated) RX ORDER — ACETAMINOPHEN 325 MG/1
TABLET ORAL
Status: DISPENSED
Start: 2023-11-13

## (undated) RX ORDER — OXYCODONE HYDROCHLORIDE 5 MG/1
TABLET ORAL
Status: DISPENSED
Start: 2023-10-06

## (undated) RX ORDER — SODIUM CHLORIDE, SODIUM LACTATE, POTASSIUM CHLORIDE, CALCIUM CHLORIDE 600; 310; 30; 20 MG/100ML; MG/100ML; MG/100ML; MG/100ML
INJECTION, SOLUTION INTRAVENOUS
Status: DISPENSED
Start: 2023-10-21

## (undated) RX ORDER — LIDOCAINE HYDROCHLORIDE 10 MG/ML
INJECTION, SOLUTION EPIDURAL; INFILTRATION; INTRACAUDAL; PERINEURAL
Status: DISPENSED
Start: 2019-12-26

## (undated) RX ORDER — GLYCOPYRROLATE 0.2 MG/ML
INJECTION INTRAMUSCULAR; INTRAVENOUS
Status: DISPENSED
Start: 2023-12-07

## (undated) RX ORDER — PROPOFOL 10 MG/ML
INJECTION, EMULSION INTRAVENOUS
Status: DISPENSED
Start: 2023-12-07

## (undated) RX ORDER — HYDROMORPHONE HYDROCHLORIDE 1 MG/ML
INJECTION, SOLUTION INTRAMUSCULAR; INTRAVENOUS; SUBCUTANEOUS
Status: DISPENSED
Start: 2023-12-07

## (undated) RX ORDER — FENTANYL CITRATE 50 UG/ML
INJECTION, SOLUTION INTRAMUSCULAR; INTRAVENOUS
Status: DISPENSED
Start: 2023-10-21

## (undated) RX ORDER — LIDOCAINE HYDROCHLORIDE 10 MG/ML
INJECTION, SOLUTION EPIDURAL; INFILTRATION; INTRACAUDAL; PERINEURAL
Status: DISPENSED
Start: 2024-12-11

## (undated) RX ORDER — LIDOCAINE HYDROCHLORIDE AND EPINEPHRINE 10; 10 MG/ML; UG/ML
INJECTION, SOLUTION INFILTRATION; PERINEURAL
Status: DISPENSED
Start: 2023-10-06

## (undated) RX ORDER — CEFAZOLIN SODIUM 2 G/50ML
SOLUTION INTRAVENOUS
Status: DISPENSED
Start: 2023-12-07

## (undated) RX ORDER — KETOROLAC TROMETHAMINE 30 MG/ML
INJECTION, SOLUTION INTRAMUSCULAR; INTRAVENOUS
Status: DISPENSED
Start: 2023-12-07

## (undated) RX ORDER — ACETAMINOPHEN 325 MG/1
TABLET ORAL
Status: DISPENSED
Start: 2019-12-26

## (undated) RX ORDER — LORAZEPAM 2 MG/ML
INJECTION INTRAMUSCULAR
Status: DISPENSED
Start: 2023-10-21

## (undated) RX ORDER — HEPARIN SODIUM 5000 [USP'U]/.5ML
INJECTION, SOLUTION INTRAVENOUS; SUBCUTANEOUS
Status: DISPENSED
Start: 2023-10-06

## (undated) RX ORDER — GABAPENTIN 300 MG/1
CAPSULE ORAL
Status: DISPENSED
Start: 2019-12-26

## (undated) RX ORDER — LIDOCAINE HCL/EPINEPHRINE/PF 2%-1:200K
VIAL (ML) INJECTION
Status: DISPENSED
Start: 2019-12-26

## (undated) RX ORDER — FENTANYL CITRATE 50 UG/ML
INJECTION, SOLUTION INTRAMUSCULAR; INTRAVENOUS
Status: DISPENSED
Start: 2025-03-07

## (undated) RX ORDER — CEFAZOLIN SODIUM 1 G/3ML
INJECTION, POWDER, FOR SOLUTION INTRAMUSCULAR; INTRAVENOUS
Status: DISPENSED
Start: 2023-10-21

## (undated) RX ORDER — MAGNESIUM SULFATE HEPTAHYDRATE 40 MG/ML
INJECTION, SOLUTION INTRAVENOUS
Status: DISPENSED
Start: 2023-10-06

## (undated) RX ORDER — PROPOFOL 10 MG/ML
INJECTION, EMULSION INTRAVENOUS
Status: DISPENSED
Start: 2025-03-07

## (undated) RX ORDER — FENTANYL CITRATE 50 UG/ML
INJECTION, SOLUTION INTRAMUSCULAR; INTRAVENOUS
Status: DISPENSED
Start: 2023-10-06

## (undated) RX ORDER — ONDANSETRON 2 MG/ML
INJECTION INTRAMUSCULAR; INTRAVENOUS
Status: DISPENSED
Start: 2019-12-26

## (undated) RX ORDER — GENTAMICIN 40 MG/ML
INJECTION, SOLUTION INTRAMUSCULAR; INTRAVENOUS
Status: DISPENSED
Start: 2023-10-21

## (undated) RX ORDER — LIDOCAINE HYDROCHLORIDE 10 MG/ML
INJECTION, SOLUTION EPIDURAL; INFILTRATION; INTRACAUDAL; PERINEURAL
Status: DISPENSED
Start: 2019-09-12

## (undated) RX ORDER — ACETAMINOPHEN 325 MG/1
TABLET ORAL
Status: DISPENSED
Start: 2023-10-06

## (undated) RX ORDER — KETOROLAC TROMETHAMINE 30 MG/ML
INJECTION, SOLUTION INTRAMUSCULAR; INTRAVENOUS
Status: DISPENSED
Start: 2023-10-06

## (undated) RX ORDER — LIDOCAINE HYDROCHLORIDE 10 MG/ML
INJECTION, SOLUTION EPIDURAL; INFILTRATION; INTRACAUDAL; PERINEURAL
Status: DISPENSED
Start: 2025-03-14

## (undated) RX ORDER — ACETAMINOPHEN 325 MG/1
TABLET ORAL
Status: DISPENSED
Start: 2023-12-07

## (undated) RX ORDER — LIDOCAINE HYDROCHLORIDE 10 MG/ML
INJECTION, SOLUTION EPIDURAL; INFILTRATION; INTRACAUDAL; PERINEURAL
Status: DISPENSED
Start: 2019-11-08

## (undated) RX ORDER — OXYCODONE HYDROCHLORIDE 5 MG/1
TABLET ORAL
Status: DISPENSED
Start: 2023-12-07

## (undated) RX ORDER — CEFAZOLIN SODIUM 2 G/100ML
INJECTION, SOLUTION INTRAVENOUS
Status: DISPENSED
Start: 2024-10-03

## (undated) RX ORDER — VANCOMYCIN HYDROCHLORIDE 1 G/20ML
INJECTION, POWDER, LYOPHILIZED, FOR SOLUTION INTRAVENOUS
Status: DISPENSED
Start: 2023-10-21

## (undated) RX ORDER — FENTANYL CITRATE 50 UG/ML
INJECTION, SOLUTION INTRAMUSCULAR; INTRAVENOUS
Status: DISPENSED
Start: 2019-09-12

## (undated) RX ORDER — METOCLOPRAMIDE HYDROCHLORIDE 5 MG/ML
INJECTION INTRAMUSCULAR; INTRAVENOUS
Status: DISPENSED
Start: 2023-10-21

## (undated) RX ORDER — GLYCOPYRROLATE 0.2 MG/ML
INJECTION, SOLUTION INTRAMUSCULAR; INTRAVENOUS
Status: DISPENSED
Start: 2023-10-06

## (undated) RX ORDER — CEFAZOLIN SODIUM/WATER 2 G/20 ML
SYRINGE (ML) INTRAVENOUS
Status: DISPENSED
Start: 2023-10-06

## (undated) RX ORDER — BUPIVACAINE HYDROCHLORIDE 2.5 MG/ML
INJECTION, SOLUTION EPIDURAL; INFILTRATION; INTRACAUDAL
Status: DISPENSED
Start: 2023-11-13

## (undated) RX ORDER — DEXAMETHASONE SODIUM PHOSPHATE 10 MG/ML
INJECTION, SOLUTION INTRAMUSCULAR; INTRAVENOUS
Status: DISPENSED
Start: 2023-10-06